# Patient Record
Sex: MALE | Race: WHITE | Employment: OTHER | URBAN - METROPOLITAN AREA
[De-identification: names, ages, dates, MRNs, and addresses within clinical notes are randomized per-mention and may not be internally consistent; named-entity substitution may affect disease eponyms.]

---

## 2017-01-01 ENCOUNTER — TELEPHONE (OUTPATIENT)
Dept: RESPIRATORY THERAPY | Age: 57
End: 2017-01-01

## 2017-01-03 ENCOUNTER — HOSPITAL ENCOUNTER (EMERGENCY)
Age: 57
Discharge: HOME | End: 2017-01-03
Attending: EMERGENCY MEDICINE
Payer: MEDICARE

## 2017-01-03 ENCOUNTER — APPOINTMENT (OUTPATIENT)
Dept: GENERAL RADIOLOGY | Age: 57
End: 2017-01-03
Attending: EMERGENCY MEDICINE
Payer: MEDICARE

## 2017-01-03 VITALS
DIASTOLIC BLOOD PRESSURE: 79 MMHG | RESPIRATION RATE: 17 BRPM | SYSTOLIC BLOOD PRESSURE: 124 MMHG | TEMPERATURE: 98.1 F | HEART RATE: 91 BPM | BODY MASS INDEX: 34.39 KG/M2 | HEIGHT: 74 IN | OXYGEN SATURATION: 93 % | WEIGHT: 268 LBS

## 2017-01-03 DIAGNOSIS — R07.89 ATYPICAL CHEST PAIN: Primary | ICD-10-CM

## 2017-01-03 LAB
ALBUMIN SERPL BCP-MCNC: 3.7 G/DL (ref 3.5–5)
ALBUMIN/GLOB SERPL: 0.9 {RATIO} (ref 1.1–2.2)
ALP SERPL-CCNC: 178 U/L (ref 45–117)
ALT SERPL-CCNC: 21 U/L (ref 12–78)
ANION GAP BLD CALC-SCNC: 7 MMOL/L (ref 5–15)
AST SERPL W P-5'-P-CCNC: 14 U/L (ref 15–37)
ATRIAL RATE: 91 BPM
BASOPHILS # BLD AUTO: 0.1 K/UL (ref 0–0.1)
BASOPHILS # BLD: 1 % (ref 0–1)
BILIRUB SERPL-MCNC: 0.3 MG/DL (ref 0.2–1)
BNP SERPL-MCNC: 34 PG/ML (ref 0–100)
BUN SERPL-MCNC: 9 MG/DL (ref 6–20)
BUN/CREAT SERPL: 10 (ref 12–20)
CALCIUM SERPL-MCNC: 9.1 MG/DL (ref 8.5–10.1)
CALCULATED P AXIS, ECG09: 54 DEGREES
CALCULATED R AXIS, ECG10: 18 DEGREES
CALCULATED T AXIS, ECG11: 62 DEGREES
CHLORIDE SERPL-SCNC: 101 MMOL/L (ref 97–108)
CO2 SERPL-SCNC: 27 MMOL/L (ref 21–32)
CREAT SERPL-MCNC: 0.91 MG/DL (ref 0.7–1.3)
DIAGNOSIS, 93000: NORMAL
EOSINOPHIL # BLD: 0.2 K/UL (ref 0–0.4)
EOSINOPHIL NFR BLD: 3 % (ref 0–7)
ERYTHROCYTE [DISTWIDTH] IN BLOOD BY AUTOMATED COUNT: 16.8 % (ref 11.5–14.5)
GLOBULIN SER CALC-MCNC: 4 G/DL (ref 2–4)
GLUCOSE SERPL-MCNC: 109 MG/DL (ref 65–100)
HCT VFR BLD AUTO: 42.2 % (ref 36.6–50.3)
HGB BLD-MCNC: 13.7 G/DL (ref 12.1–17)
LYMPHOCYTES # BLD AUTO: 26 % (ref 12–49)
LYMPHOCYTES # BLD: 2 K/UL (ref 0.8–3.5)
MCH RBC QN AUTO: 29.3 PG (ref 26–34)
MCHC RBC AUTO-ENTMCNC: 32.5 G/DL (ref 30–36.5)
MCV RBC AUTO: 90.4 FL (ref 80–99)
MONOCYTES # BLD: 0.7 K/UL (ref 0–1)
MONOCYTES NFR BLD AUTO: 8 % (ref 5–13)
NEUTS SEG # BLD: 5 K/UL (ref 1.8–8)
NEUTS SEG NFR BLD AUTO: 62 % (ref 32–75)
P-R INTERVAL, ECG05: 208 MS
PLATELET # BLD AUTO: 299 K/UL (ref 150–400)
POTASSIUM SERPL-SCNC: 3.6 MMOL/L (ref 3.5–5.1)
PROT SERPL-MCNC: 7.7 G/DL (ref 6.4–8.2)
Q-T INTERVAL, ECG07: 370 MS
QRS DURATION, ECG06: 96 MS
QTC CALCULATION (BEZET), ECG08: 455 MS
RBC # BLD AUTO: 4.67 M/UL (ref 4.1–5.7)
SODIUM SERPL-SCNC: 135 MMOL/L (ref 136–145)
TROPONIN I SERPL-MCNC: <0.04 NG/ML
TROPONIN I SERPL-MCNC: <0.04 NG/ML
VENTRICULAR RATE, ECG03: 91 BPM
WBC # BLD AUTO: 8 K/UL (ref 4.1–11.1)

## 2017-01-03 PROCEDURE — 74011000250 HC RX REV CODE- 250: Performed by: NURSE PRACTITIONER

## 2017-01-03 PROCEDURE — 94640 AIRWAY INHALATION TREATMENT: CPT

## 2017-01-03 PROCEDURE — 85025 COMPLETE CBC W/AUTO DIFF WBC: CPT | Performed by: EMERGENCY MEDICINE

## 2017-01-03 PROCEDURE — 36415 COLL VENOUS BLD VENIPUNCTURE: CPT | Performed by: NURSE PRACTITIONER

## 2017-01-03 PROCEDURE — 96374 THER/PROPH/DIAG INJ IV PUSH: CPT

## 2017-01-03 PROCEDURE — 99285 EMERGENCY DEPT VISIT HI MDM: CPT

## 2017-01-03 PROCEDURE — 74011250636 HC RX REV CODE- 250/636: Performed by: NURSE PRACTITIONER

## 2017-01-03 PROCEDURE — 80053 COMPREHEN METABOLIC PANEL: CPT | Performed by: EMERGENCY MEDICINE

## 2017-01-03 PROCEDURE — 93005 ELECTROCARDIOGRAM TRACING: CPT

## 2017-01-03 PROCEDURE — 77030029684 HC NEB SM VOL KT MONA -A

## 2017-01-03 PROCEDURE — 83880 ASSAY OF NATRIURETIC PEPTIDE: CPT | Performed by: NURSE PRACTITIONER

## 2017-01-03 PROCEDURE — 71020 XR CHEST PA LAT: CPT

## 2017-01-03 PROCEDURE — 84484 ASSAY OF TROPONIN QUANT: CPT | Performed by: EMERGENCY MEDICINE

## 2017-01-03 RX ORDER — HYDROMORPHONE HYDROCHLORIDE 1 MG/ML
1 INJECTION, SOLUTION INTRAMUSCULAR; INTRAVENOUS; SUBCUTANEOUS ONCE
Status: COMPLETED | OUTPATIENT
Start: 2017-01-03 | End: 2017-01-03

## 2017-01-03 RX ORDER — IPRATROPIUM BROMIDE AND ALBUTEROL SULFATE 2.5; .5 MG/3ML; MG/3ML
3 SOLUTION RESPIRATORY (INHALATION)
Status: COMPLETED | OUTPATIENT
Start: 2017-01-03 | End: 2017-01-03

## 2017-01-03 RX ADMIN — IPRATROPIUM BROMIDE AND ALBUTEROL SULFATE 3 ML: .5; 3 SOLUTION RESPIRATORY (INHALATION) at 17:22

## 2017-01-03 RX ADMIN — HYDROMORPHONE HYDROCHLORIDE 1 MG: 1 INJECTION, SOLUTION INTRAMUSCULAR; INTRAVENOUS; SUBCUTANEOUS at 16:28

## 2017-01-03 RX ADMIN — ALBUTEROL SULFATE 1 DOSE: 2.5 SOLUTION RESPIRATORY (INHALATION) at 16:21

## 2017-01-03 NOTE — ED TRIAGE NOTES
Pt arrives from home c/o generalized LEFT sided chest pain that radiates down his LEFT arm with SOB that started yesterday. Pt states that he was seen at [facility] for the same s/s and discharged home. Pt reports that he called his PCP and was told to come back to the ED.

## 2017-01-03 NOTE — ED PROVIDER NOTES
HPI Comments: 60 y.o. male with past medical history significant for coronary atherosclerosis, chronic systolic heart failure, CAD, HTN, high cholesterol, DM, CHF, COPD, old MI, and neurological disorder who presents from home with chief complaint of chest pain. Pt reports he has had chest pain which radiates down his L arm for 1 day accompanied by increased SOB, diaphoresis, productive cough, and intermittent L sided abdominal pain. He states he spoke with his PCP who initially recommended a breathing treatment, then recommended ED evaluation when the breathing treatment offered no relief. Pt notes he has recently bought 2 new pillows for SOB relief while lying in bed. He states he received a flu shot and he took his regular medications this morning. Pt notes he is on 3L oxygen continuously. Pt denies fever, nausea, vomiting, and diarrhea. There are no other acute medical concerns at this time. Social hx: smokes 2-3 cigarettes daily, formerly smoked 2-3 packs daily. Denies EtOH and drug use. PCP: Yesi Rea MD  Cardiologist: Wade Chandler MD    Note written by Rich Soto, as dictated by Kortney Ochoa NP 3:53 PM    The history is provided by the patient. Past Medical History:   Diagnosis Date    CAD (coronary artery disease)     CHF (congestive heart failure) (HCC)     Chronic systolic heart failure ([facility] [address] 4/12/2011    Coronary atherosclerosis of native coronary artery 4/12/2011    Diabetes ([facility] [address]     High cholesterol     Hypertension     MI, old     Neurological disorder     Other primary cardiomyopathies ([address] 4/12/2011       Past Surgical History:   Procedure Laterality Date    Hx carpal tunnel release       right wrist    Pr cardiac surg procedure unlist       stents x7         History reviewed. No pertinent family history.     Social History     Social History    Marital status:      Spouse name: N/A    Number of children: N/A    Years of education: N/A Occupational History    Not on file. Social History Main Topics    Smoking status: Current Every Day Smoker     Packs/day: 0.25    Smokeless tobacco: Not on file    Alcohol use No    Drug use: No    Sexual activity: Not on file     Other Topics Concern    Not on file     Social History Narrative         ALLERGIES: Review of patient's allergies indicates no known allergies. Review of Systems   Constitutional: Positive for diaphoresis. Negative for fever. Respiratory: Positive for cough and shortness of breath. Cardiovascular: Positive for chest pain. Gastrointestinal: Positive for abdominal pain. Negative for diarrhea, nausea and vomiting. All other systems reviewed and are negative. Vitals:    01/03/17 1403   BP: 108/73   Pulse: 95   Resp: 20   Temp: 98.8 °F (37.1 °C)   SpO2: 96%   Weight: 121.6 kg (268 lb)   Height: 6' 2\" (1.88 m)            Physical Exam   Constitutional: He is oriented to person, place, and time. He appears well-developed and well-nourished. No distress. HENT:   Head: Atraumatic. Nose: Nose normal.   Mouth/Throat: No oropharyngeal exudate. Eyes: Conjunctivae and EOM are normal. Right eye exhibits no discharge. Left eye exhibits no discharge. No scleral icterus. Neck: Normal range of motion. Neck supple. No JVD present. No tracheal deviation present. No thyromegaly present. Cardiovascular: Normal rate, regular rhythm, normal heart sounds, intact distal pulses and normal pulses. PMI is not displaced. Exam reveals no gallop and no friction rub. No murmur heard. Pulmonary/Chest: No stridor. No respiratory distress. He has decreased breath sounds in the right lower field and the left lower field. He has no wheezes. He has no rhonchi. He has no rales. He exhibits no tenderness. Abdominal: Soft. Bowel sounds are normal. He exhibits no distension and no mass. There is no hepatosplenomegaly, splenomegaly or hepatomegaly. There is no tenderness.  There is no rigidity, no rebound, no guarding, no CVA tenderness, no tenderness at McBurney's point and negative Fong's sign. Musculoskeletal: Normal range of motion. He exhibits no edema or tenderness. Lymphadenopathy:     He has no cervical adenopathy. Neurological: He is alert and oriented to person, place, and time. He has normal strength. No cranial nerve deficit or sensory deficit. He displays a negative Romberg sign. Coordination normal. GCS eye subscore is 4. GCS verbal subscore is 5. GCS motor subscore is 6. Skin: Skin is warm and dry. He is not diaphoretic. Psychiatric: He has a normal mood and affect. His behavior is normal.   Nursing note and vitals reviewed. MDM  Number of Diagnoses or Management Options  Atypical chest pain:   Diagnosis management comments:    * routine laboratory data   * CXR   * EKG    * Consult to PCP   * Analgesia       Amount and/or Complexity of Data Reviewed  Clinical lab tests: ordered and reviewed  Tests in the radiology section of CPT®: ordered and reviewed  Discussion of test results with the performing providers: yes  Review and summarize past medical records: yes  Discuss the patient with other providers: yes    Risk of Complications, Morbidity, and/or Mortality  General comments:    - stable, ambulatory pt in [facility] irate over the lack of narcotic distribution     Patient Progress  Patient progress: stable    ED Course       Procedures    CONSULT NOTE:  7:13 PM MARIA LUISA Lopez spoke with Dr. Jodie Sheehan, Consult for PCP. Discussed available diagnostic tests and clinical findings. He is in agreement with care plans as outlined. Dr. Jodie Sheehan reports Pt is a frequent ED visitor. He recommends discharging Pt home as he will see Pt tomorrow. 7:42 PM  Pt has been reevaluated. There are no new complaints, changes, or physical findings at this time. Medications have been reviewed w/ pt and/or family. Pt and/or family's questions have been answered.  Pt and/or family expressed good understanding of the dx/tx/rx and is in agreement with plan of care. Pt instructed and agreed to f/u w/ PCP and to return to ED upon further deterioration. Pt is ready for discharge. LABORATORY TESTS:  Recent Results (from the past 12 hour(s))   EKG, 12 LEAD, INITIAL    Collection Time: 01/03/17  2:12 PM   Result Value Ref Range    Ventricular Rate 91 BPM    Atrial Rate 91 BPM    P-R Interval 208 ms    QRS Duration 96 ms    Q-T Interval 370 ms    QTC Calculation (Bezet) 455 ms    Calculated P Axis 54 degrees    Calculated R Axis 18 degrees    Calculated T Axis 62 degrees    Diagnosis       Normal sinus rhythm  When compared with ECG of 26-AUG-2015 14:10,  Nonspecific T wave abnormality now evident in Lateral leads  Confirmed by Laura Snyder MD, Robbie Yoder ([id number]) on 1/3/2017 4:02:11 PM     TROPONIN I    Collection Time: 01/03/17  2:19 PM   Result Value Ref Range    Troponin-I, Qt. <0.04 <0.05 ng/mL   CBC WITH AUTOMATED DIFF    Collection Time: 01/03/17  2:19 PM   Result Value Ref Range    WBC 8.0 4.1 - 11.1 K/uL    RBC 4.67 4.10 - 5.70 M/uL    HGB 13.7 12.1 - 17.0 g/dL    HCT 42.2 36.6 - 50.3 %    MCV 90.4 80.0 - 99.0 FL    MCH 29.3 26.0 - 34.0 PG    MCHC 32.5 30.0 - 36.5 g/dL    RDW 16.8 (H) 11.5 - 14.5 %    PLATELET 993 760 - 945 K/uL    NEUTROPHILS 62 32 - 75 %    LYMPHOCYTES 26 12 - 49 %    MONOCYTES 8 5 - 13 %    EOSINOPHILS 3 0 - 7 %    BASOPHILS 1 0 - 1 %    ABS. NEUTROPHILS 5.0 1.8 - 8.0 K/UL    ABS. LYMPHOCYTES 2.0 0.8 - 3.5 K/UL    ABS. MONOCYTES 0.7 0.0 - 1.0 K/UL    ABS. EOSINOPHILS 0.2 0.0 - 0.4 K/UL    ABS.  BASOPHILS 0.1 0.0 - 0.1 K/UL   METABOLIC PANEL, COMPREHENSIVE    Collection Time: 01/03/17  2:19 PM   Result Value Ref Range    Sodium 135 (L) 136 - 145 mmol/L    Potassium 3.6 3.5 - 5.1 mmol/L    Chloride 101 97 - 108 mmol/L    CO2 27 21 - 32 mmol/L    Anion gap 7 5 - 15 mmol/L    Glucose 109 (H) 65 - 100 mg/dL    BUN 9 6 - 20 MG/DL    Creatinine 0.91 0.70 - 1.30 MG/DL    BUN/Creatinine ratio 10 (L) 12 - 20      GFR est AA >60 >60 ml/min/1.73m2    GFR est non-AA >60 >60 ml/min/1.73m2    Calcium 9.1 8.5 - 10.1 MG/DL    Bilirubin, total 0.3 0.2 - 1.0 MG/DL    ALT 21 12 - 78 U/L    AST 14 (L) 15 - 37 U/L    Alk. phosphatase 178 (H) 45 - 117 U/L    Protein, total 7.7 6.4 - 8.2 g/dL    Albumin 3.7 3.5 - 5.0 g/dL    Globulin 4.0 2.0 - 4.0 g/dL    A-G Ratio 0.9 (L) 1.1 - 2.2     BNP    Collection Time: 01/03/17  2:19 PM   Result Value Ref Range    BNP 34 0 - 100 pg/mL   EKG, 12 LEAD, INITIAL    Collection Time: 01/03/17  5:31 PM   Result Value Ref Range    Ventricular Rate 96 BPM    Atrial Rate 96 BPM    P-R Interval 200 ms    QRS Duration 96 ms    Q-T Interval 388 ms    QTC Calculation (Bezet) 490 ms    Calculated P Axis 61 degrees    Calculated R Axis 32 degrees    Calculated T Axis 62 degrees    Diagnosis       Sinus rhythm with premature atrial complexes  Prolonged QT  When compared with ECG of 03-JAN-2017 14:12,  premature atrial complexes are now present     TROPONIN I    Collection Time: 01/03/17  5:34 PM   Result Value Ref Range    Troponin-I, Qt. <0.04 <0.05 ng/mL       IMAGING RESULTS:  XR CHEST PA LAT   Final Result        Xr Chest Pa Lat    Result Date: 1/3/2017  INDICATION: . CP, SOB Additional history: Patient complains of generalized left-sided chest pain that radiates down the left arm with associated dyspnea that began yesterday. COMPARISON: Previous chest xray, 3/18/2015. Rodo Romero FINDINGS: PA and lateral view of the chest. . Lines/tubes/surgical: None. Heart/mediastinum: Unremarkable. Lungs/pleura: Chronic appearing lung changes without focal consolidation. No visualized pleural effusion or pneumothorax. Additional Comments: Degenerative changes in the spine. Remote, left rib fracture. Rodo Romero IMPRESSION: 1. Chronic appearing lung changes without evidence of acute process.          MEDICATIONS GIVEN:  Medications   albuterol 5mg / ipratropium 0.5mg neb solution (1 Dose Nebulization Given 1/3/17 1621)   HYDROmorphone (PF) (DILAUDID) injection 1 mg (1 mg IntraVENous Given 1/3/17 1628)   albuterol-ipratropium (DUO-NEB) 2.5 MG-0.5 MG/3 ML (3 mL Nebulization Given 1/3/17 1722)       IMPRESSION:  1. Atypical chest pain        PLAN:  1. Discharge Medication List as of 1/3/2017  7:23 PM      CONTINUE these medications which have NOT CHANGED    Details   omeprazole (PRILOSEC) 40 mg capsule Take 40 mg by mouth daily. , Historical Med      docusate sodium (COLACE) 100 mg capsule Take 100 mg by mouth two (2) times a day., Historical Med      LUBIPROSTONE (AMITIZA PO) Take 24 mg by mouth two (2) times a day., Historical Med      NAPROXEN PO Take 375 mg by mouth two (2) times a day., Historical Med      TIZANIDINE HCL (TIZANIDINE PO) Take 4 mg by mouth three (3) times daily. , Historical Med      rOPINIRole (REQUIP) 4 mg tab TAB Take 4 mg by mouth nightly., Historical Med      albuterol (PROVENTIL VENTOLIN) 2.5 mg /3 mL (0.083 %) nebulizer solution 2.5 mg by Nebulization route every four (4) hours. , Historical Med      morphine CR (MS CONTIN) 30 mg CR tablet Take 60 mg by mouth every twelve (12) hours. , Historical Med      HYDROmorphone (DILAUDID) 4 mg tablet Take 4 mg by mouth two (2) times a day., Historical Med      metoprolol (LOPRESSOR) 25 mg tablet Take 12.5 mg by mouth two (2) times a day., Historical Med      zolpidem (AMBIEN) 10 mg tablet Take 1 Tab by mouth nightly. , Print, Disp-30 Tab, R-2      atorvastatin (LIPITOR) 40 mg tablet Take 1 Tab by mouth daily. , Print, Disp-30 Tab, R-11      metoclopramide HCl (REGLAN) 10 mg tablet Take 10 mg by mouth nightly., Historical Med      aspirin 81 mg chewable tablet Take 1 Tab by mouth daily. , Print, Disp-100 Tab, R-3      nitroglycerin (NITROSTAT) 0.4 mg SL tablet 0.4 mg by SubLINGual route every five (5) minutes as needed for Chest Pain., Historical Med      ranolazine ER (RANEXA) 500 mg SR tablet Take 500 mg by mouth two (2) times a day., Historical Med tiotropium (SPIRIVA WITH HANDIHALER) 18 mcg inhalation capsule Take 1 Cap by inhalation daily. , Historical Med      amLODIPine (NORVASC) 10 mg tablet Take 10 mg by mouth daily. , Historical Med      fluticasone-salmeterol (ADVAIR DISKUS) 250-50 mcg/dose diskus inhaler Take 1 Puff by inhalation two (2) times a day., Historical Med      furosemide (LASIX) 40 mg tablet Take 40 mg by mouth two (2) times a day., Historical Med      isosorbide mononitrate ER (IMDUR) 60 mg CR tablet Take 60 mg by mouth every morning., Historical Med      lisinopril (PRINIVIL) 10 mg tablet Take 10 mg by mouth nightly. 1/2 tablet, Historical Med           2. Follow-up Information     Follow up With Details Comments Contact Info    Anette Swann MD In 1 day  [facility]  [phone]          3.  Supportive care     Return to ED if worse     Kalpana Pyle NP  7:42 PM

## 2017-01-03 NOTE — PROGRESS NOTES
01/03/17 1802   Post-Treatment   Breathing Pattern Regular   Left Breath Sounds Diminished   Right Breath Sounds Diminished   Pulse 108   SpO2 92 %   Respirations 22   Treatment Tolerance Well

## 2017-01-04 LAB
ATRIAL RATE: 96 BPM
CALCULATED P AXIS, ECG09: 61 DEGREES
CALCULATED R AXIS, ECG10: 32 DEGREES
CALCULATED T AXIS, ECG11: 62 DEGREES
DIAGNOSIS, 93000: NORMAL
P-R INTERVAL, ECG05: 200 MS
Q-T INTERVAL, ECG07: 388 MS
QRS DURATION, ECG06: 96 MS
QTC CALCULATION (BEZET), ECG08: 490 MS
VENTRICULAR RATE, ECG03: 96 BPM

## 2017-01-04 NOTE — DISCHARGE INSTRUCTIONS
Chest Pain: Care Instructions  Your Care Instructions  There are many things that can cause chest pain. Some are not serious and will get better on their own in a few days. But some kinds of chest pain need more testing and treatment. Your doctor may have recommended a follow-up visit in the next 8 to 12 hours. If you are not getting better, you may need more tests or treatment. Even though your doctor has released you, you still need to watch for any problems. The doctor carefully checked you, but sometimes problems can develop later. If you have new symptoms or if your symptoms do not get better, get medical care right away. If you have worse or different chest pain or pressure that lasts more than 5 minutes or you passed out (lost consciousness), call 911 or seek other emergency help right away. A medical visit is only one step in your treatment. Even if you feel better, you still need to do what your doctor recommends, such as going to all suggested follow-up appointments and taking medicines exactly as directed. This will help you recover and help prevent future problems. How can you care for yourself at home? · Rest until you feel better. · Take your medicine exactly as prescribed. Call your doctor if you think you are having a problem with your medicine. · Do not drive after taking a prescription pain medicine. When should you call for help? Call 911 if:  · You passed out (lost consciousness). · You have severe difficulty breathing. · You have symptoms of a heart attack. These may include:  ¨ Chest pain or pressure, or a strange feeling in your chest.  ¨ Sweating. ¨ Shortness of breath. ¨ Nausea or vomiting. ¨ Pain, pressure, or a strange feeling in your back, neck, jaw, or upper belly or in one or both shoulders or arms. ¨ Lightheadedness or sudden weakness. ¨ A fast or irregular heartbeat.   After you call 911, the  may tell you to chew 1 adult-strength or 2 to 4 low-dose aspirin. Wait for an ambulance. Do not try to drive yourself. Call your doctor today if:  · You have any trouble breathing. · Your chest pain gets worse. · You are dizzy or lightheaded, or you feel like you may faint. · You are not getting better as expected. · You are having new or different chest pain. Where can you learn more? Go to http://clover-livia.info/. Enter A120 in the search box to learn more about \"Chest Pain: Care Instructions. \"  Current as of: May 27, 2016  Content Version: 11.1  © 8481-4007 [employer]. Care instructions adapted under license by [employer] (which disclaims liability or warranty for this information). If you have questions about a medical condition or this instruction, always ask your healthcare professional. [address] any warranty or liability for your use of this information. We hope that we have addressed all of your medical concerns. The examination and treatment you received in the Emergency Department were for an emergent problem and were not intended as complete care. It is important that you follow up with your healthcare provider(s) for ongoing care. If your symptoms worsen or do not improve as expected, and you are unable to reach your usual health care provider(s), you should return to the Emergency Department. Today's healthcare is undergoing tremendous change, and patient satisfaction surveys are one of the many tools to assess the quality of medical care. You may receive a survey from the [employer] organization regarding your experience in the Emergency Department. I hope that your experience has been completely positive, particularly the medical care that I provided. As such, please participate in the survey; anything less than excellent does not meet my expectations or intentions.         [facility] and [facility] participate in nationally recognized quality of care measures. If your blood pressure is greater than 120/80, as reported below, we urge that you seek medical care to address the potential of high blood pressure, commonly known as hypertension. Hypertension can be hereditary or can be caused by certain medical conditions, pain, stress, or \"white coat syndrome. \"       Please make an appointment with your health care provider(s) for follow up of your Emergency Department visit. VITALS:   Patient Vitals for the past 8 hrs:   Temp Pulse Resp BP SpO2   01/03/17 1900 - 92 23 134/65 92 %   01/03/17 1845 - 98 18 141/86 92 %   01/03/17 1830 - 94 20 126/79 -   01/03/17 1800 - 98 18 135/80 92 %   01/03/17 1730 - (!) 101 22 120/77 95 %   01/03/17 1722 - - - - 92 %   01/03/17 1700 - 93 22 125/62 93 %   01/03/17 1630 - 89 19 121/72 92 %   01/03/17 1622 - 89 19 116/74 93 %   01/03/17 1621 - - - - 93 %   01/03/17 1614 - 92 21 - 94 %   01/03/17 1613 - - - - 96 %   01/03/17 1600 - 86 22 108/58 93 %   01/03/17 1549 - - - 114/73 -   01/03/17 1403 98.8 °F (37.1 °C) 95 20 108/73 96 %          Thank you for allowing us to provide you with medical care today. We realize that you have many choices for your emergency care needs. Please choose us in the future for any continued health care needs. Wiliam Quijano [facility], [address] [place], [place] [address] 20.   Office: [phone]            Recent Results (from the past 24 hour(s))   EKG, 12 LEAD, INITIAL    Collection Time: 01/03/17  2:12 PM   Result Value Ref Range    Ventricular Rate 91 BPM    Atrial Rate 91 BPM    P-R Interval 208 ms    QRS Duration 96 ms    Q-T Interval 370 ms    QTC Calculation (Bezet) 455 ms    Calculated P Axis 54 degrees    Calculated R Axis 18 degrees    Calculated T Axis 62 degrees    Diagnosis       Normal sinus rhythm  When compared with ECG of 26-AUG-2015 14:10,  Nonspecific T wave abnormality now evident in Lateral leads  Confirmed by Azucena Cancino MD, Onel Hunter ([id number]) on 1/3/2017 4:02:11 PM     TROPONIN I    Collection Time: 01/03/17  2:19 PM   Result Value Ref Range    Troponin-I, Qt. <0.04 <0.05 ng/mL   CBC WITH AUTOMATED DIFF    Collection Time: 01/03/17  2:19 PM   Result Value Ref Range    WBC 8.0 4.1 - 11.1 K/uL    RBC 4.67 4.10 - 5.70 M/uL    HGB 13.7 12.1 - 17.0 g/dL    HCT 42.2 36.6 - 50.3 %    MCV 90.4 80.0 - 99.0 FL    MCH 29.3 26.0 - 34.0 PG    MCHC 32.5 30.0 - 36.5 g/dL    RDW 16.8 (H) 11.5 - 14.5 %    PLATELET 499 698 - 993 K/uL    NEUTROPHILS 62 32 - 75 %    LYMPHOCYTES 26 12 - 49 %    MONOCYTES 8 5 - 13 %    EOSINOPHILS 3 0 - 7 %    BASOPHILS 1 0 - 1 %    ABS. NEUTROPHILS 5.0 1.8 - 8.0 K/UL    ABS. LYMPHOCYTES 2.0 0.8 - 3.5 K/UL    ABS. MONOCYTES 0.7 0.0 - 1.0 K/UL    ABS. EOSINOPHILS 0.2 0.0 - 0.4 K/UL    ABS. BASOPHILS 0.1 0.0 - 0.1 K/UL   METABOLIC PANEL, COMPREHENSIVE    Collection Time: 01/03/17  2:19 PM   Result Value Ref Range    Sodium 135 (L) 136 - 145 mmol/L    Potassium 3.6 3.5 - 5.1 mmol/L    Chloride 101 97 - 108 mmol/L    CO2 27 21 - 32 mmol/L    Anion gap 7 5 - 15 mmol/L    Glucose 109 (H) 65 - 100 mg/dL    BUN 9 6 - 20 MG/DL    Creatinine 0.91 0.70 - 1.30 MG/DL    BUN/Creatinine ratio 10 (L) 12 - 20      GFR est AA >60 >60 ml/min/1.73m2    GFR est non-AA >60 >60 ml/min/1.73m2    Calcium 9.1 8.5 - 10.1 MG/DL    Bilirubin, total 0.3 0.2 - 1.0 MG/DL    ALT 21 12 - 78 U/L    AST 14 (L) 15 - 37 U/L    Alk.  phosphatase 178 (H) 45 - 117 U/L    Protein, total 7.7 6.4 - 8.2 g/dL    Albumin 3.7 3.5 - 5.0 g/dL    Globulin 4.0 2.0 - 4.0 g/dL    A-G Ratio 0.9 (L) 1.1 - 2.2     BNP    Collection Time: 01/03/17  2:19 PM   Result Value Ref Range    BNP 34 0 - 100 pg/mL   EKG, 12 LEAD, INITIAL    Collection Time: 01/03/17  5:31 PM   Result Value Ref Range    Ventricular Rate 96 BPM    Atrial Rate 96 BPM    P-R Interval 200 ms    QRS Duration 96 ms    Q-T Interval 388 ms    QTC Calculation (Bezet) 490 ms    Calculated P Axis 61 degrees Calculated R Axis 32 degrees    Calculated T Axis 62 degrees    Diagnosis       Sinus rhythm with premature atrial complexes  Prolonged QT  When compared with ECG of 03-JAN-2017 14:12,  premature atrial complexes are now present     TROPONIN I    Collection Time: 01/03/17  5:34 PM   Result Value Ref Range    Troponin-I, Qt. <0.04 <0.05 ng/mL       Xr Chest Pa Lat    Result Date: 1/3/2017  INDICATION: . CP, SOB Additional history: Patient complains of generalized left-sided chest pain that radiates down the left arm with associated dyspnea that began yesterday. COMPARISON: Previous chest xray, 3/18/2015. Nivia Yanni FINDINGS: PA and lateral view of the chest. . Lines/tubes/surgical: None. Heart/mediastinum: Unremarkable. Lungs/pleura: Chronic appearing lung changes without focal consolidation. No visualized pleural effusion or pneumothorax. Additional Comments: Degenerative changes in the spine. Remote, left rib fracture. Nivia Yanni IMPRESSION: 1. Chronic appearing lung changes without evidence of acute process.

## 2017-05-01 ENCOUNTER — APPOINTMENT (OUTPATIENT)
Dept: CT IMAGING | Age: 57
End: 2017-05-01
Attending: INTERNAL MEDICINE
Payer: MEDICARE

## 2017-05-01 ENCOUNTER — HOSPITAL ENCOUNTER (OUTPATIENT)
Age: 57
Setting detail: OBSERVATION
Discharge: HOME | End: 2017-05-02
Attending: EMERGENCY MEDICINE | Admitting: INTERNAL MEDICINE
Payer: MEDICARE

## 2017-05-01 ENCOUNTER — APPOINTMENT (OUTPATIENT)
Dept: GENERAL RADIOLOGY | Age: 57
End: 2017-05-01
Attending: EMERGENCY MEDICINE
Payer: MEDICARE

## 2017-05-01 DIAGNOSIS — R07.9 CHEST PAIN, UNSPECIFIED TYPE: Primary | ICD-10-CM

## 2017-05-01 DIAGNOSIS — R11.0 NAUSEA WITHOUT VOMITING: ICD-10-CM

## 2017-05-01 DIAGNOSIS — I25.110 CORONARY ARTERY DISEASE INVOLVING NATIVE HEART WITH UNSTABLE ANGINA PECTORIS, UNSPECIFIED VESSEL OR LESION TYPE (HCC): ICD-10-CM

## 2017-05-01 LAB
ALBUMIN SERPL BCP-MCNC: 3.9 G/DL (ref 3.5–5)
ALBUMIN/GLOB SERPL: 1 {RATIO} (ref 1.1–2.2)
ALP SERPL-CCNC: 151 U/L (ref 45–117)
ALT SERPL-CCNC: 27 U/L (ref 12–78)
ANION GAP BLD CALC-SCNC: 7 MMOL/L (ref 5–15)
AST SERPL W P-5'-P-CCNC: 8 U/L (ref 15–37)
ATRIAL RATE: 89 BPM
BASOPHILS # BLD AUTO: 0.3 K/UL (ref 0–0.1)
BASOPHILS # BLD: 2 % (ref 0–1)
BILIRUB SERPL-MCNC: 0.4 MG/DL (ref 0.2–1)
BUN SERPL-MCNC: 22 MG/DL (ref 6–20)
BUN/CREAT SERPL: 16 (ref 12–20)
CALCIUM SERPL-MCNC: 10.2 MG/DL (ref 8.5–10.1)
CALCULATED P AXIS, ECG09: 46 DEGREES
CALCULATED R AXIS, ECG10: 19 DEGREES
CALCULATED T AXIS, ECG11: 58 DEGREES
CHLORIDE SERPL-SCNC: 98 MMOL/L (ref 97–108)
CO2 SERPL-SCNC: 30 MMOL/L (ref 21–32)
CREAT SERPL-MCNC: 1.37 MG/DL (ref 0.7–1.3)
D DIMER PPP FEU-MCNC: 0.8 MG/L FEU (ref 0–0.65)
DIAGNOSIS, 93000: NORMAL
DIFFERENTIAL METHOD BLD: ABNORMAL
EOSINOPHIL # BLD: 0.4 K/UL (ref 0–0.4)
EOSINOPHIL NFR BLD: 3 % (ref 0–7)
ERYTHROCYTE [DISTWIDTH] IN BLOOD BY AUTOMATED COUNT: 18.1 % (ref 11.5–14.5)
EST. AVERAGE GLUCOSE BLD GHB EST-MCNC: 157 MG/DL
GLOBULIN SER CALC-MCNC: 4.1 G/DL (ref 2–4)
GLUCOSE SERPL-MCNC: 137 MG/DL (ref 65–100)
HBA1C MFR BLD: 7.1 % (ref 4.2–6.3)
HCT VFR BLD AUTO: 40.3 % (ref 36.6–50.3)
HGB BLD-MCNC: 13.4 G/DL (ref 12.1–17)
LYMPHOCYTES # BLD AUTO: 37 % (ref 12–49)
LYMPHOCYTES # BLD: 5.1 K/UL (ref 0.8–3.5)
MCH RBC QN AUTO: 28.7 PG (ref 26–34)
MCHC RBC AUTO-ENTMCNC: 33.3 G/DL (ref 30–36.5)
MCV RBC AUTO: 86.3 FL (ref 80–99)
MONOCYTES # BLD: 1.1 K/UL (ref 0–1)
MONOCYTES NFR BLD AUTO: 8 % (ref 5–13)
MYELOCYTES NFR BLD MANUAL: 2 %
NEUTS SEG # BLD: 6.7 K/UL (ref 1.8–8)
NEUTS SEG NFR BLD AUTO: 48 % (ref 32–75)
P-R INTERVAL, ECG05: 196 MS
PLATELET # BLD AUTO: 374 K/UL (ref 150–400)
POTASSIUM SERPL-SCNC: 4 MMOL/L (ref 3.5–5.1)
PROT SERPL-MCNC: 8 G/DL (ref 6.4–8.2)
Q-T INTERVAL, ECG07: 362 MS
QRS DURATION, ECG06: 98 MS
QTC CALCULATION (BEZET), ECG08: 440 MS
RBC # BLD AUTO: 4.67 M/UL (ref 4.1–5.7)
RBC MORPH BLD: ABNORMAL
SODIUM SERPL-SCNC: 135 MMOL/L (ref 136–145)
TROPONIN I BLD-MCNC: <0.04 NG/ML (ref 0–0.08)
TROPONIN I SERPL-MCNC: <0.04 NG/ML
VENTRICULAR RATE, ECG03: 89 BPM
WBC # BLD AUTO: 13.9 K/UL (ref 4.1–11.1)

## 2017-05-01 PROCEDURE — 36415 COLL VENOUS BLD VENIPUNCTURE: CPT | Performed by: INTERNAL MEDICINE

## 2017-05-01 PROCEDURE — 99218 HC RM OBSERVATION: CPT

## 2017-05-01 PROCEDURE — 93306 TTE W/DOPPLER COMPLETE: CPT

## 2017-05-01 PROCEDURE — 74011250636 HC RX REV CODE- 250/636: Performed by: EMERGENCY MEDICINE

## 2017-05-01 PROCEDURE — 74011250637 HC RX REV CODE- 250/637: Performed by: INTERNAL MEDICINE

## 2017-05-01 PROCEDURE — 84484 ASSAY OF TROPONIN QUANT: CPT | Performed by: EMERGENCY MEDICINE

## 2017-05-01 PROCEDURE — 85379 FIBRIN DEGRADATION QUANT: CPT | Performed by: INTERNAL MEDICINE

## 2017-05-01 PROCEDURE — 74011000258 HC RX REV CODE- 258: Performed by: INTERNAL MEDICINE

## 2017-05-01 PROCEDURE — 94640 AIRWAY INHALATION TREATMENT: CPT

## 2017-05-01 PROCEDURE — 74011250637 HC RX REV CODE- 250/637: Performed by: EMERGENCY MEDICINE

## 2017-05-01 PROCEDURE — 96374 THER/PROPH/DIAG INJ IV PUSH: CPT

## 2017-05-01 PROCEDURE — 74011636320 HC RX REV CODE- 636/320: Performed by: INTERNAL MEDICINE

## 2017-05-01 PROCEDURE — 93005 ELECTROCARDIOGRAM TRACING: CPT

## 2017-05-01 PROCEDURE — 96361 HYDRATE IV INFUSION ADD-ON: CPT

## 2017-05-01 PROCEDURE — 93970 EXTREMITY STUDY: CPT

## 2017-05-01 PROCEDURE — 74011000250 HC RX REV CODE- 250: Performed by: INTERNAL MEDICINE

## 2017-05-01 PROCEDURE — 99285 EMERGENCY DEPT VISIT HI MDM: CPT

## 2017-05-01 PROCEDURE — 77010033678 HC OXYGEN DAILY

## 2017-05-01 PROCEDURE — 71260 CT THORAX DX C+: CPT

## 2017-05-01 PROCEDURE — 80053 COMPREHEN METABOLIC PANEL: CPT | Performed by: EMERGENCY MEDICINE

## 2017-05-01 PROCEDURE — 85025 COMPLETE CBC W/AUTO DIFF WBC: CPT | Performed by: EMERGENCY MEDICINE

## 2017-05-01 PROCEDURE — 96376 TX/PRO/DX INJ SAME DRUG ADON: CPT

## 2017-05-01 PROCEDURE — 71010 XR CHEST PORT: CPT

## 2017-05-01 PROCEDURE — 83036 HEMOGLOBIN GLYCOSYLATED A1C: CPT | Performed by: INTERNAL MEDICINE

## 2017-05-01 PROCEDURE — 74011250636 HC RX REV CODE- 250/636: Performed by: INTERNAL MEDICINE

## 2017-05-01 RX ORDER — METFORMIN HYDROCHLORIDE 1000 MG/1
1000 TABLET ORAL 2 TIMES DAILY WITH MEALS
COMMUNITY
End: 2017-05-01 | Stop reason: SINTOL

## 2017-05-01 RX ORDER — ZOLPIDEM TARTRATE 5 MG/1
10 TABLET ORAL
Status: DISCONTINUED | OUTPATIENT
Start: 2017-05-01 | End: 2017-05-02 | Stop reason: HOSPADM

## 2017-05-01 RX ORDER — MAGNESIUM SULFATE 100 %
4 CRYSTALS MISCELLANEOUS AS NEEDED
Status: DISCONTINUED | OUTPATIENT
Start: 2017-05-01 | End: 2017-05-02 | Stop reason: HOSPADM

## 2017-05-01 RX ORDER — OXYBUTYNIN CHLORIDE 5 MG/1
5 TABLET ORAL
Status: DISCONTINUED | OUTPATIENT
Start: 2017-05-01 | End: 2017-05-02 | Stop reason: HOSPADM

## 2017-05-01 RX ORDER — AMLODIPINE BESYLATE 5 MG/1
5 TABLET ORAL 2 TIMES DAILY
Status: DISCONTINUED | OUTPATIENT
Start: 2017-05-01 | End: 2017-05-02 | Stop reason: HOSPADM

## 2017-05-01 RX ORDER — OXYBUTYNIN CHLORIDE 5 MG/1
5 TABLET ORAL
COMMUNITY
End: 2018-01-01

## 2017-05-01 RX ORDER — BISMUTH SUBSALICYLATE 262 MG
1 TABLET,CHEWABLE ORAL DAILY
COMMUNITY
End: 2018-01-01

## 2017-05-01 RX ORDER — MECLIZINE HCL 12.5 MG 12.5 MG/1
12.5 TABLET ORAL
Status: DISCONTINUED | OUTPATIENT
Start: 2017-05-01 | End: 2017-05-02 | Stop reason: HOSPADM

## 2017-05-01 RX ORDER — NORTRIPTYLINE HYDROCHLORIDE 25 MG/1
25 CAPSULE ORAL
COMMUNITY
End: 2018-01-01

## 2017-05-01 RX ORDER — RANOLAZINE 500 MG/1
1000 TABLET, EXTENDED RELEASE ORAL 2 TIMES DAILY
Status: DISCONTINUED | OUTPATIENT
Start: 2017-05-01 | End: 2017-05-02 | Stop reason: HOSPADM

## 2017-05-01 RX ORDER — IPRATROPIUM BROMIDE AND ALBUTEROL SULFATE 2.5; .5 MG/3ML; MG/3ML
3 SOLUTION RESPIRATORY (INHALATION)
Status: DISCONTINUED | OUTPATIENT
Start: 2017-05-01 | End: 2017-05-02 | Stop reason: HOSPADM

## 2017-05-01 RX ORDER — FUROSEMIDE 40 MG/1
80 TABLET ORAL 2 TIMES DAILY
Status: DISCONTINUED | OUTPATIENT
Start: 2017-05-01 | End: 2017-05-02 | Stop reason: HOSPADM

## 2017-05-01 RX ORDER — BUDESONIDE 0.5 MG/2ML
500 INHALANT ORAL
Status: DISCONTINUED | OUTPATIENT
Start: 2017-05-01 | End: 2017-05-02 | Stop reason: HOSPADM

## 2017-05-01 RX ORDER — ALLOPURINOL 300 MG/1
300 TABLET ORAL DAILY
Status: DISCONTINUED | OUTPATIENT
Start: 2017-05-01 | End: 2017-05-02 | Stop reason: HOSPADM

## 2017-05-01 RX ORDER — ISOSORBIDE MONONITRATE 60 MG/1
60 TABLET, EXTENDED RELEASE ORAL 2 TIMES DAILY
Status: DISCONTINUED | OUTPATIENT
Start: 2017-05-01 | End: 2017-05-02 | Stop reason: HOSPADM

## 2017-05-01 RX ORDER — LISINOPRIL 5 MG/1
2.5 TABLET ORAL
Status: DISCONTINUED | OUTPATIENT
Start: 2017-05-01 | End: 2017-05-01

## 2017-05-01 RX ORDER — ROPINIROLE 1 MG/1
8 TABLET, FILM COATED ORAL
Status: DISCONTINUED | OUTPATIENT
Start: 2017-05-01 | End: 2017-05-02 | Stop reason: HOSPADM

## 2017-05-01 RX ORDER — ASPIRIN 325 MG
325 TABLET ORAL DAILY
Status: DISCONTINUED | OUTPATIENT
Start: 2017-05-02 | End: 2017-05-02 | Stop reason: HOSPADM

## 2017-05-01 RX ORDER — SODIUM CHLORIDE 0.9 % (FLUSH) 0.9 %
10 SYRINGE (ML) INJECTION
Status: COMPLETED | OUTPATIENT
Start: 2017-05-01 | End: 2017-05-01

## 2017-05-01 RX ORDER — FLUOXETINE HYDROCHLORIDE 20 MG/1
20 CAPSULE ORAL DAILY
COMMUNITY
End: 2018-01-01

## 2017-05-01 RX ORDER — MECLIZINE HCL 12.5 MG 12.5 MG/1
12.5 TABLET ORAL
COMMUNITY
End: 2018-01-01

## 2017-05-01 RX ORDER — ATORVASTATIN CALCIUM 40 MG/1
80 TABLET, FILM COATED ORAL
Status: DISCONTINUED | OUTPATIENT
Start: 2017-05-01 | End: 2017-05-02 | Stop reason: HOSPADM

## 2017-05-01 RX ORDER — IPRATROPIUM BROMIDE AND ALBUTEROL SULFATE 2.5; .5 MG/3ML; MG/3ML
3 SOLUTION RESPIRATORY (INHALATION) EVERY 4 HOURS
Status: ON HOLD | COMMUNITY
End: 2018-01-01

## 2017-05-01 RX ORDER — METOPROLOL SUCCINATE 50 MG/1
50 TABLET, EXTENDED RELEASE ORAL DAILY
Status: DISCONTINUED | OUTPATIENT
Start: 2017-05-01 | End: 2017-05-02 | Stop reason: HOSPADM

## 2017-05-01 RX ORDER — METOPROLOL SUCCINATE 25 MG/1
25 TABLET, EXTENDED RELEASE ORAL DAILY
COMMUNITY
End: 2018-01-01

## 2017-05-01 RX ORDER — MORPHINE SULFATE 2 MG/ML
4 INJECTION, SOLUTION INTRAMUSCULAR; INTRAVENOUS
Status: COMPLETED | OUTPATIENT
Start: 2017-05-01 | End: 2017-05-01

## 2017-05-01 RX ORDER — ASPIRIN 325 MG
325 TABLET ORAL DAILY
COMMUNITY
End: 2018-01-01

## 2017-05-01 RX ORDER — ATORVASTATIN CALCIUM 80 MG/1
80 TABLET, FILM COATED ORAL DAILY
COMMUNITY

## 2017-05-01 RX ORDER — IPRATROPIUM BROMIDE 0.5 MG/2.5ML
0.5 SOLUTION RESPIRATORY (INHALATION)
Status: DISCONTINUED | OUTPATIENT
Start: 2017-05-01 | End: 2017-05-02 | Stop reason: HOSPADM

## 2017-05-01 RX ORDER — THERA TABS 400 MCG
1 TAB ORAL DAILY
Status: DISCONTINUED | OUTPATIENT
Start: 2017-05-01 | End: 2017-05-02 | Stop reason: HOSPADM

## 2017-05-01 RX ORDER — FLUOXETINE HYDROCHLORIDE 20 MG/1
20 CAPSULE ORAL DAILY
Status: DISCONTINUED | OUTPATIENT
Start: 2017-05-01 | End: 2017-05-02 | Stop reason: HOSPADM

## 2017-05-01 RX ORDER — FLUTICASONE PROPIONATE AND SALMETEROL 250; 50 UG/1; UG/1
1 POWDER RESPIRATORY (INHALATION) 2 TIMES DAILY
Status: DISCONTINUED | OUTPATIENT
Start: 2017-05-01 | End: 2017-05-01 | Stop reason: CLARIF

## 2017-05-01 RX ORDER — CLOPIDOGREL BISULFATE 75 MG/1
75 TABLET ORAL DAILY
Status: DISCONTINUED | OUTPATIENT
Start: 2017-05-01 | End: 2017-05-02 | Stop reason: HOSPADM

## 2017-05-01 RX ORDER — METOPROLOL TARTRATE 50 MG/1
50 TABLET ORAL DAILY
COMMUNITY
End: 2017-05-01

## 2017-05-01 RX ORDER — IBUPROFEN 200 MG
1 TABLET ORAL DAILY
Status: DISCONTINUED | OUTPATIENT
Start: 2017-05-01 | End: 2017-05-02 | Stop reason: HOSPADM

## 2017-05-01 RX ORDER — HYDROMORPHONE HYDROCHLORIDE 4 MG/1
4 TABLET ORAL 3 TIMES DAILY
Status: DISCONTINUED | OUTPATIENT
Start: 2017-05-01 | End: 2017-05-02 | Stop reason: HOSPADM

## 2017-05-01 RX ORDER — MORPHINE SULFATE 30 MG/1
60 TABLET, FILM COATED, EXTENDED RELEASE ORAL EVERY 12 HOURS
Status: DISCONTINUED | OUTPATIENT
Start: 2017-05-01 | End: 2017-05-02 | Stop reason: HOSPADM

## 2017-05-01 RX ORDER — PREDNISONE 5 MG/1
5 TABLET ORAL
COMMUNITY
End: 2018-01-01

## 2017-05-01 RX ORDER — ARFORMOTEROL TARTRATE 15 UG/2ML
15 SOLUTION RESPIRATORY (INHALATION)
Status: DISCONTINUED | OUTPATIENT
Start: 2017-05-01 | End: 2017-05-02 | Stop reason: HOSPADM

## 2017-05-01 RX ORDER — CLOPIDOGREL BISULFATE 75 MG/1
75 TABLET ORAL DAILY
COMMUNITY
End: 2018-01-01

## 2017-05-01 RX ORDER — TIZANIDINE 4 MG/1
4 TABLET ORAL 3 TIMES DAILY
Status: DISCONTINUED | OUTPATIENT
Start: 2017-05-01 | End: 2017-05-02 | Stop reason: HOSPADM

## 2017-05-01 RX ORDER — IBUPROFEN 200 MG
1 TABLET ORAL DAILY
Status: DISCONTINUED | OUTPATIENT
Start: 2017-05-02 | End: 2017-05-01

## 2017-05-01 RX ORDER — DOCUSATE SODIUM 100 MG/1
100 CAPSULE, LIQUID FILLED ORAL 2 TIMES DAILY
Status: DISCONTINUED | OUTPATIENT
Start: 2017-05-01 | End: 2017-05-02 | Stop reason: HOSPADM

## 2017-05-01 RX ORDER — PREDNISONE 10 MG/1
5 TABLET ORAL
Status: DISCONTINUED | OUTPATIENT
Start: 2017-05-01 | End: 2017-05-01

## 2017-05-01 RX ORDER — ALLOPURINOL 300 MG/1
300 TABLET ORAL DAILY
COMMUNITY
End: 2018-01-01

## 2017-05-01 RX ORDER — SODIUM CHLORIDE 9 MG/ML
100 INJECTION, SOLUTION INTRAVENOUS CONTINUOUS
Status: DISCONTINUED | OUTPATIENT
Start: 2017-05-01 | End: 2017-05-02

## 2017-05-01 RX ORDER — DEXTROSE 50 % IN WATER (D50W) INTRAVENOUS SYRINGE
12.5-25 AS NEEDED
Status: DISCONTINUED | OUTPATIENT
Start: 2017-05-01 | End: 2017-05-02 | Stop reason: HOSPADM

## 2017-05-01 RX ORDER — BUPROPION HYDROCHLORIDE 150 MG/1
150 TABLET, EXTENDED RELEASE ORAL
Status: DISCONTINUED | OUTPATIENT
Start: 2017-05-01 | End: 2017-05-02 | Stop reason: HOSPADM

## 2017-05-01 RX ORDER — NORTRIPTYLINE HYDROCHLORIDE 25 MG/1
25 CAPSULE ORAL
Status: DISCONTINUED | OUTPATIENT
Start: 2017-05-01 | End: 2017-05-02 | Stop reason: HOSPADM

## 2017-05-01 RX ORDER — MORPHINE SULFATE 4 MG/ML
4 INJECTION, SOLUTION INTRAMUSCULAR; INTRAVENOUS
Status: COMPLETED | OUTPATIENT
Start: 2017-05-01 | End: 2017-05-01

## 2017-05-01 RX ORDER — NITROGLYCERIN 0.4 MG/1
0.4 TABLET SUBLINGUAL
Status: DISCONTINUED | OUTPATIENT
Start: 2017-05-01 | End: 2017-05-02 | Stop reason: HOSPADM

## 2017-05-01 RX ORDER — LISINOPRIL 2.5 MG/1
2.5 TABLET ORAL
COMMUNITY
End: 2018-01-01

## 2017-05-01 RX ORDER — BUPROPION HYDROCHLORIDE 150 MG/1
150 TABLET, EXTENDED RELEASE ORAL
COMMUNITY
End: 2017-06-05

## 2017-05-01 RX ORDER — NITROGLYCERIN 0.4 MG/1
0.4 TABLET SUBLINGUAL AS NEEDED
Status: DISCONTINUED | OUTPATIENT
Start: 2017-05-01 | End: 2017-05-01 | Stop reason: SDUPTHER

## 2017-05-01 RX ADMIN — FUROSEMIDE 80 MG: 40 TABLET ORAL at 17:54

## 2017-05-01 RX ADMIN — NORTRIPTYLINE HYDROCHLORIDE 25 MG: 25 CAPSULE ORAL at 21:14

## 2017-05-01 RX ADMIN — ZOLPIDEM TARTRATE 10 MG: 5 TABLET ORAL at 21:14

## 2017-05-01 RX ADMIN — FLUOXETINE 20 MG: 20 CAPSULE ORAL at 11:25

## 2017-05-01 RX ADMIN — RANOLAZINE 1000 MG: 500 TABLET, FILM COATED, EXTENDED RELEASE ORAL at 13:02

## 2017-05-01 RX ADMIN — ATORVASTATIN CALCIUM 80 MG: 20 TABLET, FILM COATED ORAL at 20:07

## 2017-05-01 RX ADMIN — BUPROPION HYDROCHLORIDE 150 MG: 150 TABLET, EXTENDED RELEASE ORAL at 20:07

## 2017-05-01 RX ADMIN — Medication 4 MG: at 08:18

## 2017-05-01 RX ADMIN — RANOLAZINE 1000 MG: 500 TABLET, FILM COATED, EXTENDED RELEASE ORAL at 21:15

## 2017-05-01 RX ADMIN — CLOPIDOGREL BISULFATE 75 MG: 75 TABLET ORAL at 11:25

## 2017-05-01 RX ADMIN — TIZANIDINE 4 MG: 4 TABLET ORAL at 15:09

## 2017-05-01 RX ADMIN — AMLODIPINE BESYLATE 5 MG: 5 TABLET ORAL at 20:08

## 2017-05-01 RX ADMIN — ISOSORBIDE MONONITRATE 60 MG: 60 TABLET, EXTENDED RELEASE ORAL at 11:26

## 2017-05-01 RX ADMIN — MORPHINE SULFATE 60 MG: 30 TABLET, EXTENDED RELEASE ORAL at 20:07

## 2017-05-01 RX ADMIN — NITROGLYCERIN 0.4 MG: 0.4 TABLET SUBLINGUAL at 07:37

## 2017-05-01 RX ADMIN — IOPAMIDOL 100 ML: 612 INJECTION, SOLUTION INTRAVENOUS at 19:46

## 2017-05-01 RX ADMIN — IPRATROPIUM BROMIDE 0.5 MG: 0.5 SOLUTION RESPIRATORY (INHALATION) at 13:30

## 2017-05-01 RX ADMIN — HYDROMORPHONE HYDROCHLORIDE 4 MG: 2 TABLET ORAL at 20:08

## 2017-05-01 RX ADMIN — SODIUM CHLORIDE 100 ML: 900 INJECTION, SOLUTION INTRAVENOUS at 19:45

## 2017-05-01 RX ADMIN — BUDESONIDE 500 MCG: 0.5 INHALANT RESPIRATORY (INHALATION) at 20:47

## 2017-05-01 RX ADMIN — DOCUSATE SODIUM 100 MG: 100 CAPSULE, LIQUID FILLED ORAL at 17:54

## 2017-05-01 RX ADMIN — THERA TABS 1 TABLET: TAB at 11:26

## 2017-05-01 RX ADMIN — FUROSEMIDE 80 MG: 40 TABLET ORAL at 11:26

## 2017-05-01 RX ADMIN — ISOSORBIDE MONONITRATE 60 MG: 60 TABLET, EXTENDED RELEASE ORAL at 20:07

## 2017-05-01 RX ADMIN — TIZANIDINE 4 MG: 4 TABLET ORAL at 21:14

## 2017-05-01 RX ADMIN — HYDROMORPHONE HYDROCHLORIDE 4 MG: 2 TABLET ORAL at 11:34

## 2017-05-01 RX ADMIN — NITROGLYCERIN 0.4 MG: 0.4 TABLET SUBLINGUAL at 07:12

## 2017-05-01 RX ADMIN — MORPHINE SULFATE 60 MG: 30 TABLET, EXTENDED RELEASE ORAL at 11:24

## 2017-05-01 RX ADMIN — ARFORMOTEROL TARTRATE 15 MCG: 15 SOLUTION RESPIRATORY (INHALATION) at 20:47

## 2017-05-01 RX ADMIN — METOPROLOL SUCCINATE 50 MG: 50 TABLET, EXTENDED RELEASE ORAL at 11:48

## 2017-05-01 RX ADMIN — ALLOPURINOL 300 MG: 300 TABLET ORAL at 11:25

## 2017-05-01 RX ADMIN — DOCUSATE SODIUM 100 MG: 100 CAPSULE, LIQUID FILLED ORAL at 11:26

## 2017-05-01 RX ADMIN — AMLODIPINE BESYLATE 5 MG: 5 TABLET ORAL at 11:25

## 2017-05-01 RX ADMIN — Medication 10 ML: at 19:45

## 2017-05-01 RX ADMIN — HYDROMORPHONE HYDROCHLORIDE 4 MG: 2 TABLET ORAL at 14:29

## 2017-05-01 RX ADMIN — OXYBUTYNIN CHLORIDE 5 MG: 5 TABLET ORAL at 21:15

## 2017-05-01 RX ADMIN — Medication 4 MG: at 07:10

## 2017-05-01 RX ADMIN — ROPINIROLE HYDROCHLORIDE 8 MG: 1 TABLET, FILM COATED ORAL at 21:14

## 2017-05-01 RX ADMIN — IPRATROPIUM BROMIDE 0.5 MG: 0.5 SOLUTION RESPIRATORY (INHALATION) at 20:47

## 2017-05-01 RX ADMIN — SODIUM CHLORIDE 100 ML/HR: 900 INJECTION, SOLUTION INTRAVENOUS at 14:36

## 2017-05-01 NOTE — H&P
[address]   [address] [place] [address]   HISTORY AND PHYSICAL       Name:  Mamie Aguilera   MR#:  [id number]   :  1960   Account #:  [de-identified]        Date of Adm:  2017       PRIMARY ADMITTING DIAGNOSES   1. Atypical chest pain, worse with deep breathing, coughing or moving. Previous history of a negative stress test but positive catheterization. Agree with Cardiology as planned to admit to observation and proceed   to catheterization in the morning. The patient had been set up for   outpatient catheterization, but given his recurrent emergency room   visits, was felt to need inpatient observation admission. 2. History of sliding hiatal hernia, as well as history of esophageal   strictures. If catheterization is negative, will send for workup on the   outpatient basis with Gastroenterology. I will discuss this with Dr. Christo Garland upon discharge. 3. Leukocytosis, likely secondary to recent steroid use. SECONDARY DIAGNOSES   1. History of recurrent hospitalization admissions secondary to chest   pain, with negative cardiac workup in the past. He did have one   catheterization that was positive and had stent placed, but   interestingly, had a negative stress test prior to that. 2. History of dysphasia. 3. Cough, congestion. 4. Vitamin D deficiency. 5. Chronic angina. 6. Diabetes type 2.   7. Chronic pain. 8. Neuropathy. 9. History of chronic constipation. 10. History of hypoxemia, for which he is on oxygen at home. 11. Morbid obesity. 12. Obstructive sleep apnea. 13. Peripheral vascular disease. 14. Peripheral arterial disease. 15. Osteoarthritis. 16. Gastroesophageal reflux disease. 17. Coronary artery disease, with history of multiple previous stents. 18. Chronic obstructive pulmonary disease. 19. Congestive heart failure, with an ejection fraction of 45%. 20. History of cerebrovascular accident.    21. History of deep venous thrombosis. 22. History of abdominal aortic aneurysm. 23. History of psychiatric disorder. 24. Carpal tunnel syndrome. 25. Gastroparesis. PAST SURGICAL HISTORY INCLUDES   1. Carpal tunnel surgery. 2. Esophageal dilation secondary to strictures. 3. Abdominal aortic aneurysm repair intravenously. 4. History of right leg thrombectomy following AAA repair. 5. EGD in 12/2014, with dilation and improvement of symptoms. 6. Barium swallow in 03/2013 showing C5, C6 and C7 narrowing,   corresponding to osteophyte impingement upon the esophagus. 7. Gastric emptying study in 2013, with marked delay, consistent with   severe gastroparesis. 8. The patient had previous EGD in [facility] records in 2010,   showing sliding hiatal hernia. 9. The patient had a relatively recent negative colonoscopy in 01/2014. FAMILY HISTORY: Father with a history of COPD. Mother's history   unknown. SOCIAL HISTORY: The patient lives alone. He is currently   independent of activities of daily living. He has daily tobacco abuse,   about 2-3 packs per day. He reports that he is trying to cut back. He   smokes about 5-6 cigarettes a day, but is requesting a nicotine patch   at this time. No history of alcohol or drug use. He normally ambulates   with a cane, although he has been told he needs a walker, but he does   not have one as of yet. HOME MEDICATIONS   1. Advair Diskus 1 puff twice daily. 2. Ambien 10 mg at night. 3. Colace 100 mg p.o. b.i.d.   4. GoLYTELY as directed. 5. MS Contin 60 mg 3 times daily, extended release. 6. Multivitamin 1 tab p.o. daily. 7. Ranexa 1000 mg twice daily. 8. Requip 4 mg 2 tabs daily at bedtime. 9. Spiriva inhaler daily. 10. Ventolin inhaler 2 puffs as needed. 11. Wellbutrin 150 mg daily. 12. Albuterol 3 times daily as needed. 13. Norvasc 5 mg twice daily. 14. Aspirin 81 mg daily. 15. Atorvastatin 80 mg daily. 16. Plavix 75 mg daily.    17. Fluoxetine 20 mg daily. 18. Fluconazole 100 mg twice daily, recently prescribed. 19. Flonase 2 sprays each nostril daily. 20. Lasix 40 mg twice daily. 21. Hydromorphone 4 mg twice daily as needed. 22. Atrovent inhaler every 6 hours as needed. 23. Imdur 60 mg twice daily. 24. Lisinopril 2.5 mg daily. 25. Meclizine 25 mg twice daily as needed. 26. Metoprolol 25 mg 2 tabs daily. 27. Naproxen 500 mg twice daily as needed. 28. Nitrostat 0.4 as needed. 29. Nortriptyline 25 mg daily. 30. Omeprazole 20 mg daily. 31. Oxybutynin 5 mg daily. 33. Prednisone, which he was on from 03/29/2017 to 04/28/2017 daily. 34. Tizanidine 4 mg 3 times daily as needed. 35. Triamcinolone cream as directed. HISTORY OF PRESENT ILLNESS: The patient is a 49-year-old male   known to me from multiple ER visits, as well as hospitalizations, who   has a history of recurrent chest pain, and in fact was just in the   [facility] Emergency Room about a week ago. He had stenting of   the right coronary in 10/2017, after 3-4 admissions for chest pain, and   he did have a negative stress test prior to this. After his stent   procedure, he had another cath on 12/05/2016, after recurrent chest   pain, which indicated that the recent stent coronary was patent, and   that his stent from his circumflex to the anterior descending arteries   were also patent. However, he continued to have issues with chest   pain, and comes in almost weekly to the emergency room with   complaints of chest pain. Given his presentation, his plan of care was   discussed between Dr. Sally Hernandez and Dr. Elisha Dixon.  It was felt that he   would at least need another cardiac cath to rule out any cardiac origin   of his recurrent chest pain, as he has presented atypically in the past.   The patient was, therefore, being set up for outpatient cath test, but   again returned to the emergency room today, as he was already   scheduled for cath, it was felt that we could admit him to observation   and proceed to cath in the morning. If this was negative, we could then   discharge to home. PHYSICAL EXAMINATION   VITAL SIGNS: Temperature 97.9, pulse is 93, respirations of 14, blood   pressure 152/98, saturating 92% on 3 L nasal cannula. GENERAL: The patient is awake, alert. He is oriented to person, place   and time, not in acute distress. HEENT: Normocephalic, atraumatic. Pupils are round, equal and   reactive to light. Extraocular muscles are intact. NECK: Supple, no JVD, no carotid bruit. CARDIOVASCULAR: Regular S1, S2, with tenderness under his left   breast, as well as bilateral gynecomastia. Rate is tachycardic, but   otherwise regular S1, S2, no murmurs, gallops or rubs appreciated. LUNGS: Clear to auscultation bilaterally, with prolonged expiratory   phase. ABDOMEN: Obese, soft, nontender, nondistended, positive bowel   sounds. EXTREMITIES: He has trace edema of his lower extremities, 2+   palpable pulses. NEUROLOGIC: The patient is awake, alert and oriented. Speech,   language and memory is intact. Cranial nerves 2 through 12 are   grossly intact. No focal neurologic deficit is noted. PSYCHIATRIC: Mood and affect are appropriate. DIAGNOSTIC DATA: EKG unremarkable, shows normal sinus rhythm,   no ST or T-wave abnormality. Chest x-ray showed no evidence of   acute cardiopulmonary process. LABORATORY DATA: WBC 13.9, hemoglobin 13.4, hematocrit 40.3,   platelets 179. D-dimer 0.8. Sodium 135, potassium 4.0, chloride 98,   bicarbonate 30, BUN 22, creatinine 1.3, glucose 137. LFTs are grossly   within normal limits. Cardiac enzymes negative x1. ASSESSMENT AND PLAN:   1. Chest pain with a history of atypical presentation of coronary artery   disease in the past. Would proceed to catheterization. I will continue   him on hydration at this time, and recheck a BMP in the morning. I do   not see a need for Mucomyst at this time.  Will monitor BMP post catheterization. Continue him on his home medications including   aspirin, Plavix, Lipitor and metoprolol. Will hold lisinopril for now, given   contrast needed. 2. Elevated D-dimer. Rule out pulmonary embolism. Will check a CT   angio of the chest at this time. Continue hydration and monitor kidney   function. 3. Chronic history of tobacco abuse. The patient has been counseled. Will start the patient on a nicotine patch at this time. 4. History of esophageal strictures and sliding hiatal hernia. Likely, this   can be worked up on the outpatient basis. I will discuss with Dr. Gonzalez Fairly possible referral to Gastroenterology. 5. Leukocytosis, likely secondary to recent history of steroids. Will   monitor CBC. 6. Gastroparesis. Will check a hemoglobin A1c, start the patient on a   sliding scale insulin and ask Dietary to evaluate him.         DO JUSTYN Haynes / [facility]   D:  05/01/2017   14:31   T:  05/01/2017   15:26   Job #:  [id number]

## 2017-05-01 NOTE — CONSULTS
Full note dictated, discussed POC with patient if cath negative can likely discharge to home, discussed previous colonoscopy in 2014 negative reports hx of esophogeal stricture but EGD in 2010 negative did however show sliding hiatal hernia. Discussed follow-up outpatient with Dr. Reynold Mast and possible repeat EDG outpatient if pain not cardiac.

## 2017-05-01 NOTE — ED TRIAGE NOTES
Triage note: Pt arrives with c/o left sided chest pain that radiates down left arm with SOB starting around 6am. Hx 5 MI and 6 stents. Due for heart cath tomorrow morning.

## 2017-05-01 NOTE — H&P
[address] [facility], [facility]   HISTORY AND PHYSICAL       Name:  Kelsey Workman   MR#:  [id number]   :  1960   Account #:  [de-identified]        Date of Adm:  2017       REFERRING PHYSICIAN: Dr. Reinaldo Jackson in the emergency room. PRIMARY CARE PHYSICIAN: Dr. Nathanael Mclaughlin at [facility]. The patient came to the emergency room with chest pain, intermittent in   nature, under his left breast radiating down his left axilla and left flank   with a definite tactile component. He is not having midsternal chest   pain or pain in his shoulder, jaws or abdomen. He does have chronic   low back pain. The patient is well-known to me for many years. I have   not seen him in the last couple of years, he tends to go to [facility] for his acute care. In 2016, the patient had stenting of   the right coronary artery after 3-4 admissions for chest pain. After his   stent procedure, he had another catheterization on 2016,   which indicated that the recently stented right coronary was patent and   the stents in his circumflex and anterior descending arteries   were patent as well. His ejection fraction was 55%, LV filling pressure was 10. The   chest pains have now been going on for an unspecified period of time. PAST MEDICAL HISTORY: The patient has many other medical   problems including peripheral arterial disease and he has an   abdominal aortic aneurysm stent graft that was placed by Dr. Alina Mooney about   2 to 2-1/2 years ago at [facility]. He also has chronic   obstructive pulmonary disease and continues to smoke. Although he is   down to 2 cigarettes per day. He has hypertension, hyperlipidemia, diabetes,  chronic pain, and restless leg syndrome. PAST SURGICAL HISTORY: Includes carpal tunnel release of right   wrist and at least 7 stent procedures, and abdominal aortic stent graft. REVIEW OF SYSTEMS: Negative for fever or chills.  He does have a   cough with scant production, no hemoptysis. No blood per urine. No   blood per stool. No abdominal pain. He has chronic edema of his legs,   right greater than left. He was seen and treated for cellulitis by Dr. Dionisio Killian. MEDICATIONS AT HOME, IN [facility] CARE (still needs to be   reviewed and probably not accurate)   1. Aspirin 325 daily. 2. Clopidogrel 75 mg daily. 3. Nortriptyline 25 mg every night. 4. Prednisone 5 mg Mondays, Wednesdays, Fridays. 5. Allopurinol 300 mg daily. 6. DuoNeb with albuterol and ipratropium q.4h.   7. Ditropan 5 mg nightly. 8. Multivitamins 1 a day. 9. Prozac 20 mg daily. 10. Antivert 12.5 three times a day. 11. Atorvastatin 80 mg daily. 12. Bupropion  nightly. 13. Lisinopril 2.5 mg nightly. 14. Metoprolol succinate 25 mg twice a day. 15. Metformin 1000 mg 2 times a day. 16. Omeprazole 20 mg daily. 17. Docusate sodium 100 mg 2 times a day. 18. Naproxen p.o. 500 mg 2 times a day. 19. Tizanidine 4 mg 3 times a day. 20. Requip 8 mg nightly. 21. MS Contin 30 mg, takes 60 mg every 12 hours. 22. Hydromorphone 4 mg by mouth 3 times a day. 23. Ambien 10 mg nightly. 24. Nitroglycerin sublingual p.r.n.   25. Ranolazine 1000 mg 2 times a day. 26. Spiriva hand-held inhaler 18 mcg 1 cap daily. 27. Amlodipine 5 mg 2 times a day. 28. Advair Diskus 250/50 daily. 29. Furosemide 40 mg tablets, takes 80 mg 2 times a day. 30. Isosorbide mononitrate 60 mg 2 times a day. ALLERGIES: NO KNOWN ALLERGIES. PHYSICAL EXAMINATION   GENERAL: On exam, this is well-developed, pleasant gentleman who   looks his stated age. VITAL SIGNS: Pulse 93, respirations 16, blood pressure 127/81,   saturation 92% on 3 liters. HEENT: Unremarkable. NECK: Supple. No adenopathy. No neck vein distension. Carotid   pulses palpable, no bruits. Thyroid not enlarged. Trachea midline.    CHEST WALL: Nontender in the mid sternum, very tender under the   left breast extending towards the left axilla. He has mild bilateral   gynecomastia. ABDOMEN: Obese and nontender. No bruits, no ascites, no palpable   masses. EXTREMITIES: He has edema and erythema of the skin, right greater   than left, both with the edema and redness. The skin appears to be   intact. He has 2+ left pedal pulse, trace to 1+ right pedal pulse. NEUROLOGIC: The patient is awake, alert, and appropriate. No focal   motor signs. Normal speech, normal gait. EKG shows sinus rhythm, essentially normal.     LABORATORY: His white count is 13.9. Hemoglobin, hematocrit, and   platelets normal. Chemistries: BUN 22, creatinine 1.37, potassium 4.0,   troponin is normal at less than 0.04. Chest x-ray indicates no evidence of acute cardiopulmonary   process. This has been personally viewed given this assessment. Of interest, the patient is scheduled for an elective catheterization   here, actually by me, for ongoing recurrent chest pain. I have reviewed the catheterization reports from October 2016 and   December 2016. IMPRESSION   1. Atypical chest pain. The pain is under the left breast, worse with   deep breathing, coughing, or moving, worse with palpation. His   electrocardiogram is normal. His initial troponin is normal.   2. Elevated creatinine compared to his January baseline. 3. Chronic obstructive pulmonary disease. 4. Chronic pain. At this point, significant dependence on narcotics. 5. Peripheral arterial disease and aortic disease with stent graft in his   aorta about 2 to 2-1/2 years ago. 6. Restless leg syndrome. PLAN: Admit to telemetry for observation. Will screen for PE. Will plan   to proceed with catheterization tomorrow as originally planned. See   orders for details.         MD SHRAVAN Mullins / [facility]   D:  05/01/2017   09:27   T:  05/01/2017   10:30   Job #:  [id number]

## 2017-05-01 NOTE — ED PROVIDER NOTES
Patient is a 60 y.o. male presenting with chest pain and shortness of breath. Chest Pain (Angina)    Associated symptoms include shortness of breath. Shortness of Breath   Associated symptoms include chest pain. 61y M here with chest pain. Started around 6am today while in bed. Pain radiates into the L arm. Feels like prior cardiac chest pain. Took 325 ASA and 1 SL NTG but pain stayed the same. No shortness of breath. Some nausea. No vomiting. (+) diaphoresis. Scheduled to have cardiac cath tomorrow. No fever. No leg pain/swelling. No speech problems. No focal numbness/weakness. Past Medical History:   Diagnosis Date    CAD (coronary artery disease)     CHF (congestive heart failure) (formerly [facility])     Chronic systolic heart failure ([facility] [address] 4/12/2011    COPD (chronic obstructive pulmonary disease) (formerly [facility])     Coronary atherosclerosis of native coronary artery 4/12/2011    Diabetes ([facility] [address]     High cholesterol     Hypertension     MI, old     Neurological disorder     Other primary cardiomyopathies 4/12/2011    Restless leg syndrome        Past Surgical History:   Procedure Laterality Date    CARDIAC SURG PROCEDURE UNLIST      stents x7    HX CARPAL TUNNEL RELEASE      right wrist         No family history on file. Social History     Social History    Marital status:      Spouse name: N/A    Number of children: N/A    Years of education: N/A     Occupational History    Not on file. Social History Main Topics    Smoking status: Current Every Day Smoker     Packs/day: 0.25    Smokeless tobacco: Not on file    Alcohol use No    Drug use: No    Sexual activity: Not on file     Other Topics Concern    Not on file     Social History Narrative         ALLERGIES: Review of patient's allergies indicates no known allergies. Review of Systems   Respiratory: Positive for shortness of breath. Cardiovascular: Positive for chest pain. Review of Systems   Constitutional: (-) weight loss. HEENT: (-) stiff neck   Eyes: (-) discharge. Respiratory: (-) for cough. Cardiovascular: (-) syncope. Gastrointestinal: (-) blood in stool. Genitourinary: (-) hematuria. Musculoskeletal: (-) myalgias. Neurological: (-) seizure. Skin: (-) petechiae  Lymph/Immunologic: (-) enlarged lymph nodes  All other systems reviewed and are negative. Vitals:    05/01/17 0700   BP: 132/75   Pulse: 90   Resp: (!) 32   Temp: 98.4 °F (36.9 °C)   SpO2: (!) 89%   Weight: 142.9 kg (315 lb)   Height: 6' 2\" (1.88 m)            Physical Exam Nursing note and vitals reviewed. Constitutional: oriented to person, place, and time. Appears obese. No distress. Head: Normocephalic and atraumatic. Sclera anicteric  Nose: No rhinorrhea  Mouth/Throat: Oropharynx is clear and moist. Pharynx normal  Eyes: Conjunctivae are normal. Pupils are equal, round, and reactive to light. Right eye exhibits no discharge. Left eye exhibits no discharge. Neck: Painless normal range of motion. Neck supple. No LAD. Cardiovascular: Normal rate, regular rhythm, normal heart sounds and intact distal pulses. Exam reveals no gallop and no friction rub. No murmur heard. Pulmonary/Chest:  No respiratory distress. No wheezes. No rales. No rhonchi. No increased work of breathing. No accessory muscle use. Good air exchange throughout. Abdominal: soft, non-tender, no rebound or guarding. No hepatosplenomegaly. Normal bowel sounds throughout. Back: no tenderness to palpation, no deformities, no CVA tenderness  Extremities/Musculoskeletal: Normal range of motion. no tenderness. No edema. Distal extremities are neurovasc intact. Lymphadenopathy:   No adenopathy. Neurological:  Alert and oriented to person, place, and time. Coordination normal. CN 2-12 intact. Motor and sensory function intact. Skin: Skin is warm and dry. No rash noted. No pallor. MDM 61y M here with chest pain.  Extensive cardiac hx and this feels like cardiac chest pain to him. Needs labs, morphine, ntg. Will d/w cards. Scheduled for cath tomorrow. ED Course     ED EKG interpretation:  Rhythm: normal sinus rhythm; and regular . Rate (approx.): 89; Axis: normal; P wave: normal; QRS interval: normal ; ST/T wave: normal;  This EKG was interpreted by Noé Botello MD,ED Provider.     Procedures

## 2017-05-01 NOTE — ED NOTES
Bedside report given to Ml Oleary RN. All questions answered. Pt resting comfortably. V/S stable, and no distress noted.

## 2017-05-01 NOTE — IP AVS SNAPSHOT
Current Discharge Medication List  
  
CONTINUE these medications which have NOT CHANGED Dose & Instructions Dispensing Information Comments Morning Noon Evening Bedtime ADVAIR DISKUS 250-50 mcg/dose diskus inhaler Generic drug:  fluticasone-salmeterol Your last dose was: Your next dose is:    
   
   
 Dose:  1 Puff Take 1 Puff by inhalation two (2) times a day. Refills:  0  
     
   
   
   
  
 albuterol-ipratropium 2.5 mg-0.5 mg/3 ml Nebu Commonly known as:  Lizzie Negrete Your last dose was: Your next dose is:    
   
   
 Dose:  3 mL  
3 mL by Nebulization route every four (4) hours. Refills:  0  
     
   
   
   
  
 allopurinol 300 mg tablet Commonly known as:  Onnitatiana Sue Your last dose was: Your next dose is:    
   
   
 Dose:  300 mg Take 300 mg by mouth daily. Refills:  0  
     
   
   
   
  
 aspirin 325 mg tablet Commonly known as:  ASPIRIN Your last dose was: Your next dose is:    
   
   
 Dose:  325 mg Take 325 mg by mouth daily. Refills:  0  
     
   
   
   
  
 atorvastatin 80 mg tablet Commonly known as:  LIPITOR Your last dose was: Your next dose is:    
   
   
 Dose:  80 mg Take 80 mg by mouth daily. Refills:  0  
     
   
   
   
  
 buPROPion  mg SR tablet Commonly known as:  Nikko Apo Your last dose was: Your next dose is:    
   
   
 Dose:  150 mg Take 150 mg by mouth nightly. Refills:  0  
     
   
   
   
  
 clopidogrel 75 mg Tab Commonly known as:  PLAVIX Your last dose was: Your next dose is:    
   
   
 Dose:  75 mg Take 75 mg by mouth. Refills:  0  
     
   
   
   
  
 COLACE 100 mg capsule Generic drug:  docusate sodium Your last dose was: Your next dose is:    
   
   
 Dose:  100 mg Take 100 mg by mouth two (2) times a day. Refills:  0  
     
   
   
   
  
 DILAUDID 4 mg tablet Generic drug:  HYDROmorphone Your last dose was: Your next dose is:    
   
   
 Dose:  4 mg Take 4 mg by mouth three (3) times daily. Refills:  0 FLUoxetine 20 mg capsule Commonly known as:  PROzac Your last dose was: Your next dose is:    
   
   
 Dose:  20 mg Take 20 mg by mouth daily. Refills:  0 IMDUR 60 mg CR tablet Generic drug:  isosorbide mononitrate ER Your last dose was: Your next dose is:    
   
   
 Dose:  60 mg Take 60 mg by mouth two (2) times a day. Refills:  0  
     
   
   
   
  
 LASIX 40 mg tablet Generic drug:  furosemide Your last dose was: Your next dose is:    
   
   
 Dose:  80 mg Take 80 mg by mouth two (2) times a day. Refills:  0  
     
   
   
   
  
 lisinopril 2.5 mg tablet Commonly known as:  Tuan Dance Your last dose was: Your next dose is:    
   
   
 Dose:  2.5 mg Take 2.5 mg by mouth nightly. Refills:  0  
     
   
   
   
  
 meclizine 12.5 mg tablet Commonly known as:  ANTIVERT Your last dose was: Your next dose is:    
   
   
 Dose:  12.5 mg Take 12.5 mg by mouth three (3) times daily as needed. Refills:  0  
     
   
   
   
  
 metoprolol succinate 25 mg XL tablet Commonly known as:  TOPROL-XL Your last dose was: Your next dose is:    
   
   
 Dose:  25 mg Take 25 mg by mouth two (2) times a day. Refills:  0  
     
   
   
   
  
 MS CONTIN 30 mg CR tablet Generic drug:  morphine CR Your last dose was: Your next dose is:    
   
   
 Dose:  60 mg Take 60 mg by mouth every twelve (12) hours. Refills:  0  
     
   
   
   
  
 multivitamin tablet Commonly known as:  ONE A DAY Your last dose was: Your next dose is:    
   
   
 Dose:  1 Tab Take 1 Tab by mouth daily. Refills:  0 nitroglycerin 0.4 mg SL tablet Commonly known as:  NITROSTAT Your last dose was: Your next dose is:    
   
   
 Dose:  0.4 mg  
0.4 mg by SubLINGual route every five (5) minutes as needed for Chest Pain. Refills:  0  
     
   
   
   
  
 nortriptyline 25 mg capsule Commonly known as:  PAMELOR Your last dose was: Your next dose is:    
   
   
 Dose:  25 mg Take 25 mg by mouth nightly. Refills:  0 NORVASC 10 mg tablet Generic drug:  amLODIPine Your last dose was: Your next dose is:    
   
   
 Dose:  5 mg Take 5 mg by mouth two (2) times a day. Refills:  0  
     
   
   
   
  
 oxybutynin 5 mg tablet Commonly known as:  RRQSAPZD Your last dose was: Your next dose is:    
   
   
 Dose:  5 mg Take 5 mg by mouth nightly. Refills:  0  
     
   
   
   
  
 predniSONE 5 mg tablet Commonly known as:  Jewels Katie Your last dose was: Your next dose is:    
   
   
 Dose:  5 mg Take 5 mg by mouth every Monday, Wednesday, Friday. Refills:  0  
     
   
   
   
  
 RANEXA 500 mg SR tablet Generic drug:  ranolazine ER Your last dose was: Your next dose is:    
   
   
 Dose:  1000 mg Take 1,000 mg by mouth two (2) times a day. Refills:  0  
     
   
   
   
  
 rOPINIRole 4 mg Tab TAB Commonly known as:  Suyapa Jayme Your last dose was: Your next dose is:    
   
   
 Dose:  8 mg Take 8 mg by mouth nightly. Refills:  0 SPIRIVA WITH HANDIHALER 18 mcg inhalation capsule Generic drug:  tiotropium Your last dose was: Your next dose is:    
   
   
 Dose:  1 Cap Take 1 Cap by inhalation daily. Refills:  0 TIZANIDINE PO Your last dose was: Your next dose is:    
   
   
 Dose:  4 mg Take 4 mg by mouth three (3) times daily. Refills:  0 zolpidem 10 mg tablet Commonly known as:  AMBIEN Your last dose was: Your next dose is:    
   
   
 Dose:  10 mg Take 1 Tab by mouth nightly. Quantity:  30 Tab Refills:  2 ASK your doctor about these medications Dose & Instructions Dispensing Information Comments Morning Noon Evening Bedtime  
 albuterol 2.5 mg /3 mL (0.083 %) nebulizer solution Commonly known as:  PROVENTIL VENTOLIN Your last dose was: Your next dose is:    
   
   
 Dose:  2.5 mg  
2.5 mg by Nebulization route every four (4) hours as needed. Refills:  0  
     
   
   
   
  
 metFORMIN 1,000 mg tablet Commonly known as:  GLUCOPHAGE Your last dose was: Your next dose is:    
   
   
 Dose:  1000 mg Take 1,000 mg by mouth two (2) times daily (with meals). Refills:  0 NAPROXEN PO Your last dose was: Your next dose is:    
   
   
 Dose:  500 mg Take 500 mg by mouth two (2) times a day. Refills:  0  
     
   
   
   
  
 omeprazole 40 mg capsule Commonly known as:  PRILOSEC Your last dose was: Your next dose is:    
   
   
 Dose:  20 mg Take 20 mg by mouth daily. Refills:  0

## 2017-05-01 NOTE — PROCEDURES
[facility]  *** FINAL REPORT ***    Name: Cooper Jones  MRN: [id number]    Outpatient  : 11 Aug 1960  HIS Order #: [id number]  [address] [facility] Visit #: [id number]  Date: 01 May 2017    TYPE OF TEST: Peripheral Venous Testing    REASON FOR TEST  Pain in limb, Limb swelling, DVT suspected    Right Leg:-  Deep venous thrombosis:           No  Superficial venous thrombosis:    No  Deep venous insufficiency:        Not examined  Superficial venous insufficiency: Not examined    Left Leg:-  Deep venous thrombosis:           No  Superficial venous thrombosis:    No  Deep venous insufficiency:        Not examined  Superficial venous insufficiency: Not examined      INTERPRETATION/FINDINGS  PROCEDURE:  Color duplex ultrasound imaging of lower extremity veins. FINDINGS:       Right: The common femoral, deep femoral, femoral, popliteal,  posterior tibial, peroneal, and great saphenous are patent and without   evidence of thrombus;  each is fully compressible and there is no  narrowing of the flow channel on color Doppler imaging. Phasic flow  is observed in the common femoral vein. Left:   The common femoral, deep femoral, femoral, popliteal,  posterior tibial, peroneal, and great saphenous are patent and without   evidence of thrombus;  each is fully compressible and there is no  narrowing of the flow channel on color Doppler imaging. Phasic flow  is observed in the common femoral vein. IMPRESSION:  No evidence of right or left lower extremity vein  thrombosis. ADDITIONAL COMMENTS    I have personally reviewed the data relevant to the interpretation of  this  study.     TECHNOLOGIST: Markie Benedict RVT  Signed: 2017 03:49 PM    PHYSICIAN: Jessy Torres MD  Signed: 2017 09:05 AM

## 2017-05-01 NOTE — CARDIO/PULMONARY
Cardiac Wellness: CAD education folder to bedside of Emma Villalta. Mr. Fredrick Degroot is scheduled for a cardiac catheterization tomorrow, 5/2/17. Educated using teach back method. Reviewed CAD diagnosis definition and purpose of intervention. Mr. Fredrick Degroot stated he has had multiple stents in the past and is very familiar with cardiac caths, but has never participated in cardiac rehab. Identified pertinent CAD risk factors including previous CAD history, DM, HTN, hyperlipidemia, sedentary lifestyle, elevated BMI, and cigarette smoking. Patient is a 5-6 cigarette/day smoker (had smoked up to 3 packs/day in past). Counseled on smoking cessation. Pt stated he has discussed nicotine patch with cardiologist. Plans to quit. Explained [facility] staff will follow for results of cath, and provide additional education as needed. Emma Villalta verbalized understanding with questions answered.       Polly Humphrey RN

## 2017-05-01 NOTE — IP AVS SNAPSHOT
[address] 
[phone] Patient: Brian Stovall MRN: [id number] :1960 You are allergic to the following No active allergies Recent Documentation Height Weight BMI Smoking Status 1.88 m 137.4 kg 38.89 kg/m2 Current Every Day Smoker Emergency Contacts Name Discharge Info Relation Home Work Mobile [facility] DISCHARGE CAREGIVER [3] Friend [5] [phone] About your hospitalization You were admitted on:  May 1, 2017 You last received care in the:  [facility] 4 CV SERVICES UNIT You were discharged on:  May 2, 2017 Unit phone number:  [phone] Why you were hospitalized Your primary diagnosis was:  Not on File Providers Seen During Your Hospitalizations Provider Role Specialty Primary office phone Leona Koroma MD Attending Provider Emergency Medicine [phone] Amanda Julian MD Attending Provider Cardiology [phone] Your Primary Care Physician (PCP) Primary Care Physician Office Phone Office Fax Nancy Cunha [phone] Follow-up Information Follow up With Details Comments Contact Info Adina Peres MD   [address] [place] [address] 
[phone] Current Discharge Medication List  
  
CONTINUE these medications which have NOT CHANGED Dose & Instructions Dispensing Information Comments Morning Noon Evening Bedtime ADVAIR DISKUS 250-50 mcg/dose diskus inhaler Generic drug:  fluticasone-salmeterol Your last dose was: Your next dose is:    
   
   
 Dose:  1 Puff Take 1 Puff by inhalation two (2) times a day. Refills:  0  
     
   
   
   
  
 albuterol-ipratropium 2.5 mg-0.5 mg/3 ml Nebu Commonly known as:  Mervat Cole Your last dose was: Your next dose is:    
   
   
 Dose:  3 mL 3 mL by Nebulization route every four (4) hours. Refills:  0  
     
   
   
   
  
 allopurinol 300 mg tablet Commonly known as:  Onnitatiana Sue Your last dose was: Your next dose is:    
   
   
 Dose:  300 mg Take 300 mg by mouth daily. Refills:  0  
     
   
   
   
  
 aspirin 325 mg tablet Commonly known as:  ASPIRIN Your last dose was: Your next dose is:    
   
   
 Dose:  325 mg Take 325 mg by mouth daily. Refills:  0  
     
   
   
   
  
 atorvastatin 80 mg tablet Commonly known as:  LIPITOR Your last dose was: Your next dose is:    
   
   
 Dose:  80 mg Take 80 mg by mouth daily. Refills:  0  
     
   
   
   
  
 buPROPion  mg SR tablet Commonly known as:  Nikko Apo Your last dose was: Your next dose is:    
   
   
 Dose:  150 mg Take 150 mg by mouth nightly. Refills:  0  
     
   
   
   
  
 clopidogrel 75 mg Tab Commonly known as:  PLAVIX Your last dose was: Your next dose is:    
   
   
 Dose:  75 mg Take 75 mg by mouth. Refills:  0  
     
   
   
   
  
 COLACE 100 mg capsule Generic drug:  docusate sodium Your last dose was: Your next dose is:    
   
   
 Dose:  100 mg Take 100 mg by mouth two (2) times a day. Refills:  0  
     
   
   
   
  
 DILAUDID 4 mg tablet Generic drug:  HYDROmorphone Your last dose was: Your next dose is:    
   
   
 Dose:  4 mg Take 4 mg by mouth three (3) times daily. Refills:  0 FLUoxetine 20 mg capsule Commonly known as:  PROzac Your last dose was: Your next dose is:    
   
   
 Dose:  20 mg Take 20 mg by mouth daily. Refills:  0 IMDUR 60 mg CR tablet Generic drug:  isosorbide mononitrate ER Your last dose was: Your next dose is:    
   
   
 Dose:  60 mg Take 60 mg by mouth two (2) times a day. Refills:  0 LASIX 40 mg tablet Generic drug:  furosemide Your last dose was: Your next dose is:    
   
   
 Dose:  80 mg Take 80 mg by mouth two (2) times a day. Refills:  0  
     
   
   
   
  
 lisinopril 2.5 mg tablet Commonly known as:  Radha Gaster Your last dose was: Your next dose is:    
   
   
 Dose:  2.5 mg Take 2.5 mg by mouth nightly. Refills:  0  
     
   
   
   
  
 meclizine 12.5 mg tablet Commonly known as:  ANTIVERT Your last dose was: Your next dose is:    
   
   
 Dose:  12.5 mg Take 12.5 mg by mouth three (3) times daily as needed. Refills:  0  
     
   
   
   
  
 metoprolol succinate 25 mg XL tablet Commonly known as:  TOPROL-XL Your last dose was: Your next dose is:    
   
   
 Dose:  25 mg Take 25 mg by mouth two (2) times a day. Refills:  0  
     
   
   
   
  
 MS CONTIN 30 mg CR tablet Generic drug:  morphine CR Your last dose was: Your next dose is:    
   
   
 Dose:  60 mg Take 60 mg by mouth every twelve (12) hours. Refills:  0  
     
   
   
   
  
 multivitamin tablet Commonly known as:  ONE A DAY Your last dose was: Your next dose is:    
   
   
 Dose:  1 Tab Take 1 Tab by mouth daily. Refills:  0  
     
   
   
   
  
 nitroglycerin 0.4 mg SL tablet Commonly known as:  NITROSTAT Your last dose was: Your next dose is:    
   
   
 Dose:  0.4 mg  
0.4 mg by SubLINGual route every five (5) minutes as needed for Chest Pain. Refills:  0  
     
   
   
   
  
 nortriptyline 25 mg capsule Commonly known as:  PAMELOR Your last dose was: Your next dose is:    
   
   
 Dose:  25 mg Take 25 mg by mouth nightly. Refills:  0 NORVASC 10 mg tablet Generic drug:  amLODIPine Your last dose was: Your next dose is:    
   
   
 Dose:  5 mg Take 5 mg by mouth two (2) times a day. Refills:  0  
     
   
   
   
  
 oxybutynin 5 mg tablet Commonly known as:  SXHEHEIR Your last dose was: Your next dose is:    
   
   
 Dose:  5 mg Take 5 mg by mouth nightly. Refills:  0  
     
   
   
   
  
 predniSONE 5 mg tablet Commonly known as:  Bob Ames Your last dose was: Your next dose is:    
   
   
 Dose:  5 mg Take 5 mg by mouth every Monday, Wednesday, Friday. Refills:  0  
     
   
   
   
  
 RANEXA 500 mg SR tablet Generic drug:  ranolazine ER Your last dose was: Your next dose is:    
   
   
 Dose:  1000 mg Take 1,000 mg by mouth two (2) times a day. Refills:  0  
     
   
   
   
  
 rOPINIRole 4 mg Tab TAB Commonly known as:  Fartun Riojas Your last dose was: Your next dose is:    
   
   
 Dose:  8 mg Take 8 mg by mouth nightly. Refills:  0 SPIRIVA WITH HANDIHALER 18 mcg inhalation capsule Generic drug:  tiotropium Your last dose was: Your next dose is:    
   
   
 Dose:  1 Cap Take 1 Cap by inhalation daily. Refills:  0 TIZANIDINE PO Your last dose was: Your next dose is:    
   
   
 Dose:  4 mg Take 4 mg by mouth three (3) times daily. Refills:  0  
     
   
   
   
  
 zolpidem 10 mg tablet Commonly known as:  AMBIEN Your last dose was: Your next dose is:    
   
   
 Dose:  10 mg Take 1 Tab by mouth nightly. Quantity:  30 Tab Refills:  2 ASK your doctor about these medications Dose & Instructions Dispensing Information Comments Morning Noon Evening Bedtime  
 albuterol 2.5 mg /3 mL (0.083 %) nebulizer solution Commonly known as:  PROVENTIL VENTOLIN Your last dose was: Your next dose is:    
   
   
 Dose:  2.5 mg  
2.5 mg by Nebulization route every four (4) hours as needed. Refills:  0 metFORMIN 1,000 mg tablet Commonly known as:  GLUCOPHAGE Your last dose was: Your next dose is:    
   
   
 Dose:  1000 mg Take 1,000 mg by mouth two (2) times daily (with meals). Refills:  0 NAPROXEN PO Your last dose was: Your next dose is:    
   
   
 Dose:  500 mg Take 500 mg by mouth two (2) times a day. Refills:  0  
     
   
   
   
  
 omeprazole 40 mg capsule Commonly known as:  PRILOSEC Your last dose was: Your next dose is:    
   
   
 Dose:  20 mg Take 20 mg by mouth daily. Refills:  0 Discharge Instructions Smoking Cessation Program: Below you will find a link to a text/email based smoking cessation program. The program is individualized to meet the patient's needs. To enroll use this link [email or web address]. Discharge Orders None Introducing [facility] SERVICES! Tanis Epley introduces [facility] patient portal. Now you can access parts of your medical record, email your doctor's office, and request medication refills online. 1. In your internet browser, go to [email or web address] 2. Click on the First Time User? Click Here link in the Sign In box. You will see the New Member Sign Up page. 3. Enter your [facility] Access Code exactly as it appears below. You will not need to use this code after youve completed the sign-up process. If you do not sign up before the expiration date, you must request a new code. · [employer] Access Code: [email or web address] Expires: 7/26/2017 10:49 AM 
 
4. Enter the last four digits of your Social Security Number (xxxx) and Date of Birth (mm/dd/yyyy) as indicated and click Submit. You will be taken to the next sign-up page. 5. Create a [facility] ID. This will be your [facility] login ID and cannot be changed, so think of one that is secure and easy to remember. 6. Create a [employer] password. You can change your password at any time. 7. Enter your Password Reset Question and Answer. This can be used at a later time if you forget your password. 8. Enter your e-mail address. You will receive e-mail notification when new information is available in [address] 9. Click Sign Up. You can now view and download portions of your medical record. 10. Click the Download Summary menu link to download a portable copy of your medical information. If you have questions, please visit the Frequently Asked Questions section of the [employer] website. Remember, [employer] is NOT to be used for urgent needs. For medical emergencies, dial 911. Now available from your iPhone and Android! General Information Please provide this summary of care documentation to your next provider. Patient Signature:  ____________________________________________________________ Date:  ____________________________________________________________  
  
Marlo Gutierrez Provider Signature:  ____________________________________________________________ Date:  ____________________________________________________________

## 2017-05-01 NOTE — PROGRESS NOTES
Admission Medication Reconciliation:    Information obtained from: patient, med-list from home, Rx Query    Comments/Recommendations: Patient had a very complete list and was knowledgeable about medications. Changes made to PTA list include:  1) Removed: lubiprosone, metoclopramide (no longer taking)  2) Added: oxybutynin, prednisone, meclizine, Duo-nebs, metformin, nortriptyline, allopurinol, clopidogrel, fluoxetine, bupropion (recently started about a week and a half ago), metoprolol succinate (patient reports dose increased from 25 mg daily to 25 mg BID 4-5 days ago)  3) Modified doses of the following:  -amlodipine from 10 mg daily to 5 mg BID  -atorvastatin from 40 mg to 80 mg  -furosemide from 40 mg BID to 80 mg BID  -isosorbide mononitrate from 60 mg daily to 60 mg BID  -lisinopril from 10 mg to 2.5 mg  -naproxen from 375 mg BID to 500 mg BID  -ranolazine from 500 mg BID to 1000 mg BID  -ropinirole from 4 mg qhs to 8 mg qhs  4) Patient reported taking aspirin and nitroglycerin this AM but no other morning meds. Chief Complaint for this Admission:    Chief Complaint   Patient presents with    Chest Pain (Angina)    Shortness of Breath       Allergies:  Review of patient's allergies indicates no known allergies. Prior to Admission Medications:   Prior to Admission Medications   Prescriptions Last Dose Informant Patient Reported? Taking? FLUoxetine (PROZAC) 20 mg capsule 2017 at am  Yes Yes   Sig: Take 20 mg by mouth daily. HYDROmorphone (DILAUDID) 4 mg tablet 2017 at pm  Yes Yes   Sig: Take 4 mg by mouth three (3) times daily. NAPROXEN PO 2017 at pm  Yes Yes   Sig: Take 500 mg by mouth two (2) times a day. TIZANIDINE HCL (TIZANIDINE PO) 2017 at pm  Yes Yes   Sig: Take 4 mg by mouth three (3) times daily. albuterol (PROVENTIL VENTOLIN) 2.5 mg /3 mL (0.083 %) nebulizer solution 2017 at pm  Yes Yes   Si.5 mg by Nebulization route every four (4) hours as needed. albuterol-ipratropium (DUO-NEB) 2.5 mg-0.5 mg/3 ml nebu 4/30/2017 at Unknown time  Yes Yes   Sig: 3 mL by Nebulization route every four (4) hours. allopurinol (ZYLOPRIM) 300 mg tablet 4/30/2017 at am  Yes Yes   Sig: Take 300 mg by mouth daily. amLODIPine (NORVASC) 10 mg tablet 4/30/2017 at pm  Yes Yes   Sig: Take 5 mg by mouth two (2) times a day. aspirin (ASPIRIN) 325 mg tablet 5/1/2017 at 0600  Yes Yes   Sig: Take 325 mg by mouth daily. atorvastatin (LIPITOR) 80 mg tablet 4/30/2017  Yes Yes   Sig: Take 80 mg by mouth daily. buPROPion SR (WELLBUTRIN SR) 150 mg SR tablet 4/30/2017 at hs  Yes Yes   Sig: Take 150 mg by mouth nightly. clopidogrel (PLAVIX) 75 mg tab 4/30/2017   Yes Yes   Sig: Take 75 mg by mouth. docusate sodium (COLACE) 100 mg capsule 4/30/2017 at pm  Yes Yes   Sig: Take 100 mg by mouth two (2) times a day. fluticasone-salmeterol (ADVAIR DISKUS) 250-50 mcg/dose diskus inhaler 4/30/2017 at pm  Yes Yes   Sig: Take 1 Puff by inhalation two (2) times a day. furosemide (LASIX) 40 mg tablet 4/30/2017 at pm  Yes Yes   Sig: Take 80 mg by mouth two (2) times a day. isosorbide mononitrate ER (IMDUR) 60 mg CR tablet 4/30/2017 at pm  Yes Yes   Sig: Take 60 mg by mouth two (2) times a day. lisinopril (PRINIVIL, ZESTRIL) 2.5 mg tablet 4/30/2017  Yes Yes   Sig: Take 2.5 mg by mouth nightly. meclizine (ANTIVERT) 12.5 mg tablet   Yes Yes   Sig: Take 12.5 mg by mouth three (3) times daily as needed. metFORMIN (GLUCOPHAGE) 1,000 mg tablet 4/30/2017  Yes Yes   Sig: Take 1,000 mg by mouth two (2) times daily (with meals). metoprolol succinate (TOPROL-XL) 25 mg XL tablet 4/30/2017  Yes Yes   Sig: Take 25 mg by mouth two (2) times a day. morphine CR (MS CONTIN) 30 mg CR tablet 4/30/2017 at pm  Yes Yes   Sig: Take 60 mg by mouth every twelve (12) hours. multivitamin (ONE A DAY) tablet 4/30/2017 at am  Yes Yes   Sig: Take 1 Tab by mouth daily.    nitroglycerin (NITROSTAT) 0.4 mg SL tablet 2017 at 0600  Yes Yes   Si.4 mg by SubLINGual route every five (5) minutes as needed for Chest Pain. nortriptyline (PAMELOR) 25 mg capsule 2017 at pm  Yes Yes   Sig: Take 25 mg by mouth nightly. omeprazole (PRILOSEC) 40 mg capsule 2017 at am  Yes Yes   Sig: Take 20 mg by mouth daily. oxybutynin (DITROPAN) 5 mg tablet 2017 at pm  Yes Yes   Sig: Take 5 mg by mouth nightly. predniSONE (DELTASONE) 5 mg tablet 2017  Yes Yes   Sig: Take 5 mg by mouth every Monday, Wednesday, Friday. rOPINIRole (REQUIP) 4 mg tab TAB 2017 at pm  Yes Yes   Sig: Take 8 mg by mouth nightly. ranolazine ER (RANEXA) 500 mg SR tablet 2017 at pm  Yes Yes   Sig: Take 1,000 mg by mouth two (2) times a day. tiotropium (SPIRIVA WITH HANDIHALER) 18 mcg inhalation capsule 17  Yes Yes   Sig: Take 1 Cap by inhalation daily. zolpidem (AMBIEN) 10 mg tablet 2017 at pm  No Yes   Sig: Take 1 Tab by mouth nightly.       Facility-Administered Medications: None

## 2017-05-01 NOTE — ROUTINE PROCESS
Bedside and Verbal shift change report given to Cameron Christiansen RN (oncoming nurse) by Demetri Sandra RN (offgoing nurse). Report included the following information SBAR, Kardex, Intake/Output, MAR and Recent Results.

## 2017-05-01 NOTE — PROGRESS NOTES
1530:  Bedside shift change report given to Melony Chatman RN (oncoming nurse) by Lashawn Bonilla RN (offgoing nurse). Report included the following information SBAR, Kardex, Procedure Summary, Intake/Output, MAR and Recent Results. 1935:  Patient off tele for CT scan. 2006:  Patient back from CT scan, tele applied. 2330:  Bedside shift change report given to Kim Bui RN (oncoming nurse) by Melony Chatman RN (offgoing nurse). Report included the following information SBAR, Kardex, Procedure Summary, Intake/Output, MAR and Recent Results. Problem: Falls - Risk of  Goal: *Absence of falls  Outcome: Progressing Towards Goal  Remains free of falls. Goal: *Knowledge of fall prevention  Outcome: Progressing Towards Goal  Reinforced call bell use. Problem: Pressure Injury - Risk of  Goal: *Prevention of pressure ulcer  Outcome: Progressing Towards Goal  No pressure ulcer noted. Problem: Cath Lab Procedures: Pre-Procedure  Goal: *Verbalize description of procedure  Outcome: Progressing Towards Goal  Patient verbalizes description of procedure and prior experiences.

## 2017-05-01 NOTE — ROUTINE PROCESS
1045: Patient brought to the unit via stretcher. Patient oriented to the unit and plan of care/ ordered procedures discussed. 1100: Primary Nurse Sona Lyle and Karel Pete RN performed a dual skin assessment on this patient No impairment noted  Charlie score is 23      1300: Consent signed for cardiac catheterization and blood transfusion.

## 2017-05-02 VITALS
TEMPERATURE: 97.9 F | BODY MASS INDEX: 38.88 KG/M2 | OXYGEN SATURATION: 86 % | RESPIRATION RATE: 12 BRPM | HEIGHT: 74 IN | HEART RATE: 100 BPM | SYSTOLIC BLOOD PRESSURE: 122 MMHG | DIASTOLIC BLOOD PRESSURE: 80 MMHG | WEIGHT: 302.91 LBS

## 2017-05-02 LAB
ANION GAP BLD CALC-SCNC: 6 MMOL/L (ref 5–15)
ATRIAL RATE: 89 BPM
BUN SERPL-MCNC: 23 MG/DL (ref 6–20)
BUN/CREAT SERPL: 18 (ref 12–20)
CALCIUM SERPL-MCNC: 8.9 MG/DL (ref 8.5–10.1)
CALCULATED P AXIS, ECG09: 7 DEGREES
CALCULATED R AXIS, ECG10: 16 DEGREES
CALCULATED T AXIS, ECG11: 70 DEGREES
CHLORIDE SERPL-SCNC: 100 MMOL/L (ref 97–108)
CO2 SERPL-SCNC: 31 MMOL/L (ref 21–32)
CREAT SERPL-MCNC: 1.27 MG/DL (ref 0.7–1.3)
DIAGNOSIS, 93000: NORMAL
ERYTHROCYTE [DISTWIDTH] IN BLOOD BY AUTOMATED COUNT: 18 % (ref 11.5–14.5)
GLUCOSE BLD STRIP.AUTO-MCNC: 98 MG/DL (ref 65–100)
GLUCOSE SERPL-MCNC: 127 MG/DL (ref 65–100)
HCT VFR BLD AUTO: 38.5 % (ref 36.6–50.3)
HGB BLD-MCNC: 12.5 G/DL (ref 12.1–17)
MCH RBC QN AUTO: 28.2 PG (ref 26–34)
MCHC RBC AUTO-ENTMCNC: 32.5 G/DL (ref 30–36.5)
MCV RBC AUTO: 86.9 FL (ref 80–99)
P-R INTERVAL, ECG05: 188 MS
PLATELET # BLD AUTO: 321 K/UL (ref 150–400)
POTASSIUM SERPL-SCNC: 3.9 MMOL/L (ref 3.5–5.1)
Q-T INTERVAL, ECG07: 376 MS
QRS DURATION, ECG06: 98 MS
QTC CALCULATION (BEZET), ECG08: 457 MS
RBC # BLD AUTO: 4.43 M/UL (ref 4.1–5.7)
SERVICE CMNT-IMP: NORMAL
SODIUM SERPL-SCNC: 137 MMOL/L (ref 136–145)
TROPONIN I SERPL-MCNC: <0.04 NG/ML
VENTRICULAR RATE, ECG03: 89 BPM
WBC # BLD AUTO: 11.4 K/UL (ref 4.1–11.1)

## 2017-05-02 PROCEDURE — 93458 L HRT ARTERY/VENTRICLE ANGIO: CPT

## 2017-05-02 PROCEDURE — 74011000250 HC RX REV CODE- 250: Performed by: INTERNAL MEDICINE

## 2017-05-02 PROCEDURE — 74011250637 HC RX REV CODE- 250/637: Performed by: INTERNAL MEDICINE

## 2017-05-02 PROCEDURE — 77030029065 HC DRSG HEMO QCLOT ZMED -B

## 2017-05-02 PROCEDURE — 80048 BASIC METABOLIC PNL TOTAL CA: CPT | Performed by: INTERNAL MEDICINE

## 2017-05-02 PROCEDURE — 96361 HYDRATE IV INFUSION ADD-ON: CPT

## 2017-05-02 PROCEDURE — 74011250636 HC RX REV CODE- 250/636: Performed by: INTERNAL MEDICINE

## 2017-05-02 PROCEDURE — 77010033678 HC OXYGEN DAILY

## 2017-05-02 PROCEDURE — C1894 INTRO/SHEATH, NON-LASER: HCPCS

## 2017-05-02 PROCEDURE — 99218 HC RM OBSERVATION: CPT

## 2017-05-02 PROCEDURE — 94640 AIRWAY INHALATION TREATMENT: CPT

## 2017-05-02 PROCEDURE — 93005 ELECTROCARDIOGRAM TRACING: CPT

## 2017-05-02 PROCEDURE — 82962 GLUCOSE BLOOD TEST: CPT

## 2017-05-02 PROCEDURE — 85027 COMPLETE CBC AUTOMATED: CPT | Performed by: INTERNAL MEDICINE

## 2017-05-02 PROCEDURE — 77030004522 HC CATH ANGI DX EXPO BSC -A

## 2017-05-02 PROCEDURE — 77030013744

## 2017-05-02 PROCEDURE — 74011636320 HC RX REV CODE- 636/320: Performed by: INTERNAL MEDICINE

## 2017-05-02 PROCEDURE — 84484 ASSAY OF TROPONIN QUANT: CPT | Performed by: INTERNAL MEDICINE

## 2017-05-02 PROCEDURE — 77030004533 HC CATH ANGI DX IMP BSC -B

## 2017-05-02 PROCEDURE — 36415 COLL VENOUS BLD VENIPUNCTURE: CPT | Performed by: INTERNAL MEDICINE

## 2017-05-02 RX ORDER — ATROPINE SULFATE 0.1 MG/ML
1 INJECTION INTRAVENOUS AS NEEDED
Status: DISCONTINUED | OUTPATIENT
Start: 2017-05-02 | End: 2017-05-02

## 2017-05-02 RX ORDER — VERAPAMIL HYDROCHLORIDE 2.5 MG/ML
2.5 INJECTION, SOLUTION INTRAVENOUS
Status: DISCONTINUED | OUTPATIENT
Start: 2017-05-02 | End: 2017-05-02

## 2017-05-02 RX ORDER — SODIUM CHLORIDE 9 MG/ML
3 INJECTION, SOLUTION INTRAVENOUS CONTINUOUS
Status: DISCONTINUED | OUTPATIENT
Start: 2017-05-02 | End: 2017-05-02

## 2017-05-02 RX ORDER — PRASUGREL 10 MG/1
60 TABLET, FILM COATED ORAL AS NEEDED
Status: DISCONTINUED | OUTPATIENT
Start: 2017-05-02 | End: 2017-05-02

## 2017-05-02 RX ORDER — SODIUM CHLORIDE 9 MG/ML
1.5 INJECTION, SOLUTION INTRAVENOUS CONTINUOUS
Status: DISPENSED | OUTPATIENT
Start: 2017-05-02 | End: 2017-05-02

## 2017-05-02 RX ORDER — FENTANYL CITRATE 50 UG/ML
25-200 INJECTION, SOLUTION INTRAMUSCULAR; INTRAVENOUS
Status: DISCONTINUED | OUTPATIENT
Start: 2017-05-02 | End: 2017-05-02

## 2017-05-02 RX ORDER — MIDAZOLAM HYDROCHLORIDE 1 MG/ML
.5-1 INJECTION, SOLUTION INTRAMUSCULAR; INTRAVENOUS
Status: DISCONTINUED | OUTPATIENT
Start: 2017-05-02 | End: 2017-05-02

## 2017-05-02 RX ORDER — HEPARIN SODIUM 200 [USP'U]/100ML
1000 INJECTION, SOLUTION INTRAVENOUS AS NEEDED
Status: DISCONTINUED | OUTPATIENT
Start: 2017-05-02 | End: 2017-05-02

## 2017-05-02 RX ORDER — LIDOCAINE HYDROCHLORIDE 10 MG/ML
10 INJECTION INFILTRATION; PERINEURAL
Status: DISCONTINUED | OUTPATIENT
Start: 2017-05-02 | End: 2017-05-02

## 2017-05-02 RX ORDER — HEPARIN SODIUM 1000 [USP'U]/ML
1000-5000 INJECTION, SOLUTION INTRAVENOUS; SUBCUTANEOUS AS NEEDED
Status: DISCONTINUED | OUTPATIENT
Start: 2017-05-02 | End: 2017-05-02

## 2017-05-02 RX ADMIN — FENTANYL CITRATE 25 MCG: 50 INJECTION, SOLUTION INTRAMUSCULAR; INTRAVENOUS at 14:03

## 2017-05-02 RX ADMIN — CLOPIDOGREL BISULFATE 75 MG: 75 TABLET ORAL at 08:52

## 2017-05-02 RX ADMIN — MIDAZOLAM HYDROCHLORIDE 2 MG: 1 INJECTION, SOLUTION INTRAMUSCULAR; INTRAVENOUS at 14:03

## 2017-05-02 RX ADMIN — SODIUM CHLORIDE 1.5 ML/KG/HR: 900 INJECTION, SOLUTION INTRAVENOUS at 13:20

## 2017-05-02 RX ADMIN — HEPARIN SODIUM 2000 UNITS: 200 INJECTION, SOLUTION INTRAVENOUS at 13:44

## 2017-05-02 RX ADMIN — DOCUSATE SODIUM 100 MG: 100 CAPSULE, LIQUID FILLED ORAL at 08:51

## 2017-05-02 RX ADMIN — MORPHINE SULFATE 60 MG: 30 TABLET, EXTENDED RELEASE ORAL at 18:10

## 2017-05-02 RX ADMIN — TIZANIDINE 4 MG: 4 TABLET ORAL at 08:52

## 2017-05-02 RX ADMIN — IPRATROPIUM BROMIDE 0.5 MG: 0.5 SOLUTION RESPIRATORY (INHALATION) at 04:00

## 2017-05-02 RX ADMIN — FUROSEMIDE 80 MG: 40 TABLET ORAL at 18:10

## 2017-05-02 RX ADMIN — NITROGLYCERIN 0.4 MG: 0.4 TABLET SUBLINGUAL at 01:31

## 2017-05-02 RX ADMIN — ARFORMOTEROL TARTRATE 15 MCG: 15 SOLUTION RESPIRATORY (INHALATION) at 08:00

## 2017-05-02 RX ADMIN — DOCUSATE SODIUM 100 MG: 100 CAPSULE, LIQUID FILLED ORAL at 18:10

## 2017-05-02 RX ADMIN — MIDAZOLAM HYDROCHLORIDE 1 MG: 1 INJECTION, SOLUTION INTRAMUSCULAR; INTRAVENOUS at 13:51

## 2017-05-02 RX ADMIN — RANOLAZINE 1000 MG: 500 TABLET, FILM COATED, EXTENDED RELEASE ORAL at 10:21

## 2017-05-02 RX ADMIN — BUDESONIDE 500 MCG: 0.5 INHALANT RESPIRATORY (INHALATION) at 07:28

## 2017-05-02 RX ADMIN — SODIUM CHLORIDE 100 ML/HR: 900 INJECTION, SOLUTION INTRAVENOUS at 01:03

## 2017-05-02 RX ADMIN — LIDOCAINE HYDROCHLORIDE 10 ML: 10 INJECTION, SOLUTION INFILTRATION; PERINEURAL at 14:02

## 2017-05-02 RX ADMIN — ASPIRIN 325 MG: 325 TABLET ORAL at 08:52

## 2017-05-02 RX ADMIN — FENTANYL CITRATE 50 MCG: 50 INJECTION, SOLUTION INTRAMUSCULAR; INTRAVENOUS at 13:52

## 2017-05-02 RX ADMIN — SODIUM CHLORIDE 1.5 ML/KG/HR: 900 INJECTION, SOLUTION INTRAVENOUS at 14:13

## 2017-05-02 RX ADMIN — SODIUM CHLORIDE 1.5 ML/KG/HR: 900 INJECTION, SOLUTION INTRAVENOUS at 15:49

## 2017-05-02 RX ADMIN — IPRATROPIUM BROMIDE 0.5 MG: 0.5 SOLUTION RESPIRATORY (INHALATION) at 01:29

## 2017-05-02 RX ADMIN — THERA TABS 1 TABLET: TAB at 08:52

## 2017-05-02 RX ADMIN — HYDROMORPHONE HYDROCHLORIDE 4 MG: 2 TABLET ORAL at 08:53

## 2017-05-02 RX ADMIN — METOPROLOL SUCCINATE 50 MG: 50 TABLET, EXTENDED RELEASE ORAL at 08:51

## 2017-05-02 RX ADMIN — ALLOPURINOL 300 MG: 300 TABLET ORAL at 08:51

## 2017-05-02 RX ADMIN — NITROGLYCERIN 0.4 MG: 0.4 TABLET SUBLINGUAL at 01:39

## 2017-05-02 RX ADMIN — AMLODIPINE BESYLATE 5 MG: 5 TABLET ORAL at 08:51

## 2017-05-02 RX ADMIN — ISOSORBIDE MONONITRATE 60 MG: 60 TABLET, EXTENDED RELEASE ORAL at 08:52

## 2017-05-02 RX ADMIN — FUROSEMIDE 80 MG: 40 TABLET ORAL at 08:51

## 2017-05-02 RX ADMIN — FLUOXETINE 20 MG: 20 CAPSULE ORAL at 08:52

## 2017-05-02 RX ADMIN — IOPAMIDOL 100 ML: 755 INJECTION, SOLUTION INTRAVENOUS at 14:23

## 2017-05-02 RX ADMIN — SODIUM CHLORIDE 3 ML/KG/HR: 900 INJECTION, SOLUTION INTRAVENOUS at 12:20

## 2017-05-02 NOTE — PROGRESS NOTES
TRANSFER - OUT REPORT:    Verbal report given to WALTER Mei(name) on Malini Gibbs S Nine  being transferred to CVSU(unit) for routine progression of care       Report consisted of patients Situation, Background, Assessment and   Recommendations(SBAR). Information from the following report(s) SBAR, Procedure Summary, MAR and Recent Results was reviewed with the receiving nurse. Lines:   Peripheral IV 05/01/17 Left Antecubital (Active)   Site Assessment Other (Comment) 5/2/2017 11:55 AM   Phlebitis Assessment 0 5/2/2017  8:02 AM   Infiltration Assessment 0 5/2/2017  8:02 AM   Dressing Status Clean, dry, & intact 5/2/2017  8:02 AM   Dressing Type Transparent;Tape 5/2/2017  8:02 AM   Hub Color/Line Status Green; Infusing 5/2/2017  8:02 AM   Action Taken Open ports on tubing capped 5/2/2017  8:02 AM   Alcohol Cap Used Yes 5/2/2017  8:02 AM        Opportunity for questions and clarification was provided.       Patient transported with:   Monitor  Registered Nurse

## 2017-05-02 NOTE — PROGRESS NOTES
Care management met with patient to explain role and discuss transitions of care. Patient's PCP is Dr Lisbet Slaughter through [facility] and he sees him at least monthly and gets his prescriptions through [facility] or [facility]. Per patient he has a walker, cane, hospital bed and oxygen at home, he isn't sure the name of the agency  That services the O2. Offered patient H2H for HF and he is agreeable. Referral made through [facility]. Care management will continue to follow . Sadi Hill RN,CRM  Care Management Interventions  PCP Verified by CM:  Yes ( Dr Lisbet Slaughter)  Transition of Care Consult (CM Consult):  [facility] for [facility]F)  [facility] - INPATIENT: Yes  MyChart Signup: No  Discharge Durable Medical Equipment: No  Physical Therapy Consult: No  Occupational Therapy Consult: No  Speech Therapy Consult: No  Current Support Network: Own Home Jong Tirado (geovany) [phone])  Confirm Follow Up Transport: Family (abbinelida will transport)  Plan discussed with Pt/Family/Caregiver: Yes  Discharge Location  Discharge Placement: Home with home health

## 2017-05-02 NOTE — PROGRESS NOTES
Cardiac Cath Lab Procedure Area Arrival Note:    Castro Villalta arrived to Cardiac Cath Lab, Procedure Area. Patient identifiers verified with NAME and DATE OF BIRTH. Procedure verified with patient. Consent forms verified. Allergies verified. Patient informed of procedure and plan of care. Questions answered with review. Patient voiced understanding of procedure and plan of care. Patient on cardiac monitor, non-invasive blood pressure, SPO2 monitor. On O2 @ 2 lpm via NC.  IV of normal saline on pump at 206 ml/hr. Patient status doing well with some problems : short of breath. Patient is A&Ox 4. Patient reports shortness of breath but no chest pain at this time. Patient medicated during procedure with orders obtained and verified by Dr. Eulalia Vega. Refer to patients Cardiac Cath Lab PROCEDURE REPORT for vital signs, assessment, status, and response during procedure, printed at end of case. Printed report on chart or scanned into chart.

## 2017-05-02 NOTE — PROGRESS NOTES
TRANSFER - IN REPORT:    Verbal report received from Sarbjit Fritz, RN(name) on Reymundo Buddle Nine  being received from [facility](unit) for ordered procedure      Report consisted of patients Situation, Background, Assessment and   Recommendations(SBAR). Information from the following report(s) SBAR, Procedure Summary, MAR and Recent Results was reviewed with the receiving nurse. Opportunity for questions and clarification was provided. Assessment completed upon patients arrival to unit and care assumed.

## 2017-05-02 NOTE — PROGRESS NOTES
Received call from [address] [place] [address] liaison, as patient is a [facility] patient he would not be eligible for the [facility] as [facility] follows patient.  Panfilo Walker RN,CRM

## 2017-05-02 NOTE — PROCEDURES
Cardiac Catheterization Procedure Note   Patient: Makayla Coronado  MRN: [id number]  SSN: [id number]   YOB: 1960 Age: 60 y.o.   Sex: male    Date of Procedure: 5/2/2017   Pre-procedure Diagnosis: Chest pain CCS Class IV  Post-procedure Diagnosis: Coronary Artery Disease  Procedure: Left Heart Cath  :  Dr. Brandon Valle MD    Assistant(s):  None  Anesthesia: Moderate Sedation   Estimated Blood Loss: Less than 10 mL   Specimens Removed: None  Findings: moderate nonocclusive disease with widely patent stents in all 3 vessels; elevated LV filling pressure @ 28 mmHg; EF 49%  Complications: None   Implants:  None  Signed by:  Brandon Valle MD  5/2/2017  2:28 PM

## 2017-05-02 NOTE — PROGRESS NOTES
Problem: Patient Education: Go to Patient Education Activity  Goal: Patient/Family Education  Outcome: Resolved/Met Date Met:  05/02/17  Pt education on gastroparesis diet; provided handout and instructed pt on gastroparesis nutrition. Pt expressed understanding.

## 2017-05-02 NOTE — PROGRESS NOTES
TRANSFER - OUT REPORT:    Verbal report given to Andrea Dick and Elie Ma RN (name) on Km Shen Nine  being transferred to cath lab recovery room (unit) for routine progression of care       Report consisted of patients Situation, Background, Assessment and   Recommendations(SBAR). Information from the following report(s) SBAR, Procedure Summary and MAR was reviewed with the receiving nurse. Lines:   Peripheral IV 05/01/17 Left Antecubital (Active)   Site Assessment Other (Comment) 5/2/2017 11:55 AM   Phlebitis Assessment 0 5/2/2017  8:02 AM   Infiltration Assessment 0 5/2/2017  8:02 AM   Dressing Status Clean, dry, & intact 5/2/2017  8:02 AM   Dressing Type Transparent;Tape 5/2/2017  8:02 AM   Hub Color/Line Status Green; Infusing 5/2/2017  8:02 AM   Action Taken Open ports on tubing capped 5/2/2017  8:02 AM   Alcohol Cap Used Yes 5/2/2017  8:02 AM        Opportunity for questions and clarification was provided.       Patient transported with:   Registered Nurse  Tech

## 2017-05-02 NOTE — PHYSICIAN ADVISORY
Chart reviewed and Observation is appropriate at this point. Pending result of cardiac cath and possible need for intervention and additional days in hospital, updated review and possible inpatient admission should be considered. Sherita Prader, [facility].  Care Management

## 2017-05-02 NOTE — PROGRESS NOTES
0730:  Bedside shift change report given to Catalina Costa (oncoming nurse) by Katarina Ricketts (offgoing nurse). Report included the following information SBAR, Kardex, MAR and Recent Results. 1301:  Pt off unit to Cath lab    1622:  Pt back from Cath lab    1908: I have reviewed discharge instructions with the patient. The patient verbalized understanding. Discharge medications reviewed with patient and appropriate educational materials and side effects teaching were provided.

## 2017-05-02 NOTE — DIABETES MGMT
DTC Progress Note    Recommendations/ Comments: Consult received for diabetic teaching regarding diet. Pt in cath lab. BG by chemistry 137, 127; by POC today 98. Pt has been NPO for cardiac cath. DTC will continue to follow        Chart reviewed on Juan Alberto Villalta. Patient is a 60 y.o. male with hx DM; on no DM meds at home. A1c:   Lab Results   Component Value Date/Time    Hemoglobin A1c 7.1 05/01/2017 03:48 PM    Hemoglobin A1c 9.6 05/21/2014 09:59 AM       Recent Glucose Results: Lab Results   Component Value Date/Time     (H) 05/02/2017 05:06 AM    GLUCPOC 98 05/02/2017 01:30 PM        Lab Results   Component Value Date/Time    Creatinine 1.27 05/02/2017 05:06 AM       Active Orders   Diet    DIET NPO Past Midnight        PO intake: Patient Vitals for the past 72 hrs:   % Diet Eaten   05/02/17 1155 0 %   05/02/17 0802 0 %   05/01/17 1911 50 %   05/01/17 1540 100 %       Current hospital DM medication: None    Will continue to follow as needed.     Thank you  Jon Choi RN, CDE

## 2017-05-02 NOTE — PROGRESS NOTES
2330: Bedside shift change report given to [address] (oncoming nurse) by Cynthia Pearson RN (offgoing nurse). Report included the following information SBAR and Cardiac Rhythm NSR.     0100: Pt still sitting on side of bed, declines laying back in bed. Pt complains of generalized pain on left side of chest and abdomen. Refuses the offer of SL nitro, appears anxious. Will continue to monitor. 0120: RN convinced pt to let staff completed EKG and given SL nitro for CP 9/10.    0126: EKG completed. RT in to given breathing treatment. Placed pt back on 3L NC that he had taken off.     0131: 1st SL nitro given. 0139: Pt finished breathing treatment. Gave 2nd SL nitro. Pt stated pain has come down to 7/10. Will page MD on call. 0145: Pt states pain has come down to 6/10, refusing additional dose of nitro. Awaiting call back from MD.    0157: Pt sleeping in bed, appears in no acute distress. 0300: Pt still resting in bed quietly. Has not complained of any pain. Will continue to monitor. 0500: VSS. Pt ambulated at side of bed to urinate. Weight obtained, pt tucked back into bed. Declined bath at this time. Will continue to monitor. 0730: Bedside shift change report given to Adri Ramos (oncoming nurse) by [address] (offgoing nurse). Report included the following information SBAR and Cardiac Rhythm NSR with 1st degree AVB, PACs.

## 2017-05-02 NOTE — PROGRESS NOTES
SHEATH PULL NOTE:    Patient informed of procedure with questions answered with review. Sheath site prepped with Chloraprep swab. 6 fr sheath in right femoral artery pulled by Tonya Steel RN. Hand hold and quick clot, with manual compression to site. No bleeding, no hematoma, no pain at site. Hemostasis obtained with hand hold/manual compression at site. Patient tolerated well. No change in status. Handhold for 15 minutes. No change at site. Quick clot, 4x4, & tegaderm dressing applied to site. No bleeding, no hematoma, no pain/discomfort at site. Groin instructions provided with review. Continue to monitor procedure site and patient status. *Advised patient to keep head flat and extremity flat to decrease risk of bleeding. *Recommended that patient not drink for ONE HOUR post sheath pull completion. *Recommended that patient not eat for TWO HOURS post sheath pull completion. *Instructed patient on rationale for delay of PO products to decrease risk for aspiration and if additional treatment to procedure site is required. Patient verbalized understanding of instructions with review.

## 2017-05-02 NOTE — DISCHARGE INSTRUCTIONS
Smoking Cessation Program: Below you will find a link to a text/email based smoking cessation program. The program is individualized to meet the patient's needs. To enroll use this link [email or web address].

## 2017-05-02 NOTE — PROGRESS NOTES
TRANSFER - IN REPORT:    Verbal report received from [address] on Spring Villalta  being received from procedure for routine progression of care. Report consisted of patients Situation, Background, Assessment and Recommendations(SBAR). Information from the following report(s) SBAR, Procedure Summary, MAR and Recent Results was reviewed with the receiving clinician. Opportunity for questions and clarification was provided. Assessment completed upon patients arrival to [address] [place] [address] and care assumed. Cardiac Cath Lab Recovery Arrival Note:    Spring Villalta arrived to [facility] recovery area. Patient procedure= cardiac catheterization. Patient on cardiac monitor, non-invasive blood pressure, SPO2 monitor. On O2 @ 3 lpm via nasal cannula. IV  of 0.9% normal saline on pump at 206 ml/hr. Patient status doing well without problems. Patient is A&Ox 4. Patient reports no complaints. PROCEDURE SITE CHECK:    Procedure site:without any bleeding or hematoma, no pain/discomfort reported at procedure site. 6fr sheath in right femoral artery. No change in patient status. Continue to monitor patient and status.

## 2017-06-01 ENCOUNTER — TELEPHONE (OUTPATIENT)
Dept: RESPIRATORY THERAPY | Age: 57
End: 2017-06-01

## 2017-06-05 ENCOUNTER — APPOINTMENT (OUTPATIENT)
Dept: GENERAL RADIOLOGY | Age: 57
End: 2017-06-05
Attending: STUDENT IN AN ORGANIZED HEALTH CARE EDUCATION/TRAINING PROGRAM
Payer: MEDICARE

## 2017-06-05 ENCOUNTER — HOSPITAL ENCOUNTER (EMERGENCY)
Age: 57
Discharge: HOME | End: 2017-06-06
Attending: STUDENT IN AN ORGANIZED HEALTH CARE EDUCATION/TRAINING PROGRAM
Payer: MEDICARE

## 2017-06-05 DIAGNOSIS — E87.6 HYPOKALEMIA: ICD-10-CM

## 2017-06-05 DIAGNOSIS — R60.9 PERIPHERAL EDEMA: ICD-10-CM

## 2017-06-05 DIAGNOSIS — R07.89 ATYPICAL CHEST PAIN: Primary | ICD-10-CM

## 2017-06-05 LAB
ALBUMIN SERPL BCP-MCNC: 3.8 G/DL (ref 3.5–5)
ALBUMIN/GLOB SERPL: 1 {RATIO} (ref 1.1–2.2)
ALP SERPL-CCNC: 125 U/L (ref 45–117)
ALT SERPL-CCNC: 39 U/L (ref 12–78)
ANION GAP BLD CALC-SCNC: 9 MMOL/L (ref 5–15)
APPEARANCE UR: CLEAR
APTT PPP: 27 SEC (ref 22.1–32.5)
AST SERPL W P-5'-P-CCNC: 20 U/L (ref 15–37)
BASOPHILS # BLD AUTO: 0 K/UL (ref 0–0.1)
BASOPHILS # BLD: 0 % (ref 0–1)
BILIRUB SERPL-MCNC: 0.6 MG/DL (ref 0.2–1)
BILIRUB UR QL CFM: NEGATIVE
BNP SERPL-MCNC: 27 PG/ML (ref 0–100)
BUN SERPL-MCNC: 24 MG/DL (ref 6–20)
BUN/CREAT SERPL: 20 (ref 12–20)
CALCIUM SERPL-MCNC: 9.8 MG/DL (ref 8.5–10.1)
CHLORIDE SERPL-SCNC: 91 MMOL/L (ref 97–108)
CO2 SERPL-SCNC: 32 MMOL/L (ref 21–32)
COLOR UR: NORMAL
CREAT SERPL-MCNC: 1.23 MG/DL (ref 0.7–1.3)
DIFFERENTIAL METHOD BLD: ABNORMAL
EOSINOPHIL # BLD: 0.1 K/UL (ref 0–0.4)
EOSINOPHIL NFR BLD: 1 % (ref 0–7)
ERYTHROCYTE [DISTWIDTH] IN BLOOD BY AUTOMATED COUNT: 17.5 % (ref 11.5–14.5)
GLOBULIN SER CALC-MCNC: 4 G/DL (ref 2–4)
GLUCOSE SERPL-MCNC: 134 MG/DL (ref 65–100)
GLUCOSE UR STRIP.AUTO-MCNC: NEGATIVE MG/DL
HCT VFR BLD AUTO: 36.6 % (ref 36.6–50.3)
HGB BLD-MCNC: 11.9 G/DL (ref 12.1–17)
HGB UR QL STRIP: NEGATIVE
INR PPP: 1.1 (ref 0.9–1.1)
KETONES UR QL STRIP.AUTO: NEGATIVE MG/DL
LEUKOCYTE ESTERASE UR QL STRIP.AUTO: NEGATIVE
LYMPHOCYTES # BLD AUTO: 28 % (ref 12–49)
LYMPHOCYTES # BLD: 3.5 K/UL (ref 0.8–3.5)
MCH RBC QN AUTO: 28.1 PG (ref 26–34)
MCHC RBC AUTO-ENTMCNC: 32.5 G/DL (ref 30–36.5)
MCV RBC AUTO: 86.3 FL (ref 80–99)
METAMYELOCYTES NFR BLD MANUAL: 1 %
MONOCYTES # BLD: 0.6 K/UL (ref 0–1)
MONOCYTES NFR BLD AUTO: 5 % (ref 5–13)
MYELOCYTES NFR BLD MANUAL: 1 %
NEUTS BAND NFR BLD MANUAL: 1 % (ref 0–6)
NEUTS SEG # BLD: 8 K/UL (ref 1.8–8)
NEUTS SEG NFR BLD AUTO: 63 % (ref 32–75)
NITRITE UR QL STRIP.AUTO: NEGATIVE
NRBC # BLD: 0.02 K/UL (ref 0–0.01)
NRBC BLD-RTO: 0.2 PER 100 WBC
PH UR STRIP: 6 [PH] (ref 5–8)
PLATELET # BLD AUTO: 358 K/UL (ref 150–400)
POTASSIUM SERPL-SCNC: 3.1 MMOL/L (ref 3.5–5.1)
PROT SERPL-MCNC: 7.8 G/DL (ref 6.4–8.2)
PROT UR STRIP-MCNC: NEGATIVE MG/DL
PROTHROMBIN TIME: 10.8 SEC (ref 9–11.1)
RBC # BLD AUTO: 4.24 M/UL (ref 4.1–5.7)
RBC MORPH BLD: ABNORMAL
SODIUM SERPL-SCNC: 132 MMOL/L (ref 136–145)
SP GR UR REFRACTOMETRY: 1.03 (ref 1–1.03)
THERAPEUTIC RANGE,PTTT: NORMAL SECS (ref 58–77)
TROPONIN I SERPL-MCNC: <0.04 NG/ML
UROBILINOGEN UR QL STRIP.AUTO: 1 EU/DL (ref 0.2–1)
WBC # BLD AUTO: 12.5 K/UL (ref 4.1–11.1)
WBC NRBC COR # BLD: ABNORMAL 10*3/UL

## 2017-06-05 PROCEDURE — 85025 COMPLETE CBC W/AUTO DIFF WBC: CPT | Performed by: STUDENT IN AN ORGANIZED HEALTH CARE EDUCATION/TRAINING PROGRAM

## 2017-06-05 PROCEDURE — 96374 THER/PROPH/DIAG INJ IV PUSH: CPT

## 2017-06-05 PROCEDURE — 84484 ASSAY OF TROPONIN QUANT: CPT | Performed by: STUDENT IN AN ORGANIZED HEALTH CARE EDUCATION/TRAINING PROGRAM

## 2017-06-05 PROCEDURE — 81003 URINALYSIS AUTO W/O SCOPE: CPT | Performed by: STUDENT IN AN ORGANIZED HEALTH CARE EDUCATION/TRAINING PROGRAM

## 2017-06-05 PROCEDURE — 99285 EMERGENCY DEPT VISIT HI MDM: CPT

## 2017-06-05 PROCEDURE — 71010 XR CHEST PORT: CPT

## 2017-06-05 PROCEDURE — 74011250637 HC RX REV CODE- 250/637

## 2017-06-05 PROCEDURE — 93005 ELECTROCARDIOGRAM TRACING: CPT

## 2017-06-05 PROCEDURE — 85730 THROMBOPLASTIN TIME PARTIAL: CPT | Performed by: STUDENT IN AN ORGANIZED HEALTH CARE EDUCATION/TRAINING PROGRAM

## 2017-06-05 PROCEDURE — 83880 ASSAY OF NATRIURETIC PEPTIDE: CPT | Performed by: STUDENT IN AN ORGANIZED HEALTH CARE EDUCATION/TRAINING PROGRAM

## 2017-06-05 PROCEDURE — 85610 PROTHROMBIN TIME: CPT | Performed by: STUDENT IN AN ORGANIZED HEALTH CARE EDUCATION/TRAINING PROGRAM

## 2017-06-05 PROCEDURE — 80053 COMPREHEN METABOLIC PANEL: CPT | Performed by: STUDENT IN AN ORGANIZED HEALTH CARE EDUCATION/TRAINING PROGRAM

## 2017-06-05 RX ORDER — NAPROXEN 500 MG/1
500 TABLET ORAL 2 TIMES DAILY WITH MEALS
COMMUNITY
End: 2018-01-01

## 2017-06-05 RX ORDER — POTASSIUM CHLORIDE 20MEQ/15ML
40 LIQUID (ML) ORAL
Status: COMPLETED | OUTPATIENT
Start: 2017-06-05 | End: 2017-06-06

## 2017-06-05 RX ORDER — OMEPRAZOLE 20 MG/1
20 CAPSULE, DELAYED RELEASE ORAL DAILY
COMMUNITY

## 2017-06-05 RX ORDER — METFORMIN HYDROCHLORIDE 1000 MG/1
1000 TABLET ORAL 2 TIMES DAILY WITH MEALS
COMMUNITY
End: 2018-01-01

## 2017-06-05 RX ORDER — BUMETANIDE 0.25 MG/ML
2 INJECTION INTRAMUSCULAR; INTRAVENOUS
Status: COMPLETED | OUTPATIENT
Start: 2017-06-06 | End: 2017-06-06

## 2017-06-05 RX ORDER — ASPIRIN 325 MG
TABLET ORAL
Status: COMPLETED
Start: 2017-06-05 | End: 2017-06-05

## 2017-06-05 RX ORDER — ASPIRIN 325 MG
325 TABLET ORAL ONCE
Status: COMPLETED | OUTPATIENT
Start: 2017-06-05 | End: 2017-06-05

## 2017-06-05 RX ADMIN — ASPIRIN 325 MG: 325 TABLET ORAL at 22:20

## 2017-06-05 RX ADMIN — Medication 325 MG: at 22:20

## 2017-06-05 NOTE — ED TRIAGE NOTES
Pt states that he has been SOB for a couple of days with leg swelling. Pt wears 3L oxygen at night to sleep. Pt went to [facility] for the past 10 days and left AMA last night.

## 2017-06-06 VITALS
OXYGEN SATURATION: 96 % | HEART RATE: 87 BPM | TEMPERATURE: 97.9 F | HEIGHT: 74 IN | DIASTOLIC BLOOD PRESSURE: 63 MMHG | BODY MASS INDEX: 39.27 KG/M2 | SYSTOLIC BLOOD PRESSURE: 121 MMHG | WEIGHT: 306 LBS | RESPIRATION RATE: 14 BRPM

## 2017-06-06 LAB
ATRIAL RATE: 86 BPM
CALCULATED P AXIS, ECG09: 17 DEGREES
CALCULATED R AXIS, ECG10: -5 DEGREES
CALCULATED T AXIS, ECG11: 37 DEGREES
DIAGNOSIS, 93000: NORMAL
P-R INTERVAL, ECG05: 204 MS
Q-T INTERVAL, ECG07: 414 MS
QRS DURATION, ECG06: 106 MS
QTC CALCULATION (BEZET), ECG08: 495 MS
VENTRICULAR RATE, ECG03: 86 BPM

## 2017-06-06 PROCEDURE — 74011250637 HC RX REV CODE- 250/637: Performed by: STUDENT IN AN ORGANIZED HEALTH CARE EDUCATION/TRAINING PROGRAM

## 2017-06-06 PROCEDURE — 74011000250 HC RX REV CODE- 250: Performed by: STUDENT IN AN ORGANIZED HEALTH CARE EDUCATION/TRAINING PROGRAM

## 2017-06-06 RX ADMIN — POTASSIUM CHLORIDE 40 MEQ: 20 SOLUTION ORAL at 00:34

## 2017-06-06 RX ADMIN — BUMETANIDE 2 MG: 0.25 INJECTION, SOLUTION INTRAMUSCULAR; INTRAVENOUS at 00:34

## 2017-06-06 NOTE — ED NOTES
Pt discharged from ED with follow up care instructions reviewed by MD. Pt questions asked and answered at this time. VSS. NAD. Pt ambulatory with use of cane to waiting room to contact family member for transport home.

## 2017-06-06 NOTE — ED PROVIDER NOTES
HPI Comments: 60 y.o. male with past medical history significant for CAD, chronic systolic heart failure, DM, CHF, MI, and COPD who presents ambulatory from home with chief complaint of chest pain. Pt reports ongoing CP, leg swelling, SOB and difficulty swallowing. Pt states he was admitted to [facility] for several days for the same where he received Lovenox and Lasix. Pt states he left AMA last night. Pt is oxygen dependent. Pt endorses a history of the same with multiple admissions all with negative cardiac work-ups. There are no other acute medical concerns at this time. Social hx: current every day tobacco smoker; denies EtOH use; denies illicit drug use  PCP: Nathanael Mclaughlin MD    Note written by Rich Ball, as dictated by Socrates Mcelroy MD 9:20 PM         The history is provided by the patient. Past Medical History:   Diagnosis Date    CAD (coronary artery disease)     [facility])     Chronic systolic heart failure ([facility] 4/12/2011    COPD (chronic obstructive pulmonary disease) ([facility])     Coronary atherosclerosis of native coronary artery 4/12/2011    Diabetes ([facility]     High cholesterol     Hypertension     MI, old     Neurological disorder     Other primary cardiomyopathies 4/12/2011    Restless leg syndrome        Past Surgical History:   Procedure Laterality Date    CARDIAC SURG PROCEDURE UNLIST      stents x7    HX CARPAL TUNNEL RELEASE      right wrist         History reviewed. No pertinent family history. Social History     Social History    Marital status:      Spouse name: N/A    Number of children: N/A    Years of education: N/A     Occupational History    Not on file.      Social History Main Topics    Smoking status: Current Every Day Smoker     Packs/day: 0.25    Smokeless tobacco: Not on file    Alcohol use No    Drug use: No    Sexual activity: Not on file     Other Topics Concern    Not on file     Social History Narrative ALLERGIES: Review of patient's allergies indicates no known allergies. Review of Systems   HENT: Positive for trouble swallowing. Respiratory: Positive for shortness of breath. Cardiovascular: Positive for chest pain and leg swelling. All other systems reviewed and are negative. Vitals:    06/05/17 1915   BP: 154/83   Pulse: 95   Resp: 16   Temp: 98.4 °F (36.9 °C)   SpO2: 95%   Weight: 138.8 kg (306 lb)   Height: 6' 2\" (1.88 m)            Physical Exam   Constitutional: He is oriented to person, place, and time. He appears well-developed and well-nourished. He appears ill (chronically). No distress. Obese   HENT:   Head: Normocephalic and atraumatic. Eyes: Conjunctivae and EOM are normal.   Neck: Normal range of motion. Cardiovascular: Normal rate and normal heart sounds. No murmur heard. Pulmonary/Chest: Effort normal and breath sounds normal. No respiratory distress. He has no wheezes. Abdominal: Soft. Bowel sounds are normal. He exhibits no distension. There is no tenderness. There is no rebound. Musculoskeletal: Normal range of motion. He exhibits no edema or tenderness. 3+ edema bilaterally in legs   Neurological: He is alert and oriented to person, place, and time. No cranial nerve deficit. He exhibits normal muscle tone. Coordination normal.   Skin: Skin is warm and dry. He is not diaphoretic. Nursing note and vitals reviewed. Note written by Rich Rizo, as dictated by Ilir Joshi MD 9:20 PM     [facility]  ED Course   The patient is resting comfortably and feels better, is alert and in no distress. The repeat examination is unremarkable and benign.  The electrocardiogram shows no signs of acute ischemia and the history, exam, diagnostic testing and current condition do not suggest that this patient is having an acute myocardial infarction, significant arrhythmia, unstable angina, esophageal perforation, pulmonary embolism, aortic dissection, pneumothorax, severe pneumonia, sepsis or other significant pathology that would warrant further testing, continued ED treatment, admission, or cardiology or other specialist consultation at this point. The vital signs have been stable. The patient's condition is stable and appropriate for discharge. The patient will pursue further outpatient evaluation with the primary care physician, other designated physician or cardiologist. The patient and/or caregivers have expressed a clear and thorough understanding and agree to follow up as instructed. Procedures    ED EKG interpretation:  Rhythm: sinus rhythm with 1st degree AV block. Rate (approx.): 86; Axis: normal; ST/T wave: normal. Occasional PACs. No STEMI. No ischemia.    Note written by Rich Goetz, as dictated by Lacho Wetzel MD 9:36 PM

## 2017-06-06 NOTE — PROGRESS NOTES
Admission Medication Reconciliation:    Information obtained from: patient and his medication list    Significant PMH/Disease States:   Past Medical History:   Diagnosis Date    CAD (coronary artery disease)     [facility]     Chronic systolic heart failure ([facility] [address] 4/12/2011    COPD (chronic obstructive pulmonary disease) ([facility])     Coronary atherosclerosis of native coronary artery 4/12/2011    Diabetes ([facility] [address] [facility]     High cholesterol     Hypertension     MI, old     Neurological disorder     Other primary cardiomyopathies 4/12/2011    Restless leg syndrome        Chief Complaint for this Admission:  leg edema, back pain    Allergies:  Review of patient's allergies indicates no known allergies. Prior to Admission Medications:   Prior to Admission Medications   Prescriptions Last Dose Informant Patient Reported? Taking? FLUoxetine (PROZAC) 20 mg capsule 6/5/2017 at am Self Yes Yes   Sig: Take 20 mg by mouth daily. HYDROmorphone (DILAUDID) 4 mg tablet 6/5/2017 at pta Self Yes Yes   Sig: Take 4 mg by mouth three (3) times daily. TIZANIDINE HCL (TIZANIDINE PO) 6/5/2017 at Unknown time Self Yes Yes   Sig: Take 4 mg by mouth three (3) times daily. albuterol-ipratropium (DUO-NEB) 2.5 mg-0.5 mg/3 ml nebu 6/5/2017 at Unknown time Self Yes Yes   Sig: 3 mL by Nebulization route every four (4) hours. allopurinol (ZYLOPRIM) 300 mg tablet 6/5/2017 at am Self Yes Yes   Sig: Take 300 mg by mouth daily. amLODIPine (NORVASC) 10 mg tablet 6/5/2017 at am Self Yes Yes   Sig: Take 5 mg by mouth two (2) times a day. aspirin (ASPIRIN) 325 mg tablet 6/5/2017 at am Self Yes Yes   Sig: Take 325 mg by mouth daily. atorvastatin (LIPITOR) 80 mg tablet 6/5/2017 at am Self Yes Yes   Sig: Take 80 mg by mouth daily. clopidogrel (PLAVIX) 75 mg tab 6/5/2017 at am Self Yes Yes   Sig: Take 75 mg by mouth daily.    docusate sodium (COLACE) 100 mg capsule 6/5/2017 at am Self Yes Yes   Sig: Take 100 mg by mouth two (2) times a day. fluticasone-salmeterol (ADVAIR DISKUS) 250-50 mcg/dose diskus inhaler 2017 at Unknown time Self Yes Yes   Sig: Take 1 Puff by inhalation two (2) times a day. isosorbide mononitrate ER (IMDUR) 60 mg CR tablet 2017 at am Self Yes Yes   Sig: Take 60 mg by mouth two (2) times a day. lisinopril (PRINIVIL, ZESTRIL) 2.5 mg tablet 2017 at pm Self Yes Yes   Sig: Take 2.5 mg by mouth nightly. meclizine (ANTIVERT) 12.5 mg tablet  Self Yes Yes   Sig: Take 12.5 mg by mouth three (3) times daily as needed. metFORMIN (GLUCOPHAGE) 1,000 mg tablet 2017 at am Self Yes Yes   Sig: Take 1,000 mg by mouth two (2) times daily (with meals). Indications: type 2 diabetes mellitus   metoprolol succinate (TOPROL-XL) 25 mg XL tablet 2017 at Unknown time Self Yes Yes   Sig: Take 50 mg by mouth daily. morphine CR (MS CONTIN) 30 mg CR tablet 2017 at am Self Yes Yes   Sig: Take 60 mg by mouth every twelve (12) hours. multivitamin (ONE A DAY) tablet 2017 at am Self Yes Yes   Sig: Take 1 Tab by mouth daily. naproxen (NAPROSYN) 500 mg tablet 2017 at am Self Yes Yes   Sig: Take 500 mg by mouth two (2) times daily (with meals). Indications: Pain   nitroglycerin (NITROSTAT) 0.4 mg SL tablet  Self Yes Yes   Si.4 mg by SubLINGual route every five (5) minutes as needed for Chest Pain. nortriptyline (PAMELOR) 25 mg capsule 2017 at pm Self Yes Yes   Sig: Take 25 mg by mouth nightly. omeprazole (PRILOSEC) 20 mg capsule 2017 at am Self Yes Yes   Sig: Take 20 mg by mouth daily. Indications: gastroesophageal reflux disease   oxybutynin (DITROPAN) 5 mg tablet 2017 at pm Self Yes Yes   Sig: Take 5 mg by mouth nightly. predniSONE (DELTASONE) 5 mg tablet 2017 at am Self Yes Yes   Sig: Take 5 mg by mouth every Monday, Wednesday, Friday. rOPINIRole (REQUIP) 4 mg tab TAB 2017 at pm Self Yes Yes   Sig: Take 8 mg by mouth nightly.  Indications: Restless Legs Syndrome ranolazine ER (RANEXA) 500 mg SR tablet 6/5/2017 at am Self Yes Yes   Sig: Take 1,000 mg by mouth two (2) times a day. tiotropium (SPIRIVA WITH HANDIHALER) 18 mcg inhalation capsule 6/5/2017 at am Self Yes Yes   Sig: Take 1 Cap by inhalation daily. zolpidem (AMBIEN) 10 mg tablet 6/4/2017 at pm Self No Yes   Sig: Take 1 Tab by mouth nightly. Facility-Administered Medications: None     Comments/Recommendations: Medication review done with patient and his medication list.  Of note removed furosemide and bupropion. Added naproxen, metformin, and omeprazole  Metoprolol changed form 25mg xl bid  Allergies reviewed.

## 2017-06-06 NOTE — DISCHARGE INSTRUCTIONS
We hope that we have addressed all of your medical concerns. The examination and treatment you received in the Emergency Department were for an emergent problem and were not intended as complete care. It is important that you follow up with your healthcare provider(s) for ongoing care. If your symptoms worsen or do not improve as expected, and you are unable to reach your usual health care provider(s), you should return to the Emergency Department. Today's healthcare is undergoing tremendous change, and patient satisfaction surveys are one of the many tools to assess the quality of medical care. You may receive a survey from the [employer]iu regarding your experience in the Emergency Department. I hope that your experience has been completely positive, particularly the medical care that I provided. As such, please participate in the survey; anything less than excellent does not meet my expectations or intentions. [facility] and [facility] participate in nationally recognized quality of care measures. If your blood pressure is greater than 120/80, as reported below, we urge that you seek medical care to address the potential of high blood pressure, commonly known as hypertension. Hypertension can be hereditary or can be caused by certain medical conditions, pain, stress, or \"white coat syndrome. \"       Please make an appointment with your health care provider(s) for follow up of your Emergency Department visit.        VITALS:   Patient Vitals for the past 8 hrs:   Temp Pulse Resp BP SpO2   06/05/17 2330 - 85 17 137/76 93 %   06/05/17 2300 - 83 16 116/60 95 %   06/05/17 2230 - 82 19 119/63 94 %   06/05/17 2200 - 87 17 129/65 94 %   06/05/17 2130 - 94 12 116/69 95 %   06/05/17 2100 - 90 19 129/74 94 %   06/05/17 2030 - 88 17 128/71 97 %   06/05/17 1915 98.4 °F (36.9 °C) 95 16 154/83 95 %          Thank you for allowing us to provide you with medical care today. We realize that you have many choices for your emergency care needs. Please choose us in the future for any continued health care needs. Tyson Avery, [address] [place], [place] [address].   Office: [phone]            Recent Results (from the past 24 hour(s))   BNP    Collection Time: 06/05/17  7:37 PM   Result Value Ref Range    BNP 27 0 - 100 pg/mL   CBC WITH AUTOMATED DIFF    Collection Time: 06/05/17  7:37 PM   Result Value Ref Range    WBC 12.5 (H) 4.1 - 11.1 K/uL    RBC 4.24 4.10 - 5.70 M/uL    HGB 11.9 (L) 12.1 - 17.0 g/dL    HCT 36.6 36.6 - 50.3 %    MCV 86.3 80.0 - 99.0 FL    MCH 28.1 26.0 - 34.0 PG    MCHC 32.5 30.0 - 36.5 g/dL    RDW 17.5 (H) 11.5 - 14.5 %    PLATELET 826 754 - 848 K/uL    NEUTROPHILS 63 32 - 75 %    BAND NEUTROPHILS 1 0 - 6 %    LYMPHOCYTES 28 12 - 49 %    MONOCYTES 5 5 - 13 %    EOSINOPHILS 1 0 - 7 %    BASOPHILS 0 0 - 1 %    METAMYELOCYTES 1 (H) 0 %    MYELOCYTES 1 (H) 0 %    ABS. NEUTROPHILS 8.0 1.8 - 8.0 K/UL    ABS. LYMPHOCYTES 3.5 0.8 - 3.5 K/UL    ABS. MONOCYTES 0.6 0.0 - 1.0 K/UL    ABS. EOSINOPHILS 0.1 0.0 - 0.4 K/UL    ABS. BASOPHILS 0.0 0.0 - 0.1 K/UL    DF MANUAL      RBC COMMENTS MICROCYTOSIS  1+        RBC COMMENTS ANISOCYTOSIS  1+        RBC COMMENTS POLYCHROMASIA  1+       METABOLIC PANEL, COMPREHENSIVE    Collection Time: 06/05/17  7:37 PM   Result Value Ref Range    Sodium 132 (L) 136 - 145 mmol/L    Potassium 3.1 (L) 3.5 - 5.1 mmol/L    Chloride 91 (L) 97 - 108 mmol/L    CO2 32 21 - 32 mmol/L    Anion gap 9 5 - 15 mmol/L    Glucose 134 (H) 65 - 100 mg/dL    BUN 24 (H) 6 - 20 MG/DL    Creatinine 1.23 0.70 - 1.30 MG/DL    BUN/Creatinine ratio 20 12 - 20      GFR est AA >60 >60 ml/min/1.73m2    GFR est non-AA >60 >60 ml/min/1.73m2    Calcium 9.8 8.5 - 10.1 MG/DL    Bilirubin, total 0.6 0.2 - 1.0 MG/DL    ALT (SGPT) 39 12 - 78 U/L    AST (SGOT) 20 15 - 37 U/L    Alk.  phosphatase 125 (H) 45 - 117 U/L    Protein, total 7.8 6.4 - 8.2 g/dL    Albumin 3.8 3.5 - 5.0 g/dL    Globulin 4.0 2.0 - 4.0 g/dL    A-G Ratio 1.0 (L) 1.1 - 2.2     PROTHROMBIN TIME + INR    Collection Time: 06/05/17  7:37 PM   Result Value Ref Range    INR 1.1 0.9 - 1.1      Prothrombin time 10.8 9.0 - 11.1 sec   PTT    Collection Time: 06/05/17  7:37 PM   Result Value Ref Range    aPTT 27.0 22.1 - 32.5 sec    aPTT, therapeutic range     58.0 - 77.0 SECS   TROPONIN I    Collection Time: 06/05/17  7:37 PM   Result Value Ref Range    Troponin-I, Qt. <0.04 <0.05 ng/mL   NUCLEATED RBC    Collection Time: 06/05/17  7:37 PM   Result Value Ref Range    NRBC 0.2 (H) 0  WBC    ABSOLUTE NRBC 0.02 (H) 0.00 - 0.01 K/uL    WBC CORRECTED FOR NR ADJUSTED FOR NUCLEATED RBC'S     EKG, 12 LEAD, INITIAL    Collection Time: 06/05/17  9:36 PM   Result Value Ref Range    Ventricular Rate 86 BPM    Atrial Rate 86 BPM    P-R Interval 204 ms    QRS Duration 106 ms    Q-T Interval 414 ms    QTC Calculation (Bezet) 495 ms    Calculated P Axis 17 degrees    Calculated R Axis -5 degrees    Calculated T Axis 37 degrees    Diagnosis       Sinus rhythm with premature atrial complexes  When compared with ECG of 02-MAY-2017 01:26,  No significant change was found     URINALYSIS W/ RFLX MICROSCOPIC    Collection Time: 06/05/17 10:19 PM   Result Value Ref Range    Color DARK YELLOW      Appearance CLEAR CLEAR      Specific gravity 1.027 1.003 - 1.030      pH (UA) 6.0 5.0 - 8.0      Protein NEGATIVE  NEG mg/dL    Glucose NEGATIVE  NEG mg/dL    Ketone NEGATIVE  NEG mg/dL    Blood NEGATIVE  NEG      Urobilinogen 1.0 0.2 - 1.0 EU/dL    Nitrites NEGATIVE  NEG      Leukocyte Esterase NEGATIVE  NEG     BILIRUBIN, CONFIRM    Collection Time: 06/05/17 10:19 PM   Result Value Ref Range    Bilirubin UA, confirm NEGATIVE  NEG         Xr Chest Port    Result Date: 6/5/2017  INDICATION: . chest pain Additional history: Patient left [facility] last night. Dyspnea for a couple days with leg swelling. Oxygen dependent wall sleeping. COMPARISON: Previous chest xray, 5/1/2017. LIMITATIONS: Portable technique. Pancho Vasquez FINDINGS: Single frontal view of the chest. . Lines/tubes/surgical: None. Heart/mediastinum: Unremarkable. Lungs/pleura: Right basilar opacity. Left basilar opacity. No visualized pleural effusion or pneumothorax. Additional Comments: None. Pancho Vasquez IMPRESSION: 1. Bibasilar opacities which could represent atelectasis and/or consolidation. .               Chest Pain: Care Instructions  Your Care Instructions  There are many things that can cause chest pain. Some are not serious and will get better on their own in a few days. But some kinds of chest pain need more testing and treatment. Your doctor may have recommended a follow-up visit in the next 8 to 12 hours. If you are not getting better, you may need more tests or treatment. Even though your doctor has released you, you still need to watch for any problems. The doctor carefully checked you, but sometimes problems can develop later. If you have new symptoms or if your symptoms do not get better, get medical care right away. If you have worse or different chest pain or pressure that lasts more than 5 minutes or you passed out (lost consciousness), call 911 or seek other emergency help right away. A medical visit is only one step in your treatment. Even if you feel better, you still need to do what your doctor recommends, such as going to all suggested follow-up appointments and taking medicines exactly as directed. This will help you recover and help prevent future problems. How can you care for yourself at home? · Rest until you feel better. · Take your medicine exactly as prescribed. Call your doctor if you think you are having a problem with your medicine. · Do not drive after taking a prescription pain medicine. When should you call for help? Call 911 if:  · You passed out (lost consciousness). · You have severe difficulty breathing.   · You have symptoms of a heart attack. These may include:  ¨ Chest pain or pressure, or a strange feeling in your chest.  ¨ Sweating. ¨ Shortness of breath. ¨ Nausea or vomiting. ¨ Pain, pressure, or a strange feeling in your back, neck, jaw, or upper belly or in one or both shoulders or arms. ¨ Lightheadedness or sudden weakness. ¨ A fast or irregular heartbeat. After you call 911, the  may tell you to chew 1 adult-strength or 2 to 4 low-dose aspirin. Wait for an ambulance. Do not try to drive yourself. Call your doctor today if:  · You have any trouble breathing. · Your chest pain gets worse. · You are dizzy or lightheaded, or you feel like you may faint. · You are not getting better as expected. · You are having new or different chest pain. Where can you learn more? Go to http://cloverEndecalivia.info/. Enter A120 in the search box to learn more about \"Chest Pain: Care Instructions. \"  Current as of: May 27, 2016  Content Version: 11.2  © 4636-2739 [employer]. Care instructions adapted under license by [employer] (which disclaims liability or warranty for this information). If you have questions about a medical condition or this instruction, always ask your healthcare professional. [place] [address] any warranty or liability for your use of this information. Leg and Ankle Edema: Care Instructions  Your Care Instructions  Swelling in the legs, ankles, and feet is called edema. It is common after you sit or stand for a while. Long plane flights or car rides often cause swelling in the legs and feet. You may also have swelling if you have to stand for long periods of time at your job. Problems with the veins in the legs (varicose veins) and changes in hormones can also cause swelling.  Sometimes the swelling in the ankles and feet is caused by a more serious problem, such as heart failure, infection, blood clots, or liver or kidney disease. Follow-up care is a key part of your treatment and safety. Be sure to make and go to all appointments, and call your doctor if you are having problems. Its also a good idea to know your test results and keep a list of the medicines you take. How can you care for yourself at home? · If your doctor gave you medicine, take it as prescribed. Call your doctor if you think you are having a problem with your medicine. · Whenever you are resting, raise your legs up. Try to keep the swollen area higher than the level of your heart. · Take breaks from standing or sitting in one position. ¨ Walk around to increase the blood flow in your lower legs. ¨ Move your feet and ankles often while you stand, or tighten and relax your leg muscles. · Wear support stockings. Put them on in the morning, before swelling gets worse. · Eat a balanced diet. Lose weight if you need to. · Limit the amount of salt (sodium) in your diet. Salt holds fluid in the body and may increase swelling. When should you call for help? Call 911 anytime you think you may need emergency care. For example, call if:  · You have symptoms of a blood clot in your lung (called a pulmonary embolism). These may include:  ¨ Sudden chest pain. ¨ Trouble breathing. ¨ Coughing up blood. Call your doctor now or seek immediate medical care if:  · You have signs of a blood clot, such as:  ¨ Pain in your calf, back of the knee, thigh, or groin. ¨ Redness and swelling in your leg or groin. · You have symptoms of infection, such as:  ¨ Increased pain, swelling, warmth, or redness. ¨ Red streaks or pus. ¨ A fever. Watch closely for changes in your health, and be sure to contact your doctor if:  · Your swelling is getting worse. · You have new or worsening pain in your legs. · You do not get better as expected. Where can you learn more? Go to http://clover-livia.info/.   Enter O218 in the search box to learn more about \"Leg and Ankle Edema: Care Instructions. \"  Current as of: May 27, 2016  Content Version: 11.2  © 2693-5349 [employer], [employer]. Care instructions adapted under license by [employer] (which disclaims liability or warranty for this information). If you have questions about a medical condition or this instruction, always ask your healthcare professional. [place] [address] any warranty or liability for your use of this information.

## 2017-08-04 ENCOUNTER — TELEPHONE (OUTPATIENT)
Dept: RESPIRATORY THERAPY | Age: 57
End: 2017-08-04

## 2017-09-07 ENCOUNTER — TELEPHONE (OUTPATIENT)
Dept: RESPIRATORY THERAPY | Age: 57
End: 2017-09-07

## 2017-10-04 ENCOUNTER — TELEPHONE (OUTPATIENT)
Dept: RESPIRATORY THERAPY | Age: 57
End: 2017-10-04

## 2018-01-01 ENCOUNTER — HOSPITAL ENCOUNTER (INPATIENT)
Age: 58
LOS: 2 days | Discharge: HOME HEALTH | DRG: 871 | End: 2018-07-12
Attending: EMERGENCY MEDICINE | Admitting: FAMILY MEDICINE
Payer: MEDICARE

## 2018-01-01 ENCOUNTER — TELEPHONE (OUTPATIENT)
Dept: RESPIRATORY THERAPY | Age: 58
End: 2018-01-01

## 2018-01-01 ENCOUNTER — TELEPHONE (OUTPATIENT)
Dept: CARDIOLOGY CLINIC | Age: 58
End: 2018-01-01

## 2018-01-01 ENCOUNTER — APPOINTMENT (OUTPATIENT)
Dept: GENERAL RADIOLOGY | Age: 58
DRG: 871 | End: 2018-01-01
Attending: STUDENT IN AN ORGANIZED HEALTH CARE EDUCATION/TRAINING PROGRAM
Payer: MEDICARE

## 2018-01-01 ENCOUNTER — ANESTHESIA (OUTPATIENT)
Dept: CARDIAC CATH/INVASIVE PROCEDURES | Age: 58
DRG: 871 | End: 2018-01-01
Payer: MEDICARE

## 2018-01-01 ENCOUNTER — APPOINTMENT (OUTPATIENT)
Dept: GENERAL RADIOLOGY | Age: 58
DRG: 190 | End: 2018-01-01
Attending: EMERGENCY MEDICINE
Payer: MEDICARE

## 2018-01-01 ENCOUNTER — HOSPITAL ENCOUNTER (INPATIENT)
Age: 58
LOS: 10 days | Discharge: HOME HEALTH | DRG: 871 | End: 2018-08-23
Attending: STUDENT IN AN ORGANIZED HEALTH CARE EDUCATION/TRAINING PROGRAM | Admitting: FAMILY MEDICINE
Payer: MEDICARE

## 2018-01-01 ENCOUNTER — APPOINTMENT (OUTPATIENT)
Dept: GENERAL RADIOLOGY | Age: 58
DRG: 291 | End: 2018-01-01
Attending: EMERGENCY MEDICINE
Payer: MEDICARE

## 2018-01-01 ENCOUNTER — PATIENT OUTREACH (OUTPATIENT)
Dept: FAMILY MEDICINE CLINIC | Age: 58
End: 2018-01-01

## 2018-01-01 ENCOUNTER — APPOINTMENT (OUTPATIENT)
Dept: GENERAL RADIOLOGY | Age: 58
DRG: 291 | End: 2018-01-01
Attending: INTERNAL MEDICINE
Payer: MEDICARE

## 2018-01-01 ENCOUNTER — TELEPHONE (OUTPATIENT)
Dept: CASE MANAGEMENT | Age: 58
End: 2018-01-01

## 2018-01-01 ENCOUNTER — HOSPITAL ENCOUNTER (INPATIENT)
Age: 58
LOS: 3 days | Discharge: NURSING, REHAB OR LONG-TERM CARE | DRG: 313 | End: 2018-06-06
Attending: STUDENT IN AN ORGANIZED HEALTH CARE EDUCATION/TRAINING PROGRAM | Admitting: HOSPITALIST
Payer: MEDICARE

## 2018-01-01 ENCOUNTER — APPOINTMENT (OUTPATIENT)
Dept: CT IMAGING | Age: 58
DRG: 287 | End: 2018-01-01
Attending: EMERGENCY MEDICINE
Payer: MEDICARE

## 2018-01-01 ENCOUNTER — HOSPITAL ENCOUNTER (INPATIENT)
Age: 58
LOS: 7 days | DRG: 291 | End: 2018-10-22
Attending: EMERGENCY MEDICINE | Admitting: INTERNAL MEDICINE
Payer: MEDICARE

## 2018-01-01 ENCOUNTER — APPOINTMENT (OUTPATIENT)
Dept: NUCLEAR MEDICINE | Age: 58
DRG: 313 | End: 2018-01-01
Attending: INTERNAL MEDICINE
Payer: MEDICARE

## 2018-01-01 ENCOUNTER — ANESTHESIA EVENT (OUTPATIENT)
Dept: CARDIAC CATH/INVASIVE PROCEDURES | Age: 58
DRG: 871 | End: 2018-01-01
Payer: MEDICARE

## 2018-01-01 ENCOUNTER — APPOINTMENT (OUTPATIENT)
Dept: NUCLEAR MEDICINE | Age: 58
DRG: 871 | End: 2018-01-01
Attending: INTERNAL MEDICINE
Payer: MEDICARE

## 2018-01-01 ENCOUNTER — HOSPITAL ENCOUNTER (INPATIENT)
Age: 58
LOS: 5 days | Discharge: HOME HEALTH | DRG: 287 | End: 2018-03-03
Attending: EMERGENCY MEDICINE | Admitting: INTERNAL MEDICINE
Payer: MEDICARE

## 2018-01-01 ENCOUNTER — APPOINTMENT (OUTPATIENT)
Dept: GENERAL RADIOLOGY | Age: 58
DRG: 291 | End: 2018-01-01
Attending: HOSPITALIST
Payer: MEDICARE

## 2018-01-01 ENCOUNTER — APPOINTMENT (OUTPATIENT)
Dept: GENERAL RADIOLOGY | Age: 58
DRG: 871 | End: 2018-01-01
Attending: EMERGENCY MEDICINE
Payer: MEDICARE

## 2018-01-01 ENCOUNTER — APPOINTMENT (OUTPATIENT)
Dept: GENERAL RADIOLOGY | Age: 58
DRG: 871 | End: 2018-01-01
Attending: INTERNAL MEDICINE
Payer: MEDICARE

## 2018-01-01 ENCOUNTER — APPOINTMENT (OUTPATIENT)
Dept: GENERAL RADIOLOGY | Age: 58
DRG: 287 | End: 2018-01-01
Attending: EMERGENCY MEDICINE
Payer: MEDICARE

## 2018-01-01 ENCOUNTER — APPOINTMENT (OUTPATIENT)
Dept: CT IMAGING | Age: 58
DRG: 871 | End: 2018-01-01
Attending: STUDENT IN AN ORGANIZED HEALTH CARE EDUCATION/TRAINING PROGRAM
Payer: MEDICARE

## 2018-01-01 ENCOUNTER — APPOINTMENT (OUTPATIENT)
Dept: GENERAL RADIOLOGY | Age: 58
DRG: 313 | End: 2018-01-01
Attending: STUDENT IN AN ORGANIZED HEALTH CARE EDUCATION/TRAINING PROGRAM
Payer: MEDICARE

## 2018-01-01 ENCOUNTER — HOSPITAL ENCOUNTER (INPATIENT)
Age: 58
LOS: 1 days | Discharge: HOME | DRG: 190 | End: 2018-05-07
Attending: EMERGENCY MEDICINE | Admitting: INTERNAL MEDICINE
Payer: MEDICARE

## 2018-01-01 VITALS
SYSTOLIC BLOOD PRESSURE: 102 MMHG | DIASTOLIC BLOOD PRESSURE: 67 MMHG | OXYGEN SATURATION: 99 % | HEART RATE: 98 BPM | RESPIRATION RATE: 20 BRPM | TEMPERATURE: 98 F | WEIGHT: 256.2 LBS | BODY MASS INDEX: 32.88 KG/M2 | HEIGHT: 74 IN

## 2018-01-01 VITALS
RESPIRATION RATE: 16 BRPM | WEIGHT: 261.47 LBS | SYSTOLIC BLOOD PRESSURE: 108 MMHG | DIASTOLIC BLOOD PRESSURE: 69 MMHG | OXYGEN SATURATION: 95 % | BODY MASS INDEX: 32.51 KG/M2 | HEART RATE: 90 BPM | TEMPERATURE: 97.9 F | HEIGHT: 75 IN

## 2018-01-01 VITALS
RESPIRATION RATE: 18 BRPM | DIASTOLIC BLOOD PRESSURE: 59 MMHG | OXYGEN SATURATION: 96 % | HEIGHT: 74 IN | TEMPERATURE: 97.9 F | HEART RATE: 91 BPM | WEIGHT: 239.86 LBS | SYSTOLIC BLOOD PRESSURE: 120 MMHG | BODY MASS INDEX: 30.78 KG/M2

## 2018-01-01 VITALS
HEART RATE: 83 BPM | SYSTOLIC BLOOD PRESSURE: 107 MMHG | BODY MASS INDEX: 33.3 KG/M2 | WEIGHT: 259.48 LBS | HEIGHT: 74 IN | DIASTOLIC BLOOD PRESSURE: 69 MMHG | RESPIRATION RATE: 18 BRPM | OXYGEN SATURATION: 96 % | TEMPERATURE: 97.9 F

## 2018-01-01 VITALS
WEIGHT: 265.2 LBS | OXYGEN SATURATION: 97 % | TEMPERATURE: 97.6 F | HEART RATE: 89 BPM | HEIGHT: 74 IN | BODY MASS INDEX: 34.03 KG/M2 | DIASTOLIC BLOOD PRESSURE: 65 MMHG | RESPIRATION RATE: 18 BRPM | SYSTOLIC BLOOD PRESSURE: 129 MMHG

## 2018-01-01 VITALS
RESPIRATION RATE: 38 BRPM | BODY MASS INDEX: 32.26 KG/M2 | TEMPERATURE: 100.5 F | HEIGHT: 74 IN | OXYGEN SATURATION: 53 % | WEIGHT: 251.4 LBS | HEART RATE: 102 BPM | SYSTOLIC BLOOD PRESSURE: 105 MMHG | DIASTOLIC BLOOD PRESSURE: 63 MMHG

## 2018-01-01 DIAGNOSIS — I10 ESSENTIAL HYPERTENSION, BENIGN: ICD-10-CM

## 2018-01-01 DIAGNOSIS — I50.9 ACUTE ON CHRONIC CONGESTIVE HEART FAILURE, UNSPECIFIED CONGESTIVE HEART FAILURE TYPE: Primary | ICD-10-CM

## 2018-01-01 DIAGNOSIS — I21.4 NSTEMI (NON-ST ELEVATED MYOCARDIAL INFARCTION) (HCC): ICD-10-CM

## 2018-01-01 DIAGNOSIS — J44.1 COPD WITH EXACERBATION (HCC): ICD-10-CM

## 2018-01-01 DIAGNOSIS — L03.116 BILATERAL LOWER LEG CELLULITIS: ICD-10-CM

## 2018-01-01 DIAGNOSIS — E66.01 MORBID OBESITY (HCC): ICD-10-CM

## 2018-01-01 DIAGNOSIS — R07.9 CHEST PAIN, UNSPECIFIED TYPE: ICD-10-CM

## 2018-01-01 DIAGNOSIS — I50.21 ACUTE SYSTOLIC CHF (CONGESTIVE HEART FAILURE) (HCC): Primary | ICD-10-CM

## 2018-01-01 DIAGNOSIS — I89.0 LYMPHEDEMA OF BOTH LOWER EXTREMITIES: ICD-10-CM

## 2018-01-01 DIAGNOSIS — R07.9 CHEST PAIN, UNSPECIFIED TYPE: Primary | ICD-10-CM

## 2018-01-01 DIAGNOSIS — I25.10 ATHEROSCLEROSIS OF NATIVE CORONARY ARTERY OF NATIVE HEART WITHOUT ANGINA PECTORIS: ICD-10-CM

## 2018-01-01 DIAGNOSIS — I50.21 SYSTOLIC CHF, ACUTE (HCC): ICD-10-CM

## 2018-01-01 DIAGNOSIS — L30.9 DERMATITIS: ICD-10-CM

## 2018-01-01 DIAGNOSIS — J18.9 PNEUMONIA DUE TO INFECTIOUS ORGANISM, UNSPECIFIED LATERALITY, UNSPECIFIED PART OF LUNG: Primary | ICD-10-CM

## 2018-01-01 DIAGNOSIS — Z71.89 ACP (ADVANCE CARE PLANNING): ICD-10-CM

## 2018-01-01 DIAGNOSIS — R06.02 SOB (SHORTNESS OF BREATH): ICD-10-CM

## 2018-01-01 DIAGNOSIS — L03.119 CELLULITIS OF LOWER EXTREMITY, UNSPECIFIED LATERALITY: Primary | ICD-10-CM

## 2018-01-01 DIAGNOSIS — I50.22 CHRONIC SYSTOLIC HEART FAILURE (HCC): ICD-10-CM

## 2018-01-01 DIAGNOSIS — I50.21 ACUTE SYSTOLIC CHF (CONGESTIVE HEART FAILURE) (HCC): ICD-10-CM

## 2018-01-01 DIAGNOSIS — R05.9 COUGH: ICD-10-CM

## 2018-01-01 DIAGNOSIS — L03.115 BILATERAL LOWER LEG CELLULITIS: ICD-10-CM

## 2018-01-01 DIAGNOSIS — R06.02 SOB (SHORTNESS OF BREATH): Primary | ICD-10-CM

## 2018-01-01 DIAGNOSIS — E88.09 HYPOALBUMINEMIA: ICD-10-CM

## 2018-01-01 DIAGNOSIS — A41.9 SEPSIS, DUE TO UNSPECIFIED ORGANISM: ICD-10-CM

## 2018-01-01 DIAGNOSIS — R60.1 ANASARCA: ICD-10-CM

## 2018-01-01 DIAGNOSIS — R53.81 PHYSICAL DEBILITY: ICD-10-CM

## 2018-01-01 DIAGNOSIS — L03.119 CELLULITIS OF LOWER EXTREMITY, UNSPECIFIED LATERALITY: ICD-10-CM

## 2018-01-01 LAB
ALBUMIN SERPL-MCNC: 1.9 G/DL (ref 3.5–5)
ALBUMIN SERPL-MCNC: 1.9 G/DL (ref 3.5–5)
ALBUMIN SERPL-MCNC: 2 G/DL (ref 3.5–5)
ALBUMIN SERPL-MCNC: 2.1 G/DL (ref 3.5–5)
ALBUMIN SERPL-MCNC: 2.1 G/DL (ref 3.5–5)
ALBUMIN SERPL-MCNC: 2.2 G/DL (ref 3.5–5)
ALBUMIN SERPL-MCNC: 2.3 G/DL (ref 3.5–5)
ALBUMIN SERPL-MCNC: 2.4 G/DL (ref 3.5–5)
ALBUMIN SERPL-MCNC: 2.7 G/DL (ref 3.5–5)
ALBUMIN SERPL-MCNC: 2.9 G/DL (ref 3.5–5)
ALBUMIN SERPL-MCNC: 3 G/DL (ref 3.5–5)
ALBUMIN SERPL-MCNC: 3.7 G/DL (ref 3.5–5)
ALBUMIN/GLOB SERPL: 0.3 {RATIO} (ref 1.1–2.2)
ALBUMIN/GLOB SERPL: 0.4 {RATIO} (ref 1.1–2.2)
ALBUMIN/GLOB SERPL: 0.5 {RATIO} (ref 1.1–2.2)
ALBUMIN/GLOB SERPL: 0.5 {RATIO} (ref 1.1–2.2)
ALBUMIN/GLOB SERPL: 0.6 {RATIO} (ref 1.1–2.2)
ALBUMIN/GLOB SERPL: 0.6 {RATIO} (ref 1.1–2.2)
ALBUMIN/GLOB SERPL: 0.7 {RATIO} (ref 1.1–2.2)
ALP SERPL-CCNC: 112 U/L (ref 45–117)
ALP SERPL-CCNC: 116 U/L (ref 45–117)
ALP SERPL-CCNC: 132 U/L (ref 45–117)
ALP SERPL-CCNC: 147 U/L (ref 45–117)
ALP SERPL-CCNC: 151 U/L (ref 45–117)
ALP SERPL-CCNC: 151 U/L (ref 45–117)
ALP SERPL-CCNC: 152 U/L (ref 45–117)
ALP SERPL-CCNC: 155 U/L (ref 45–117)
ALP SERPL-CCNC: 202 U/L (ref 45–117)
ALP SERPL-CCNC: 203 U/L (ref 45–117)
ALP SERPL-CCNC: 206 U/L (ref 45–117)
ALP SERPL-CCNC: 210 U/L (ref 45–117)
ALT SERPL-CCNC: 10 U/L (ref 12–78)
ALT SERPL-CCNC: 11 U/L (ref 12–78)
ALT SERPL-CCNC: 11 U/L (ref 12–78)
ALT SERPL-CCNC: 12 U/L (ref 12–78)
ALT SERPL-CCNC: 13 U/L (ref 12–78)
ALT SERPL-CCNC: 14 U/L (ref 12–78)
ALT SERPL-CCNC: 19 U/L (ref 12–78)
ALT SERPL-CCNC: 75 U/L (ref 12–78)
ANION GAP SERPL CALC-SCNC: 10 MMOL/L (ref 5–15)
ANION GAP SERPL CALC-SCNC: 11 MMOL/L (ref 5–15)
ANION GAP SERPL CALC-SCNC: 12 MMOL/L (ref 5–15)
ANION GAP SERPL CALC-SCNC: 12 MMOL/L (ref 5–15)
ANION GAP SERPL CALC-SCNC: 5 MMOL/L (ref 5–15)
ANION GAP SERPL CALC-SCNC: 5 MMOL/L (ref 5–15)
ANION GAP SERPL CALC-SCNC: 6 MMOL/L (ref 5–15)
ANION GAP SERPL CALC-SCNC: 6 MMOL/L (ref 5–15)
ANION GAP SERPL CALC-SCNC: 7 MMOL/L (ref 5–15)
ANION GAP SERPL CALC-SCNC: 8 MMOL/L (ref 5–15)
ANION GAP SERPL CALC-SCNC: 9 MMOL/L (ref 5–15)
APPEARANCE UR: CLEAR
APTT PPP: 28.1 SEC (ref 22.1–32)
ARTERIAL PATENCY WRIST A: ABNORMAL
ARTERIAL PATENCY WRIST A: YES
AST SERPL-CCNC: 11 U/L (ref 15–37)
AST SERPL-CCNC: 12 U/L (ref 15–37)
AST SERPL-CCNC: 13 U/L (ref 15–37)
AST SERPL-CCNC: 16 U/L (ref 15–37)
AST SERPL-CCNC: 16 U/L (ref 15–37)
AST SERPL-CCNC: 23 U/L (ref 15–37)
AST SERPL-CCNC: 25 U/L (ref 15–37)
AST SERPL-CCNC: 27 U/L (ref 15–37)
AST SERPL-CCNC: 27 U/L (ref 15–37)
AST SERPL-CCNC: 31 U/L (ref 15–37)
AST SERPL-CCNC: 50 U/L (ref 15–37)
AST SERPL-CCNC: 80 U/L (ref 15–37)
ATRIAL RATE: 111 BPM
ATRIAL RATE: 114 BPM
ATRIAL RATE: 119 BPM
ATRIAL RATE: 133 BPM
ATRIAL RATE: 258 BPM
ATRIAL RATE: 79 BPM
ATRIAL RATE: 88 BPM
ATRIAL RATE: 88 BPM
ATRIAL RATE: 95 BPM
ATRIAL RATE: 97 BPM
ATTENDING PHYSICIAN, CST07: NORMAL
BACTERIA SPEC CULT: NORMAL
BACTERIA URNS QL MICRO: NEGATIVE /HPF
BACTERIA URNS QL MICRO: NEGATIVE /HPF
BASE EXCESS BLD CALC-SCNC: 2 MMOL/L
BASE EXCESS BLD CALC-SCNC: 7 MMOL/L
BASOPHILS # BLD: 0 K/UL (ref 0–0.1)
BASOPHILS # BLD: 0.1 K/UL (ref 0–0.1)
BASOPHILS NFR BLD: 0 % (ref 0–1)
BASOPHILS NFR BLD: 1 % (ref 0–1)
BASOPHILS NFR BLD: 2 % (ref 0–1)
BDY SITE: ABNORMAL
BDY SITE: ABNORMAL
BILIRUB SERPL-MCNC: 0.3 MG/DL (ref 0.2–1)
BILIRUB SERPL-MCNC: 0.5 MG/DL (ref 0.2–1)
BILIRUB SERPL-MCNC: 0.5 MG/DL (ref 0.2–1)
BILIRUB SERPL-MCNC: 0.7 MG/DL (ref 0.2–1)
BILIRUB SERPL-MCNC: 0.8 MG/DL (ref 0.2–1)
BILIRUB SERPL-MCNC: 0.9 MG/DL (ref 0.2–1)
BILIRUB SERPL-MCNC: 1 MG/DL (ref 0.2–1)
BILIRUB SERPL-MCNC: 1 MG/DL (ref 0.2–1)
BILIRUB SERPL-MCNC: 1.2 MG/DL (ref 0.2–1)
BILIRUB SERPL-MCNC: 1.3 MG/DL (ref 0.2–1)
BILIRUB SERPL-MCNC: 1.4 MG/DL (ref 0.2–1)
BILIRUB SERPL-MCNC: 2.1 MG/DL (ref 0.2–1)
BILIRUB UR QL: NEGATIVE
BNP SERPL-MCNC: 1048 PG/ML (ref 0–125)
BNP SERPL-MCNC: 1135 PG/ML (ref 0–125)
BNP SERPL-MCNC: 1212 PG/ML (ref 0–125)
BNP SERPL-MCNC: 1311 PG/ML (ref 0–125)
BNP SERPL-MCNC: 1711 PG/ML (ref 0–125)
BNP SERPL-MCNC: 2626 PG/ML (ref 0–125)
BNP SERPL-MCNC: 6371 PG/ML (ref 0–125)
BNP SERPL-MCNC: 6802 PG/ML (ref 0–125)
BNP SERPL-MCNC: 8157 PG/ML (ref 0–125)
BNP SERPL-MCNC: 846 PG/ML (ref 0–125)
BNP SERPL-MCNC: 946 PG/ML (ref 0–125)
BNP SERPL-MCNC: 995 PG/ML (ref 0–125)
BUN SERPL-MCNC: 10 MG/DL (ref 6–20)
BUN SERPL-MCNC: 10 MG/DL (ref 6–20)
BUN SERPL-MCNC: 11 MG/DL (ref 6–20)
BUN SERPL-MCNC: 12 MG/DL (ref 6–20)
BUN SERPL-MCNC: 13 MG/DL (ref 6–20)
BUN SERPL-MCNC: 13 MG/DL (ref 6–20)
BUN SERPL-MCNC: 15 MG/DL (ref 6–20)
BUN SERPL-MCNC: 16 MG/DL (ref 6–20)
BUN SERPL-MCNC: 16 MG/DL (ref 6–20)
BUN SERPL-MCNC: 17 MG/DL (ref 6–20)
BUN SERPL-MCNC: 18 MG/DL (ref 6–20)
BUN SERPL-MCNC: 19 MG/DL (ref 6–20)
BUN SERPL-MCNC: 20 MG/DL (ref 6–20)
BUN SERPL-MCNC: 21 MG/DL (ref 6–20)
BUN SERPL-MCNC: 23 MG/DL (ref 6–20)
BUN SERPL-MCNC: 7 MG/DL (ref 6–20)
BUN SERPL-MCNC: 8 MG/DL (ref 6–20)
BUN SERPL-MCNC: 8 MG/DL (ref 6–20)
BUN SERPL-MCNC: 9 MG/DL (ref 6–20)
BUN/CREAT SERPL: 10 (ref 12–20)
BUN/CREAT SERPL: 11 (ref 12–20)
BUN/CREAT SERPL: 12 (ref 12–20)
BUN/CREAT SERPL: 13 (ref 12–20)
BUN/CREAT SERPL: 14 (ref 12–20)
BUN/CREAT SERPL: 14 (ref 12–20)
BUN/CREAT SERPL: 15 (ref 12–20)
BUN/CREAT SERPL: 17 (ref 12–20)
BUN/CREAT SERPL: 18 (ref 12–20)
BUN/CREAT SERPL: 21 (ref 12–20)
BUN/CREAT SERPL: 21 (ref 12–20)
BUN/CREAT SERPL: 22 (ref 12–20)
BUN/CREAT SERPL: 23 (ref 12–20)
BUN/CREAT SERPL: 23 (ref 12–20)
BUN/CREAT SERPL: 7 (ref 12–20)
BUN/CREAT SERPL: 9 (ref 12–20)
CALCIUM SERPL-MCNC: 10.6 MG/DL (ref 8.5–10.1)
CALCIUM SERPL-MCNC: 7.5 MG/DL (ref 8.5–10.1)
CALCIUM SERPL-MCNC: 7.6 MG/DL (ref 8.5–10.1)
CALCIUM SERPL-MCNC: 7.7 MG/DL (ref 8.5–10.1)
CALCIUM SERPL-MCNC: 7.9 MG/DL (ref 8.5–10.1)
CALCIUM SERPL-MCNC: 8 MG/DL (ref 8.5–10.1)
CALCIUM SERPL-MCNC: 8.1 MG/DL (ref 8.5–10.1)
CALCIUM SERPL-MCNC: 8.2 MG/DL (ref 8.5–10.1)
CALCIUM SERPL-MCNC: 8.2 MG/DL (ref 8.5–10.1)
CALCIUM SERPL-MCNC: 8.3 MG/DL (ref 8.5–10.1)
CALCIUM SERPL-MCNC: 8.4 MG/DL (ref 8.5–10.1)
CALCIUM SERPL-MCNC: 8.5 MG/DL (ref 8.5–10.1)
CALCIUM SERPL-MCNC: 8.7 MG/DL (ref 8.5–10.1)
CALCIUM SERPL-MCNC: 8.8 MG/DL (ref 8.5–10.1)
CALCIUM SERPL-MCNC: 8.9 MG/DL (ref 8.5–10.1)
CALCIUM SERPL-MCNC: 9 MG/DL (ref 8.5–10.1)
CALCIUM SERPL-MCNC: 9 MG/DL (ref 8.5–10.1)
CALCIUM SERPL-MCNC: 9.1 MG/DL (ref 8.5–10.1)
CALCIUM SERPL-MCNC: 9.3 MG/DL (ref 8.5–10.1)
CALCIUM SERPL-MCNC: 9.5 MG/DL (ref 8.5–10.1)
CALCIUM SERPL-MCNC: 9.6 MG/DL (ref 8.5–10.1)
CALCULATED P AXIS, ECG09: 46 DEGREES
CALCULATED P AXIS, ECG09: 48 DEGREES
CALCULATED P AXIS, ECG09: 98 DEGREES
CALCULATED R AXIS, ECG10: 135 DEGREES
CALCULATED R AXIS, ECG10: 154 DEGREES
CALCULATED R AXIS, ECG10: 79 DEGREES
CALCULATED R AXIS, ECG10: 85 DEGREES
CALCULATED R AXIS, ECG10: 87 DEGREES
CALCULATED R AXIS, ECG10: 90 DEGREES
CALCULATED R AXIS, ECG10: 91 DEGREES
CALCULATED R AXIS, ECG10: 93 DEGREES
CALCULATED R AXIS, ECG10: 94 DEGREES
CALCULATED R AXIS, ECG10: 95 DEGREES
CALCULATED T AXIS, ECG11: -14 DEGREES
CALCULATED T AXIS, ECG11: -18 DEGREES
CALCULATED T AXIS, ECG11: -19 DEGREES
CALCULATED T AXIS, ECG11: 32 DEGREES
CALCULATED T AXIS, ECG11: 32 DEGREES
CALCULATED T AXIS, ECG11: 37 DEGREES
CALCULATED T AXIS, ECG11: 47 DEGREES
CALCULATED T AXIS, ECG11: 58 DEGREES
CALCULATED T AXIS, ECG11: 63 DEGREES
CALCULATED T AXIS, ECG11: 84 DEGREES
CHLORIDE SERPL-SCNC: 100 MMOL/L (ref 97–108)
CHLORIDE SERPL-SCNC: 101 MMOL/L (ref 97–108)
CHLORIDE SERPL-SCNC: 102 MMOL/L (ref 97–108)
CHLORIDE SERPL-SCNC: 103 MMOL/L (ref 97–108)
CHLORIDE SERPL-SCNC: 104 MMOL/L (ref 97–108)
CHLORIDE SERPL-SCNC: 105 MMOL/L (ref 97–108)
CHLORIDE SERPL-SCNC: 90 MMOL/L (ref 97–108)
CHLORIDE SERPL-SCNC: 94 MMOL/L (ref 97–108)
CHLORIDE SERPL-SCNC: 95 MMOL/L (ref 97–108)
CHLORIDE SERPL-SCNC: 97 MMOL/L (ref 97–108)
CHLORIDE SERPL-SCNC: 98 MMOL/L (ref 97–108)
CHLORIDE SERPL-SCNC: 98 MMOL/L (ref 97–108)
CHLORIDE SERPL-SCNC: 99 MMOL/L (ref 97–108)
CHLORIDE SERPL-SCNC: 99 MMOL/L (ref 97–108)
CHOLEST SERPL-MCNC: 104 MG/DL
CHOLEST SERPL-MCNC: 109 MG/DL
CK MB CFR SERPL CALC: 2.6 % (ref 0–2.5)
CK MB SERPL-MCNC: 2.7 NG/ML (ref 5–25)
CK SERPL-CCNC: 102 U/L (ref 39–308)
CO2 SERPL-SCNC: 20 MMOL/L (ref 21–32)
CO2 SERPL-SCNC: 22 MMOL/L (ref 21–32)
CO2 SERPL-SCNC: 23 MMOL/L (ref 21–32)
CO2 SERPL-SCNC: 24 MMOL/L (ref 21–32)
CO2 SERPL-SCNC: 25 MMOL/L (ref 21–32)
CO2 SERPL-SCNC: 26 MMOL/L (ref 21–32)
CO2 SERPL-SCNC: 27 MMOL/L (ref 21–32)
CO2 SERPL-SCNC: 28 MMOL/L (ref 21–32)
CO2 SERPL-SCNC: 29 MMOL/L (ref 21–32)
CO2 SERPL-SCNC: 31 MMOL/L (ref 21–32)
CO2 SERPL-SCNC: 32 MMOL/L (ref 21–32)
CO2 SERPL-SCNC: 33 MMOL/L (ref 21–32)
COLOR UR: ABNORMAL
COLOR UR: ABNORMAL
COLOR UR: NORMAL
COMMENT, HOLDF: NORMAL
CORTIS SERPL-MCNC: 16.1 UG/DL
CORTIS SERPL-MCNC: 19.2 UG/DL
CREAT SERPL-MCNC: 0.58 MG/DL (ref 0.7–1.3)
CREAT SERPL-MCNC: 0.65 MG/DL (ref 0.7–1.3)
CREAT SERPL-MCNC: 0.66 MG/DL (ref 0.7–1.3)
CREAT SERPL-MCNC: 0.69 MG/DL (ref 0.7–1.3)
CREAT SERPL-MCNC: 0.72 MG/DL (ref 0.7–1.3)
CREAT SERPL-MCNC: 0.73 MG/DL (ref 0.7–1.3)
CREAT SERPL-MCNC: 0.73 MG/DL (ref 0.7–1.3)
CREAT SERPL-MCNC: 0.75 MG/DL (ref 0.7–1.3)
CREAT SERPL-MCNC: 0.78 MG/DL (ref 0.7–1.3)
CREAT SERPL-MCNC: 0.8 MG/DL (ref 0.7–1.3)
CREAT SERPL-MCNC: 0.81 MG/DL (ref 0.7–1.3)
CREAT SERPL-MCNC: 0.82 MG/DL (ref 0.7–1.3)
CREAT SERPL-MCNC: 0.82 MG/DL (ref 0.7–1.3)
CREAT SERPL-MCNC: 0.83 MG/DL (ref 0.7–1.3)
CREAT SERPL-MCNC: 0.83 MG/DL (ref 0.7–1.3)
CREAT SERPL-MCNC: 0.87 MG/DL (ref 0.7–1.3)
CREAT SERPL-MCNC: 0.88 MG/DL (ref 0.7–1.3)
CREAT SERPL-MCNC: 0.89 MG/DL (ref 0.7–1.3)
CREAT SERPL-MCNC: 0.92 MG/DL (ref 0.7–1.3)
CREAT SERPL-MCNC: 0.92 MG/DL (ref 0.7–1.3)
CREAT SERPL-MCNC: 0.99 MG/DL (ref 0.7–1.3)
CREAT SERPL-MCNC: 1 MG/DL (ref 0.7–1.3)
CREAT SERPL-MCNC: 1 MG/DL (ref 0.7–1.3)
CREAT SERPL-MCNC: 1.02 MG/DL (ref 0.7–1.3)
CREAT SERPL-MCNC: 1.03 MG/DL (ref 0.7–1.3)
CREAT SERPL-MCNC: 1.04 MG/DL (ref 0.7–1.3)
CREAT SERPL-MCNC: 1.09 MG/DL (ref 0.7–1.3)
CREAT SERPL-MCNC: 1.18 MG/DL (ref 0.7–1.3)
CREAT SERPL-MCNC: 1.34 MG/DL (ref 0.7–1.3)
CREAT SERPL-MCNC: 1.37 MG/DL (ref 0.7–1.3)
CREAT SERPL-MCNC: 1.39 MG/DL (ref 0.7–1.3)
CREAT SERPL-MCNC: 1.48 MG/DL (ref 0.7–1.3)
CREAT SERPL-MCNC: 1.58 MG/DL (ref 0.7–1.3)
CREAT SERPL-MCNC: 1.8 MG/DL (ref 0.7–1.3)
CREAT SERPL-MCNC: 1.88 MG/DL (ref 0.7–1.3)
CRP SERPL-MCNC: 5.75 MG/DL (ref 0–0.6)
CRP SERPL-MCNC: 9.2 MG/DL (ref 0–0.6)
DATE LAST DOSE: ABNORMAL
DIAGNOSIS, 93000: NORMAL
DIFFERENTIAL METHOD BLD: ABNORMAL
DUKE TM SCORE RESULT, CST14: NORMAL
DUKE TREADMILL SCORE, CST13: NORMAL
ECG INTERP BEFORE EX, CST11: NORMAL
ECG INTERP DURING EX, CST12: NORMAL
EOSINOPHIL # BLD: 0 K/UL (ref 0–0.4)
EOSINOPHIL # BLD: 0.1 K/UL (ref 0–0.4)
EOSINOPHIL # BLD: 0.2 K/UL (ref 0–0.4)
EOSINOPHIL # BLD: 0.3 K/UL (ref 0–0.4)
EOSINOPHIL # BLD: 0.4 K/UL (ref 0–0.4)
EOSINOPHIL # BLD: 0.5 K/UL (ref 0–0.4)
EOSINOPHIL # BLD: 0.6 K/UL (ref 0–0.4)
EOSINOPHIL NFR BLD: 0 % (ref 0–7)
EOSINOPHIL NFR BLD: 1 % (ref 0–7)
EOSINOPHIL NFR BLD: 2 % (ref 0–7)
EOSINOPHIL NFR BLD: 2 % (ref 0–7)
EOSINOPHIL NFR BLD: 3 % (ref 0–7)
EOSINOPHIL NFR BLD: 4 % (ref 0–7)
EOSINOPHIL NFR BLD: 5 % (ref 0–7)
EOSINOPHIL NFR BLD: 5 % (ref 0–7)
EOSINOPHIL NFR BLD: 7 % (ref 0–7)
EOSINOPHIL NFR BLD: 7 % (ref 0–7)
EPITH CASTS URNS QL MICRO: ABNORMAL /LPF
EPITH CASTS URNS QL MICRO: ABNORMAL /LPF
ERYTHROCYTE [DISTWIDTH] IN BLOOD BY AUTOMATED COUNT: 18.9 % (ref 11.5–14.5)
ERYTHROCYTE [DISTWIDTH] IN BLOOD BY AUTOMATED COUNT: 19 % (ref 11.5–14.5)
ERYTHROCYTE [DISTWIDTH] IN BLOOD BY AUTOMATED COUNT: 19.1 % (ref 11.5–14.5)
ERYTHROCYTE [DISTWIDTH] IN BLOOD BY AUTOMATED COUNT: 19.6 % (ref 11.5–14.5)
ERYTHROCYTE [DISTWIDTH] IN BLOOD BY AUTOMATED COUNT: 19.7 % (ref 11.5–14.5)
ERYTHROCYTE [DISTWIDTH] IN BLOOD BY AUTOMATED COUNT: 19.9 % (ref 11.5–14.5)
ERYTHROCYTE [DISTWIDTH] IN BLOOD BY AUTOMATED COUNT: 20.1 % (ref 11.5–14.5)
ERYTHROCYTE [DISTWIDTH] IN BLOOD BY AUTOMATED COUNT: 20.2 % (ref 11.5–14.5)
ERYTHROCYTE [DISTWIDTH] IN BLOOD BY AUTOMATED COUNT: 20.2 % (ref 11.5–14.5)
ERYTHROCYTE [DISTWIDTH] IN BLOOD BY AUTOMATED COUNT: 20.8 % (ref 11.5–14.5)
ERYTHROCYTE [DISTWIDTH] IN BLOOD BY AUTOMATED COUNT: 21.8 % (ref 11.5–14.5)
ERYTHROCYTE [DISTWIDTH] IN BLOOD BY AUTOMATED COUNT: 21.9 % (ref 11.5–14.5)
ERYTHROCYTE [DISTWIDTH] IN BLOOD BY AUTOMATED COUNT: 22 % (ref 11.5–14.5)
ERYTHROCYTE [DISTWIDTH] IN BLOOD BY AUTOMATED COUNT: 22.2 % (ref 11.5–14.5)
ERYTHROCYTE [DISTWIDTH] IN BLOOD BY AUTOMATED COUNT: 22.4 % (ref 11.5–14.5)
ERYTHROCYTE [DISTWIDTH] IN BLOOD BY AUTOMATED COUNT: 22.5 % (ref 11.5–14.5)
ERYTHROCYTE [DISTWIDTH] IN BLOOD BY AUTOMATED COUNT: 22.9 % (ref 11.5–14.5)
ERYTHROCYTE [DISTWIDTH] IN BLOOD BY AUTOMATED COUNT: 23.1 % (ref 11.5–14.5)
ERYTHROCYTE [DISTWIDTH] IN BLOOD BY AUTOMATED COUNT: 23.3 % (ref 11.5–14.5)
ERYTHROCYTE [DISTWIDTH] IN BLOOD BY AUTOMATED COUNT: 23.5 % (ref 11.5–14.5)
ERYTHROCYTE [SEDIMENTATION RATE] IN BLOOD: 46 MM/HR (ref 0–20)
ERYTHROCYTE [SEDIMENTATION RATE] IN BLOOD: 64 MM/HR (ref 0–20)
EST. AVERAGE GLUCOSE BLD GHB EST-MCNC: 126 MG/DL
EST. AVERAGE GLUCOSE BLD GHB EST-MCNC: 126 MG/DL
FERRITIN SERPL-MCNC: 115 NG/ML (ref 26–388)
FLUAV AG NPH QL IA: NEGATIVE
FLUBV AG NOSE QL IA: NEGATIVE
FOLATE SERPL-MCNC: 7.5 NG/ML (ref 5–21)
FOLATE SERPL-MCNC: 7.9 NG/ML (ref 5–21)
FUNCTIONAL CAPACITY, CST17: NORMAL
GAS FLOW.O2 O2 DELIVERY SYS: ABNORMAL L/MIN
GAS FLOW.O2 O2 DELIVERY SYS: ABNORMAL L/MIN
GAS FLOW.O2 SETTING OXYMISER: 3 L/M
GLOBULIN SER CALC-MCNC: 4.4 G/DL (ref 2–4)
GLOBULIN SER CALC-MCNC: 4.7 G/DL (ref 2–4)
GLOBULIN SER CALC-MCNC: 4.7 G/DL (ref 2–4)
GLOBULIN SER CALC-MCNC: 5.1 G/DL (ref 2–4)
GLOBULIN SER CALC-MCNC: 5.2 G/DL (ref 2–4)
GLOBULIN SER CALC-MCNC: 5.3 G/DL (ref 2–4)
GLOBULIN SER CALC-MCNC: 5.3 G/DL (ref 2–4)
GLOBULIN SER CALC-MCNC: 5.6 G/DL (ref 2–4)
GLOBULIN SER CALC-MCNC: 5.8 G/DL (ref 2–4)
GLOBULIN SER CALC-MCNC: 5.9 G/DL (ref 2–4)
GLOBULIN SER CALC-MCNC: 6 G/DL (ref 2–4)
GLOBULIN SER CALC-MCNC: 6.4 G/DL (ref 2–4)
GLUCOSE BLD STRIP.AUTO-MCNC: 100 MG/DL (ref 65–100)
GLUCOSE BLD STRIP.AUTO-MCNC: 100 MG/DL (ref 65–100)
GLUCOSE BLD STRIP.AUTO-MCNC: 101 MG/DL (ref 65–100)
GLUCOSE BLD STRIP.AUTO-MCNC: 102 MG/DL (ref 65–100)
GLUCOSE BLD STRIP.AUTO-MCNC: 102 MG/DL (ref 65–100)
GLUCOSE BLD STRIP.AUTO-MCNC: 103 MG/DL (ref 65–100)
GLUCOSE BLD STRIP.AUTO-MCNC: 103 MG/DL (ref 65–100)
GLUCOSE BLD STRIP.AUTO-MCNC: 104 MG/DL (ref 65–100)
GLUCOSE BLD STRIP.AUTO-MCNC: 105 MG/DL (ref 65–100)
GLUCOSE BLD STRIP.AUTO-MCNC: 106 MG/DL (ref 65–100)
GLUCOSE BLD STRIP.AUTO-MCNC: 107 MG/DL (ref 65–100)
GLUCOSE BLD STRIP.AUTO-MCNC: 108 MG/DL (ref 65–100)
GLUCOSE BLD STRIP.AUTO-MCNC: 109 MG/DL (ref 65–100)
GLUCOSE BLD STRIP.AUTO-MCNC: 109 MG/DL (ref 65–100)
GLUCOSE BLD STRIP.AUTO-MCNC: 110 MG/DL (ref 65–100)
GLUCOSE BLD STRIP.AUTO-MCNC: 111 MG/DL (ref 65–100)
GLUCOSE BLD STRIP.AUTO-MCNC: 112 MG/DL (ref 65–100)
GLUCOSE BLD STRIP.AUTO-MCNC: 113 MG/DL (ref 65–100)
GLUCOSE BLD STRIP.AUTO-MCNC: 114 MG/DL (ref 65–100)
GLUCOSE BLD STRIP.AUTO-MCNC: 115 MG/DL (ref 65–100)
GLUCOSE BLD STRIP.AUTO-MCNC: 116 MG/DL (ref 65–100)
GLUCOSE BLD STRIP.AUTO-MCNC: 116 MG/DL (ref 65–100)
GLUCOSE BLD STRIP.AUTO-MCNC: 117 MG/DL (ref 65–100)
GLUCOSE BLD STRIP.AUTO-MCNC: 117 MG/DL (ref 65–100)
GLUCOSE BLD STRIP.AUTO-MCNC: 118 MG/DL (ref 65–100)
GLUCOSE BLD STRIP.AUTO-MCNC: 119 MG/DL (ref 65–100)
GLUCOSE BLD STRIP.AUTO-MCNC: 120 MG/DL (ref 65–100)
GLUCOSE BLD STRIP.AUTO-MCNC: 121 MG/DL (ref 65–100)
GLUCOSE BLD STRIP.AUTO-MCNC: 122 MG/DL (ref 65–100)
GLUCOSE BLD STRIP.AUTO-MCNC: 122 MG/DL (ref 65–100)
GLUCOSE BLD STRIP.AUTO-MCNC: 123 MG/DL (ref 65–100)
GLUCOSE BLD STRIP.AUTO-MCNC: 125 MG/DL (ref 65–100)
GLUCOSE BLD STRIP.AUTO-MCNC: 125 MG/DL (ref 65–100)
GLUCOSE BLD STRIP.AUTO-MCNC: 126 MG/DL (ref 65–100)
GLUCOSE BLD STRIP.AUTO-MCNC: 126 MG/DL (ref 65–100)
GLUCOSE BLD STRIP.AUTO-MCNC: 129 MG/DL (ref 65–100)
GLUCOSE BLD STRIP.AUTO-MCNC: 130 MG/DL (ref 65–100)
GLUCOSE BLD STRIP.AUTO-MCNC: 131 MG/DL (ref 65–100)
GLUCOSE BLD STRIP.AUTO-MCNC: 133 MG/DL (ref 65–100)
GLUCOSE BLD STRIP.AUTO-MCNC: 136 MG/DL (ref 65–100)
GLUCOSE BLD STRIP.AUTO-MCNC: 137 MG/DL (ref 65–100)
GLUCOSE BLD STRIP.AUTO-MCNC: 138 MG/DL (ref 65–100)
GLUCOSE BLD STRIP.AUTO-MCNC: 139 MG/DL (ref 65–100)
GLUCOSE BLD STRIP.AUTO-MCNC: 140 MG/DL (ref 65–100)
GLUCOSE BLD STRIP.AUTO-MCNC: 141 MG/DL (ref 65–100)
GLUCOSE BLD STRIP.AUTO-MCNC: 142 MG/DL (ref 65–100)
GLUCOSE BLD STRIP.AUTO-MCNC: 142 MG/DL (ref 65–100)
GLUCOSE BLD STRIP.AUTO-MCNC: 143 MG/DL (ref 65–100)
GLUCOSE BLD STRIP.AUTO-MCNC: 147 MG/DL (ref 65–100)
GLUCOSE BLD STRIP.AUTO-MCNC: 149 MG/DL (ref 65–100)
GLUCOSE BLD STRIP.AUTO-MCNC: 149 MG/DL (ref 65–100)
GLUCOSE BLD STRIP.AUTO-MCNC: 152 MG/DL (ref 65–100)
GLUCOSE BLD STRIP.AUTO-MCNC: 153 MG/DL (ref 65–100)
GLUCOSE BLD STRIP.AUTO-MCNC: 168 MG/DL (ref 65–100)
GLUCOSE BLD STRIP.AUTO-MCNC: 172 MG/DL (ref 65–100)
GLUCOSE BLD STRIP.AUTO-MCNC: 198 MG/DL (ref 65–100)
GLUCOSE BLD STRIP.AUTO-MCNC: 198 MG/DL (ref 65–100)
GLUCOSE BLD STRIP.AUTO-MCNC: 217 MG/DL (ref 65–100)
GLUCOSE BLD STRIP.AUTO-MCNC: 256 MG/DL (ref 65–100)
GLUCOSE BLD STRIP.AUTO-MCNC: 69 MG/DL (ref 65–100)
GLUCOSE BLD STRIP.AUTO-MCNC: 71 MG/DL (ref 65–100)
GLUCOSE BLD STRIP.AUTO-MCNC: 76 MG/DL (ref 65–100)
GLUCOSE BLD STRIP.AUTO-MCNC: 79 MG/DL (ref 65–100)
GLUCOSE BLD STRIP.AUTO-MCNC: 81 MG/DL (ref 65–100)
GLUCOSE BLD STRIP.AUTO-MCNC: 81 MG/DL (ref 65–100)
GLUCOSE BLD STRIP.AUTO-MCNC: 84 MG/DL (ref 65–100)
GLUCOSE BLD STRIP.AUTO-MCNC: 86 MG/DL (ref 65–100)
GLUCOSE BLD STRIP.AUTO-MCNC: 88 MG/DL (ref 65–100)
GLUCOSE BLD STRIP.AUTO-MCNC: 89 MG/DL (ref 65–100)
GLUCOSE BLD STRIP.AUTO-MCNC: 91 MG/DL (ref 65–100)
GLUCOSE BLD STRIP.AUTO-MCNC: 93 MG/DL (ref 65–100)
GLUCOSE BLD STRIP.AUTO-MCNC: 93 MG/DL (ref 65–100)
GLUCOSE BLD STRIP.AUTO-MCNC: 94 MG/DL (ref 65–100)
GLUCOSE BLD STRIP.AUTO-MCNC: 95 MG/DL (ref 65–100)
GLUCOSE BLD STRIP.AUTO-MCNC: 96 MG/DL (ref 65–100)
GLUCOSE BLD STRIP.AUTO-MCNC: 96 MG/DL (ref 65–100)
GLUCOSE BLD STRIP.AUTO-MCNC: 98 MG/DL (ref 65–100)
GLUCOSE BLD STRIP.AUTO-MCNC: 99 MG/DL (ref 65–100)
GLUCOSE BLD STRIP.AUTO-MCNC: 99 MG/DL (ref 65–100)
GLUCOSE SERPL-MCNC: 102 MG/DL (ref 65–100)
GLUCOSE SERPL-MCNC: 103 MG/DL (ref 65–100)
GLUCOSE SERPL-MCNC: 105 MG/DL (ref 65–100)
GLUCOSE SERPL-MCNC: 105 MG/DL (ref 65–100)
GLUCOSE SERPL-MCNC: 107 MG/DL (ref 65–100)
GLUCOSE SERPL-MCNC: 108 MG/DL (ref 65–100)
GLUCOSE SERPL-MCNC: 110 MG/DL (ref 65–100)
GLUCOSE SERPL-MCNC: 110 MG/DL (ref 65–100)
GLUCOSE SERPL-MCNC: 111 MG/DL (ref 65–100)
GLUCOSE SERPL-MCNC: 113 MG/DL (ref 65–100)
GLUCOSE SERPL-MCNC: 113 MG/DL (ref 65–100)
GLUCOSE SERPL-MCNC: 115 MG/DL (ref 65–100)
GLUCOSE SERPL-MCNC: 116 MG/DL (ref 65–100)
GLUCOSE SERPL-MCNC: 116 MG/DL (ref 65–100)
GLUCOSE SERPL-MCNC: 117 MG/DL (ref 65–100)
GLUCOSE SERPL-MCNC: 121 MG/DL (ref 65–100)
GLUCOSE SERPL-MCNC: 122 MG/DL (ref 65–100)
GLUCOSE SERPL-MCNC: 128 MG/DL (ref 65–100)
GLUCOSE SERPL-MCNC: 130 MG/DL (ref 65–100)
GLUCOSE SERPL-MCNC: 136 MG/DL (ref 65–100)
GLUCOSE SERPL-MCNC: 155 MG/DL (ref 65–100)
GLUCOSE SERPL-MCNC: 167 MG/DL (ref 65–100)
GLUCOSE SERPL-MCNC: 75 MG/DL (ref 65–100)
GLUCOSE SERPL-MCNC: 76 MG/DL (ref 65–100)
GLUCOSE SERPL-MCNC: 83 MG/DL (ref 65–100)
GLUCOSE SERPL-MCNC: 84 MG/DL (ref 65–100)
GLUCOSE SERPL-MCNC: 87 MG/DL (ref 65–100)
GLUCOSE SERPL-MCNC: 88 MG/DL (ref 65–100)
GLUCOSE SERPL-MCNC: 90 MG/DL (ref 65–100)
GLUCOSE SERPL-MCNC: 92 MG/DL (ref 65–100)
GLUCOSE SERPL-MCNC: 93 MG/DL (ref 65–100)
GLUCOSE SERPL-MCNC: 95 MG/DL (ref 65–100)
GLUCOSE SERPL-MCNC: 96 MG/DL (ref 65–100)
GLUCOSE SERPL-MCNC: 97 MG/DL (ref 65–100)
GLUCOSE SERPL-MCNC: 98 MG/DL (ref 65–100)
GLUCOSE UR STRIP.AUTO-MCNC: NEGATIVE MG/DL
HBA1C MFR BLD: 6 % (ref 4.2–6.3)
HBA1C MFR BLD: 6 % (ref 4.2–6.3)
HCO3 BLD-SCNC: 26 MMOL/L (ref 22–26)
HCO3 BLD-SCNC: 30.7 MMOL/L (ref 22–26)
HCT VFR BLD AUTO: 24.3 % (ref 36.6–50.3)
HCT VFR BLD AUTO: 25.2 % (ref 36.6–50.3)
HCT VFR BLD AUTO: 26 % (ref 36.6–50.3)
HCT VFR BLD AUTO: 26.4 % (ref 36.6–50.3)
HCT VFR BLD AUTO: 26.5 % (ref 36.6–50.3)
HCT VFR BLD AUTO: 27.1 % (ref 36.6–50.3)
HCT VFR BLD AUTO: 27.3 % (ref 36.6–50.3)
HCT VFR BLD AUTO: 27.5 % (ref 36.6–50.3)
HCT VFR BLD AUTO: 30 % (ref 36.6–50.3)
HCT VFR BLD AUTO: 30.8 % (ref 36.6–50.3)
HCT VFR BLD AUTO: 31.2 % (ref 36.6–50.3)
HCT VFR BLD AUTO: 31.2 % (ref 36.6–50.3)
HCT VFR BLD AUTO: 31.5 % (ref 36.6–50.3)
HCT VFR BLD AUTO: 32.8 % (ref 36.6–50.3)
HCT VFR BLD AUTO: 33.9 % (ref 36.6–50.3)
HCT VFR BLD AUTO: 33.9 % (ref 36.6–50.3)
HCT VFR BLD AUTO: 34.1 % (ref 36.6–50.3)
HCT VFR BLD AUTO: 34.2 % (ref 36.6–50.3)
HCT VFR BLD AUTO: 37.7 % (ref 36.6–50.3)
HCT VFR BLD AUTO: 38.1 % (ref 36.6–50.3)
HCT VFR BLD AUTO: 38.3 % (ref 36.6–50.3)
HCT VFR BLD AUTO: 38.5 % (ref 36.6–50.3)
HCT VFR BLD AUTO: 38.5 % (ref 36.6–50.3)
HCT VFR BLD AUTO: 40 % (ref 36.6–50.3)
HCT VFR BLD AUTO: 40.9 % (ref 36.6–50.3)
HDLC SERPL-MCNC: 24 MG/DL
HDLC SERPL-MCNC: 34 MG/DL
HDLC SERPL: 3.1 {RATIO} (ref 0–5)
HDLC SERPL: 4.5 {RATIO} (ref 0–5)
HGB BLD-MCNC: 10 G/DL (ref 12.1–17)
HGB BLD-MCNC: 10.1 G/DL (ref 12.1–17)
HGB BLD-MCNC: 10.8 G/DL (ref 12.1–17)
HGB BLD-MCNC: 11.5 G/DL (ref 12.1–17)
HGB BLD-MCNC: 11.5 G/DL (ref 12.1–17)
HGB BLD-MCNC: 11.6 G/DL (ref 12.1–17)
HGB BLD-MCNC: 11.6 G/DL (ref 12.1–17)
HGB BLD-MCNC: 11.9 G/DL (ref 12.1–17)
HGB BLD-MCNC: 12.3 G/DL (ref 12.1–17)
HGB BLD-MCNC: 12.4 G/DL (ref 12.1–17)
HGB BLD-MCNC: 7.2 G/DL (ref 12.1–17)
HGB BLD-MCNC: 7.7 G/DL (ref 12.1–17)
HGB BLD-MCNC: 7.8 G/DL (ref 12.1–17)
HGB BLD-MCNC: 7.8 G/DL (ref 12.1–17)
HGB BLD-MCNC: 7.9 G/DL (ref 12.1–17)
HGB BLD-MCNC: 7.9 G/DL (ref 12.1–17)
HGB BLD-MCNC: 8.1 G/DL (ref 12.1–17)
HGB BLD-MCNC: 8.3 G/DL (ref 12.1–17)
HGB BLD-MCNC: 8.9 G/DL (ref 12.1–17)
HGB BLD-MCNC: 9 G/DL (ref 12.1–17)
HGB BLD-MCNC: 9.2 G/DL (ref 12.1–17)
HGB BLD-MCNC: 9.5 G/DL (ref 12.1–17)
HGB BLD-MCNC: 9.6 G/DL (ref 12.1–17)
HGB BLD-MCNC: 9.8 G/DL (ref 12.1–17)
HGB BLD-MCNC: 9.9 G/DL (ref 12.1–17)
HGB UR QL STRIP: NEGATIVE
HYALINE CASTS URNS QL MICRO: ABNORMAL /LPF (ref 0–5)
HYALINE CASTS URNS QL MICRO: ABNORMAL /LPF (ref 0–5)
IMM GRANULOCYTES # BLD: 0 K/UL
IMM GRANULOCYTES # BLD: 0 K/UL (ref 0–0.04)
IMM GRANULOCYTES # BLD: 0.1 K/UL (ref 0–0.04)
IMM GRANULOCYTES # BLD: 0.2 K/UL (ref 0–0.04)
IMM GRANULOCYTES NFR BLD AUTO: 0 %
IMM GRANULOCYTES NFR BLD AUTO: 0 % (ref 0–0.5)
IMM GRANULOCYTES NFR BLD AUTO: 1 % (ref 0–0.5)
INR PPP: 1.1 (ref 0.9–1.1)
IRON SATN MFR SERPL: 14 % (ref 20–50)
IRON SATN MFR SERPL: 5 % (ref 20–50)
IRON SERPL-MCNC: 14 UG/DL (ref 35–150)
IRON SERPL-MCNC: 40 UG/DL (ref 35–150)
KETONES UR QL STRIP.AUTO: NEGATIVE MG/DL
KNOWN CARDIAC CONDITION, CST08: NORMAL
LACTATE BLD-SCNC: 1.3 MMOL/L (ref 0.4–2)
LACTATE SERPL-SCNC: 1.4 MMOL/L (ref 0.4–2)
LACTATE SERPL-SCNC: 1.5 MMOL/L (ref 0.4–2)
LACTATE SERPL-SCNC: 2 MMOL/L (ref 0.4–2)
LDLC SERPL CALC-MCNC: 56.8 MG/DL (ref 0–100)
LDLC SERPL CALC-MCNC: 72.2 MG/DL (ref 0–100)
LEUKOCYTE ESTERASE UR QL STRIP.AUTO: ABNORMAL
LEUKOCYTE ESTERASE UR QL STRIP.AUTO: NEGATIVE
LEUKOCYTE ESTERASE UR QL STRIP.AUTO: NEGATIVE
LIPID PROFILE,FLP: NORMAL
LIPID PROFILE,FLP: NORMAL
LYMPHOCYTES # BLD: 1 K/UL (ref 0.8–3.5)
LYMPHOCYTES # BLD: 1.1 K/UL (ref 0.8–3.5)
LYMPHOCYTES # BLD: 1.1 K/UL (ref 0.8–3.5)
LYMPHOCYTES # BLD: 1.3 K/UL (ref 0.8–3.5)
LYMPHOCYTES # BLD: 1.3 K/UL (ref 0.8–3.5)
LYMPHOCYTES # BLD: 1.4 K/UL (ref 0.8–3.5)
LYMPHOCYTES # BLD: 1.5 K/UL (ref 0.8–3.5)
LYMPHOCYTES # BLD: 1.6 K/UL (ref 0.8–3.5)
LYMPHOCYTES # BLD: 1.6 K/UL (ref 0.8–3.5)
LYMPHOCYTES # BLD: 1.7 K/UL (ref 0.8–3.5)
LYMPHOCYTES # BLD: 1.7 K/UL (ref 0.8–3.5)
LYMPHOCYTES # BLD: 1.8 K/UL (ref 0.8–3.5)
LYMPHOCYTES # BLD: 1.9 K/UL (ref 0.8–3.5)
LYMPHOCYTES # BLD: 1.9 K/UL (ref 0.8–3.5)
LYMPHOCYTES # BLD: 2.1 K/UL (ref 0.8–3.5)
LYMPHOCYTES # BLD: 2.2 K/UL (ref 0.8–3.5)
LYMPHOCYTES # BLD: 2.3 K/UL (ref 0.8–3.5)
LYMPHOCYTES # BLD: 2.4 K/UL (ref 0.8–3.5)
LYMPHOCYTES # BLD: 2.6 K/UL (ref 0.8–3.5)
LYMPHOCYTES # BLD: 2.9 K/UL (ref 0.8–3.5)
LYMPHOCYTES # BLD: 3.3 K/UL (ref 0.8–3.5)
LYMPHOCYTES # BLD: 3.5 K/UL (ref 0.8–3.5)
LYMPHOCYTES NFR BLD: 11 % (ref 12–49)
LYMPHOCYTES NFR BLD: 13 % (ref 12–49)
LYMPHOCYTES NFR BLD: 15 % (ref 12–49)
LYMPHOCYTES NFR BLD: 16 % (ref 12–49)
LYMPHOCYTES NFR BLD: 19 % (ref 12–49)
LYMPHOCYTES NFR BLD: 20 % (ref 12–49)
LYMPHOCYTES NFR BLD: 22 % (ref 12–49)
LYMPHOCYTES NFR BLD: 25 % (ref 12–49)
LYMPHOCYTES NFR BLD: 25 % (ref 12–49)
LYMPHOCYTES NFR BLD: 26 % (ref 12–49)
LYMPHOCYTES NFR BLD: 28 % (ref 12–49)
LYMPHOCYTES NFR BLD: 31 % (ref 12–49)
LYMPHOCYTES NFR BLD: 31 % (ref 12–49)
LYMPHOCYTES NFR BLD: 32 % (ref 12–49)
LYMPHOCYTES NFR BLD: 33 % (ref 12–49)
LYMPHOCYTES NFR BLD: 35 % (ref 12–49)
LYMPHOCYTES NFR BLD: 6 % (ref 12–49)
LYMPHOCYTES NFR BLD: 8 % (ref 12–49)
MAGNESIUM SERPL-MCNC: 1.5 MG/DL (ref 1.6–2.4)
MAGNESIUM SERPL-MCNC: 1.7 MG/DL (ref 1.6–2.4)
MAGNESIUM SERPL-MCNC: 1.8 MG/DL (ref 1.6–2.4)
MAGNESIUM SERPL-MCNC: 1.9 MG/DL (ref 1.6–2.4)
MAGNESIUM SERPL-MCNC: 2 MG/DL (ref 1.6–2.4)
MAGNESIUM SERPL-MCNC: 2.3 MG/DL (ref 1.6–2.4)
MAX. DIASTOLIC BP, CST04: 76 MMHG
MAX. HEART RATE, CST05: 96 BPM
MAX. SYSTOLIC BP, CST03: 133 MMHG
MCH RBC QN AUTO: 20.9 PG (ref 26–34)
MCH RBC QN AUTO: 21.3 PG (ref 26–34)
MCH RBC QN AUTO: 23.6 PG (ref 26–34)
MCH RBC QN AUTO: 23.7 PG (ref 26–34)
MCH RBC QN AUTO: 23.8 PG (ref 26–34)
MCH RBC QN AUTO: 23.9 PG (ref 26–34)
MCH RBC QN AUTO: 24 PG (ref 26–34)
MCH RBC QN AUTO: 24.1 PG (ref 26–34)
MCH RBC QN AUTO: 24.2 PG (ref 26–34)
MCH RBC QN AUTO: 24.2 PG (ref 26–34)
MCH RBC QN AUTO: 24.3 PG (ref 26–34)
MCH RBC QN AUTO: 24.3 PG (ref 26–34)
MCH RBC QN AUTO: 24.4 PG (ref 26–34)
MCH RBC QN AUTO: 24.5 PG (ref 26–34)
MCH RBC QN AUTO: 24.6 PG (ref 26–34)
MCH RBC QN AUTO: 24.9 PG (ref 26–34)
MCHC RBC AUTO-ENTMCNC: 28.8 G/DL (ref 30–36.5)
MCHC RBC AUTO-ENTMCNC: 28.9 G/DL (ref 30–36.5)
MCHC RBC AUTO-ENTMCNC: 29.2 G/DL (ref 30–36.5)
MCHC RBC AUTO-ENTMCNC: 29.2 G/DL (ref 30–36.5)
MCHC RBC AUTO-ENTMCNC: 29.4 G/DL (ref 30–36.5)
MCHC RBC AUTO-ENTMCNC: 29.5 G/DL (ref 30–36.5)
MCHC RBC AUTO-ENTMCNC: 29.5 G/DL (ref 30–36.5)
MCHC RBC AUTO-ENTMCNC: 29.6 G/DL (ref 30–36.5)
MCHC RBC AUTO-ENTMCNC: 29.7 G/DL (ref 30–36.5)
MCHC RBC AUTO-ENTMCNC: 29.7 G/DL (ref 30–36.5)
MCHC RBC AUTO-ENTMCNC: 29.9 G/DL (ref 30–36.5)
MCHC RBC AUTO-ENTMCNC: 29.9 G/DL (ref 30–36.5)
MCHC RBC AUTO-ENTMCNC: 30 G/DL (ref 30–36.5)
MCHC RBC AUTO-ENTMCNC: 30.1 G/DL (ref 30–36.5)
MCHC RBC AUTO-ENTMCNC: 30.2 G/DL (ref 30–36.5)
MCHC RBC AUTO-ENTMCNC: 30.2 G/DL (ref 30–36.5)
MCHC RBC AUTO-ENTMCNC: 30.4 G/DL (ref 30–36.5)
MCHC RBC AUTO-ENTMCNC: 30.4 G/DL (ref 30–36.5)
MCHC RBC AUTO-ENTMCNC: 30.5 G/DL (ref 30–36.5)
MCHC RBC AUTO-ENTMCNC: 30.6 G/DL (ref 30–36.5)
MCHC RBC AUTO-ENTMCNC: 30.8 G/DL (ref 30–36.5)
MCHC RBC AUTO-ENTMCNC: 30.8 G/DL (ref 30–36.5)
MCHC RBC AUTO-ENTMCNC: 30.9 G/DL (ref 30–36.5)
MCHC RBC AUTO-ENTMCNC: 31 G/DL (ref 30–36.5)
MCHC RBC AUTO-ENTMCNC: 31.7 G/DL (ref 30–36.5)
MCV RBC AUTO: 70.5 FL (ref 80–99)
MCV RBC AUTO: 70.6 FL (ref 80–99)
MCV RBC AUTO: 78.6 FL (ref 80–99)
MCV RBC AUTO: 78.8 FL (ref 80–99)
MCV RBC AUTO: 79 FL (ref 80–99)
MCV RBC AUTO: 79.4 FL (ref 80–99)
MCV RBC AUTO: 79.4 FL (ref 80–99)
MCV RBC AUTO: 79.5 FL (ref 80–99)
MCV RBC AUTO: 79.6 FL (ref 80–99)
MCV RBC AUTO: 79.7 FL (ref 80–99)
MCV RBC AUTO: 79.8 FL (ref 80–99)
MCV RBC AUTO: 80.1 FL (ref 80–99)
MCV RBC AUTO: 80.2 FL (ref 80–99)
MCV RBC AUTO: 80.2 FL (ref 80–99)
MCV RBC AUTO: 80.3 FL (ref 80–99)
MCV RBC AUTO: 81.3 FL (ref 80–99)
MCV RBC AUTO: 81.4 FL (ref 80–99)
MCV RBC AUTO: 81.5 FL (ref 80–99)
MCV RBC AUTO: 81.5 FL (ref 80–99)
MCV RBC AUTO: 81.7 FL (ref 80–99)
MCV RBC AUTO: 81.7 FL (ref 80–99)
MCV RBC AUTO: 82 FL (ref 80–99)
MCV RBC AUTO: 83.2 FL (ref 80–99)
MCV RBC AUTO: 83.2 FL (ref 80–99)
MCV RBC AUTO: 83.5 FL (ref 80–99)
MONOCYTES # BLD: 0.1 K/UL (ref 0–1)
MONOCYTES # BLD: 0.4 K/UL (ref 0–1)
MONOCYTES # BLD: 0.5 K/UL (ref 0–1)
MONOCYTES # BLD: 0.6 K/UL (ref 0–1)
MONOCYTES # BLD: 0.7 K/UL (ref 0–1)
MONOCYTES # BLD: 0.8 K/UL (ref 0–1)
MONOCYTES # BLD: 0.9 K/UL (ref 0–1)
MONOCYTES NFR BLD: 1 % (ref 5–13)
MONOCYTES NFR BLD: 11 % (ref 5–13)
MONOCYTES NFR BLD: 5 % (ref 5–13)
MONOCYTES NFR BLD: 6 % (ref 5–13)
MONOCYTES NFR BLD: 7 % (ref 5–13)
MONOCYTES NFR BLD: 8 % (ref 5–13)
MONOCYTES NFR BLD: 9 % (ref 5–13)
MONOCYTES NFR BLD: 9 % (ref 5–13)
MYELOCYTES NFR BLD MANUAL: 1 %
NEUTS BAND NFR BLD MANUAL: 2 % (ref 0–6)
NEUTS SEG # BLD: 10.7 K/UL (ref 1.8–8)
NEUTS SEG # BLD: 14.5 K/UL (ref 1.8–8)
NEUTS SEG # BLD: 15.6 K/UL (ref 1.8–8)
NEUTS SEG # BLD: 2.4 K/UL (ref 1.8–8)
NEUTS SEG # BLD: 3.4 K/UL (ref 1.8–8)
NEUTS SEG # BLD: 3.8 K/UL (ref 1.8–8)
NEUTS SEG # BLD: 4.8 K/UL (ref 1.8–8)
NEUTS SEG # BLD: 4.9 K/UL (ref 1.8–8)
NEUTS SEG # BLD: 5 K/UL (ref 1.8–8)
NEUTS SEG # BLD: 5 K/UL (ref 1.8–8)
NEUTS SEG # BLD: 5.1 K/UL (ref 1.8–8)
NEUTS SEG # BLD: 5.6 K/UL (ref 1.8–8)
NEUTS SEG # BLD: 5.8 K/UL (ref 1.8–8)
NEUTS SEG # BLD: 5.8 K/UL (ref 1.8–8)
NEUTS SEG # BLD: 5.9 K/UL (ref 1.8–8)
NEUTS SEG # BLD: 5.9 K/UL (ref 1.8–8)
NEUTS SEG # BLD: 6.2 K/UL (ref 1.8–8)
NEUTS SEG # BLD: 6.3 K/UL (ref 1.8–8)
NEUTS SEG # BLD: 7.9 K/UL (ref 1.8–8)
NEUTS SEG # BLD: 8.3 K/UL (ref 1.8–8)
NEUTS SEG # BLD: 8.5 K/UL (ref 1.8–8)
NEUTS SEG # BLD: 9 K/UL (ref 1.8–8)
NEUTS SEG NFR BLD: 48 % (ref 32–75)
NEUTS SEG NFR BLD: 49 % (ref 32–75)
NEUTS SEG NFR BLD: 50 % (ref 32–75)
NEUTS SEG NFR BLD: 54 % (ref 32–75)
NEUTS SEG NFR BLD: 55 % (ref 32–75)
NEUTS SEG NFR BLD: 61 % (ref 32–75)
NEUTS SEG NFR BLD: 63 % (ref 32–75)
NEUTS SEG NFR BLD: 64 % (ref 32–75)
NEUTS SEG NFR BLD: 64 % (ref 32–75)
NEUTS SEG NFR BLD: 66 % (ref 32–75)
NEUTS SEG NFR BLD: 68 % (ref 32–75)
NEUTS SEG NFR BLD: 70 % (ref 32–75)
NEUTS SEG NFR BLD: 71 % (ref 32–75)
NEUTS SEG NFR BLD: 73 % (ref 32–75)
NEUTS SEG NFR BLD: 74 % (ref 32–75)
NEUTS SEG NFR BLD: 76 % (ref 32–75)
NEUTS SEG NFR BLD: 79 % (ref 32–75)
NEUTS SEG NFR BLD: 86 % (ref 32–75)
NEUTS SEG NFR BLD: 86 % (ref 32–75)
NEUTS SEG NFR BLD: 87 % (ref 32–75)
NITRITE UR QL STRIP.AUTO: NEGATIVE
NRBC # BLD: 0 K/UL (ref 0–0.01)
NRBC # BLD: 0.02 K/UL (ref 0–0.01)
NRBC BLD-RTO: 0 PER 100 WBC
NRBC BLD-RTO: 0.2 PER 100 WBC
NRBC BLD-RTO: 0.2 PER 100 WBC
NRBC BLD-RTO: 0.3 PER 100 WBC
O2/TOTAL GAS SETTING VFR VENT: 0.8 %
OVERALL BP RESPONSE TO EXERCISE, CST16: NORMAL
OVERALL HR RESPONSE TO EXERCISE, CST15: NORMAL
P-R INTERVAL, ECG05: 128 MS
P-R INTERVAL, ECG05: 144 MS
P-R INTERVAL, ECG05: 164 MS
P-R INTERVAL, ECG05: 168 MS
P-R INTERVAL, ECG05: 210 MS
P-R INTERVAL, ECG05: 218 MS
PCO2 BLD: 35.8 MMHG (ref 35–45)
PCO2 BLD: 42.9 MMHG (ref 35–45)
PEAK EX METS, CST10: 1 METS
PEEP RESPIRATORY: 5 CMH2O
PH BLD: 7.46 [PH] (ref 7.35–7.45)
PH BLD: 7.47 [PH] (ref 7.35–7.45)
PH UR STRIP: 5.5 [PH] (ref 5–8)
PH UR STRIP: 5.5 [PH] (ref 5–8)
PH UR STRIP: 8 [PH] (ref 5–8)
PHOSPHATE SERPL-MCNC: 2.5 MG/DL (ref 2.6–4.7)
PHOSPHATE SERPL-MCNC: 3 MG/DL (ref 2.6–4.7)
PHOSPHATE SERPL-MCNC: 3.1 MG/DL (ref 2.6–4.7)
PIP ISTAT,IPIP: 12
PLATELET # BLD AUTO: 328 K/UL (ref 150–400)
PLATELET # BLD AUTO: 340 K/UL (ref 150–400)
PLATELET # BLD AUTO: 357 K/UL (ref 150–400)
PLATELET # BLD AUTO: 359 K/UL (ref 150–400)
PLATELET # BLD AUTO: 365 K/UL (ref 150–400)
PLATELET # BLD AUTO: 370 K/UL (ref 150–400)
PLATELET # BLD AUTO: 371 K/UL (ref 150–400)
PLATELET # BLD AUTO: 382 K/UL (ref 150–400)
PLATELET # BLD AUTO: 392 K/UL (ref 150–400)
PLATELET # BLD AUTO: 396 K/UL (ref 150–400)
PLATELET # BLD AUTO: 401 K/UL (ref 150–400)
PLATELET # BLD AUTO: 401 K/UL (ref 150–400)
PLATELET # BLD AUTO: 405 K/UL (ref 150–400)
PLATELET # BLD AUTO: 406 K/UL (ref 150–400)
PLATELET # BLD AUTO: 407 K/UL (ref 150–400)
PLATELET # BLD AUTO: 420 K/UL (ref 150–400)
PLATELET # BLD AUTO: 424 K/UL (ref 150–400)
PLATELET # BLD AUTO: 446 K/UL (ref 150–400)
PLATELET # BLD AUTO: 464 K/UL (ref 150–400)
PLATELET # BLD AUTO: 488 K/UL (ref 150–400)
PLATELET # BLD AUTO: 489 K/UL (ref 150–400)
PLATELET # BLD AUTO: 507 K/UL (ref 150–400)
PLATELET # BLD AUTO: 510 K/UL (ref 150–400)
PLATELET # BLD AUTO: 532 K/UL (ref 150–400)
PLATELET # BLD AUTO: 568 K/UL (ref 150–400)
PLATELET COMMENTS,PCOM: ABNORMAL
PMV BLD AUTO: 8.8 FL (ref 8.9–12.9)
PMV BLD AUTO: 9 FL (ref 8.9–12.9)
PMV BLD AUTO: 9 FL (ref 8.9–12.9)
PMV BLD AUTO: 9.1 FL (ref 8.9–12.9)
PMV BLD AUTO: 9.1 FL (ref 8.9–12.9)
PMV BLD AUTO: 9.2 FL (ref 8.9–12.9)
PMV BLD AUTO: 9.3 FL (ref 8.9–12.9)
PMV BLD AUTO: 9.4 FL (ref 8.9–12.9)
PMV BLD AUTO: 9.5 FL (ref 8.9–12.9)
PMV BLD AUTO: 9.6 FL (ref 8.9–12.9)
PMV BLD AUTO: 9.7 FL (ref 8.9–12.9)
PO2 BLD: 49 MMHG (ref 80–100)
PO2 BLD: 81 MMHG (ref 80–100)
POTASSIUM SERPL-SCNC: 2.9 MMOL/L (ref 3.5–5.1)
POTASSIUM SERPL-SCNC: 3 MMOL/L (ref 3.5–5.1)
POTASSIUM SERPL-SCNC: 3.2 MMOL/L (ref 3.5–5.1)
POTASSIUM SERPL-SCNC: 3.4 MMOL/L (ref 3.5–5.1)
POTASSIUM SERPL-SCNC: 3.5 MMOL/L (ref 3.5–5.1)
POTASSIUM SERPL-SCNC: 3.5 MMOL/L (ref 3.5–5.1)
POTASSIUM SERPL-SCNC: 3.6 MMOL/L (ref 3.5–5.1)
POTASSIUM SERPL-SCNC: 3.6 MMOL/L (ref 3.5–5.1)
POTASSIUM SERPL-SCNC: 3.7 MMOL/L (ref 3.5–5.1)
POTASSIUM SERPL-SCNC: 3.8 MMOL/L (ref 3.5–5.1)
POTASSIUM SERPL-SCNC: 4 MMOL/L (ref 3.5–5.1)
POTASSIUM SERPL-SCNC: 4 MMOL/L (ref 3.5–5.1)
POTASSIUM SERPL-SCNC: 4.1 MMOL/L (ref 3.5–5.1)
POTASSIUM SERPL-SCNC: 4.2 MMOL/L (ref 3.5–5.1)
POTASSIUM SERPL-SCNC: 4.3 MMOL/L (ref 3.5–5.1)
POTASSIUM SERPL-SCNC: 4.3 MMOL/L (ref 3.5–5.1)
POTASSIUM SERPL-SCNC: 4.4 MMOL/L (ref 3.5–5.1)
POTASSIUM SERPL-SCNC: 4.5 MMOL/L (ref 3.5–5.1)
POTASSIUM SERPL-SCNC: 4.6 MMOL/L (ref 3.5–5.1)
PROT SERPL-MCNC: 6.4 G/DL (ref 6.4–8.2)
PROT SERPL-MCNC: 6.6 G/DL (ref 6.4–8.2)
PROT SERPL-MCNC: 7.2 G/DL (ref 6.4–8.2)
PROT SERPL-MCNC: 7.5 G/DL (ref 6.4–8.2)
PROT SERPL-MCNC: 7.6 G/DL (ref 6.4–8.2)
PROT SERPL-MCNC: 8 G/DL (ref 6.4–8.2)
PROT SERPL-MCNC: 8.2 G/DL (ref 6.4–8.2)
PROT SERPL-MCNC: 8.4 G/DL (ref 6.4–8.2)
PROT SERPL-MCNC: 8.7 G/DL (ref 6.4–8.2)
PROT SERPL-MCNC: 9 G/DL (ref 6.4–8.2)
PROT UR STRIP-MCNC: ABNORMAL MG/DL
PROT UR STRIP-MCNC: NEGATIVE MG/DL
PROT UR STRIP-MCNC: NEGATIVE MG/DL
PROTHROMBIN TIME: 10.9 SEC (ref 9–11.1)
PROTOCOL NAME, CST01: NORMAL
Q-T INTERVAL, ECG07: 316 MS
Q-T INTERVAL, ECG07: 368 MS
Q-T INTERVAL, ECG07: 388 MS
Q-T INTERVAL, ECG07: 388 MS
Q-T INTERVAL, ECG07: 390 MS
Q-T INTERVAL, ECG07: 402 MS
Q-T INTERVAL, ECG07: 412 MS
Q-T INTERVAL, ECG07: 416 MS
Q-T INTERVAL, ECG07: 434 MS
Q-T INTERVAL, ECG07: 436 MS
QRS DURATION, ECG06: 110 MS
QRS DURATION, ECG06: 112 MS
QRS DURATION, ECG06: 128 MS
QRS DURATION, ECG06: 130 MS
QRS DURATION, ECG06: 136 MS
QRS DURATION, ECG06: 142 MS
QRS DURATION, ECG06: 144 MS
QRS DURATION, ECG06: 150 MS
QRS DURATION, ECG06: 158 MS
QRS DURATION, ECG06: 92 MS
QTC CALCULATION (BEZET), ECG08: 468 MS
QTC CALCULATION (BEZET), ECG08: 494 MS
QTC CALCULATION (BEZET), ECG08: 495 MS
QTC CALCULATION (BEZET), ECG08: 498 MS
QTC CALCULATION (BEZET), ECG08: 500 MS
QTC CALCULATION (BEZET), ECG08: 503 MS
QTC CALCULATION (BEZET), ECG08: 537 MS
QTC CALCULATION (BEZET), ECG08: 539 MS
QTC CALCULATION (BEZET), ECG08: 545 MS
QTC CALCULATION (BEZET), ECG08: 547 MS
RBC # BLD AUTO: 3.03 M/UL (ref 4.1–5.7)
RBC # BLD AUTO: 3.19 M/UL (ref 4.1–5.7)
RBC # BLD AUTO: 3.22 M/UL (ref 4.1–5.7)
RBC # BLD AUTO: 3.24 M/UL (ref 4.1–5.7)
RBC # BLD AUTO: 3.31 M/UL (ref 4.1–5.7)
RBC # BLD AUTO: 3.33 M/UL (ref 4.1–5.7)
RBC # BLD AUTO: 3.36 M/UL (ref 4.1–5.7)
RBC # BLD AUTO: 3.45 M/UL (ref 4.1–5.7)
RBC # BLD AUTO: 3.75 M/UL (ref 4.1–5.7)
RBC # BLD AUTO: 3.78 M/UL (ref 4.1–5.7)
RBC # BLD AUTO: 3.92 M/UL (ref 4.1–5.7)
RBC # BLD AUTO: 4.06 M/UL (ref 4.1–5.7)
RBC # BLD AUTO: 4.11 M/UL (ref 4.1–5.7)
RBC # BLD AUTO: 4.11 M/UL (ref 4.1–5.7)
RBC # BLD AUTO: 4.15 M/UL (ref 4.1–5.7)
RBC # BLD AUTO: 4.25 M/UL (ref 4.1–5.7)
RBC # BLD AUTO: 4.33 M/UL (ref 4.1–5.7)
RBC # BLD AUTO: 4.47 M/UL (ref 4.1–5.7)
RBC # BLD AUTO: 4.71 M/UL (ref 4.1–5.7)
RBC # BLD AUTO: 4.75 M/UL (ref 4.1–5.7)
RBC # BLD AUTO: 4.77 M/UL (ref 4.1–5.7)
RBC # BLD AUTO: 4.79 M/UL (ref 4.1–5.7)
RBC # BLD AUTO: 4.85 M/UL (ref 4.1–5.7)
RBC # BLD AUTO: 5.02 M/UL (ref 4.1–5.7)
RBC # BLD AUTO: 5.09 M/UL (ref 4.1–5.7)
RBC #/AREA URNS HPF: ABNORMAL /HPF (ref 0–5)
RBC #/AREA URNS HPF: ABNORMAL /HPF (ref 0–5)
RBC MORPH BLD: ABNORMAL
REPORTED DOSE,DOSE: ABNORMAL UNITS
REPORTED DOSE/TIME,TMG: ABNORMAL
SAMPLES BEING HELD,HOLD: NORMAL
SAO2 % BLD: 87 % (ref 92–97)
SAO2 % BLD: 96 % (ref 92–97)
SERVICE CMNT-IMP: ABNORMAL
SERVICE CMNT-IMP: NORMAL
SODIUM SERPL-SCNC: 129 MMOL/L (ref 136–145)
SODIUM SERPL-SCNC: 131 MMOL/L (ref 136–145)
SODIUM SERPL-SCNC: 131 MMOL/L (ref 136–145)
SODIUM SERPL-SCNC: 132 MMOL/L (ref 136–145)
SODIUM SERPL-SCNC: 132 MMOL/L (ref 136–145)
SODIUM SERPL-SCNC: 134 MMOL/L (ref 136–145)
SODIUM SERPL-SCNC: 135 MMOL/L (ref 136–145)
SODIUM SERPL-SCNC: 136 MMOL/L (ref 136–145)
SODIUM SERPL-SCNC: 137 MMOL/L (ref 136–145)
SODIUM SERPL-SCNC: 138 MMOL/L (ref 136–145)
SODIUM SERPL-SCNC: 139 MMOL/L (ref 136–145)
SODIUM SERPL-SCNC: 141 MMOL/L (ref 136–145)
SP GR UR REFRACTOMETRY: 1.01 (ref 1–1.03)
SP GR UR REFRACTOMETRY: 1.02 (ref 1–1.03)
SP GR UR REFRACTOMETRY: 1.02 (ref 1–1.03)
SPECIMEN TYPE: ABNORMAL
SPECIMEN TYPE: ABNORMAL
TEST INDICATION, CST09: NORMAL
THERAPEUTIC RANGE,PTTT: NORMAL SECS (ref 58–77)
TIBC SERPL-MCNC: 283 UG/DL (ref 250–450)
TIBC SERPL-MCNC: 305 UG/DL (ref 250–450)
TOTAL RESP. RATE, ITRR: 18
TRIGL SERPL-MCNC: 64 MG/DL (ref ?–150)
TRIGL SERPL-MCNC: 66 MG/DL (ref ?–150)
TROPONIN I SERPL-MCNC: 0.05 NG/ML
TROPONIN I SERPL-MCNC: 0.05 NG/ML
TROPONIN I SERPL-MCNC: 0.5 NG/ML
TROPONIN I SERPL-MCNC: 0.61 NG/ML
TROPONIN I SERPL-MCNC: 0.93 NG/ML
TROPONIN I SERPL-MCNC: <0.04 NG/ML
TROPONIN I SERPL-MCNC: <0.05 NG/ML
TROPONIN I SERPL-MCNC: <0.05 NG/ML
TSH SERPL DL<=0.05 MIU/L-ACNC: 2.84 UIU/ML (ref 0.36–3.74)
UR CULT HOLD, URHOLD: NORMAL
UR CULT HOLD, URHOLD: NORMAL
UROBILINOGEN UR QL STRIP.AUTO: 0.2 EU/DL (ref 0.2–1)
UROBILINOGEN UR QL STRIP.AUTO: 0.2 EU/DL (ref 0.2–1)
UROBILINOGEN UR QL STRIP.AUTO: 1 EU/DL (ref 0.2–1)
VANCOMYCIN TROUGH SERPL-MCNC: 13 UG/ML (ref 5–10)
VANCOMYCIN TROUGH SERPL-MCNC: 16.6 UG/ML (ref 5–10)
VANCOMYCIN TROUGH SERPL-MCNC: 17.8 UG/ML (ref 5–10)
VANCOMYCIN TROUGH SERPL-MCNC: 28 UG/ML (ref 5–10)
VANCOMYCIN TROUGH SERPL-MCNC: 28.5 UG/ML (ref 5–10)
VENTRICULAR RATE, ECG03: 111 BPM
VENTRICULAR RATE, ECG03: 114 BPM
VENTRICULAR RATE, ECG03: 119 BPM
VENTRICULAR RATE, ECG03: 132 BPM
VENTRICULAR RATE, ECG03: 88 BPM
VENTRICULAR RATE, ECG03: 88 BPM
VENTRICULAR RATE, ECG03: 91 BPM
VENTRICULAR RATE, ECG03: 93 BPM
VENTRICULAR RATE, ECG03: 95 BPM
VENTRICULAR RATE, ECG03: 98 BPM
VIT B12 SERPL-MCNC: 235 PG/ML (ref 211–911)
VLDLC SERPL CALC-MCNC: 12.8 MG/DL
VLDLC SERPL CALC-MCNC: 13.2 MG/DL
WBC # BLD AUTO: 10.5 K/UL (ref 4.1–11.1)
WBC # BLD AUTO: 10.6 K/UL (ref 4.1–11.1)
WBC # BLD AUTO: 11.7 K/UL (ref 4.1–11.1)
WBC # BLD AUTO: 12.5 K/UL (ref 4.1–11.1)
WBC # BLD AUTO: 14 K/UL (ref 4.1–11.1)
WBC # BLD AUTO: 16.8 K/UL (ref 4.1–11.1)
WBC # BLD AUTO: 17.8 K/UL (ref 4.1–11.1)
WBC # BLD AUTO: 5.1 K/UL (ref 4.1–11.1)
WBC # BLD AUTO: 7 K/UL (ref 4.1–11.1)
WBC # BLD AUTO: 7.4 K/UL (ref 4.1–11.1)
WBC # BLD AUTO: 7.5 K/UL (ref 4.1–11.1)
WBC # BLD AUTO: 7.6 K/UL (ref 4.1–11.1)
WBC # BLD AUTO: 7.7 K/UL (ref 4.1–11.1)
WBC # BLD AUTO: 7.7 K/UL (ref 4.1–11.1)
WBC # BLD AUTO: 8.1 K/UL (ref 4.1–11.1)
WBC # BLD AUTO: 8.1 K/UL (ref 4.1–11.1)
WBC # BLD AUTO: 8.2 K/UL (ref 4.1–11.1)
WBC # BLD AUTO: 8.3 K/UL (ref 4.1–11.1)
WBC # BLD AUTO: 8.3 K/UL (ref 4.1–11.1)
WBC # BLD AUTO: 8.4 K/UL (ref 4.1–11.1)
WBC # BLD AUTO: 8.9 K/UL (ref 4.1–11.1)
WBC # BLD AUTO: 9.2 K/UL (ref 4.1–11.1)
WBC # BLD AUTO: 9.2 K/UL (ref 4.1–11.1)
WBC # BLD AUTO: 9.4 K/UL (ref 4.1–11.1)
WBC # BLD AUTO: 9.9 K/UL (ref 4.1–11.1)
WBC MORPH BLD: ABNORMAL
WBC URNS QL MICRO: ABNORMAL /HPF (ref 0–4)
WBC URNS QL MICRO: ABNORMAL /HPF (ref 0–4)

## 2018-01-01 PROCEDURE — 74011250637 HC RX REV CODE- 250/637: Performed by: HOSPITALIST

## 2018-01-01 PROCEDURE — 74011250636 HC RX REV CODE- 250/636: Performed by: INTERNAL MEDICINE

## 2018-01-01 PROCEDURE — 84484 ASSAY OF TROPONIN QUANT: CPT | Performed by: EMERGENCY MEDICINE

## 2018-01-01 PROCEDURE — 74011250637 HC RX REV CODE- 250/637: Performed by: INTERNAL MEDICINE

## 2018-01-01 PROCEDURE — 74011000250 HC RX REV CODE- 250: Performed by: FAMILY MEDICINE

## 2018-01-01 PROCEDURE — 94640 AIRWAY INHALATION TREATMENT: CPT

## 2018-01-01 PROCEDURE — 74011636637 HC RX REV CODE- 636/637: Performed by: FAMILY MEDICINE

## 2018-01-01 PROCEDURE — 36415 COLL VENOUS BLD VENIPUNCTURE: CPT | Performed by: HOSPITALIST

## 2018-01-01 PROCEDURE — 77010033678 HC OXYGEN DAILY

## 2018-01-01 PROCEDURE — 74011250636 HC RX REV CODE- 250/636: Performed by: EMERGENCY MEDICINE

## 2018-01-01 PROCEDURE — 80053 COMPREHEN METABOLIC PANEL: CPT | Performed by: INTERNAL MEDICINE

## 2018-01-01 PROCEDURE — 65660000001 HC RM ICU INTERMED STEPDOWN

## 2018-01-01 PROCEDURE — 83735 ASSAY OF MAGNESIUM: CPT | Performed by: INTERNAL MEDICINE

## 2018-01-01 PROCEDURE — 36415 COLL VENOUS BLD VENIPUNCTURE: CPT | Performed by: NURSE PRACTITIONER

## 2018-01-01 PROCEDURE — 99218 HC RM OBSERVATION: CPT

## 2018-01-01 PROCEDURE — 74011000258 HC RX REV CODE- 258: Performed by: INTERNAL MEDICINE

## 2018-01-01 PROCEDURE — 84100 ASSAY OF PHOSPHORUS: CPT | Performed by: INTERNAL MEDICINE

## 2018-01-01 PROCEDURE — 85025 COMPLETE CBC W/AUTO DIFF WBC: CPT | Performed by: EMERGENCY MEDICINE

## 2018-01-01 PROCEDURE — 97530 THERAPEUTIC ACTIVITIES: CPT

## 2018-01-01 PROCEDURE — 80048 BASIC METABOLIC PNL TOTAL CA: CPT | Performed by: INTERNAL MEDICINE

## 2018-01-01 PROCEDURE — 82962 GLUCOSE BLOOD TEST: CPT

## 2018-01-01 PROCEDURE — 74011250636 HC RX REV CODE- 250/636: Performed by: HOSPITALIST

## 2018-01-01 PROCEDURE — 93970 EXTREMITY STUDY: CPT

## 2018-01-01 PROCEDURE — 74011250636 HC RX REV CODE- 250/636: Performed by: FAMILY MEDICINE

## 2018-01-01 PROCEDURE — 96365 THER/PROPH/DIAG IV INF INIT: CPT

## 2018-01-01 PROCEDURE — 80202 ASSAY OF VANCOMYCIN: CPT | Performed by: HOSPITALIST

## 2018-01-01 PROCEDURE — 85025 COMPLETE CBC W/AUTO DIFF WBC: CPT | Performed by: HOSPITALIST

## 2018-01-01 PROCEDURE — 65620000000 HC RM CCU GENERAL

## 2018-01-01 PROCEDURE — 74011000250 HC RX REV CODE- 250: Performed by: INTERNAL MEDICINE

## 2018-01-01 PROCEDURE — 83880 ASSAY OF NATRIURETIC PEPTIDE: CPT | Performed by: FAMILY MEDICINE

## 2018-01-01 PROCEDURE — 36600 WITHDRAWAL OF ARTERIAL BLOOD: CPT

## 2018-01-01 PROCEDURE — 85025 COMPLETE CBC W/AUTO DIFF WBC: CPT | Performed by: INTERNAL MEDICINE

## 2018-01-01 PROCEDURE — 99285 EMERGENCY DEPT VISIT HI MDM: CPT

## 2018-01-01 PROCEDURE — 97165 OT EVAL LOW COMPLEX 30 MIN: CPT

## 2018-01-01 PROCEDURE — P9047 ALBUMIN (HUMAN), 25%, 50ML: HCPCS | Performed by: INTERNAL MEDICINE

## 2018-01-01 PROCEDURE — 97164 PT RE-EVAL EST PLAN CARE: CPT

## 2018-01-01 PROCEDURE — 71045 X-RAY EXAM CHEST 1 VIEW: CPT

## 2018-01-01 PROCEDURE — A9500 TC99M SESTAMIBI: HCPCS

## 2018-01-01 PROCEDURE — 74011250637 HC RX REV CODE- 250/637: Performed by: FAMILY MEDICINE

## 2018-01-01 PROCEDURE — 65660000000 HC RM CCU STEPDOWN

## 2018-01-01 PROCEDURE — 4A023N6 MEASUREMENT OF CARDIAC SAMPLING AND PRESSURE, RIGHT HEART, PERCUTANEOUS APPROACH: ICD-10-PCS | Performed by: INTERNAL MEDICINE

## 2018-01-01 PROCEDURE — 93005 ELECTROCARDIOGRAM TRACING: CPT

## 2018-01-01 PROCEDURE — 85730 THROMBOPLASTIN TIME PARTIAL: CPT | Performed by: EMERGENCY MEDICINE

## 2018-01-01 PROCEDURE — 36415 COLL VENOUS BLD VENIPUNCTURE: CPT | Performed by: INTERNAL MEDICINE

## 2018-01-01 PROCEDURE — 97116 GAIT TRAINING THERAPY: CPT

## 2018-01-01 PROCEDURE — 97110 THERAPEUTIC EXERCISES: CPT

## 2018-01-01 PROCEDURE — C1751 CATH, INF, PER/CENT/MIDLINE: HCPCS

## 2018-01-01 PROCEDURE — 85025 COMPLETE CBC W/AUTO DIFF WBC: CPT | Performed by: STUDENT IN AN ORGANIZED HEALTH CARE EDUCATION/TRAINING PROGRAM

## 2018-01-01 PROCEDURE — 74011250637 HC RX REV CODE- 250/637: Performed by: NURSE PRACTITIONER

## 2018-01-01 PROCEDURE — 94660 CPAP INITIATION&MGMT: CPT

## 2018-01-01 PROCEDURE — 84484 ASSAY OF TROPONIN QUANT: CPT | Performed by: STUDENT IN AN ORGANIZED HEALTH CARE EDUCATION/TRAINING PROGRAM

## 2018-01-01 PROCEDURE — 77030013797 HC KT TRNSDUC PRSSR EDWD -A

## 2018-01-01 PROCEDURE — 77030029684 HC NEB SM VOL KT MONA -A

## 2018-01-01 PROCEDURE — 77010033711 HC HIGH FLOW OXYGEN

## 2018-01-01 PROCEDURE — 80053 COMPREHEN METABOLIC PANEL: CPT | Performed by: STUDENT IN AN ORGANIZED HEALTH CARE EDUCATION/TRAINING PROGRAM

## 2018-01-01 PROCEDURE — 74011636637 HC RX REV CODE- 636/637: Performed by: INTERNAL MEDICINE

## 2018-01-01 PROCEDURE — 87040 BLOOD CULTURE FOR BACTERIA: CPT | Performed by: EMERGENCY MEDICINE

## 2018-01-01 PROCEDURE — 83540 ASSAY OF IRON: CPT | Performed by: HOSPITALIST

## 2018-01-01 PROCEDURE — 94760 N-INVAS EAR/PLS OXIMETRY 1: CPT

## 2018-01-01 PROCEDURE — 82746 ASSAY OF FOLIC ACID SERUM: CPT | Performed by: INTERNAL MEDICINE

## 2018-01-01 PROCEDURE — 83540 ASSAY OF IRON: CPT | Performed by: INTERNAL MEDICINE

## 2018-01-01 PROCEDURE — 96374 THER/PROPH/DIAG INJ IV PUSH: CPT

## 2018-01-01 PROCEDURE — 85025 COMPLETE CBC W/AUTO DIFF WBC: CPT | Performed by: FAMILY MEDICINE

## 2018-01-01 PROCEDURE — 96367 TX/PROPH/DG ADDL SEQ IV INF: CPT

## 2018-01-01 PROCEDURE — 74011250637 HC RX REV CODE- 250/637: Performed by: EMERGENCY MEDICINE

## 2018-01-01 PROCEDURE — 84484 ASSAY OF TROPONIN QUANT: CPT | Performed by: FAMILY MEDICINE

## 2018-01-01 PROCEDURE — 77030010547 HC BG URIN W/UMETER -A

## 2018-01-01 PROCEDURE — 3E033XZ INTRODUCTION OF VASOPRESSOR INTO PERIPHERAL VEIN, PERCUTANEOUS APPROACH: ICD-10-PCS | Performed by: INTERNAL MEDICINE

## 2018-01-01 PROCEDURE — 74011250636 HC RX REV CODE- 250/636

## 2018-01-01 PROCEDURE — 84443 ASSAY THYROID STIM HORMONE: CPT | Performed by: INTERNAL MEDICINE

## 2018-01-01 PROCEDURE — 36415 COLL VENOUS BLD VENIPUNCTURE: CPT | Performed by: STUDENT IN AN ORGANIZED HEALTH CARE EDUCATION/TRAINING PROGRAM

## 2018-01-01 PROCEDURE — 81001 URINALYSIS AUTO W/SCOPE: CPT | Performed by: EMERGENCY MEDICINE

## 2018-01-01 PROCEDURE — G8988 SELF CARE GOAL STATUS: HCPCS

## 2018-01-01 PROCEDURE — 80048 BASIC METABOLIC PNL TOTAL CA: CPT | Performed by: HOSPITALIST

## 2018-01-01 PROCEDURE — 83735 ASSAY OF MAGNESIUM: CPT | Performed by: HOSPITALIST

## 2018-01-01 PROCEDURE — 87040 BLOOD CULTURE FOR BACTERIA: CPT | Performed by: INTERNAL MEDICINE

## 2018-01-01 PROCEDURE — 96361 HYDRATE IV INFUSION ADD-ON: CPT

## 2018-01-01 PROCEDURE — 84484 ASSAY OF TROPONIN QUANT: CPT | Performed by: INTERNAL MEDICINE

## 2018-01-01 PROCEDURE — 94761 N-INVAS EAR/PLS OXIMETRY MLT: CPT

## 2018-01-01 PROCEDURE — 94664 DEMO&/EVAL PT USE INHALER: CPT

## 2018-01-01 PROCEDURE — 5A2204Z RESTORATION OF CARDIAC RHYTHM, SINGLE: ICD-10-PCS | Performed by: INTERNAL MEDICINE

## 2018-01-01 PROCEDURE — 97161 PT EVAL LOW COMPLEX 20 MIN: CPT

## 2018-01-01 PROCEDURE — 78452 HT MUSCLE IMAGE SPECT MULT: CPT

## 2018-01-01 PROCEDURE — C1894 INTRO/SHEATH, NON-LASER: HCPCS

## 2018-01-01 PROCEDURE — 71046 X-RAY EXAM CHEST 2 VIEWS: CPT

## 2018-01-01 PROCEDURE — 74011000250 HC RX REV CODE- 250: Performed by: NURSE PRACTITIONER

## 2018-01-01 PROCEDURE — 77030011255 HC DSG AQUACEL AG BMS -A

## 2018-01-01 PROCEDURE — 71275 CT ANGIOGRAPHY CHEST: CPT

## 2018-01-01 PROCEDURE — 83880 ASSAY OF NATRIURETIC PEPTIDE: CPT | Performed by: INTERNAL MEDICINE

## 2018-01-01 PROCEDURE — 80053 COMPREHEN METABOLIC PANEL: CPT | Performed by: HOSPITALIST

## 2018-01-01 PROCEDURE — 81003 URINALYSIS AUTO W/O SCOPE: CPT | Performed by: EMERGENCY MEDICINE

## 2018-01-01 PROCEDURE — 97535 SELF CARE MNGMENT TRAINING: CPT

## 2018-01-01 PROCEDURE — 65270000029 HC RM PRIVATE

## 2018-01-01 PROCEDURE — 65610000006 HC RM INTENSIVE CARE

## 2018-01-01 PROCEDURE — 85027 COMPLETE CBC AUTOMATED: CPT | Performed by: INTERNAL MEDICINE

## 2018-01-01 PROCEDURE — 85610 PROTHROMBIN TIME: CPT | Performed by: EMERGENCY MEDICINE

## 2018-01-01 PROCEDURE — 83880 ASSAY OF NATRIURETIC PEPTIDE: CPT | Performed by: EMERGENCY MEDICINE

## 2018-01-01 PROCEDURE — 80202 ASSAY OF VANCOMYCIN: CPT | Performed by: INTERNAL MEDICINE

## 2018-01-01 PROCEDURE — 74011250636 HC RX REV CODE- 250/636: Performed by: STUDENT IN AN ORGANIZED HEALTH CARE EDUCATION/TRAINING PROGRAM

## 2018-01-01 PROCEDURE — 82803 BLOOD GASES ANY COMBINATION: CPT

## 2018-01-01 PROCEDURE — 82607 VITAMIN B-12: CPT | Performed by: INTERNAL MEDICINE

## 2018-01-01 PROCEDURE — 93312 ECHO TRANSESOPHAGEAL: CPT

## 2018-01-01 PROCEDURE — 94762 N-INVAS EAR/PLS OXIMTRY CONT: CPT

## 2018-01-01 PROCEDURE — 74011000258 HC RX REV CODE- 258: Performed by: FAMILY MEDICINE

## 2018-01-01 PROCEDURE — 74011000250 HC RX REV CODE- 250: Performed by: HOSPITALIST

## 2018-01-01 PROCEDURE — 80048 BASIC METABOLIC PNL TOTAL CA: CPT | Performed by: FAMILY MEDICINE

## 2018-01-01 PROCEDURE — 96366 THER/PROPH/DIAG IV INF ADDON: CPT

## 2018-01-01 PROCEDURE — 51798 US URINE CAPACITY MEASURE: CPT

## 2018-01-01 PROCEDURE — G8979 MOBILITY GOAL STATUS: HCPCS

## 2018-01-01 PROCEDURE — 82533 TOTAL CORTISOL: CPT | Performed by: INTERNAL MEDICINE

## 2018-01-01 PROCEDURE — 74011000258 HC RX REV CODE- 258: Performed by: EMERGENCY MEDICINE

## 2018-01-01 PROCEDURE — 87804 INFLUENZA ASSAY W/OPTIC: CPT | Performed by: INTERNAL MEDICINE

## 2018-01-01 PROCEDURE — 74011636320 HC RX REV CODE- 636/320: Performed by: EMERGENCY MEDICINE

## 2018-01-01 PROCEDURE — 93041 RHYTHM ECG TRACING: CPT

## 2018-01-01 PROCEDURE — 85652 RBC SED RATE AUTOMATED: CPT | Performed by: EMERGENCY MEDICINE

## 2018-01-01 PROCEDURE — 80048 BASIC METABOLIC PNL TOTAL CA: CPT | Performed by: NURSE PRACTITIONER

## 2018-01-01 PROCEDURE — 83880 ASSAY OF NATRIURETIC PEPTIDE: CPT | Performed by: NURSE PRACTITIONER

## 2018-01-01 PROCEDURE — 97161 PT EVAL LOW COMPLEX 20 MIN: CPT | Performed by: PHYSICAL THERAPIST

## 2018-01-01 PROCEDURE — G8978 MOBILITY CURRENT STATUS: HCPCS

## 2018-01-01 PROCEDURE — 86140 C-REACTIVE PROTEIN: CPT | Performed by: EMERGENCY MEDICINE

## 2018-01-01 PROCEDURE — 83605 ASSAY OF LACTIC ACID: CPT | Performed by: STUDENT IN AN ORGANIZED HEALTH CARE EDUCATION/TRAINING PROGRAM

## 2018-01-01 PROCEDURE — 93306 TTE W/DOPPLER COMPLETE: CPT

## 2018-01-01 PROCEDURE — 99233 SBSQ HOSP IP/OBS HIGH 50: CPT | Performed by: INTERNAL MEDICINE

## 2018-01-01 PROCEDURE — C1769 GUIDE WIRE: HCPCS

## 2018-01-01 PROCEDURE — 80053 COMPREHEN METABOLIC PANEL: CPT | Performed by: EMERGENCY MEDICINE

## 2018-01-01 PROCEDURE — 96376 TX/PRO/DX INJ SAME DRUG ADON: CPT

## 2018-01-01 PROCEDURE — 96375 TX/PRO/DX INJ NEW DRUG ADDON: CPT

## 2018-01-01 PROCEDURE — B24BZZ4 ULTRASONOGRAPHY OF HEART WITH AORTA, TRANSESOPHAGEAL: ICD-10-PCS | Performed by: INTERNAL MEDICINE

## 2018-01-01 PROCEDURE — 77030034850

## 2018-01-01 PROCEDURE — 77030018836 HC SOL IRR NACL ICUM -A

## 2018-01-01 PROCEDURE — 83735 ASSAY OF MAGNESIUM: CPT | Performed by: EMERGENCY MEDICINE

## 2018-01-01 PROCEDURE — 77030013744

## 2018-01-01 PROCEDURE — 5A09357 ASSISTANCE WITH RESPIRATORY VENTILATION, LESS THAN 24 CONSECUTIVE HOURS, CONTINUOUS POSITIVE AIRWAY PRESSURE: ICD-10-PCS | Performed by: INTERNAL MEDICINE

## 2018-01-01 PROCEDURE — 85027 COMPLETE CBC AUTOMATED: CPT | Performed by: HOSPITALIST

## 2018-01-01 PROCEDURE — 92960 CARDIOVERSION ELECTRIC EXT: CPT

## 2018-01-01 PROCEDURE — 83036 HEMOGLOBIN GLYCOSYLATED A1C: CPT | Performed by: FAMILY MEDICINE

## 2018-01-01 PROCEDURE — 36415 COLL VENOUS BLD VENIPUNCTURE: CPT | Performed by: EMERGENCY MEDICINE

## 2018-01-01 PROCEDURE — 77030039046 HC PAD DEFIB RADIOTRNSPNT CNMD -B

## 2018-01-01 PROCEDURE — 80202 ASSAY OF VANCOMYCIN: CPT | Performed by: FAMILY MEDICINE

## 2018-01-01 PROCEDURE — 83605 ASSAY OF LACTIC ACID: CPT | Performed by: EMERGENCY MEDICINE

## 2018-01-01 PROCEDURE — 77030035694 HC MSK BIPAP FLL FAC PERFMAX PHIL -B

## 2018-01-01 PROCEDURE — 99222 1ST HOSP IP/OBS MODERATE 55: CPT | Performed by: INTERNAL MEDICINE

## 2018-01-01 PROCEDURE — 93451 RIGHT HEART CATH: CPT

## 2018-01-01 PROCEDURE — 74011000250 HC RX REV CODE- 250: Performed by: EMERGENCY MEDICINE

## 2018-01-01 PROCEDURE — 80061 LIPID PANEL: CPT | Performed by: FAMILY MEDICINE

## 2018-01-01 PROCEDURE — 77030012879 HC MSK CPAP FLL FAC PHIL -B

## 2018-01-01 PROCEDURE — G8987 SELF CARE CURRENT STATUS: HCPCS

## 2018-01-01 PROCEDURE — 76450000000

## 2018-01-01 PROCEDURE — 82728 ASSAY OF FERRITIN: CPT | Performed by: HOSPITALIST

## 2018-01-01 PROCEDURE — 93017 CV STRESS TEST TRACING ONLY: CPT

## 2018-01-01 PROCEDURE — 83880 ASSAY OF NATRIURETIC PEPTIDE: CPT | Performed by: STUDENT IN AN ORGANIZED HEALTH CARE EDUCATION/TRAINING PROGRAM

## 2018-01-01 PROCEDURE — 83605 ASSAY OF LACTIC ACID: CPT

## 2018-01-01 PROCEDURE — 36415 COLL VENOUS BLD VENIPUNCTURE: CPT | Performed by: FAMILY MEDICINE

## 2018-01-01 PROCEDURE — 82550 ASSAY OF CK (CPK): CPT | Performed by: INTERNAL MEDICINE

## 2018-01-01 PROCEDURE — 74011000250 HC RX REV CODE- 250

## 2018-01-01 PROCEDURE — 74011000258 HC RX REV CODE- 258: Performed by: NURSE PRACTITIONER

## 2018-01-01 PROCEDURE — 74011250636 HC RX REV CODE- 250/636: Performed by: NURSE PRACTITIONER

## 2018-01-01 PROCEDURE — 77030013140 HC MSK NEB VYRM -A

## 2018-01-01 RX ORDER — PHENYLEPHRINE HCL IN 0.9% NACL 30MG/250ML
10-100 PLASTIC BAG, INJECTION (ML) INTRAVENOUS
Status: DISCONTINUED | OUTPATIENT
Start: 2018-01-01 | End: 2018-01-01

## 2018-01-01 RX ORDER — FLUOXETINE HYDROCHLORIDE 20 MG/1
20 CAPSULE ORAL DAILY
Qty: 30 CAP | Refills: 0 | Status: SHIPPED
Start: 2018-01-01 | End: 2018-01-01

## 2018-01-01 RX ORDER — INSULIN LISPRO 100 [IU]/ML
INJECTION, SOLUTION INTRAVENOUS; SUBCUTANEOUS
Status: DISCONTINUED | OUTPATIENT
Start: 2018-01-01 | End: 2018-01-01 | Stop reason: HOSPADM

## 2018-01-01 RX ORDER — MORPHINE SULFATE 30 MG/1
30 TABLET, FILM COATED, EXTENDED RELEASE ORAL EVERY 8 HOURS
Status: DISCONTINUED | OUTPATIENT
Start: 2018-01-01 | End: 2018-01-01 | Stop reason: HOSPADM

## 2018-01-01 RX ORDER — LEVOFLOXACIN 5 MG/ML
750 INJECTION, SOLUTION INTRAVENOUS EVERY 24 HOURS
Status: DISCONTINUED | OUTPATIENT
Start: 2018-01-01 | End: 2018-01-01 | Stop reason: CLARIF

## 2018-01-01 RX ORDER — LANOLIN ALCOHOL/MO/W.PET/CERES
325 CREAM (GRAM) TOPICAL
Status: DISCONTINUED | OUTPATIENT
Start: 2018-01-01 | End: 2018-01-01

## 2018-01-01 RX ORDER — LANOLIN ALCOHOL/MO/W.PET/CERES
1 CREAM (GRAM) TOPICAL
Status: DISCONTINUED | OUTPATIENT
Start: 2018-01-01 | End: 2018-01-01 | Stop reason: HOSPADM

## 2018-01-01 RX ORDER — BUDESONIDE 0.5 MG/2ML
500 INHALANT ORAL
Status: DISCONTINUED | OUTPATIENT
Start: 2018-01-01 | End: 2018-01-01 | Stop reason: HOSPADM

## 2018-01-01 RX ORDER — ISOSORBIDE MONONITRATE 60 MG/1
60 TABLET, EXTENDED RELEASE ORAL DAILY
Status: DISCONTINUED | OUTPATIENT
Start: 2018-01-01 | End: 2018-01-01 | Stop reason: HOSPADM

## 2018-01-01 RX ORDER — AMOXICILLIN 250 MG
1 CAPSULE ORAL DAILY
Status: DISCONTINUED | OUTPATIENT
Start: 2018-01-01 | End: 2018-01-01 | Stop reason: HOSPADM

## 2018-01-01 RX ORDER — HEPARIN SODIUM 200 [USP'U]/100ML
2000 INJECTION, SOLUTION INTRAVENOUS AS NEEDED
Status: DISCONTINUED | OUTPATIENT
Start: 2018-01-01 | End: 2018-01-01

## 2018-01-01 RX ORDER — HYDROMORPHONE HYDROCHLORIDE 4 MG/1
4 TABLET ORAL EVERY 12 HOURS
Status: DISCONTINUED | OUTPATIENT
Start: 2018-01-01 | End: 2018-01-01 | Stop reason: HOSPADM

## 2018-01-01 RX ORDER — LISINOPRIL 5 MG/1
2.5 TABLET ORAL
Status: DISCONTINUED | OUTPATIENT
Start: 2018-01-01 | End: 2018-01-01

## 2018-01-01 RX ORDER — METOPROLOL SUCCINATE 25 MG/1
25 TABLET, EXTENDED RELEASE ORAL DAILY
Status: DISCONTINUED | OUTPATIENT
Start: 2018-01-01 | End: 2018-01-01 | Stop reason: HOSPADM

## 2018-01-01 RX ORDER — ACETAMINOPHEN 325 MG/1
650 TABLET ORAL
Status: DISCONTINUED | OUTPATIENT
Start: 2018-01-01 | End: 2018-01-01 | Stop reason: HOSPADM

## 2018-01-01 RX ORDER — FUROSEMIDE 10 MG/ML
80 INJECTION INTRAMUSCULAR; INTRAVENOUS EVERY 12 HOURS
Status: DISCONTINUED | OUTPATIENT
Start: 2018-01-01 | End: 2018-01-01

## 2018-01-01 RX ORDER — FUROSEMIDE 20 MG/1
20 TABLET ORAL DAILY
Status: DISCONTINUED | OUTPATIENT
Start: 2018-01-01 | End: 2018-01-01 | Stop reason: HOSPADM

## 2018-01-01 RX ORDER — NITROGLYCERIN 0.4 MG/1
0.4 TABLET SUBLINGUAL
Status: DISCONTINUED | OUTPATIENT
Start: 2018-01-01 | End: 2018-01-01 | Stop reason: SDUPTHER

## 2018-01-01 RX ORDER — ARFORMOTEROL TARTRATE 15 UG/2ML
15 SOLUTION RESPIRATORY (INHALATION)
Status: DISCONTINUED | OUTPATIENT
Start: 2018-01-01 | End: 2018-01-01 | Stop reason: HOSPADM

## 2018-01-01 RX ORDER — ZOLPIDEM TARTRATE 10 MG/1
10 TABLET ORAL
Status: DISCONTINUED | OUTPATIENT
Start: 2018-01-01 | End: 2018-01-01 | Stop reason: HOSPADM

## 2018-01-01 RX ORDER — METOPROLOL TARTRATE 50 MG/1
50 TABLET ORAL EVERY 12 HOURS
Status: DISCONTINUED | OUTPATIENT
Start: 2018-01-01 | End: 2018-01-01

## 2018-01-01 RX ORDER — FUROSEMIDE 10 MG/ML
40 INJECTION INTRAMUSCULAR; INTRAVENOUS DAILY
Status: DISCONTINUED | OUTPATIENT
Start: 2018-01-01 | End: 2018-01-01

## 2018-01-01 RX ORDER — ALBUTEROL SULFATE 90 UG/1
1 AEROSOL, METERED RESPIRATORY (INHALATION)
COMMUNITY

## 2018-01-01 RX ORDER — IPRATROPIUM BROMIDE AND ALBUTEROL SULFATE 2.5; .5 MG/3ML; MG/3ML
3 SOLUTION RESPIRATORY (INHALATION)
Status: DISCONTINUED | OUTPATIENT
Start: 2018-01-01 | End: 2018-01-01

## 2018-01-01 RX ORDER — VANCOMYCIN HYDROCHLORIDE
1250 EVERY 8 HOURS
Status: DISCONTINUED | OUTPATIENT
Start: 2018-01-01 | End: 2018-01-01 | Stop reason: HOSPADM

## 2018-01-01 RX ORDER — HYDROMORPHONE HYDROCHLORIDE 2 MG/1
4 TABLET ORAL 3 TIMES DAILY
Status: DISCONTINUED | OUTPATIENT
Start: 2018-01-01 | End: 2018-01-01

## 2018-01-01 RX ORDER — SODIUM CHLORIDE 0.9 % (FLUSH) 0.9 %
10 SYRINGE (ML) INJECTION
Status: COMPLETED | OUTPATIENT
Start: 2018-01-01 | End: 2018-01-01

## 2018-01-01 RX ORDER — CITALOPRAM 40 MG/1
40 TABLET, FILM COATED ORAL DAILY
COMMUNITY
End: 2018-01-01

## 2018-01-01 RX ORDER — FLUTICASONE PROPIONATE AND SALMETEROL 250; 50 UG/1; UG/1
1 POWDER RESPIRATORY (INHALATION) 2 TIMES DAILY
Status: DISCONTINUED | OUTPATIENT
Start: 2018-01-01 | End: 2018-01-01 | Stop reason: CLARIF

## 2018-01-01 RX ORDER — HYDROMORPHONE HYDROCHLORIDE 2 MG/ML
1 INJECTION, SOLUTION INTRAMUSCULAR; INTRAVENOUS; SUBCUTANEOUS
Status: DISCONTINUED | OUTPATIENT
Start: 2018-01-01 | End: 2018-01-01 | Stop reason: HOSPADM

## 2018-01-01 RX ORDER — CLOPIDOGREL BISULFATE 75 MG/1
75 TABLET ORAL DAILY
Status: DISCONTINUED | OUTPATIENT
Start: 2018-01-01 | End: 2018-01-01

## 2018-01-01 RX ORDER — BUPROPION HYDROCHLORIDE 150 MG/1
150 TABLET, EXTENDED RELEASE ORAL 2 TIMES DAILY
COMMUNITY

## 2018-01-01 RX ORDER — PHENYLEPHRINE HCL IN 0.9% NACL 30MG/250ML
10-100 PLASTIC BAG, INJECTION (ML) INTRAVENOUS
Status: DISCONTINUED | OUTPATIENT
Start: 2018-01-01 | End: 2018-01-01 | Stop reason: HOSPADM

## 2018-01-01 RX ORDER — ATROPINE SULFATE 0.1 MG/ML
1 INJECTION INTRAVENOUS AS NEEDED
Status: DISCONTINUED | OUTPATIENT
Start: 2018-01-01 | End: 2018-01-01

## 2018-01-01 RX ORDER — NORTRIPTYLINE HYDROCHLORIDE 25 MG/1
25 CAPSULE ORAL
Status: DISCONTINUED | OUTPATIENT
Start: 2018-01-01 | End: 2018-01-01

## 2018-01-01 RX ORDER — ENOXAPARIN SODIUM 100 MG/ML
40 INJECTION SUBCUTANEOUS EVERY 24 HOURS
Status: DISCONTINUED | OUTPATIENT
Start: 2018-01-01 | End: 2018-01-01

## 2018-01-01 RX ORDER — BUPROPION HYDROCHLORIDE 150 MG/1
150 TABLET, EXTENDED RELEASE ORAL 2 TIMES DAILY
Status: DISCONTINUED | OUTPATIENT
Start: 2018-01-01 | End: 2018-01-01 | Stop reason: HOSPADM

## 2018-01-01 RX ORDER — ASPIRIN 81 MG/1
81 TABLET ORAL DAILY
Status: DISCONTINUED | OUTPATIENT
Start: 2018-01-01 | End: 2018-01-01

## 2018-01-01 RX ORDER — IPRATROPIUM BROMIDE AND ALBUTEROL SULFATE 2.5; .5 MG/3ML; MG/3ML
3 SOLUTION RESPIRATORY (INHALATION)
Status: DISCONTINUED | OUTPATIENT
Start: 2018-01-01 | End: 2018-01-01 | Stop reason: HOSPADM

## 2018-01-01 RX ORDER — METOPROLOL TARTRATE 25 MG/1
12.5 TABLET, FILM COATED ORAL EVERY 12 HOURS
Status: DISCONTINUED | OUTPATIENT
Start: 2018-01-01 | End: 2018-01-01 | Stop reason: HOSPADM

## 2018-01-01 RX ORDER — CITALOPRAM 20 MG/1
40 TABLET, FILM COATED ORAL DAILY
Status: DISCONTINUED | OUTPATIENT
Start: 2018-01-01 | End: 2018-01-01 | Stop reason: HOSPADM

## 2018-01-01 RX ORDER — FACIAL-BODY WIPES
10 EACH TOPICAL DAILY PRN
Status: DISCONTINUED | OUTPATIENT
Start: 2018-01-01 | End: 2018-01-01 | Stop reason: HOSPADM

## 2018-01-01 RX ORDER — MAGNESIUM CITRATE
296 SOLUTION, ORAL ORAL
Status: COMPLETED | OUTPATIENT
Start: 2018-01-01 | End: 2018-01-01

## 2018-01-01 RX ORDER — HYDROMORPHONE HYDROCHLORIDE 2 MG/ML
1 INJECTION, SOLUTION INTRAMUSCULAR; INTRAVENOUS; SUBCUTANEOUS
Status: DISCONTINUED | OUTPATIENT
Start: 2018-01-01 | End: 2018-01-01

## 2018-01-01 RX ORDER — SODIUM CHLORIDE 9 MG/ML
INJECTION, SOLUTION INTRAVENOUS
Status: DISCONTINUED | OUTPATIENT
Start: 2018-01-01 | End: 2018-01-01 | Stop reason: HOSPADM

## 2018-01-01 RX ORDER — MORPHINE SULFATE 60 MG/1
60 TABLET, FILM COATED, EXTENDED RELEASE ORAL 3 TIMES DAILY
COMMUNITY

## 2018-01-01 RX ORDER — OMEPRAZOLE 20 MG/1
20 CAPSULE, DELAYED RELEASE ORAL DAILY
Status: DISCONTINUED | OUTPATIENT
Start: 2018-01-01 | End: 2018-01-01 | Stop reason: CLARIF

## 2018-01-01 RX ORDER — PREDNISONE 20 MG/1
40 TABLET ORAL
Status: DISCONTINUED | OUTPATIENT
Start: 2018-01-01 | End: 2018-01-01 | Stop reason: HOSPADM

## 2018-01-01 RX ORDER — SPIRONOLACTONE 25 MG/1
25 TABLET ORAL 2 TIMES DAILY
Status: DISCONTINUED | OUTPATIENT
Start: 2018-01-01 | End: 2018-01-01

## 2018-01-01 RX ORDER — CLOPIDOGREL BISULFATE 75 MG/1
75 TABLET ORAL DAILY
Status: DISCONTINUED | OUTPATIENT
Start: 2018-01-01 | End: 2018-01-01 | Stop reason: HOSPADM

## 2018-01-01 RX ORDER — ALBUTEROL SULFATE 0.83 MG/ML
2.5 SOLUTION RESPIRATORY (INHALATION)
Status: DISCONTINUED | OUTPATIENT
Start: 2018-01-01 | End: 2018-01-01 | Stop reason: HOSPADM

## 2018-01-01 RX ORDER — SODIUM CHLORIDE 0.9 % (FLUSH) 0.9 %
5-10 SYRINGE (ML) INJECTION EVERY 8 HOURS
Status: DISCONTINUED | OUTPATIENT
Start: 2018-01-01 | End: 2018-01-01

## 2018-01-01 RX ORDER — KETOROLAC TROMETHAMINE 30 MG/ML
30 INJECTION, SOLUTION INTRAMUSCULAR; INTRAVENOUS
Status: DISCONTINUED | OUTPATIENT
Start: 2018-01-01 | End: 2018-01-01

## 2018-01-01 RX ORDER — FUROSEMIDE 20 MG/1
20 TABLET ORAL DAILY
Status: DISCONTINUED | OUTPATIENT
Start: 2018-01-01 | End: 2018-01-01

## 2018-01-01 RX ORDER — NORTRIPTYLINE HYDROCHLORIDE 25 MG/1
50 CAPSULE ORAL
Status: DISCONTINUED | OUTPATIENT
Start: 2018-01-01 | End: 2018-01-01 | Stop reason: HOSPADM

## 2018-01-01 RX ORDER — ZOLPIDEM TARTRATE 5 MG/1
10 TABLET ORAL ONCE
Status: COMPLETED | OUTPATIENT
Start: 2018-01-01 | End: 2018-01-01

## 2018-01-01 RX ORDER — THERA TABS 400 MCG
1 TAB ORAL DAILY
Status: DISCONTINUED | OUTPATIENT
Start: 2018-01-01 | End: 2018-01-01 | Stop reason: HOSPADM

## 2018-01-01 RX ORDER — MAGNESIUM SULFATE 100 %
4 CRYSTALS MISCELLANEOUS AS NEEDED
Status: DISCONTINUED | OUTPATIENT
Start: 2018-01-01 | End: 2018-01-01 | Stop reason: HOSPADM

## 2018-01-01 RX ORDER — SODIUM CHLORIDE 9 MG/ML
100 INJECTION, SOLUTION INTRAVENOUS CONTINUOUS
Status: DISCONTINUED | OUTPATIENT
Start: 2018-01-01 | End: 2018-01-01 | Stop reason: HOSPADM

## 2018-01-01 RX ORDER — LISINOPRIL 5 MG/1
2.5 TABLET ORAL DAILY
Status: DISCONTINUED | OUTPATIENT
Start: 2018-01-01 | End: 2018-01-01 | Stop reason: HOSPADM

## 2018-01-01 RX ORDER — CARVEDILOL 6.25 MG/1
6.25 TABLET ORAL 2 TIMES DAILY WITH MEALS
Status: DISCONTINUED | OUTPATIENT
Start: 2018-01-01 | End: 2018-01-01

## 2018-01-01 RX ORDER — NORTRIPTYLINE HYDROCHLORIDE 25 MG/1
50 CAPSULE ORAL
Status: DISCONTINUED | OUTPATIENT
Start: 2018-01-01 | End: 2018-01-01

## 2018-01-01 RX ORDER — DEXTROSE 50 % IN WATER (D50W) INTRAVENOUS SYRINGE
12.5-25 AS NEEDED
Status: DISCONTINUED | OUTPATIENT
Start: 2018-01-01 | End: 2018-01-01 | Stop reason: HOSPADM

## 2018-01-01 RX ORDER — ALBUMIN HUMAN 250 G/1000ML
12.5 SOLUTION INTRAVENOUS EVERY 12 HOURS
Status: DISCONTINUED | OUTPATIENT
Start: 2018-01-01 | End: 2018-01-01

## 2018-01-01 RX ORDER — FUROSEMIDE 20 MG/1
60 TABLET ORAL DAILY
Qty: 90 TAB | Refills: 0 | Status: SHIPPED
Start: 2018-01-01 | End: 2018-01-01

## 2018-01-01 RX ORDER — FUROSEMIDE 20 MG/1
20 TABLET ORAL DAILY
COMMUNITY

## 2018-01-01 RX ORDER — MIDAZOLAM HYDROCHLORIDE 1 MG/ML
1-10 INJECTION, SOLUTION INTRAMUSCULAR; INTRAVENOUS
Status: DISCONTINUED | OUTPATIENT
Start: 2018-01-01 | End: 2018-01-01

## 2018-01-01 RX ORDER — MIDODRINE HYDROCHLORIDE 5 MG/1
5 TABLET ORAL
Status: DISCONTINUED | OUTPATIENT
Start: 2018-01-01 | End: 2018-01-01 | Stop reason: HOSPADM

## 2018-01-01 RX ORDER — IBUPROFEN 200 MG
1 TABLET ORAL DAILY
Status: DISCONTINUED | OUTPATIENT
Start: 2018-01-01 | End: 2018-01-01 | Stop reason: HOSPADM

## 2018-01-01 RX ORDER — SODIUM CHLORIDE 0.9 % (FLUSH) 0.9 %
20 SYRINGE (ML) INJECTION
Status: COMPLETED | OUTPATIENT
Start: 2018-01-01 | End: 2018-01-01

## 2018-01-01 RX ORDER — LANOLIN ALCOHOL/MO/W.PET/CERES
325 CREAM (GRAM) TOPICAL
COMMUNITY
End: 2018-01-01

## 2018-01-01 RX ORDER — ALLOPURINOL 300 MG/1
300 TABLET ORAL DAILY
Status: DISCONTINUED | OUTPATIENT
Start: 2018-01-01 | End: 2018-01-01

## 2018-01-01 RX ORDER — LEVOFLOXACIN 5 MG/ML
750 INJECTION, SOLUTION INTRAVENOUS
Status: COMPLETED | OUTPATIENT
Start: 2018-01-01 | End: 2018-01-01

## 2018-01-01 RX ORDER — BUPROPION HYDROCHLORIDE 150 MG/1
150 TABLET, EXTENDED RELEASE ORAL 2 TIMES DAILY
Status: DISCONTINUED | OUTPATIENT
Start: 2018-01-01 | End: 2018-01-01

## 2018-01-01 RX ORDER — METOPROLOL SUCCINATE 50 MG/1
25 TABLET, EXTENDED RELEASE ORAL DAILY
Status: DISCONTINUED | OUTPATIENT
Start: 2018-01-01 | End: 2018-01-01 | Stop reason: SDUPTHER

## 2018-01-01 RX ORDER — ISOSORBIDE MONONITRATE 30 MG/1
60 TABLET, EXTENDED RELEASE ORAL DAILY
Status: DISCONTINUED | OUTPATIENT
Start: 2018-01-01 | End: 2018-01-01 | Stop reason: HOSPADM

## 2018-01-01 RX ORDER — ACETAMINOPHEN 325 MG/1
650 TABLET ORAL
Status: COMPLETED | OUTPATIENT
Start: 2018-01-01 | End: 2018-01-01

## 2018-01-01 RX ORDER — ATORVASTATIN CALCIUM 40 MG/1
80 TABLET, FILM COATED ORAL DAILY
Status: DISCONTINUED | OUTPATIENT
Start: 2018-01-01 | End: 2018-01-01 | Stop reason: HOSPADM

## 2018-01-01 RX ORDER — MORPHINE SULFATE 60 MG/1
60 TABLET, FILM COATED, EXTENDED RELEASE ORAL
Status: COMPLETED | OUTPATIENT
Start: 2018-01-01 | End: 2018-01-01

## 2018-01-01 RX ORDER — ASPIRIN 81 MG/1
81 TABLET ORAL DAILY
COMMUNITY

## 2018-01-01 RX ORDER — AMIODARONE HYDROCHLORIDE 200 MG/1
200 TABLET ORAL
Qty: 60 TAB | Refills: 0 | Status: SHIPPED | OUTPATIENT
Start: 2018-01-01

## 2018-01-01 RX ORDER — VANCOMYCIN 2 GRAM/500 ML IN 0.9 % SODIUM CHLORIDE INTRAVENOUS
2000 EVERY 12 HOURS
Status: DISCONTINUED | OUTPATIENT
Start: 2018-01-01 | End: 2018-01-01 | Stop reason: HOSPADM

## 2018-01-01 RX ORDER — SODIUM CHLORIDE 0.9 % (FLUSH) 0.9 %
5-10 SYRINGE (ML) INJECTION AS NEEDED
Status: DISCONTINUED | OUTPATIENT
Start: 2018-01-01 | End: 2018-01-01 | Stop reason: HOSPADM

## 2018-01-01 RX ORDER — CITALOPRAM 20 MG/1
40 TABLET, FILM COATED ORAL DAILY
Status: DISCONTINUED | OUTPATIENT
Start: 2018-01-01 | End: 2018-01-01

## 2018-01-01 RX ORDER — PROPOFOL 10 MG/ML
INJECTION, EMULSION INTRAVENOUS AS NEEDED
Status: DISCONTINUED | OUTPATIENT
Start: 2018-01-01 | End: 2018-01-01 | Stop reason: HOSPADM

## 2018-01-01 RX ORDER — MORPHINE SULFATE 2 MG/ML
1 INJECTION, SOLUTION INTRAMUSCULAR; INTRAVENOUS
Status: COMPLETED | OUTPATIENT
Start: 2018-01-01 | End: 2018-01-01

## 2018-01-01 RX ORDER — POLYETHYLENE GLYCOL 3350 17 G/17G
17 POWDER, FOR SOLUTION ORAL DAILY PRN
Status: DISCONTINUED | OUTPATIENT
Start: 2018-01-01 | End: 2018-01-01 | Stop reason: HOSPADM

## 2018-01-01 RX ORDER — LANOLIN ALCOHOL/MO/W.PET/CERES
325 CREAM (GRAM) TOPICAL
Qty: 30 TAB | Refills: 1 | Status: SHIPPED | OUTPATIENT
Start: 2018-01-01

## 2018-01-01 RX ORDER — FUROSEMIDE 10 MG/ML
40 INJECTION INTRAMUSCULAR; INTRAVENOUS EVERY 12 HOURS
Status: DISCONTINUED | OUTPATIENT
Start: 2018-01-01 | End: 2018-01-01

## 2018-01-01 RX ORDER — MORPHINE SULFATE 60 MG/1
60 TABLET, FILM COATED, EXTENDED RELEASE ORAL 3 TIMES DAILY
Status: DISCONTINUED | OUTPATIENT
Start: 2018-01-01 | End: 2018-01-01 | Stop reason: HOSPADM

## 2018-01-01 RX ORDER — FLUMAZENIL 0.1 MG/ML
INJECTION INTRAVENOUS AS NEEDED
Status: DISCONTINUED | OUTPATIENT
Start: 2018-01-01 | End: 2018-01-01 | Stop reason: HOSPADM

## 2018-01-01 RX ORDER — HYDROMORPHONE HYDROCHLORIDE 4 MG/1
4 TABLET ORAL 2 TIMES DAILY
Status: DISCONTINUED | OUTPATIENT
Start: 2018-01-01 | End: 2018-01-01 | Stop reason: HOSPADM

## 2018-01-01 RX ORDER — LORAZEPAM 0.5 MG/1
0.5 TABLET ORAL
Status: DISCONTINUED | OUTPATIENT
Start: 2018-01-01 | End: 2018-01-01 | Stop reason: HOSPADM

## 2018-01-01 RX ORDER — LANOLIN ALCOHOL/MO/W.PET/CERES
325 CREAM (GRAM) TOPICAL
Qty: 30 TAB | Refills: 1 | Status: SHIPPED | OUTPATIENT
Start: 2018-01-01 | End: 2018-01-01

## 2018-01-01 RX ORDER — NITROGLYCERIN 0.4 MG/1
0.4 TABLET SUBLINGUAL AS NEEDED
Status: DISCONTINUED | OUTPATIENT
Start: 2018-01-01 | End: 2018-01-01 | Stop reason: HOSPADM

## 2018-01-01 RX ORDER — PANTOPRAZOLE SODIUM 20 MG/1
20 TABLET, DELAYED RELEASE ORAL
Status: DISCONTINUED | OUTPATIENT
Start: 2018-01-01 | End: 2018-01-01 | Stop reason: HOSPADM

## 2018-01-01 RX ORDER — VANCOMYCIN/0.9 % SOD CHLORIDE 1.5G/250ML
1500 PLASTIC BAG, INJECTION (ML) INTRAVENOUS
Status: DISCONTINUED | OUTPATIENT
Start: 2018-01-01 | End: 2018-01-01

## 2018-01-01 RX ORDER — AMIODARONE HYDROCHLORIDE 200 MG/1
200 TABLET ORAL
Status: DISCONTINUED | OUTPATIENT
Start: 2018-01-01 | End: 2018-01-01

## 2018-01-01 RX ORDER — SPIRONOLACTONE 25 MG/1
50 TABLET ORAL 2 TIMES DAILY
Status: DISCONTINUED | OUTPATIENT
Start: 2018-01-01 | End: 2018-01-01 | Stop reason: HOSPADM

## 2018-01-01 RX ORDER — SPIRONOLACTONE 25 MG/1
25 TABLET ORAL DAILY
Status: DISCONTINUED | OUTPATIENT
Start: 2018-01-01 | End: 2018-01-01 | Stop reason: HOSPADM

## 2018-01-01 RX ORDER — ZOLPIDEM TARTRATE 5 MG/1
10 TABLET ORAL
Status: DISCONTINUED | OUTPATIENT
Start: 2018-01-01 | End: 2018-01-01 | Stop reason: HOSPADM

## 2018-01-01 RX ORDER — LEVOFLOXACIN 750 MG/1
750 TABLET ORAL EVERY 24 HOURS
Status: DISCONTINUED | OUTPATIENT
Start: 2018-01-01 | End: 2018-01-01 | Stop reason: HOSPADM

## 2018-01-01 RX ORDER — DOCUSATE SODIUM 100 MG/1
100 CAPSULE, LIQUID FILLED ORAL 2 TIMES DAILY
COMMUNITY

## 2018-01-01 RX ORDER — ZOLPIDEM TARTRATE 5 MG/1
10 TABLET ORAL
Status: DISCONTINUED | OUTPATIENT
Start: 2018-01-01 | End: 2018-01-01

## 2018-01-01 RX ORDER — FLUTICASONE PROPIONATE AND SALMETEROL 250; 50 UG/1; UG/1
1 POWDER RESPIRATORY (INHALATION)
Status: DISCONTINUED | OUTPATIENT
Start: 2018-01-01 | End: 2018-01-01 | Stop reason: CLARIF

## 2018-01-01 RX ORDER — ROPINIROLE 1 MG/1
8 TABLET, FILM COATED ORAL
Status: DISCONTINUED | OUTPATIENT
Start: 2018-01-01 | End: 2018-01-01 | Stop reason: HOSPADM

## 2018-01-01 RX ORDER — IBUPROFEN 200 MG
1 TABLET ORAL EVERY 24 HOURS
Status: DISCONTINUED | OUTPATIENT
Start: 2018-01-01 | End: 2018-01-01

## 2018-01-01 RX ORDER — FENTANYL CITRATE 50 UG/ML
25-200 INJECTION, SOLUTION INTRAMUSCULAR; INTRAVENOUS
Status: DISCONTINUED | OUTPATIENT
Start: 2018-01-01 | End: 2018-01-01

## 2018-01-01 RX ORDER — METFORMIN HYDROCHLORIDE 500 MG/1
1000 TABLET ORAL 2 TIMES DAILY WITH MEALS
Status: DISCONTINUED | OUTPATIENT
Start: 2018-01-01 | End: 2018-01-01 | Stop reason: HOSPADM

## 2018-01-01 RX ORDER — MORPHINE SULFATE 2 MG/ML
2 INJECTION, SOLUTION INTRAMUSCULAR; INTRAVENOUS
Status: DISCONTINUED | OUTPATIENT
Start: 2018-01-01 | End: 2018-01-01 | Stop reason: HOSPADM

## 2018-01-01 RX ORDER — NITROGLYCERIN 0.4 MG/1
0.4 TABLET SUBLINGUAL
COMMUNITY

## 2018-01-01 RX ORDER — LISINOPRIL 5 MG/1
2.5 TABLET ORAL DAILY
Status: DISCONTINUED | OUTPATIENT
Start: 2018-01-01 | End: 2018-01-01

## 2018-01-01 RX ORDER — DOBUTAMINE HYDROCHLORIDE 200 MG/100ML
2.5 INJECTION INTRAVENOUS CONTINUOUS
Status: DISPENSED | OUTPATIENT
Start: 2018-01-01 | End: 2018-01-01

## 2018-01-01 RX ORDER — SODIUM CHLORIDE 0.9 % (FLUSH) 0.9 %
5-10 SYRINGE (ML) INJECTION EVERY 8 HOURS
Status: DISCONTINUED | OUTPATIENT
Start: 2018-01-01 | End: 2018-01-01 | Stop reason: HOSPADM

## 2018-01-01 RX ORDER — SPIRONOLACTONE 25 MG/1
25 TABLET ORAL DAILY
Status: DISCONTINUED | OUTPATIENT
Start: 2018-01-01 | End: 2018-01-01

## 2018-01-01 RX ORDER — BUMETANIDE 0.25 MG/ML
1 INJECTION INTRAMUSCULAR; INTRAVENOUS ONCE
Status: COMPLETED | OUTPATIENT
Start: 2018-01-01 | End: 2018-01-01

## 2018-01-01 RX ORDER — MILRINONE LACTATE 0.2 MG/ML
0.38 INJECTION, SOLUTION INTRAVENOUS CONTINUOUS
Status: DISCONTINUED | OUTPATIENT
Start: 2018-01-01 | End: 2018-01-01

## 2018-01-01 RX ORDER — POTASSIUM CHLORIDE 7.45 MG/ML
10 INJECTION INTRAVENOUS
Status: DISCONTINUED | OUTPATIENT
Start: 2018-01-01 | End: 2018-01-01

## 2018-01-01 RX ORDER — ASPIRIN 81 MG/1
81 TABLET ORAL DAILY
Status: DISCONTINUED | OUTPATIENT
Start: 2018-01-01 | End: 2018-01-01 | Stop reason: HOSPADM

## 2018-01-01 RX ORDER — VANCOMYCIN/0.9 % SOD CHLORIDE 1.5G/250ML
1500 PLASTIC BAG, INJECTION (ML) INTRAVENOUS EVERY 12 HOURS
Status: DISCONTINUED | OUTPATIENT
Start: 2018-01-01 | End: 2018-01-01

## 2018-01-01 RX ORDER — FUROSEMIDE 10 MG/ML
60 INJECTION INTRAMUSCULAR; INTRAVENOUS 3 TIMES DAILY
Status: DISCONTINUED | OUTPATIENT
Start: 2018-01-01 | End: 2018-01-01

## 2018-01-01 RX ORDER — FUROSEMIDE 40 MG/1
80 TABLET ORAL DAILY
Status: DISCONTINUED | OUTPATIENT
Start: 2018-01-01 | End: 2018-01-01

## 2018-01-01 RX ORDER — MORPHINE SULFATE 30 MG/1
60 TABLET, FILM COATED, EXTENDED RELEASE ORAL EVERY 8 HOURS
Qty: 20 TAB | Refills: 0 | Status: SHIPPED | OUTPATIENT
Start: 2018-01-01 | End: 2018-01-01

## 2018-01-01 RX ORDER — PREDNISONE 20 MG/1
20 TABLET ORAL
Qty: 5 TAB | Refills: 0 | Status: SHIPPED | OUTPATIENT
Start: 2018-01-01 | End: 2018-01-01

## 2018-01-01 RX ORDER — METOPROLOL SUCCINATE 25 MG/1
25 TABLET, EXTENDED RELEASE ORAL DAILY
Status: DISCONTINUED | OUTPATIENT
Start: 2018-01-01 | End: 2018-01-01

## 2018-01-01 RX ORDER — HYDROMORPHONE HYDROCHLORIDE 4 MG/1
4 TABLET ORAL
COMMUNITY

## 2018-01-01 RX ORDER — SODIUM CHLORIDE 0.9 % (FLUSH) 0.9 %
20 SYRINGE (ML) INJECTION
Status: DISPENSED | OUTPATIENT
Start: 2018-01-01 | End: 2018-01-01

## 2018-01-01 RX ORDER — PHENYLEPHRINE HCL IN 0.9% NACL 30MG/250ML
10-300 PLASTIC BAG, INJECTION (ML) INTRAVENOUS
Status: DISCONTINUED | OUTPATIENT
Start: 2018-01-01 | End: 2018-01-01

## 2018-01-01 RX ORDER — MIDAZOLAM HYDROCHLORIDE 1 MG/ML
INJECTION, SOLUTION INTRAMUSCULAR; INTRAVENOUS AS NEEDED
Status: DISCONTINUED | OUTPATIENT
Start: 2018-01-01 | End: 2018-01-01 | Stop reason: HOSPADM

## 2018-01-01 RX ORDER — MIDODRINE HYDROCHLORIDE 5 MG/1
5 TABLET ORAL
Qty: 90 TAB | Refills: 0 | Status: SHIPPED | OUTPATIENT
Start: 2018-01-01 | End: 2018-01-01

## 2018-01-01 RX ORDER — HYDROMORPHONE HYDROCHLORIDE 4 MG/1
4 TABLET ORAL 2 TIMES DAILY
Qty: 20 TAB | Refills: 0 | Status: SHIPPED | OUTPATIENT
Start: 2018-01-01 | End: 2018-01-01

## 2018-01-01 RX ORDER — FUROSEMIDE 40 MG/1
40 TABLET ORAL 2 TIMES DAILY
Status: ON HOLD | COMMUNITY
End: 2018-01-01

## 2018-01-01 RX ORDER — ISOSORBIDE MONONITRATE 60 MG/1
60 TABLET, EXTENDED RELEASE ORAL DAILY
Status: DISCONTINUED | OUTPATIENT
Start: 2018-01-01 | End: 2018-01-01

## 2018-01-01 RX ORDER — SPIRONOLACTONE 25 MG/1
25 TABLET ORAL 2 TIMES DAILY
COMMUNITY
End: 2018-01-01

## 2018-01-01 RX ORDER — KETOROLAC TROMETHAMINE 30 MG/ML
30 INJECTION, SOLUTION INTRAMUSCULAR; INTRAVENOUS
Status: DISCONTINUED | OUTPATIENT
Start: 2018-01-01 | End: 2018-01-01 | Stop reason: HOSPADM

## 2018-01-01 RX ORDER — POTASSIUM CHLORIDE 1500 MG/1
40 TABLET, FILM COATED, EXTENDED RELEASE ORAL DAILY
Qty: 60 TAB | Refills: 0 | Status: SHIPPED | OUTPATIENT
Start: 2018-01-01 | End: 2018-01-01

## 2018-01-01 RX ORDER — POTASSIUM CHLORIDE 20MEQ/15ML
40 LIQUID (ML) ORAL
Status: COMPLETED | OUTPATIENT
Start: 2018-01-01 | End: 2018-01-01

## 2018-01-01 RX ORDER — HYDROMORPHONE HYDROCHLORIDE 2 MG/1
4 TABLET ORAL
Status: COMPLETED | OUTPATIENT
Start: 2018-01-01 | End: 2018-01-01

## 2018-01-01 RX ORDER — LORAZEPAM 2 MG/ML
1 INJECTION INTRAMUSCULAR ONCE
Status: COMPLETED | OUTPATIENT
Start: 2018-01-01 | End: 2018-01-01

## 2018-01-01 RX ORDER — VANCOMYCIN 2 GRAM/500 ML IN 0.9 % SODIUM CHLORIDE INTRAVENOUS
2000
Status: COMPLETED | OUTPATIENT
Start: 2018-01-01 | End: 2018-01-01

## 2018-01-01 RX ORDER — DOCUSATE SODIUM 100 MG/1
100 CAPSULE, LIQUID FILLED ORAL 2 TIMES DAILY
Status: DISCONTINUED | OUTPATIENT
Start: 2018-01-01 | End: 2018-01-01

## 2018-01-01 RX ORDER — LEVOFLOXACIN 750 MG/1
750 TABLET ORAL EVERY 24 HOURS
Qty: 10 TAB | Refills: 0 | Status: SHIPPED | OUTPATIENT
Start: 2018-01-01 | End: 2018-01-01

## 2018-01-01 RX ORDER — LISINOPRIL 5 MG/1
5 TABLET ORAL
Status: DISCONTINUED | OUTPATIENT
Start: 2018-01-01 | End: 2018-01-01 | Stop reason: SDUPTHER

## 2018-01-01 RX ORDER — AMOXICILLIN 250 MG
1 CAPSULE ORAL
Status: COMPLETED | OUTPATIENT
Start: 2018-01-01 | End: 2018-01-01

## 2018-01-01 RX ORDER — FUROSEMIDE 10 MG/ML
80 INJECTION INTRAMUSCULAR; INTRAVENOUS
Status: COMPLETED | OUTPATIENT
Start: 2018-01-01 | End: 2018-01-01

## 2018-01-01 RX ORDER — ALLOPURINOL 100 MG/1
100 TABLET ORAL DAILY
Status: DISCONTINUED | OUTPATIENT
Start: 2018-01-01 | End: 2018-01-01

## 2018-01-01 RX ORDER — ENOXAPARIN SODIUM 100 MG/ML
40 INJECTION SUBCUTANEOUS EVERY 24 HOURS
Status: DISCONTINUED | OUTPATIENT
Start: 2018-01-01 | End: 2018-01-01 | Stop reason: HOSPADM

## 2018-01-01 RX ORDER — LANOLIN ALCOHOL/MO/W.PET/CERES
325 CREAM (GRAM) TOPICAL
Status: DISCONTINUED | OUTPATIENT
Start: 2018-01-01 | End: 2018-01-01 | Stop reason: HOSPADM

## 2018-01-01 RX ORDER — NOREPINEPHRINE BITARTRATE/D5W 8 MG/250ML
2-16 PLASTIC BAG, INJECTION (ML) INTRAVENOUS
Status: DISCONTINUED | OUTPATIENT
Start: 2018-01-01 | End: 2018-01-01

## 2018-01-01 RX ORDER — MORPHINE SULFATE 60 MG/1
60 TABLET, FILM COATED, EXTENDED RELEASE ORAL EVERY 8 HOURS
Status: DISCONTINUED | OUTPATIENT
Start: 2018-01-01 | End: 2018-01-01 | Stop reason: HOSPADM

## 2018-01-01 RX ORDER — POTASSIUM CHLORIDE 750 MG/1
40 TABLET, FILM COATED, EXTENDED RELEASE ORAL 2 TIMES DAILY
Status: COMPLETED | OUTPATIENT
Start: 2018-01-01 | End: 2018-01-01

## 2018-01-01 RX ORDER — ALBUTEROL SULFATE 90 UG/1
1 AEROSOL, METERED RESPIRATORY (INHALATION)
Status: DISCONTINUED | OUTPATIENT
Start: 2018-01-01 | End: 2018-01-01 | Stop reason: CLARIF

## 2018-01-01 RX ORDER — METOPROLOL SUCCINATE 25 MG/1
25 TABLET, EXTENDED RELEASE ORAL DAILY
COMMUNITY

## 2018-01-01 RX ORDER — MORPHINE SULFATE 2 MG/ML
INJECTION, SOLUTION INTRAMUSCULAR; INTRAVENOUS
Status: COMPLETED
Start: 2018-01-01 | End: 2018-01-01

## 2018-01-01 RX ORDER — SPIRONOLACTONE 25 MG/1
25 TABLET ORAL DAILY
Qty: 30 TAB | Refills: 1 | Status: SHIPPED | OUTPATIENT
Start: 2018-01-01 | End: 2018-01-01

## 2018-01-01 RX ORDER — MORPHINE SULFATE 30 MG/1
60 TABLET, FILM COATED, EXTENDED RELEASE ORAL EVERY 8 HOURS
Status: DISCONTINUED | OUTPATIENT
Start: 2018-01-01 | End: 2018-01-01 | Stop reason: HOSPADM

## 2018-01-01 RX ORDER — HYDROMORPHONE HYDROCHLORIDE 1 MG/ML
1 INJECTION, SOLUTION INTRAMUSCULAR; INTRAVENOUS; SUBCUTANEOUS
Status: COMPLETED | OUTPATIENT
Start: 2018-01-01 | End: 2018-01-01

## 2018-01-01 RX ORDER — DOCUSATE SODIUM 100 MG/1
100 CAPSULE, LIQUID FILLED ORAL 2 TIMES DAILY
Status: DISCONTINUED | OUTPATIENT
Start: 2018-01-01 | End: 2018-01-01 | Stop reason: HOSPADM

## 2018-01-01 RX ORDER — POLYETHYLENE GLYCOL 3350 17 G/17G
17 POWDER, FOR SOLUTION ORAL DAILY
Status: DISCONTINUED | OUTPATIENT
Start: 2018-01-01 | End: 2018-01-01 | Stop reason: HOSPADM

## 2018-01-01 RX ORDER — ALLOPURINOL 100 MG/1
100 TABLET ORAL DAILY
Status: DISCONTINUED | OUTPATIENT
Start: 2018-01-01 | End: 2018-01-01 | Stop reason: HOSPADM

## 2018-01-01 RX ORDER — CLINDAMYCIN PHOSPHATE 600 MG/50ML
600 INJECTION INTRAVENOUS EVERY 8 HOURS
Status: DISCONTINUED | OUTPATIENT
Start: 2018-01-01 | End: 2018-01-01

## 2018-01-01 RX ORDER — ATROPINE SULFATE 0.1 MG/ML
.5-1 INJECTION INTRAVENOUS AS NEEDED
Status: DISCONTINUED | OUTPATIENT
Start: 2018-01-01 | End: 2018-01-01

## 2018-01-01 RX ORDER — PANTOPRAZOLE SODIUM 40 MG/1
40 TABLET, DELAYED RELEASE ORAL
Status: DISCONTINUED | OUTPATIENT
Start: 2018-01-01 | End: 2018-01-01 | Stop reason: HOSPADM

## 2018-01-01 RX ORDER — ALLOPURINOL 100 MG/1
100 TABLET ORAL DAILY
COMMUNITY

## 2018-01-01 RX ORDER — VANCOMYCIN 2 GRAM/500 ML IN 0.9 % SODIUM CHLORIDE INTRAVENOUS
2000 ONCE
Status: COMPLETED | OUTPATIENT
Start: 2018-01-01 | End: 2018-01-01

## 2018-01-01 RX ORDER — NITROGLYCERIN 0.4 MG/1
0.4 TABLET SUBLINGUAL
Status: DISCONTINUED | OUTPATIENT
Start: 2018-01-01 | End: 2018-01-01 | Stop reason: HOSPADM

## 2018-01-01 RX ORDER — METOPROLOL TARTRATE 25 MG/1
25 TABLET, FILM COATED ORAL EVERY 12 HOURS
Status: DISCONTINUED | OUTPATIENT
Start: 2018-01-01 | End: 2018-01-01

## 2018-01-01 RX ORDER — ENOXAPARIN SODIUM 150 MG/ML
1 INJECTION SUBCUTANEOUS EVERY 12 HOURS
Status: DISCONTINUED | OUTPATIENT
Start: 2018-01-01 | End: 2018-01-01

## 2018-01-01 RX ORDER — ALBUMIN HUMAN 250 G/1000ML
12.5 SOLUTION INTRAVENOUS EVERY 6 HOURS
Status: DISCONTINUED | OUTPATIENT
Start: 2018-01-01 | End: 2018-01-01

## 2018-01-01 RX ORDER — FUROSEMIDE 40 MG/1
60 TABLET ORAL 2 TIMES DAILY
Qty: 45 TAB | Refills: 3 | Status: SHIPPED | OUTPATIENT
Start: 2018-01-01 | End: 2018-01-01

## 2018-01-01 RX ORDER — HYDROMORPHONE HYDROCHLORIDE 2 MG/1
TABLET ORAL
COMMUNITY
End: 2018-01-01

## 2018-01-01 RX ORDER — VANCOMYCIN 1.75 GRAM/500 ML IN 0.9 % SODIUM CHLORIDE INTRAVENOUS
1750 EVERY 12 HOURS
Status: DISCONTINUED | OUTPATIENT
Start: 2018-01-01 | End: 2018-01-01 | Stop reason: DRUGHIGH

## 2018-01-01 RX ORDER — CLOPIDOGREL BISULFATE 75 MG/1
75 TABLET ORAL DAILY
COMMUNITY

## 2018-01-01 RX ORDER — ASPIRIN 325 MG
325 TABLET ORAL DAILY
Status: DISCONTINUED | OUTPATIENT
Start: 2018-01-01 | End: 2018-01-01

## 2018-01-01 RX ORDER — ONDANSETRON 2 MG/ML
4 INJECTION INTRAMUSCULAR; INTRAVENOUS
Status: ACTIVE | OUTPATIENT
Start: 2018-01-01 | End: 2018-01-01

## 2018-01-01 RX ORDER — HYDROMORPHONE HYDROCHLORIDE 2 MG/1
4 TABLET ORAL
Status: DISCONTINUED | OUTPATIENT
Start: 2018-01-01 | End: 2018-01-01

## 2018-01-01 RX ORDER — IBUPROFEN 200 MG
1 TABLET ORAL DAILY
Qty: 30 PATCH | Refills: 0 | Status: SHIPPED
Start: 2018-01-01 | End: 2018-01-01

## 2018-01-01 RX ORDER — PANTOPRAZOLE SODIUM 40 MG/1
40 TABLET, DELAYED RELEASE ORAL DAILY
Status: DISCONTINUED | OUTPATIENT
Start: 2018-01-01 | End: 2018-01-01 | Stop reason: HOSPADM

## 2018-01-01 RX ORDER — ATORVASTATIN CALCIUM 40 MG/1
80 TABLET, FILM COATED ORAL DAILY
Status: DISCONTINUED | OUTPATIENT
Start: 2018-01-01 | End: 2018-01-01

## 2018-01-01 RX ORDER — MAGNESIUM SULFATE HEPTAHYDRATE 40 MG/ML
2 INJECTION, SOLUTION INTRAVENOUS ONCE
Status: COMPLETED | OUTPATIENT
Start: 2018-01-01 | End: 2018-01-01

## 2018-01-01 RX ORDER — VANCOMYCIN/0.9 % SOD CHLORIDE 1.5G/250ML
1500 PLASTIC BAG, INJECTION (ML) INTRAVENOUS EVERY 12 HOURS
Status: DISCONTINUED | OUTPATIENT
Start: 2018-01-01 | End: 2018-01-01 | Stop reason: DRUGHIGH

## 2018-01-01 RX ORDER — ISOSORBIDE MONONITRATE 60 MG/1
60 TABLET, EXTENDED RELEASE ORAL 2 TIMES DAILY
Status: DISCONTINUED | OUTPATIENT
Start: 2018-01-01 | End: 2018-01-01

## 2018-01-01 RX ORDER — GUAIFENESIN 100 MG/5ML
324 LIQUID (ML) ORAL
Status: COMPLETED | OUTPATIENT
Start: 2018-01-01 | End: 2018-01-01

## 2018-01-01 RX ORDER — METOPROLOL TARTRATE 5 MG/5ML
5 INJECTION INTRAVENOUS ONCE
Status: COMPLETED | OUTPATIENT
Start: 2018-01-01 | End: 2018-01-01

## 2018-01-01 RX ORDER — HYDROMORPHONE HYDROCHLORIDE 4 MG/1
4 TABLET ORAL EVERY 12 HOURS
Status: DISCONTINUED | OUTPATIENT
Start: 2018-01-01 | End: 2018-01-01

## 2018-01-01 RX ORDER — MORPHINE SULFATE 60 MG/1
60 TABLET, FILM COATED, EXTENDED RELEASE ORAL EVERY 12 HOURS
Status: DISCONTINUED | OUTPATIENT
Start: 2018-01-01 | End: 2018-01-01

## 2018-01-01 RX ORDER — ENOXAPARIN SODIUM 100 MG/ML
1 INJECTION SUBCUTANEOUS EVERY 12 HOURS
Status: DISCONTINUED | OUTPATIENT
Start: 2018-01-01 | End: 2018-01-01 | Stop reason: CLARIF

## 2018-01-01 RX ORDER — FUROSEMIDE 10 MG/ML
40 INJECTION INTRAMUSCULAR; INTRAVENOUS
Status: COMPLETED | OUTPATIENT
Start: 2018-01-01 | End: 2018-01-01

## 2018-01-01 RX ORDER — FAMOTIDINE 20 MG/1
20 TABLET, FILM COATED ORAL 2 TIMES DAILY
Status: DISCONTINUED | OUTPATIENT
Start: 2018-01-01 | End: 2018-01-01 | Stop reason: HOSPADM

## 2018-01-01 RX ORDER — HYDROMORPHONE HYDROCHLORIDE 1 MG/ML
1 INJECTION, SOLUTION INTRAMUSCULAR; INTRAVENOUS; SUBCUTANEOUS ONCE
Status: COMPLETED | OUTPATIENT
Start: 2018-01-01 | End: 2018-01-01

## 2018-01-01 RX ORDER — PANTOPRAZOLE SODIUM 40 MG/1
40 TABLET, DELAYED RELEASE ORAL
Status: DISCONTINUED | OUTPATIENT
Start: 2018-01-01 | End: 2018-01-01

## 2018-01-01 RX ORDER — HYDROMORPHONE HYDROCHLORIDE 2 MG/1
4 TABLET ORAL EVERY 12 HOURS
Status: DISCONTINUED | OUTPATIENT
Start: 2018-01-01 | End: 2018-01-01

## 2018-01-01 RX ORDER — FENTANYL CITRATE 50 UG/ML
100 INJECTION, SOLUTION INTRAMUSCULAR; INTRAVENOUS
Status: COMPLETED | OUTPATIENT
Start: 2018-01-01 | End: 2018-01-01

## 2018-01-01 RX ORDER — ONDANSETRON 2 MG/ML
4 INJECTION INTRAMUSCULAR; INTRAVENOUS
Status: DISCONTINUED | OUTPATIENT
Start: 2018-01-01 | End: 2018-01-01 | Stop reason: HOSPADM

## 2018-01-01 RX ORDER — SIMETHICONE 80 MG
80 TABLET,CHEWABLE ORAL
Status: DISCONTINUED | OUTPATIENT
Start: 2018-01-01 | End: 2018-01-01 | Stop reason: HOSPADM

## 2018-01-01 RX ORDER — LISINOPRIL 2.5 MG/1
2.5 TABLET ORAL DAILY
COMMUNITY
End: 2018-01-01

## 2018-01-01 RX ORDER — IPRATROPIUM BROMIDE AND ALBUTEROL SULFATE 2.5; .5 MG/3ML; MG/3ML
3 SOLUTION RESPIRATORY (INHALATION) EVERY 4 HOURS
Qty: 30 NEBULE | Refills: 2 | Status: SHIPPED | OUTPATIENT
Start: 2018-01-01

## 2018-01-01 RX ORDER — MECLIZINE HCL 12.5 MG 12.5 MG/1
12.5 TABLET ORAL
Status: DISCONTINUED | OUTPATIENT
Start: 2018-01-01 | End: 2018-01-01 | Stop reason: HOSPADM

## 2018-01-01 RX ORDER — IPRATROPIUM BROMIDE AND ALBUTEROL SULFATE 2.5; .5 MG/3ML; MG/3ML
3 SOLUTION RESPIRATORY (INHALATION)
Status: COMPLETED | OUTPATIENT
Start: 2018-01-01 | End: 2018-01-01

## 2018-01-01 RX ORDER — EPHEDRINE SULFATE 50 MG/ML
INJECTION, SOLUTION INTRAVENOUS AS NEEDED
Status: DISCONTINUED | OUTPATIENT
Start: 2018-01-01 | End: 2018-01-01 | Stop reason: HOSPADM

## 2018-01-01 RX ORDER — MORPHINE SULFATE 15 MG/1
60 TABLET, FILM COATED, EXTENDED RELEASE ORAL 3 TIMES DAILY
Status: DISCONTINUED | OUTPATIENT
Start: 2018-01-01 | End: 2018-01-01

## 2018-01-01 RX ORDER — SODIUM CHLORIDE 0.9 % (FLUSH) 0.9 %
5-10 SYRINGE (ML) INJECTION AS NEEDED
Status: DISCONTINUED | OUTPATIENT
Start: 2018-01-01 | End: 2018-01-01

## 2018-01-01 RX ORDER — NALOXONE HYDROCHLORIDE 0.4 MG/ML
0.4 INJECTION, SOLUTION INTRAMUSCULAR; INTRAVENOUS; SUBCUTANEOUS
Status: DISCONTINUED | OUTPATIENT
Start: 2018-01-01 | End: 2018-01-01 | Stop reason: HOSPADM

## 2018-01-01 RX ORDER — AMIODARONE HYDROCHLORIDE 200 MG/1
400 TABLET ORAL
Status: DISCONTINUED | OUTPATIENT
Start: 2018-01-01 | End: 2018-01-01 | Stop reason: HOSPADM

## 2018-01-01 RX ORDER — PEPPERMINT OIL
SPIRIT ORAL ONCE
Status: COMPLETED | OUTPATIENT
Start: 2018-01-01 | End: 2018-01-01

## 2018-01-01 RX ORDER — MORPHINE SULFATE 30 MG/1
30 TABLET, FILM COATED, EXTENDED RELEASE ORAL 3 TIMES DAILY
Status: DISCONTINUED | OUTPATIENT
Start: 2018-01-01 | End: 2018-01-01 | Stop reason: HOSPADM

## 2018-01-01 RX ORDER — MORPHINE SULFATE 30 MG/1
30 TABLET, FILM COATED, EXTENDED RELEASE ORAL EVERY 12 HOURS
Status: DISCONTINUED | OUTPATIENT
Start: 2018-01-01 | End: 2018-01-01

## 2018-01-01 RX ORDER — LORAZEPAM 2 MG/ML
1 INJECTION INTRAMUSCULAR
Status: DISCONTINUED | OUTPATIENT
Start: 2018-01-01 | End: 2018-01-01 | Stop reason: HOSPADM

## 2018-01-01 RX ORDER — LORAZEPAM 2 MG/ML
1 CONCENTRATE ORAL
Status: DISCONTINUED | OUTPATIENT
Start: 2018-01-01 | End: 2018-01-01

## 2018-01-01 RX ORDER — ROPINIROLE 1 MG/1
8 TABLET, FILM COATED ORAL
Status: DISCONTINUED | OUTPATIENT
Start: 2018-01-01 | End: 2018-01-01

## 2018-01-01 RX ORDER — PANTOPRAZOLE SODIUM 40 MG/1
40 TABLET, DELAYED RELEASE ORAL DAILY
Status: DISCONTINUED | OUTPATIENT
Start: 2018-01-01 | End: 2018-01-01

## 2018-01-01 RX ORDER — FLUMAZENIL 0.1 MG/ML
INJECTION INTRAVENOUS
Status: COMPLETED
Start: 2018-01-01 | End: 2018-01-01

## 2018-01-01 RX ORDER — HEPARIN SODIUM 5000 [USP'U]/ML
5000 INJECTION, SOLUTION INTRAVENOUS; SUBCUTANEOUS EVERY 8 HOURS
Status: DISCONTINUED | OUTPATIENT
Start: 2018-01-01 | End: 2018-01-01 | Stop reason: HOSPADM

## 2018-01-01 RX ORDER — RANOLAZINE 500 MG/1
1000 TABLET, EXTENDED RELEASE ORAL 2 TIMES DAILY
Status: DISCONTINUED | OUTPATIENT
Start: 2018-01-01 | End: 2018-01-01

## 2018-01-01 RX ORDER — ACETAMINOPHEN 160 MG/5ML
650 SUSPENSION ORAL
Status: DISCONTINUED | OUTPATIENT
Start: 2018-01-01 | End: 2018-01-01

## 2018-01-01 RX ORDER — PREDNISONE 5 MG/1
5 TABLET ORAL
Status: DISCONTINUED | OUTPATIENT
Start: 2018-01-01 | End: 2018-01-01 | Stop reason: HOSPADM

## 2018-01-01 RX ORDER — FUROSEMIDE 10 MG/ML
40 INJECTION INTRAMUSCULAR; INTRAVENOUS ONCE
Status: COMPLETED | OUTPATIENT
Start: 2018-01-01 | End: 2018-01-01

## 2018-01-01 RX ORDER — LORAZEPAM 2 MG/ML
1 INJECTION INTRAMUSCULAR
Status: COMPLETED | OUTPATIENT
Start: 2018-01-01 | End: 2018-01-01

## 2018-01-01 RX ORDER — LORAZEPAM 2 MG/ML
1 INJECTION INTRAMUSCULAR ONCE
Status: DISCONTINUED | OUTPATIENT
Start: 2018-01-01 | End: 2018-01-01 | Stop reason: SDUPTHER

## 2018-01-01 RX ORDER — GUAIFENESIN 100 MG/5ML
81 LIQUID (ML) ORAL DAILY
Status: DISCONTINUED | OUTPATIENT
Start: 2018-01-01 | End: 2018-01-01 | Stop reason: HOSPADM

## 2018-01-01 RX ORDER — VANCOMYCIN 1.75 GRAM/500 ML IN 0.9 % SODIUM CHLORIDE INTRAVENOUS
1750
Status: DISCONTINUED | OUTPATIENT
Start: 2018-01-01 | End: 2018-01-01

## 2018-01-01 RX ORDER — LISINOPRIL 2.5 MG/1
2.5 TABLET ORAL DAILY
COMMUNITY

## 2018-01-01 RX ORDER — IPRATROPIUM BROMIDE 0.5 MG/2.5ML
0.5 SOLUTION RESPIRATORY (INHALATION)
Status: DISCONTINUED | OUTPATIENT
Start: 2018-01-01 | End: 2018-01-01 | Stop reason: SDUPTHER

## 2018-01-01 RX ORDER — NORTRIPTYLINE HYDROCHLORIDE 50 MG/1
50 CAPSULE ORAL
COMMUNITY

## 2018-01-01 RX ORDER — VANCOMYCIN 1.75 GRAM/500 ML IN 0.9 % SODIUM CHLORIDE INTRAVENOUS
1750 EVERY 12 HOURS
Status: DISCONTINUED | OUTPATIENT
Start: 2018-01-01 | End: 2018-01-01

## 2018-01-01 RX ORDER — SPIRONOLACTONE 50 MG/1
50 TABLET, FILM COATED ORAL 2 TIMES DAILY
Qty: 60 TAB | Refills: 2 | Status: SHIPPED | OUTPATIENT
Start: 2018-01-01 | End: 2018-01-01

## 2018-01-01 RX ORDER — METOPROLOL SUCCINATE 50 MG/1
50 TABLET, EXTENDED RELEASE ORAL DAILY
Status: DISCONTINUED | OUTPATIENT
Start: 2018-01-01 | End: 2018-01-01

## 2018-01-01 RX ORDER — OXYBUTYNIN CHLORIDE 5 MG/1
5 TABLET ORAL
Status: DISCONTINUED | OUTPATIENT
Start: 2018-01-01 | End: 2018-01-01

## 2018-01-01 RX ORDER — AMOXICILLIN AND CLAVULANATE POTASSIUM 875; 125 MG/1; MG/1
1 TABLET, FILM COATED ORAL 2 TIMES DAILY
Qty: 14 TAB | Refills: 0 | Status: SHIPPED | OUTPATIENT
Start: 2018-01-01 | End: 2018-01-01

## 2018-01-01 RX ORDER — POTASSIUM CHLORIDE 750 MG/1
40 TABLET, FILM COATED, EXTENDED RELEASE ORAL 2 TIMES DAILY
Status: DISCONTINUED | OUTPATIENT
Start: 2018-01-01 | End: 2018-01-01 | Stop reason: HOSPADM

## 2018-01-01 RX ORDER — LIDOCAINE HYDROCHLORIDE 10 MG/ML
10-30 INJECTION INFILTRATION; PERINEURAL
Status: DISCONTINUED | OUTPATIENT
Start: 2018-01-01 | End: 2018-01-01

## 2018-01-01 RX ORDER — AMLODIPINE BESYLATE 5 MG/1
5 TABLET ORAL 2 TIMES DAILY
Status: DISCONTINUED | OUTPATIENT
Start: 2018-01-01 | End: 2018-01-01

## 2018-01-01 RX ORDER — ACETAMINOPHEN 650 MG/1
650 SUPPOSITORY RECTAL
Status: DISCONTINUED | OUTPATIENT
Start: 2018-01-01 | End: 2018-01-01 | Stop reason: HOSPADM

## 2018-01-01 RX ORDER — DOBUTAMINE HYDROCHLORIDE 200 MG/100ML
2.5 INJECTION INTRAVENOUS CONTINUOUS
Status: DISCONTINUED | OUTPATIENT
Start: 2018-01-01 | End: 2018-01-01

## 2018-01-01 RX ADMIN — VANCOMYCIN HYDROCHLORIDE 1750 MG: 10 INJECTION, POWDER, LYOPHILIZED, FOR SOLUTION INTRAVENOUS at 17:53

## 2018-01-01 RX ADMIN — ACETAMINOPHEN 650 MG: 325 TABLET ORAL at 23:20

## 2018-01-01 RX ADMIN — SPIRONOLACTONE 25 MG: 25 TABLET ORAL at 09:52

## 2018-01-01 RX ADMIN — BUDESONIDE 500 MCG: 0.5 INHALANT RESPIRATORY (INHALATION) at 21:59

## 2018-01-01 RX ADMIN — IPRATROPIUM BROMIDE AND ALBUTEROL SULFATE 3 ML: .5; 3 SOLUTION RESPIRATORY (INHALATION) at 08:45

## 2018-01-01 RX ADMIN — ATORVASTATIN CALCIUM 80 MG: 40 TABLET, FILM COATED ORAL at 08:31

## 2018-01-01 RX ADMIN — CITALOPRAM HYDROBROMIDE 40 MG: 20 TABLET ORAL at 12:25

## 2018-01-01 RX ADMIN — Medication 10 ML: at 22:51

## 2018-01-01 RX ADMIN — SACUBITRIL AND VALSARTAN 1 TABLET: 24; 26 TABLET, FILM COATED ORAL at 11:46

## 2018-01-01 RX ADMIN — MORPHINE SULFATE 60 MG: 15 TABLET, EXTENDED RELEASE ORAL at 00:46

## 2018-01-01 RX ADMIN — SODIUM CHLORIDE 1000 ML: 900 INJECTION, SOLUTION INTRAVENOUS at 22:37

## 2018-01-01 RX ADMIN — NORTRIPTYLINE HYDROCHLORIDE 50 MG: 25 CAPSULE ORAL at 21:17

## 2018-01-01 RX ADMIN — LORAZEPAM 1 MG: 2 INJECTION INTRAMUSCULAR; INTRAVENOUS at 06:36

## 2018-01-01 RX ADMIN — MORPHINE SULFATE 60 MG: 60 TABLET, FILM COATED, EXTENDED RELEASE ORAL at 09:45

## 2018-01-01 RX ADMIN — ARFORMOTEROL TARTRATE 15 MCG: 15 SOLUTION RESPIRATORY (INHALATION) at 08:45

## 2018-01-01 RX ADMIN — BUDESONIDE 500 MCG: 0.5 INHALANT RESPIRATORY (INHALATION) at 20:29

## 2018-01-01 RX ADMIN — METOPROLOL TARTRATE 25 MG: 25 TABLET ORAL at 09:42

## 2018-01-01 RX ADMIN — MIDODRINE HYDROCHLORIDE 5 MG: 5 TABLET ORAL at 08:17

## 2018-01-01 RX ADMIN — LISINOPRIL 2.5 MG: 5 TABLET ORAL at 10:34

## 2018-01-01 RX ADMIN — Medication 10 ML: at 04:51

## 2018-01-01 RX ADMIN — MORPHINE SULFATE 60 MG: 15 TABLET, EXTENDED RELEASE ORAL at 17:09

## 2018-01-01 RX ADMIN — MORPHINE SULFATE 60 MG: 60 TABLET, FILM COATED, EXTENDED RELEASE ORAL at 21:02

## 2018-01-01 RX ADMIN — ISOSORBIDE MONONITRATE 60 MG: 60 TABLET, EXTENDED RELEASE ORAL at 08:30

## 2018-01-01 RX ADMIN — ASPIRIN 81 MG: 81 TABLET, COATED ORAL at 09:04

## 2018-01-01 RX ADMIN — MORPHINE SULFATE 30 MG: 30 TABLET, FILM COATED, EXTENDED RELEASE ORAL at 19:10

## 2018-01-01 RX ADMIN — BUDESONIDE 500 MCG: 0.5 INHALANT RESPIRATORY (INHALATION) at 08:44

## 2018-01-01 RX ADMIN — BUPROPION HYDROCHLORIDE 150 MG: 150 TABLET, EXTENDED RELEASE ORAL at 08:20

## 2018-01-01 RX ADMIN — PIPERACILLIN SODIUM AND TAZOBACTAM SODIUM 3.38 G: 3; .375 INJECTION, POWDER, LYOPHILIZED, FOR SOLUTION INTRAVENOUS at 18:09

## 2018-01-01 RX ADMIN — BUDESONIDE 500 MCG: 0.5 INHALANT RESPIRATORY (INHALATION) at 21:26

## 2018-01-01 RX ADMIN — Medication 10 ML: at 21:39

## 2018-01-01 RX ADMIN — CLOPIDOGREL BISULFATE 75 MG: 75 TABLET ORAL at 09:45

## 2018-01-01 RX ADMIN — ARFORMOTEROL TARTRATE 15 MCG: 15 SOLUTION RESPIRATORY (INHALATION) at 07:49

## 2018-01-01 RX ADMIN — HYDROMORPHONE HYDROCHLORIDE 4 MG: 2 TABLET ORAL at 07:00

## 2018-01-01 RX ADMIN — BUDESONIDE 500 MCG: 0.5 INHALANT RESPIRATORY (INHALATION) at 08:53

## 2018-01-01 RX ADMIN — Medication 81 MG: at 09:45

## 2018-01-01 RX ADMIN — Medication 81 MG: at 08:41

## 2018-01-01 RX ADMIN — CLOPIDOGREL BISULFATE 75 MG: 75 TABLET ORAL at 08:35

## 2018-01-01 RX ADMIN — Medication 10 ML: at 14:00

## 2018-01-01 RX ADMIN — BUDESONIDE 500 MCG: 0.5 INHALANT RESPIRATORY (INHALATION) at 08:20

## 2018-01-01 RX ADMIN — HYDROMORPHONE HYDROCHLORIDE 4 MG: 4 TABLET ORAL at 17:08

## 2018-01-01 RX ADMIN — METOPROLOL TARTRATE 12.5 MG: 25 TABLET ORAL at 21:32

## 2018-01-01 RX ADMIN — MORPHINE SULFATE 60 MG: 60 TABLET, FILM COATED, EXTENDED RELEASE ORAL at 08:19

## 2018-01-01 RX ADMIN — MORPHINE SULFATE 60 MG: 60 TABLET, FILM COATED, EXTENDED RELEASE ORAL at 09:24

## 2018-01-01 RX ADMIN — ARFORMOTEROL TARTRATE 15 MCG: 15 SOLUTION RESPIRATORY (INHALATION) at 20:32

## 2018-01-01 RX ADMIN — MIDAZOLAM HYDROCHLORIDE 1 MG: 1 INJECTION, SOLUTION INTRAMUSCULAR; INTRAVENOUS at 12:20

## 2018-01-01 RX ADMIN — NAFCILLIN SODIUM 2 G: 2 INJECTION, POWDER, LYOPHILIZED, FOR SOLUTION INTRAMUSCULAR; INTRAVENOUS at 11:43

## 2018-01-01 RX ADMIN — NORTRIPTYLINE HYDROCHLORIDE 50 MG: 25 CAPSULE ORAL at 20:56

## 2018-01-01 RX ADMIN — MORPHINE SULFATE 60 MG: 60 TABLET, FILM COATED, EXTENDED RELEASE ORAL at 22:25

## 2018-01-01 RX ADMIN — PIPERACILLIN SODIUM,TAZOBACTAM SODIUM 3.38 G: 3; .375 INJECTION, POWDER, FOR SOLUTION INTRAVENOUS at 09:53

## 2018-01-01 RX ADMIN — Medication 10 ML: at 21:18

## 2018-01-01 RX ADMIN — Medication 81 MG: at 10:51

## 2018-01-01 RX ADMIN — ZOLPIDEM TARTRATE 10 MG: 10 TABLET ORAL at 21:17

## 2018-01-01 RX ADMIN — PIPERACILLIN SODIUM AND TAZOBACTAM SODIUM 3.38 G: 3; .375 INJECTION, POWDER, LYOPHILIZED, FOR SOLUTION INTRAVENOUS at 02:31

## 2018-01-01 RX ADMIN — Medication 10 ML: at 21:03

## 2018-01-01 RX ADMIN — NAFCILLIN SODIUM 2 G: 2 INJECTION, POWDER, LYOPHILIZED, FOR SOLUTION INTRAMUSCULAR; INTRAVENOUS at 16:28

## 2018-01-01 RX ADMIN — PANTOPRAZOLE SODIUM 40 MG: 40 TABLET, DELAYED RELEASE ORAL at 08:44

## 2018-01-01 RX ADMIN — ROPINIROLE HYDROCHLORIDE 8 MG: 1 TABLET, FILM COATED ORAL at 22:02

## 2018-01-01 RX ADMIN — ARFORMOTEROL TARTRATE 15 MCG: 15 SOLUTION RESPIRATORY (INHALATION) at 07:20

## 2018-01-01 RX ADMIN — HYDROMORPHONE HYDROCHLORIDE 4 MG: 4 TABLET ORAL at 09:16

## 2018-01-01 RX ADMIN — MORPHINE SULFATE 60 MG: 60 TABLET, FILM COATED, EXTENDED RELEASE ORAL at 09:27

## 2018-01-01 RX ADMIN — VANCOMYCIN HYDROCHLORIDE 1750 MG: 10 INJECTION, POWDER, LYOPHILIZED, FOR SOLUTION INTRAVENOUS at 17:17

## 2018-01-01 RX ADMIN — FUROSEMIDE 60 MG: 40 TABLET ORAL at 08:40

## 2018-01-01 RX ADMIN — LORAZEPAM 1 MG: 2 INJECTION INTRAMUSCULAR; INTRAVENOUS at 15:40

## 2018-01-01 RX ADMIN — THERA TABS 1 TABLET: TAB at 08:44

## 2018-01-01 RX ADMIN — BUDESONIDE 500 MCG: 0.5 INHALANT RESPIRATORY (INHALATION) at 07:21

## 2018-01-01 RX ADMIN — MORPHINE SULFATE 30 MG: 30 TABLET, FILM COATED, EXTENDED RELEASE ORAL at 08:35

## 2018-01-01 RX ADMIN — Medication 10 ML: at 07:40

## 2018-01-01 RX ADMIN — SPIRONOLACTONE 50 MG: 25 TABLET, FILM COATED ORAL at 18:07

## 2018-01-01 RX ADMIN — POTASSIUM CHLORIDE 40 MEQ: 750 TABLET, EXTENDED RELEASE ORAL at 09:05

## 2018-01-01 RX ADMIN — VANCOMYCIN HYDROCHLORIDE 1750 MG: 10 INJECTION, POWDER, LYOPHILIZED, FOR SOLUTION INTRAVENOUS at 21:41

## 2018-01-01 RX ADMIN — PIPERACILLIN SODIUM,TAZOBACTAM SODIUM 3.38 G: 3; .375 INJECTION, POWDER, FOR SOLUTION INTRAVENOUS at 20:34

## 2018-01-01 RX ADMIN — ARFORMOTEROL TARTRATE 15 MCG: 15 SOLUTION RESPIRATORY (INHALATION) at 09:19

## 2018-01-01 RX ADMIN — ACETAMINOPHEN 650 MG: 650 SUPPOSITORY RECTAL at 11:18

## 2018-01-01 RX ADMIN — ASPIRIN 81 MG: 81 TABLET, COATED ORAL at 08:18

## 2018-01-01 RX ADMIN — APIXABAN 5 MG: 5 TABLET, FILM COATED ORAL at 08:42

## 2018-01-01 RX ADMIN — NORTRIPTYLINE HYDROCHLORIDE 25 MG: 25 CAPSULE ORAL at 21:27

## 2018-01-01 RX ADMIN — ATORVASTATIN CALCIUM 80 MG: 40 TABLET, FILM COATED ORAL at 09:51

## 2018-01-01 RX ADMIN — METFORMIN HYDROCHLORIDE 1000 MG: 500 TABLET, FILM COATED ORAL at 07:22

## 2018-01-01 RX ADMIN — SACUBITRIL AND VALSARTAN 1 TABLET: 24; 26 TABLET, FILM COATED ORAL at 12:16

## 2018-01-01 RX ADMIN — CLINDAMYCIN PHOSPHATE 600 MG: 600 INJECTION, SOLUTION INTRAVENOUS at 02:31

## 2018-01-01 RX ADMIN — PANTOPRAZOLE SODIUM 40 MG: 40 TABLET, DELAYED RELEASE ORAL at 07:40

## 2018-01-01 RX ADMIN — APIXABAN 5 MG: 5 TABLET, FILM COATED ORAL at 08:34

## 2018-01-01 RX ADMIN — ATORVASTATIN CALCIUM 80 MG: 40 TABLET, FILM COATED ORAL at 09:03

## 2018-01-01 RX ADMIN — PIPERACILLIN SODIUM AND TAZOBACTAM SODIUM 3.38 G: 3; .375 INJECTION, POWDER, LYOPHILIZED, FOR SOLUTION INTRAVENOUS at 03:00

## 2018-01-01 RX ADMIN — Medication 10 ML: at 14:02

## 2018-01-01 RX ADMIN — FAMOTIDINE 20 MG: 20 TABLET ORAL at 09:14

## 2018-01-01 RX ADMIN — ASPIRIN 81 MG CHEWABLE TABLET 324 MG: 81 TABLET CHEWABLE at 20:10

## 2018-01-01 RX ADMIN — ARFORMOTEROL TARTRATE 15 MCG: 15 SOLUTION RESPIRATORY (INHALATION) at 19:46

## 2018-01-01 RX ADMIN — CITALOPRAM HYDROBROMIDE 40 MG: 20 TABLET ORAL at 08:24

## 2018-01-01 RX ADMIN — BUPROPION HYDROCHLORIDE 150 MG: 150 TABLET, EXTENDED RELEASE ORAL at 08:34

## 2018-01-01 RX ADMIN — HYDROMORPHONE HYDROCHLORIDE 1 MG: 2 INJECTION INTRAMUSCULAR; INTRAVENOUS; SUBCUTANEOUS at 06:35

## 2018-01-01 RX ADMIN — ZOLPIDEM TARTRATE 10 MG: 5 TABLET ORAL at 22:50

## 2018-01-01 RX ADMIN — CITALOPRAM HYDROBROMIDE 40 MG: 20 TABLET ORAL at 09:45

## 2018-01-01 RX ADMIN — MORPHINE SULFATE 30 MG: 30 TABLET, FILM COATED, EXTENDED RELEASE ORAL at 03:26

## 2018-01-01 RX ADMIN — CLINDAMYCIN PHOSPHATE 600 MG: 600 INJECTION, SOLUTION INTRAVENOUS at 00:12

## 2018-01-01 RX ADMIN — FERROUS SULFATE TAB 325 MG (65 MG ELEMENTAL FE) 325 MG: 325 (65 FE) TAB at 07:22

## 2018-01-01 RX ADMIN — ENOXAPARIN SODIUM 111 MG: 120 INJECTION SUBCUTANEOUS at 22:45

## 2018-01-01 RX ADMIN — MORPHINE SULFATE 60 MG: 60 TABLET, FILM COATED, EXTENDED RELEASE ORAL at 08:33

## 2018-01-01 RX ADMIN — SACUBITRIL AND VALSARTAN 1 TABLET: 24; 26 TABLET, FILM COATED ORAL at 22:58

## 2018-01-01 RX ADMIN — FUROSEMIDE 60 MG: 40 TABLET ORAL at 08:27

## 2018-01-01 RX ADMIN — ALLOPURINOL 100 MG: 100 TABLET ORAL at 08:03

## 2018-01-01 RX ADMIN — ARFORMOTEROL TARTRATE 15 MCG: 15 SOLUTION RESPIRATORY (INHALATION) at 07:46

## 2018-01-01 RX ADMIN — PANTOPRAZOLE SODIUM 40 MG: 40 TABLET, DELAYED RELEASE ORAL at 09:05

## 2018-01-01 RX ADMIN — Medication 10 ML: at 21:00

## 2018-01-01 RX ADMIN — ASPIRIN 81 MG CHEWABLE TABLET 81 MG: 81 TABLET CHEWABLE at 08:31

## 2018-01-01 RX ADMIN — MORPHINE SULFATE 30 MG: 30 TABLET, FILM COATED, EXTENDED RELEASE ORAL at 20:20

## 2018-01-01 RX ADMIN — AMIODARONE HYDROCHLORIDE 200 MG: 200 TABLET ORAL at 08:46

## 2018-01-01 RX ADMIN — ARFORMOTEROL TARTRATE 15 MCG: 15 SOLUTION RESPIRATORY (INHALATION) at 07:31

## 2018-01-01 RX ADMIN — VANCOMYCIN HYDROCHLORIDE 2000 MG: 10 INJECTION, POWDER, LYOPHILIZED, FOR SOLUTION INTRAVENOUS at 02:16

## 2018-01-01 RX ADMIN — PIPERACILLIN SODIUM,TAZOBACTAM SODIUM 3.38 G: 3; .375 INJECTION, POWDER, FOR SOLUTION INTRAVENOUS at 17:42

## 2018-01-01 RX ADMIN — POTASSIUM CHLORIDE 10 MEQ: 10 INJECTION, SOLUTION INTRAVENOUS at 06:32

## 2018-01-01 RX ADMIN — ATORVASTATIN CALCIUM 80 MG: 40 TABLET, FILM COATED ORAL at 08:19

## 2018-01-01 RX ADMIN — CLINDAMYCIN PHOSPHATE 600 MG: 600 INJECTION, SOLUTION INTRAVENOUS at 08:13

## 2018-01-01 RX ADMIN — METOPROLOL SUCCINATE 50 MG: 50 TABLET, EXTENDED RELEASE ORAL at 09:04

## 2018-01-01 RX ADMIN — METOPROLOL TARTRATE 12.5 MG: 25 TABLET ORAL at 08:18

## 2018-01-01 RX ADMIN — Medication 10 ML: at 16:22

## 2018-01-01 RX ADMIN — HEPARIN SODIUM 5000 UNITS: 5000 INJECTION, SOLUTION INTRAVENOUS; SUBCUTANEOUS at 20:14

## 2018-01-01 RX ADMIN — IPRATROPIUM BROMIDE AND ALBUTEROL SULFATE 3 ML: .5; 3 SOLUTION RESPIRATORY (INHALATION) at 12:15

## 2018-01-01 RX ADMIN — ATORVASTATIN CALCIUM 80 MG: 40 TABLET, FILM COATED ORAL at 08:45

## 2018-01-01 RX ADMIN — LORAZEPAM 1 MG: 2 INJECTION INTRAMUSCULAR; INTRAVENOUS at 16:00

## 2018-01-01 RX ADMIN — PIPERACILLIN SODIUM AND TAZOBACTAM SODIUM 3.38 G: 3; .375 INJECTION, POWDER, LYOPHILIZED, FOR SOLUTION INTRAVENOUS at 18:07

## 2018-01-01 RX ADMIN — FAMOTIDINE 20 MG: 20 TABLET ORAL at 09:45

## 2018-01-01 RX ADMIN — CITALOPRAM HYDROBROMIDE 40 MG: 20 TABLET ORAL at 09:05

## 2018-01-01 RX ADMIN — ARFORMOTEROL TARTRATE 15 MCG: 15 SOLUTION RESPIRATORY (INHALATION) at 21:26

## 2018-01-01 RX ADMIN — BUDESONIDE 500 MCG: 0.5 INHALANT RESPIRATORY (INHALATION) at 07:41

## 2018-01-01 RX ADMIN — BUDESONIDE 500 MCG: 0.5 INHALANT RESPIRATORY (INHALATION) at 08:01

## 2018-01-01 RX ADMIN — DIBASIC SODIUM PHOSPHATE, MONOBASIC POTASSIUM PHOSPHATE AND MONOBASIC SODIUM PHOSPHATE 1 TABLET: 852; 155; 130 TABLET ORAL at 09:53

## 2018-01-01 RX ADMIN — IPRATROPIUM BROMIDE AND ALBUTEROL SULFATE 3 ML: .5; 3 SOLUTION RESPIRATORY (INHALATION) at 22:20

## 2018-01-01 RX ADMIN — ZOLPIDEM TARTRATE 10 MG: 10 TABLET ORAL at 21:41

## 2018-01-01 RX ADMIN — ROPINIROLE HYDROCHLORIDE 8 MG: 1 TABLET, FILM COATED ORAL at 21:33

## 2018-01-01 RX ADMIN — ASPIRIN 81 MG: 81 TABLET, COATED ORAL at 08:36

## 2018-01-01 RX ADMIN — ARFORMOTEROL TARTRATE 15 MCG: 15 SOLUTION RESPIRATORY (INHALATION) at 07:21

## 2018-01-01 RX ADMIN — IPRATROPIUM BROMIDE AND ALBUTEROL SULFATE 3 ML: .5; 3 SOLUTION RESPIRATORY (INHALATION) at 09:32

## 2018-01-01 RX ADMIN — DIBASIC SODIUM PHOSPHATE, MONOBASIC POTASSIUM PHOSPHATE AND MONOBASIC SODIUM PHOSPHATE 1 TABLET: 852; 155; 130 TABLET ORAL at 08:49

## 2018-01-01 RX ADMIN — FERROUS SULFATE TAB 325 MG (65 MG ELEMENTAL FE) 325 MG: 325 (65 FE) TAB at 07:40

## 2018-01-01 RX ADMIN — METOPROLOL TARTRATE 25 MG: 25 TABLET ORAL at 09:14

## 2018-01-01 RX ADMIN — HYDROMORPHONE HYDROCHLORIDE 4 MG: 4 TABLET ORAL at 17:26

## 2018-01-01 RX ADMIN — BUDESONIDE 500 MCG: 0.5 INHALANT RESPIRATORY (INHALATION) at 08:31

## 2018-01-01 RX ADMIN — THERA TABS 1 TABLET: TAB at 09:06

## 2018-01-01 RX ADMIN — APIXABAN 5 MG: 5 TABLET, FILM COATED ORAL at 09:24

## 2018-01-01 RX ADMIN — Medication 10 ML: at 00:21

## 2018-01-01 RX ADMIN — Medication 10 ML: at 15:08

## 2018-01-01 RX ADMIN — FERROUS SULFATE TAB 325 MG (65 MG ELEMENTAL FE) 325 MG: 325 (65 FE) TAB at 06:52

## 2018-01-01 RX ADMIN — DOBUTAMINE IN DEXTROSE 4 MCG/KG/MIN: 200 INJECTION, SOLUTION INTRAVENOUS at 05:19

## 2018-01-01 RX ADMIN — ROPINIROLE HYDROCHLORIDE 8 MG: 1 TABLET, FILM COATED ORAL at 21:05

## 2018-01-01 RX ADMIN — ROPINIROLE HYDROCHLORIDE 8 MG: 1 TABLET, FILM COATED ORAL at 21:40

## 2018-01-01 RX ADMIN — METFORMIN HYDROCHLORIDE 1000 MG: 500 TABLET, FILM COATED ORAL at 17:26

## 2018-01-01 RX ADMIN — MORPHINE SULFATE 60 MG: 60 TABLET, FILM COATED, EXTENDED RELEASE ORAL at 21:05

## 2018-01-01 RX ADMIN — ROPINIROLE HYDROCHLORIDE 8 MG: 1 TABLET, FILM COATED ORAL at 22:36

## 2018-01-01 RX ADMIN — FUROSEMIDE 80 MG: 10 INJECTION, SOLUTION INTRAMUSCULAR; INTRAVENOUS at 00:14

## 2018-01-01 RX ADMIN — DOCUSATE SODIUM 100 MG: 100 CAPSULE, LIQUID FILLED ORAL at 08:42

## 2018-01-01 RX ADMIN — MORPHINE SULFATE 60 MG: 15 TABLET, EXTENDED RELEASE ORAL at 08:40

## 2018-01-01 RX ADMIN — BUPROPION HYDROCHLORIDE 150 MG: 150 TABLET, EXTENDED RELEASE ORAL at 09:55

## 2018-01-01 RX ADMIN — BUPROPION HYDROCHLORIDE 150 MG: 150 TABLET, EXTENDED RELEASE ORAL at 17:10

## 2018-01-01 RX ADMIN — Medication 10 ML: at 22:26

## 2018-01-01 RX ADMIN — APIXABAN 5 MG: 5 TABLET, FILM COATED ORAL at 17:13

## 2018-01-01 RX ADMIN — VANCOMYCIN HYDROCHLORIDE 1750 MG: 10 INJECTION, POWDER, LYOPHILIZED, FOR SOLUTION INTRAVENOUS at 11:17

## 2018-01-01 RX ADMIN — APIXABAN 5 MG: 5 TABLET, FILM COATED ORAL at 21:17

## 2018-01-01 RX ADMIN — ISOSORBIDE MONONITRATE 60 MG: 60 TABLET, EXTENDED RELEASE ORAL at 12:25

## 2018-01-01 RX ADMIN — APIXABAN 5 MG: 5 TABLET, FILM COATED ORAL at 08:19

## 2018-01-01 RX ADMIN — ROPINIROLE HYDROCHLORIDE 8 MG: 1 TABLET, FILM COATED ORAL at 21:11

## 2018-01-01 RX ADMIN — ATORVASTATIN CALCIUM 80 MG: 40 TABLET, FILM COATED ORAL at 09:56

## 2018-01-01 RX ADMIN — PIPERACILLIN SODIUM AND TAZOBACTAM SODIUM 3.38 G: 3; .375 INJECTION, POWDER, LYOPHILIZED, FOR SOLUTION INTRAVENOUS at 11:39

## 2018-01-01 RX ADMIN — BUPROPION HYDROCHLORIDE 150 MG: 150 TABLET, EXTENDED RELEASE ORAL at 17:48

## 2018-01-01 RX ADMIN — PANTOPRAZOLE SODIUM 40 MG: 40 TABLET, DELAYED RELEASE ORAL at 07:02

## 2018-01-01 RX ADMIN — VANCOMYCIN HYDROCHLORIDE 2000 MG: 10 INJECTION, POWDER, LYOPHILIZED, FOR SOLUTION INTRAVENOUS at 16:39

## 2018-01-01 RX ADMIN — FUROSEMIDE 40 MG: 10 INJECTION, SOLUTION INTRAMUSCULAR; INTRAVENOUS at 08:40

## 2018-01-01 RX ADMIN — SODIUM CHLORIDE 100 ML: 900 INJECTION, SOLUTION INTRAVENOUS at 00:21

## 2018-01-01 RX ADMIN — BUDESONIDE 500 MCG: 0.5 INHALANT RESPIRATORY (INHALATION) at 09:18

## 2018-01-01 RX ADMIN — Medication 81 MG: at 08:03

## 2018-01-01 RX ADMIN — MILRINONE LACTATE IN DEXTROSE 0.38 MCG/KG/MIN: 200 INJECTION, SOLUTION INTRAVENOUS at 14:01

## 2018-01-01 RX ADMIN — METFORMIN HYDROCHLORIDE 1000 MG: 500 TABLET, FILM COATED ORAL at 16:10

## 2018-01-01 RX ADMIN — MORPHINE SULFATE 2 MG: 2 INJECTION, SOLUTION INTRAMUSCULAR; INTRAVENOUS at 21:47

## 2018-01-01 RX ADMIN — ZOLPIDEM TARTRATE 10 MG: 10 TABLET ORAL at 22:37

## 2018-01-01 RX ADMIN — WHITE PETROLATUM: 1.75 OINTMENT TOPICAL at 21:15

## 2018-01-01 RX ADMIN — DOCUSATE SODIUM 100 MG: 100 CAPSULE, LIQUID FILLED ORAL at 17:26

## 2018-01-01 RX ADMIN — NORTRIPTYLINE HYDROCHLORIDE 50 MG: 25 CAPSULE ORAL at 21:05

## 2018-01-01 RX ADMIN — HYDROMORPHONE HYDROCHLORIDE 4 MG: 2 TABLET ORAL at 21:34

## 2018-01-01 RX ADMIN — Medication 10 ML: at 21:11

## 2018-01-01 RX ADMIN — BUPROPION HYDROCHLORIDE 150 MG: 150 TABLET, EXTENDED RELEASE ORAL at 08:41

## 2018-01-01 RX ADMIN — FERROUS SULFATE TAB 325 MG (65 MG ELEMENTAL FE) 325 MG: 325 (65 FE) TAB at 08:14

## 2018-01-01 RX ADMIN — HYDROMORPHONE HYDROCHLORIDE 4 MG: 2 TABLET ORAL at 06:39

## 2018-01-01 RX ADMIN — MAGNESIUM SULFATE HEPTAHYDRATE 2 G: 40 INJECTION, SOLUTION INTRAVENOUS at 08:36

## 2018-01-01 RX ADMIN — HYDROMORPHONE HYDROCHLORIDE 4 MG: 4 TABLET ORAL at 08:19

## 2018-01-01 RX ADMIN — ARFORMOTEROL TARTRATE 15 MCG: 15 SOLUTION RESPIRATORY (INHALATION) at 19:39

## 2018-01-01 RX ADMIN — ENOXAPARIN SODIUM 40 MG: 40 INJECTION, SOLUTION INTRAVENOUS; SUBCUTANEOUS at 21:53

## 2018-01-01 RX ADMIN — ALLOPURINOL 100 MG: 100 TABLET ORAL at 08:45

## 2018-01-01 RX ADMIN — HEPARIN SODIUM 5000 UNITS: 5000 INJECTION, SOLUTION INTRAVENOUS; SUBCUTANEOUS at 12:00

## 2018-01-01 RX ADMIN — METFORMIN HYDROCHLORIDE 1000 MG: 500 TABLET, FILM COATED ORAL at 17:47

## 2018-01-01 RX ADMIN — ARFORMOTEROL TARTRATE 15 MCG: 15 SOLUTION RESPIRATORY (INHALATION) at 20:42

## 2018-01-01 RX ADMIN — ARFORMOTEROL TARTRATE 15 MCG: 15 SOLUTION RESPIRATORY (INHALATION) at 08:20

## 2018-01-01 RX ADMIN — Medication 81 MG: at 08:20

## 2018-01-01 RX ADMIN — FERROUS SULFATE TAB 325 MG (65 MG ELEMENTAL FE) 325 MG: 325 (65 FE) TAB at 08:30

## 2018-01-01 RX ADMIN — MORPHINE SULFATE 2 MG: 2 INJECTION, SOLUTION INTRAMUSCULAR; INTRAVENOUS at 12:01

## 2018-01-01 RX ADMIN — CITALOPRAM HYDROBROMIDE 40 MG: 20 TABLET ORAL at 08:35

## 2018-01-01 RX ADMIN — ARFORMOTEROL TARTRATE 15 MCG: 15 SOLUTION RESPIRATORY (INHALATION) at 20:02

## 2018-01-01 RX ADMIN — NORTRIPTYLINE HYDROCHLORIDE 50 MG: 25 CAPSULE ORAL at 21:23

## 2018-01-01 RX ADMIN — SODIUM CHLORIDE 1000 ML: 900 INJECTION, SOLUTION INTRAVENOUS at 02:04

## 2018-01-01 RX ADMIN — CITALOPRAM HYDROBROMIDE 40 MG: 20 TABLET ORAL at 08:32

## 2018-01-01 RX ADMIN — POLYETHYLENE GLYCOL 3350 17 G: 17 POWDER, FOR SOLUTION ORAL at 11:56

## 2018-01-01 RX ADMIN — NORTRIPTYLINE HYDROCHLORIDE 50 MG: 25 CAPSULE ORAL at 21:33

## 2018-01-01 RX ADMIN — APIXABAN 5 MG: 5 TABLET, FILM COATED ORAL at 18:03

## 2018-01-01 RX ADMIN — SACUBITRIL AND VALSARTAN 1 TABLET: 24; 26 TABLET, FILM COATED ORAL at 13:20

## 2018-01-01 RX ADMIN — Medication 10 ML: at 21:43

## 2018-01-01 RX ADMIN — FAMOTIDINE 20 MG: 20 TABLET ORAL at 09:53

## 2018-01-01 RX ADMIN — IOPAMIDOL 85 ML: 755 INJECTION, SOLUTION INTRAVENOUS at 00:21

## 2018-01-01 RX ADMIN — ZOLPIDEM TARTRATE 10 MG: 10 TABLET ORAL at 21:07

## 2018-01-01 RX ADMIN — Medication 10 ML: at 06:48

## 2018-01-01 RX ADMIN — NORTRIPTYLINE HYDROCHLORIDE 50 MG: 25 CAPSULE ORAL at 21:21

## 2018-01-01 RX ADMIN — MORPHINE SULFATE 60 MG: 60 TABLET, FILM COATED, EXTENDED RELEASE ORAL at 08:31

## 2018-01-01 RX ADMIN — HYDROMORPHONE HYDROCHLORIDE 4 MG: 2 TABLET ORAL at 20:13

## 2018-01-01 RX ADMIN — DOCUSATE SODIUM AND SENNOSIDES 1 TABLET: 8.6; 5 TABLET, FILM COATED ORAL at 12:03

## 2018-01-01 RX ADMIN — ROPINIROLE HYDROCHLORIDE 8 MG: 1 TABLET, FILM COATED ORAL at 01:43

## 2018-01-01 RX ADMIN — MORPHINE SULFATE 2 MG: 2 INJECTION, SOLUTION INTRAMUSCULAR; INTRAVENOUS at 08:20

## 2018-01-01 RX ADMIN — BUDESONIDE 500 MCG: 0.5 INHALANT RESPIRATORY (INHALATION) at 21:02

## 2018-01-01 RX ADMIN — Medication 10 ML: at 06:50

## 2018-01-01 RX ADMIN — MORPHINE SULFATE 60 MG: 60 TABLET, FILM COATED, EXTENDED RELEASE ORAL at 02:07

## 2018-01-01 RX ADMIN — HYDROMORPHONE HYDROCHLORIDE 1 MG: 1 INJECTION, SOLUTION INTRAMUSCULAR; INTRAVENOUS; SUBCUTANEOUS at 21:27

## 2018-01-01 RX ADMIN — VANCOMYCIN HYDROCHLORIDE 1250 MG: 10 INJECTION, POWDER, LYOPHILIZED, FOR SOLUTION INTRAVENOUS at 15:29

## 2018-01-01 RX ADMIN — ENOXAPARIN SODIUM 40 MG: 40 INJECTION SUBCUTANEOUS at 01:44

## 2018-01-01 RX ADMIN — BUDESONIDE 500 MCG: 0.5 INHALANT RESPIRATORY (INHALATION) at 08:28

## 2018-01-01 RX ADMIN — BUDESONIDE 500 MCG: 0.5 INHALANT RESPIRATORY (INHALATION) at 20:48

## 2018-01-01 RX ADMIN — VANCOMYCIN HYDROCHLORIDE 2000 MG: 10 INJECTION, POWDER, LYOPHILIZED, FOR SOLUTION INTRAVENOUS at 02:00

## 2018-01-01 RX ADMIN — MORPHINE SULFATE 60 MG: 60 TABLET, FILM COATED, EXTENDED RELEASE ORAL at 05:20

## 2018-01-01 RX ADMIN — AMIODARONE HYDROCHLORIDE 400 MG: 200 TABLET ORAL at 09:53

## 2018-01-01 RX ADMIN — EPHEDRINE SULFATE 10 MG: 50 INJECTION, SOLUTION INTRAVENOUS at 12:50

## 2018-01-01 RX ADMIN — FUROSEMIDE 60 MG: 40 TABLET ORAL at 17:26

## 2018-01-01 RX ADMIN — MORPHINE SULFATE 60 MG: 15 TABLET, EXTENDED RELEASE ORAL at 21:23

## 2018-01-01 RX ADMIN — Medication 10 ML: at 21:20

## 2018-01-01 RX ADMIN — MORPHINE SULFATE 60 MG: 60 TABLET, FILM COATED, EXTENDED RELEASE ORAL at 14:02

## 2018-01-01 RX ADMIN — CITALOPRAM HYDROBROMIDE 40 MG: 20 TABLET ORAL at 08:40

## 2018-01-01 RX ADMIN — IPRATROPIUM BROMIDE AND ALBUTEROL SULFATE 3 ML: .5; 3 SOLUTION RESPIRATORY (INHALATION) at 16:49

## 2018-01-01 RX ADMIN — HEPARIN SODIUM 5000 UNITS: 5000 INJECTION, SOLUTION INTRAVENOUS; SUBCUTANEOUS at 12:29

## 2018-01-01 RX ADMIN — POLYETHYLENE GLYCOL 3350 17 G: 17 POWDER, FOR SOLUTION ORAL at 09:06

## 2018-01-01 RX ADMIN — APIXABAN 5 MG: 5 TABLET, FILM COATED ORAL at 17:48

## 2018-01-01 RX ADMIN — VANCOMYCIN HYDROCHLORIDE 2000 MG: 10 INJECTION, POWDER, LYOPHILIZED, FOR SOLUTION INTRAVENOUS at 14:26

## 2018-01-01 RX ADMIN — HYDROMORPHONE HYDROCHLORIDE 4 MG: 2 TABLET ORAL at 19:35

## 2018-01-01 RX ADMIN — ACETAMINOPHEN 650 MG: 325 TABLET ORAL at 00:13

## 2018-01-01 RX ADMIN — DOBUTAMINE IN DEXTROSE 5 MCG/KG/MIN: 200 INJECTION, SOLUTION INTRAVENOUS at 17:16

## 2018-01-01 RX ADMIN — NAFCILLIN SODIUM 2 G: 2 INJECTION, POWDER, LYOPHILIZED, FOR SOLUTION INTRAMUSCULAR; INTRAVENOUS at 06:00

## 2018-01-01 RX ADMIN — METFORMIN HYDROCHLORIDE 1000 MG: 500 TABLET, FILM COATED ORAL at 07:02

## 2018-01-01 RX ADMIN — DIBASIC SODIUM PHOSPHATE, MONOBASIC POTASSIUM PHOSPHATE AND MONOBASIC SODIUM PHOSPHATE 1 TABLET: 852; 155; 130 TABLET ORAL at 17:51

## 2018-01-01 RX ADMIN — HYDROMORPHONE HYDROCHLORIDE 4 MG: 4 TABLET ORAL at 20:49

## 2018-01-01 RX ADMIN — METOPROLOL TARTRATE 25 MG: 25 TABLET ORAL at 10:19

## 2018-01-01 RX ADMIN — BUDESONIDE 500 MCG: 0.5 INHALANT RESPIRATORY (INHALATION) at 20:51

## 2018-01-01 RX ADMIN — PIPERACILLIN SODIUM,TAZOBACTAM SODIUM 3.38 G: 3; .375 INJECTION, POWDER, FOR SOLUTION INTRAVENOUS at 17:03

## 2018-01-01 RX ADMIN — MORPHINE SULFATE 60 MG: 60 TABLET, FILM COATED, EXTENDED RELEASE ORAL at 10:22

## 2018-01-01 RX ADMIN — DILTIAZEM HYDROCHLORIDE 2.5 MG/HR: 5 INJECTION INTRAVENOUS at 22:38

## 2018-01-01 RX ADMIN — PIPERACILLIN SODIUM AND TAZOBACTAM SODIUM 3.38 G: 3; .375 INJECTION, POWDER, LYOPHILIZED, FOR SOLUTION INTRAVENOUS at 21:50

## 2018-01-01 RX ADMIN — Medication 10 ML: at 05:20

## 2018-01-01 RX ADMIN — BUPROPION HYDROCHLORIDE 150 MG: 150 TABLET, EXTENDED RELEASE ORAL at 20:37

## 2018-01-01 RX ADMIN — PIPERACILLIN SODIUM,TAZOBACTAM SODIUM 3.38 G: 3; .375 INJECTION, POWDER, FOR SOLUTION INTRAVENOUS at 04:00

## 2018-01-01 RX ADMIN — LISINOPRIL 2.5 MG: 5 TABLET ORAL at 08:40

## 2018-01-01 RX ADMIN — MORPHINE SULFATE 60 MG: 15 TABLET, EXTENDED RELEASE ORAL at 22:54

## 2018-01-01 RX ADMIN — ROPINIROLE HYDROCHLORIDE 8 MG: 1 TABLET, FILM COATED ORAL at 21:46

## 2018-01-01 RX ADMIN — MORPHINE SULFATE 60 MG: 60 TABLET, FILM COATED, EXTENDED RELEASE ORAL at 13:46

## 2018-01-01 RX ADMIN — ALBUMIN (HUMAN) 12.5 G: 0.25 INJECTION, SOLUTION INTRAVENOUS at 20:13

## 2018-01-01 RX ADMIN — PIPERACILLIN SODIUM,TAZOBACTAM SODIUM 3.38 G: 3; .375 INJECTION, POWDER, FOR SOLUTION INTRAVENOUS at 09:23

## 2018-01-01 RX ADMIN — METOPROLOL SUCCINATE 25 MG: 25 TABLET, EXTENDED RELEASE ORAL at 08:36

## 2018-01-01 RX ADMIN — NAFCILLIN SODIUM 2 G: 2 INJECTION, POWDER, LYOPHILIZED, FOR SOLUTION INTRAMUSCULAR; INTRAVENOUS at 00:04

## 2018-01-01 RX ADMIN — MORPHINE SULFATE 2 MG: 2 INJECTION, SOLUTION INTRAMUSCULAR; INTRAVENOUS at 19:17

## 2018-01-01 RX ADMIN — BUDESONIDE 500 MCG: 0.5 INHALANT RESPIRATORY (INHALATION) at 09:26

## 2018-01-01 RX ADMIN — HYDROMORPHONE HYDROCHLORIDE 4 MG: 4 TABLET ORAL at 21:18

## 2018-01-01 RX ADMIN — Medication 81 MG: at 09:25

## 2018-01-01 RX ADMIN — ASPIRIN 81 MG CHEWABLE TABLET 81 MG: 81 TABLET CHEWABLE at 10:33

## 2018-01-01 RX ADMIN — FUROSEMIDE 60 MG: 10 INJECTION, SOLUTION INTRAMUSCULAR; INTRAVENOUS at 10:37

## 2018-01-01 RX ADMIN — ENOXAPARIN SODIUM 40 MG: 40 INJECTION SUBCUTANEOUS at 01:49

## 2018-01-01 RX ADMIN — HYDROMORPHONE HYDROCHLORIDE 4 MG: 4 TABLET ORAL at 20:30

## 2018-01-01 RX ADMIN — IPRATROPIUM BROMIDE AND ALBUTEROL SULFATE 3 ML: .5; 3 SOLUTION RESPIRATORY (INHALATION) at 13:54

## 2018-01-01 RX ADMIN — ROPINIROLE HYDROCHLORIDE 8 MG: 1 TABLET, FILM COATED ORAL at 21:06

## 2018-01-01 RX ADMIN — HYDROMORPHONE HYDROCHLORIDE 4 MG: 4 TABLET ORAL at 08:44

## 2018-01-01 RX ADMIN — FAMOTIDINE 20 MG: 20 TABLET ORAL at 18:09

## 2018-01-01 RX ADMIN — IPRATROPIUM BROMIDE AND ALBUTEROL SULFATE 3 ML: .5; 3 SOLUTION RESPIRATORY (INHALATION) at 16:17

## 2018-01-01 RX ADMIN — Medication 10 ML: at 01:27

## 2018-01-01 RX ADMIN — ROPINIROLE HYDROCHLORIDE 8 MG: 1 TABLET, FILM COATED ORAL at 21:19

## 2018-01-01 RX ADMIN — MORPHINE SULFATE 60 MG: 60 TABLET, FILM COATED, EXTENDED RELEASE ORAL at 09:52

## 2018-01-01 RX ADMIN — SPIRONOLACTONE 25 MG: 25 TABLET ORAL at 08:53

## 2018-01-01 RX ADMIN — CLOPIDOGREL BISULFATE 75 MG: 75 TABLET ORAL at 12:25

## 2018-01-01 RX ADMIN — CITALOPRAM HYDROBROMIDE 40 MG: 20 TABLET ORAL at 08:10

## 2018-01-01 RX ADMIN — HYDROMORPHONE HYDROCHLORIDE 1 MG: 2 INJECTION INTRAMUSCULAR; INTRAVENOUS; SUBCUTANEOUS at 10:45

## 2018-01-01 RX ADMIN — BUDESONIDE 500 MCG: 0.5 INHALANT RESPIRATORY (INHALATION) at 20:21

## 2018-01-01 RX ADMIN — BUDESONIDE 500 MCG: 0.5 INHALANT RESPIRATORY (INHALATION) at 20:42

## 2018-01-01 RX ADMIN — VANCOMYCIN HYDROCHLORIDE 2000 MG: 10 INJECTION, POWDER, LYOPHILIZED, FOR SOLUTION INTRAVENOUS at 13:24

## 2018-01-01 RX ADMIN — MORPHINE SULFATE 2 MG: 2 INJECTION, SOLUTION INTRAMUSCULAR; INTRAVENOUS at 00:58

## 2018-01-01 RX ADMIN — ALBUTEROL SULFATE 2.5 MG: 2.5 SOLUTION RESPIRATORY (INHALATION) at 13:20

## 2018-01-01 RX ADMIN — PANTOPRAZOLE SODIUM 40 MG: 40 TABLET, DELAYED RELEASE ORAL at 07:30

## 2018-01-01 RX ADMIN — FUROSEMIDE 80 MG: 10 INJECTION, SOLUTION INTRAMUSCULAR; INTRAVENOUS at 16:42

## 2018-01-01 RX ADMIN — NAFCILLIN SODIUM 2 G: 2 INJECTION, POWDER, LYOPHILIZED, FOR SOLUTION INTRAMUSCULAR; INTRAVENOUS at 23:03

## 2018-01-01 RX ADMIN — Medication 10 ML: at 06:47

## 2018-01-01 RX ADMIN — Medication 81 MG: at 08:29

## 2018-01-01 RX ADMIN — VANCOMYCIN HYDROCHLORIDE 1500 MG: 10 INJECTION, POWDER, LYOPHILIZED, FOR SOLUTION INTRAVENOUS at 06:32

## 2018-01-01 RX ADMIN — PROPOFOL 25 MG: 10 INJECTION, EMULSION INTRAVENOUS at 12:26

## 2018-01-01 RX ADMIN — FUROSEMIDE 40 MG: 10 INJECTION, SOLUTION INTRAMUSCULAR; INTRAVENOUS at 18:42

## 2018-01-01 RX ADMIN — BUDESONIDE 500 MCG: 0.5 INHALANT RESPIRATORY (INHALATION) at 20:32

## 2018-01-01 RX ADMIN — HYDROMORPHONE HYDROCHLORIDE 4 MG: 2 TABLET ORAL at 07:07

## 2018-01-01 RX ADMIN — MORPHINE SULFATE 60 MG: 60 TABLET, FILM COATED, EXTENDED RELEASE ORAL at 17:26

## 2018-01-01 RX ADMIN — VANCOMYCIN HYDROCHLORIDE 1750 MG: 10 INJECTION, POWDER, LYOPHILIZED, FOR SOLUTION INTRAVENOUS at 22:52

## 2018-01-01 RX ADMIN — ARFORMOTEROL TARTRATE 15 MCG: 15 SOLUTION RESPIRATORY (INHALATION) at 20:21

## 2018-01-01 RX ADMIN — SPIRONOLACTONE 50 MG: 25 TABLET, FILM COATED ORAL at 16:11

## 2018-01-01 RX ADMIN — DOCUSATE SODIUM 100 MG: 100 CAPSULE, LIQUID FILLED ORAL at 08:26

## 2018-01-01 RX ADMIN — ALLOPURINOL 100 MG: 100 TABLET ORAL at 09:52

## 2018-01-01 RX ADMIN — MILRINONE LACTATE IN DEXTROSE 0.38 MCG/KG/MIN: 200 INJECTION, SOLUTION INTRAVENOUS at 04:23

## 2018-01-01 RX ADMIN — FAMOTIDINE 20 MG: 20 TABLET ORAL at 08:14

## 2018-01-01 RX ADMIN — ARFORMOTEROL TARTRATE 15 MCG: 15 SOLUTION RESPIRATORY (INHALATION) at 08:10

## 2018-01-01 RX ADMIN — BUDESONIDE 500 MCG: 0.5 INHALANT RESPIRATORY (INHALATION) at 20:40

## 2018-01-01 RX ADMIN — MILRINONE LACTATE IN DEXTROSE 0.38 MCG/KG/MIN: 200 INJECTION, SOLUTION INTRAVENOUS at 06:37

## 2018-01-01 RX ADMIN — FENTANYL CITRATE 50 MCG: 50 INJECTION, SOLUTION INTRAMUSCULAR; INTRAVENOUS at 09:56

## 2018-01-01 RX ADMIN — LIDOCAINE HYDROCHLORIDE 10 ML: 10 INJECTION, SOLUTION INFILTRATION; PERINEURAL at 09:53

## 2018-01-01 RX ADMIN — MORPHINE SULFATE 60 MG: 15 TABLET, EXTENDED RELEASE ORAL at 17:13

## 2018-01-01 RX ADMIN — ALLOPURINOL 100 MG: 100 TABLET ORAL at 10:39

## 2018-01-01 RX ADMIN — POTASSIUM CHLORIDE 40 MEQ: 750 TABLET, EXTENDED RELEASE ORAL at 17:38

## 2018-01-01 RX ADMIN — PANTOPRAZOLE SODIUM 40 MG: 40 TABLET, DELAYED RELEASE ORAL at 07:07

## 2018-01-01 RX ADMIN — ROPINIROLE HYDROCHLORIDE 8 MG: 1 TABLET, FILM COATED ORAL at 21:13

## 2018-01-01 RX ADMIN — Medication 10 ML: at 22:00

## 2018-01-01 RX ADMIN — MORPHINE SULFATE 60 MG: 15 TABLET, EXTENDED RELEASE ORAL at 21:38

## 2018-01-01 RX ADMIN — BUMETANIDE 1 MG: 0.25 INJECTION INTRAMUSCULAR; INTRAVENOUS at 02:05

## 2018-01-01 RX ADMIN — AMIODARONE HYDROCHLORIDE 400 MG: 200 TABLET ORAL at 17:13

## 2018-01-01 RX ADMIN — METOPROLOL TARTRATE 25 MG: 25 TABLET ORAL at 08:14

## 2018-01-01 RX ADMIN — HYDROMORPHONE HYDROCHLORIDE 4 MG: 2 TABLET ORAL at 18:02

## 2018-01-01 RX ADMIN — MORPHINE SULFATE 30 MG: 30 TABLET, FILM COATED, EXTENDED RELEASE ORAL at 22:02

## 2018-01-01 RX ADMIN — ALBUMIN (HUMAN) 12.5 G: 0.25 INJECTION, SOLUTION INTRAVENOUS at 08:43

## 2018-01-01 RX ADMIN — ARFORMOTEROL TARTRATE 15 MCG: 15 SOLUTION RESPIRATORY (INHALATION) at 10:17

## 2018-01-01 RX ADMIN — APIXABAN 5 MG: 5 TABLET, FILM COATED ORAL at 09:14

## 2018-01-01 RX ADMIN — EPHEDRINE SULFATE 10 MG: 50 INJECTION, SOLUTION INTRAVENOUS at 13:03

## 2018-01-01 RX ADMIN — FUROSEMIDE 80 MG: 40 TABLET ORAL at 09:26

## 2018-01-01 RX ADMIN — MORPHINE SULFATE 60 MG: 60 TABLET, FILM COATED, EXTENDED RELEASE ORAL at 17:13

## 2018-01-01 RX ADMIN — VANCOMYCIN HYDROCHLORIDE 1500 MG: 10 INJECTION, POWDER, LYOPHILIZED, FOR SOLUTION INTRAVENOUS at 13:10

## 2018-01-01 RX ADMIN — BUPROPION HYDROCHLORIDE 150 MG: 150 TABLET, EXTENDED RELEASE ORAL at 18:08

## 2018-01-01 RX ADMIN — ZOLPIDEM TARTRATE 10 MG: 10 TABLET ORAL at 21:18

## 2018-01-01 RX ADMIN — VANCOMYCIN HYDROCHLORIDE 1750 MG: 10 INJECTION, POWDER, LYOPHILIZED, FOR SOLUTION INTRAVENOUS at 10:06

## 2018-01-01 RX ADMIN — MORPHINE SULFATE 60 MG: 15 TABLET, EXTENDED RELEASE ORAL at 15:56

## 2018-01-01 RX ADMIN — CALCIUM GLUCONATE 2 G: 98 INJECTION, SOLUTION INTRAVENOUS at 08:41

## 2018-01-01 RX ADMIN — DOCUSATE SODIUM 100 MG: 100 CAPSULE, LIQUID FILLED ORAL at 08:31

## 2018-01-01 RX ADMIN — ROPINIROLE HYDROCHLORIDE 8 MG: 1 TABLET, FILM COATED ORAL at 00:24

## 2018-01-01 RX ADMIN — BUDESONIDE 500 MCG: 0.5 INHALANT RESPIRATORY (INHALATION) at 12:42

## 2018-01-01 RX ADMIN — NAFCILLIN SODIUM 2 G: 2 INJECTION, POWDER, LYOPHILIZED, FOR SOLUTION INTRAMUSCULAR; INTRAVENOUS at 22:08

## 2018-01-01 RX ADMIN — BUDESONIDE 500 MCG: 0.5 INHALANT RESPIRATORY (INHALATION) at 07:49

## 2018-01-01 RX ADMIN — LEVOFLOXACIN 750 MG: 5 INJECTION, SOLUTION INTRAVENOUS at 00:46

## 2018-01-01 RX ADMIN — ATORVASTATIN CALCIUM 80 MG: 40 TABLET, FILM COATED ORAL at 08:17

## 2018-01-01 RX ADMIN — LISINOPRIL 2.5 MG: 5 TABLET ORAL at 08:31

## 2018-01-01 RX ADMIN — ARFORMOTEROL TARTRATE 15 MCG: 15 SOLUTION RESPIRATORY (INHALATION) at 09:26

## 2018-01-01 RX ADMIN — ALBUMIN (HUMAN) 12.5 G: 0.25 INJECTION, SOLUTION INTRAVENOUS at 08:40

## 2018-01-01 RX ADMIN — CITALOPRAM HYDROBROMIDE 40 MG: 20 TABLET ORAL at 18:15

## 2018-01-01 RX ADMIN — BUDESONIDE 500 MCG: 0.5 INHALANT RESPIRATORY (INHALATION) at 20:36

## 2018-01-01 RX ADMIN — Medication 10 ML: at 16:01

## 2018-01-01 RX ADMIN — PROPOFOL 25 MG: 10 INJECTION, EMULSION INTRAVENOUS at 12:21

## 2018-01-01 RX ADMIN — MORPHINE SULFATE 60 MG: 60 TABLET, FILM COATED, EXTENDED RELEASE ORAL at 07:21

## 2018-01-01 RX ADMIN — ROPINIROLE HYDROCHLORIDE 8 MG: 1 TABLET, FILM COATED ORAL at 21:44

## 2018-01-01 RX ADMIN — METOPROLOL TARTRATE 12.5 MG: 25 TABLET ORAL at 09:56

## 2018-01-01 RX ADMIN — APIXABAN 5 MG: 5 TABLET, FILM COATED ORAL at 08:15

## 2018-01-01 RX ADMIN — MORPHINE SULFATE 60 MG: 60 TABLET, FILM COATED, EXTENDED RELEASE ORAL at 21:11

## 2018-01-01 RX ADMIN — BUDESONIDE 500 MCG: 0.5 INHALANT RESPIRATORY (INHALATION) at 09:15

## 2018-01-01 RX ADMIN — HYDROMORPHONE HYDROCHLORIDE 1 MG: 1 INJECTION, SOLUTION INTRAMUSCULAR; INTRAVENOUS; SUBCUTANEOUS at 16:13

## 2018-01-01 RX ADMIN — ZOLPIDEM TARTRATE 10 MG: 5 TABLET ORAL at 21:33

## 2018-01-01 RX ADMIN — POLYETHYLENE GLYCOL 3350 17 G: 17 POWDER, FOR SOLUTION ORAL at 12:25

## 2018-01-01 RX ADMIN — MORPHINE SULFATE 60 MG: 60 TABLET, FILM COATED, EXTENDED RELEASE ORAL at 21:07

## 2018-01-01 RX ADMIN — NORTRIPTYLINE HYDROCHLORIDE 50 MG: 25 CAPSULE ORAL at 21:40

## 2018-01-01 RX ADMIN — Medication 10 ML: at 16:26

## 2018-01-01 RX ADMIN — POTASSIUM CHLORIDE 40 MEQ: 750 TABLET, EXTENDED RELEASE ORAL at 09:02

## 2018-01-01 RX ADMIN — FUROSEMIDE 60 MG: 40 TABLET ORAL at 20:13

## 2018-01-01 RX ADMIN — MORPHINE SULFATE 60 MG: 60 TABLET, FILM COATED, EXTENDED RELEASE ORAL at 07:09

## 2018-01-01 RX ADMIN — BUPROPION HYDROCHLORIDE 150 MG: 150 TABLET, EXTENDED RELEASE ORAL at 17:03

## 2018-01-01 RX ADMIN — Medication 10 ML: at 07:09

## 2018-01-01 RX ADMIN — NORTRIPTYLINE HYDROCHLORIDE 50 MG: 25 CAPSULE ORAL at 21:19

## 2018-01-01 RX ADMIN — HYDROMORPHONE HYDROCHLORIDE 4 MG: 4 TABLET ORAL at 21:20

## 2018-01-01 RX ADMIN — SPIRONOLACTONE 50 MG: 25 TABLET, FILM COATED ORAL at 17:12

## 2018-01-01 RX ADMIN — HYDROMORPHONE HYDROCHLORIDE 4 MG: 4 TABLET ORAL at 09:04

## 2018-01-01 RX ADMIN — ZOLPIDEM TARTRATE 10 MG: 10 TABLET ORAL at 22:38

## 2018-01-01 RX ADMIN — AMIODARONE HYDROCHLORIDE 200 MG: 200 TABLET ORAL at 08:29

## 2018-01-01 RX ADMIN — CARVEDILOL 6.25 MG: 6.25 TABLET, FILM COATED ORAL at 17:23

## 2018-01-01 RX ADMIN — Medication 10 ML: at 00:06

## 2018-01-01 RX ADMIN — FUROSEMIDE 20 MG: 40 TABLET ORAL at 10:33

## 2018-01-01 RX ADMIN — DOCUSATE SODIUM 100 MG: 100 CAPSULE, LIQUID FILLED ORAL at 09:04

## 2018-01-01 RX ADMIN — PIPERACILLIN SODIUM AND TAZOBACTAM SODIUM 3.38 G: 3; .375 INJECTION, POWDER, LYOPHILIZED, FOR SOLUTION INTRAVENOUS at 02:30

## 2018-01-01 RX ADMIN — IPRATROPIUM BROMIDE AND ALBUTEROL SULFATE 3 ML: .5; 3 SOLUTION RESPIRATORY (INHALATION) at 01:11

## 2018-01-01 RX ADMIN — BUDESONIDE 500 MCG: 0.5 INHALANT RESPIRATORY (INHALATION) at 19:39

## 2018-01-01 RX ADMIN — Medication 81 MG: at 09:51

## 2018-01-01 RX ADMIN — Medication 81 MG: at 09:14

## 2018-01-01 RX ADMIN — NORTRIPTYLINE HYDROCHLORIDE 50 MG: 25 CAPSULE ORAL at 21:07

## 2018-01-01 RX ADMIN — VANCOMYCIN HYDROCHLORIDE 2000 MG: 10 INJECTION, POWDER, LYOPHILIZED, FOR SOLUTION INTRAVENOUS at 14:47

## 2018-01-01 RX ADMIN — NORTRIPTYLINE HYDROCHLORIDE 50 MG: 25 CAPSULE ORAL at 22:02

## 2018-01-01 RX ADMIN — VANCOMYCIN HYDROCHLORIDE 1250 MG: 10 INJECTION, POWDER, LYOPHILIZED, FOR SOLUTION INTRAVENOUS at 00:57

## 2018-01-01 RX ADMIN — LORAZEPAM 0.5 MG: 0.5 TABLET ORAL at 21:22

## 2018-01-01 RX ADMIN — POTASSIUM CHLORIDE 40 MEQ: 750 TABLET, EXTENDED RELEASE ORAL at 09:24

## 2018-01-01 RX ADMIN — VANCOMYCIN HYDROCHLORIDE 1500 MG: 10 INJECTION, POWDER, LYOPHILIZED, FOR SOLUTION INTRAVENOUS at 01:20

## 2018-01-01 RX ADMIN — MORPHINE SULFATE 60 MG: 60 TABLET, FILM COATED, EXTENDED RELEASE ORAL at 07:15

## 2018-01-01 RX ADMIN — IPRATROPIUM BROMIDE AND ALBUTEROL SULFATE 3 ML: .5; 3 SOLUTION RESPIRATORY (INHALATION) at 11:42

## 2018-01-01 RX ADMIN — ATORVASTATIN CALCIUM 80 MG: 40 TABLET, FILM COATED ORAL at 08:42

## 2018-01-01 RX ADMIN — PIPERACILLIN SODIUM AND TAZOBACTAM SODIUM 3.38 G: 3; .375 INJECTION, POWDER, LYOPHILIZED, FOR SOLUTION INTRAVENOUS at 10:21

## 2018-01-01 RX ADMIN — Medication 10 ML: at 21:07

## 2018-01-01 RX ADMIN — MORPHINE SULFATE 60 MG: 60 TABLET, FILM COATED, EXTENDED RELEASE ORAL at 21:00

## 2018-01-01 RX ADMIN — AMIODARONE HYDROCHLORIDE 200 MG: 200 TABLET ORAL at 10:39

## 2018-01-01 RX ADMIN — CITALOPRAM HYDROBROMIDE 40 MG: 20 TABLET ORAL at 08:30

## 2018-01-01 RX ADMIN — HYDROMORPHONE HYDROCHLORIDE 1 MG: 2 INJECTION INTRAMUSCULAR; INTRAVENOUS; SUBCUTANEOUS at 15:40

## 2018-01-01 RX ADMIN — VANCOMYCIN HYDROCHLORIDE 2000 MG: 10 INJECTION, POWDER, LYOPHILIZED, FOR SOLUTION INTRAVENOUS at 01:27

## 2018-01-01 RX ADMIN — MORPHINE SULFATE 60 MG: 60 TABLET, FILM COATED, EXTENDED RELEASE ORAL at 08:14

## 2018-01-01 RX ADMIN — ASPIRIN 81 MG: 81 TABLET, COATED ORAL at 08:27

## 2018-01-01 RX ADMIN — VANCOMYCIN HYDROCHLORIDE 2000 MG: 10 INJECTION, POWDER, LYOPHILIZED, FOR SOLUTION INTRAVENOUS at 13:21

## 2018-01-01 RX ADMIN — PREDNISONE 40 MG: 20 TABLET ORAL at 17:31

## 2018-01-01 RX ADMIN — Medication 10 ML: at 21:02

## 2018-01-01 RX ADMIN — MORPHINE SULFATE 60 MG: 60 TABLET, FILM COATED, EXTENDED RELEASE ORAL at 01:04

## 2018-01-01 RX ADMIN — DEXTROSE MONOHYDRATE 12.5 G: 25 INJECTION, SOLUTION INTRAVENOUS at 13:39

## 2018-01-01 RX ADMIN — METOPROLOL TARTRATE 50 MG: 50 TABLET ORAL at 09:53

## 2018-01-01 RX ADMIN — SODIUM CHLORIDE 1000 ML: 900 INJECTION, SOLUTION INTRAVENOUS at 23:49

## 2018-01-01 RX ADMIN — Medication 81 MG: at 08:18

## 2018-01-01 RX ADMIN — BUDESONIDE 500 MCG: 0.5 INHALANT RESPIRATORY (INHALATION) at 21:06

## 2018-01-01 RX ADMIN — METOPROLOL SUCCINATE 25 MG: 25 TABLET, EXTENDED RELEASE ORAL at 08:26

## 2018-01-01 RX ADMIN — VANCOMYCIN HYDROCHLORIDE 1250 MG: 10 INJECTION, POWDER, LYOPHILIZED, FOR SOLUTION INTRAVENOUS at 15:33

## 2018-01-01 RX ADMIN — ALLOPURINOL 100 MG: 100 TABLET ORAL at 08:35

## 2018-01-01 RX ADMIN — BUPROPION HYDROCHLORIDE 150 MG: 150 TABLET, EXTENDED RELEASE ORAL at 17:13

## 2018-01-01 RX ADMIN — CLINDAMYCIN PHOSPHATE 600 MG: 600 INJECTION, SOLUTION INTRAVENOUS at 00:54

## 2018-01-01 RX ADMIN — CLOPIDOGREL BISULFATE 75 MG: 75 TABLET ORAL at 08:36

## 2018-01-01 RX ADMIN — FUROSEMIDE 40 MG: 10 INJECTION, SOLUTION INTRAMUSCULAR; INTRAVENOUS at 16:13

## 2018-01-01 RX ADMIN — DOCUSATE SODIUM 100 MG: 100 CAPSULE, LIQUID FILLED ORAL at 17:10

## 2018-01-01 RX ADMIN — MORPHINE SULFATE 60 MG: 60 TABLET, FILM COATED, EXTENDED RELEASE ORAL at 15:12

## 2018-01-01 RX ADMIN — LORAZEPAM 1 MG: 2 INJECTION INTRAMUSCULAR; INTRAVENOUS at 15:44

## 2018-01-01 RX ADMIN — METFORMIN HYDROCHLORIDE 1000 MG: 500 TABLET, FILM COATED ORAL at 06:51

## 2018-01-01 RX ADMIN — IPRATROPIUM BROMIDE AND ALBUTEROL SULFATE 3 ML: .5; 3 SOLUTION RESPIRATORY (INHALATION) at 21:59

## 2018-01-01 RX ADMIN — Medication 10 ML: at 19:01

## 2018-01-01 RX ADMIN — BUDESONIDE 500 MCG: 0.5 INHALANT RESPIRATORY (INHALATION) at 08:57

## 2018-01-01 RX ADMIN — METFORMIN HYDROCHLORIDE 1000 MG: 500 TABLET, FILM COATED ORAL at 07:01

## 2018-01-01 RX ADMIN — FUROSEMIDE 40 MG: 10 INJECTION, SOLUTION INTRAMUSCULAR; INTRAVENOUS at 09:24

## 2018-01-01 RX ADMIN — BUDESONIDE 500 MCG: 0.5 INHALANT RESPIRATORY (INHALATION) at 08:43

## 2018-01-01 RX ADMIN — ARFORMOTEROL TARTRATE 15 MCG: 15 SOLUTION RESPIRATORY (INHALATION) at 21:50

## 2018-01-01 RX ADMIN — FAMOTIDINE 20 MG: 20 TABLET ORAL at 18:08

## 2018-01-01 RX ADMIN — ROPINIROLE HYDROCHLORIDE 8 MG: 1 TABLET, FILM COATED ORAL at 22:37

## 2018-01-01 RX ADMIN — PANTOPRAZOLE SODIUM 40 MG: 40 TABLET, DELAYED RELEASE ORAL at 06:51

## 2018-01-01 RX ADMIN — BUDESONIDE 500 MCG: 0.5 INHALANT RESPIRATORY (INHALATION) at 09:32

## 2018-01-01 RX ADMIN — Medication 10 ML: at 03:50

## 2018-01-01 RX ADMIN — MORPHINE SULFATE 2 MG: 2 INJECTION, SOLUTION INTRAMUSCULAR; INTRAVENOUS at 23:31

## 2018-01-01 RX ADMIN — SPIRONOLACTONE 25 MG: 25 TABLET ORAL at 12:29

## 2018-01-01 RX ADMIN — AMIODARONE HYDROCHLORIDE 400 MG: 200 TABLET ORAL at 18:03

## 2018-01-01 RX ADMIN — ALLOPURINOL 100 MG: 100 TABLET ORAL at 08:19

## 2018-01-01 RX ADMIN — NORTRIPTYLINE HYDROCHLORIDE 50 MG: 25 CAPSULE ORAL at 00:22

## 2018-01-01 RX ADMIN — HUMAN INSULIN 5 UNITS: 100 INJECTION, SOLUTION SUBCUTANEOUS at 12:20

## 2018-01-01 RX ADMIN — IPRATROPIUM BROMIDE AND ALBUTEROL SULFATE 3 ML: .5; 3 SOLUTION RESPIRATORY (INHALATION) at 20:48

## 2018-01-01 RX ADMIN — Medication 10 ML: at 21:34

## 2018-01-01 RX ADMIN — IPRATROPIUM BROMIDE AND ALBUTEROL SULFATE 3 ML: .5; 3 SOLUTION RESPIRATORY (INHALATION) at 23:07

## 2018-01-01 RX ADMIN — DOCUSATE SODIUM 100 MG: 100 CAPSULE, LIQUID FILLED ORAL at 17:43

## 2018-01-01 RX ADMIN — CEFAZOLIN 3 G: 1 INJECTION, POWDER, FOR SOLUTION INTRAMUSCULAR; INTRAVENOUS; PARENTERAL at 00:36

## 2018-01-01 RX ADMIN — ROPINIROLE HYDROCHLORIDE 8 MG: 1 TABLET, FILM COATED ORAL at 21:02

## 2018-01-01 RX ADMIN — NORTRIPTYLINE HYDROCHLORIDE 50 MG: 25 CAPSULE ORAL at 21:18

## 2018-01-01 RX ADMIN — ARFORMOTEROL TARTRATE 15 MCG: 15 SOLUTION RESPIRATORY (INHALATION) at 08:37

## 2018-01-01 RX ADMIN — ROPINIROLE HYDROCHLORIDE 8 MG: 1 TABLET, FILM COATED ORAL at 21:07

## 2018-01-01 RX ADMIN — HYDROMORPHONE HYDROCHLORIDE 4 MG: 4 TABLET ORAL at 21:07

## 2018-01-01 RX ADMIN — BUPROPION HYDROCHLORIDE 150 MG: 150 TABLET, EXTENDED RELEASE ORAL at 17:25

## 2018-01-01 RX ADMIN — DOCUSATE SODIUM 100 MG: 100 CAPSULE, LIQUID FILLED ORAL at 10:37

## 2018-01-01 RX ADMIN — POTASSIUM CHLORIDE 40 MEQ: 750 TABLET, EXTENDED RELEASE ORAL at 09:14

## 2018-01-01 RX ADMIN — NORTRIPTYLINE HYDROCHLORIDE 25 MG: 25 CAPSULE ORAL at 22:51

## 2018-01-01 RX ADMIN — SPIRONOLACTONE 50 MG: 25 TABLET, FILM COATED ORAL at 08:30

## 2018-01-01 RX ADMIN — APIXABAN 5 MG: 5 TABLET, FILM COATED ORAL at 00:21

## 2018-01-01 RX ADMIN — ARFORMOTEROL TARTRATE 15 MCG: 15 SOLUTION RESPIRATORY (INHALATION) at 09:08

## 2018-01-01 RX ADMIN — ATORVASTATIN CALCIUM 80 MG: 40 TABLET, FILM COATED ORAL at 08:12

## 2018-01-01 RX ADMIN — ROPINIROLE HYDROCHLORIDE 8 MG: 1 TABLET, FILM COATED ORAL at 21:23

## 2018-01-01 RX ADMIN — CITALOPRAM HYDROBROMIDE 40 MG: 20 TABLET ORAL at 08:44

## 2018-01-01 RX ADMIN — DOBUTAMINE IN DEXTROSE 4 MCG/KG/MIN: 200 INJECTION, SOLUTION INTRAVENOUS at 11:02

## 2018-01-01 RX ADMIN — METOPROLOL SUCCINATE 25 MG: 25 TABLET, EXTENDED RELEASE ORAL at 10:33

## 2018-01-01 RX ADMIN — IPRATROPIUM BROMIDE AND ALBUTEROL SULFATE 3 ML: .5; 3 SOLUTION RESPIRATORY (INHALATION) at 11:32

## 2018-01-01 RX ADMIN — MORPHINE SULFATE 60 MG: 60 TABLET, FILM COATED, EXTENDED RELEASE ORAL at 13:25

## 2018-01-01 RX ADMIN — ROPINIROLE HYDROCHLORIDE 8 MG: 1 TABLET, FILM COATED ORAL at 20:54

## 2018-01-01 RX ADMIN — BUDESONIDE 500 MCG: 0.5 INHALANT RESPIRATORY (INHALATION) at 08:45

## 2018-01-01 RX ADMIN — MORPHINE SULFATE 30 MG: 30 TABLET, FILM COATED, EXTENDED RELEASE ORAL at 12:18

## 2018-01-01 RX ADMIN — CLINDAMYCIN PHOSPHATE 600 MG: 600 INJECTION, SOLUTION INTRAVENOUS at 09:46

## 2018-01-01 RX ADMIN — HYDROMORPHONE HYDROCHLORIDE 4 MG: 2 TABLET ORAL at 09:03

## 2018-01-01 RX ADMIN — ATORVASTATIN CALCIUM 80 MG: 40 TABLET, FILM COATED ORAL at 08:24

## 2018-01-01 RX ADMIN — MORPHINE SULFATE 60 MG: 60 TABLET, FILM COATED, EXTENDED RELEASE ORAL at 18:51

## 2018-01-01 RX ADMIN — BUPROPION HYDROCHLORIDE 150 MG: 150 TABLET, EXTENDED RELEASE ORAL at 17:46

## 2018-01-01 RX ADMIN — POTASSIUM CHLORIDE 40 MEQ: 750 TABLET, EXTENDED RELEASE ORAL at 08:44

## 2018-01-01 RX ADMIN — ZOLPIDEM TARTRATE 10 MG: 10 TABLET ORAL at 21:20

## 2018-01-01 RX ADMIN — IRON SUCROSE 400 MG: 20 INJECTION, SOLUTION INTRAVENOUS at 09:33

## 2018-01-01 RX ADMIN — ZOLPIDEM TARTRATE 10 MG: 5 TABLET ORAL at 21:02

## 2018-01-01 RX ADMIN — MORPHINE SULFATE 30 MG: 30 TABLET, FILM COATED, EXTENDED RELEASE ORAL at 08:10

## 2018-01-01 RX ADMIN — MORPHINE SULFATE 60 MG: 15 TABLET, EXTENDED RELEASE ORAL at 08:46

## 2018-01-01 RX ADMIN — BUPROPION HYDROCHLORIDE 150 MG: 150 TABLET, EXTENDED RELEASE ORAL at 18:03

## 2018-01-01 RX ADMIN — HYDROMORPHONE HYDROCHLORIDE 4 MG: 4 TABLET ORAL at 17:43

## 2018-01-01 RX ADMIN — IPRATROPIUM BROMIDE AND ALBUTEROL SULFATE 3 ML: .5; 3 SOLUTION RESPIRATORY (INHALATION) at 22:00

## 2018-01-01 RX ADMIN — LORAZEPAM 1 MG: 2 INJECTION INTRAMUSCULAR; INTRAVENOUS at 02:27

## 2018-01-01 RX ADMIN — BUPROPION HYDROCHLORIDE 150 MG: 150 TABLET, EXTENDED RELEASE ORAL at 08:43

## 2018-01-01 RX ADMIN — CLOPIDOGREL BISULFATE 75 MG: 75 TABLET ORAL at 08:15

## 2018-01-01 RX ADMIN — ISOSORBIDE MONONITRATE 60 MG: 60 TABLET, EXTENDED RELEASE ORAL at 08:31

## 2018-01-01 RX ADMIN — FUROSEMIDE 60 MG: 40 TABLET ORAL at 17:42

## 2018-01-01 RX ADMIN — ATORVASTATIN CALCIUM 80 MG: 40 TABLET, FILM COATED ORAL at 08:26

## 2018-01-01 RX ADMIN — NORTRIPTYLINE HYDROCHLORIDE 50 MG: 25 CAPSULE ORAL at 21:14

## 2018-01-01 RX ADMIN — Medication 10 ML: at 21:44

## 2018-01-01 RX ADMIN — Medication 10 ML: at 05:25

## 2018-01-01 RX ADMIN — CITALOPRAM HYDROBROMIDE 40 MG: 20 TABLET ORAL at 10:33

## 2018-01-01 RX ADMIN — Medication 10 ML: at 15:11

## 2018-01-01 RX ADMIN — ZOLPIDEM TARTRATE 10 MG: 5 TABLET ORAL at 00:22

## 2018-01-01 RX ADMIN — MIDODRINE HYDROCHLORIDE 5 MG: 5 TABLET ORAL at 11:43

## 2018-01-01 RX ADMIN — MORPHINE SULFATE 60 MG: 60 TABLET, FILM COATED, EXTENDED RELEASE ORAL at 21:41

## 2018-01-01 RX ADMIN — ASPIRIN 81 MG: 81 TABLET, COATED ORAL at 08:25

## 2018-01-01 RX ADMIN — MORPHINE SULFATE 60 MG: 60 TABLET, FILM COATED, EXTENDED RELEASE ORAL at 22:04

## 2018-01-01 RX ADMIN — DIBASIC SODIUM PHOSPHATE, MONOBASIC POTASSIUM PHOSPHATE AND MONOBASIC SODIUM PHOSPHATE 1 TABLET: 852; 155; 130 TABLET ORAL at 20:45

## 2018-01-01 RX ADMIN — ASPIRIN 81 MG: 81 TABLET, COATED ORAL at 08:31

## 2018-01-01 RX ADMIN — FUROSEMIDE 40 MG: 10 INJECTION, SOLUTION INTRAMUSCULAR; INTRAVENOUS at 08:15

## 2018-01-01 RX ADMIN — MILRINONE LACTATE IN DEXTROSE 0.38 MCG/KG/MIN: 200 INJECTION, SOLUTION INTRAVENOUS at 12:53

## 2018-01-01 RX ADMIN — ALLOPURINOL 100 MG: 100 TABLET ORAL at 09:55

## 2018-01-01 RX ADMIN — METOPROLOL TARTRATE 50 MG: 50 TABLET ORAL at 20:54

## 2018-01-01 RX ADMIN — HYDROMORPHONE HYDROCHLORIDE 4 MG: 2 TABLET ORAL at 19:05

## 2018-01-01 RX ADMIN — APIXABAN 5 MG: 5 TABLET, FILM COATED ORAL at 17:42

## 2018-01-01 RX ADMIN — BUDESONIDE 500 MCG: 0.5 INHALANT RESPIRATORY (INHALATION) at 21:14

## 2018-01-01 RX ADMIN — ZOLPIDEM TARTRATE 10 MG: 10 TABLET ORAL at 21:06

## 2018-01-01 RX ADMIN — ZOLPIDEM TARTRATE 10 MG: 5 TABLET ORAL at 21:07

## 2018-01-01 RX ADMIN — APIXABAN 5 MG: 5 TABLET, FILM COATED ORAL at 08:17

## 2018-01-01 RX ADMIN — METOPROLOL SUCCINATE 25 MG: 50 TABLET, EXTENDED RELEASE ORAL at 10:43

## 2018-01-01 RX ADMIN — CLOPIDOGREL BISULFATE 75 MG: 75 TABLET ORAL at 08:18

## 2018-01-01 RX ADMIN — PIPERACILLIN SODIUM,TAZOBACTAM SODIUM 3.38 G: 3; .375 INJECTION, POWDER, FOR SOLUTION INTRAVENOUS at 22:42

## 2018-01-01 RX ADMIN — ZOLPIDEM TARTRATE 10 MG: 5 TABLET ORAL at 22:02

## 2018-01-01 RX ADMIN — INSULIN LISPRO 2 UNITS: 100 INJECTION, SOLUTION INTRAVENOUS; SUBCUTANEOUS at 19:09

## 2018-01-01 RX ADMIN — Medication 10 ML: at 12:28

## 2018-01-01 RX ADMIN — THERA TABS 1 TABLET: TAB at 12:25

## 2018-01-01 RX ADMIN — VANCOMYCIN HYDROCHLORIDE 1250 MG: 10 INJECTION, POWDER, LYOPHILIZED, FOR SOLUTION INTRAVENOUS at 09:19

## 2018-01-01 RX ADMIN — ZOLPIDEM TARTRATE 10 MG: 10 TABLET ORAL at 21:11

## 2018-01-01 RX ADMIN — DOCUSATE SODIUM 100 MG: 100 CAPSULE, LIQUID FILLED ORAL at 17:09

## 2018-01-01 RX ADMIN — Medication 10 ML: at 06:00

## 2018-01-01 RX ADMIN — APIXABAN 5 MG: 5 TABLET, FILM COATED ORAL at 17:46

## 2018-01-01 RX ADMIN — IPRATROPIUM BROMIDE AND ALBUTEROL SULFATE 3 ML: .5; 3 SOLUTION RESPIRATORY (INHALATION) at 03:46

## 2018-01-01 RX ADMIN — ATORVASTATIN CALCIUM 80 MG: 40 TABLET, FILM COATED ORAL at 09:18

## 2018-01-01 RX ADMIN — MORPHINE SULFATE 1 MG: 2 INJECTION, SOLUTION INTRAMUSCULAR; INTRAVENOUS at 17:45

## 2018-01-01 RX ADMIN — FAMOTIDINE 20 MG: 20 TABLET ORAL at 17:25

## 2018-01-01 RX ADMIN — PROPOFOL 25 MG: 10 INJECTION, EMULSION INTRAVENOUS at 12:23

## 2018-01-01 RX ADMIN — IPRATROPIUM BROMIDE AND ALBUTEROL SULFATE 3 ML: .5; 3 SOLUTION RESPIRATORY (INHALATION) at 17:31

## 2018-01-01 RX ADMIN — WHITE PETROLATUM: 1.75 OINTMENT TOPICAL at 22:36

## 2018-01-01 RX ADMIN — HYDROMORPHONE HYDROCHLORIDE 4 MG: 4 TABLET ORAL at 17:52

## 2018-01-01 RX ADMIN — SPIRONOLACTONE 50 MG: 25 TABLET, FILM COATED ORAL at 20:13

## 2018-01-01 RX ADMIN — HEPARIN SODIUM 2000 UNITS: 200 INJECTION, SOLUTION INTRAVENOUS at 09:42

## 2018-01-01 RX ADMIN — FUROSEMIDE 60 MG: 10 INJECTION, SOLUTION INTRAMUSCULAR; INTRAVENOUS at 22:59

## 2018-01-01 RX ADMIN — MILRINONE LACTATE IN DEXTROSE 0.38 MCG/KG/MIN: 200 INJECTION, SOLUTION INTRAVENOUS at 20:44

## 2018-01-01 RX ADMIN — ALLOPURINOL 100 MG: 100 TABLET ORAL at 08:10

## 2018-01-01 RX ADMIN — BUPROPION HYDROCHLORIDE 150 MG: 150 TABLET, EXTENDED RELEASE ORAL at 18:00

## 2018-01-01 RX ADMIN — HYDROMORPHONE HYDROCHLORIDE 4 MG: 4 TABLET ORAL at 08:25

## 2018-01-01 RX ADMIN — VANCOMYCIN HYDROCHLORIDE 1750 MG: 10 INJECTION, POWDER, LYOPHILIZED, FOR SOLUTION INTRAVENOUS at 09:30

## 2018-01-01 RX ADMIN — CITALOPRAM HYDROBROMIDE 40 MG: 20 TABLET ORAL at 09:17

## 2018-01-01 RX ADMIN — MORPHINE SULFATE 60 MG: 15 TABLET, EXTENDED RELEASE ORAL at 16:48

## 2018-01-01 RX ADMIN — PANTOPRAZOLE SODIUM 40 MG: 40 TABLET, DELAYED RELEASE ORAL at 07:22

## 2018-01-01 RX ADMIN — VANCOMYCIN HYDROCHLORIDE 2000 MG: 10 INJECTION, POWDER, LYOPHILIZED, FOR SOLUTION INTRAVENOUS at 13:14

## 2018-01-01 RX ADMIN — AMIODARONE HYDROCHLORIDE 400 MG: 200 TABLET ORAL at 08:18

## 2018-01-01 RX ADMIN — Medication 10 ML: at 21:16

## 2018-01-01 RX ADMIN — CITALOPRAM HYDROBROMIDE 40 MG: 20 TABLET ORAL at 08:03

## 2018-01-01 RX ADMIN — ATORVASTATIN CALCIUM 80 MG: 40 TABLET, FILM COATED ORAL at 09:14

## 2018-01-01 RX ADMIN — DOCUSATE SODIUM 100 MG: 100 CAPSULE, LIQUID FILLED ORAL at 17:18

## 2018-01-01 RX ADMIN — FUROSEMIDE 60 MG: 40 TABLET ORAL at 09:05

## 2018-01-01 RX ADMIN — SODIUM CHLORIDE 1000 ML: 900 INJECTION, SOLUTION INTRAVENOUS at 16:13

## 2018-01-01 RX ADMIN — SPIRONOLACTONE 50 MG: 25 TABLET, FILM COATED ORAL at 17:26

## 2018-01-01 RX ADMIN — NAFCILLIN SODIUM 2 G: 2 INJECTION, POWDER, LYOPHILIZED, FOR SOLUTION INTRAMUSCULAR; INTRAVENOUS at 11:59

## 2018-01-01 RX ADMIN — MORPHINE SULFATE 60 MG: 15 TABLET, EXTENDED RELEASE ORAL at 16:59

## 2018-01-01 RX ADMIN — THERA TABS 1 TABLET: TAB at 09:16

## 2018-01-01 RX ADMIN — ATORVASTATIN CALCIUM 80 MG: 40 TABLET, FILM COATED ORAL at 08:34

## 2018-01-01 RX ADMIN — Medication 10 ML: at 08:20

## 2018-01-01 RX ADMIN — BUDESONIDE 500 MCG: 0.5 INHALANT RESPIRATORY (INHALATION) at 09:01

## 2018-01-01 RX ADMIN — HEPARIN SODIUM 5000 UNITS: 5000 INJECTION, SOLUTION INTRAVENOUS; SUBCUTANEOUS at 08:20

## 2018-01-01 RX ADMIN — ACETAMINOPHEN 650 MG: 325 TABLET ORAL at 21:53

## 2018-01-01 RX ADMIN — Medication 80 MCG/MIN: at 13:42

## 2018-01-01 RX ADMIN — ATORVASTATIN CALCIUM 80 MG: 40 TABLET, FILM COATED ORAL at 08:30

## 2018-01-01 RX ADMIN — ASPIRIN 81 MG: 81 TABLET, COATED ORAL at 08:40

## 2018-01-01 RX ADMIN — ASPIRIN 325 MG: 325 TABLET ORAL at 09:03

## 2018-01-01 RX ADMIN — DOCUSATE SODIUM 100 MG: 100 CAPSULE, LIQUID FILLED ORAL at 08:41

## 2018-01-01 RX ADMIN — ENOXAPARIN SODIUM 111 MG: 120 INJECTION SUBCUTANEOUS at 09:51

## 2018-01-01 RX ADMIN — ATORVASTATIN CALCIUM 80 MG: 40 TABLET, FILM COATED ORAL at 09:25

## 2018-01-01 RX ADMIN — ZOLPIDEM TARTRATE 10 MG: 5 TABLET ORAL at 21:39

## 2018-01-01 RX ADMIN — HYDROMORPHONE HYDROCHLORIDE 4 MG: 4 TABLET ORAL at 20:13

## 2018-01-01 RX ADMIN — ISOSORBIDE MONONITRATE 60 MG: 60 TABLET, EXTENDED RELEASE ORAL at 10:29

## 2018-01-01 RX ADMIN — NAFCILLIN SODIUM 2 G: 2 INJECTION, POWDER, LYOPHILIZED, FOR SOLUTION INTRAMUSCULAR; INTRAVENOUS at 23:40

## 2018-01-01 RX ADMIN — ZOLPIDEM TARTRATE 10 MG: 10 TABLET ORAL at 21:31

## 2018-01-01 RX ADMIN — NORTRIPTYLINE HYDROCHLORIDE 50 MG: 25 CAPSULE ORAL at 22:54

## 2018-01-01 RX ADMIN — SPIRONOLACTONE 50 MG: 25 TABLET, FILM COATED ORAL at 08:24

## 2018-01-01 RX ADMIN — SPIRONOLACTONE 50 MG: 25 TABLET, FILM COATED ORAL at 08:26

## 2018-01-01 RX ADMIN — ATORVASTATIN CALCIUM 80 MG: 40 TABLET, FILM COATED ORAL at 09:42

## 2018-01-01 RX ADMIN — PIPERACILLIN SODIUM,TAZOBACTAM SODIUM 3.38 G: 3; .375 INJECTION, POWDER, FOR SOLUTION INTRAVENOUS at 11:17

## 2018-01-01 RX ADMIN — NORTRIPTYLINE HYDROCHLORIDE 50 MG: 25 CAPSULE ORAL at 21:44

## 2018-01-01 RX ADMIN — Medication 10 ML: at 14:25

## 2018-01-01 RX ADMIN — BUPROPION HYDROCHLORIDE 150 MG: 150 TABLET, EXTENDED RELEASE ORAL at 08:35

## 2018-01-01 RX ADMIN — Medication 10 ML: at 06:39

## 2018-01-01 RX ADMIN — APIXABAN 5 MG: 5 TABLET, FILM COATED ORAL at 08:03

## 2018-01-01 RX ADMIN — REGADENOSON 0.4 MG: 0.08 INJECTION, SOLUTION INTRAVENOUS at 09:50

## 2018-01-01 RX ADMIN — DOCUSATE SODIUM 100 MG: 100 CAPSULE, LIQUID FILLED ORAL at 08:45

## 2018-01-01 RX ADMIN — ARFORMOTEROL TARTRATE 15 MCG: 15 SOLUTION RESPIRATORY (INHALATION) at 09:01

## 2018-01-01 RX ADMIN — ALLOPURINOL 100 MG: 100 TABLET ORAL at 09:24

## 2018-01-01 RX ADMIN — PANTOPRAZOLE SODIUM 20 MG: 20 TABLET, DELAYED RELEASE ORAL at 07:13

## 2018-01-01 RX ADMIN — HYDROMORPHONE HYDROCHLORIDE 4 MG: 4 TABLET ORAL at 08:13

## 2018-01-01 RX ADMIN — AMIODARONE HYDROCHLORIDE 200 MG: 200 TABLET ORAL at 20:37

## 2018-01-01 RX ADMIN — Medication 10 ML: at 21:38

## 2018-01-01 RX ADMIN — BUDESONIDE 500 MCG: 0.5 INHALANT RESPIRATORY (INHALATION) at 08:38

## 2018-01-01 RX ADMIN — ARFORMOTEROL TARTRATE 15 MCG: 15 SOLUTION RESPIRATORY (INHALATION) at 20:20

## 2018-01-01 RX ADMIN — DOCUSATE SODIUM 100 MG: 100 CAPSULE, LIQUID FILLED ORAL at 17:31

## 2018-01-01 RX ADMIN — ALBUMIN (HUMAN) 12.5 G: 0.25 INJECTION, SOLUTION INTRAVENOUS at 21:37

## 2018-01-01 RX ADMIN — LISINOPRIL 2.5 MG: 5 TABLET ORAL at 08:27

## 2018-01-01 RX ADMIN — Medication 10 ML: at 22:55

## 2018-01-01 RX ADMIN — VANCOMYCIN HYDROCHLORIDE 1250 MG: 10 INJECTION, POWDER, LYOPHILIZED, FOR SOLUTION INTRAVENOUS at 08:15

## 2018-01-01 RX ADMIN — BUPROPION HYDROCHLORIDE 150 MG: 150 TABLET, EXTENDED RELEASE ORAL at 17:42

## 2018-01-01 RX ADMIN — ACETAMINOPHEN 650 MG: 325 TABLET ORAL at 21:34

## 2018-01-01 RX ADMIN — METOPROLOL SUCCINATE 25 MG: 25 TABLET, EXTENDED RELEASE ORAL at 08:40

## 2018-01-01 RX ADMIN — POTASSIUM CHLORIDE 40 MEQ: 750 TABLET, EXTENDED RELEASE ORAL at 17:18

## 2018-01-01 RX ADMIN — AMIODARONE HYDROCHLORIDE 200 MG: 200 TABLET ORAL at 17:42

## 2018-01-01 RX ADMIN — HYDROMORPHONE HYDROCHLORIDE 4 MG: 4 TABLET ORAL at 08:35

## 2018-01-01 RX ADMIN — CLINDAMYCIN PHOSPHATE 600 MG: 600 INJECTION, SOLUTION INTRAVENOUS at 09:58

## 2018-01-01 RX ADMIN — Medication 10 ML: at 07:14

## 2018-01-01 RX ADMIN — ZOLPIDEM TARTRATE 10 MG: 5 TABLET ORAL at 21:20

## 2018-01-01 RX ADMIN — SACUBITRIL AND VALSARTAN 1 TABLET: 24; 26 TABLET, FILM COATED ORAL at 22:25

## 2018-01-01 RX ADMIN — VANCOMYCIN HYDROCHLORIDE 1250 MG: 10 INJECTION, POWDER, LYOPHILIZED, FOR SOLUTION INTRAVENOUS at 23:21

## 2018-01-01 RX ADMIN — Medication 10 MCG/MIN: at 00:32

## 2018-01-01 RX ADMIN — FUROSEMIDE 40 MG: 10 INJECTION, SOLUTION INTRAMUSCULAR; INTRAVENOUS at 08:19

## 2018-01-01 RX ADMIN — ENOXAPARIN SODIUM 40 MG: 40 INJECTION SUBCUTANEOUS at 01:27

## 2018-01-01 RX ADMIN — MIDAZOLAM HYDROCHLORIDE 2 MG: 1 INJECTION, SOLUTION INTRAMUSCULAR; INTRAVENOUS at 09:56

## 2018-01-01 RX ADMIN — MORPHINE SULFATE 30 MG: 30 TABLET, FILM COATED, EXTENDED RELEASE ORAL at 03:46

## 2018-01-01 RX ADMIN — HEPARIN SODIUM 5000 UNITS: 5000 INJECTION, SOLUTION INTRAVENOUS; SUBCUTANEOUS at 08:32

## 2018-01-01 RX ADMIN — ARFORMOTEROL TARTRATE 15 MCG: 15 SOLUTION RESPIRATORY (INHALATION) at 21:14

## 2018-01-01 RX ADMIN — THERA TABS 1 TABLET: TAB at 09:04

## 2018-01-01 RX ADMIN — FUROSEMIDE 60 MG: 40 TABLET ORAL at 12:26

## 2018-01-01 RX ADMIN — FUROSEMIDE 40 MG: 10 INJECTION, SOLUTION INTRAMUSCULAR; INTRAVENOUS at 14:26

## 2018-01-01 RX ADMIN — BUPROPION HYDROCHLORIDE 150 MG: 150 TABLET, EXTENDED RELEASE ORAL at 10:39

## 2018-01-01 RX ADMIN — PIPERACILLIN SODIUM AND TAZOBACTAM SODIUM 3.38 G: 3; .375 INJECTION, POWDER, LYOPHILIZED, FOR SOLUTION INTRAVENOUS at 12:50

## 2018-01-01 RX ADMIN — ROPINIROLE HYDROCHLORIDE 8 MG: 1 TABLET, FILM COATED ORAL at 21:31

## 2018-01-01 RX ADMIN — HEPARIN SODIUM 5000 UNITS: 5000 INJECTION, SOLUTION INTRAVENOUS; SUBCUTANEOUS at 11:43

## 2018-01-01 RX ADMIN — FUROSEMIDE 20 MG: 40 TABLET ORAL at 08:03

## 2018-01-01 RX ADMIN — FERROUS SULFATE TAB 325 MG (65 MG ELEMENTAL FE) 325 MG: 325 (65 FE) TAB at 07:07

## 2018-01-01 RX ADMIN — BUDESONIDE 500 MCG: 0.5 INHALANT RESPIRATORY (INHALATION) at 22:20

## 2018-01-01 RX ADMIN — FUROSEMIDE 40 MG: 10 INJECTION, SOLUTION INTRAMUSCULAR; INTRAVENOUS at 21:55

## 2018-01-01 RX ADMIN — VANCOMYCIN HYDROCHLORIDE 1750 MG: 10 INJECTION, POWDER, LYOPHILIZED, FOR SOLUTION INTRAVENOUS at 05:39

## 2018-01-01 RX ADMIN — FUROSEMIDE 40 MG: 10 INJECTION, SOLUTION INTRAMUSCULAR; INTRAVENOUS at 09:00

## 2018-01-01 RX ADMIN — FERROUS SULFATE TAB 325 MG (65 MG ELEMENTAL FE) 325 MG: 325 (65 FE) TAB at 08:45

## 2018-01-01 RX ADMIN — PANTOPRAZOLE SODIUM 40 MG: 40 TABLET, DELAYED RELEASE ORAL at 06:49

## 2018-01-01 RX ADMIN — BUDESONIDE 500 MCG: 0.5 INHALANT RESPIRATORY (INHALATION) at 10:17

## 2018-01-01 RX ADMIN — PIPERACILLIN SODIUM,TAZOBACTAM SODIUM 3.38 G: 3; .375 INJECTION, POWDER, FOR SOLUTION INTRAVENOUS at 14:30

## 2018-01-01 RX ADMIN — PANTOPRAZOLE SODIUM 40 MG: 40 TABLET, DELAYED RELEASE ORAL at 07:15

## 2018-01-01 RX ADMIN — FLUMAZENIL 0.1 MG: 0.1 INJECTION INTRAVENOUS at 13:07

## 2018-01-01 RX ADMIN — EPHEDRINE SULFATE 5 MG: 50 INJECTION, SOLUTION INTRAVENOUS at 12:45

## 2018-01-01 RX ADMIN — METFORMIN HYDROCHLORIDE 1000 MG: 500 TABLET, FILM COATED ORAL at 07:07

## 2018-01-01 RX ADMIN — ROPINIROLE HYDROCHLORIDE 8 MG: 1 TABLET, FILM COATED ORAL at 22:06

## 2018-01-01 RX ADMIN — FERROUS SULFATE TAB 325 MG (65 MG ELEMENTAL FE) 325 MG: 325 (65 FE) TAB at 07:18

## 2018-01-01 RX ADMIN — Medication 81 MG: at 08:45

## 2018-01-01 RX ADMIN — Medication 70 MCG/MIN: at 08:32

## 2018-01-01 RX ADMIN — Medication 10 ML: at 07:22

## 2018-01-01 RX ADMIN — EPHEDRINE SULFATE 5 MG: 50 INJECTION, SOLUTION INTRAVENOUS at 12:47

## 2018-01-01 RX ADMIN — FAMOTIDINE 20 MG: 20 TABLET ORAL at 17:46

## 2018-01-01 RX ADMIN — ISOSORBIDE MONONITRATE 60 MG: 60 TABLET, EXTENDED RELEASE ORAL at 10:33

## 2018-01-01 RX ADMIN — ZOLPIDEM TARTRATE 10 MG: 10 TABLET ORAL at 21:44

## 2018-01-01 RX ADMIN — FUROSEMIDE 60 MG: 40 TABLET ORAL at 08:53

## 2018-01-01 RX ADMIN — FUROSEMIDE 60 MG: 40 TABLET ORAL at 17:31

## 2018-01-01 RX ADMIN — MORPHINE SULFATE 60 MG: 60 TABLET, FILM COATED, EXTENDED RELEASE ORAL at 09:14

## 2018-01-01 RX ADMIN — METOPROLOL SUCCINATE 25 MG: 25 TABLET, EXTENDED RELEASE ORAL at 12:25

## 2018-01-01 RX ADMIN — ARFORMOTEROL TARTRATE 15 MCG: 15 SOLUTION RESPIRATORY (INHALATION) at 07:41

## 2018-01-01 RX ADMIN — DOBUTAMINE IN DEXTROSE 5 MCG/KG/MIN: 200 INJECTION, SOLUTION INTRAVENOUS at 08:32

## 2018-01-01 RX ADMIN — ARFORMOTEROL TARTRATE 15 MCG: 15 SOLUTION RESPIRATORY (INHALATION) at 08:57

## 2018-01-01 RX ADMIN — MORPHINE SULFATE 60 MG: 60 TABLET, FILM COATED, EXTENDED RELEASE ORAL at 16:28

## 2018-01-01 RX ADMIN — IPRATROPIUM BROMIDE AND ALBUTEROL SULFATE 3 ML: .5; 3 SOLUTION RESPIRATORY (INHALATION) at 20:40

## 2018-01-01 RX ADMIN — HEPARIN SODIUM 5000 UNITS: 5000 INJECTION, SOLUTION INTRAVENOUS; SUBCUTANEOUS at 17:43

## 2018-01-01 RX ADMIN — Medication 60 MCG/MIN: at 17:08

## 2018-01-01 RX ADMIN — ROPINIROLE HYDROCHLORIDE 8 MG: 1 TABLET, FILM COATED ORAL at 21:17

## 2018-01-01 RX ADMIN — HYDROMORPHONE HYDROCHLORIDE 4 MG: 4 TABLET ORAL at 21:33

## 2018-01-01 RX ADMIN — ATORVASTATIN CALCIUM 80 MG: 40 TABLET, FILM COATED ORAL at 12:28

## 2018-01-01 RX ADMIN — IPRATROPIUM BROMIDE AND ALBUTEROL SULFATE 3 ML: .5; 3 SOLUTION RESPIRATORY (INHALATION) at 12:40

## 2018-01-01 RX ADMIN — APIXABAN 5 MG: 5 TABLET, FILM COATED ORAL at 18:00

## 2018-01-01 RX ADMIN — APIXABAN 5 MG: 5 TABLET, FILM COATED ORAL at 17:14

## 2018-01-01 RX ADMIN — FUROSEMIDE 60 MG: 40 TABLET ORAL at 12:25

## 2018-01-01 RX ADMIN — PIPERACILLIN SODIUM,TAZOBACTAM SODIUM 3.38 G: 3; .375 INJECTION, POWDER, FOR SOLUTION INTRAVENOUS at 01:11

## 2018-01-01 RX ADMIN — ALLOPURINOL 100 MG: 100 TABLET ORAL at 08:42

## 2018-01-01 RX ADMIN — FUROSEMIDE 60 MG: 40 TABLET ORAL at 08:34

## 2018-01-01 RX ADMIN — NAFCILLIN SODIUM 2 G: 2 INJECTION, POWDER, LYOPHILIZED, FOR SOLUTION INTRAMUSCULAR; INTRAVENOUS at 05:44

## 2018-01-01 RX ADMIN — ACETAMINOPHEN 650 MG: 325 TABLET, FILM COATED ORAL at 17:02

## 2018-01-01 RX ADMIN — ALBUMIN (HUMAN) 12.5 G: 0.25 INJECTION, SOLUTION INTRAVENOUS at 08:41

## 2018-01-01 RX ADMIN — ARFORMOTEROL TARTRATE 15 MCG: 15 SOLUTION RESPIRATORY (INHALATION) at 08:01

## 2018-01-01 RX ADMIN — Medication 10 ML: at 23:37

## 2018-01-01 RX ADMIN — AMIODARONE HYDROCHLORIDE 400 MG: 200 TABLET ORAL at 09:13

## 2018-01-01 RX ADMIN — HEPARIN SODIUM 5000 UNITS: 5000 INJECTION, SOLUTION INTRAVENOUS; SUBCUTANEOUS at 17:30

## 2018-01-01 RX ADMIN — Medication 10 ML: at 13:26

## 2018-01-01 RX ADMIN — FUROSEMIDE 40 MG: 10 INJECTION, SOLUTION INTRAMUSCULAR; INTRAVENOUS at 08:34

## 2018-01-01 RX ADMIN — ALLOPURINOL 100 MG: 100 TABLET ORAL at 08:17

## 2018-01-01 RX ADMIN — MILRINONE LACTATE IN DEXTROSE 0.38 MCG/KG/MIN: 200 INJECTION, SOLUTION INTRAVENOUS at 23:57

## 2018-01-01 RX ADMIN — VANCOMYCIN HYDROCHLORIDE 1500 MG: 10 INJECTION, POWDER, LYOPHILIZED, FOR SOLUTION INTRAVENOUS at 02:11

## 2018-01-01 RX ADMIN — MORPHINE SULFATE 30 MG: 30 TABLET, FILM COATED, EXTENDED RELEASE ORAL at 11:44

## 2018-01-01 RX ADMIN — Medication 10 ML: at 03:46

## 2018-01-01 RX ADMIN — MORPHINE SULFATE 30 MG: 30 TABLET, FILM COATED, EXTENDED RELEASE ORAL at 21:17

## 2018-01-01 RX ADMIN — SACUBITRIL AND VALSARTAN 1 TABLET: 24; 26 TABLET, FILM COATED ORAL at 12:38

## 2018-01-01 RX ADMIN — VANCOMYCIN HYDROCHLORIDE 2000 MG: 10 INJECTION, POWDER, LYOPHILIZED, FOR SOLUTION INTRAVENOUS at 15:13

## 2018-01-01 RX ADMIN — VANCOMYCIN HYDROCHLORIDE 2000 MG: 10 INJECTION, POWDER, LYOPHILIZED, FOR SOLUTION INTRAVENOUS at 03:00

## 2018-01-01 RX ADMIN — HYDROMORPHONE HYDROCHLORIDE 4 MG: 4 TABLET ORAL at 17:31

## 2018-01-01 RX ADMIN — Medication 10 ML: at 22:10

## 2018-01-01 RX ADMIN — HEPARIN SODIUM 5000 UNITS: 5000 INJECTION, SOLUTION INTRAVENOUS; SUBCUTANEOUS at 21:18

## 2018-01-01 RX ADMIN — SODIUM CHLORIDE 3 G: 900 INJECTION, SOLUTION INTRAVENOUS at 20:28

## 2018-01-01 RX ADMIN — IPRATROPIUM BROMIDE AND ALBUTEROL SULFATE 3 ML: .5; 3 SOLUTION RESPIRATORY (INHALATION) at 09:15

## 2018-01-01 RX ADMIN — SACUBITRIL AND VALSARTAN 1 TABLET: 24; 26 TABLET, FILM COATED ORAL at 22:19

## 2018-01-01 RX ADMIN — MORPHINE SULFATE 60 MG: 60 TABLET, FILM COATED, EXTENDED RELEASE ORAL at 17:42

## 2018-01-01 RX ADMIN — HYDROMORPHONE HYDROCHLORIDE 4 MG: 4 TABLET ORAL at 20:33

## 2018-01-01 RX ADMIN — Medication 10 ML: at 21:47

## 2018-01-01 RX ADMIN — NORTRIPTYLINE HYDROCHLORIDE 50 MG: 25 CAPSULE ORAL at 21:20

## 2018-01-01 RX ADMIN — BUDESONIDE 500 MCG: 0.5 INHALANT RESPIRATORY (INHALATION) at 07:20

## 2018-01-01 RX ADMIN — Medication 81 MG: at 09:53

## 2018-01-01 RX ADMIN — LEVOFLOXACIN 750 MG: 5 INJECTION, SOLUTION INTRAVENOUS at 00:48

## 2018-01-01 RX ADMIN — SODIUM CHLORIDE 75 ML/HR: 900 INJECTION, SOLUTION INTRAVENOUS at 06:31

## 2018-01-01 RX ADMIN — ALLOPURINOL 100 MG: 100 TABLET ORAL at 09:14

## 2018-01-01 RX ADMIN — ALLOPURINOL 100 MG: 100 TABLET ORAL at 08:41

## 2018-01-01 RX ADMIN — ENOXAPARIN SODIUM 111 MG: 120 INJECTION SUBCUTANEOUS at 09:43

## 2018-01-01 RX ADMIN — FUROSEMIDE 60 MG: 40 TABLET ORAL at 10:33

## 2018-01-01 RX ADMIN — ENOXAPARIN SODIUM 40 MG: 40 INJECTION SUBCUTANEOUS at 02:07

## 2018-01-01 RX ADMIN — AMIODARONE HYDROCHLORIDE 200 MG: 200 TABLET ORAL at 08:43

## 2018-01-01 RX ADMIN — DILTIAZEM HYDROCHLORIDE 10 MG/HR: 5 INJECTION INTRAVENOUS at 16:16

## 2018-01-01 RX ADMIN — CLOPIDOGREL BISULFATE 75 MG: 75 TABLET ORAL at 08:31

## 2018-01-01 RX ADMIN — ARFORMOTEROL TARTRATE 15 MCG: 15 SOLUTION RESPIRATORY (INHALATION) at 21:27

## 2018-01-01 RX ADMIN — NAFCILLIN SODIUM 2 G: 2 INJECTION, POWDER, LYOPHILIZED, FOR SOLUTION INTRAMUSCULAR; INTRAVENOUS at 04:44

## 2018-01-01 RX ADMIN — METOPROLOL SUCCINATE 25 MG: 25 TABLET, EXTENDED RELEASE ORAL at 08:24

## 2018-01-01 RX ADMIN — MORPHINE SULFATE 60 MG: 60 TABLET, FILM COATED, EXTENDED RELEASE ORAL at 17:14

## 2018-01-01 RX ADMIN — Medication 81 MG: at 09:42

## 2018-01-01 RX ADMIN — ARFORMOTEROL TARTRATE 15 MCG: 15 SOLUTION RESPIRATORY (INHALATION) at 20:37

## 2018-01-01 RX ADMIN — Medication 10 ML: at 05:46

## 2018-01-01 RX ADMIN — HYDROMORPHONE HYDROCHLORIDE 4 MG: 2 TABLET ORAL at 20:36

## 2018-01-01 RX ADMIN — FLUMAZENIL 0.2 MG: 0.1 INJECTION INTRAVENOUS at 13:11

## 2018-01-01 RX ADMIN — NORTRIPTYLINE HYDROCHLORIDE 50 MG: 25 CAPSULE ORAL at 21:38

## 2018-01-01 RX ADMIN — HYDROMORPHONE HYDROCHLORIDE 4 MG: 4 TABLET ORAL at 08:32

## 2018-01-01 RX ADMIN — ISOSORBIDE MONONITRATE 60 MG: 60 TABLET, EXTENDED RELEASE ORAL at 09:43

## 2018-01-01 RX ADMIN — AMIODARONE HYDROCHLORIDE 200 MG: 200 TABLET ORAL at 17:14

## 2018-01-01 RX ADMIN — MORPHINE SULFATE 30 MG: 30 TABLET, FILM COATED, EXTENDED RELEASE ORAL at 15:29

## 2018-01-01 RX ADMIN — Medication 10 ML: at 22:36

## 2018-01-01 RX ADMIN — SPIRONOLACTONE 25 MG: 25 TABLET ORAL at 09:05

## 2018-01-01 RX ADMIN — FUROSEMIDE 40 MG: 10 INJECTION, SOLUTION INTRAMUSCULAR; INTRAVENOUS at 20:14

## 2018-01-01 RX ADMIN — FUROSEMIDE 40 MG: 10 INJECTION, SOLUTION INTRAMUSCULAR; INTRAVENOUS at 21:24

## 2018-01-01 RX ADMIN — HEPARIN SODIUM 5000 UNITS: 5000 INJECTION, SOLUTION INTRAVENOUS; SUBCUTANEOUS at 01:04

## 2018-01-01 RX ADMIN — ATORVASTATIN CALCIUM 80 MG: 40 TABLET, FILM COATED ORAL at 08:14

## 2018-01-01 RX ADMIN — NORTRIPTYLINE HYDROCHLORIDE 50 MG: 25 CAPSULE ORAL at 21:01

## 2018-01-01 RX ADMIN — DOCUSATE SODIUM 100 MG: 100 CAPSULE, LIQUID FILLED ORAL at 09:17

## 2018-01-01 RX ADMIN — PREDNISONE 40 MG: 20 TABLET ORAL at 07:08

## 2018-01-01 RX ADMIN — Medication 20 ML: at 09:50

## 2018-01-01 RX ADMIN — ROPINIROLE HYDROCHLORIDE 8 MG: 1 TABLET, FILM COATED ORAL at 22:04

## 2018-01-01 RX ADMIN — ALLOPURINOL 100 MG: 100 TABLET ORAL at 08:14

## 2018-01-01 RX ADMIN — LISINOPRIL 2.5 MG: 5 TABLET ORAL at 10:38

## 2018-01-01 RX ADMIN — FERROUS SULFATE TAB 325 MG (65 MG ELEMENTAL FE) 325 MG: 325 (65 FE) TAB at 08:41

## 2018-01-01 RX ADMIN — AMIODARONE HYDROCHLORIDE 400 MG: 200 TABLET ORAL at 17:14

## 2018-01-01 RX ADMIN — ASPIRIN 81 MG: 81 TABLET, COATED ORAL at 12:25

## 2018-01-01 RX ADMIN — HYDROMORPHONE HYDROCHLORIDE 1 MG: 1 INJECTION, SOLUTION INTRAMUSCULAR; INTRAVENOUS; SUBCUTANEOUS at 00:36

## 2018-01-01 RX ADMIN — ATORVASTATIN CALCIUM 80 MG: 40 TABLET, FILM COATED ORAL at 10:34

## 2018-01-01 RX ADMIN — CITALOPRAM HYDROBROMIDE 40 MG: 20 TABLET ORAL at 09:02

## 2018-01-01 RX ADMIN — PREDNISONE 5 MG: 5 TABLET ORAL at 16:23

## 2018-01-01 RX ADMIN — Medication: at 14:21

## 2018-01-01 RX ADMIN — Medication 10 ML: at 13:47

## 2018-01-01 RX ADMIN — PANTOPRAZOLE SODIUM 40 MG: 40 TABLET, DELAYED RELEASE ORAL at 09:04

## 2018-01-01 RX ADMIN — ASPIRIN 81 MG: 81 TABLET, COATED ORAL at 09:17

## 2018-01-01 RX ADMIN — Medication 10 ML: at 06:51

## 2018-01-01 RX ADMIN — FAMOTIDINE 20 MG: 20 TABLET ORAL at 17:14

## 2018-01-01 RX ADMIN — LEVOFLOXACIN 750 MG: 5 INJECTION, SOLUTION INTRAVENOUS at 00:43

## 2018-01-01 RX ADMIN — SPIRONOLACTONE 50 MG: 25 TABLET, FILM COATED ORAL at 10:33

## 2018-01-01 RX ADMIN — MIDODRINE HYDROCHLORIDE 5 MG: 5 TABLET ORAL at 10:03

## 2018-01-01 RX ADMIN — FAMOTIDINE 20 MG: 20 TABLET ORAL at 09:42

## 2018-01-01 RX ADMIN — Medication 10 ML: at 15:06

## 2018-01-01 RX ADMIN — DOCUSATE SODIUM AND SENNOSIDES 1 TABLET: 8.6; 5 TABLET, FILM COATED ORAL at 15:15

## 2018-01-01 RX ADMIN — IPRATROPIUM BROMIDE AND ALBUTEROL SULFATE 3 ML: .5; 3 SOLUTION RESPIRATORY (INHALATION) at 13:04

## 2018-01-01 RX ADMIN — NORTRIPTYLINE HYDROCHLORIDE 50 MG: 25 CAPSULE ORAL at 21:31

## 2018-01-01 RX ADMIN — METOPROLOL SUCCINATE 25 MG: 25 TABLET, EXTENDED RELEASE ORAL at 08:31

## 2018-01-01 RX ADMIN — ARFORMOTEROL TARTRATE 15 MCG: 15 SOLUTION RESPIRATORY (INHALATION) at 21:02

## 2018-01-01 RX ADMIN — AMIODARONE HYDROCHLORIDE 400 MG: 200 TABLET ORAL at 17:46

## 2018-01-01 RX ADMIN — HEPARIN SODIUM 5000 UNITS: 5000 INJECTION, SOLUTION INTRAVENOUS; SUBCUTANEOUS at 04:36

## 2018-01-01 RX ADMIN — APIXABAN 5 MG: 5 TABLET, FILM COATED ORAL at 08:41

## 2018-01-01 RX ADMIN — LISINOPRIL 2.5 MG: 5 TABLET ORAL at 08:35

## 2018-01-01 RX ADMIN — ATORVASTATIN CALCIUM 80 MG: 40 TABLET, FILM COATED ORAL at 08:03

## 2018-01-01 RX ADMIN — Medication 95 MCG/MIN: at 03:00

## 2018-01-01 RX ADMIN — BUDESONIDE 500 MCG: 0.5 INHALANT RESPIRATORY (INHALATION) at 21:27

## 2018-01-01 RX ADMIN — ATORVASTATIN CALCIUM 80 MG: 40 TABLET, FILM COATED ORAL at 08:44

## 2018-01-01 RX ADMIN — HYDROMORPHONE HYDROCHLORIDE 4 MG: 4 TABLET ORAL at 08:10

## 2018-01-01 RX ADMIN — PANTOPRAZOLE SODIUM 40 MG: 40 TABLET, DELAYED RELEASE ORAL at 12:27

## 2018-01-01 RX ADMIN — APIXABAN 5 MG: 5 TABLET, FILM COATED ORAL at 18:09

## 2018-01-01 RX ADMIN — SACUBITRIL AND VALSARTAN 1 TABLET: 24; 26 TABLET, FILM COATED ORAL at 11:44

## 2018-01-01 RX ADMIN — ARFORMOTEROL TARTRATE 15 MCG: 15 SOLUTION RESPIRATORY (INHALATION) at 12:44

## 2018-01-01 RX ADMIN — AMIODARONE HYDROCHLORIDE 200 MG: 200 TABLET ORAL at 17:03

## 2018-01-01 RX ADMIN — PREDNISONE 5 MG: 5 TABLET ORAL at 18:02

## 2018-01-01 RX ADMIN — ARFORMOTEROL TARTRATE 15 MCG: 15 SOLUTION RESPIRATORY (INHALATION) at 20:41

## 2018-01-01 RX ADMIN — METOPROLOL TARTRATE 25 MG: 25 TABLET ORAL at 08:20

## 2018-01-01 RX ADMIN — FUROSEMIDE 80 MG: 40 TABLET ORAL at 09:03

## 2018-01-01 RX ADMIN — ATORVASTATIN CALCIUM 80 MG: 40 TABLET, FILM COATED ORAL at 09:45

## 2018-01-01 RX ADMIN — CLOPIDOGREL BISULFATE 75 MG: 75 TABLET ORAL at 09:04

## 2018-01-01 RX ADMIN — ISOSORBIDE MONONITRATE 60 MG: 60 TABLET, EXTENDED RELEASE ORAL at 08:35

## 2018-01-01 RX ADMIN — IPRATROPIUM BROMIDE AND ALBUTEROL SULFATE 3 ML: .5; 3 SOLUTION RESPIRATORY (INHALATION) at 03:52

## 2018-01-01 RX ADMIN — NAFCILLIN SODIUM 2 G: 2 INJECTION, POWDER, LYOPHILIZED, FOR SOLUTION INTRAMUSCULAR; INTRAVENOUS at 05:42

## 2018-01-01 RX ADMIN — BUPROPION HYDROCHLORIDE 150 MG: 150 TABLET, EXTENDED RELEASE ORAL at 17:14

## 2018-01-01 RX ADMIN — BUPROPION HYDROCHLORIDE 150 MG: 150 TABLET, EXTENDED RELEASE ORAL at 09:45

## 2018-01-01 RX ADMIN — METFORMIN HYDROCHLORIDE 1000 MG: 500 TABLET, FILM COATED ORAL at 07:40

## 2018-01-01 RX ADMIN — FUROSEMIDE 60 MG: 40 TABLET ORAL at 16:10

## 2018-01-01 RX ADMIN — ACETAMINOPHEN 650 MG: 325 TABLET ORAL at 06:31

## 2018-01-01 RX ADMIN — MORPHINE SULFATE 30 MG: 30 TABLET, FILM COATED, EXTENDED RELEASE ORAL at 12:25

## 2018-01-01 RX ADMIN — HYDROMORPHONE HYDROCHLORIDE 4 MG: 2 TABLET ORAL at 06:37

## 2018-01-01 RX ADMIN — MORPHINE SULFATE 30 MG: 30 TABLET, FILM COATED, EXTENDED RELEASE ORAL at 00:59

## 2018-01-01 RX ADMIN — METFORMIN HYDROCHLORIDE 1000 MG: 500 TABLET, FILM COATED ORAL at 17:31

## 2018-01-01 RX ADMIN — Medication 10 ML: at 20:58

## 2018-01-01 RX ADMIN — PIPERACILLIN SODIUM,TAZOBACTAM SODIUM 3.38 G: 3; .375 INJECTION, POWDER, FOR SOLUTION INTRAVENOUS at 17:18

## 2018-01-01 RX ADMIN — HEPARIN SODIUM 5000 UNITS: 5000 INJECTION, SOLUTION INTRAVENOUS; SUBCUTANEOUS at 21:17

## 2018-01-01 RX ADMIN — ACETAMINOPHEN 650 MG: 325 TABLET ORAL at 12:38

## 2018-01-01 RX ADMIN — HYDROMORPHONE HYDROCHLORIDE 4 MG: 4 TABLET ORAL at 10:34

## 2018-01-01 RX ADMIN — PROPOFOL 0.5 MG: 10 INJECTION, EMULSION INTRAVENOUS at 12:29

## 2018-01-01 RX ADMIN — HYDROMORPHONE HYDROCHLORIDE 4 MG: 4 TABLET ORAL at 19:09

## 2018-01-01 RX ADMIN — CLOPIDOGREL BISULFATE 75 MG: 75 TABLET ORAL at 08:26

## 2018-01-01 RX ADMIN — FAMOTIDINE 20 MG: 20 TABLET ORAL at 08:03

## 2018-01-01 RX ADMIN — ZOLPIDEM TARTRATE 10 MG: 10 TABLET ORAL at 21:14

## 2018-01-01 RX ADMIN — ARFORMOTEROL TARTRATE 15 MCG: 15 SOLUTION RESPIRATORY (INHALATION) at 19:42

## 2018-01-01 RX ADMIN — MORPHINE SULFATE 60 MG: 60 TABLET, EXTENDED RELEASE ORAL at 03:36

## 2018-01-01 RX ADMIN — SPIRONOLACTONE 50 MG: 25 TABLET, FILM COATED ORAL at 12:24

## 2018-01-01 RX ADMIN — PIPERACILLIN SODIUM AND TAZOBACTAM SODIUM 3.38 G: 3; .375 INJECTION, POWDER, LYOPHILIZED, FOR SOLUTION INTRAVENOUS at 18:50

## 2018-01-01 RX ADMIN — SODIUM CHLORIDE 100 ML/HR: 900 INJECTION, SOLUTION INTRAVENOUS at 06:50

## 2018-01-01 RX ADMIN — AMIODARONE HYDROCHLORIDE 200 MG: 200 TABLET ORAL at 08:41

## 2018-01-01 RX ADMIN — POTASSIUM CHLORIDE 40 MEQ: 750 TABLET, EXTENDED RELEASE ORAL at 17:47

## 2018-01-01 RX ADMIN — POTASSIUM CHLORIDE 40 MEQ: 750 TABLET, EXTENDED RELEASE ORAL at 12:25

## 2018-01-01 RX ADMIN — DOCUSATE SODIUM 100 MG: 100 CAPSULE, LIQUID FILLED ORAL at 08:24

## 2018-01-01 RX ADMIN — VANCOMYCIN HYDROCHLORIDE 1750 MG: 10 INJECTION, POWDER, LYOPHILIZED, FOR SOLUTION INTRAVENOUS at 01:08

## 2018-01-01 RX ADMIN — HYDROMORPHONE HYDROCHLORIDE 4 MG: 2 TABLET ORAL at 18:33

## 2018-01-01 RX ADMIN — PIPERACILLIN SODIUM AND TAZOBACTAM SODIUM 3.38 G: 3; .375 INJECTION, POWDER, LYOPHILIZED, FOR SOLUTION INTRAVENOUS at 11:13

## 2018-01-01 RX ADMIN — ROPINIROLE HYDROCHLORIDE 8 MG: 1 TABLET, FILM COATED ORAL at 22:54

## 2018-01-01 RX ADMIN — NAFCILLIN SODIUM 2 G: 2 INJECTION, POWDER, LYOPHILIZED, FOR SOLUTION INTRAMUSCULAR; INTRAVENOUS at 17:55

## 2018-01-01 RX ADMIN — Medication 60 MCG/MIN: at 16:02

## 2018-01-01 RX ADMIN — POTASSIUM CHLORIDE 40 MEQ: 750 TABLET, EXTENDED RELEASE ORAL at 19:09

## 2018-01-01 RX ADMIN — IPRATROPIUM BROMIDE AND ALBUTEROL SULFATE 3 ML: .5; 3 SOLUTION RESPIRATORY (INHALATION) at 19:43

## 2018-01-01 RX ADMIN — POTASSIUM CHLORIDE 40 MEQ: 20 SOLUTION ORAL at 08:45

## 2018-01-01 RX ADMIN — ATORVASTATIN CALCIUM 80 MG: 40 TABLET, FILM COATED ORAL at 08:41

## 2018-01-01 RX ADMIN — MILRINONE LACTATE IN DEXTROSE 0.38 MCG/KG/MIN: 200 INJECTION, SOLUTION INTRAVENOUS at 21:20

## 2018-01-01 RX ADMIN — PIPERACILLIN SODIUM,TAZOBACTAM SODIUM 3.38 G: 3; .375 INJECTION, POWDER, FOR SOLUTION INTRAVENOUS at 02:32

## 2018-01-01 RX ADMIN — FAMOTIDINE 20 MG: 20 TABLET ORAL at 08:19

## 2018-01-01 RX ADMIN — ALLOPURINOL 100 MG: 100 TABLET ORAL at 09:44

## 2018-01-01 RX ADMIN — PIPERACILLIN SODIUM,TAZOBACTAM SODIUM 3.38 G: 3; .375 INJECTION, POWDER, FOR SOLUTION INTRAVENOUS at 10:47

## 2018-01-01 RX ADMIN — Medication 10 ML: at 15:13

## 2018-01-01 RX ADMIN — ISOSORBIDE MONONITRATE 60 MG: 60 TABLET, EXTENDED RELEASE ORAL at 08:40

## 2018-01-01 RX ADMIN — BUPROPION HYDROCHLORIDE 150 MG: 150 TABLET, EXTENDED RELEASE ORAL at 08:46

## 2018-01-01 RX ADMIN — FUROSEMIDE 20 MG: 40 TABLET ORAL at 09:52

## 2018-01-01 RX ADMIN — FAMOTIDINE 20 MG: 20 TABLET ORAL at 17:21

## 2018-01-01 RX ADMIN — ATORVASTATIN CALCIUM 80 MG: 40 TABLET, FILM COATED ORAL at 10:39

## 2018-01-01 RX ADMIN — METOPROLOL TARTRATE 25 MG: 25 TABLET ORAL at 09:24

## 2018-01-01 RX ADMIN — BUDESONIDE 500 MCG: 0.5 INHALANT RESPIRATORY (INHALATION) at 08:10

## 2018-01-01 RX ADMIN — HYDROMORPHONE HYDROCHLORIDE 4 MG: 4 TABLET ORAL at 07:02

## 2018-01-01 RX ADMIN — Medication 10 ML: at 05:15

## 2018-01-01 RX ADMIN — MIDODRINE HYDROCHLORIDE 5 MG: 5 TABLET ORAL at 13:39

## 2018-01-01 RX ADMIN — FUROSEMIDE 40 MG: 10 INJECTION, SOLUTION INTRAMUSCULAR; INTRAVENOUS at 21:22

## 2018-01-01 RX ADMIN — MORPHINE SULFATE 60 MG: 60 TABLET, FILM COATED, EXTENDED RELEASE ORAL at 17:25

## 2018-01-01 RX ADMIN — FENTANYL CITRATE 100 MCG: 50 INJECTION, SOLUTION INTRAMUSCULAR; INTRAVENOUS at 06:05

## 2018-01-01 RX ADMIN — Medication 10 ML: at 13:10

## 2018-01-01 RX ADMIN — METOPROLOL SUCCINATE 25 MG: 25 TABLET, EXTENDED RELEASE ORAL at 08:30

## 2018-01-01 RX ADMIN — NAFCILLIN SODIUM 2 G: 2 INJECTION, POWDER, LYOPHILIZED, FOR SOLUTION INTRAMUSCULAR; INTRAVENOUS at 16:05

## 2018-01-01 RX ADMIN — SPIRONOLACTONE 25 MG: 25 TABLET ORAL at 18:08

## 2018-01-01 RX ADMIN — PIPERACILLIN SODIUM AND TAZOBACTAM SODIUM 3.38 G: 3; .375 INJECTION, POWDER, LYOPHILIZED, FOR SOLUTION INTRAVENOUS at 12:29

## 2018-01-01 RX ADMIN — FUROSEMIDE 40 MG: 10 INJECTION, SOLUTION INTRAMUSCULAR; INTRAVENOUS at 09:14

## 2018-01-01 RX ADMIN — HEPARIN SODIUM 5000 UNITS: 5000 INJECTION, SOLUTION INTRAVENOUS; SUBCUTANEOUS at 11:42

## 2018-01-01 RX ADMIN — AMIODARONE HYDROCHLORIDE 200 MG: 200 TABLET ORAL at 17:48

## 2018-01-01 RX ADMIN — BUDESONIDE 500 MCG: 0.5 INHALANT RESPIRATORY (INHALATION) at 20:02

## 2018-01-01 RX ADMIN — Medication 81 MG: at 08:43

## 2018-01-01 RX ADMIN — IOPAMIDOL 75 ML: 755 INJECTION, SOLUTION INTRAVENOUS at 19:01

## 2018-01-01 RX ADMIN — KETOROLAC TROMETHAMINE 30 MG: 30 INJECTION, SOLUTION INTRAMUSCULAR at 12:57

## 2018-01-01 RX ADMIN — IPRATROPIUM BROMIDE AND ALBUTEROL SULFATE 3 ML: .5; 3 SOLUTION RESPIRATORY (INHALATION) at 17:57

## 2018-01-01 RX ADMIN — APIXABAN 5 MG: 5 TABLET, FILM COATED ORAL at 10:51

## 2018-01-01 RX ADMIN — IPRATROPIUM BROMIDE AND ALBUTEROL SULFATE 3 ML: .5; 3 SOLUTION RESPIRATORY (INHALATION) at 04:16

## 2018-01-01 RX ADMIN — FAMOTIDINE 20 MG: 20 TABLET ORAL at 09:55

## 2018-01-01 RX ADMIN — MORPHINE SULFATE 60 MG: 60 TABLET, FILM COATED, EXTENDED RELEASE ORAL at 01:56

## 2018-01-01 RX ADMIN — CLOPIDOGREL BISULFATE 75 MG: 75 TABLET ORAL at 08:30

## 2018-01-01 RX ADMIN — FAMOTIDINE 20 MG: 20 TABLET ORAL at 08:17

## 2018-01-01 RX ADMIN — MORPHINE SULFATE 60 MG: 60 TABLET, FILM COATED, EXTENDED RELEASE ORAL at 15:15

## 2018-01-01 RX ADMIN — ASPIRIN 81 MG: 81 TABLET, COATED ORAL at 08:44

## 2018-01-01 RX ADMIN — ARFORMOTEROL TARTRATE 15 MCG: 15 SOLUTION RESPIRATORY (INHALATION) at 20:51

## 2018-01-01 RX ADMIN — CLOPIDOGREL BISULFATE 75 MG: 75 TABLET ORAL at 08:24

## 2018-01-01 RX ADMIN — DOCUSATE SODIUM 100 MG: 100 CAPSULE, LIQUID FILLED ORAL at 08:39

## 2018-01-01 RX ADMIN — PANTOPRAZOLE SODIUM 20 MG: 20 TABLET, DELAYED RELEASE ORAL at 06:44

## 2018-01-01 RX ADMIN — FUROSEMIDE 20 MG: 20 TABLET ORAL at 08:17

## 2018-01-01 RX ADMIN — BUPROPION HYDROCHLORIDE 150 MG: 150 TABLET, EXTENDED RELEASE ORAL at 09:51

## 2018-01-01 RX ADMIN — DOCUSATE SODIUM 100 MG: 100 CAPSULE, LIQUID FILLED ORAL at 17:13

## 2018-01-01 RX ADMIN — NAFCILLIN SODIUM 2 G: 2 INJECTION, POWDER, LYOPHILIZED, FOR SOLUTION INTRAMUSCULAR; INTRAVENOUS at 10:26

## 2018-01-01 RX ADMIN — FERROUS SULFATE TAB 325 MG (65 MG ELEMENTAL FE) 325 MG: 325 (65 FE) TAB at 08:29

## 2018-01-01 RX ADMIN — ROPINIROLE HYDROCHLORIDE 8 MG: 1 TABLET, FILM COATED ORAL at 21:00

## 2018-01-01 RX ADMIN — Medication 10 ML: at 21:26

## 2018-01-01 RX ADMIN — CLOPIDOGREL BISULFATE 75 MG: 75 TABLET ORAL at 09:42

## 2018-01-01 RX ADMIN — MORPHINE SULFATE 60 MG: 60 TABLET, FILM COATED, EXTENDED RELEASE ORAL at 09:57

## 2018-01-01 RX ADMIN — BUDESONIDE 500 MCG: 0.5 INHALANT RESPIRATORY (INHALATION) at 09:08

## 2018-01-01 RX ADMIN — DOCUSATE SODIUM AND SENNOSIDES 1 TABLET: 8.6; 5 TABLET, FILM COATED ORAL at 09:53

## 2018-01-01 RX ADMIN — SPIRONOLACTONE 25 MG: 25 TABLET ORAL at 09:43

## 2018-01-01 RX ADMIN — BUPROPION HYDROCHLORIDE 150 MG: 150 TABLET, EXTENDED RELEASE ORAL at 08:14

## 2018-01-01 RX ADMIN — HYDROMORPHONE HYDROCHLORIDE 4 MG: 4 TABLET ORAL at 12:38

## 2018-01-01 RX ADMIN — MORPHINE SULFATE 60 MG: 60 TABLET, FILM COATED, EXTENDED RELEASE ORAL at 13:18

## 2018-01-01 RX ADMIN — DOCUSATE SODIUM 100 MG: 100 CAPSULE, LIQUID FILLED ORAL at 09:02

## 2018-01-01 RX ADMIN — MORPHINE SULFATE 60 MG: 60 TABLET, FILM COATED, EXTENDED RELEASE ORAL at 17:15

## 2018-01-01 RX ADMIN — BUDESONIDE 500 MCG: 0.5 INHALANT RESPIRATORY (INHALATION) at 20:41

## 2018-01-01 RX ADMIN — INSULIN LISPRO 2 UNITS: 100 INJECTION, SOLUTION INTRAVENOUS; SUBCUTANEOUS at 08:36

## 2018-01-01 RX ADMIN — PANTOPRAZOLE SODIUM 40 MG: 40 TABLET, DELAYED RELEASE ORAL at 06:50

## 2018-01-01 RX ADMIN — HYDROMORPHONE HYDROCHLORIDE 4 MG: 4 TABLET ORAL at 21:44

## 2018-01-01 RX ADMIN — MORPHINE SULFATE 60 MG: 60 TABLET, FILM COATED, EXTENDED RELEASE ORAL at 16:14

## 2018-01-01 RX ADMIN — ZOLPIDEM TARTRATE 10 MG: 10 TABLET ORAL at 21:32

## 2018-01-01 RX ADMIN — NAFCILLIN SODIUM 2 G: 2 INJECTION, POWDER, LYOPHILIZED, FOR SOLUTION INTRAMUSCULAR; INTRAVENOUS at 11:11

## 2018-01-01 RX ADMIN — MIDODRINE HYDROCHLORIDE 5 MG: 5 TABLET ORAL at 16:35

## 2018-01-01 RX ADMIN — SODIUM CHLORIDE 100 ML/HR: 900 INJECTION, SOLUTION INTRAVENOUS at 18:31

## 2018-01-01 RX ADMIN — APIXABAN 5 MG: 5 TABLET, FILM COATED ORAL at 09:53

## 2018-01-01 RX ADMIN — MORPHINE SULFATE 60 MG: 15 TABLET, EXTENDED RELEASE ORAL at 10:38

## 2018-01-01 RX ADMIN — CARVEDILOL 6.25 MG: 6.25 TABLET, FILM COATED ORAL at 07:06

## 2018-01-01 RX ADMIN — Medication 10 ML: at 06:19

## 2018-01-01 RX ADMIN — MORPHINE SULFATE 2 MG: 2 INJECTION, SOLUTION INTRAMUSCULAR; INTRAVENOUS at 15:06

## 2018-01-01 RX ADMIN — CITALOPRAM HYDROBROMIDE 40 MG: 20 TABLET ORAL at 08:18

## 2018-01-01 RX ADMIN — MORPHINE SULFATE 60 MG: 15 TABLET, EXTENDED RELEASE ORAL at 08:26

## 2018-01-01 RX ADMIN — FAMOTIDINE 20 MG: 20 TABLET ORAL at 17:13

## 2018-01-01 RX ADMIN — BUPROPION HYDROCHLORIDE 150 MG: 150 TABLET, EXTENDED RELEASE ORAL at 09:14

## 2018-01-01 RX ADMIN — SPIRONOLACTONE 50 MG: 25 TABLET, FILM COATED ORAL at 08:36

## 2018-01-01 RX ADMIN — Medication 10 ML: at 06:42

## 2018-01-01 RX ADMIN — VANCOMYCIN HYDROCHLORIDE 2000 MG: 10 INJECTION, POWDER, LYOPHILIZED, FOR SOLUTION INTRAVENOUS at 06:32

## 2018-01-01 RX ADMIN — Medication 10 ML: at 16:28

## 2018-01-01 RX ADMIN — POTASSIUM CHLORIDE 10 MEQ: 10 INJECTION, SOLUTION INTRAVENOUS at 05:00

## 2018-01-01 RX ADMIN — MORPHINE SULFATE 60 MG: 60 TABLET, FILM COATED, EXTENDED RELEASE ORAL at 22:58

## 2018-01-01 RX ADMIN — MORPHINE SULFATE 60 MG: 60 TABLET, FILM COATED, EXTENDED RELEASE ORAL at 08:40

## 2018-01-01 RX ADMIN — HEPARIN SODIUM 5000 UNITS: 5000 INJECTION, SOLUTION INTRAVENOUS; SUBCUTANEOUS at 04:14

## 2018-01-01 RX ADMIN — BUDESONIDE 500 MCG: 0.5 INHALANT RESPIRATORY (INHALATION) at 07:55

## 2018-01-01 RX ADMIN — HYDROMORPHONE HYDROCHLORIDE 4 MG: 2 TABLET ORAL at 14:18

## 2018-01-01 RX ADMIN — FUROSEMIDE 60 MG: 40 TABLET ORAL at 08:31

## 2018-01-01 RX ADMIN — MILRINONE LACTATE IN DEXTROSE 0.38 MCG/KG/MIN: 200 INJECTION, SOLUTION INTRAVENOUS at 17:07

## 2018-01-01 RX ADMIN — METOPROLOL TARTRATE 12.5 MG: 25 TABLET ORAL at 21:07

## 2018-01-01 RX ADMIN — PANTOPRAZOLE SODIUM 40 MG: 40 TABLET, DELAYED RELEASE ORAL at 09:18

## 2018-01-01 RX ADMIN — ASPIRIN 81 MG CHEWABLE TABLET 81 MG: 81 TABLET CHEWABLE at 08:15

## 2018-01-01 RX ADMIN — IPRATROPIUM BROMIDE AND ALBUTEROL SULFATE 3 ML: .5; 3 SOLUTION RESPIRATORY (INHALATION) at 09:18

## 2018-01-01 RX ADMIN — PANTOPRAZOLE SODIUM 40 MG: 40 TABLET, DELAYED RELEASE ORAL at 06:38

## 2018-01-01 RX ADMIN — PREDNISONE 5 MG: 5 TABLET ORAL at 18:15

## 2018-01-01 RX ADMIN — FERROUS SULFATE TAB 325 MG (65 MG ELEMENTAL FE) 325 MG: 325 (65 FE) TAB at 08:42

## 2018-01-01 RX ADMIN — AMIODARONE HYDROCHLORIDE 150 MG: 150 INJECTION, SOLUTION INTRAVENOUS at 15:24

## 2018-01-01 RX ADMIN — MORPHINE SULFATE 30 MG: 30 TABLET, FILM COATED, EXTENDED RELEASE ORAL at 21:01

## 2018-01-01 RX ADMIN — IPRATROPIUM BROMIDE AND ALBUTEROL SULFATE 3 ML: .5; 3 SOLUTION RESPIRATORY (INHALATION) at 16:44

## 2018-01-01 RX ADMIN — IPRATROPIUM BROMIDE AND ALBUTEROL SULFATE 3 ML: .5; 3 SOLUTION RESPIRATORY (INHALATION) at 07:55

## 2018-01-01 RX ADMIN — FERROUS SULFATE TAB 325 MG (65 MG ELEMENTAL FE) 325 MG: 325 (65 FE) TAB at 07:02

## 2018-01-01 RX ADMIN — PANTOPRAZOLE SODIUM 20 MG: 20 TABLET, DELAYED RELEASE ORAL at 06:35

## 2018-01-01 RX ADMIN — DOCUSATE SODIUM 100 MG: 100 CAPSULE, LIQUID FILLED ORAL at 17:42

## 2018-01-01 RX ADMIN — BUPROPION HYDROCHLORIDE 150 MG: 150 TABLET, EXTENDED RELEASE ORAL at 09:24

## 2018-01-01 RX ADMIN — CLOPIDOGREL BISULFATE 75 MG: 75 TABLET ORAL at 09:52

## 2018-01-01 RX ADMIN — NITROGLYCERIN 1 INCH: 20 OINTMENT TOPICAL at 07:30

## 2018-01-01 RX ADMIN — ZOLPIDEM TARTRATE 10 MG: 5 TABLET ORAL at 21:23

## 2018-01-01 RX ADMIN — MORPHINE SULFATE 60 MG: 60 TABLET, FILM COATED, EXTENDED RELEASE ORAL at 16:34

## 2018-01-01 RX ADMIN — HYDROMORPHONE HYDROCHLORIDE 4 MG: 4 TABLET ORAL at 18:17

## 2018-01-01 RX ADMIN — DOCUSATE SODIUM 100 MG: 100 CAPSULE, LIQUID FILLED ORAL at 08:18

## 2018-01-01 RX ADMIN — BENZOCAINE 2 SPRAY: 200 SPRAY DENTAL; ORAL; PERIODONTAL at 12:19

## 2018-01-01 RX ADMIN — ASPIRIN 81 MG: 81 TABLET, COATED ORAL at 09:05

## 2018-01-01 RX ADMIN — ALLOPURINOL 100 MG: 100 TABLET ORAL at 09:43

## 2018-01-01 RX ADMIN — MORPHINE SULFATE 60 MG: 60 TABLET, FILM COATED, EXTENDED RELEASE ORAL at 22:38

## 2018-01-01 RX ADMIN — ARFORMOTEROL TARTRATE 15 MCG: 15 SOLUTION RESPIRATORY (INHALATION) at 08:28

## 2018-01-01 RX ADMIN — FAMOTIDINE 20 MG: 20 TABLET ORAL at 09:25

## 2018-01-01 RX ADMIN — SODIUM CHLORIDE 100 ML: 900 INJECTION, SOLUTION INTRAVENOUS at 19:01

## 2018-01-01 RX ADMIN — MORPHINE SULFATE 30 MG: 30 TABLET, FILM COATED, EXTENDED RELEASE ORAL at 18:17

## 2018-01-01 RX ADMIN — CLINDAMYCIN PHOSPHATE 600 MG: 600 INJECTION, SOLUTION INTRAVENOUS at 17:24

## 2018-01-01 RX ADMIN — Medication 10 ML: at 21:37

## 2018-01-01 RX ADMIN — BUPROPION HYDROCHLORIDE 150 MG: 150 TABLET, EXTENDED RELEASE ORAL at 17:52

## 2018-01-01 RX ADMIN — AMIODARONE HYDROCHLORIDE 400 MG: 200 TABLET ORAL at 09:24

## 2018-01-01 RX ADMIN — ROPINIROLE HYDROCHLORIDE 8 MG: 1 TABLET, FILM COATED ORAL at 22:45

## 2018-01-01 RX ADMIN — PIPERACILLIN SODIUM AND TAZOBACTAM SODIUM 3.38 G: 3; .375 INJECTION, POWDER, LYOPHILIZED, FOR SOLUTION INTRAVENOUS at 19:18

## 2018-01-01 RX ADMIN — SODIUM CHLORIDE: 9 INJECTION, SOLUTION INTRAVENOUS at 12:16

## 2018-01-01 RX ADMIN — PIPERACILLIN SODIUM,TAZOBACTAM SODIUM 3.38 G: 3; .375 INJECTION, POWDER, FOR SOLUTION INTRAVENOUS at 01:51

## 2018-01-01 RX ADMIN — POTASSIUM CHLORIDE 40 MEQ: 750 TABLET, EXTENDED RELEASE ORAL at 17:14

## 2018-01-01 RX ADMIN — MORPHINE SULFATE 60 MG: 60 TABLET, FILM COATED, EXTENDED RELEASE ORAL at 08:03

## 2018-01-01 RX ADMIN — CARVEDILOL 6.25 MG: 6.25 TABLET, FILM COATED ORAL at 16:23

## 2018-01-01 RX ADMIN — CLOPIDOGREL BISULFATE 75 MG: 75 TABLET ORAL at 08:10

## 2018-01-01 RX ADMIN — FUROSEMIDE 60 MG: 40 TABLET ORAL at 08:24

## 2018-01-01 RX ADMIN — AMIODARONE HYDROCHLORIDE 200 MG: 200 TABLET ORAL at 17:09

## 2018-01-01 RX ADMIN — Medication 10 ML: at 06:33

## 2018-01-01 RX ADMIN — AMIODARONE HYDROCHLORIDE 400 MG: 200 TABLET ORAL at 18:00

## 2018-01-01 RX ADMIN — MAGESIUM CITRATE 296 ML: 1.75 LIQUID ORAL at 12:03

## 2018-01-01 RX ADMIN — MAGNESIUM CITRATE 296 ML: 1.75 LIQUID ORAL at 15:12

## 2018-01-01 RX ADMIN — MORPHINE SULFATE 30 MG: 30 TABLET, FILM COATED, EXTENDED RELEASE ORAL at 03:57

## 2018-01-01 RX ADMIN — BUDESONIDE 500 MCG: 0.5 INHALANT RESPIRATORY (INHALATION) at 07:31

## 2018-01-01 RX ADMIN — METFORMIN HYDROCHLORIDE 1000 MG: 500 TABLET, FILM COATED ORAL at 16:28

## 2018-01-01 RX ADMIN — ZOLPIDEM TARTRATE 10 MG: 5 TABLET ORAL at 21:05

## 2018-01-01 RX ADMIN — SPIRONOLACTONE 50 MG: 25 TABLET, FILM COATED ORAL at 17:09

## 2018-01-01 RX ADMIN — FERROUS SULFATE TAB 325 MG (65 MG ELEMENTAL FE) 325 MG: 325 (65 FE) TAB at 09:05

## 2018-01-01 RX ADMIN — Medication 95 MCG/MIN: at 20:34

## 2018-01-01 RX ADMIN — APIXABAN 5 MG: 5 TABLET, FILM COATED ORAL at 17:09

## 2018-01-01 RX ADMIN — MORPHINE SULFATE 60 MG: 60 TABLET, FILM COATED, EXTENDED RELEASE ORAL at 21:06

## 2018-01-01 RX ADMIN — FERROUS SULFATE TAB 325 MG (65 MG ELEMENTAL FE) 325 MG: 325 (65 FE) TAB at 07:01

## 2018-01-01 RX ADMIN — CITALOPRAM HYDROBROMIDE 40 MG: 20 TABLET ORAL at 08:26

## 2018-01-01 RX ADMIN — Medication 10 ML: at 04:57

## 2018-01-01 RX ADMIN — PREDNISONE 40 MG: 20 TABLET ORAL at 07:40

## 2018-01-01 RX ADMIN — IPRATROPIUM BROMIDE AND ALBUTEROL SULFATE 3 ML: .5; 3 SOLUTION RESPIRATORY (INHALATION) at 12:21

## 2018-01-01 RX ADMIN — ATORVASTATIN CALCIUM 80 MG: 40 TABLET, FILM COATED ORAL at 21:25

## 2018-01-01 RX ADMIN — Medication 10 ML: at 06:35

## 2018-01-01 RX ADMIN — Medication 10 ML: at 13:21

## 2018-01-01 RX ADMIN — PANTOPRAZOLE SODIUM 40 MG: 40 TABLET, DELAYED RELEASE ORAL at 06:37

## 2018-01-01 RX ADMIN — PIPERACILLIN SODIUM,TAZOBACTAM SODIUM 3.38 G: 3; .375 INJECTION, POWDER, FOR SOLUTION INTRAVENOUS at 17:47

## 2018-01-01 RX ADMIN — BUDESONIDE 500 MCG: 0.5 INHALANT RESPIRATORY (INHALATION) at 19:46

## 2018-01-01 RX ADMIN — AMIODARONE HYDROCHLORIDE 400 MG: 200 TABLET ORAL at 18:09

## 2018-01-01 RX ADMIN — Medication 81 MG: at 08:14

## 2018-01-01 RX ADMIN — HEPARIN SODIUM 5000 UNITS: 5000 INJECTION, SOLUTION INTRAVENOUS; SUBCUTANEOUS at 03:35

## 2018-01-01 RX ADMIN — ALLOPURINOL 100 MG: 100 TABLET ORAL at 08:33

## 2018-01-01 RX ADMIN — VANCOMYCIN HYDROCHLORIDE 2000 MG: 10 INJECTION, POWDER, LYOPHILIZED, FOR SOLUTION INTRAVENOUS at 22:39

## 2018-01-01 RX ADMIN — ZOLPIDEM TARTRATE 10 MG: 10 TABLET ORAL at 20:56

## 2018-01-01 RX ADMIN — HUMAN INSULIN 2 UNITS: 100 INJECTION, SOLUTION SUBCUTANEOUS at 06:46

## 2018-01-01 RX ADMIN — AMIODARONE HYDROCHLORIDE 400 MG: 200 TABLET ORAL at 08:17

## 2018-01-01 RX ADMIN — KETOROLAC TROMETHAMINE 30 MG: 60 INJECTION, SOLUTION INTRAMUSCULAR at 04:18

## 2018-01-01 RX ADMIN — VANCOMYCIN HYDROCHLORIDE 2000 MG: 10 INJECTION, POWDER, LYOPHILIZED, FOR SOLUTION INTRAVENOUS at 02:07

## 2018-01-01 RX ADMIN — MORPHINE SULFATE 30 MG: 30 TABLET, FILM COATED, EXTENDED RELEASE ORAL at 12:05

## 2018-01-01 RX ADMIN — HEPARIN SODIUM 5000 UNITS: 5000 INJECTION, SOLUTION INTRAVENOUS; SUBCUTANEOUS at 02:07

## 2018-01-01 RX ADMIN — METOPROLOL TARTRATE 5 MG: 5 INJECTION, SOLUTION INTRAVENOUS at 05:18

## 2018-01-01 RX ADMIN — SPIRONOLACTONE 50 MG: 25 TABLET, FILM COATED ORAL at 17:42

## 2018-01-01 RX ADMIN — CLOPIDOGREL BISULFATE 75 MG: 75 TABLET ORAL at 10:33

## 2018-01-01 RX ADMIN — NAFCILLIN SODIUM 2 G: 2 INJECTION, POWDER, LYOPHILIZED, FOR SOLUTION INTRAMUSCULAR; INTRAVENOUS at 05:14

## 2018-01-01 RX ADMIN — NAFCILLIN SODIUM 2 G: 2 INJECTION, POWDER, LYOPHILIZED, FOR SOLUTION INTRAMUSCULAR; INTRAVENOUS at 22:37

## 2018-01-01 RX ADMIN — ATORVASTATIN CALCIUM 80 MG: 40 TABLET, FILM COATED ORAL at 09:04

## 2018-01-01 RX ADMIN — DILTIAZEM HYDROCHLORIDE 10 MG/HR: 5 INJECTION INTRAVENOUS at 08:37

## 2018-01-01 RX ADMIN — VANCOMYCIN HYDROCHLORIDE 1750 MG: 10 INJECTION, POWDER, LYOPHILIZED, FOR SOLUTION INTRAVENOUS at 09:41

## 2018-01-01 RX ADMIN — Medication 25 MCG/MIN: at 12:00

## 2018-01-01 RX ADMIN — CITALOPRAM HYDROBROMIDE 40 MG: 20 TABLET ORAL at 08:12

## 2018-01-01 RX ADMIN — IRON SUCROSE 400 MG: 20 INJECTION, SOLUTION INTRAVENOUS at 09:09

## 2018-01-01 RX ADMIN — INSULIN LISPRO 2 UNITS: 100 INJECTION, SOLUTION INTRAVENOUS; SUBCUTANEOUS at 07:22

## 2018-01-01 RX ADMIN — Medication 10 ML: at 15:12

## 2018-01-01 RX ADMIN — POTASSIUM CHLORIDE 40 MEQ: 750 TABLET, EXTENDED RELEASE ORAL at 09:18

## 2018-01-01 RX ADMIN — HYDROMORPHONE HYDROCHLORIDE 1 MG: 1 INJECTION, SOLUTION INTRAMUSCULAR; INTRAVENOUS; SUBCUTANEOUS at 22:38

## 2018-01-01 RX ADMIN — PIPERACILLIN SODIUM AND TAZOBACTAM SODIUM 3.38 G: 3; .375 INJECTION, POWDER, LYOPHILIZED, FOR SOLUTION INTRAVENOUS at 03:14

## 2018-01-01 RX ADMIN — MORPHINE SULFATE 60 MG: 60 TABLET, FILM COATED, EXTENDED RELEASE ORAL at 14:26

## 2018-01-01 RX ADMIN — SACUBITRIL AND VALSARTAN 1 TABLET: 24; 26 TABLET, FILM COATED ORAL at 01:43

## 2018-01-01 RX ADMIN — LORAZEPAM 1 MG: 2 INJECTION INTRAMUSCULAR; INTRAVENOUS at 15:18

## 2018-01-01 RX ADMIN — ROPINIROLE HYDROCHLORIDE 8 MG: 1 TABLET, FILM COATED ORAL at 21:38

## 2018-01-01 RX ADMIN — HYDROMORPHONE HYDROCHLORIDE 4 MG: 4 TABLET ORAL at 09:06

## 2018-01-01 RX ADMIN — POTASSIUM CHLORIDE 10 MEQ: 10 INJECTION, SOLUTION INTRAVENOUS at 04:23

## 2018-01-01 RX ADMIN — MORPHINE SULFATE 60 MG: 60 TABLET, FILM COATED, EXTENDED RELEASE ORAL at 22:37

## 2018-01-01 RX ADMIN — ARFORMOTEROL TARTRATE 15 MCG: 15 SOLUTION RESPIRATORY (INHALATION) at 08:53

## 2018-01-01 RX ADMIN — ROPINIROLE HYDROCHLORIDE 8 MG: 1 TABLET, FILM COATED ORAL at 21:01

## 2018-01-01 RX ADMIN — POTASSIUM CHLORIDE 40 MEQ: 750 TABLET, EXTENDED RELEASE ORAL at 18:02

## 2018-01-01 RX ADMIN — MORPHINE SULFATE 60 MG: 15 TABLET, EXTENDED RELEASE ORAL at 08:42

## 2018-01-01 RX ADMIN — Medication 10 ML: at 06:44

## 2018-01-01 RX ADMIN — Medication 10 ML: at 22:02

## 2018-01-01 RX ADMIN — POTASSIUM CHLORIDE 40 MEQ: 750 TABLET, EXTENDED RELEASE ORAL at 18:00

## 2018-01-01 RX ADMIN — POTASSIUM CHLORIDE 10 MEQ: 10 INJECTION, SOLUTION INTRAVENOUS at 02:07

## 2018-01-01 RX ADMIN — MORPHINE SULFATE 60 MG: 60 TABLET, FILM COATED, EXTENDED RELEASE ORAL at 17:21

## 2018-01-01 RX ADMIN — DOCUSATE SODIUM 100 MG: 100 CAPSULE, LIQUID FILLED ORAL at 18:15

## 2018-01-01 RX ADMIN — PIPERACILLIN SODIUM,TAZOBACTAM SODIUM 3.38 G: 3; .375 INJECTION, POWDER, FOR SOLUTION INTRAVENOUS at 02:11

## 2018-01-01 RX ADMIN — FAMOTIDINE 20 MG: 20 TABLET ORAL at 18:03

## 2018-01-01 RX ADMIN — LISINOPRIL 2.5 MG: 5 TABLET ORAL at 12:26

## 2018-01-01 RX ADMIN — LISINOPRIL 2.5 MG: 5 TABLET ORAL at 08:25

## 2018-01-01 RX ADMIN — BUPROPION HYDROCHLORIDE 150 MG: 150 TABLET, EXTENDED RELEASE ORAL at 08:03

## 2018-01-01 RX ADMIN — ATORVASTATIN CALCIUM 80 MG: 40 TABLET, FILM COATED ORAL at 08:40

## 2018-01-01 RX ADMIN — CARVEDILOL 6.25 MG: 6.25 TABLET, FILM COATED ORAL at 07:22

## 2018-01-01 RX ADMIN — BUPROPION HYDROCHLORIDE 150 MG: 150 TABLET, EXTENDED RELEASE ORAL at 17:21

## 2018-01-01 RX ADMIN — MORPHINE SULFATE 60 MG: 60 TABLET, FILM COATED, EXTENDED RELEASE ORAL at 07:06

## 2018-01-01 RX ADMIN — MORPHINE SULFATE 2 MG: 2 INJECTION, SOLUTION INTRAMUSCULAR; INTRAVENOUS at 03:35

## 2018-01-01 RX ADMIN — DOCUSATE SODIUM 100 MG: 100 CAPSULE, LIQUID FILLED ORAL at 20:13

## 2018-01-01 RX ADMIN — FERROUS SULFATE TAB 325 MG (65 MG ELEMENTAL FE) 325 MG: 325 (65 FE) TAB at 12:38

## 2018-01-01 RX ADMIN — FUROSEMIDE 40 MG: 10 INJECTION, SOLUTION INTRAMUSCULAR; INTRAVENOUS at 21:37

## 2018-01-01 RX ADMIN — METOPROLOL SUCCINATE 25 MG: 25 TABLET, EXTENDED RELEASE ORAL at 08:35

## 2018-01-01 RX ADMIN — MORPHINE SULFATE 60 MG: 60 TABLET, FILM COATED, EXTENDED RELEASE ORAL at 21:32

## 2018-01-01 RX ADMIN — FUROSEMIDE 60 MG: 40 TABLET ORAL at 17:09

## 2018-01-01 RX ADMIN — FAMOTIDINE 20 MG: 20 TABLET ORAL at 18:00

## 2018-01-01 RX ADMIN — METOPROLOL TARTRATE 25 MG: 25 TABLET ORAL at 21:53

## 2018-01-01 RX ADMIN — HEPARIN SODIUM 5000 UNITS: 5000 INJECTION, SOLUTION INTRAVENOUS; SUBCUTANEOUS at 17:25

## 2018-01-01 RX ADMIN — BUPROPION HYDROCHLORIDE 150 MG: 150 TABLET, EXTENDED RELEASE ORAL at 18:17

## 2018-01-01 RX ADMIN — NAFCILLIN SODIUM 2 G: 2 INJECTION, POWDER, LYOPHILIZED, FOR SOLUTION INTRAMUSCULAR; INTRAVENOUS at 12:46

## 2018-01-01 RX ADMIN — DOCUSATE SODIUM 100 MG: 100 CAPSULE, LIQUID FILLED ORAL at 17:03

## 2018-01-01 RX ADMIN — BUPROPION HYDROCHLORIDE 150 MG: 150 TABLET, EXTENDED RELEASE ORAL at 08:10

## 2018-01-01 RX ADMIN — APIXABAN 5 MG: 5 TABLET, FILM COATED ORAL at 08:45

## 2018-01-01 RX ADMIN — MIDODRINE HYDROCHLORIDE 5 MG: 5 TABLET ORAL at 14:41

## 2018-01-01 RX ADMIN — IPRATROPIUM BROMIDE AND ALBUTEROL SULFATE 3 ML: .5; 3 SOLUTION RESPIRATORY (INHALATION) at 08:43

## 2018-01-01 RX ADMIN — CITALOPRAM HYDROBROMIDE 40 MG: 20 TABLET ORAL at 09:52

## 2018-01-01 RX ADMIN — ISOSORBIDE MONONITRATE 60 MG: 60 TABLET, EXTENDED RELEASE ORAL at 08:24

## 2018-01-01 RX ADMIN — CLOPIDOGREL BISULFATE 75 MG: 75 TABLET ORAL at 08:40

## 2018-01-01 RX ADMIN — MILRINONE LACTATE IN DEXTROSE 0.38 MCG/KG/MIN: 200 INJECTION, SOLUTION INTRAVENOUS at 10:18

## 2018-01-01 RX ADMIN — FERROUS SULFATE TAB 325 MG (65 MG ELEMENTAL FE) 325 MG: 325 (65 FE) TAB at 09:17

## 2018-01-01 RX ADMIN — ARFORMOTEROL TARTRATE 15 MCG: 15 SOLUTION RESPIRATORY (INHALATION) at 08:44

## 2018-01-01 RX ADMIN — ATORVASTATIN CALCIUM 80 MG: 40 TABLET, FILM COATED ORAL at 09:06

## 2018-01-01 RX ADMIN — Medication 10 ML: at 21:05

## 2018-01-01 RX ADMIN — METOPROLOL SUCCINATE 25 MG: 25 TABLET, EXTENDED RELEASE ORAL at 08:10

## 2018-01-01 RX ADMIN — DOCUSATE SODIUM 100 MG: 100 CAPSULE, LIQUID FILLED ORAL at 08:33

## 2018-01-01 RX ADMIN — POLYETHYLENE GLYCOL 3350 17 G: 17 POWDER, FOR SOLUTION ORAL at 15:15

## 2018-01-01 RX ADMIN — CLINDAMYCIN PHOSPHATE 600 MG: 600 INJECTION, SOLUTION INTRAVENOUS at 17:21

## 2018-01-01 RX ADMIN — ZOLPIDEM TARTRATE 10 MG: 5 TABLET ORAL at 23:15

## 2018-01-01 RX ADMIN — ISOSORBIDE MONONITRATE 60 MG: 60 TABLET, EXTENDED RELEASE ORAL at 08:27

## 2018-01-01 RX ADMIN — FERROUS SULFATE TAB 325 MG (65 MG ELEMENTAL FE) 325 MG: 325 (65 FE) TAB at 09:55

## 2018-01-01 RX ADMIN — BUDESONIDE 500 MCG: 0.5 INHALANT RESPIRATORY (INHALATION) at 21:50

## 2018-01-01 RX ADMIN — Medication 10 ML: at 05:44

## 2018-01-01 RX ADMIN — MORPHINE SULFATE 60 MG: 60 TABLET, FILM COATED, EXTENDED RELEASE ORAL at 17:06

## 2018-01-01 RX ADMIN — NAFCILLIN SODIUM 2 G: 2 INJECTION, POWDER, LYOPHILIZED, FOR SOLUTION INTRAMUSCULAR; INTRAVENOUS at 16:35

## 2018-01-01 RX ADMIN — AMIODARONE HYDROCHLORIDE 400 MG: 200 TABLET ORAL at 08:14

## 2018-01-01 RX ADMIN — ARFORMOTEROL TARTRATE 15 MCG: 15 SOLUTION RESPIRATORY (INHALATION) at 21:06

## 2018-01-01 RX ADMIN — NAFCILLIN SODIUM 2 G: 2 INJECTION, POWDER, LYOPHILIZED, FOR SOLUTION INTRAMUSCULAR; INTRAVENOUS at 16:39

## 2018-01-01 RX ADMIN — ARFORMOTEROL TARTRATE 15 MCG: 15 SOLUTION RESPIRATORY (INHALATION) at 20:29

## 2018-01-01 RX ADMIN — BUDESONIDE 500 MCG: 0.5 INHALANT RESPIRATORY (INHALATION) at 19:42

## 2018-01-01 RX ADMIN — HEPARIN SODIUM 5000 UNITS: 5000 INJECTION, SOLUTION INTRAVENOUS; SUBCUTANEOUS at 05:20

## 2018-01-01 RX ADMIN — MORPHINE SULFATE 60 MG: 60 TABLET, FILM COATED, EXTENDED RELEASE ORAL at 22:50

## 2018-01-01 RX ADMIN — ALBUMIN (HUMAN) 12.5 G: 0.25 INJECTION, SOLUTION INTRAVENOUS at 19:32

## 2018-01-01 RX ADMIN — HYDROMORPHONE HYDROCHLORIDE 4 MG: 4 TABLET ORAL at 12:29

## 2018-01-01 RX ADMIN — ENOXAPARIN SODIUM 111 MG: 120 INJECTION SUBCUTANEOUS at 20:52

## 2018-01-01 RX ADMIN — BUDESONIDE 500 MCG: 0.5 INHALANT RESPIRATORY (INHALATION) at 07:46

## 2018-01-01 RX ADMIN — ZOLPIDEM TARTRATE 10 MG: 5 TABLET ORAL at 22:54

## 2018-01-01 RX ADMIN — MORPHINE SULFATE 60 MG: 60 TABLET, FILM COATED, EXTENDED RELEASE ORAL at 01:11

## 2018-01-01 RX ADMIN — CLINDAMYCIN PHOSPHATE 600 MG: 600 INJECTION, SOLUTION INTRAVENOUS at 16:33

## 2018-01-01 RX ADMIN — DOCUSATE SODIUM 100 MG: 100 CAPSULE, LIQUID FILLED ORAL at 08:36

## 2018-01-01 RX ADMIN — ARFORMOTEROL TARTRATE 15 MCG: 15 SOLUTION RESPIRATORY (INHALATION) at 20:26

## 2018-01-01 RX ADMIN — PIPERACILLIN SODIUM AND TAZOBACTAM SODIUM 3.38 G: 3; .375 INJECTION, POWDER, LYOPHILIZED, FOR SOLUTION INTRAVENOUS at 03:44

## 2018-01-01 RX ADMIN — APIXABAN 5 MG: 5 TABLET, FILM COATED ORAL at 17:03

## 2018-01-01 RX ADMIN — HYDROMORPHONE HYDROCHLORIDE 4 MG: 4 TABLET ORAL at 08:30

## 2018-01-01 RX ADMIN — BUPROPION HYDROCHLORIDE 150 MG: 150 TABLET, EXTENDED RELEASE ORAL at 08:17

## 2018-01-01 RX ADMIN — MORPHINE SULFATE 60 MG: 60 TABLET, FILM COATED, EXTENDED RELEASE ORAL at 08:17

## 2018-01-01 RX ADMIN — HYDROMORPHONE HYDROCHLORIDE 4 MG: 4 TABLET ORAL at 08:26

## 2018-01-01 RX ADMIN — FUROSEMIDE 60 MG: 40 TABLET ORAL at 08:30

## 2018-01-01 RX ADMIN — BUDESONIDE 500 MCG: 0.5 INHALANT RESPIRATORY (INHALATION) at 20:26

## 2018-01-01 RX ADMIN — MORPHINE SULFATE 60 MG: 60 TABLET, FILM COATED, EXTENDED RELEASE ORAL at 00:32

## 2018-01-01 RX ADMIN — DOCUSATE SODIUM 100 MG: 100 CAPSULE, LIQUID FILLED ORAL at 16:12

## 2018-01-01 RX ADMIN — DOCUSATE SODIUM 100 MG: 100 CAPSULE, LIQUID FILLED ORAL at 17:49

## 2018-01-01 RX ADMIN — MIDODRINE HYDROCHLORIDE 5 MG: 5 TABLET ORAL at 17:46

## 2018-01-01 RX ADMIN — LISINOPRIL 2.5 MG: 5 TABLET ORAL at 09:53

## 2018-02-25 NOTE — IP AVS SNAPSHOT
[address] 
[phone] Patient: Donato Hunter MRN: [id number] :1960 About your hospitalization You were admitted on:  2018 You last received care in the:  [place] [id number] You were discharged on:  March 3, 2018 Why you were hospitalized Your primary diagnosis was:  Systolic Chf, Acute (Hcc) Your diagnoses also included:  Acute Systolic Chf (Congestive Heart Failure) (Hcc) Follow-up Information Follow up With Details Comments Contact Info [place] Sent by [email or web address]  [facility] for home health services. Service to begin the day after discharge. Please call the agency if no contact by 12 noon the day after discharge. [phone] [address]  Appointment on 3/28/18 at 10:25AM with Jorden Cerna MD. Please call the Center before then if there are questions or concerns. [phone] Tammy Velázquez MD  follow up with your pcp next week for hospital follow up [address] [place] [address] 
[phone] Ursula Sanchez MD   [address] 
[phone] Micheal Clark NP On 3/8/2018 at 11:30 am [address] [place] [address] 
[phone] Discharge Orders None A check dani indicates which time of day the medication should be taken. My Medications START taking these medications Instructions Each Dose to Equal  
 Morning Noon Evening Bedtime  
 amoxicillin-clavulanate 875-125 mg per tablet Commonly known as:  AUGMENTIN Your last dose was: Your next dose is: Take 1 Tab by mouth two (2) times a day. 1 Tab  
    
   
   
   
  
 potassium chloride SR 20 mEq tablet Commonly known as:  K-TAB Your last dose was: Your next dose is: Take 2 Tabs by mouth daily. 40 mEq sacubitril-valsartan 24-26 mg tablet Commonly known as:  ENTRESTO Your last dose was: Your next dose is: Take 1 Tab by mouth two (2) times a day. 1 Tab  
    
   
   
   
  
 spironolactone 25 mg tablet Commonly known as:  ALDACTONE Your last dose was: Your next dose is: Take 1 Tab by mouth daily. 25 mg CHANGE how you take these medications Instructions Each Dose to Equal  
 Morning Noon Evening Bedtime  
 furosemide 40 mg tablet Commonly known as:  LASIX What changed:  how much to take Your last dose was: Your next dose is: Take 1.5 Tabs by mouth two (2) times a day. 60 mg CONTINUE taking these medications Instructions Each Dose to Equal  
 Morning Noon Evening Bedtime ADVAIR DISKUS 250-50 mcg/dose diskus inhaler Generic drug:  fluticasone-salmeterol Your last dose was: Your next dose is: Take 1 Puff by inhalation two (2) times a day. 1 Puff  
    
   
   
   
  
 albuterol-ipratropium 2.5 mg-0.5 mg/3 ml Nebu Commonly known as:  Juventino Cinnamon Your last dose was: Your next dose is:    
   
   
 3 mL by Nebulization route every four (4) hours. 3 mL  
    
   
   
   
  
 aspirin delayed-release 81 mg tablet Your last dose was: Your next dose is: Take 81 mg by mouth daily. 81 mg  
    
   
   
   
  
 atorvastatin 80 mg tablet Commonly known as:  LIPITOR Your last dose was: Your next dose is: Take 80 mg by mouth daily. 80 mg  
    
   
   
   
  
 citalopram 40 mg tablet Commonly known as:  Adarsh Pinedory Your last dose was: Your next dose is: Take 40 mg by mouth daily. 40 mg  
    
   
   
   
  
 clopidogrel 75 mg Tab Commonly known as:  PLAVIX Your last dose was: Your next dose is: Take 75 mg by mouth daily. 75 mg  
    
   
   
   
  
 DILAUDID 4 mg tablet Generic drug:  HYDROmorphone Your last dose was: Your next dose is: Take 4 mg by mouth two (2) times a day. 4 mg  
    
   
   
   
  
 docusate sodium 100 mg capsule Commonly known as:  Joselyn Palin Your last dose was: Your next dose is: Take 100 mg by mouth two (2) times a day. 100 mg FLUoxetine 20 mg capsule Commonly known as:  PROzac Your last dose was: Your next dose is: Take 1 Cap by mouth daily. 20 mg  
    
   
   
   
  
 IMDUR 60 mg CR tablet Generic drug:  isosorbide mononitrate ER Your last dose was: Your next dose is: Take 60 mg by mouth daily. 60 mg  
    
   
   
   
  
 metFORMIN 1,000 mg tablet Commonly known as:  GLUCOPHAGE Your last dose was: Your next dose is: Take 1,000 mg by mouth two (2) times daily (with meals). Indications: type 2 diabetes mellitus 1000 mg  
    
   
   
   
  
 MS CONTIN 30 mg CR tablet Generic drug:  morphine CR Your last dose was: Your next dose is: Take 60 mg by mouth every eight (8) hours. 60 mg NITROSTAT 0.4 mg SL tablet Generic drug:  nitroglycerin Your last dose was: Your next dose is: 0.4 mg by SubLINGual route every five (5) minutes as needed for Chest Pain. 0.4 mg  
    
   
   
   
  
 nortriptyline 50 mg capsule Commonly known as:  PAMELOR Your last dose was: Your next dose is: Take 50 mg by mouth nightly. 50 mg  
    
   
   
   
  
 omeprazole 20 mg capsule Commonly known as:  PRILOSEC Your last dose was: Your next dose is: Take 20 mg by mouth daily. Indications: gastroesophageal reflux disease 20 mg  
    
   
   
   
  
 rOPINIRole 4 mg Tab TAB Commonly known as:  Nabila Ny Your last dose was: Your next dose is: Take 8 mg by mouth nightly. Indications: Restless Legs Syndrome 8 mg SPIRIVA WITH HANDIHALER 18 mcg inhalation capsule Generic drug:  tiotropium Your last dose was: Your next dose is: Take 1 Cap by inhalation daily. 1 Cap  
    
   
   
   
  
 zolpidem 10 mg tablet Commonly known as:  AMBIEN Your last dose was: Your next dose is: Take 1 Tab by mouth nightly. 10 mg  
    
   
   
   
  
  
STOP taking these medications   
 lisinopril 2.5 mg tablet Commonly known as:  PRINIVIL, ZESTRIL  
   
  
 metoprolol succinate 25 mg XL tablet Commonly known as:  TOPROL-XL Where to Get Your Medications These medications were sent to [facility], [facility] 99 [address] [place] [address], [facility] 79 Phone:  [phone]  
  sacubitril-valsartan 24-26 mg tablet Information on where to get these meds will be given to you by the nurse or doctor. ! Ask your nurse or doctor about these medications  
  albuterol-ipratropium 2.5 mg-0.5 mg/3 ml Nebu  
 amoxicillin-clavulanate 875-125 mg per tablet FLUoxetine 20 mg capsule  
 furosemide 40 mg tablet  
 potassium chloride SR 20 mEq tablet  
 spironolactone 25 mg tablet Discharge Instructions Please bring this form with you to show your primary care provider at your follow-up appointment. Primary care provider:  Dr. Jose Baker MD 
 
Discharging provider:  Schuyler Wilson MD 
 
You have been admitted to the hospital with the following diagnoses: · Acute systolic CHF (congestive heart failure) ([facility] [address] · Cellulitis of Legs FOLLOW-UP CARE RECOMMENDATIONS: 
 
APPOINTMENTS: 
Follow-up Information Follow up With Details Comments Contact Info [place] Sent by [email or web address]  Referred for home health services. Service to begin the day after discharge. Please call the agency if no contact by 12 noon the day after discharge. [phone] [facility] Yung  Appointment on 3/28/18 at 10:25AM with Kathleen Hoover MD. Please call the Center before then if there are questions or concerns. [phone] Fuentes Hernández MD  follow up with your pcp next week for hospital follow up [address] [facility] [address] [place] [address] 
[phone] Luis Alves MD   [address] 
[phone] Eugenio Dennis NP On 3/8/2018 at 11:30 am [address] [place] [address] 
[phone] FOLLOW-UP TESTS recommended:  
- follow up with Cardiology team as scheduled 
- take medications as prescribed - Lasix increased to 60mg daily 
- STOP taking Toprol XL until further instructed - Take Entresto- samples given by Cardiology PENDING TEST RESULTS: 
At the time of your discharge the following test results are still pending: NONE Please make sure you review these results with your outpatient follow-up provider(s). SYMPTOMS to watch for: chest pain, shortness of breath, fever, chills, nausea, vomiting, diarrhea, change in mentation, falling, weakness, bleeding. DIET/what to eat:  Diabetic Diet ACTIVITY:  Activity as tolerated WOUND CARE: NONE 
 
EQUIPMENT needed:  NONE What to do if new or unexpected symptoms occur? If you experience any of the above symptoms (or should other concerns or questions arise after discharge) please call your primary care physician. Return to the emergency room if you cannot get hold of your doctor. · It is very important that you keep your follow-up appointment(s). · Please bring discharge papers, medication list (and/or medication bottles) to your follow-up appointments for review by your outpatient provider(s). · Please check the list of medications and be sure it includes every medication (even non-prescription medications) that your provider wants you to take. · It is important that you take the medication exactly as they are prescribed. · Keep your medication in the bottles provided by the pharmacist and keep a list of the medication names, dosages, and times to be taken in your wallet. · Do not take other medications without consulting your doctor. · If you have any questions about your medications or other instructions, please talk to your nurse or care provider before you leave the hospital. 
 
I understand that if any problems occur once I am at home I am to contact my physician. These instructions were explained to me and I had the opportunity to ask questions. [facility] Announcement We are excited to announce that we are making your provider's discharge notes available to you in [facility]. You will see these notes when they are completed and signed by the physician that discharged you from your recent hospital stay. If you have any questions or concerns about any information you see in [facility], please call the Health Information Department where you were seen or reach out to your Primary Care Provider for more information about your plan of care. Introducing [facility]! Lilian Tanner introduces [employer] patient portal. Now you can access parts of your medical record, email your doctor's office, and request medication refills online. 1. In your internet browser, go to [email or web address] 2. Click on the First Time User? Click Here link in the Sign In box. You will see the New Member Sign Up page. 3. Enter your [facility] Access Code exactly as it appears below. You will not need to use this code after youve completed the sign-up process. If you do not sign up before the expiration date, you must request a new code. · [employer] Access Code: [email or web address] Expires: 5/29/2018  7:02 AM 
 
4. Enter the last four digits of your Social Security Number (xxxx) and Date of Birth (mm/dd/yyyy) as indicated and click Submit. You will be taken to the next sign-up page. 5. Create a [employer] ID. This will be your [employer] login ID and cannot be changed, so think of one that is secure and easy to remember. 6. Create a [employer] password. You can change your password at any time. 7. Enter your Password Reset Question and Answer. This can be used at a later time if you forget your password. 8. Enter your e-mail address. You will receive e-mail notification when new information is available in [address] 9. Click Sign Up. You can now view and download portions of your medical record. 10. Click the Download Summary menu link to download a portable copy of your medical information. If you have questions, please visit the Frequently Asked Questions section of the [employer] website. Remember, [employer] is NOT to be used for urgent needs. For medical emergencies, dial 911. Now available from your iPhone and Android! Providers Seen During Your Hospitalization Provider Specialty Primary office phone Caprice Clarke MD Emergency Medicine [phone] Chandrika Jones MD Hospitalist [phone] Jasvir Escobar MD Internal Medicine [phone] Martinez Nielsen MD Internal Medicine [phone] Your Primary Care Physician (PCP) Primary Care Physician Office Phone Office Fax Zheng Oglesby [phone] You are allergic to the following No active allergies Recent Documentation Height Weight BMI Smoking Status 1.88 m 120.3 kg 34.05 kg/m2 Current Every Day Smoker Emergency Contacts Name Discharge Info Relation Home Work Mobile [facility] DISCHARGE CAREGIVER [3] Friend [5] [phone] Patient Belongings The following personal items are in your possession at time of discharge: 
     Visual Aid: Glasses Discharge Instructions Attachments/References HEART FAILURE: AVOIDING TRIGGERS (ENGLISH) Patient Handouts Avoiding Triggers With Heart Failure: Care Instructions Your Care Instructions Triggers are anything that make your heart failure flare up. A flare-up is also called \"sudden heart failure\" or \"acute heart failure. \" When you have a flare-up, fluid builds up in your lungs, and you have problems breathing. You might need to go to the hospital. By watching for changes in your condition and avoiding triggers, you can prevent heart failure flare-ups. Follow-up care is a key part of your treatment and safety. Be sure to make and go to all appointments, and call your doctor if you are having problems. It's also a good idea to know your test results and keep a list of the medicines you take. How can you care for yourself at home? Watch for changes in your weight and condition · Weigh yourself without clothing at the same time each day. Record your weight. Call your doctor if you have sudden weight gain, such as more than 2 to 3 pounds in a day or 5 pounds in a week. (Your doctor may suggest a different range of weight gain.) A sudden weight gain may mean that your heart failure is getting worse. · Keep a daily record of your symptoms. Write down any changes in how you feel, such as new shortness of breath, cough, or problems eating. Also record if your ankles are more swollen than usual and if you feel more tired than usual. Note anything that you ate or did that could have triggered these changes. Limit sodium Sodium causes your body to hold on to extra water. This may cause your heart failure symptoms to get worse. People get most of their sodium from processed foods. Fast food and restaurant meals also tend to be very high in sodium. · Your doctor may suggest that you limit sodium to 2,000 milligrams (mg) a day or less. That is less than 1 teaspoon of salt a day, including all the salt you eat in cooking or in packaged foods. · Read food labels on cans and food packages. They tell you how much sodium you get in one serving. Check the serving size. If you eat more than one serving, you are getting more sodium. · Be aware that sodium can come in forms other than salt, including monosodium glutamate (MSG), sodium citrate, and sodium bicarbonate (baking soda). MSG is often added to Asian food. You can sometimes ask for food without MSG or salt. · Slowly reducing salt will help you adjust to the taste. Take the salt shaker off the table. · Flavor your food with garlic, lemon juice, onion, vinegar, herbs, and spices instead of salt. Do not use soy sauce, steak sauce, onion salt, garlic salt, mustard, or ketchup on your food, unless it is labeled \"low-sodium\" or \"low-salt. \" 
· Make your own salad dressings, sauces, and ketchup without adding salt. · Use fresh or frozen ingredients, instead of canned ones, whenever you can. Choose low-sodium canned goods. · Eat less processed food and food from restaurants, including fast food. Exercise as directed Moderate, regular exercise is very good for your heart. It improves your blood flow and helps control your weight. But too much exercise can stress your heart and cause a heart failure flare-up. · Check with your doctor before you start an exercise program. 
· Walking is an easy way to get exercise. Start out slowly. Gradually increase the length and pace of your walk. Swimming, riding a bike, and using a treadmill are also good forms of exercise. · When you exercise, watch for signs that your heart is working too hard. You are pushing yourself too hard if you cannot talk while you are exercising.  If you become short of breath or dizzy or have chest pain, stop, sit down, and rest. 
 · Do not exercise when you do not feel well. Take medicines correctly · Take your medicines exactly as prescribed. Call your doctor if you think you are having a problem with your medicine. · Make a list of all the medicines you take. Include those prescribed to you by other doctors and any over-the-counter medicines, vitamins, or supplements you take. Take this list with you when you go to any doctor. · Take your medicines at the same time every day. It may help you to post a list of all the medicines you take every day and what time of day you take them. · Make taking your medicine as simple as you can. Plan times to take your medicines when you are doing other things, such as eating a meal or getting ready for bed. This will make it easier to remember to take your medicines. · Get organized. Use helpful tools, such as daily or weekly pill containers. When should you call for help? Call 911 if you have symptoms of sudden heart failure such as: 
? · You have severe trouble breathing. ? · You cough up pink, foamy mucus. ? · You have a new irregular or rapid heartbeat. ?Call your doctor now or seek immediate medical care if: 
? · You have new or increased shortness of breath. ? · You are dizzy or lightheaded, or you feel like you may faint. ? · You have sudden weight gain, such as more than 2 to 3 pounds in a day or 5 pounds in a week. (Your doctor may suggest a different range of weight gain.) ? · You have increased swelling in your legs, ankles, or feet. ? · You are suddenly so tired or weak that you cannot do your usual activities. ? Watch closely for changes in your health, and be sure to contact your doctor if you develop new symptoms. Where can you learn more? Go to http://clover-livia.info/. Enter Q198 in the search box to learn more about \"Avoiding Triggers With Heart Failure: Care Instructions. \" Current as of: September 21, 2016 Content Version: 11.4 © 7425-3359 [employer]. Care instructions adapted under license by [employer] (which disclaims liability or warranty for this information). If you have questions about a medical condition or this instruction, always ask your healthcare professional. [address] any warranty or liability for your use of this information. Please provide this summary of care documentation to your next provider. Signatures-by signing, you are acknowledging that this After Visit Summary has been reviewed with you and you have received a copy. Patient Signature:  ____________________________________________________________ Date:  ____________________________________________________________  
  
[facility] Provider Signature:  ____________________________________________________________ Date:  ____________________________________________________________

## 2018-02-25 NOTE — IP AVS SNAPSHOT
[address] [facility] 13 
[phone] Patient: Ina Arias MRN: [id number] :1960 A check dani indicates which time of day the medication should be taken. My Medications START taking these medications Instructions Each Dose to Equal  
 Morning Noon Evening Bedtime  
 amoxicillin-clavulanate 875-125 mg per tablet Commonly known as:  AUGMENTIN Your last dose was: Your next dose is: Take 1 Tab by mouth two (2) times a day. 1 Tab  
    
   
   
   
  
 potassium chloride SR 20 mEq tablet Commonly known as:  K-TAB Your last dose was: Your next dose is: Take 2 Tabs by mouth daily. 40 mEq  
    
   
   
   
  
 sacubitril-valsartan 24-26 mg tablet Commonly known as:  ENTRESTO Your last dose was: Your next dose is: Take 1 Tab by mouth two (2) times a day. 1 Tab  
    
   
   
   
  
 spironolactone 25 mg tablet Commonly known as:  ALDACTONE Your last dose was: Your next dose is: Take 1 Tab by mouth daily. 25 mg CHANGE how you take these medications Instructions Each Dose to Equal  
 Morning Noon Evening Bedtime  
 furosemide 40 mg tablet Commonly known as:  LASIX What changed:  how much to take Your last dose was: Your next dose is: Take 1.5 Tabs by mouth two (2) times a day. 60 mg CONTINUE taking these medications Instructions Each Dose to Equal  
 Morning Noon Evening Bedtime ADVAIR DISKUS 250-50 mcg/dose diskus inhaler Generic drug:  fluticasone-salmeterol Your last dose was: Your next dose is: Take 1 Puff by inhalation two (2) times a day. 1 Puff  
    
   
   
   
  
 albuterol-ipratropium 2.5 mg-0.5 mg/3 ml Nebu Commonly known as:  Cathie Barbosa  
   
 Your last dose was: Your next dose is:    
   
   
 3 mL by Nebulization route every four (4) hours. 3 mL  
    
   
   
   
  
 aspirin delayed-release 81 mg tablet Your last dose was: Your next dose is: Take 81 mg by mouth daily. 81 mg  
    
   
   
   
  
 atorvastatin 80 mg tablet Commonly known as:  LIPITOR Your last dose was: Your next dose is: Take 80 mg by mouth daily. 80 mg  
    
   
   
   
  
 citalopram 40 mg tablet Commonly known as:  Robbie Alston Your last dose was: Your next dose is: Take 40 mg by mouth daily. 40 mg  
    
   
   
   
  
 clopidogrel 75 mg Tab Commonly known as:  PLAVIX Your last dose was: Your next dose is: Take 75 mg by mouth daily. 75 mg  
    
   
   
   
  
 DILAUDID 4 mg tablet Generic drug:  HYDROmorphone Your last dose was: Your next dose is: Take 4 mg by mouth two (2) times a day. 4 mg  
    
   
   
   
  
 docusate sodium 100 mg capsule Commonly known as:  Addison Jhaveri Your last dose was: Your next dose is: Take 100 mg by mouth two (2) times a day. 100 mg FLUoxetine 20 mg capsule Commonly known as:  PROzac Your last dose was: Your next dose is: Take 1 Cap by mouth daily. 20 mg  
    
   
   
   
  
 IMDUR 60 mg CR tablet Generic drug:  isosorbide mononitrate ER Your last dose was: Your next dose is: Take 60 mg by mouth daily. 60 mg  
    
   
   
   
  
 metFORMIN 1,000 mg tablet Commonly known as:  GLUCOPHAGE Your last dose was: Your next dose is: Take 1,000 mg by mouth two (2) times daily (with meals). Indications: type 2 diabetes mellitus 1000 mg  
    
   
   
   
  
 MS CONTIN 30 mg CR tablet Generic drug:  morphine CR Your last dose was: Your next dose is: Take 60 mg by mouth every eight (8) hours. 60 mg NITROSTAT 0.4 mg SL tablet Generic drug:  nitroglycerin Your last dose was: Your next dose is: 0.4 mg by SubLINGual route every five (5) minutes as needed for Chest Pain. 0.4 mg  
    
   
   
   
  
 nortriptyline 50 mg capsule Commonly known as:  PAMELOR Your last dose was: Your next dose is: Take 50 mg by mouth nightly. 50 mg  
    
   
   
   
  
 omeprazole 20 mg capsule Commonly known as:  PRILOSEC Your last dose was: Your next dose is: Take 20 mg by mouth daily. Indications: gastroesophageal reflux disease 20 mg  
    
   
   
   
  
 rOPINIRole 4 mg Tab TAB Commonly known as:  Twylla Ramus Your last dose was: Your next dose is: Take 8 mg by mouth nightly. Indications: Restless Legs Syndrome 8 mg SPIRIVA WITH HANDIHALER 18 mcg inhalation capsule Generic drug:  tiotropium Your last dose was: Your next dose is: Take 1 Cap by inhalation daily. 1 Cap  
    
   
   
   
  
 zolpidem 10 mg tablet Commonly known as:  AMBIEN Your last dose was: Your next dose is: Take 1 Tab by mouth nightly. 10 mg  
    
   
   
   
  
  
STOP taking these medications   
 lisinopril 2.5 mg tablet Commonly known as:  PRINIVIL, ZESTRIL  
   
  
 metoprolol succinate 25 mg XL tablet Commonly known as:  TOPROL-XL Where to Get Your Medications These medications were sent to [facility], [address] [place] [address] 99 [address], [place] [facility] 79 Phone:  [phone]  
  sacubitril-valsartan 24-26 mg tablet Information on where to get these meds will be given to you by the nurse or doctor. ! Ask your nurse or doctor about these medications  
  albuterol-ipratropium 2.5 mg-0.5 mg/3 ml Nebu  
 amoxicillin-clavulanate 875-125 mg per tablet FLUoxetine 20 mg capsule  
 furosemide 40 mg tablet  
 potassium chloride SR 20 mEq tablet  
 spironolactone 25 mg tablet

## 2018-02-26 PROBLEM — I50.21 SYSTOLIC CHF, ACUTE (HCC): Status: ACTIVE | Noted: 2018-01-01

## 2018-02-26 PROBLEM — I50.21 ACUTE SYSTOLIC CHF (CONGESTIVE HEART FAILURE) (HCC): Status: ACTIVE | Noted: 2018-01-01

## 2018-02-26 NOTE — PROGRESS NOTES
Admission Medication Reconciliation:    Information obtained from:  patient, RxQuery, Home medication list (date of 6/2017)    Comments/Recommendations: All medications/allergies have been reviewed and updated. The patient appeared to be a good historian and the medication doses and frequencies matched the information from insurance claim history. The patient had a hand written medication list with the date of 6/2017; however, it appears he has made several updates since that date. Changes made to Prior to Admission (PTA) Medication List:   ?   Medications Added:   - Nitroglycerin, nortriptyline, docusate  ? Medications Changed:   - Hydromorphone 4 mg tablet changed from 1 tablet TID to BID  - morphine CR 60 mg changed from Q12H to Q8H  - metoprolol changed from 50 mg daily to 25 mg daily  ? Medications Removed:   - None       Significant PMH/Disease States:   Past Medical History:   Diagnosis Date    CAD (coronary artery disease)     [facility])     Chronic systolic heart failure ([facility] [address] 4/12/2011    COPD (chronic obstructive pulmonary disease) ([facility])     Coronary atherosclerosis of native coronary artery 4/12/2011    Diabetes ([facility]     High cholesterol     Hypertension     MI, old     Neurological disorder     Other primary cardiomyopathies 4/12/2011    Restless leg syndrome        Chief Complaint for this Admission:    Chief Complaint   Patient presents with    Shortness of Breath    Chest Pain       Allergies:  Review of patient's allergies indicates no known allergies. Prior to Admission Medications:   Prior to Admission Medications   Prescriptions Last Dose Informant Patient Reported? Taking? HYDROmorphone (DILAUDID) 4 mg tablet  Self Yes Yes   Sig: Take 4 mg by mouth two (2) times a day. albuterol-ipratropium (DUO-NEB) 2.5 mg-0.5 mg/3 ml nebu  Self Yes Yes   Sig: 3 mL by Nebulization route every four (4) hours.    aspirin delayed-release 81 mg tablet   Yes Yes   Sig: Take 81 mg by mouth daily. atorvastatin (LIPITOR) 80 mg tablet  Self Yes Yes   Sig: Take 80 mg by mouth daily. citalopram (CELEXA) 40 mg tablet   Yes Yes   Sig: Take 40 mg by mouth daily. clopidogrel (PLAVIX) 75 mg tab  Self Yes Yes   Sig: Take 75 mg by mouth daily. docusate sodium (COLACE) 100 mg capsule   Yes Yes   Sig: Take 100 mg by mouth two (2) times a day. fluticasone-salmeterol (ADVAIR DISKUS) 250-50 mcg/dose diskus inhaler  Self Yes Yes   Sig: Take 1 Puff by inhalation two (2) times a day. furosemide (LASIX) 40 mg tablet   Yes Yes   Sig: Take 40 mg by mouth two (2) times a day. isosorbide mononitrate ER (IMDUR) 60 mg CR tablet  Self Yes Yes   Sig: Take 60 mg by mouth daily. lisinopril (PRINIVIL, ZESTRIL) 2.5 mg tablet  Self Yes Yes   Sig: Take 2.5 mg by mouth nightly. metFORMIN (GLUCOPHAGE) 1,000 mg tablet  Self Yes Yes   Sig: Take 1,000 mg by mouth two (2) times daily (with meals). Indications: type 2 diabetes mellitus   metoprolol succinate (TOPROL-XL) 25 mg XL tablet  Self Yes Yes   Sig: Take 25 mg by mouth daily. morphine CR (MS CONTIN) 30 mg CR tablet  Self Yes Yes   Sig: Take 60 mg by mouth every eight (8) hours. nitroglycerin (NITROSTAT) 0.4 mg SL tablet   Yes Yes   Si.4 mg by SubLINGual route every five (5) minutes as needed for Chest Pain. nortriptyline (PAMELOR) 50 mg capsule   Yes Yes   Sig: Take 50 mg by mouth nightly. omeprazole (PRILOSEC) 20 mg capsule  Self Yes Yes   Sig: Take 20 mg by mouth daily. Indications: gastroesophageal reflux disease   rOPINIRole (REQUIP) 4 mg tab TAB  Self Yes Yes   Sig: Take 8 mg by mouth nightly. Indications: Restless Legs Syndrome   tiotropium (SPIRIVA WITH HANDIHALER) 18 mcg inhalation capsule  Self Yes Yes   Sig: Take 1 Cap by inhalation daily. zolpidem (AMBIEN) 10 mg tablet  Self No Yes   Sig: Take 1 Tab by mouth nightly.       Facility-Administered Medications: None     Thank you for allowing pharmacy to participate in the coordination of this patient's care. If you have any other questions, please contact the medication reconciliation pharmacist at [phone]. Lynn Siu PharmBianca D.

## 2018-02-26 NOTE — PROGRESS NOTES
Primary Nurse Megan Allen RN and Dre Noonan RN performed a dual skin assessment on this patient Impairment noted- see wound doc flow sheet  Charlie score is 17; wound care consulted.

## 2018-02-26 NOTE — ED NOTES
Assumed care of patient at this time. He states he has significant shortness of breath. Additionally he has severe pain in his bilateral legs. Given Duoneb in route which patient states helped him significantly. Lungs are coarse and sound wet throughout. Patient has a strong cardiac history. Will place on CM x 3. Call bell within reach of patient at this time.

## 2018-02-26 NOTE — ED NOTES
Reassessed patient at this time. He is still sitting on the edge of the bed. Continuing to void well after the lasix dose. Lungs sound less coarse, but he states that his breathing is not improved.

## 2018-02-26 NOTE — PROGRESS NOTES
Hospitalist Progress Note            Daily Progress Note: 2/26/2018    I have seen and examined the patient. Case discussed with NP, please see her more detailed note   I agree with the overall treatment plan as documented.     Acute on chronic systolic CHF  -On IV diuresis  -Daily weights  -Strict I/Os  -Awaiting Cardiology    Bilateral LE cellulitis  -On Vanc  -Follow cultures  -Awaiting ID    Recent MI/CAD  -continue home meds    Prolonged QTc  -On Prozac  -Will repeat EKG  -Will follow Cardiology    Acute on chronic hypoxic respiratory failure  -recent baseline of 3l of oxygen daytime and CPAP at night    Chronic COPD  -Continue home meds    HTN  -stable    SIRISHA  -on CPAP    Chronic pain syndrome  -On pain meds    Smoking  -Counseled  -Nicotine patch    Anxiety/Depression  -continue home meds        Carlos Perdomo MD      Hospitalist, Internal Medicine            Blackberry number:  [phone]

## 2018-02-26 NOTE — ED NOTES
Reassessed patient at this time. He continues to state that he has severe pain in his legs. Additionally patient states that his breathing is not improved. \"I feel like my airway is closing. It doesn't feel right. \" Patient assured that he was getting plenty of air and it appeared that he has some extra fluid on his lungs. Will give a dose of Lasix to evaluate. Will continue to monitor. CM x 3. Call bell within reach of patient.

## 2018-02-26 NOTE — PROGRESS NOTES
Pharmacist Note - Vancomycin Dosing    Consult provided for this 59 y.o. male for indication of lower extremity cellulitis. Antibiotic regimen(s):      Recent Labs      18   2205   WBC  9.2   CREA  0.83   BUN  11     Frequency of BMP: daily x 3 then every other day  Height: 188 cm  Weight: 124 kg  Est CrCl: >100 ml/min  Temp (24hrs), Av °F (36.7 °C), Min:98 °F (36.7 °C), Max:98 °F (36.7 °C)    Cultures:pending    Goal trough = 10 - 15 mcg/mL    Therapy will be initiated with a loading dose of 2000 mg IV x 1 to be followed by a maintenance dose of 2000 mg IV every 12 hours. Pharmacy to follow patient daily and order levels / make dose adjustments as appropriate.

## 2018-02-26 NOTE — PROGRESS NOTES
Hospitalist Progress Note            Daily Progress Note: 2/26/2018    I have seen and examined the patient. Case discussed with NP, please see her more detailed note   I agree with the overall treatment plan as documented.     Acute on chronic systolic CHF  -On IV diuresis  -Daily weights  -Strict I/Os  -Awaiting Cardiology    Bilateral LE cellulitis  -On Vanc  -Follow cultures  -Awaiting ID    Recent MI/CAD  -continue home meds    Prolonged QTc  -On Prozac  -Will repeat EKG  -Will follow Cardiology    Acute on chronic hypoxic respiratory failure  -recent baseline of 3l of oxygen daytime and CPAP at night    Chronic COPD  -Continue home meds    HTN  -stable    SIRISHA  -on CPAP    Chronic pain syndrome  -On pain meds    Smoking  -Counseled  -Nicotine patch    Anxiety/Depression  -continue home meds        Juan Luis Mackey MD      Hospitalist, Internal Medicine            Blackberry number:  [phone]

## 2018-02-26 NOTE — PROGRESS NOTES
Admission Medication Reconciliation:    Information obtained from:  patient, RxQuery, Home medication list (date of 6/2017)    Comments/Recommendations: All medications/allergies have been reviewed and updated. The patient appeared to be a good historian and the medication doses and frequencies matched the information from insurance claim history. The patient had a hand written medication list with the date of 6/2017; however, it appears he has made several updates since that date. Changes made to Prior to Admission (PTA) Medication List:   ?   Medications Added:   - Nitroglycerin, nortriptyline, docusate  ? Medications Changed:   - Hydromorphone 4 mg tablet changed from 1 tablet TID to BID  - morphine CR 60 mg changed from Q12H to Q8H  ? Medications Removed:   - None       Significant PMH/Disease States:   Past Medical History:   Diagnosis Date    CAD (coronary artery disease)     [facility]     Chronic systolic heart failure ([facility] [address] 4/12/2011    [facility]) [facility])     Coronary atherosclerosis of native coronary artery 4/12/2011    Diabetes ([facility] [address]     High cholesterol     Hypertension     MI, old     Neurological disorder     Other primary cardiomyopathies 4/12/2011    Restless leg syndrome        Chief Complaint for this Admission:    Chief Complaint   Patient presents with    Shortness of Breath    Chest Pain       Allergies:  Review of patient's allergies indicates no known allergies. Prior to Admission Medications:   Prior to Admission Medications   Prescriptions Last Dose Informant Patient Reported? Taking? HYDROmorphone (DILAUDID) 4 mg tablet  Self Yes Yes   Sig: Take 4 mg by mouth two (2) times a day. albuterol-ipratropium (DUO-NEB) 2.5 mg-0.5 mg/3 ml nebu  Self Yes Yes   Sig: 3 mL by Nebulization route every four (4) hours. aspirin delayed-release 81 mg tablet   Yes Yes   Sig: Take 81 mg by mouth daily.    atorvastatin (LIPITOR) 80 mg tablet  Self Yes Yes   Sig: Take 80 mg by mouth daily. citalopram (CELEXA) 40 mg tablet   Yes Yes   Sig: Take 40 mg by mouth daily. clopidogrel (PLAVIX) 75 mg tab  Self Yes Yes   Sig: Take 75 mg by mouth daily. docusate sodium (COLACE) 100 mg capsule   Yes Yes   Sig: Take 100 mg by mouth two (2) times a day. fluticasone-salmeterol (ADVAIR DISKUS) 250-50 mcg/dose diskus inhaler  Self Yes Yes   Sig: Take 1 Puff by inhalation two (2) times a day. furosemide (LASIX) 40 mg tablet   Yes Yes   Sig: Take 40 mg by mouth two (2) times a day. isosorbide mononitrate ER (IMDUR) 60 mg CR tablet  Self Yes Yes   Sig: Take 60 mg by mouth daily. lisinopril (PRINIVIL, ZESTRIL) 2.5 mg tablet  Self Yes Yes   Sig: Take 2.5 mg by mouth nightly. metFORMIN (GLUCOPHAGE) 1,000 mg tablet  Self Yes Yes   Sig: Take 1,000 mg by mouth two (2) times daily (with meals). Indications: type 2 diabetes mellitus   metoprolol succinate (TOPROL-XL) 25 mg XL tablet  Self Yes Yes   Sig: Take 25 mg by mouth daily. morphine CR (MS CONTIN) 30 mg CR tablet  Self Yes Yes   Sig: Take 60 mg by mouth every eight (8) hours. nitroglycerin (NITROSTAT) 0.4 mg SL tablet   Yes Yes   Si.4 mg by SubLINGual route every five (5) minutes as needed for Chest Pain. nortriptyline (PAMELOR) 50 mg capsule   Yes Yes   Sig: Take 50 mg by mouth nightly. omeprazole (PRILOSEC) 20 mg capsule  Self Yes Yes   Sig: Take 20 mg by mouth daily. Indications: gastroesophageal reflux disease   rOPINIRole (REQUIP) 4 mg tab TAB  Self Yes Yes   Sig: Take 8 mg by mouth nightly. Indications: Restless Legs Syndrome   tiotropium (SPIRIVA WITH HANDIHALER) 18 mcg inhalation capsule  Self Yes Yes   Sig: Take 1 Cap by inhalation daily. zolpidem (AMBIEN) 10 mg tablet  Self No Yes   Sig: Take 1 Tab by mouth nightly. Facility-Administered Medications: None     Thank you for allowing pharmacy to participate in the coordination of this patient's care.   If you have any other questions, please contact the medication reconciliation pharmacist at [phone]. Ivette Palacios.

## 2018-02-26 NOTE — CONSULTS
Infectious Disease Consult    Today's Date: 2/26/2018   Admit Date: 2/25/2018    Impression:   · CHF  · Lymphedema  · Dermatitis of both legs--same as last I saw him a couple of weeks ago--suspect most of this is secondary to vascular insufficiency    Plan:   · IV antibiotic therapy  · Wound care/edema control is paramount    Anti-infectives:     Inpat Anti-Infectives     Start     Ordered Stop    02/27/18 1400  Vancomycin trough level 2/27 @ 14:00 prior to dose administration  Other,   ONCE      02/26/18 1555 02/28/18 0159    02/26/18 0200  vancomycin (VANCOCIN) 2000 mg in  ml infusion  2,000 mg,   IntraVENous,   EVERY 12 HOURS      02/26/18 0122 --    02/26/18 0120  vancomycin dosing per pharmacy  Other,   RX DOSING/MONITORING      02/26/18 0122 --          Subjective:   Date of Consultation:  February 26, 2018  Referring Physician: Dr Sal Lafleur    Patient is a 59 y.o. male admitted with painful and swollen legs. I know him from his being in the hospital a couple of weeks ago with AMI and painful, swollen legs. He states that he has had no improvement in the leg symptoms, though he has not been ill with fevers, chills, etc. He is on IV antibiotic therapy and we are asked to see him in consultation.       Patient Active Problem List   Diagnosis Code    Coronary atherosclerosis of native coronary artery I25.10    Other primary cardiomyopathies I42.8    Chronic systolic heart failure (HCC) I50.22    Chest pain R07.9    Hematemesis K92.0    Rectal bleeding K62.5    Obstructive sleep apnea (adult) (pediatric) G47.33    Chronic airway obstruction, not elsewhere classified J44.9    Essential hypertension, benign I10    Type II or unspecified type diabetes mellitus without mention of complication, not stated as uncontrolled E11.9    Coronary artery disease I25.10    Morbid obesity ([address] E66.01    COPD with exacerbation ([address] T94.8    Systolic CHF, acute ([address] H89.90    Acute systolic CHF (congestive heart failure) ([facility]) I50.21     Past Medical History:   Diagnosis Date    CAD (coronary artery disease)     CHF (congestive heart failure) ([facility])     Chronic systolic heart failure ([facility] 4/12/2011    COPD [facility]ch[facility] obstructive [facility]     Coronary atherosclerosis of native coronary artery 4/12/2011    Diabetes ([facility]     High cholesterol     Hypertension     MI, old     Neurological disorder     Other primary cardiomyopathies 4/12/2011    Restless leg syndrome       History reviewed. No pertinent family history. Social History   Substance Use Topics    Smoking status: Current Every Day Smoker     Packs/day: 0.25    Smokeless tobacco: Not on file    Alcohol use No     Past Surgical History:   Procedure Laterality Date    CARDIAC SURG PROCEDURE UNLIST      stents x7    HX CARPAL TUNNEL RELEASE      right wrist      Prior to Admission medications    Medication Sig Start Date End Date Taking? Authorizing Provider   aspirin delayed-release 81 mg tablet Take 81 mg by mouth daily. Yes Gerald Caldwell MD   furosemide (LASIX) 40 mg tablet Take 40 mg by mouth two (2) times a day. Yes Gerald Caldwell MD   citalopram (CELEXA) 40 mg tablet Take 40 mg by mouth daily. Yes Gerald Caldwell MD   nitroglycerin (NITROSTAT) 0.4 mg SL tablet 0.4 mg by SubLINGual route every five (5) minutes as needed for Chest Pain. Yes Historical Provider   nortriptyline (PAMELOR) 50 mg capsule Take 50 mg by mouth nightly. Yes Historical Provider   docusate sodium (COLACE) 100 mg capsule Take 100 mg by mouth two (2) times a day. Yes Historical Provider   metFORMIN (GLUCOPHAGE) 1,000 mg tablet Take 1,000 mg by mouth two (2) times daily (with meals). Indications: type 2 diabetes mellitus   Yes Historical Provider   omeprazole (PRILOSEC) 20 mg capsule Take 20 mg by mouth daily. Indications: gastroesophageal reflux disease   Yes Historical Provider   clopidogrel (PLAVIX) 75 mg tab Take 75 mg by mouth daily.    Yes Phys Other, MD   albuterol-ipratropium (DUO-NEB) 2.5 mg-0.5 mg/3 ml nebu 3 mL by Nebulization route every four (4) hours. Yes Gerald Caldwell MD   atorvastatin (LIPITOR) 80 mg tablet Take 80 mg by mouth daily. Yes Historical Provider   lisinopril (PRINIVIL, ZESTRIL) 2.5 mg tablet Take 2.5 mg by mouth nightly. Yes Historical Provider   metoprolol succinate (TOPROL-XL) 25 mg XL tablet Take 25 mg by mouth daily. Yes Historical Provider   rOPINIRole (REQUIP) 4 mg tab TAB Take 8 mg by mouth nightly. Indications: Restless Legs Syndrome   Yes Historical Provider   morphine CR (MS CONTIN) 30 mg CR tablet Take 60 mg by mouth every eight (8) hours. Yes Historical Provider   HYDROmorphone (DILAUDID) 4 mg tablet Take 4 mg by mouth two (2) times a day. Yes Historical Provider   zolpidem (AMBIEN) 10 mg tablet Take 1 Tab by mouth nightly. 14  Yes Melissa Bean MD   tiotropium (SPIRIVA WITH HANDIHALER) 18 mcg inhalation capsule Take 1 Cap by inhalation daily. Yes Gerald Caldwell MD   fluticasone-salmeterol (ADVAIR DISKUS) 250-50 mcg/dose diskus inhaler Take 1 Puff by inhalation two (2) times a day. Yes Gerald Caldwell MD   isosorbide mononitrate ER (IMDUR) 60 mg CR tablet Take 60 mg by mouth daily. Yes Gerald Caldwell MD       No Known Allergies     Review of Systems:  Pertinent items are noted in the History of Present Illness. Objective:     Visit Vitals    /64    Pulse 95    Temp 97.7 °F (36.5 °C)    Resp 18    Ht 6' 2\" (1.88 m)    Wt 124.3 kg (274 lb 2 oz)    SpO2 99%    BMI 35.2 kg/m2     Temp (24hrs), Av.1 °F (36.7 °C), Min:97.7 °F (36.5 °C), Max:98.7 °F (37.1 °C)       Lines:  Peripheral IV:            Physical Exam:  Lungs:  clear to auscultation bilaterally  Heart:  regular rate and rhythm  Abdomen:  soft, non-tender. Bowel sounds normal. No masses,  no organomegaly  Skin:  no rash  Bilateral lower extremity edema with erythema and superficial skin loss. No lymphangiitis, or adenopathy.  The skin is not all that warm.     Data Review:     CBC:  Recent Labs      02/25/18 2205   WBC  9.2   GRANS  64   MONOS  6   EOS  3   ANEU  5.8   ABL  2.4   HGB  9.5*   HCT  31.5*   PLT  401*       BMP:  Recent Labs      02/25/18 2205   CREA  0.83   BUN  11   NA  137   K  3.6   CL  104   CO2  25   AGAP  8   GLU  136*       LFTS:  Recent Labs      02/25/18 2205   TBILI  0.3   ALT  14   SGOT  16   AP  132*   TP  7.6   ALB  2.9*       Microbiology:     All Micro Results     Procedure Component Value Units Date/Time    CULTURE, BLOOD, PAIRED [[id number]] Collected:  02/25/18 2246    Order Status:  Completed Specimen:  Blood Updated:  02/26/18 1035     Special Requests: NO SPECIAL REQUESTS        Culture result: NO GROWTH AFTER 11 HOURS       INFLUENZA A & B AG (RAPID TEST) [[id number]] Collected:  02/26/18 0209    Order Status:  Completed Specimen:  Nasopharyngeal from Nasal washing Updated:  02/26/18 0238     Influenza A Antigen NEGATIVE         Influenza B Antigen NEGATIVE              Imaging:   Results noted    Signed By: Wolfgang Ny MD     February 26, 2018

## 2018-02-26 NOTE — NURSE NAVIGATOR
Chart reviewed by Heart Failure Nurse Navigator. Heart Failure database completed. EF pending prior echo 40%. ACEi/ARB: Lisinopril 2.5 mg.  BB:Toprol. CRT not indicated. NYHA Functional Class IV. Heart Failure Teach Back in Patient Education. Heart Failure Avoiding Triggers on Discharge Instructions. Spoke with patient's nurse for daily weight for today.

## 2018-02-26 NOTE — PROGRESS NOTES
Hospitalist Progress Note  Prabhakar Zapata NP  Answering service: [phone] OR [phone] from in house phone  Cell: [phone]      Date of Service:  2018  NAME:  Shyla Villalta  :  1960  MRN:  [id number]      Admission Summary:   68-year-old male with history of coronary artery disease, status post MI and COPD, who presented to the emergency room via EMS with complaints of chest pain and shortness of breath. Patient reports that chest pain is of acute onset, located in the left anterior chest wall. Shortness of breath is associated with the chest pain. Symptoms started about one and a half to two hours prior to presentation to the emergency room. The patient described chest pain as 10/10, radiating down the left arm. According to the patient, he had just finished taking his nightly medications and laid down on the bed when he started feeling short of breath and chest pain. The patient takes daily aspirin. Patient reported that he put on his oxygen via nasal cannula at 3 liters. Patient also called EMS who gave a nebulizer treatment. The patient reported some relief from the nebulizer treatments. Patient stated \"I had an MI about 1 month ago and was treated at [facility].  There is no association with respiration and chest pain. The patient also reported diaphoresis but denied wheezing. Interval history / Subjective:     Breathing improved. Feels breathing is at is baseline now. Reports chronic generalized pain is about at it's baseline. Discussed with patient he is already on a large amount of pain medications and if his pain is no different that we will continue his home medications.       Assessment & Plan:     Acute on Chronic Systolic Heart Failure NYHA class 4 on admission  -increased SOB, orthopnea, crackles on exam   -IV diuretics BID, hopefully can switch to home dose in am   -17 echo ef 40%  -cardiology consulted   -resume home ASA, Lisinopril, Toprol  -CM consulted   -follows cardiology at [facility]   -HF educator  -strict I&Os    Chest Pain/Shortness of Breath   -improved, likely 2/2 to the above   -scheduled nebs, resume home scheduled meds   -wear O2 3L at home prn, monitor O2 sats   -troponin negative x3   -reports recent MI at [address] [facility] mo ago however unable to tell me if he had stents placed  -cardiology consult   -waiting records from [facility]    BLE Cellulitis vs Chronic Lymphedema   -has been following OP wound care at [facility] AND [facility] for this   -no fevers or chills, legs weeping however unsure if this acute infection, will await ID recs  -continue IV abx for now   -wound care consulted   -doppler negative    Chronic Respiratory Failure in the setting of COPD and chronic home O2 use   -continue home O2 at 3L    Chronic COPD with tobacco use   -does not appear to be in acute exacerbation, no wheezing on exam   -resume nebs   -smoking cessation encouraged     HTN   -BP stable   -resume home meds     Sleep Apnea   -resume home cpap    CAD  -resume home meds     Chronic Pain Syndrome   -on scheduled Oxycodone and Dilaudid at home prescribed by pcp   -pt reports it is at its baseline   -resume home meds, encouraged pt to follow up with pcp for further adjustments     Type 2 DM   -hold home Metformin   -SSI, accuchecks    Diffuse Rash   -has been on going >1 mo  -no recent travel   -will await ID input     Depression   -resume home meds    Obesity   -BMI 35.2    Code status: Full  DVT prophylaxis: Lovenox    Care Plan discussed with: Patient/Family and Nurse  Disposition: Home w/Family and TBD     Hospital Problems  Date Reviewed: 2/26/2018          Codes Class Noted POA    * (Principal)Systolic CHF, acute ([facility] ICD-10-CM: I50.21  ICD-9-CM: 428.21, 428.0  2/26/2018 Unknown        Acute systolic CHF (congestive heart failure) ([facility] ICD-10-CM: I50.21  ICD-9-CM: 428.21, 428.0  2/26/2018 Unknown                Review of Systems: A comprehensive review of systems was negative except for: Respiratory: positive for dyspnea on exertion  Cardiovascular: positive for chest pain, lower extremity edema  Integument/breast: positive for rash and skin color change  Musculoskeletal: positive for BLE pain       Vital Signs:    Last 24hrs VS reviewed since prior progress note. Most recent are:  Visit Vitals    /61    Pulse 97    Temp 98 °F (36.7 °C)    Resp 17    Ht 6' 2\" (1.88 m)    Wt 124.3 kg (274 lb 2 oz)    SpO2 99%    BMI 35.2 kg/m2         Intake/Output Summary (Last 24 hours) at 02/26/18 1224  Last data filed at 02/26/18 0400   Gross per 24 hour   Intake                0 ml   Output             3625 ml   Net            -3625 ml        Physical Examination:             Constitutional:  No acute distress, cooperative, pleasant    ENT:  Oral mucous moist, oropharynx benign. Resp:  CTA bilaterally. Diminished in bases. No wheezing/rhonchi/rales. No accessory muscle use. O2 3L via nasal cannula   CV:  Regular rhythm, normal rate, no murmurs, gallops, rubs    GI:  Soft, non distended, non tender. normoactive bowel sounds,     Musculoskeletal:  Non pitting BLE edema, warm, 2+ pulses throughout    Neurologic:  Moves all extremities. AAOx3, CN II-XII reviewed. Follows commands     Psych:  Good insight, Not anxious nor agitated. Skin:  generalized maculopapular rash throughout excluding face.  see below for BLE              Data Review:    Review and/or order of clinical lab test  Review and/or order of tests in the radiology section of CPT  Review and/or order of tests in the medicine section of CPT      Labs:     Recent Labs      02/25/18 2205   WBC  9.2   HGB  9.5*   HCT  31.5*   PLT  401*     Recent Labs      02/25/18 2205   NA  137   K  3.6   CL  104   CO2  25   BUN  11   CREA  0.83   GLU  136*   CA  8.9   MG  1.8     Recent Labs      02/25/18 2205   SGOT  16   ALT  14   AP  132*   TBILI  0.3   TP  7.6   ALB  2.9*   GLOB 4.7*     Recent Labs      02/25/18 2205   INR  1.1   PTP  10.9   APTT  28.1      No results for input(s): FE, TIBC, PSAT, FERR in the last 72 hours. No results found for: FOL, RBCF   No results for input(s): PH, PCO2, PO2 in the last 72 hours.   Recent Labs      02/26/18   0209  02/25/18 2205   TROIQ  <0.04  <0.04     Lab Results   Component Value Date/Time    Cholesterol, total 284 (H) 05/21/2014 09:59 AM    HDL Cholesterol 23 05/21/2014 09:59 AM    LDL,Direct 193 (H) 05/21/2014 09:59 AM    LDL, calculated Not calculated due to elevated triglyceride level 05/21/2014 09:59 AM    Triglyceride 482 (H) 05/21/2014 09:59 AM    CHOL/HDL Ratio 12.3 (H) 05/21/2014 09:59 AM     Lab Results   Component Value Date/Time    Glucose (POC) 98 05/02/2017 01:30 PM    Glucose (POC) 137 (H) 08/27/2015 06:43 AM    Glucose (POC) 87 08/26/2015 04:30 PM    Glucose (POC) 84 08/26/2015 04:12 PM    Glucose (POC) 93 05/06/2015 11:49 AM     Lab Results   Component Value Date/Time    Color YELLOW/STRAW 02/25/2018 10:46 PM    Appearance CLEAR 02/25/2018 10:46 PM    Specific gravity 1.019 02/25/2018 10:46 PM    pH (UA) 5.5 02/25/2018 10:46 PM    Protein NEGATIVE  02/25/2018 10:46 PM    Glucose NEGATIVE  02/25/2018 10:46 PM    Ketone NEGATIVE  02/25/2018 10:46 PM    Bilirubin NEGATIVE  02/25/2018 10:46 PM    Urobilinogen 0.2 02/25/2018 10:46 PM    Nitrites NEGATIVE  02/25/2018 10:46 PM    Leukocyte Esterase NEGATIVE  02/25/2018 10:46 PM    Epithelial cells FEW 01/05/2014 04:34 PM    Bacteria NEGATIVE  01/05/2014 04:34 PM    WBC 0-4 01/05/2014 04:34 PM    RBC 0-5 01/05/2014 04:34 PM         Medications Reviewed:     Current Facility-Administered Medications   Medication Dose Route Frequency    albuterol-ipratropium (DUO-NEB) 2.5 MG-0.5 MG/3 ML  3 mL Nebulization Q4H RT    aspirin (ASPIRIN) tablet 325 mg  325 mg Oral DAILY    atorvastatin (LIPITOR) tablet 80 mg  80 mg Oral DAILY    clopidogrel (PLAVIX) tablet 75 mg  75 mg Oral DAILY    docusate sodium (COLACE) capsule 100 mg  100 mg Oral BID    FLUoxetine (PROzac) capsule 20 mg  20 mg Oral DAILY    isosorbide mononitrate ER (IMDUR) tablet 60 mg  60 mg Oral BID    lisinopril (PRINIVIL, ZESTRIL) tablet 2.5 mg  2.5 mg Oral QHS    meclizine (ANTIVERT) tablet 12.5 mg  12.5 mg Oral TID PRN    metoprolol succinate (TOPROL-XL) XL tablet 50 mg  50 mg Oral DAILY    therapeutic multivitamin (THERAGRAN) tablet 1 Tab  1 Tab Oral DAILY    nitroglycerin (NITROSTAT) tablet 0.4 mg  0.4 mg SubLINGual Q5MIN PRN    predniSONE (DELTASONE) tablet 5 mg  5 mg Oral Q MON, WED & FRI    rOPINIRole (REQUIP) tablet 8 mg  8 mg Oral QHS    zolpidem (AMBIEN) tablet 10 mg  10 mg Oral QHS    sodium chloride (NS) flush 5-10 mL  5-10 mL IntraVENous Q8H    sodium chloride (NS) flush 5-10 mL  5-10 mL IntraVENous PRN    ondansetron (ZOFRAN) injection 4 mg  4 mg IntraVENous Q4H PRN    enoxaparin (LOVENOX) injection 40 mg  40 mg SubCUTAneous Q24H    furosemide (LASIX) injection 40 mg  40 mg IntraVENous Q12H    vancomycin dosing per pharmacy   Other Rx Dosing/Monitoring    vancomycin (VANCOCIN) 2000 mg in  ml infusion  2,000 mg IntraVENous Q12H    pantoprazole (PROTONIX) tablet 40 mg  40 mg Oral DAILY    arformoterol (BROVANA) neb solution 15 mcg  15 mcg Nebulization BID RT    And    budesonide (PULMICORT) 500 mcg/2 ml nebulizer suspension  500 mcg Nebulization BID RT    HYDROmorphone (DILAUDID) tablet 4 mg  4 mg Oral Q12H    morphine CR (MS CONTIN) tablet 60 mg  60 mg Oral Q8H     Current Outpatient Prescriptions   Medication Sig    aspirin delayed-release 81 mg tablet Take 81 mg by mouth daily.  furosemide (LASIX) 40 mg tablet Take 40 mg by mouth two (2) times a day.  citalopram (CELEXA) 40 mg tablet Take 40 mg by mouth daily.  nitroglycerin (NITROSTAT) 0.4 mg SL tablet 0.4 mg by SubLINGual route every five (5) minutes as needed for Chest Pain.     nortriptyline (PAMELOR) 50 mg capsule Take 50 mg by mouth nightly.  docusate sodium (COLACE) 100 mg capsule Take 100 mg by mouth two (2) times a day.  metFORMIN (GLUCOPHAGE) 1,000 mg tablet Take 1,000 mg by mouth two (2) times daily (with meals). Indications: type 2 diabetes mellitus    omeprazole (PRILOSEC) 20 mg capsule Take 20 mg by mouth daily. Indications: gastroesophageal reflux disease    clopidogrel (PLAVIX) 75 mg tab Take 75 mg by mouth daily.  albuterol-ipratropium (DUO-NEB) 2.5 mg-0.5 mg/3 ml nebu 3 mL by Nebulization route every four (4) hours.  atorvastatin (LIPITOR) 80 mg tablet Take 80 mg by mouth daily.  lisinopril (PRINIVIL, ZESTRIL) 2.5 mg tablet Take 2.5 mg by mouth nightly.  metoprolol succinate (TOPROL-XL) 25 mg XL tablet Take 25 mg by mouth daily.  rOPINIRole (REQUIP) 4 mg tab TAB Take 8 mg by mouth nightly. Indications: Restless Legs Syndrome    morphine CR (MS CONTIN) 30 mg CR tablet Take 60 mg by mouth every eight (8) hours.  HYDROmorphone (DILAUDID) 4 mg tablet Take 4 mg by mouth two (2) times a day.  zolpidem (AMBIEN) 10 mg tablet Take 1 Tab by mouth nightly.  tiotropium (SPIRIVA WITH HANDIHALER) 18 mcg inhalation capsule Take 1 Cap by inhalation daily.  fluticasone-salmeterol (ADVAIR DISKUS) 250-50 mcg/dose diskus inhaler Take 1 Puff by inhalation two (2) times a day.  isosorbide mononitrate ER (IMDUR) 60 mg CR tablet Take 60 mg by mouth daily.      ______________________________________________________________________  EXPECTED LENGTH OF STAY: 2d 12h  ACTUAL LENGTH OF STAY:          Biju Penaloza NP

## 2018-02-26 NOTE — CDMP QUERY
#1    Patient is noted to have a BMI of 35.2 kg/m 2  Please clarify if this patient is:     =>Obesity (BMI of 30-39.9)  => Morbid Obesity  (BMI 40 or greater)  =>Overweight (BMI 25-29.9)  => Other weight status (specify  status)  => Unable to determine    The [address] [place] [address] has issued a statement indicating that, \"Individuals who are overweight, obese, or morbidly obese are at an increased risk for certain medical conditions when compared to persons of normal weight.  Therefore, these conditions are always clinically significant and reportable when documented by the provider. \"      Presentation:  Ht: 6' 2\" (1.88 m)  Wt: 124.3 kg (274 lb 2 oz      Please clarify and document your clinical opinion in the progress notes and discharge summary, including the definitive and or presumptive diagnosis, (suspected or probable), related to the above clinical findings. Please include clinical findings supporting your diagnosis.        Thank you,         Edilia Stroud [address] [place] [address]

## 2018-02-26 NOTE — ED PROVIDER NOTES
HPI Comments: 59 y.o. male with past medical history significant for CAD, MI, CHF, COPD, HTN, High cholesterol who presents from home via EMS with chief complaint of chest pain and shortness of breath. Pt reports an acute onset of left sided chest pain and shortness of breath approximately 1.5 hours ago. He describes chest pain as 10/10 radiating down left arm. At time of onset, pt had recently completed taking his nightly medications and laid down. He takes aspirin daily. Pt is chronically 3L. EMS was contacted. He received a nebulizer treatment by EMS. Pt also notes cellulitis to legs b/l x 1 year which has worsened recently. He also has a diffuse, pruritic rash over arms, back, chest, abdomen and legs. Pt also c/o productive cough with clear phlegm, nausea, vomiting and mild low abdominal pain. He denies a known fever but \"I'm really hot\". He states he was seen at [facility] 3-4 weeks ago. EKG showed MI. He was transferred to [facility] and had cardiac cath. He is unsure if he had a stent at that time. NKDA. There are no other acute medical concerns at this time. Social hx: Smoker (down to 4 cigarettes per day from 3 ppd), He does consume EtOH but has improving with this as well. Cardiology: Sharla Wallace MD   PCP: Aysha Garcia MD    Note written by Rich Ojeda, as dictated by Naga Price MD 10:34 PM    The history is provided by the patient. No  was used.         Past Medical History:   Diagnosis Date    CAD (coronary artery disease)     CHF (congestive heart failure) (HCC)     Chronic systolic heart failure ([address] 4/12/2011    COPD (chronic obstructive pulmonary disease) ([address]     Coronary atherosclerosis of native coronary artery 4/12/2011    Diabetes ([facility]     High cholesterol     Hypertension     MI, old     Neurological disorder     Other primary cardiomyopathies 4/12/2011    Restless leg syndrome        Past Surgical History:   Procedure Laterality Date  CARDIAC SURG PROCEDURE UNLIST      stents x7    HX CARPAL TUNNEL RELEASE      right wrist         History reviewed. No pertinent family history. Social History     Social History    Marital status:      Spouse name: N/A    Number of children: N/A    Years of education: N/A     Occupational History    Not on file. Social History Main Topics    Smoking status: Current Every Day Smoker     Packs/day: 0.25    Smokeless tobacco: Not on file    Alcohol use No    Drug use: No    Sexual activity: Not on file     Other Topics Concern    Not on file     Social History Narrative         ALLERGIES: Review of patient's allergies indicates no known allergies. Review of Systems   Constitutional: Negative for appetite change and fever. HENT: Negative for congestion, nosebleeds and sore throat. Respiratory: Positive for cough and shortness of breath. Cardiovascular: Positive for chest pain. Gastrointestinal: Positive for abdominal pain (mild, low), nausea and vomiting. Negative for diarrhea. Genitourinary: Negative for dysuria. Musculoskeletal: Negative. Negative for back pain. Skin: Positive for color change (legs increasingly erythemic) and rash (arms, back, chest, abdomen, legs). Neurological: Negative for weakness and headaches. Hematological: Negative for adenopathy. Psychiatric/Behavioral: Negative. All other systems reviewed and are negative. Vitals:    02/25/18 2148 02/25/18 2300 02/25/18 2307   BP:  125/61    Pulse: (!) 117 (!) 109    Resp: 24 21    Temp: 98 °F (36.7 °C)     SpO2: 97% 100% 100%   Weight: 124.3 kg (274 lb 2 oz)     Height: 6' 2\" (1.88 m)              Physical Exam   Constitutional: He is oriented to person, place, and time. He appears well-developed and well-nourished. HENT:   Head: Normocephalic and atraumatic. Mouth/Throat: Oropharynx is clear and moist.   Eyes: Conjunctivae are normal.   Neck: Normal range of motion. Neck supple. Cardiovascular: Regular rhythm and normal heart sounds. Tachycardia present. Pulses present in lower extremities b/l   Pulmonary/Chest: Effort normal.   Crackles at bases b/l   Abdominal: Soft. Bowel sounds are normal. There is no tenderness. Musculoskeletal: Normal range of motion. He exhibits no edema or tenderness. Neurological: He is alert and oriented to person, place, and time. Skin: Skin is warm. Rash (red raised pruritic rash: arms, chest, back and trunk) noted. There is erythema (knees down b/l). Weeping from legs b/l   Psychiatric: He has a normal mood and affect. His behavior is normal.   Nursing note and vitals reviewed. Note written by Rich Urrutia, as dictated by Nilsa Smith MD 10:38 PM    [facility]      ED Course       Procedures     PROGRESS NOTE:  Pt reports that he had an MI 2-3 weeks ago and was initially evaluated at [facility], transferred to [address] [place] [address] and had a cardiac cath. Pt unsure if he received any stents. ED EKG interpretation:  Rhythm: sinus tachycardia; and regular . Rate (approx.): 119; Axis: normal; P wave: normal; QRS interval: L posterior fasicular block; ST/T wave: non-specific changes;  Prolonged QT. This EKG was interpreted by Nilsa Smith MD,ED Provider. CONSULT NOTE:  11:00 PM Nilsa Smith MD spoke with Dr. Allison Stoll, Consult for Hospitalist.  Discussed available diagnostic tests and clinical findings. Hospitalist team will admit. 11:52 PM  Dr. Lola Madrid at bedside evaluating patient. CONSULT NOTE:  12:04 AM Nilsa Smith MD spoke with Dr. Julianna Caldwell, Consult for Cardiology. Tiger Texted current and old EKG. 12:09 AM  Spoke with Dr. Julianna Caldwell. He has reviewed EKGs and notes new RBBB and     Patient with 2 SIRS - tachycardia and tachypnea. I believe these vital signs are from his CHF, rather than infection. A/P:  1. CHF - Lasix given  2. SOB - hx COPD, but no wheezing on exam. Feel CHF more likely.   3. Chest pain - trend troponins. Hx STEMI, per patient 4 weeks ago treated at [facility]. 4. Lower extremity cellulitis - Ancef given. Cultures pending.

## 2018-02-26 NOTE — ED NOTES
Patient is sitting on the bed comfortably. Remains on monitor, all vitals are stable. No apparent distress. Call bell within reach of patient at this time.

## 2018-02-26 NOTE — PROCEDURES
[facility]  *** FINAL REPORT ***    Name: Kevin Nielsen  MRN: [id number]    Inpatient  : 11 Aug 1960  HIS Order #: [id number]  [id number] [facility] Visit #: [id number]  Date: 2018    TYPE OF TEST: Peripheral Venous Testing    REASON FOR TEST  Pain in limb, Limb swelling    Right Leg:-  Deep venous thrombosis:           No  Superficial venous thrombosis:    No  Deep venous insufficiency:        Not examined  Superficial venous insufficiency: Not examined    Left Leg:-  Deep venous thrombosis:           No  Superficial venous thrombosis:    No  Deep venous insufficiency:        Not examined  Superficial venous insufficiency: Not examined      INTERPRETATION/FINDINGS  PROCEDURE:  Color duplex ultrasound imaging of lower extremity veins. FINDINGS:       Right: The common femoral, deep femoral, femoral, popliteal,  posterior tibial, peroneal, and great saphenous are patent and without   evidence of thrombus;  each is fully compressible and there is no  narrowing of the flow channel on color Doppler imaging. Phasic flow  is observed in the common femoral vein. Left:   The common femoral, deep femoral, femoral, popliteal,  posterior tibial, peroneal, and great saphenous are patent and without   evidence of thrombus;  each is fully compressible and there is no  narrowing of the flow channel on color Doppler imaging. Phasic flow  is observed in the common femoral vein. IMPRESSION:  No evidence of right or left lower extremity vein  thrombosis. ADDITIONAL COMMENTS    I have personally reviewed the data relevant to the interpretation of  this  study.     TECHNOLOGIST: YUMIKO Cameron, FRANKY  Signed: 2018 01:32 PM    PHYSICIAN: Elena Miles MD  Signed: 2018 11:10 AM

## 2018-02-26 NOTE — CDMP QUERY
#2  There is noted documentation of \"Chronic oxygen dependence\". on this record. Could this be further clarified as    =>Chronic Respiratory Failure in the setting of COPD requiring supplemental oxygen  =>Other Explanation of clinical findings  =>Unable to Determine (no explanation of clinical findings)    The medical record reflects the following clinical findings, treatment, and risk factors:    Risk Factors: O2 dependent COPD  Clinical Indicators: Chronic oxygen dependence  Treatment: supplemental oxygen      Please clarify and document your clinical opinion in the progress notes and discharge summary including the definitive and/or presumptive diagnosis, (suspected or probable), related to the above clinical findings.  Please include clinical findings supporting your diagnosis    Thank you,         Geovanny Byrd [facility]

## 2018-02-26 NOTE — ED NOTES
Patient reassessed at this time. He continues to sit on the edge of the bed. \"It takes too much work to lay down and sit back up all the time to pee after that Lasix. \" Suggested to patient that it is possible to use the urinal in the bed and that it might be more comfortable for him but he refused. Patient refuses to wear his BP cuff consistently and takes it off. Additionally he is fidgety and the pulse ox cannot get a good consistent reading. Patient made aware, and there is no change in his behavior at this time. Will continue to monitor.

## 2018-02-26 NOTE — CONSULTS
[address]    Chip Claudio  MR#: [id number]  : 1960  ACCOUNT #: [de-identified]   DATE OF SERVICE: 2018    HISTORY OF PRESENT ILLNESS:  This 57-year-old man has a longstanding ischemic cardiomyopathy with a history of a recent non-ST elevation infarct, initially seen at [facility], then transferred to [facility].  He underwent cardiac catheterization on 2018 by Dr. Fer Thurston. This study showed a dilated left ventricle with global hypokinesis and a left ventricular ejection fraction of 20% without mitral regurgitation. He has a history of stents, but there was no significant stenoses within the coronary vasculature. He was sent home on 2018 and he presented to [facility] ER yesterday evening awakening very short of breath after trying to get home and go to sleep. He had some vague chest pain. Rescue squad was called. He was given a jet nebulizer by EMS. There are no acute EKG changes. The EKG here showed a sinus rhythm, right bundle branch block, with left posterior fascicular block, and troponins have all been negative x4. He received some IV Lasix and has diuresed over 3.5 liters so far. He is not in any severe distress, but generally does not feel well. He has chronic lymphedema and cellulitis. It is not clear how compliant he is with a medical or dietary regimen. He is not having ongoing chest pain. No clear cut orthopnea, PND. At this point, he has a very prominent diffuse peripheral edema and chronic cellulitis. PAST MEDICAL HISTORY:  Restless leg syndrome, hypertension, hypercholesterolemia, type 2 diabetes mellitus, longstanding coronary artery disease with chronic systolic congestive heart failure with acute exacerbations and multiple catheterizations and stenting procedures. A total of 7 stents reported. PAST SURGICAL HISTORY:  He had carpal tunnel release.     CURRENT MEDICATIONS:  Dual jet nebulizers, Brovana nebulizer, aspirin 325 daily, atorvastatin 80 mg a day,  Celexa 40 mg a day, Plavix 75 mg daily, Colace 100 twice a day,  Lovenox 40 subq q.24h.,  furosemide 40 mg IV q.12h.,  Dilaudid 4 mg every 12 hours, Imdur 60 mg daily x2, lisinopril 2.5 mg daily. metoprolol 50 mg a day, MS Contin 60 p.o. q. 8h. Nicoderm patch, Protonix 40 mg a day, prednisone 5 mg 3 times a week, Requip 8 mg at bedtime, vancomycin per pharmacy dosing, Ambien 10 at bedtime. ALLERGIES:  NONE KNOWN. FAMILY HISTORY:  Noncontributory. SOCIAL HISTORY:  , continues to smoke 1/4 pack a day. No alcohol. REVIEW OF SYSTEMS:  Some generalized malaise, but no focal weakness. Level of edema seems about the same. PHYSICAL EXAMINATION:  GENERAL:  No acute distress. VITAL SIGNS:  Blood pressure 102/64, pulse 96 and regular, afebrile. HEENT:  Sitting up there is no jugular venous distention. Carotids are full without bruits. Thyroid not palpable. Sclerae are clear. LUNGS:  Show distant breath sounds, but otherwise clear. HEART:  Regular rate and rhythm with an S3 gallop and no murmur. ABDOMEN:  Distended with probable ascites present. EXTREMITIES:  Massive edema with chronic cellulitis and lymphedema. I cannot feel distal pulses. SKIN:  Otherwise is intact. MUSCULOSKELETAL:  No skeletal deformities. NEUROLOGIC:  Nonfocal.    Chest x-ray shows cephalization. LABORATORY DATA:  hemoglobin 9.5, hematocrit 31.5, white count 9200, platelets 769,710. Sodium 137. Potassium 3.6. Chloride 104, CO2 25, BUN 11, creatinine 0.83. Troponins are negative x4. ProBNP is elevated at 1311. Blood gas pH 7.35, pCO2 of 44, pO2 61. Blood cultures pending. TSH 0.50 negative drug screen. EKG:  Sinus rhythm, right bundle branch block. More horizontal axis.   Tracing from admission shows sinus rhythm, right bundle branch block with left posterior fascicular block and more prominent nonspecific ST-T wave changes. PROBLEMS:    1. Acute on chronic systolic congestive heart failure. 2.  Chronic cellulitis and lymphedema. 3.  Restless leg syndrome. 4.  Chronic obstructive pulmonary disease. 5.  Type 2 diabetes mellitus. 6.  Hypercholesterolemia. 7.  Hypertension. 8.  Chronic pain syndrome. PLANS/RECOMMENDATIONS:  Recheck echo. Intensify his heart failure regimen. Wound care consult. I will follow with you.       MD Lucian Orellana 112 / DN  D: 02/26/2018 17:47     T: 02/26/2018 18:15  JOB #: [id number]

## 2018-02-26 NOTE — H&P
[address] [facility]  ACUTE CARE HISTORY AND PHYSICAL    Jt GEORGE  MR#: [id number]  : 1960  ACCOUNT #: [de-identified]   DATE OF SERVICE: 2018    PRIMARY CARE PHYSICIAN:  Khanh Rodríguez MD    PRESENTING COMPLAINT:  Chest pain and shortness of breath x1 day. HISTORY OF PRESENT ILLNESS:  Patient is a 78-year-old male with history of coronary artery disease, status post MI and COPD, who presented to the emergency room via EMS with complaints of chest pain and shortness of breath. Patient reports that chest pain is of acute onset, located in the left anterior chest wall. Shortness of breath is associated with the chest pain. Symptoms started about one and a half to two hours prior to presentation to the emergency room. The patient described chest pain as 10/10, radiating down the left arm. According to the patient, he had just finished taking his nightly medications and laid down on the bed when he started feeling short of breath and chest pain. The patient takes daily aspirin. Patient reported that he put on his oxygen via nasal cannula at 3 liters. Patient also called EMS who gave a nebulizer treatment. The patient reported some relief from the nebulizer treatments. Patient stated \"I had an MI about 1 month ago and was treated at [facility].  There is no association with respiration and chest pain. The patient also reported diaphoresis but denied wheezing. When he presented to the emergency room, he was noted to be tachycardic. EKG showed tachycardia. EKG was discussed with Cardiology on call, Dr. Katie Chau and ischemic episode was ruled out as per ED provider who discussed the case with cardiologist.  The patient reports that he is on oxygen via nasal cannula at 3 liters chronically. He also reports using CPAP at night. Patient is reporting bilateral lower extremity cellulitis that was treated about a year ago.   According to the patient, the lower extremity has become painful, swollen and erythematous. The patient states that he has had antibiotics orally prescribed by primary care physician, but these do not seem to be helping. The patient reports cough that is productive of clear phlegm, nausea, and vomiting. Vomiting was once and mild as per patient. He denies fever, chills or rigors. Patient is also reporting increasing erythematous skin rash that has been ongoing for more than 4 months. Patient still smokes 4-5 cigarettes a day, down from 3 packs a day. He denies recent alcohol use. The patient reported that he had MI about 4 weeks ago at [place]. EKG showed MI and he had a cardiac catheterization. He is not sure if he received any stents or not. PAST MEDICAL HISTORY:    1. Coronary artery disease, status post myocardial infarction. 2.  Chronic systolic heart failure. 3.  COPD. 4.  Diabetes mellitus. 5.  Hyperlipidemia. 6.  Hypertension. 7.  Restless legs syndrome. 8.  History of bilateral lower extremity cellulitis. 9.  Tobacco abuse. 10.  Chronic oxygen dependent. 11.  Obstructive sleep apnea on CPAP. PAST SURGICAL HISTORY:    1. Cardiac catheterization, status post stent x7.  2.  Right wrist carpal tunnel release.     MEDICATIONS:  Albuterol nebulizer every 4 hours as needed, allopurinol 300 mg daily, amlodipine 10 mg, take 5 mg 2 times daily, aspirin 325 mg daily, atorvastatin 80 mg daily, clopidogrel 75 mg daily, docusate sodium 100 mg 2 times daily, fluoxetine 20 mg daily, Advair Diskus 250/50 one puff inhalation 2 times daily, Dilaudid 4 mg 3 times daily, isosorbide mononitrate CR 60 mg 2 times daily, lisinopril 2.5 mg every night, meclizine 12.5 mg 3 times daily as needed, metformin 1000 mg 2 times daily with meals, metoprolol XL 50 mg daily, morphine CR 60 mg every 12 hours, multivitamin 1 tablet daily, naproxen 500 mg 2 times daily with meals, nitroglycerin 0.4 mg sublingual every 5 minutes as needed for chest pain, nortriptyline 25 mg every night, omeprazole 20 mg daily, oxybutynin 5 mg every night, prednisone 5 mg every Monday, Wednesday, and Friday, ranolazine ER (Ranexa) 1000 mg 2 times a day, ropinirole (Requip) 4 mg, take 8 mg every night, tiotropium (Spiriva) 18 mcg inhalation daily, tizanidine 4 mg 3 times daily, zolpidem 10 mg every night. ALLERGIES:  NO KNOWN DRUG ALLERGIES. SOCIAL HISTORY:  Patient is . He smokes about 4 cigarettes a day, down from 3 packs of cigarettes daily. He denies recreational drug use. The patient also denies current alcohol use. FAMILY HISTORY:  Reviewed but noncontributory. REVIEW OF SYSTEMS:  More than 12 systems reviewed and the pertinent positives are in history of present illness, all others are negative. PHYSICAL EXAMINATION:  GENERAL:  Patient is seen lying in bed, on oxygen via nasal cannula. VITAL SIGNS:  Blood pressure 125/61, pulse 109, respirations 21, temperature 98, O2 sat 100%. HEAD, EAR, NOSE AND THROAT:  Atraumatic, normocephalic. Anicteric, not pale, not jaundiced. Extraocular muscles intact. Pupils bilaterally reactive, equal, round. NECK:  Supple, no JVD, no carotid bruit. CARDIOVASCULAR:  Heart sounds 1 and 2, tachycardic. No gallop, no friction murmur. RESPIRATIONS:  No wheezing, no rhonchi. Rales noted in bilateral basal lung zones. ABDOMEN:  Soft, nontender. Bowel sounds normoactive. NEUROLOGIC:  Alert, oriented x3. Cranial nerves II-XII intact. SKIN:  Warm, dry, and erythematous rash noted diffusely, upper extremities, in the arms, chest, back, and trunk. PSYCHIATRIC:  Normal mood, normal affect. BACK:  No CVA tenderness. EXTREMITIES:  Bilateral lower extremities erythematous, swollen, tender to palpation and warm to touch. The swelling extends to the feet bilaterally. Pulses present. DIAGNOSTIC TESTS:  EKG showed sinus tachycardia at 119 beats per minute, nonspecific ST-T wave changes.     LABORATORY DATA: WBC 9.2, hemoglobin 9.5, hematocrit 31.5, platelets 462. Urine is yellow and clear, specific gravity 1.019, negative nitrite, negative leukocyte esterase. INR 1.1. Sodium 137, potassium 3.6, chloride 104, carbon dioxide 25, BUN 11, creatinine 0.83, glucose 136, calcium 8.9, ALT 14, AST 16, alkaline phosphatase 132. Troponin negative. BNP 1311. C-reactive protein 5.75. Lactic acid 1.4. Chest x-ray showed no acute cardiopulmonary process. ASSESSMENT AND PLAN:  1. Chest pain/shortness of breath. 2.  Acute on chronic systolic heart failure. 3.  COPD. 4.  Chronic oxygen dependence. 5.  Coronary artery disease, status post recent myocardial infarction. Patient has had stents in the past.  6.  Hypertension. 7.  Hypercholesterolemia. 8.  Sleep apnea on CPAP. 9.  Chronic pain syndrome. 10.  Tobacco abuse. 11.  Depression. 12.  Restless leg syndrome. 13.  Diabetes mellitus. 14.  Diffuse erythematous rash. 15.  Bilateral lower extremity cellulitis. PLAN:    1. To admit patient to [facility] under hospitalist service. 2.  Blood cultures. 3.  IV antibiotics including vancomycin. 4.  CTA of the chest. Venous doppler LE. 5.  Serial cardiac enzymes. Parenteral diuresis. 6.  Echocardiogram.  7.  Obtain records from [facility]. 8.  EKG was reviewed by Cardiology on call and no intervention is needed at this time. 9.  Pain control. Antiemetics. 10.  Infectious disease consult. 11.  Extensive patient education including smoking cessation and counseling was done by bedside. 12.  Oxygen supplement via nasal cannula. 13.  Continue patient on CPAP during sleep. 14.  Monitor vital signs including blood pressure as per unit protocol. 15.  Restart appropriate home medications. Hold metformin. 12.  Fall precautions, skin care precautions, out of bed to chair with assistance. 17.  I discussed Advance Directives with the patient.   He is FULL CODE.  18.  Further management will depend on patient's clinical progress and further evaluation by attending physician, results of tests and recommendation by specialist.      Alfonso Hummel MD       SGU / LYNNETTE  D: 02/26/2018 00:41     T: 02/26/2018 06:28  JOB #: [id number]

## 2018-02-27 NOTE — PROGRESS NOTES
PTs BP 97/60. PT has both Lasix 80mg and Lisinopril 5mg due at 2100. Paged Dr. Don Mccloud regarding BP meds. MD instructed to hold both medications for now.

## 2018-02-27 NOTE — PROGRESS NOTES
ID Progress Note  2018    Subjective:     Up and around a bit    Objective:     Antibiotics:  1. Vancomycin       Vitals:   Visit Vitals    /72    Pulse 98    Temp 97.8 °F (36.6 °C)    Resp 20    Ht 6' 2\" (1.88 m)    Wt 119.9 kg (264 lb 5.3 oz)    SpO2 95%    BMI 33.94 kg/m2        Tmax:  Temp (24hrs), Av.7 °F (36.5 °C), Min:97.5 °F (36.4 °C), Max:97.9 °F (36.6 °C)      Exam:  Edema persists, erythema improved    Labs:      Recent Labs      18   0352  18   2205   WBC  7.4  9.2   HGB  8.9*  9.5*   PLT  365  401*   BUN  9  11   CREA  0.65*  0.83   SGOT   --   16   AP   --   132*   TBILI   --   0.3       Cultures:     Lab Results   Component Value Date/Time    Specimen Description: ESOPHAGEAL BRUSHING 2010 09:25 AM     Lab Results   Component Value Date/Time    Culture result: NO GROWTH 2 DAYS 2018 10:46 PM       Radiology:     Line/Insert Date:           Assessment:     1. Lymphedema   2. Debility   3. Erythema of legs more consistent with dermatitis/vascular insufficiency    Objective:     1.  Oral antibiotics at time of discharge    Chelsey Corral MD

## 2018-02-27 NOTE — PROGRESS NOTES
Problem: Mobility Impaired (Adult and Pediatric)  Goal: *Acute Goals and Plan of Care (Insert Text)  Physical Therapy Goals  Initiated 2/27/2018  1. Patient will transfer from bed to chair and chair to bed with modified independence using the least restrictive device within 7 day(s). 2.  Patient will perform sit to stand with modified independence within 7 day(s). 3.  Patient will ambulate with modified independence for 250 feet with the least restrictive device within 7 day(s). 4.  Patient will ascend/descend 1 stairs with 1 handrail(s) with minimal assistance/contact guard assist within 7 day(s). physical Therapy EVALUATION  Patient: Shiraz Cain (62 y.o. male)  Date: 2/27/2018  Primary Diagnosis: Acute systolic CHF (congestive heart failure) ([facility])        Precautions:        ASSESSMENT :  Based on the objective data described below, the patient presents with decline from baseline in independence with functional mobility due to poor cardiopulmonary function, LE edema, decreased LE sensation and generalized weakness s/p admission for acute CHF. Patient receievd sitting EOB, transefrred to standing with supervision with RW.  Gait training with RW with supervision initially x 75' but reported rapid onset of LE weakness and fear of giving way. Chair brought and patient sat x 2 min before returning to standing (CGA and use of coutner to asssit). Amb back to room with RW and CGA due to decreased step length and mild buckling noted in L knee intermittently. Patient's VS stable throughout without c/o dizziness or lightheadedness, just LE fatigue and weakness. Left sitting EOB with all needs in reach. Patient lives with fiance in one level home with 1 step up to enter. MOD Indep with RW prior to admission but needed assist for dressing and bathing. Anticipate patient will improve endurance as medically more stable and fluid offloaded and recommend HHPT upon d/c.     Patient will benefit from skilled intervention to address the above impairments. Patients rehabilitation potential is considered to be Good  Factors which may influence rehabilitation potential include:   []         None noted  []         Mental ability/status  []         Medical condition  []         Home/family situation and support systems  []         Safety awareness  []         Pain tolerance/management  []         Other:      PLAN :  Recommendations and Planned Interventions:  []           Bed Mobility Training             []    Neuromuscular Re-Education  []           Transfer Training                   []    Orthotic/Prosthetic Training  []           Gait Training                         []    Modalities  []           Therapeutic Exercises           []    Edema Management/Control  []           Therapeutic Activities            []    Patient and Family Training/Education  []           Other (comment):    Frequency/Duration: Patient will be followed by physical therapy  4 times a week to address goals. Discharge Recommendations: Home Health  Further Equipment Recommendations for Discharge: Likely none from PT     SUBJECTIVE:   Patient stated I get around okay, just hard with my legs so big.     OBJECTIVE DATA SUMMARY:   HISTORY:    Past Medical History:   Diagnosis Date    CAD (coronary artery disease)     [facility]estive [facility]) ([facility]     Chronic systolic heart failure ([facility] 4/12/2011    COPD [facility]ch[facility] obstructive pulmonary [facility]     Coronary atherosclerosis of native coronary artery 4/12/2011    Diabetes ([facility]     High cholesterol     Hypertension     MI, old     Neurological disorder     Other primary cardiomyopathies 4/12/2011    Restless leg syndrome      Past Surgical History:   Procedure Laterality Date    CARDIAC SURG PROCEDURE UNLIST      stents x7    HX CARPAL TUNNEL RELEASE      right wrist     Prior Level of Function/Home Situation:   Personal factors and/or comorbidities impacting plan of care: Home Situation  Home Environment: Private residence  # Steps to Enter: 1  One/Two Story Residence: One story  Living Alone: No  Support Systems: Spouse/Significant Other/Partner, [employer] / [employer], Friends \ neighbors  Patient Expects to be Discharged to[de-identified] Private residence  Current DME Used/Available at Home: Oxygen, portable, Glucometer, Nebulizer, CPAP, Cane, straight  Tub or Shower Type: Tub/Shower combination    EXAMINATION/PRESENTATION/DECISION MAKING:   Critical Behavior:  Neurologic State: Alert  Orientation Level: Oriented X4  Cognition: Appropriate decision making, Appropriate for age attention/concentration, Appropriate safety awareness  Safety/Judgement: Awareness of environment, Good awareness of safety precautions  Hearing: Auditory  Auditory Impairment: None  Skin:  See nursing notes   Edema: B LE  Range Of Motion:  AROM: Generally decreased, functional (L shoulder flexion ~60*; R 120*; elbows-digits WDL)                       Strength:    Strength: Generally decreased, functional (-3/5 shoulder flexion; elbows-digits WDL)                    Tone & Sensation:   Tone: Normal              Sensation: Intact               Coordination:  Coordination: Within functional limits  Vision:   Acuity: Impaired near vision (baseline)  Corrective Lenses: Reading glasses  Functional Mobility:  Bed Mobility:  Rolling:  (recieved sitting EOB but reports indep with bed mob)           Transfers:  Sit to Stand: Supervision; Adaptive equipment  Stand to Sit: Supervision        Bed to Chair: Supervision              Balance:   Sitting: Intact; Without support  Standing: Intact; With support  Ambulation/Gait Training:  Distance (ft): 75 Feet (ft) (needed chair brought after 75' 2/2 LE fa) 75' back to room after rest  Assistive Device: Gait belt;Walker, rolling  Ambulation - Level of Assistance: Contact guard assistance        Gait Abnormalities: Decreased step clearance; Antalgic        Base of Support: Widened Speed/Renetta: Fluctuations  Step Length: Right shortened;Left shortened                     Stairs:          Functional Measure:  Barthel Index:    Bathin  Bladder: 10  Bowels: 10  Groomin  Dressin  Feeding: 10  Mobility: 5  Stairs: 0  Toilet Use: 5  Transfer (Bed to Chair and Back): 10  Total: 60       Barthel and G-code impairment scale:  Percentage of impairment CH  0% CI  1-19% CJ  20-39% CK  40-59% CL  60-79% CM  80-99% CN  100%   Barthel Score 0-100 100 99-80 79-60 59-40 20-39 1-19   0   Barthel Score 0-20 20 17-19 13-16 9-12 5-8 1-4 0      The Barthel ADL Index: Guidelines  1. The index should be used as a record of what a patient does, not as a record of what a patient could do. 2. The main aim is to establish degree of independence from any help, physical or verbal, however minor and for whatever reason. 3. The need for supervision renders the patient not independent. 4. A patient's performance should be established using the best available evidence. Asking the patient, friends/relatives and nurses are the usual sources, but direct observation and common sense are also important. However direct testing is not needed. 5. Usually the patient's performance over the preceding 24-48 hours is important, but occasionally longer periods will be relevant. 6. Middle categories imply that the patient supplies over 50 per cent of the effort. 7. Use of aids to be independent is allowed. Juanita Lundy., Barthel, D.W. ([id number]). Functional evaluation: the Barthel Index. [facility] (14)2. Norval Low corey [place], J.J.M.F, Chon Garza., Bernardino Dick., [place], [address] [place] [address] (). Measuring the change indisability after inpatient rehabilitation; comparison of the responsiveness of the Barthel Index and Functional Jackson Measure. Journal of Neurology, Neurosurgery, and Psychiatry, 66(4), 925-544.   KIAH Garcia, ROLANDO Carlos, Annalee Schilling MBiancaA. (2004.) Assessment of post-stroke quality of life in cost-effectiveness studies: The usefulness of the Barthel Index and the EuroQoL-5D. Quality of Life Research, 13, 938-56         G codes: In compliance with CMSs Claims Based Outcome Reporting, the following G-code set was chosen for this patient based on their primary functional limitation being treated: The outcome measure chosen to determine the severity of the functional limitation was the Barthel with a score of 60/100 which was correlated with the impairment scale. ? Mobility - Walking and Moving Around:     - CURRENT STATUS: CJ - 20%-39% impaired, limited or restricted    - GOAL STATUS: CI - 1%-19% impaired, limited or restricted    - D/C STATUS:  ---------------To be determined---------------      Physical Therapy Evaluation Charge Determination   History Examination Presentation Decision-Making   HIGH Complexity :3+ comorbidities / personal factors will impact the outcome/ POC  LOW Complexity : 1-2 Standardized tests and measures addressing body structure, function, activity limitation and / or participation in recreation  LOW Complexity : Stable, uncomplicated  Other outcome measures Barthel 60/100  MEDIUM      Based on the above components, the patient evaluation is determined to be of the following complexity level: LOW     Pain:  Pain Scale 1: Numeric (0 - 10)  Pain Intensity 1: 7  Pain Location 1: Generalized     Pain Description 1: Aching  Pain Intervention(s) 1: Declines  Activity Tolerance:   Below baseline, 75' ambulation with seated rest needed    Please refer to the flowsheet for vital signs taken during this treatment.   After treatment:   []         Patient left in no apparent distress sitting up in chair  [x]         Patient left in no apparent distress in bed  [x]         Call bell left within reach  [x]         Nursing notified  []         Caregiver present  []         Bed alarm activated    COMMUNICATION/EDUCATION:   The patients plan of care was discussed with: Registered Nurse. [x]         Fall prevention education was provided and the patient/caregiver indicated understanding. []         Patient/family have participated as able in goal setting and plan of care. [x]         Patient/family agree to work toward stated goals and plan of care. []         Patient understands intent and goals of therapy, but is neutral about his/her participation. []         Patient is unable to participate in goal setting and plan of care.     Thank you for this referral.  Nathan Alonso, PT   Time Calculation: 24 mins

## 2018-02-27 NOTE — WOUND CARE
WOCN Note:     New consult placed by MD for assessmend of low legs    Chart reviewed. Admitted DX:  Acute systolic CHF (congestive heart failure) (HCC)  Past Medical History:  CAD, MI , COPD, DM,   Admitted from home    Assessment:   Patient is A&O x 3, communicative, continent and mobile independently. Bed: Advance care      Bilateral heel, buttocks, and sacral skin intact and without erythema. Generalized edema, redness, warmth and tenderness to lower legs and feet. 1. Left low leg, venous leg wounds Present on Admission = multiple areas circumferentially over a field measuring ~30 x 40 x cm  0.2 cm; 75 % yellow 25% red. Large serous exudate. Periwound with erythema. 2.  Right low leg, venous leg wounds Present on Admission:  Two fields of wounds (8x5x0.1 and 13x2x1); 75% red 25% yellow; moderate serous exudate. Periwound with erythema. Recommendations:    Elevate legs  1. Right and left legs:  Daily cleanse with soap and water; apply Xeroform; cover with dry roll gauze. Skin Care & Pressure Prevention:  Minimize layers of linen/pads under patient to optimize support surface. Turn/reposition approximately every 2 hours and offload heels.     Discussed above plan with patient and Keira Baldwin RN    Transition of Care: Plan to follow weekly and as needed while admitted to hospital.    ASHUTOSH Salas RN  Wound Care  Office [phone]  Pager [phone]

## 2018-02-27 NOTE — PROGRESS NOTES
2/27/18 1450: CM received call from [address] [facility] will not accept patient for [address] [place] [address], as Supriya Nava reportedly provides services for CHF patients and also will not sign [facility] plan of care if additional [facility] services are recommended. CM awaiting PT/OT evaluations, sent initial referral to [facility] via [employer]. [facility] orders pending determination of final needs. ELINA Mehta    ------------------------  2/27/18 1009  Patient is a 60 y/o  male insured by [facility], outpatient medical care managed by Supriya Nava. PMH CHF, MI, CAD, COPD, HTN, BLE leg cellulitis. CM visited patient bedside. Patient sitting at EOB, A&Ox4, confirmed demographics on facesheet. Patient lives with friend Tjbhargavi Adame in [address] [place] [address]. DME includes cane, walker, O2 with portable tanks and concentrator. Patient states that portable tanks are \"useless\" 2/2 difficulty wheeling tanks with walker, CM suggested that patient s/w PCP and O2 supplier for possible alternatives. Patient agrees to [facility] if ordered, denies agency preference. CM sent referral to [address] via [address] [facility]. Discharge pending therapy evaluations and medical clearance. Friend Mai to provide discharge transport home. ELINA Mehta     Care Management Interventions  PCP Verified by CM: Yes (seen last week, speaks to provider \"daily\")  Mode of Transport at Discharge: Other (see comment) (friend Mai)  MyChart Signup: No  Discharge Durable Medical Equipment: No  Physical Therapy Consult: Yes  Occupational Therapy Consult: Yes  Speech Therapy Consult: No  Current Support Network: Own Home  Plan discussed with Pt/Family/Caregiver: Yes  Freedom of Choice Offered:  Yes

## 2018-02-27 NOTE — NURSE NAVIGATOR
Heart Failure Nurse Navigator note:  Provided heart failure folder. Reviewed daily weights and calendar. Patient states he has a scale at home but does not do his weights daily. Encouraged him to weigh daily and record on calendar. Reviewed when to call physician. Reviewed low sodium diet. Patient likes to eat pizza once a week. Provided \"Nutrition in the Fast Daniele\" and reviewed salt content of the pizza he eats. Encourage patient to avoid high salt food. Reviewed signs and symptoms of heart failure. Patient states he has no difficulty getting his prescriptions and having transportation to his appointments through [facility]. Spoke with patient's nurse concerning standing scale weights. Patient is able to stand and would benefit in hospital from standing weights.

## 2018-02-27 NOTE — PROGRESS NOTES
Problem: Self Care Deficits Care Plan (Adult)  Goal: *Acute Goals and Plan of Care (Insert Text)  Occupational Therapy Goals  Initiated 2/27/2018  1. Patient will perform lower body dressing with AE PRN supervision/set-up within 7 day(s). 2.  Patient will perform showering with supervision/set-up within 7 day(s). 3.  Patient will perform gathering ADL items high and low 4/4 with supervision/set-up within 7 day(s). 4.  Patient will perform toilet transfers with supervision/set-up within 7 day(s). 5.  Patient will perform all aspects of toileting with supervision/set-up within 7 day(s). 6.  Patient will participate in upper extremity therapeutic exercise/activities with supervision/set-up within 7 day(s). 7.  Patient will utilize energy conservation techniques during functional activities with verbal cues within 7 day(s). Occupational Therapy EVALUATION  Patient: Michelle Villalta (62 y.o. male)  Date: 2/27/2018  Primary Diagnosis: Acute systolic CHF (congestive heart failure) ([facility]ly [facility])        Precautions:        ASSESSMENT :  Based on the objective data described below, the patient presents with independence to max A ADLs. ADLs limited by active ROM, strength, edema B LEs, cognition (attention to task, processing) and cardiopulmonary tolerance. Recommend HHOT to increase independence and safety in the home. Patient is highly motivated to be independent and decrease dependency on wife for A. [address] paperwork filled out for tub transfer bench and long handled AE. Recommend with nursing patient to complete as able in order to maintain strength, endurance and independence: ADLs with supervision/setup, OOB to chair 3x/day and mobilizing to the bathroom for toileting with  assist. Thank you for your assistance. Patient will benefit from skilled intervention to address the above impairments.   Patients rehabilitation potential is considered to be Good  Factors which may influence rehabilitation potential include:   [x]             None noted  []             Mental ability/status  []             Medical condition  []             Home/family situation and support systems  []             Safety awareness  []             Pain tolerance/management  []             Other:      PLAN :  Recommendations and Planned Interventions:  [x]               Self Care Training                  [x]        Therapeutic Activities  [x]               Functional Mobility Training    []        Cognitive Retraining  [x]               Therapeutic Exercises           [x]        Endurance Activities  [x]               Balance Training                   []        Neuromuscular Re-Education  []               Visual/Perceptual Training     [x]   Home Safety Training  [x]               Patient Education                 [x]        Family Training/Education  []               Other (comment):    Frequency/Duration: Patient will be followed by occupational therapy 3 times a week to address goals. Discharge Recommendations: Home Health  Further Equipment Recommendations for Discharge: tub transfer bench, long handled dressing AE     SUBJECTIVE:   Patient stated My wife does it for me but if I can do it myself I do.     OBJECTIVE DATA SUMMARY:   HISTORY:   Past Medical History:   Diagnosis Date    CAD (coronary artery disease)     [facility]     Chronic systolic heart failure ([facility] 4/12/2011    [facility]     Coronary atherosclerosis of native coronary artery 4/12/2011    Diabetes ([facility]     High cholesterol     Hypertension     MI, old     Neurological disorder     Other primary cardiomyopathies 4/12/2011    Restless leg syndrome      Past Surgical History:   Procedure Laterality Date    CARDIAC SURG PROCEDURE UNLIST      stents x7    HX CARPAL TUNNEL RELEASE      right wrist       Prior Level of Function/Environment/Context: independent to moderate A ADLs, stands to shower with difficulty due to SOB, energy, endurance, and ability to step in and out; uses back scratcher to don underwear and pants, donning L LE first and undress last. Wife A with socks, shoes and bathing. Patient does not work. Occupations in which the patient is/was successful, what are the barriers preventing that success:   Performance Patterns (routines, roles, habits, and rituals):   Personal Interests and/or values:   Expanded or extensive additional review of patient history:     Home Situation  Home Environment: Private residence  # Steps to Enter: 1  One/Two Story Residence: One story  Living Alone: No  Support Systems: Spouse/Significant Other/Partner, [employer] community, Friends \ neighbors  Patient Expects to be Discharged to[de-identified] Private residence  Current DME Used/Available at Home: Oxygen, portable, Glucometer, Nebulizer, CPAP, Cane, straight  Tub or Shower Type: Tub/Shower combination  [x]  Right hand dominant   []  Left hand dominant    EXAMINATION OF PERFORMANCE DEFICITS:  Cognitive/Behavioral Status:  Neurologic State: Alert  Orientation Level: Oriented X4  Cognition: Appropriate decision making; Appropriate for age attention/concentration; Appropriate safety awareness  Perception: Appears intact  Perseveration: No perseveration noted  Safety/Judgement: Awareness of environment;Good awareness of safety precautions    Skin: red rash B UEs and LEs with red wounds in which wound care addressing this am. Session deferred to pm    Edema: increased B LEs, non pitting    Hearing:   Auditory  Auditory Impairment: None    Vision/Perceptual:                           Acuity: Impaired near vision (baseline)    Corrective Lenses: Reading glasses    Range of Motion:    AROM: Generally decreased, functional (L shoulder flexion ~60*; R 120*; elbows-digits WDL) L shoulder IR to neutral; R IR to back side                         Strength:    Strength: Generally decreased, functional (-3/5 shoulder flexion; elbows-digits WDL)                Coordination:  Coordination: Within functional limits  Fine Motor Skills-Upper: Left Intact; Right Intact    Gross Motor Skills-Upper: Left Intact; Right Intact    Tone & Sensation:    Tone: Normal  Sensation: Intact                      Balance:  Sitting: Intact; Without support    Functional Mobility and Transfers for ADLs:  Bed Mobility:       Transfers:       ADL Assessment:  Feeding: Independent    Oral Facial Hygiene/Grooming: Supervision setup on beside tray, sitting EOB    Bathing: Moderate assistance patient stating completed upper body this am, A from wound care knees-feet    Upper Body Dressing: Supervision infer from ROM and setup 2* declined demonstrate ability to gather    Lower Body Dressing: Maximum assistance poor tailor sitting, functional reach to knees, declined standing to demonstrate clothing management over bottom 2* just finished walking with PT and now exhausted    Toileting: Moderate assistance R UE decreased functional reach to backside, declined standing to demonstrate clothing management over bottom 2* just finished walking with PT and now exhausted                    ADL Intervention and task modifications:     Patient instructed and indicated understanding energy conservation techniques to increase independence and safety during ADLs with visual handout provided. Recommend remain seated to don all, stand one time; patient indicated understanding. Discussed at length his tub, tub transfer, bathing, endurance for completion and overall safety.                             Cognitive Retraining  Safety/Judgement: Awareness of environment;Good awareness of safety precautions    Therapeutic Exercise:     Functional Measure:  Barthel Index:    Bathin  Bladder: 10  Bowels: 10  Groomin  Dressin  Feeding: 10  Mobility: 5  Stairs: 0  Toilet Use: 5  Transfer (Bed to Chair and Back): 10  Total: 60       Barthel and G-code impairment scale:  Percentage of impairment CH  0% CI  1-19% CJ  20-39% CK  40-59% CL  60-79% CM  80-99% CN  100%   Barthel Score 0-100 100 99-80 79-60 59-40 20-39 1-19   0   Barthel Score 0-20 20 17-19 13-16 9-12 5-8 1-4 0      The Barthel ADL Index: Guidelines  1. The index should be used as a record of what a patient does, not as a record of what a patient could do. 2. The main aim is to establish degree of independence from any help, physical or verbal, however minor and for whatever reason. 3. The need for supervision renders the patient not independent. 4. A patient's performance should be established using the best available evidence. Asking the patient, friends/relatives and nurses are the usual sources, but direct observation and common sense are also important. However direct testing is not needed. 5. Usually the patient's performance over the preceding 24-48 hours is important, but occasionally longer periods will be relevant. 6. Middle categories imply that the patient supplies over 50 per cent of the effort. 7. Use of aids to be independent is allowed. Rosalva Dailey., Barthel, D.W. (5384). Functional evaluation: the Barthel Index. [facility] [address] [facility] (14)2. ARIN Smith, Naresh Reyes., Laith Gottlieb., [place], [address] [place] [address] (1999). Measuring the change indisability after inpatient rehabilitation; comparison of the responsiveness of the Barthel Index and Functional [place] Measure. Journal of Neurology, Neurosurgery, and Psychiatry, 66(4), 022-913. Jeffrey Randhawa, N.J.A, ROLANDO Carlos, & Maricruz Gutierrez MBiancaA. (2004.) Assessment of post-stroke quality of life in cost-effectiveness studies: The usefulness of the Barthel Index and the EuroQoL-5D. Quality of Life Research, 13, 119-34         G codes: In compliance with CMSs Claims Based Outcome Reporting, the following G-code set was chosen for this patient based on their primary functional limitation being treated:     The outcome measure chosen to determine the severity of the functional limitation was the Barthel Index with a score of 60/100 which was correlated with the impairment scale. ? Self Care:     - CURRENT STATUS: CJ - 20%-39% impaired, limited or restricted    - GOAL STATUS: CH - 0% impaired, limited or restricted    - D/C STATUS:  ---------------To be determined---------------     Pain:  Pain Scale 1: Numeric (0 - 10)  Pain Intensity 1: 7  Pain Location 1: Generalized     Pain Description 1: Aching  Pain Intervention(s) 1: Declines  Activity Tolerance:   VSS  Please refer to the flowsheet for vital signs taken during this treatment. After treatment:   [] Patient left in no apparent distress sitting up in chair  [x] Patient left in no apparent distress sitting edge of bed  [x] Call bell left within reach  [x] Nursing notified  [] Caregiver present  [] Bed alarm activated    COMMUNICATION/EDUCATION:   The patients plan of care was discussed with: Registered Nurse. [x] Home safety education was provided and the patient/caregiver indicated understanding. [x] Patient/family have participated as able in goal setting and plan of care. [x] Patient/family agree to work toward stated goals and plan of care. [] Patient understands intent and goals of therapy, but is neutral about his/her participation. [] Patient is unable to participate in goal setting and plan of care. This patients plan of care is appropriate for delegation to [facility].     Thank you for this referral.  Becky Escobedo  Time Calculation: 19 mins

## 2018-02-27 NOTE — PROGRESS NOTES
Bedside and Verbal shift change [facility]  (oncoming nurse) by Obed Red (offgoing nurse). Report included the following information SBAR, Kardex, Procedure Summary and Intake/Output.

## 2018-02-27 NOTE — PROGRESS NOTES
Hospitalist Progress Note  Yesenia Denis NP  Answering service: [phone] OR [phone] from in house phone  Cell: [phone]      Date of Service:  2018  NAME:  Jimbo Villalta  :  1960  MRN:  [id number]      Admission Summary:   66-year-old male with history of coronary artery disease, status post MI and COPD, who presented to the emergency room via EMS with complaints of chest pain and shortness of breath. Patient reports that chest pain is of acute onset, located in the left anterior chest wall. Shortness of breath is associated with the chest pain. Symptoms started about one and a half to two hours prior to presentation to the emergency room. The patient described chest pain as 10/10, radiating down the left arm. According to the patient, he had just finished taking his nightly medications and laid down on the bed when he started feeling short of breath and chest pain. The patient takes daily aspirin. Patient reported that he put on his oxygen via nasal cannula at 3 liters. Patient also called EMS who gave a nebulizer treatment. The patient reported some relief from the nebulizer treatments. Patient stated \"I had an MI about 1 month ago and was treated at [facility].  There is no association with respiration and chest pain. The patient also reported diaphoresis but denied wheezing. Interval history / Subjective:    Breathing back to baseline. Chronic BLE the same. Reports increased abdominal pain over night. Has not has a BM since last Thursday. No nausea or vomiting.       Assessment & Plan:     Acute on Chronic Systolic Heart Failure NYHA class 4 on admission  -increased SOB, orthopnea, crackles on exam on admission  -IV diuretics BID, hopefully can switch to home dose in am   -17 echo ef 40%, repeat echo ef 30%  -cardiology following, recommending maximizing HF meds, no need for AICD at this time  -HF team consulted  -resume home ASA, Lisinopril, Toprol  -CM consulted for [facility]   -follows cardiology at [address]   -HF educator  -strict I&Os    Chest Pain/Shortness of Breath   -currently resolved, likely 2/2 to the above   -prn nebs, resume home scheduled meds   -wear O2 3L at home prn, monitor O2 sats   -troponin negative x3   -recent NSTEMI on 1/24 at [facility] with heart cath that showed no blockages.  -cardiology following    BLE Chronic Lymphedema with Dermatitis   -has been following OP wound care at [facility] for this   -no fevers or chills, legs weeping however unsure if this acute infection, will await ID recs  -continue IV abx for now  -discussed with Dr Almas Hopkins, can discharge home with Augmentin 875 BID x10 days  -wound care consulted   -doppler negative    Chronic Respiratory Failure in the setting of COPD and chronic home O2 use   -continue home O2 at 3L    Chronic COPD with tobacco use   -does not appear to be in acute exacerbation, no wheezing on exam   -resume nebs   -smoking cessation encouraged     HTN   -BP stable   -resume home meds     Sleep Apnea   -resume home cpap    CAD  -resume home meds     Chronic Pain Syndrome   -on scheduled Oxycodone and Dilaudid at home prescribed by pcp   -pt reports it is at its baseline   -resume home meds, encouraged pt to follow up with pcp for further adjustments     Type 2 DM   -hold home Metformin   -SSI, accuchecks    Diffuse Rash   -has been on going >1 mo  -no recent travel   -ID following    Depression   -resume home meds    Obesity   -BMI 35.2    Constipation   -likely from narcotic use, no BM since last thursday   -continue home colace  -give Mag citrate today    Iron Deficiency Anemia   -iron low  -IV iron today  -add po iron    Code status: Full  DVT prophylaxis: Lovenox    Care Plan discussed with: Patient/Family, Nurse and  Dr Katarina Sue, Dr Almas Hopkins  Disposition: Home w/Family and TBD, hopefully tomorrow     Hospital Problems  Date Reviewed: 2/26/2018          Codes Class Noted POA    * (Principal)Systolic CHF, acute ([facility] ICD-10-CM: I50.21  ICD-9-CM: 428.21, 428.0  2/26/2018 Unknown        Acute systolic CHF (congestive heart failure) ([facility] ICD-10-CM: I50.21  ICD-9-CM: 428.21, 428.0  2/26/2018 Unknown                Review of Systems:   A comprehensive review of systems was negative except for: Respiratory: positive for dyspnea on exertion  Cardiovascular: positive for chest pain, lower extremity edema  Gastrointestinal: positive for constipation and abdominal pain  Integument/breast: positive for rash and skin color change  Musculoskeletal: positive for BLE pain       Vital Signs:    Last 24hrs VS reviewed since prior progress note. Most recent are:  Visit Vitals    /72    Pulse 98    Temp 97.8 °F (36.6 °C)    Resp 20    Ht 6' 2\" (1.88 m)    Wt 119.9 kg (264 lb 5.3 oz)    SpO2 95%    BMI 33.94 kg/m2         Intake/Output Summary (Last 24 hours) at 02/27/18 1045  Last data filed at 02/27/18 0954   Gross per 24 hour   Intake                0 ml   Output             2200 ml   Net            -2200 ml        Physical Examination:             Constitutional:  No acute distress, cooperative, pleasant    ENT:  Oral mucous moist, oropharynx benign. Resp:  CTA bilaterally. Diminished in bases. No wheezing/rhonchi/rales. No accessory muscle use. O2 3L via nasal cannula   CV:  Regular rhythm, normal rate, no murmurs, gallops, rubs    GI:  abd distended, non tender. normoactive bowel sounds,     Musculoskeletal:  Non pitting BLE edema, warm, 2+ pulses throughout    Neurologic:  Moves all extremities. AAOx3, CN II-XII reviewed. Follows commands     Psych:  Good insight, Not anxious nor agitated. Skin:  generalized maculopapular rash throughout excluding face.  see below for BLE              Data Review:    Review and/or order of clinical lab test  Review and/or order of tests in the radiology section of CPT  Review and/or order of tests in the medicine section of [facility]      Labs:     Recent Labs      02/27/18 0352 02/25/18 2205   WBC  7.4  9.2   HGB  8.9*  9.5*   HCT  30.0*  31.5*   PLT  365  401*     Recent Labs      02/27/18 0352 02/25/18 2205   NA  138  137   K  4.6  3.6   CL  104  104   CO2  28  25   BUN  9  11   CREA  0.65*  0.83   GLU  105*  136*   CA  8.4*  8.9   MG  2.0  1.8     Recent Labs      02/25/18 2205   SGOT  16   ALT  14   AP  132*   TBILI  0.3   TP  7.6   ALB  2.9*   GLOB  4.7*     Recent Labs      02/25/18 2205   INR  1.1   PTP  10.9   APTT  28.1      Recent Labs      02/27/18 0352   TIBC  305   PSAT  5*      Lab Results   Component Value Date/Time    Folate 7.9 02/27/2018 03:52 AM    Folate 7.5 02/27/2018 03:52 AM      No results for input(s): PH, PCO2, PO2 in the last 72 hours.   Recent Labs      02/27/18 0352 02/26/18 0209 02/25/18 2205   CPK  102   --    --    CKNDX  2.6*   --    --    TROIQ  <0.04  <0.04  <0.04     Lab Results   Component Value Date/Time    Cholesterol, total 284 (H) 05/21/2014 09:59 AM    HDL Cholesterol 23 05/21/2014 09:59 AM    LDL,Direct 193 (H) 05/21/2014 09:59 AM    LDL, calculated Not calculated due to elevated triglyceride level 05/21/2014 09:59 AM    Triglyceride 482 (H) 05/21/2014 09:59 AM    CHOL/HDL Ratio 12.3 (H) 05/21/2014 09:59 AM     Lab Results   Component Value Date/Time    Glucose (POC) 111 (H) 02/27/2018 08:02 AM    Glucose (POC) 119 (H) 02/26/2018 09:19 PM    Glucose (POC) 116 (H) 02/26/2018 05:01 PM    Glucose (POC) 98 05/02/2017 01:30 PM    Glucose (POC) 137 (H) 08/27/2015 06:43 AM     Lab Results   Component Value Date/Time    Color YELLOW/STRAW 02/25/2018 10:46 PM    Appearance CLEAR 02/25/2018 10:46 PM    Specific gravity 1.019 02/25/2018 10:46 PM    pH (UA) 5.5 02/25/2018 10:46 PM    Protein NEGATIVE  02/25/2018 10:46 PM    Glucose NEGATIVE  02/25/2018 10:46 PM    Ketone NEGATIVE  02/25/2018 10:46 PM    Bilirubin NEGATIVE  02/25/2018 10:46 PM    Urobilinogen 0.2 02/25/2018 10:46 PM    Nitrites NEGATIVE  02/25/2018 10:46 PM    Leukocyte Esterase NEGATIVE  02/25/2018 10:46 PM    Epithelial cells FEW 01/05/2014 04:34 PM    Bacteria NEGATIVE  01/05/2014 04:34 PM    WBC 0-4 01/05/2014 04:34 PM    RBC 0-5 01/05/2014 04:34 PM         Medications Reviewed:     Current Facility-Administered Medications   Medication Dose Route Frequency    ferrous sulfate tablet 325 mg  1 Tab Oral DAILY WITH BREAKFAST    furosemide (LASIX) tablet 80 mg  80 mg Oral DAILY    iron sucrose (VENOFER) 400 mg in 0.9% sodium chloride 250 mL IVPB  400 mg IntraVENous Q24H    sacubitril-valsartan (ENTRESTO) 24-26 mg tablet 1 Tab  1 Tab Oral Q12H    magnesium citrate solution 296 mL  296 mL Oral NOW    albuterol-ipratropium (DUO-NEB) 2.5 MG-0.5 MG/3 ML  3 mL Nebulization Q4H RT    atorvastatin (LIPITOR) tablet 80 mg  80 mg Oral DAILY    docusate sodium (COLACE) capsule 100 mg  100 mg Oral BID    citalopram (CELEXA) tablet 40 mg  40 mg Oral DAILY    meclizine (ANTIVERT) tablet 12.5 mg  12.5 mg Oral TID PRN    therapeutic multivitamin (THERAGRAN) tablet 1 Tab  1 Tab Oral DAILY    nitroglycerin (NITROSTAT) tablet 0.4 mg  0.4 mg SubLINGual Q5MIN PRN    predniSONE (DELTASONE) tablet 5 mg  5 mg Oral Q MON, WED & FRI    rOPINIRole (REQUIP) tablet 8 mg  8 mg Oral QHS    zolpidem (AMBIEN) tablet 10 mg  10 mg Oral QHS    sodium chloride (NS) flush 5-10 mL  5-10 mL IntraVENous Q8H    sodium chloride (NS) flush 5-10 mL  5-10 mL IntraVENous PRN    ondansetron (ZOFRAN) injection 4 mg  4 mg IntraVENous Q4H PRN    enoxaparin (LOVENOX) injection 40 mg  40 mg SubCUTAneous Q24H    vancomycin dosing per pharmacy   Other Rx Dosing/Monitoring    vancomycin (VANCOCIN) 2000 mg in  ml infusion  2,000 mg IntraVENous Q12H    pantoprazole (PROTONIX) tablet 40 mg  40 mg Oral DAILY    arformoterol (BROVANA) neb solution 15 mcg  15 mcg Nebulization BID RT    And    budesonide (PULMICORT) 500 mcg/2 ml nebulizer suspension  500 mcg Nebulization BID RT    morphine CR (MS CONTIN) tablet 60 mg  60 mg Oral Q8H    nicotine (NICODERM CQ) 21 mg/24 hr patch 1 Patch  1 Patch TransDERmal DAILY    Vancomycin trough level 2/27 @ 14:00 prior to dose administration   Other ONCE    aspirin delayed-release tablet 81 mg  81 mg Oral DAILY    potassium chloride SR (KLOR-CON 10) tablet 40 mEq  40 mEq Oral BID    carvedilol (COREG) tablet 6.25 mg  6.25 mg Oral BID WITH MEALS    HYDROmorphone (DILAUDID) tablet 4 mg  4 mg Oral Q12H    nortriptyline (PAMELOR) capsule 25 mg  25 mg Oral QHS     ______________________________________________________________________  EXPECTED LENGTH OF STAY: 3d 12h  ACTUAL LENGTH OF STAY:          [facility], NP

## 2018-02-27 NOTE — PROGRESS NOTES
Cardiology Progress Note  2018     Admit Date: 2018  Admit Diagnosis: Acute systolic CHF (congestive heart failure) (HCC)  CC: none currently    Assessment:   Principal Problem:    Systolic CHF, acute ([facility].) (2018)    Active Problems:    Acute systolic CHF (congestive heart failure) ([facility] (2018)      Plan:   Has diuresed well. Ongoing issues with cellulitis and lymphedema legs, Fe def anemia. Adjust CHF regimen, hold off AICD with active leg infection and consult to Advanced CHF re: longterm management and support. Volume status:euvolemic  Renal function: stable    For other plans, see orders.   Subjective: Lora Dumont Nine reports   Chest Pain:  [x]   none,  consistent with  []   non-cardiac   []   atypical   []   angina             [x]   none now    []      on-going  Dyspnea: [x]   none  []   at rest  []   with exertion     []   improved   []   unchanged   []   worsening  PND:       [x]   none  []   overnight    Orthopnea: [x]   none  []   improved  []   unchanged  []   worsening  Presyncope: [x]   none   []   improved    []   unchanged    []   worsening  Ambulated in hallway without symptoms  []   Yes  Ambulated in room without symptoms  []   Yes    Objective:    Physical Exam:  Overall VSSAF;    Visit Vitals    /70    Pulse 91    Temp 97.5 °F (36.4 °C)    Resp 18    Ht 6' 2\" (1.88 m)    Wt 119.9 kg (264 lb 5.3 oz)    SpO2 90%    BMI 33.94 kg/m2     Temp (24hrs), Av.9 °F (36.6 °C), Min:97.5 °F (36.4 °C), Max:98.7 °F (37.1 °C)    Patient Vitals for the past 8 hrs:   Pulse   18 0706 91   18 0424 67   18 0021 83    Patient Vitals for the past 8 hrs:   Resp   18 0424 18   18 0021 18    Patient Vitals for the past 8 hrs:   BP   18 0706 115/70   18 0424 100/67   18 0021 111/64        Intake/Output Summary (Last 24 hours) at 18 0741  Last data filed at 18 0422   Gross per 24 hour   Intake                0 ml Output             1525 ml   Net            -1525 ml       General Appearance: Well developed, well nourished, no acute distress. Ears/Nose/Mouth/Throat:   Normal MM; anicteric. JVP: WNL   Resp:   Lungs clear to auscultation bilaterally. Nl resp effort. Cardiovascular:  RRR, S1, S2 normal, no new murmur. +S3. Abdomen:   Soft, non-tender, bowel sounds are present. Extremities: 4+ edema bilaterally. Chronic cellulitis. Skin:  Neuro: Breakdown on legs. A/O x3, grossly nonfocal    []      cath site intact w/o hematoma or bruit; distal pulse unchanged. D  No results for input(s): PH, PCO2, PO2 in the last 72 hours.   Recent Labs      02/27/18 0352 02/26/18 0209 02/25/18 2205   CPK  102   --    --    CKMB  2.7   --    --    TROIQ  <0.04  <0.04  <0.04     Recent Labs      02/27/18 0352 02/25/18 2205   NA  138  137   K  4.6  3.6   CL  104  104   CO2  28  25   BUN  9  11   CREA  0.65*  0.83   GLU  105*  136*   CA  8.4*  8.9   ALB   --   2.9*   WBC  7.4  9.2   HGB  8.9*  9.5*   HCT  30.0*  31.5*   PLT  365  401*     Recent Labs      02/25/18 2205   SGOT  16   ALT  14   AP  132*   TBILI  0.3   TP  7.6   ALB  2.9*   GLOB  4.7*     Recent Labs      02/25/18 2205   INR  1.1   PTP  10.9   APTT  28.1      Recent Labs      02/27/18 0352   TIBC  305   PSAT  5*      Lab Results   Component Value Date/Time    Glucose (POC) 119 (H) 02/26/2018 09:19 PM    Glucose (POC) 116 (H) 02/26/2018 05:01 PM    Glucose (POC) 98 05/02/2017 01:30 PM    Glucose (POC) 137 (H) 08/27/2015 06:43 AM    Glucose (POC) 87 08/26/2015 04:30 PM       Current Facility-Administered Medications   Medication Dose Route Frequency    ferrous sulfate tablet 325 mg  1 Tab Oral DAILY WITH BREAKFAST    furosemide (LASIX) tablet 80 mg  80 mg Oral DAILY    iron sucrose (VENOFER) 400 mg in 0.9% sodium chloride 250 mL IVPB  400 mg IntraVENous Q24H    sacubitril-valsartan (ENTRESTO) 24-26 mg tablet 1 Tab  1 Tab Oral Q12H    albuterol-ipratropium (DUO-NEB) 2.5 MG-0.5 MG/3 ML  3 mL Nebulization Q4H RT    atorvastatin (LIPITOR) tablet 80 mg  80 mg Oral DAILY    docusate sodium (COLACE) capsule 100 mg  100 mg Oral BID    citalopram (CELEXA) tablet 40 mg  40 mg Oral DAILY    meclizine (ANTIVERT) tablet 12.5 mg  12.5 mg Oral TID PRN    therapeutic multivitamin (THERAGRAN) tablet 1 Tab  1 Tab Oral DAILY    nitroglycerin (NITROSTAT) tablet 0.4 mg  0.4 mg SubLINGual Q5MIN PRN    predniSONE (DELTASONE) tablet 5 mg  5 mg Oral Q MON, WED & FRI    rOPINIRole (REQUIP) tablet 8 mg  8 mg Oral QHS    zolpidem (AMBIEN) tablet 10 mg  10 mg Oral QHS    sodium chloride (NS) flush 5-10 mL  5-10 mL IntraVENous Q8H    sodium chloride (NS) flush 5-10 mL  5-10 mL IntraVENous PRN    ondansetron (ZOFRAN) injection 4 mg  4 mg IntraVENous Q4H PRN    enoxaparin (LOVENOX) injection 40 mg  40 mg SubCUTAneous Q24H    vancomycin dosing per pharmacy   Other Rx Dosing/Monitoring    vancomycin (VANCOCIN) 2000 mg in  ml infusion  2,000 mg IntraVENous Q12H    pantoprazole (PROTONIX) tablet 40 mg  40 mg Oral DAILY    arformoterol (BROVANA) neb solution 15 mcg  15 mcg Nebulization BID RT    And    budesonide (PULMICORT) 500 mcg/2 ml nebulizer suspension  500 mcg Nebulization BID RT    morphine CR (MS CONTIN) tablet 60 mg  60 mg Oral Q8H    nicotine (NICODERM CQ) 21 mg/24 hr patch 1 Patch  1 Patch TransDERmal DAILY    Vancomycin trough level 2/27 @ 14:00 prior to dose administration   Other ONCE    aspirin delayed-release tablet 81 mg  81 mg Oral DAILY    potassium chloride SR (KLOR-CON 10) tablet 40 mEq  40 mEq Oral BID    carvedilol (COREG) tablet 6.25 mg  6.25 mg Oral BID WITH MEALS    lisinopril (PRINIVIL, ZESTRIL) tablet 5 mg  5 mg Oral QHS    HYDROmorphone (DILAUDID) tablet 4 mg  4 mg Oral Q12H    nortriptyline (PAMELOR) capsule 25 mg  25 mg Oral QHS        Yanet Burch MD

## 2018-02-28 NOTE — PROGRESS NOTES
Hospitalist Progress Note  Christiano Yoon NP  Answering service: [phone] OR [phone] from in house phone  Cell: [phone]      Date of Service:  2018  NAME:  Joel Villalta  :  1960  MRN:  [id number]      Admission Summary:   13-year-old male with history of coronary artery disease, status post MI and COPD, who presented to the emergency room via EMS with complaints of chest pain and shortness of breath. Patient reports that chest pain is of acute onset, located in the left anterior chest wall. Shortness of breath is associated with the chest pain. Symptoms started about one and a half to two hours prior to presentation to the emergency room. The patient described chest pain as 10/10, radiating down the left arm. According to the patient, he had just finished taking his nightly medications and laid down on the bed when he started feeling short of breath and chest pain. The patient takes daily aspirin. Patient reported that he put on his oxygen via nasal cannula at 3 liters. Patient also called EMS who gave a nebulizer treatment. The patient reported some relief from the nebulizer treatments. Patient stated \"I had an MI about 1 month ago and was treated at [facility].  There is no association with respiration and chest pain. The patient also reported diaphoresis but denied wheezing. Interval history / Subjective:    Patient reports breathing is worse today. Feels tight. Breathing treatments help but dont last long enough. Still no BM, would like to try Mag Citrate again today, if that does not work discussed trying suppository this afternoon.       Assessment & Plan:     Acute on Chronic Systolic Heart Failure NYHA class 4 on admission  -increased SOB, orthopnea, crackles on exam on admission  -IV diuretics BID-> changed to Lasix 80mg daily  -17 echo ef 40%, repeat echo ef 30%  -cardiology following, recommending maximizing HF meds, no need for AICD at this time  -HF team consulted, waiting input  -resume home ASA, Lisinopril, Toprol  -CM consulted for [facility]   -follows cardiology at [address]   -HF educator  -strict I&Os    Chest Pain/Shortness of Breath   - likely 2/2 to the above   -change nebs to q6hr, resume home scheduled meds   -wear O2 3L at home prn, monitor O2 sats   -troponin negative x3   -recent NSTEMI on 1/24 at [facility] with heart cath that showed no blockages.  -cardiology following    BLE Chronic Lymphedema with Dermatitis   -has been following OP wound care at [facility] for this   -no fevers or chills, legs weeping however unsure if this acute infection, will await ID recs  -continue IV abx for now  -discussed with Dr Rae Murray, can discharge home with Augmentin 875 BID x10 days  -wound care consulted   -doppler negative    Chronic Respiratory Failure in the setting of COPD and chronic home O2 use   -continue home O2 at 3L    Chronic COPD with tobacco use   -does not appear to be in acute exacerbation, no wheezing on exam   -reports being more tight today, change nebs to every 6 hours, resume home Spiriva/Advair  -smoking cessation encouraged, nicotine patch     HTN   -BP stable   -resume home meds     Sleep Apnea   -resume home cpap    CAD  -resume home meds     Chronic Pain Syndrome   -on scheduled Oxycodone and Dilaudid at home prescribed by pcp   -pt reports it is at its baseline   -resume home meds, encouraged pt to follow up with pcp for further adjustments     Type 2 DM   -hold home Metformin   -SSI, accuchecks    Diffuse Rash   -has been on going >1 mo  -no recent travel   -ID following    Depression   -resume home meds    Obesity   -BMI 35.2    Constipation   -likely from narcotic use, no BM since last thursday   -change colace to pericolace  -mag citrate again today  -add prn suppository    Iron Deficiency Anemia   -iron low  -IV iron today  -add po iron    Code status: Full  DVT prophylaxis: Lovenox    Care Plan discussed with: Patient/Family, Nurse and  Dr Loretta Wood  Disposition: Home w/Family and TBD, hopefully tomorrow     Hospital Problems  Date Reviewed: 2/26/2018          Codes Class Noted POA    * (Principal)Systolic CHF, acute ([facility] ICD-10-CM: I50.21  ICD-9-CM: 428.21, 428.0  2/26/2018 Unknown        Acute systolic CHF (congestive heart failure) ([facility] ICD-10-CM: I50.21  ICD-9-CM: 428.21, 428.0  2/26/2018 Unknown                Review of Systems:   A comprehensive review of systems was negative except for: Respiratory: positive for dyspnea on exertion  Cardiovascular: positive for chest pain, lower extremity edema  Gastrointestinal: positive for constipation and abdominal pain  Integument/breast: positive for rash and skin color change  Musculoskeletal: positive for BLE pain       Vital Signs:    Last 24hrs VS reviewed since prior progress note. Most recent are:  Visit Vitals    /67 (BP 1 Location: Right arm, BP Patient Position: Sitting)    Pulse 97    Temp 97.6 °F (36.4 °C)    Resp 18    Ht 6' 2\" (1.88 m)    Wt 119.9 kg (264 lb 5.3 oz)    SpO2 99%    BMI 33.94 kg/m2         Intake/Output Summary (Last 24 hours) at 02/28/18 1047  Last data filed at 02/28/18 0850   Gross per 24 hour   Intake              650 ml   Output             2800 ml   Net            -2150 ml        Physical Examination:             Constitutional:  No acute distress, cooperative, pleasant    ENT:  Oral mucous moist, oropharynx benign. Resp:  CTA bilaterally. Diminished in bases. No wheezing/rhonchi/rales. No accessory muscle use. O2 3L via nasal cannula   CV:  Regular rhythm, normal rate, no murmurs, gallops, rubs    GI:  abd distended, non tender. normoactive bowel sounds,     Musculoskeletal:  Non pitting BLE edema, warm, 2+ pulses throughout    Neurologic:  Moves all extremities. AAOx3, CN II-XII reviewed.  Follows commands     Psych:  Good insight, Not anxious nor agitated. Skin:  generalized maculopapular rash throughout excluding face. BLE lymphedema with dsg. Data Review:    Review and/or order of clinical lab test  Review and/or order of tests in the radiology section of CPT  Review and/or order of tests in the medicine section of CPT      Labs:     Recent Labs      02/27/18 0352 02/25/18 2205   WBC  7.4  9.2   HGB  8.9*  9.5*   HCT  30.0*  31.5*   PLT  365  401*     Recent Labs      02/28/18 0209 02/27/18 0352 02/25/18 2205   NA  135*  138  137   K  4.2  4.6  3.6   CL  101  104  104   CO2  29  28  25   BUN  12  9  11   CREA  0.78  0.65*  0.83   GLU  98  105*  136*   CA  9.0  8.4*  8.9   MG   --   2.0  1.8     Recent Labs      02/25/18 2205   SGOT  16   ALT  14   AP  132*   TBILI  0.3   TP  7.6   ALB  2.9*   GLOB  4.7*     Recent Labs      02/25/18 2205   INR  1.1   PTP  10.9   APTT  28.1      Recent Labs      02/27/18 0352   TIBC  305   PSAT  5*      Lab Results   Component Value Date/Time    Folate 7.9 02/27/2018 03:52 AM    Folate 7.5 02/27/2018 03:52 AM      No results for input(s): PH, PCO2, PO2 in the last 72 hours.   Recent Labs      02/27/18 0352 02/26/18 0209 02/25/18 2205   CPK  102   --    --    CKNDX  2.6*   --    --    TROIQ  <0.04  <0.04  <0.04     Lab Results   Component Value Date/Time    Cholesterol, total 284 (H) 05/21/2014 09:59 AM    HDL Cholesterol 23 05/21/2014 09:59 AM    LDL,Direct 193 (H) 05/21/2014 09:59 AM    LDL, calculated Not calculated due to elevated triglyceride level 05/21/2014 09:59 AM    Triglyceride 482 (H) 05/21/2014 09:59 AM    CHOL/HDL Ratio 12.3 (H) 05/21/2014 09:59 AM     Lab Results   Component Value Date/Time    Glucose (POC) 130 (H) 02/28/2018 07:27 AM    Glucose (POC) 133 (H) 02/27/2018 09:28 PM    Glucose (POC) 111 (H) 02/27/2018 08:02 AM    Glucose (POC) 119 (H) 02/26/2018 09:19 PM    Glucose (POC) 116 (H) 02/26/2018 05:01 PM     Lab Results   Component Value Date/Time    Color YELLOW/STRAW 02/25/2018 10:46 PM    Appearance CLEAR 02/25/2018 10:46 PM    Specific gravity 1.019 02/25/2018 10:46 PM    pH (UA) 5.5 02/25/2018 10:46 PM    Protein NEGATIVE  02/25/2018 10:46 PM    Glucose NEGATIVE  02/25/2018 10:46 PM    Ketone NEGATIVE  02/25/2018 10:46 PM    Bilirubin NEGATIVE  02/25/2018 10:46 PM    Urobilinogen 0.2 02/25/2018 10:46 PM    Nitrites NEGATIVE  02/25/2018 10:46 PM    Leukocyte Esterase NEGATIVE  02/25/2018 10:46 PM    Epithelial cells FEW 01/05/2014 04:34 PM    Bacteria NEGATIVE  01/05/2014 04:34 PM    WBC 0-4 01/05/2014 04:34 PM    RBC 0-5 01/05/2014 04:34 PM         Medications Reviewed:     Current Facility-Administered Medications   Medication Dose Route Frequency    albuterol-ipratropium (DUO-NEB) 2.5 MG-0.5 MG/3 ML  3 mL Nebulization Q6H RT    magnesium citrate solution 296 mL  296 mL Oral NOW    senna-docusate (PERICOLACE) 8.6-50 mg per tablet 1 Tab  1 Tab Oral NOW    bisacodyl (DULCOLAX) suppository 10 mg  10 mg Rectal DAILY PRN    nortriptyline (PAMELOR) capsule 50 mg  50 mg Oral QHS    ferrous sulfate tablet 325 mg  1 Tab Oral DAILY WITH BREAKFAST    furosemide (LASIX) tablet 80 mg  80 mg Oral DAILY    iron sucrose (VENOFER) 400 mg in 0.9% sodium chloride 250 mL IVPB  400 mg IntraVENous Q24H    sacubitril-valsartan (ENTRESTO) 24-26 mg tablet 1 Tab  1 Tab Oral Q12H    atorvastatin (LIPITOR) tablet 80 mg  80 mg Oral DAILY    citalopram (CELEXA) tablet 40 mg  40 mg Oral DAILY    meclizine (ANTIVERT) tablet 12.5 mg  12.5 mg Oral TID PRN    therapeutic multivitamin (THERAGRAN) tablet 1 Tab  1 Tab Oral DAILY    nitroglycerin (NITROSTAT) tablet 0.4 mg  0.4 mg SubLINGual Q5MIN PRN    predniSONE (DELTASONE) tablet 5 mg  5 mg Oral Q MON, WED & FRI    rOPINIRole (REQUIP) tablet 8 mg  8 mg Oral QHS    zolpidem (AMBIEN) tablet 10 mg  10 mg Oral QHS    sodium chloride (NS) flush 5-10 mL  5-10 mL IntraVENous Q8H    sodium chloride (NS) flush 5-10 mL  5-10 mL IntraVENous PRN    ondansetron (ZOFRAN) injection 4 mg  4 mg IntraVENous Q4H PRN    enoxaparin (LOVENOX) injection 40 mg  40 mg SubCUTAneous Q24H    vancomycin dosing per pharmacy   Other Rx Dosing/Monitoring    vancomycin (VANCOCIN) 2000 mg in  ml infusion  2,000 mg IntraVENous Q12H    pantoprazole (PROTONIX) tablet 40 mg  40 mg Oral DAILY    arformoterol (BROVANA) neb solution 15 mcg  15 mcg Nebulization BID RT    And    budesonide (PULMICORT) 500 mcg/2 ml nebulizer suspension  500 mcg Nebulization BID RT    morphine CR (MS CONTIN) tablet 60 mg  60 mg Oral Q8H    nicotine (NICODERM CQ) 21 mg/24 hr patch 1 Patch  1 Patch TransDERmal DAILY    aspirin delayed-release tablet 81 mg  81 mg Oral DAILY    potassium chloride SR (KLOR-CON 10) tablet 40 mEq  40 mEq Oral BID    carvedilol (COREG) tablet 6.25 mg  6.25 mg Oral BID WITH MEALS    HYDROmorphone (DILAUDID) tablet 4 mg  4 mg Oral Q12H    nortriptyline (PAMELOR) capsule 25 mg  25 mg Oral QHS     ______________________________________________________________________  EXPECTED LENGTH OF STAY: 3d 12h  ACTUAL LENGTH OF STAY:          2                 Lynsey Nugent NP

## 2018-02-28 NOTE — PROGRESS NOTES
Problem: Mobility Impaired (Adult and Pediatric)  Goal: *Acute Goals and Plan of Care (Insert Text)  Physical Therapy Goals  Initiated 2/27/2018  1. Patient will transfer from bed to chair and chair to bed with modified independence using the least restrictive device within 7 day(s). 2.  Patient will perform sit to stand with modified independence within 7 day(s). 3.  Patient will ambulate with modified independence for 250 feet with the least restrictive device within 7 day(s). 4.  Patient will ascend/descend 1 stairs with 1 handrail(s) with minimal assistance/contact guard assist within 7 day(s). physical Therapy TREATMENT with 6MWT results    Patient: Carmita Corey [de-identified]59 y.o. male)  Date: 2/28/2018  Diagnosis: Acute systolic CHF (congestive heart failure) (HCC) Systolic CHF, acute (HCC)       Precautions:    Chart, physical therapy assessment, plan of care and goals were reviewed. ASSESSMENT:  Pt was able to complete the 6MWT. Pt reports concerns over LE drainage during treatment session. Pt was able to ambulate at a supervision level with the rolling walker. Pt had pain in LE but able to support weight during gait. Pt may benefit from HHPT to progress. Progression toward goals:  [x]      Improving appropriately and progressing toward goals  []      Improving slowly and progressing toward goals  []      Not making progress toward goals and plan of care will be adjusted     PLAN:  Patient continues to benefit from skilled intervention to address the above impairments. Continue treatment per established plan of care. Discharge Recommendations:  Home Health  Further Equipment Recommendations for Discharge: Owns rolling walker     SUBJECTIVE:   Patient stated To tell you the truth it is not a good day.     OBJECTIVE DATA SUMMARY:   Critical Behavior:  Neurologic State: Alert  Orientation Level: Oriented X4  Cognition: Appropriate decision making, Appropriate for age attention/concentration, Appropriate safety awareness, Follows commands  Safety/Judgement: Awareness of environment, Good awareness of safety precautions  Functional Mobility Training:  Bed Mobility:                     Transfers:  Sit to Stand: Modified independent  Stand to Sit: Modified independent                             Balance:  Sitting: Intact  Standing: Impaired  Standing - Static: Good  Standing - Dynamic : Good  Ambulation/Gait Training:  Distance (ft): 160 Feet (ft) (92 feet for 6MWT)  Assistive Device: Gait belt;Walker, rolling  Ambulation - Level of Assistance: Stand-by assistance;Contact guard assistance        Gait Abnormalities: Antalgic;Decreased step clearance        Base of Support: Widened     Speed/Renetta: Pace decreased (<100 feet/min); Slow  Step Length: Left shortened;Right shortened                   Stairs:             6 MWT results:  Distance Walked in Feet (ft): 92 ft. Pre Heart Rate: 99   Pre O2 Saturation: 99 (3 L O2)      Post Heart Rate: 102   Post O2 Saturation: 95 (on 3 L)   Assistive device used: Assistive Device: Gait belt;Walker, rolling       Normative data: 30-57 years old = 0 feet; Men 39-80 years old = 1889 feet; Women 3680 years rhl=3780 feet  Modified 10 point Adolfo RPE scale utilized where 0 = no breathlessness at all; 10 = maximum exertion  Please refer to the flowsheet for any additional vital signs taken during this treatment. Neuro Re-Education:    Therapeutic Exercises:     Pain:  Pain Scale 1: Numeric (0 - 10)  Pain Intensity 1: 8  Pain Location 1: Generalized  Pain Orientation 1: Anterior;Posterior  Pain Description 1: Aching  Pain Intervention(s) 1: Rest  Activity Tolerance:   Limited   Please refer to the flowsheet for vital signs taken during this treatment.   After treatment:   [] Patient left in no apparent distress sitting up in chair  [x] Patient left in no apparent distress in bed-sitting EOB  [x] Call bell left within reach  [x] Nursing notified  [] Caregiver present  [] Bed alarm activated    COMMUNICATION/COLLABORATION:   The patients plan of care was discussed with: Registered Nurse    Mohini Sommers PTA   Time Calculation: 19 mins

## 2018-02-28 NOTE — PROGRESS NOTES
ID Progress Note  2018    Subjective:     Up and around a bit    Objective:     Antibiotics:  1. Vancomycin       Vitals:   Visit Vitals    /75    Pulse 99    Temp 97.8 °F (36.6 °C)    Resp 18    Ht 6' 2\" (1.88 m)    Wt 119.9 kg (264 lb 5.3 oz)    SpO2 99%    BMI 33.94 kg/m2        Tmax:  Temp (24hrs), Av.8 °F (36.6 °C), Min:97.6 °F (36.4 °C), Max:98.1 °F (36.7 °C)      Exam:  Edema persists, erythema improved    Labs:      Recent Labs      18   0209  18   0352  18   2205   WBC   --   7.4  9.2   HGB   --   8.9*  9.5*   PLT   --   365  401*   BUN  12  9  11   CREA  0.78  0.65*  0.83   SGOT   --    --   16   AP   --    --   132*   TBILI   --    --   0.3       Cultures:     Lab Results   Component Value Date/Time    Specimen Description: ESOPHAGEAL BRUSHING 2010 09:25 AM     Lab Results   Component Value Date/Time    Culture result: NO GROWTH 3 DAYS 2018 10:46 PM       Radiology:     Line/Insert Date:           Assessment:     1. Lymphedema   2. Debility   3. Erythema of legs more consistent with dermatitis/vascular insufficiency    Objective:     1.  Oral antibiotics at time of discharge    Amira Retana MD

## 2018-02-28 NOTE — PROGRESS NOTES
CM reviewed case with treatment team during Interdisciplinary Rounds. Plan is for possible discharge tomorrow with home health. CM noted that patient was previously accepted by [facility] with order needed. Order now available, and CM forwarded order to Colette Shipman with [address] verifying receipt. CM was advised that patient had an appointment today with Nidia Pandey MD at the [address] [place] [address] ([phone]). CM called the [address] and rescheduled the appointment for 3/2/8/18 at 10:15AM with understanding that he can call if needed before the appointment.    CM updated AVS.

## 2018-02-28 NOTE — PROGRESS NOTES
Bedside shift change report given to Stevie (oncoming nurse) by Aurelia Cochran (offgoing nurse). Report included the following information SBAR, Kardex, Intake/Output, MAR, Recent Results and Cardiac Rhythm NSR/Sinus Tach, BBB.

## 2018-02-28 NOTE — PROGRESS NOTES
Bedside and Verbal shift change report given to Glen Edwards RN (oncoming nurse) by Vern Edmondson RN (offgoing nurse). Report included the following information SBAR, Kardex, ED Summary, Procedure Summary, Intake/Output, MAR and Recent Results.

## 2018-02-28 NOTE — CONSULTS
[facility] Consult Note      DOS:   2/28/2018  NAME:  Noemi Hammer   MRN:   [id number]     REFERRING PROVIDER:  Dr. Faulkner Lucero: Amparo Fritz MD  PRIMARY CARDIOLOGIST: Dr. Amairani Francis      Chief Complaint:   Chief Complaint   Patient presents with    Shortness of Breath    Chest Pain       HPI: 59y.o. year old male with a history of  ischemic cardiomyopathy with a history of a recent non-ST elevation infarct at [facility]. He underwent cardiac catheterization on 01/24/2018 which showed an EF of 20% without mitral regurgitation. He has had previous stents but this last cath showed no significant stenosis. He was sent home on 01/27/2018 and he presented to [facility] ER yesterday evening presenting with chest pain and shortness of breath, he placed himself on his nasal cannula (3L) and called EMA. In the ED, there were no acute EKG changes and his troponins have all been negative. He received some IV Lasix and has diuresed well. He was started on his home regimen yesterday including BB and Entresto. Impression / Plan:   Heart Failure Status: NYHA Class IV on admission     Acute on chronic HFrEF, ICM, EF 30%, NYHA IIIb   Agree with Entresto 24/26mg, watch BP and renal function    Cont Coreg 6.25mg   Decrease to 60mg lasix daily    Start low dose Spironolactone, watch potassium    Will follow along, will follow as OP.         History:  Past Medical History:   Diagnosis Date    CAD (coronary artery disease)     [facility]est[facility] failure) [facility]     Chronic systolic heart failure ([facility] 4/12/2011    COPD (chronic obstructive pulmonary disease) ([facility])     Coronary atherosclerosis of native coronary artery 4/12/2011    Diabetes ([facility] [address]     High cholesterol     Hypertension     MI, old     Neurological disorder     Other primary cardiomyopathies 4/12/2011    Restless leg syndrome      Past Surgical History:   Procedure Laterality Date    CARDIAC SURG PROCEDURE UNLIST      stents x7    HX CARPAL TUNNEL RELEASE      right wrist     Social History     Social History    Marital status:      Spouse name: N/A    Number of children: N/A    Years of education: N/A     Occupational History    Not on file. Social History Main Topics    Smoking status: Current Every Day Smoker     Packs/day: 0.25    Smokeless tobacco: Not on file    Alcohol use No    Drug use: No    Sexual activity: Not on file     Other Topics Concern    Not on file     Social History Narrative     History reviewed. No pertinent family history.     Current Medications:   Current Facility-Administered Medications   Medication Dose Route Frequency Provider Last Rate Last Dose    ferrous sulfate tablet 325 mg  1 Tab Oral DAILY WITH BREAKFAST Alondra Lu NP   325 mg at 02/28/18 0849    furosemide (LASIX) tablet 80 mg  80 mg Oral DAILY Dada Warren MD   80 mg at 02/28/18 8616    iron sucrose (VENOFER) 400 mg in 0.9% sodium chloride 250 mL IVPB  400 mg IntraVENous Q24H Dada Warren MD   400 mg at 02/28/18 3494    sacubitril-valsartan (ENTRESTO) 24-26 mg tablet 1 Tab  1 Tab Oral Q12H Dada Warren MD   1 Tab at 02/28/18 0143    albuterol-ipratropium (DUO-NEB) 2.5 MG-0.5 MG/3 ML  3 mL Nebulization BID RT Dada Warren MD   3 mL at 02/28/18 0755    atorvastatin (LIPITOR) tablet 80 mg  80 mg Oral DAILY Renny Marcial MD   80 mg at 02/28/18 1246    docusate sodium (COLACE) capsule 100 mg  100 mg Oral BID Renny Marcial MD   100 mg at 02/28/18 0902    citalopram (CELEXA) tablet 40 mg  40 mg Oral DAILY Renny Marcial MD   40 mg at 02/28/18 0902    meclizine (ANTIVERT) tablet 12.5 mg  12.5 mg Oral TID PRN Renny Marcial MD        therapeutic multivitamin [facility]) tablet 1 Tab  1 Tab Oral DAILY Renny Marcial MD   1 Tab at 02/28/18 0904    nitroglycerin (NITROSTAT) tablet 0.4 mg  0.4 mg SubLINGual Q5MIN PRN Renny Marcial MD        predniSONE (DELTASONE) tablet 5 mg  5 mg Oral Q MON, WED & Meera Smith MD   5 mg at 02/26/18 1815    rOPINIRole (REQUIP) tablet 8 mg  8 mg Oral QHS Kev López MD   8 mg at 02/28/18 0143    zolpidem (AMBIEN) tablet 10 mg  10 mg Oral QHS Kev López MD   10 mg at 02/27/18 2250    sodium chloride (NS) flush 5-10 mL  5-10 mL IntraVENous Q8H Kev López MD   10 mL at 02/28/18 0709    sodium chloride (NS) flush 5-10 mL  5-10 mL IntraVENous PRN Kev López MD        ondansetron [facility]) injection 4 mg  4 mg IntraVENous Q4H PRN Kev López MD        enoxaparin (LOVENOX) injection 40 mg  40 mg SubCUTAneous Q24H Kev López MD   40 mg at 02/28/18 0144    vancomycin dosing per pharmacy   Other Rx Dosing/Monitoring Kev López MD        vancomycin (VANCOCIN) 2000 mg in  ml infusion  2,000 mg IntraVENous Q12H Kev Lpóez  mL/hr at 02/28/18 0200 2,000 mg at 02/28/18 0200    pantoprazole (PROTONIX) tablet 40 mg  40 mg Oral DAILY Kev López MD   40 mg at 02/28/18 0904    arformoterol (BROVANA) neb solution 15 mcg  15 mcg Nebulization BID RT Kev López MD   Stopped at 02/26/18 2000    And    budesonide (PULMICORT) 500 mcg/2 ml nebulizer suspension  500 mcg Nebulization BID RT Kev López MD   500 mcg at 02/28/18 0755    morphine CR (MS CONTIN) tablet 60 mg  60 mg Oral Q8H Dusty Montes De Oca NP   60 mg at 02/28/18 0709    nicotine (NICODERM CQ) 21 mg/24 hr patch 1 Patch  1 Patch TransDERmal DAILY Gabriela Dixon MD   1 Patch at 02/28/18 1580    aspirin delayed-release tablet 81 mg  81 mg Oral DAILY Lupe Fuentes MD   81 mg at 02/28/18 4917    potassium chloride SR (KLOR-CON 10) tablet 40 mEq  40 mEq Oral BID Lupe Fuentes MD   40 mEq at 02/28/18 0902    carvedilol (COREG) tablet 6.25 mg  6.25 mg Oral BID WITH MEALS Dusty Montes De Oca NP   Stopped at 02/28/18 0718    HYDROmorphone (DILAUDID) tablet 4 mg  4 mg Oral Q12H Waldo Barry Hunter MD   4 mg at 02/28/18 0709    nortriptyline (PAMELOR) capsule 25 mg  25 mg Oral QHS Belgica Lopez MD   25 mg at 02/27/18 2472       Allergies: No Known Allergies    ROS:    Review of Systems   Constitutional: Positive for malaise/fatigue. HENT: Negative. Eyes: Negative. Respiratory: Positive for shortness of breath. Cardiovascular: Positive for leg swelling. Gastrointestinal: Negative. Genitourinary: Negative. Skin: Positive for itching and rash. Neurological: Negative. Physical Exam:   Physical Exam   Constitutional: He is oriented to person, place, and time. He appears well-nourished. He is active and cooperative. Nasal cannula in place. HENT:   Head: Normocephalic. Eyes: Pupils are equal, round, and reactive to light. Neck: No hepatojugular reflux and no JVD present. Cardiovascular: Normal heart sounds. Exam reveals no S3. No murmur heard. Pulses:       Radial pulses are 2+ on the right side, and 2+ on the left side. Dorsalis pedis pulses are 1+ on the right side, and 1+ on the left side. Posterior tibial pulses are 1+ on the right side, and 1+ on the left side. Pulmonary/Chest: No accessory muscle usage. No respiratory distress. He has decreased breath sounds in the right lower field and the left lower field. Abdominal: Bowel sounds are normal. He exhibits distension. There is no hepatosplenomegaly. There is no tenderness. Feet:   Right Foot:   Skin Integrity: Positive for blister, skin breakdown and erythema. Left Foot:   Skin Integrity: Positive for blister, skin breakdown and erythema. Neurological: He is alert and oriented to person, place, and time. Skin: Skin is dry. Rash noted. There is erythema.        Vitals:   Visit Vitals    /67 (BP 1 Location: Right arm, BP Patient Position: Sitting)    Pulse 97    Temp 97.6 °F (36.4 °C)    Resp 18    Ht 6' 2\" (1.88 m)    Wt 264 lb 5.3 oz (119.9 kg)    SpO2 99%    BMI 33.94 kg/m2         Temp (24hrs), Av.8 °F (36.6 °C), Min:97.6 °F (36.4 °C), Max:98.1 °F (36.7 °C)      Admission Weight: Last Weight   Weight: 274 lb 2 oz (124.3 kg) Weight: 264 lb 5.3 oz (119.9 kg)     Intake / Output / Drain:  Last 24 hrs.:   Intake/Output Summary (Last 24 hours) at 18 1013  Last data filed at 18 0700   Gross per 24 hour   Intake              410 ml   Output             2800 ml   Net            -2390 ml       Oxygen Therapy:  Oxygen Therapy  O2 Sat (%): 99 % (18 0850)  Pulse via Oximetry: 80 beats per minute (18 0843)  O2 Device: Nasal cannula (18)  O2 Flow Rate (L/min): 3 l/min (18 2200)  O2 Temperature: 37.4 °F (3 °C) (18 0035)  FIO2 (%): 32 % (18 2307)    Recent Labs:   Labs Latest Ref Rng & Units 2018   WBC 4.1 - 11.1 K/uL - 7.4 9.2   RBC 4.10 - 5.70 M/uL - 4.25 4.47   Hemoglobin 12.1 - 17.0 g/dL - 8. 9(L) 9.5(L)   Hematocrit 36.6 - 50.3 % - 30. 0(L) 31. 5(L)   MCV 80.0 - 99.0 FL - 70. 6(L) 70. 5(L)   Platelets 531 - 857 K/uL - 365 401(H)   Lymphocytes 12 - 49 % - 20 26   Monocytes 5 - 13 % - 7 6   Eosinophils 0 - 7 % - 4 3   Basophils 0 - 1 % - 1 1   Albumin 3.5 - 5.0 g/dL - - 2. 9(L)   Calcium 8.5 - 10.1 MG/DL 9.0 8.4(L) 8.9   SGOT 15 - 37 U/L - - 16   Glucose 65 - 100 mg/dL 98 105(H) 136(H)   BUN 6 - 20 MG/DL 12 9 11   Creatinine 0.70 - 1.30 MG/DL 0.78 0.65(L) 0.83   Sodium 136 - 145 mmol/L 135(L) 138 137   Potassium 3.5 - 5.1 mmol/L 4.2 4.6 3.6   Some recent data might be hidden     EKG:   EKG Results     Procedure 720 Value Units Date/Time    EKG, 12 LEAD, INITIAL [[id number]] Collected:  18 0813    Order Status:  Completed Updated:  18 1024     Ventricular Rate 88 BPM      Atrial Rate 88 BPM      P-R Interval 218 ms      QRS Duration 144 ms      Q-T Interval 412 ms      QTC Calculation (Bezet) 498 ms      Calculated P Axis 48 degrees      Calculated R Axis 87 degrees      Calculated T Axis 58 degrees Diagnosis --     Sinus rhythm with 1st degree AV block  Right bundle branch block  Abnormal ECG  When compared with ECG of 26-FEB-2018 15:26,  No significant change was found  Confirmed by Ambrcoio Alves M.D., [facility] ([id number]) on 2/27/2018 10:24:35 AM      ECG RHYTHM ANALYSIS ADULT [[id number]] Collected:  02/26/18 1526    Order Status:  Completed Updated:  02/27/18 0939     Ventricular Rate 88 BPM      Atrial Rate 88 BPM      P-R Interval 210 ms      QRS Duration 158 ms      Q-T Interval 416 ms      QTC Calculation (Bezet) 503 ms      Calculated P Axis 46 degrees      Calculated R Axis 79 degrees      Calculated T Axis 37 degrees      Diagnosis --     Sinus rhythm with 1st degree AV block  Right bundle branch block  Abnormal ECG  When compared with ECG of 25-FEB-2018 21:54,  WA interval has increased  Right bundle branch block is now present  Confirmed by Vitor Reyes MD ([id number]) on 2/27/2018 9:39:10 AM      EKG 12 LEAD INITIAL [[id number]] Collected:  02/25/18 2154    Order Status:  Completed Updated:  02/26/18 0932     Ventricular Rate 119 BPM      Atrial Rate 119 BPM      P-R Interval 128 ms      QRS Duration 110 ms      Q-T Interval 388 ms      QTC Calculation (Bezet) 545 ms      Calculated R Axis 135 degrees      Calculated T Axis -14 degrees      Diagnosis --     Sinus tachycardia  Left posterior fascicular block  Right bundle branch block  Prolonged QT  When compared with ECG of 05-JUN-2017 21:36,  premature atrial complexes are no longer present  Left posterior fascicular block is now present  ST now depressed in Lateral leads  T wave inversion now evident in Inferior leads  T wave inversion now evident in Anterior leads  Confirmed by Vitor Reyes MD ([id number]) on 2/26/2018 9:32:48 AM          Echocardiogram:   2/26/18  Left ventricle: The ventricle was dilated. Systolic function was  moderately to markedly reduced. Ejection fraction was estimated in the  range of 30 % to 35 %.  Suboptimal endocardial visualization limits wall  motion analysis. Left atrium: The atrium was dilated. Tricuspid valve: Insufficient tricuspid regurgitation to estimate  pulmonary artery pressure. Nuclear Studies:   5/2014  Myocardial SPECT study: LexiScan stress test was performed by the   cardiologist. A stress rest sestamibi study was performed per 2 day protocol   utilizing 32.8 mCi Tc-99 M  sestamibi for the stress portion and 33.0 mCi   Tc-99 M sestamibi for the rest portion. The examination demonstrates   reversible defect in the anterior and inferolateral walls compatible with   myocardium at ischemic risk. Gated images were obtained in a CINE format. Left ventricular ejection fraction is 40%. Gated images demonstrate global   hypokinesia.        IMPRESSION: Reversible defects in the anterior and inferolateral walls   compatible with myocardium at ischemic risk. Global hypokinesia with an EF   of 40 %.    Cardiac Catheterizations: 1/24/18 at [facility], no significant blockages. Radiology (CXR, CT scans):       Results from Hospital Encounter encounter on 02/25/18   XR CHEST PORT   Narrative Chest portable AP    History: Chest pain. Shortness of breath. Comparison: 6/5/2017    Findings: The lungs are well expanded. No focal consolidation, pleural  effusion, or pneumothorax. The heart is mildly enlarged, but unchanged. The  visualized osseous structures are unremarkable. Impression Impression:  No acute cardiopulmonary process. CT Results (most recent):    Results from Hospital Encounter encounter on 02/25/18   CTA CHEST W OR W WO CONT   Narrative EXAM:  CTA CHEST W OR W WO CONT    INDICATION:   CP, SOB    COMPARISON: 5/1/2017. CONTRAST:  85 mL of Isovue-370. TECHNIQUE:   Precontrast  images were obtained to localize the volume for acquisition.   Multislice helical CT arteriography was performed from the diaphragm to the  thoracic inlet during uneventful rapid bolus intravenous contrast  administration. Lung and soft tissue windows were generated. Coronal and  sagittal images were generated and 3D post processing consisting of coronal  maximum intensity images was performed. CT dose reduction was achieved through  use of a standardized protocol tailored for this examination and automatic  exposure control for dose modulation. FINDINGS:    THYROID: No nodule. MEDIASTINUM: No mass or lymphadenopathy. TAYLOR: No mass or lymphadenopathy. THORACIC AORTA: No dissection or aneurysm. PULMONARY ARTERIES: Normal in caliber. The pulmonary arteries are well enhanced. No evidence of pulmonary embolus. TRACHEA/BRONCHI: Patent. ESOPHAGUS: No wall thickening or dilatation. HEART: Mildly enlarged. Atherosclerotic calcifications. PLEURA: No effusion or pneumothorax. LUNGS: Mild emphysema. Bands of atelectasis or scarring the lung bases. No  nodule, mass, or airspace disease. INCIDENTALLY IMAGED UPPER ABDOMEN: No focal abnormality. BONES: No destructive bone lesion. Impression IMPRESSION:   1. Negative for pulmonary embolus. 2. Mild cardiomegaly. 3. Mild emphysema. dAi Mckinley NP  [facility] [id number] [facility]  [phone]  24 hour VAD/HF Pager: [phone]         Patient seen and examined. Data and note reviewed. I have discussed and agree with the plans as noted. 59year old male with a history of ICM who was admitted with acute on chronic HFrEF. Agree with transition from ARB to ARNI. Continue to diurese, monitor hemodynamics and renal function closely. Recommend starting AA. Thank you for allowing us to participate in your patient's care. Roberta C. Ivie Castleman, MD, [address], [address] [place] [address]  Office [phone]  Fax [phone]

## 2018-03-01 NOTE — PROGRESS NOTES
ID Progress Note  3/1/2018    Subjective:     Up and around a bit    Objective:     Antibiotics:  1. Vancomycin       Vitals:   Visit Vitals    /69    Pulse 90    Temp 98.1 °F (36.7 °C)    Resp 20    Ht 6' 2\" (1.88 m)    Wt 120.3 kg (265 lb 3.4 oz)    SpO2 96%    BMI 34.05 kg/m2        Tmax:  Temp (24hrs), Av °F (36.7 °C), Min:97.4 °F (36.3 °C), Max:98.6 °F (37 °C)      Exam:  Edema persists, erythema improved    Labs:      Recent Labs      18   0323  18   0209  18   0352   WBC   --    --   7.4   HGB   --    --   8.9*   PLT   --    --   365   BUN  13  12  9   CREA  0.73  0.78  0.65*       Cultures:     Lab Results   Component Value Date/Time    Specimen Description: ESOPHAGEAL BRUSHING 2010 09:25 AM     Lab Results   Component Value Date/Time    Culture result: NO GROWTH 4 DAYS 2018 10:46 PM       Radiology:     Line/Insert Date:           Assessment:     1. Lymphedema   2. Debility   3. Erythema of legs more consistent with dermatitis/vascular insufficiency    Objective:     1. Oral antibiotics at time of discharge  2.  Edema control    Dyan Sloan MD

## 2018-03-01 NOTE — ADT AUTH CERT NOTES
Patient Demographics          Patient Name [facility] Sex  Address Phone       Layla Thurston [phone] Male 1960 [address] [address] [phone] (Home)   [phone] (Mobile) *Preferred*                   CSN:       [id number]                   [address] [place] [address] Date: Admit Time Room Bed       2018  9:43  [[id number]] 02 [[id number]]                   Attending Providers          Provider Pager From To       Chacha Fiore MD  18       Colette Veliz MD  18       Trenton Buck MD  18       Yony Walker MD  18                    Emergency Contact(s)          Name Manoj Cross [phone]  [phone]                   Utilization Review                ADVANCED HF CONSULT NOTE by Jovanny Cardozo RN          Review Entered Review Status       3/1/2018 In Primary         Details                    [facility] Consult Note              DOS:                                 2018    NAME:                    Zane Mabry     MRN:                                 [id number]          REFERRING PROVIDER:  Dr. Launie Seip: Sudeep Urban MD    PRIMARY CARDIOLOGIST: Dr. Jose Miguel [place]              Chief Complaint:                     Chief Complaint       Patient presents with            Shortness of Breath            Chest Pain                 HPI: 59y.o. year old male with a history of  ischemic cardiomyopathy with a history of a recent non-ST elevation infarct at [facility]. He underwent cardiac catheterization on 2018 which showed an EF of 20% without mitral regurgitation. He has had previous stents but this last cath showed no significant stenosis. He was sent home on 2018 and he presented to [facility] ER yesterday evening presenting with chest pain and shortness of breath, he placed himself on his nasal cannula (3L) and called EMA.  In the ED, there were no acute EKG changes and his troponins have all been negative. He received some IV Lasix and has diuresed well. He was started on his home regimen yesterday including BB and Entresto. Impression / Plan:     Heart Failure Status: NYHA Class IV on admission          Acute on chronic HFrEF, ICM, EF 30%, NYHA IIIb                          Agree with Entresto 24/26mg, watch BP and renal function                           Cont Coreg 6.25mg                          Decrease to 60mg lasix daily                           Start low dose Spironolactone, watch potassium                           Will follow along, will follow as OP. History:                          Past Medical History:       Diagnosis     Date            CAD (coronary artery disease)                  CHF (congestive heart failure) ([facility])                  Chronic systolic heart failure ([facility] [address] [facility]     4/12/2011            COPD (chronic obstructive pulmonary disease) ([facility])                  Coronary atherosclerosis of native coronary artery     4/12/2011            Diabetes ([facility]                  High cholesterol                  Hypertension                  MI, old                  Neurological disorder                  Other primary cardiomyopathies     4/12/2011            Restless leg syndrome                                              Past Surgical History:       Procedure     Laterality     Date            CARDIAC SURG PROCEDURE UNLIST                         stents x7            HX CARPAL TUNNEL RELEASE                         right wrist              Social History                                        Social History            Marital status:                        Spouse name:     N/A            Number of children:     N/A            Years of education:     N/A                            Occupational History            Not on file.                                         Social History Main Topics      Smoking status:     Current Every Day Smoker                   Packs/day:     0.25            Smokeless tobacco:     Not on file            Alcohol use     No            Drug use:     No            Sexual activity:     Not on file                                  Other Topics     Concern            Not on file              Social History Narrative            History reviewed. No pertinent family history.          Current Medications:                                                         Current Facility-Administered Medications       Medication     Dose     Route     Frequency     Provider     Last Rate     Last Dose            ferrous sulfate tablet 325 mg      1 Tab     Oral     DAILY WITH BREAKFAST     Mari Sol, NP           325 mg at 02/28/18 1782            furosemide (LASIX) tablet 80 mg      80 mg     Oral     DAILY     Alana Joy MD           80 mg at 02/28/18 8409            iron sucrose (VENOFER) 400 mg in 0.9% sodium chloride 250 mL IVPB      400 mg     IntraVENous     Q24H     Alana Joy MD           400 mg at 02/28/18 4420            sacubitril-valsartan (ENTRESTO) 24-26 mg tablet 1 Tab      1 Tab     Oral     Q12H     Alana Joy MD           1 Tab at 02/28/18 0143            albuterol-ipratropium (DUO-NEB) 2.5 MG-0.5 MG/3 ML      3 mL     Nebulization     BID RT     Alana Joy MD           3 mL at 02/28/18 0755            atorvastatin (LIPITOR) tablet 80 mg      80 mg     Oral     DAILY     Tara Tellez MD           80 mg at 02/28/18 8802            docusate sodium (COLACE) capsule 100 mg      100 mg     Oral     BID     Tara Tellez MD           100 mg at 02/28/18 0902            citalopram (CELEXA) tablet 40 mg      40 mg     Oral     DAILY     Tara Tellez MD           40 mg at 02/28/18 0902            meclizine (ANTIVERT) tablet 12.5 mg      12.5 mg     Oral     TID PRN     MD Palak Cohen therapeutic multivitamin (THERAGRAN) tablet 1 Tab      1 Tab     Oral     DAILY     Ngozi Urena MD           1 Tab at 02/28/18 0904            nitroglycerin (NITROSTAT) tablet 0.4 mg      0.4 mg     SubLINGual     Q5MIN PRN     Ngozi Urena MD                        predniSONE (DELTASONE) tablet 5 mg      5 mg     Oral     Q MON, WED & Sly Villela MD           5 mg at 02/26/18 1815            rOPINIRole (REQUIP) tablet 8 mg      8 mg     Oral     QHS     Ngozi Urena MD           8 mg at 02/28/18 0143            zolpidem (AMBIEN) tablet 10 mg      10 mg     Oral     QHS     Ngozi Urena MD           10 mg at 02/27/18 2250            sodium chloride (NS) flush 5-10 mL      5-10 mL     IntraVENous     Q8H     Ngozi Urena MD           10 mL at 02/28/18 0709            sodium chloride (NS) flush 5-10 mL      5-10 mL     IntraVENous     PRN     Ngozi Urena MD                        ondansetron [facility]) injection 4 mg      4 mg     IntraVENous     Q4H PRN     Ngozi Urena MD                        enoxaparin (LOVENOX) injection 40 mg      40 mg     SubCUTAneous     Q24H     Ngozi Urena MD           40 mg at 02/28/18 0144            vancomycin dosing per pharmacy           Other     Rx Dosing/Monitoring     Ngozi Urena MD                        vancomycin (VANCOCIN) 2000 mg in  ml infusion      2,000 mg     IntraVENous     Q12H     Ngozi Urena MD     250 mL/hr at 02/28/18 0200     2,000 mg at 02/28/18 0200            pantoprazole (PROTONIX) tablet 40 mg      40 mg     Oral     DAILY     Ngozi Urena MD           40 mg at 02/28/18 0904            arformoterol (BROVANA) neb solution 15 mcg      15 mcg     Nebulization     BID RT     Ngozi Urena MD           Stopped at 02/26/18 2000             And            budesonide (PULMICORT) 500 mcg/2 ml nebulizer suspension      500 mcg     Nebulization     BID RT Clearance MD Winsome           500 mcg at 02/28/18 0755            morphine CR (MS CONTIN) tablet 60 mg      60 mg     Oral     Q8H     Jessica Burgess, NP           60 mg at 02/28/18 0709            nicotine (NICODERM CQ) 21 mg/24 hr patch 1 Patch      1 Patch     TransDERmal     DAILY     Steve Diaz MD           1 Patch at 02/28/18 7898            aspirin delayed-release tablet 81 mg      81 mg     Oral     DAILY     Marley Villagomez MD           81 mg at 02/28/18 0573            potassium chloride SR (KLOR-CON 10) tablet 40 mEq      40 mEq     Oral     BID     Marley Villagomez MD           40 mEq at 02/28/18 0902            carvedilol (COREG) tablet 6.25 mg      6.25 mg     Oral     BID WITH MEALS     Jessica Burgess NP           Stopped at 02/28/18 2604            HYDROmorphone (DILAUDID) tablet 4 mg      4 mg     Oral     Q12H     Steve Diaz MD           4 mg at 02/28/18 5816            nortriptyline (PAMELOR) capsule 25 mg      25 mg     Oral     QHS     Dylon Retana MD           25 mg at 02/27/18 2251                 Allergies: No Known Allergies         ROS:      Review of Systems     Constitutional: Positive for malaise/fatigue. HENT: Negative. Eyes: Negative. Respiratory: Positive for shortness of breath. Cardiovascular: Positive for leg swelling. Gastrointestinal: Negative. Genitourinary: Negative. Skin: Positive for itching and rash. Neurological: Negative. Physical Exam:     Physical Exam     Constitutional: He is oriented to person, place, and time. He appears well-nourished. He is active and cooperative. Nasal cannula in place. HENT:     Head: Normocephalic. Eyes: Pupils are equal, round, and reactive to light. Neck: No hepatojugular reflux and no JVD present. Cardiovascular: Normal heart sounds. Exam reveals no S3. No murmur heard.     Pulses:         Radial pulses are 2+ on the right side, and 2+ on the left side.          Dorsalis pedis pulses are 1+ on the right side, and 1+ on the left side. Posterior tibial pulses are 1+ on the right side, and 1+ on the left side. Pulmonary/Chest: No accessory muscle usage. No respiratory distress. He has decreased breath sounds in the right lower field and the left lower field. Abdominal: Bowel sounds are normal. He exhibits distension. There is no hepatosplenomegaly. There is no tenderness. Feet:     Right Foot:     Skin Integrity: Positive for blister, skin breakdown and erythema. Left Foot:     Skin Integrity: Positive for blister, skin breakdown and erythema. Neurological: He is alert and oriented to person, place, and time. Skin: Skin is dry. Rash noted. There is erythema.                Vitals:                           Visit Vitals            BP     112/67 (BP 1 Location: Right arm, BP Patient Position: Sitting)            Pulse     97            Temp     97.6 °F (36.4 °C)            Resp     18            Ht     6' 2\" (1.88 m)            Wt     264 lb 5.3 oz (119.9 kg)            SpO2     99%            BMI     33.94 kg/m2                 Temp (24hrs), Av.8 °F (36.6 °C), Min:97.6 °F (36.4 °C), Max:98.1 °F (36.7 °C)                Admission Weight:     Last Weight       Weight: 274 lb 2 oz (124.3 kg)     Weight: 264 lb 5.3 oz (119.9 kg)            Intake / Output / Drain:    Last 24 hrs.:     Intake/Output Summary (Last 24 hours) at 18 1013  Last data filed at 18 0700            Gross per 24 hour       Intake                  410 ml       Output                 2800 ml       Net                -2390 ml                 Oxygen Therapy:    Oxygen Therapy    O2 Sat (%): 99 % (18 0850)    Pulse via Oximetry: 80 beats per minute (18 0843)    O2 Device: Nasal cannula (18)    O2 Flow Rate (L/min): 3 l/min (18)    O2 Temperature: 37.4 °F (3 °C) (18 0035)    FIO2 (%): 32 % (18 2307) Recent Labs:       Labs     Latest Ref Rng & Units     2/28/2018 2/27/2018 2/25/2018       WBC     4.1 - 11.1 K/uL     -     7.4     9.2       RBC     4.10 - 5.70 M/uL     -     4.25     4.47       Hemoglobin     12.1 - 17.0 g/dL     -     8. 9(L)     9.5(L)       Hematocrit     36.6 - 50.3 %     -     30. 0(L)     31. 5(L)       MCV     80.0 - 99.0 FL     -     70. 6(L)     70. 5(L)       Platelets     989 - 880 K/uL     -     365     401(H)       Lymphocytes     12 - 49 %     -     20     26       Monocytes     5 - 13 %     -     7     6       Eosinophils     0 - 7 %     -     4     3       Basophils     0 - 1 %     -     1     1       Albumin     3.5 - 5.0 g/dL     -     -     2. 9(L)       Calcium     8.5 - 10.1 MG/DL     9.0     8.4(L)     8.9       SGOT     15 - 37 U/L     -     -     16       Glucose     65 - 100 mg/dL     98     105(H)     136(H)       BUN     6 - 20 MG/DL     12     9     11       Creatinine     0.70 - 1.30 MG/DL     0.78     0.65(L)     0.83       Sodium     136 - 145 mmol/L     135(L)     138     137       Potassium     3.5 - 5.1 mmol/L     4.2     4.6     3.6       Some recent data might be hidden            EKG:               EKG Results              Procedure     720     Value     Units     Date/Time                                                   EKG, 12 LEAD, INITIAL [[id number]]     Collected:  02/27/18 0813             Order Status:  Completed     Updated:  02/27/18 1024                   Ventricular Rate     88     BPM                         Atrial Rate     88     BPM                         P-R Interval     218     ms                         QRS Duration     144     ms                         Q-T Interval     412     ms                         QTC Calculation (Bezet)     498     ms                         Calculated P Axis     48     degrees                         Calculated R Axis     87     degrees                         Calculated T Axis     58     degrees Diagnosis     --                   Sinus rhythm with 1st degree AV block    Right bundle branch block    Abnormal ECG    When compared with ECG of 26-FEB-2018 15:26,    No significant change was found    Confirmed by Bethany Flaherty M.D., Sabrina Deluca ([id number]) on 2/27/2018 10:24:35 AM             ECG RHYTHM ANALYSIS ADULT [[id number]]     Collected:  02/26/18 1526             Order Status:  Completed     Updated:  02/27/18 0939                   Ventricular Rate     88     BPM                         Atrial Rate     88     BPM                         P-R Interval     210     ms                         QRS Duration     158     ms                         Q-T Interval     416     ms                         QTC Calculation (Bezet)     503     ms                         Calculated P Axis     46     degrees                         Calculated R Axis     79     degrees                         Calculated T Axis     37     degrees                         Diagnosis     --                   Sinus rhythm with 1st degree AV block    Right bundle branch block    Abnormal ECG    When compared with ECG of 25-FEB-2018 21:54,    ME interval has increased    Right bundle branch block is now present    Confirmed by Riya Goldberg MD ([id number]) on 2/27/2018 9:39:10 AM             EKG 12 LEAD INITIAL [[id number]]     Collected:  02/25/18 2154             Order Status:  Completed     Updated:  02/26/18 0932                   Ventricular Rate     119     BPM                         Atrial Rate     119     BPM                         P-R Interval     128     ms                         QRS Duration     110     ms                         Q-T Interval     388     ms                         QTC Calculation (Bezet)     545     ms                         Calculated R Axis     135     degrees                         Calculated T Axis     -14     degrees                         Diagnosis     --                   Sinus tachycardia    Left posterior fascicular block    Right bundle branch block    Prolonged QT    When compared with ECG of 05-JUN-2017 21:36,    premature atrial complexes are no longer present    Left posterior fascicular block is now present    ST now depressed in Lateral leads    T wave inversion now evident in Inferior leads    T wave inversion now evident in Anterior leads    Confirmed by Linn Hurtado MD ([id number]) on 2/26/2018 9:32:48 AM                    Echocardiogram:     2/26/18    Left ventricle: The ventricle was dilated. Systolic function was  moderately to markedly reduced. Ejection fraction was estimated in the  range of 30 % to 35 %. Suboptimal endocardial visualization limits wall  motion analysis. Left atrium: The atrium was dilated. Tricuspid valve: Insufficient tricuspid regurgitation to estimate  pulmonary artery pressure. Nuclear Studies:     5/2014    Myocardial SPECT study: LexiScan stress test was performed by the     cardiologist. A stress rest sestamibi study was performed per 2 day protocol     utilizing 32.8 mCi Tc-99 M  sestamibi for the stress portion and 33.0 mCi     Tc-99 M sestamibi for the rest portion. The examination demonstrates     reversible defect in the anterior and inferolateral walls compatible with     myocardium at ischemic risk. Gated images were obtained in a CINE format. Left ventricular ejection fraction is 40%. Gated images demonstrate global     hypokinesia. IMPRESSION: Reversible defects in the anterior and inferolateral walls     compatible with myocardium at ischemic risk. Global hypokinesia with an EF     of 40 %. Cardiac Catheterizations: 1/24/18 [facility], no significant blockages. Radiology (CXR, CT scans):                 Results from Hospital Encounter encounter on 02/25/18       XR CHEST PORT            Narrative     Chest portable AP    History: Chest pain. Shortness of breath. Comparison: 6/5/2017    Findings:   The lungs are well expanded. No focal consolidation, pleural  effusion, or pneumothorax. The heart is mildly enlarged, but unchanged. The  visualized osseous structures are unremarkable. Impression     Impression:  No acute cardiopulmonary process. CT Results (most recent):           Results from Hospital Encounter encounter on 02/25/18       CTA CHEST W OR W WO CONT            Narrative     EXAM:  CTA CHEST W OR W WO CONT    INDICATION:   CP, SOB    COMPARISON: 5/1/2017. CONTRAST:  85 mL of Isovue-370. TECHNIQUE:   Precontrast  images were obtained to localize the volume for acquisition. Multislice helical CT arteriography was performed from the diaphragm to the  thoracic inlet during uneventful rapid bolus intravenous contrast  administration. Lung and soft tissue windows were generated. Coronal and  sagittal images were generated and 3D post processing consisting of coronal  maximum intensity images was performed. CT dose reduction was achieved through  use of a standardized protocol tailored for this examination and automatic  exposure control for dose modulation. FINDINGS:    THYROID: No nodule. MEDIASTINUM: No mass or lymphadenopathy. TAYLOR: No mass or lymphadenopathy. THORACIC AORTA: No dissection or aneurysm. PULMONARY ARTERIES: Normal in caliber. The pulmonary arteries are well enhanced. No evidence of pulmonary embolus. TRACHEA/BRONCHI: Patent. ESOPHAGUS: No wall thickening or dilatation. HEART: Mildly enlarged. Atherosclerotic calcifications. PLEURA: No effusion or pneumothorax. LUNGS: Mild emphysema. Bands of atelectasis or scarring the lung bases. No  nodule, mass, or airspace disease. INCIDENTALLY IMAGED UPPER ABDOMEN: No focal abnormality. BONES: No destructive bone lesion. Impression     IMPRESSION:   1. Negative for pulmonary embolus. 2. Mild cardiomegaly. 3. Mild emphysema. [address] Kim Warren NP    Lucian [address]    [address] [facility]    [phone]    24 hour VAD/HF Pager: [phone]                    Patient seen and examined. Data and note reviewed. I have discussed and agree with the plans as noted. 59year old male with a history of ICM who was admitted with acute on chronic HFrEF. Agree with transition from ARB to ARNI. Continue to diurese, monitor hemodynamics and renal function closely. Recommend starting AA. Thank you for allowing us to participate in your patient's care. Nuris Correa MD, [facility], [address] [place] [address] Director                          2/28 PROGRESS NOTES by Javad Ng RN          Review Entered Review Status       3/1/2018 In Primary         Details                        Admission Summary:       38-year-old male with history of coronary artery disease, status post MI and COPD, who presented to the emergency room via EMS with complaints of chest pain and shortness of breath. Patient reports that chest pain is of acute onset, located in the left anterior chest wall. Shortness of breath is associated with the chest pain. Symptoms started about one and a half to two hours prior to presentation to the emergency room. The patient described chest pain as 10/10, radiating down the left arm. According to the patient, he had just finished taking his nightly medications and laid down on the bed when he started feeling short of breath and chest pain. The patient takes daily aspirin. Patient reported that he put on his oxygen via nasal cannula at 3 liters. Patient also called EMS who gave a nebulizer treatment. The patient reported some relief from the nebulizer treatments. Patient stated \"I had an MI about 1 month ago and was treated at [facility].  There is no association with respiration and chest pain. The patient also reported diaphoresis but denied wheezing.              Interval history / Subjective:        Breathing back to baseline. Chronic BLE the same. Reports increased abdominal pain over night. Has not has a BM since last Thursday. No nausea or vomiting.                Assessment & Plan:              Acute on Chronic Systolic Heart Failure NYHA class 4 on admission    -increased SOB, orthopnea, crackles on exam on admission    -IV diuretics BID, hopefully can switch to home dose in am     -5/1/17 echo ef 40%, repeat echo ef 30%    -cardiology following, recommending maximizing HF meds, no need for AICD at this time    -HF team consulted    -resume home ASA, Lisinopril, Toprol    -CM consulted for [facility]     -follows cardiology at [address] [place] [address] educator    -strict I&Os         Chest Pain/Shortness of Breath     -currently resolved, likely 2/2 to the above     -prn nebs, resume home scheduled meds     -wear O2 3L at home prn, monitor O2 sats     -troponin negative x3     -recent NSTEMI on 1/24 at [facility] with heart cath that showed no blockages.    -cardiology following         BLE Chronic Lymphedema with Dermatitis     -has been following OP wound care at [facility] for this     -no fevers or chills, legs weeping however unsure if this acute infection, will await ID recs    -continue IV abx for now    -discussed with Dr Sawyer Heart, can discharge home with Augmentin 875 BID x10 days    -wound care consulted     -doppler negative         Chronic Respiratory Failure in the setting of COPD and chronic home O2 use     -continue home O2 at 3L         Chronic COPD with tobacco use     -does not appear to be in acute exacerbation, no wheezing on exam     -resume nebs     -smoking cessation encouraged          HTN     -BP stable     -resume home meds          Sleep Apnea     -resume home cpap         CAD    -resume home meds          Chronic Pain Syndrome     -on scheduled Oxycodone and Dilaudid at home prescribed by pcp     -pt reports it is at its baseline     -resume home meds, encouraged pt to follow up with pcp for further adjustments          Type 2 DM     -hold home Metformin     -SSI, accuchecks         Diffuse Rash     -has been on going >1 mo    -no recent travel     -ID following         Depression     -resume home meds         Obesity     -BMI 35.2         Constipation     -likely from narcotic use, no BM since last thursday     -continue home colace    -give Mag citrate today         Iron Deficiency Anemia     -iron low    -IV iron today    -add po iron         Code status: Full    DVT prophylaxis: Lovenox         Care Plan discussed with: Patient/Family, Nurse and  Dr Wing Agudelo, Dr Ita Reyes    Disposition: Home w/Family and TBD, hopefully tomorrow            ID Progress Note    2018           Subjective:            Up and around a bit           Objective:            Antibiotics: 1. Vancomycin                                                   Vitals:                           Visit Vitals            BP     121/75            Pulse     99            Temp     97.8 °F (36.6 °C)            Resp     18            Ht     6' 2\" (1.88 m)            Wt     119.9 kg (264 lb 5.3 oz)            SpO2     99%            BMI     33.94 kg/m2                 Tmax:  Temp (24hrs), Av.8 °F (36.6 °C), Min:97.6 °F (36.4 °C), Max:98.1 °F (36.7 °C)              Exam:  Edema persists, erythema improved         Labs:                                              Recent Labs              18     0209      18     0352      18     2205       WBC       --       7.4      9.2       HGB       --       8.9*      9.5*       PLT       --       365      401*       BUN      12      9      11       CREA      0.78      0.65*      0.83       SGOT       --        --       16       AP       --        --       132*       TBILI       --        --       0.3                 Cultures:                                      Lab Results       Component     Value     Date/Time             Specimen Description:     ESOPHAGEAL BRUSHING     06/22/2010 09:25 AM                                        Lab Results       Component     Value     Date/Time             Culture result:     NO GROWTH 3 DAYS     02/25/2018 10:46 PM                 Radiology:          Line/Insert Date:                  Assessment: 1.Lymphedema       2. Debility       3. Erythema of legs more consistent with dermatitis/vascular insufficiency             Objective: 1.Oral antibiotics at time of discharge                                 2/28 PT NOTE by Fco Latif RN          Review Entered Review Status       3/1/2018 In Primary         Details               Problem: Mobility Impaired (Adult and Pediatric)    Goal: *Acute Goals and Plan of Care (Insert Text)  Physical Therapy Goals  Initiated 2/27/2018  1. Patient will transfer from bed to chair and chair to bed with modified independence using the least restrictive device within 7 day(s). 2.  Patient will perform sit to stand with modified independence within 7 day(s). 3.  Patient will ambulate with modified independence for 250 feet with the least restrictive device within 7 day(s). 4.  Patient will ascend/descend 1 stairs with 1 handrail(s) with minimal assistance/contact guard assist within 7 day(s). PHYSICAL THERAPY TREATMENT WITH 6MWT RESULTS         Patient: Suha Vasquez (62 y.o. male)    Date: 2/28/2018    Diagnosis: Acute systolic CHF (congestive heart failure) (HCC) Systolic CHF, acute (HCC)         Precautions:      Chart, physical therapy assessment, plan of care and goals were reviewed. ASSESSMENT:    Pt was able to complete the 6MWT. Pt reports concerns over LE drainage during treatment session. Pt was able to ambulate at a supervision level with the rolling walker. Pt had pain in LE but able to support weight during gait. Pt may benefit from HHPT to progress.      Progression toward goals:          Improving appropriately and progressing toward goals          Improving slowly and progressing toward goals          Not making progress toward goals and plan of care will be adjusted              PLAN:    Patient continues to benefit from skilled intervention to address the above impairments. Continue treatment per established plan of care. Discharge Recommendations:  Home Health    Further Equipment Recommendations for Discharge: Owns rolling walker              SUBJECTIVE:       Patient stated To tell you the truth it is not a good day.            OBJECTIVE DATA SUMMARY:       Critical Behavior:    Neurologic State: Alert    Orientation Level: Oriented X4    Cognition: Appropriate decision making, Appropriate for age attention/concentration, Appropriate safety awareness, Follows commands    Safety/Judgement: Awareness of environment, Good awareness of safety precautions    Functional Mobility Training:    Bed Mobility:                              Transfers:    Sit to Stand: Modified independent    Stand to Sit: Modified independent                                       Balance:    Sitting: Intact    Standing: Impaired    Standing - Static: Good    Standing - Dynamic : Good    Ambulation/Gait Training:    Distance (ft): 160 Feet (ft) (92 feet for 6MWT)    Assistive Device: Gait belt;Walker, rolling    Ambulation - Level of Assistance: Stand-by assistance;Contact guard assistance              Gait Abnormalities: Antalgic;Decreased step clearance              Base of Support: Widened         Speed/Renetta: Pace decreased (<100 feet/min); Slow    Step Length: Left shortened;Right shortened                                  Stairs:                        6 MWT results:      Distance Walked in Feet (ft): 92 ft.        Pre Heart Rate: 99       Pre O2 Saturation: 99 (3 L O2)               Post Heart Rate: 102       Post O2 Saturation: 95 (on 3 L)       Assistive device used: Assistive Device: Gait belt;Walker, rolling Normative data: 30-57 years old = 0 feet; Men 39-80 years old = 1889 feet; Women 3680 years sdy=8286 feet    Modified 10 point Adolfo RPE scale utilized where 0 = no breathlessness at all; 10 = maximum exertion    Please refer to the flowsheet for any additional vital signs taken during this treatment. Neuro Re-Education:         Therapeutic Exercises:          Pain:    Pain Scale 1: Numeric (0 - 10)    Pain Intensity 1: 8    Pain Location 1: Generalized    Pain Orientation 1: Anterior;Posterior    Pain Description 1: Aching    Pain Intervention(s) 1: Rest    Activity Tolerance:     Limited     Please refer to the flowsheet for vital signs taken during this treatment.     After treatment:      Patient left in no apparent distress sitting up in chair     Patient left in no apparent distress in bed-sitting EOB     Call bell left within reach     Nursing notified     Caregiver present     Bed alarm activated           COMMUNICATION/COLLABORATION:       The patients plan of care was discussed with: Registered Nurse         Shadi Estrada PTA     Time Calculation: 19 mins

## 2018-03-01 NOTE — PROGRESS NOTES
Bedside shift change report given to Corinne, RN (oncoming nurse) by Bella Brown RN (offgoing nurse). Report included the following information SBAR, Kardex, Intake/Output, MAR and Recent Results.

## 2018-03-01 NOTE — PROGRESS NOTES
Bedside shift change report given to Addison Vela RN (oncoming nurse) by Bella Brown RN (offgoing nurse). Report included the following information SBAR, Kardex, Intake/Output, MAR and Recent Results.

## 2018-03-01 NOTE — PROGRESS NOTES
Bedside shift change report given to WALTER Moreland (oncoming nurse) by Gregoria Claudio (offgoing nurse). Report included the following information SBAR, Kardex, Intake/Output and MAR.

## 2018-03-01 NOTE — PROGRESS NOTES
CM following for discharge planning. Received call from [facility], agency has received orders and will accept patient. CM sent referral to CM specialist Eladia for PCP follow-up.  LAUREN Sidhu/LATRICIA

## 2018-03-01 NOTE — CONSULTS
[facility] Consult Note      DOS:   3/1/2018  NAME:  Loyd Meza   MRN:   [id number]     REFERRING PROVIDER:  Dr. Javier Luna: Kamari Tafoya MD  PRIMARY CARDIOLOGIST: Dr. Rustam Cedillo      Chief Complaint:   Chief Complaint   Patient presents with    Shortness of Breath    Chest Pain       HPI: 59y.o. year old male with a history of  ischemic cardiomyopathy with a history of a recent non-ST elevation infarct at [facility]. He underwent cardiac catheterization on 01/24/2018 which showed an EF of 20% without mitral regurgitation. He has had previous stents but this last cath showed no significant stenosis. He was sent home on 01/27/2018 and he presented to [facility] ER yesterday evening presenting with chest pain and shortness of breath, he placed himself on his nasal cannula (3L) and called EMA. In the ED, there were no acute EKG changes and his troponins have all been negative. He received some IV Lasix and has diuresed well. He was started on his home regimen yesterday including BB and Entresto. Impression / Plan:   Heart Failure Status: NYHA Class IV on admission     Acute on chronic HFrEF, ICM, EF 30%, NYHA IIIb after treatment    Cont  Entresto 24/26mg, watch BP and renal function    Stop Coreg- may be making RV function worse    Cont 60mg lasix daily    Cont Spironolactone, watch potassium    Check Pro BNP daily   Will discuss with cardiology for a RHC   Will follow along, will follow as OP.         History:  Past Medical History:   Diagnosis Date    CAD (coronary artery disease)     [facility])     Chronic systolic heart failure [facility] 4/12/2011    COPD (chronic obstructive pulmonary disease) ([facility])     Coronary atherosclerosis of native coronary artery 4/12/2011    Diabetes ([facility]     High cholesterol     Hypertension     MI, old     Neurological disorder     Other primary cardiomyopathies 4/12/2011    Restless leg syndrome Past Surgical History:   Procedure Laterality Date    CARDIAC SURG PROCEDURE UNLIST      stents x7    HX CARPAL TUNNEL RELEASE      right wrist     Social History     Social History    Marital status:      Spouse name: N/A    Number of children: N/A    Years of education: N/A     Occupational History    Not on file. Social History Main Topics    Smoking status: Current Every Day Smoker     Packs/day: 0.25    Smokeless tobacco: Not on file    Alcohol use No    Drug use: No    Sexual activity: Not on file     Other Topics Concern    Not on file     Social History Narrative     History reviewed. No pertinent family history.     Current Medications:   Current Facility-Administered Medications   Medication Dose Route Frequency Provider Last Rate Last Dose    albuterol-ipratropium (DUO-NEB) 2.5 MG-0.5 MG/3 ML  3 mL Nebulization Q4H RT Laurey Dubin, NP        polyethylene glycol (MIRALAX) packet 17 g  17 g Oral DAILY Laurey Dubin, NP   17 g at 03/01/18 1156    bisacodyl (DULCOLAX) suppository 10 mg  10 mg Rectal DAILY PRN Laurey Dubin, NP        nortriptyline (PAMELOR) capsule 50 mg  50 mg Oral QHS Laurey Dubin, NP   50 mg at 02/28/18 2133    spironolactone (ALDACTONE) tablet 25 mg  25 mg Oral DAILY Arco Ly, NP   25 mg at 03/01/18 0853    furosemide (LASIX) tablet 60 mg  60 mg Oral DAILY Arco Ly, NP   60 mg at 03/01/18 1562    ferrous sulfate tablet 325 mg  1 Tab Oral DAILY WITH BREAKFAST Laurey Dubin, NP   325 mg at 03/01/18 1130    sacubitril-valsartan (ENTRESTO) 24-26 mg tablet 1 Tab  1 Tab Oral Q12H Devang Welch MD   1 Tab at 03/01/18 1146    atorvastatin (LIPITOR) tablet 80 mg  80 mg Oral DAILY Uriel Hall MD   80 mg at 03/01/18 0844    citalopram (CELEXA) tablet 40 mg  40 mg Oral DAILY Uriel Hall MD   40 mg at 03/01/18 0844    meclizine (ANTIVERT) tablet 12.5 mg  12.5 mg Oral TID PRN Uriel Hall MD        therapeutic multivitamin (THERAGRAN) tablet 1 Tab  1 Tab Oral DAILY Dedra Lopez MD   1 Tab at 03/01/18 0844    nitroglycerin (NITROSTAT) tablet 0.4 mg  0.4 mg SubLINGual Q5MIN PRN Dedra Lopez MD        predniSONE (DELTASONE) tablet 5 mg  5 mg Oral Q MON, WED & Alexis Bernardo MD   5 mg at 02/28/18 1623    rOPINIRole (REQUIP) tablet 8 mg  8 mg Oral QHS Dedra Lopez MD   8 mg at 02/28/18 2133    zolpidem (AMBIEN) tablet 10 mg  10 mg Oral QHS Dedra Lopez MD   10 mg at 02/28/18 2133    sodium chloride (NS) flush 5-10 mL  5-10 mL IntraVENous Q8H Dedra Lopez MD   10 mL at 03/01/18 0722    sodium chloride (NS) flush 5-10 mL  5-10 mL IntraVENous PRN Dedra Lopez MD   10 mL at 03/01/18 0127    ondansetron (ZOFRAN) injection 4 mg  4 mg IntraVENous Q4H PRN Dedra Lopez MD        enoxaparin (LOVENOX) injection 40 mg  40 mg SubCUTAneous Q24H Dedra Lopez MD   40 mg at 03/01/18 0127    vancomycin dosing per pharmacy   Other Rx Dosing/Monitoring Dedra Lopez MD        vancomycin (VANCOCIN) 2000 mg in  ml infusion  2,000 mg IntraVENous Q12H Dedra Lopez  mL/hr at 03/01/18 0127 2,000 mg at 03/01/18 0127    pantoprazole (PROTONIX) tablet 40 mg  40 mg Oral DAILY Dedra Lopez MD   40 mg at 03/01/18 0844    arformoterol (BROVANA) neb solution 15 mcg  15 mcg Nebulization BID RT Dedra Lopez MD   Stopped at 02/26/18 2000    And    budesonide (PULMICORT) 500 mcg/2 ml nebulizer suspension  500 mcg Nebulization BID RT Dedra Lopez MD   Stopped at 03/01/18 0900    morphine CR (MS CONTIN) tablet 60 mg  60 mg Oral Q8H Windkayleigh Franlkin, NP   60 mg at 03/01/18 0721    nicotine (NICODERM CQ) 21 mg/24 hr patch 1 Patch  1 Patch TransDERmal DAILY Alisson Avila MD   1 Patch at 03/01/18 0847    aspirin delayed-release tablet 81 mg  81 mg Oral DAILY Nick Yun MD   81 mg at 03/01/18 0844    potassium chloride SR (KLOR-CON 10) tablet 40 mEq  40 mEq Oral BID Nick Yun MD   40 mEq at 03/01/18 0844    HYDROmorphone (DILAUDID) tablet 4 mg  4 mg Oral Q12H Alisson Avila MD   4 mg at 03/01/18 0844       Allergies: No Known Allergies    ROS:    Review of Systems   Constitutional: Positive for malaise/fatigue. He states he just doesn't feel right. HENT: Negative. Eyes: Negative. Respiratory: Positive for shortness of breath. Cardiovascular: Positive for leg swelling. Gastrointestinal: Negative. Genitourinary: Negative. Skin: Positive for itching and rash. Neurological: Negative. Physical Exam:   Physical Exam   Constitutional: He is oriented to person, place, and time. He appears well-nourished. He is active and cooperative. Nasal cannula in place. HENT:   Head: Normocephalic. Eyes: Pupils are equal, round, and reactive to light. Neck: No hepatojugular reflux and no JVD present. Cardiovascular: Normal heart sounds. Exam reveals no S3. No murmur heard. Pulses:       Radial pulses are 2+ on the right side, and 2+ on the left side. Dorsalis pedis pulses are 1+ on the right side, and 1+ on the left side. Posterior tibial pulses are 1+ on the right side, and 1+ on the left side. Pulmonary/Chest: No accessory muscle usage. No respiratory distress. He has decreased breath sounds in the right lower field and the left lower field. Abdominal: Bowel sounds are normal. He exhibits distension. There is no hepatosplenomegaly. There is no tenderness. Feet:   Right Foot:   Skin Integrity: Positive for blister, skin breakdown and erythema. Left Foot:   Skin Integrity: Positive for blister, skin breakdown and erythema. Neurological: He is alert and oriented to person, place, and time. Skin: Skin is dry. Rash noted. There is erythema.        Vitals:   Visit Vitals    /69 (BP 1 Location: Right arm, BP Patient Position: Sitting)    Pulse 93    Temp 98.6 °F (37 °C)    Resp 18    Ht 6' 2\" (1.88 m)    Wt 265 lb 3.4 oz (120.3 kg)    SpO2 94%    BMI 34.05 kg/m2         Temp (24hrs), Av.9 °F (36.6 °C), Min:97.4 °F (36.3 °C), Max:98.6 °F (37 °C)      Admission Weight: Last Weight   Weight: 274 lb 2 oz (124.3 kg) Weight: 265 lb 3.4 oz (120.3 kg)     Intake / Output / Drain:  Last 24 hrs.:     Intake/Output Summary (Last 24 hours) at 18 1159  Last data filed at 18 0834   Gross per 24 hour   Intake                0 ml   Output             2600 ml   Net            -2600 ml       Oxygen Therapy:  Oxygen Therapy  O2 Sat (%): 94 % (18 1146)  Pulse via Oximetry: 76 beats per minute (189)  O2 Device: Nasal cannula (18 003)  O2 Flow Rate (L/min): 3 l/min (18 0031)  O2 Temperature: 37.4 °F (3 °C) (18 0035)  FIO2 (%): 32 % (18 2307)    Recent Labs:   Labs Latest Ref Rng & Units 3/1/2018 2018 2018 2018   WBC 4.1 - 11.1 K/uL - - 7.4 9.2   RBC 4.10 - 5.70 M/uL - - 4.25 4.47   Hemoglobin 12.1 - 17.0 g/dL - - 8. 9(L) 9.5(L)   Hematocrit 36.6 - 50.3 % - - 30. 0(L) 31. 5(L)   MCV 80.0 - 99.0 FL - - 70. 6(L) 70. 5(L)   Platelets 036 - 725 K/uL - - 365 401(H)   Lymphocytes 12 - 49 % - - 20 26   Monocytes 5 - 13 % - - 7 6   Eosinophils 0 - 7 % - - 4 3   Basophils 0 - 1 % - - 1 1   Albumin 3.5 - 5.0 g/dL - - - 2. 9(L)   Calcium 8.5 - 10.1 MG/DL 9.1 9.0 8.4(L) 8.9   SGOT 15 - 37 U/L - - - 16   Glucose 65 - 100 mg/dL 130(H) 98 105(H) 136(H)   BUN 6 - 20 MG/DL 13 12 9 11   Creatinine 0.70 - 1.30 MG/DL 0.73 0.78 0.65(L) 0.83   Sodium 136 - 145 mmol/L 136 135(L) 138 137   Potassium 3.5 - 5.1 mmol/L 4.2 4.2 4.6 3.6   Some recent data might be hidden     EKG:   EKG Results     Procedure 720 Value Units Date/Time    SCANNED CARDIAC RHYTHM STRIP [[id number]] Collected:  18 0514    Order Status:  Completed Updated:  18 0601    EKG, 12 LEAD, INITIAL [[id number]] Collected:  18 0813    Order Status:  Completed Updated:  18 1024     Ventricular Rate 88 BPM Atrial Rate 88 BPM      P-R Interval 218 ms      QRS Duration 144 ms      Q-T Interval 412 ms      QTC Calculation (Bezet) 498 ms      Calculated P Axis 48 degrees      Calculated R Axis 87 degrees      Calculated T Axis 58 degrees      Diagnosis --     Sinus rhythm with 1st degree AV block  Right bundle branch block  Abnormal ECG  When compared with ECG of 26-FEB-2018 15:26,  No significant change was found  Confirmed by Scout Velasco M.D., Jannet Gauss ([id number]) on 2/27/2018 10:24:35 AM      ECG RHYTHM ANALYSIS ADULT [[id number]] Collected:  02/26/18 1526    Order Status:  Completed Updated:  02/27/18 0939     Ventricular Rate 88 BPM      Atrial Rate 88 BPM      P-R Interval 210 ms      QRS Duration 158 ms      Q-T Interval 416 ms      QTC Calculation (Bezet) 503 ms      Calculated P Axis 46 degrees      Calculated R Axis 79 degrees      Calculated T Axis 37 degrees      Diagnosis --     Sinus rhythm with 1st degree AV block  Right bundle branch block  Abnormal ECG  When compared with ECG of 25-FEB-2018 21:54,  MD interval has increased  Right bundle branch block is now present  Confirmed by Dewayne Bailey MD ([id number]) on 2/27/2018 9:39:10 AM      EKG 12 LEAD INITIAL [[id number]] Collected:  02/25/18 2154    Order Status:  Completed Updated:  02/26/18 0932     Ventricular Rate 119 BPM      Atrial Rate 119 BPM      P-R Interval 128 ms      QRS Duration 110 ms      Q-T Interval 388 ms      QTC Calculation (Bezet) 545 ms      Calculated R Axis 135 degrees      Calculated T Axis -14 degrees      Diagnosis --     Sinus tachycardia  Left posterior fascicular block  Right bundle branch block  Prolonged QT  When compared with ECG of 05-JUN-2017 21:36,  premature atrial complexes are no longer present  Left posterior fascicular block is now present  ST now depressed in Lateral leads  T wave inversion now evident in Inferior leads  T wave inversion now evident in Anterior leads  Confirmed by Dewayne Bailey MD ([id number]) on 2/26/2018 9:32:48 AM Echocardiogram:   2/26/18  Left ventricle: The ventricle was dilated. Systolic function was  moderately to markedly reduced. Ejection fraction was estimated in the  range of 30 % to 35 %. Suboptimal endocardial visualization limits wall  motion analysis. Left atrium: The atrium was dilated. Tricuspid valve: Insufficient tricuspid regurgitation to estimate  pulmonary artery pressure. Nuclear Studies:   5/2014  Myocardial SPECT study: LexiScan stress test was performed by the   cardiologist. A stress rest sestamibi study was performed per 2 day protocol   utilizing 32.8 mCi Tc-99 M  sestamibi for the stress portion and 33.0 mCi   Tc-99 M sestamibi for the rest portion. The examination demonstrates   reversible defect in the anterior and inferolateral walls compatible with   myocardium at ischemic risk. Gated images were obtained in a CINE format. Left ventricular ejection fraction is 40%. Gated images demonstrate global   hypokinesia.        IMPRESSION: Reversible defects in the anterior and inferolateral walls   compatible with myocardium at ischemic risk. Global hypokinesia with an EF   of 40 %.    Cardiac Catheterizations: 1/24/18 at [facility], no significant blockages. Radiology (CXR, CT scans):       Results from Hospital Encounter encounter on 02/25/18   XR CHEST PORT   Narrative Chest portable AP    History: Chest pain. Shortness of breath. Comparison: 6/5/2017    Findings: The lungs are well expanded. No focal consolidation, pleural  effusion, or pneumothorax. The heart is mildly enlarged, but unchanged. The  visualized osseous structures are unremarkable. Impression Impression:  No acute cardiopulmonary process. CT Results (most recent):    Results from Hospital Encounter encounter on 02/25/18   CTA CHEST W OR W WO CONT   Narrative EXAM:  CTA CHEST W OR W WO CONT    INDICATION:   CP, SOB    COMPARISON: 5/1/2017. CONTRAST:  85 mL of Isovue-370.     TECHNIQUE: Precontrast  images were obtained to localize the volume for acquisition. Multislice helical CT arteriography was performed from the diaphragm to the  thoracic inlet during uneventful rapid bolus intravenous contrast  administration. Lung and soft tissue windows were generated. Coronal and  sagittal images were generated and 3D post processing consisting of coronal  maximum intensity images was performed. CT dose reduction was achieved through  use of a standardized protocol tailored for this examination and automatic  exposure control for dose modulation. FINDINGS:    THYROID: No nodule. MEDIASTINUM: No mass or lymphadenopathy. TAYLOR: No mass or lymphadenopathy. THORACIC AORTA: No dissection or aneurysm. PULMONARY ARTERIES: Normal in caliber. The pulmonary arteries are well enhanced. No evidence of pulmonary embolus. TRACHEA/BRONCHI: Patent. ESOPHAGUS: No wall thickening or dilatation. HEART: Mildly enlarged. Atherosclerotic calcifications. PLEURA: No effusion or pneumothorax. LUNGS: Mild emphysema. Bands of atelectasis or scarring the lung bases. No  nodule, mass, or airspace disease. INCIDENTALLY IMAGED UPPER ABDOMEN: No focal abnormality. BONES: No destructive bone lesion. Impression IMPRESSION:   1. Negative for pulmonary embolus. 2. Mild cardiomegaly. 3. Mild emphysema. BARBARA Garcia [id number] [facility]  [phone]  24 hour VAD/HF Pager: [phone]             Patient seen and examined. Data and note reviewed. I have discussed and agree with the plans as noted. 59year old male with a history of ICM who was admitted with acute on chronic systolic heart failure. He was given IV diuretics with a good response but still does not feel well. Recommend discontinuation of BB d/t RV failure and RHC to further define his hemodynamics. Thank you for allowing us to participate in your patient's care.     Josh Arechiga Jeyson Gan MD, [facility] [place], [place] [address] Director    [facility]  [address] [facility], [address]  [facility], [address] [place] [address]  Office [phone]  Fax [phone]

## 2018-03-01 NOTE — PROGRESS NOTES
Hospitalist Progress Note  David Lagos NP  Answering service: [phone] OR [phone] from in house phone  Cell: [phone]      Date of Service:  3/1/2018  NAME:  Yanely Villalta  :  1960  MRN:  [id number]      Admission Summary:   58-year-old male with history of coronary artery disease, status post MI and COPD, who presented to the emergency room via EMS with complaints of chest pain and shortness of breath. Patient reports that chest pain is of acute onset, located in the left anterior chest wall. Shortness of breath is associated with the chest pain. Symptoms started about one and a half to two hours prior to presentation to the emergency room. The patient described chest pain as 10/10, radiating down the left arm. According to the patient, he had just finished taking his nightly medications and laid down on the bed when he started feeling short of breath and chest pain. The patient takes daily aspirin. Patient reported that he put on his oxygen via nasal cannula at 3 liters. Patient also called EMS who gave a nebulizer treatment. The patient reported some relief from the nebulizer treatments. Patient stated \"I had an MI about 1 month ago and was treated at [facility].  There is no association with respiration and chest pain. The patient also reported diaphoresis but denied wheezing. Interval history / Subjective:    Patient reports he still feels not good. Continues to report chronic pain. Also reports he needs the breathing treatments every 4 hours as this is how he takes them at home.       Assessment & Plan:     Acute on Chronic Systolic Heart Failure NYHA class 4 on admission  -increased SOB, orthopnea, crackles on exam on admission  -Lasix decreased to 60mg  -Started on Spironolactone and Entrestro (waiting prior auth)  -Coreg 6.25mg BID   -17 echo ef 40%, repeat echo ef 30%  -cardiology following, recommending maximizing HF meds, no need for AICD at this time  -HF team following, discussed with today, would like to repeat labs in am, waiting prior auth on Entresto, ?  Need for right heart cath they will discuss with Dr Nestor Scott  -resume home ASA, Lisinopril, Toprol  -CM consulted for [facility]   -follows cardiology at [facility]   -HF educator  -strict I&Os    Chest Pain/Shortness of Breath   - likely 2/2 to the above   -increase nebs to every 4 hours (will need script for duo-neb at discharge already has nebulizer), resume home scheduled meds   -wear O2 3L at home prn, monitor O2 sats   -troponin negative x3   -recent NSTEMI on 1/24 at [facility] with heart cath that showed no blockages.  -cardiology following    BLE Chronic Lymphedema with Dermatitis   -has been following OP wound care at [facility] for this   -no fevers or chills, legs weeping however unsure if this acute infection, will await ID recs  -continue IV abx for now  -discussed with Dr Petrona Araujo, can discharge home with Augmentin 875 BID x10 days  -wound care consulted, will need to follow up with lymphedema clinic OP  -doppler negative    Chronic Respiratory Failure in the setting of COPD and chronic home O2 use   -continue home O2 at 3L    Chronic COPD with tobacco use   -does not appear to be in acute exacerbation, no wheezing on exam   -change nebs to every 4 hours, resume home Spiriva/Advair  -smoking cessation encouraged, nicotine patch     HTN   -BP stable   -resume home meds     Sleep Apnea   -resume home cpap    CAD  -resume home meds     Chronic Pain Syndrome   -on scheduled Oxycodone and Dilaudid at home prescribed by pcp   -pt reports it is at its baseline   -resume home meds, encouraged pt to follow up with pcp for further adjustments     Type 2 DM   -hold home Metformin   -SSI, accuchecks    Diffuse Rash   -has been on going >1 mo  -no recent travel   -ID following    Depression   -resume home meds    Obesity   -BMI 35.2    Constipation -likely from narcotic use, no BM since last thursday   -change colace to pericolace  -add miralax  -prn suppository    Iron Deficiency Anemia   -iron low  -s/p IV iron x2 doses  -add po iron    Code status: Full  DVT prophylaxis: Lovenox    Care Plan discussed with: Patient/Family, Nurse and  Dr Khadra Zhang, HF team  Disposition: Home w/Family and TBD, hopefully tomorrow     Hospital Problems  Date Reviewed: 2/26/2018          Codes Class Noted POA    * (Principal)Systolic CHF, acute ([facility] [address] ICD-10-CM: I50.21  ICD-9-CM: 428.21, 428.0  2/26/2018 Unknown        Acute systolic CHF (congestive heart failure) ([facility] [address] ICD-10-CM: I50.21  ICD-9-CM: 428.21, 428.0  2/26/2018 Unknown                Review of Systems:   A comprehensive review of systems was negative except for: Respiratory: positive for dyspnea on exertion  Cardiovascular: positive for chest pain, lower extremity edema  Gastrointestinal: positive for constipation and abdominal pain  Integument/breast: positive for rash and skin color change  Musculoskeletal: positive for BLE pain       Vital Signs:    Last 24hrs VS reviewed since prior progress note. Most recent are:  Visit Vitals    BP 99/64 (BP 1 Location: Right arm, BP Patient Position: Sitting)    Pulse 93    Temp 97.4 °F (36.3 °C)    Resp 18    Ht 6' 2\" (1.88 m)    Wt 120.3 kg (265 lb 3.4 oz)    SpO2 92%    BMI 34.05 kg/m2         Intake/Output Summary (Last 24 hours) at 03/01/18 1036  Last data filed at 03/01/18 0834   Gross per 24 hour   Intake                0 ml   Output             3050 ml   Net            -3050 ml        Physical Examination:             Constitutional:  No acute distress, cooperative, pleasant    ENT:  Oral mucous moist, oropharynx benign. Resp:  Diminished. No wheezing/rhonchi/rales. No accessory muscle use. O2 3L via nasal cannula   CV:  Regular rhythm, normal rate, no murmurs, gallops, rubs    GI:  abd distended, non tender.  normoactive bowel sounds, Musculoskeletal:  Non pitting BLE edema, warm, 2+ pulses throughout    Neurologic:  Moves all extremities. AAOx3, CN II-XII reviewed. Follows commands     Psych:  Good insight, Not anxious nor agitated. Skin:  generalized maculopapular rash throughout excluding face. BLE lymphedema with dsg. Data Review:    Review and/or order of clinical lab test  Review and/or order of tests in the medicine section of [facility]      Labs:     Recent Labs      02/27/18   0352   WBC  7.4   HGB  8.9*   HCT  30.0*   PLT  365     Recent Labs      03/01/18   0323  02/28/18   0209  02/27/18   0352   NA  136  135*  138   K  4.2  4.2  4.6   CL  100  101  104   CO2  28  29  28   BUN  13  12  9   CREA  0.73  0.78  0.65*   GLU  130*  98  105*   CA  9.1  9.0  8.4*   MG   --    --   2.0     No results for input(s): SGOT, GPT, ALT, AP, TBIL, TBILI, TP, ALB, GLOB, GGT, AML, LPSE in the last 72 hours. No lab exists for component: AMYP, HLPSE  No results for input(s): INR, PTP, APTT in the last 72 hours. No lab exists for component: INREXT, INREXT   Recent Labs      02/27/18 0352   TIBC  305   PSAT  5*      Lab Results   Component Value Date/Time    Folate 7.9 02/27/2018 03:52 AM    Folate 7.5 02/27/2018 03:52 AM      No results for input(s): PH, PCO2, PO2 in the last 72 hours.   Recent Labs      02/27/18 0352   CPK  102   CKNDX  2.6*   TROIQ  <0.04     Lab Results   Component Value Date/Time    Cholesterol, total 284 (H) 05/21/2014 09:59 AM    HDL Cholesterol 23 05/21/2014 09:59 AM    LDL,Direct 193 (H) 05/21/2014 09:59 AM    LDL, calculated Not calculated due to elevated triglyceride level 05/21/2014 09:59 AM    Triglyceride 482 (H) 05/21/2014 09:59 AM    CHOL/HDL Ratio 12.3 (H) 05/21/2014 09:59 AM     Lab Results   Component Value Date/Time    Glucose (POC) 125 (H) 03/01/2018 06:36 AM    Glucose (POC) 112 (H) 02/28/2018 09:36 PM    Glucose (POC) 118 (H) 02/28/2018 05:11 PM    Glucose (POC) 172 (H) 02/28/2018 11:34 AM    Glucose (POC) 130 (H) 02/28/2018 07:27 AM     Lab Results   Component Value Date/Time    Color YELLOW/STRAW 02/25/2018 10:46 PM    Appearance CLEAR 02/25/2018 10:46 PM    Specific gravity 1.019 02/25/2018 10:46 PM    pH (UA) 5.5 02/25/2018 10:46 PM    Protein NEGATIVE  02/25/2018 10:46 PM    Glucose NEGATIVE  02/25/2018 10:46 PM    Ketone NEGATIVE  02/25/2018 10:46 PM    Bilirubin NEGATIVE  02/25/2018 10:46 PM    Urobilinogen 0.2 02/25/2018 10:46 PM    Nitrites NEGATIVE  02/25/2018 10:46 PM    Leukocyte Esterase NEGATIVE  02/25/2018 10:46 PM    Epithelial cells FEW 01/05/2014 04:34 PM    Bacteria NEGATIVE  01/05/2014 04:34 PM    WBC 0-4 01/05/2014 04:34 PM    RBC 0-5 01/05/2014 04:34 PM         Medications Reviewed:     Current Facility-Administered Medications   Medication Dose Route Frequency    albuterol-ipratropium (DUO-NEB) 2.5 MG-0.5 MG/3 ML  3 mL Nebulization Q4H RT    bisacodyl (DULCOLAX) suppository 10 mg  10 mg Rectal DAILY PRN    nortriptyline (PAMELOR) capsule 50 mg  50 mg Oral QHS    spironolactone (ALDACTONE) tablet 25 mg  25 mg Oral DAILY    furosemide (LASIX) tablet 60 mg  60 mg Oral DAILY    ferrous sulfate tablet 325 mg  1 Tab Oral DAILY WITH BREAKFAST    sacubitril-valsartan (ENTRESTO) 24-26 mg tablet 1 Tab  1 Tab Oral Q12H    atorvastatin (LIPITOR) tablet 80 mg  80 mg Oral DAILY    citalopram (CELEXA) tablet 40 mg  40 mg Oral DAILY    meclizine (ANTIVERT) tablet 12.5 mg  12.5 mg Oral TID PRN    therapeutic multivitamin (THERAGRAN) tablet 1 Tab  1 Tab Oral DAILY    nitroglycerin (NITROSTAT) tablet 0.4 mg  0.4 mg SubLINGual Q5MIN PRN    predniSONE (DELTASONE) tablet 5 mg  5 mg Oral Q MON, WED & FRI    rOPINIRole (REQUIP) tablet 8 mg  8 mg Oral QHS    zolpidem (AMBIEN) tablet 10 mg  10 mg Oral QHS    sodium chloride (NS) flush 5-10 mL  5-10 mL IntraVENous Q8H    sodium chloride (NS) flush 5-10 mL  5-10 mL IntraVENous PRN    ondansetron (ZOFRAN) injection 4 mg  4 mg IntraVENous Q4H PRN    enoxaparin (LOVENOX) injection 40 mg  40 mg SubCUTAneous Q24H    vancomycin dosing per pharmacy   Other Rx Dosing/Monitoring    vancomycin (VANCOCIN) 2000 mg in  ml infusion  2,000 mg IntraVENous Q12H    pantoprazole (PROTONIX) tablet 40 mg  40 mg Oral DAILY    arformoterol (BROVANA) neb solution 15 mcg  15 mcg Nebulization BID RT    And    budesonide (PULMICORT) 500 mcg/2 ml nebulizer suspension  500 mcg Nebulization BID RT    morphine CR (MS CONTIN) tablet 60 mg  60 mg Oral Q8H    nicotine (NICODERM CQ) 21 mg/24 hr patch 1 Patch  1 Patch TransDERmal DAILY    aspirin delayed-release tablet 81 mg  81 mg Oral DAILY    potassium chloride SR (KLOR-CON 10) tablet 40 mEq  40 mEq Oral BID    carvedilol (COREG) tablet 6.25 mg  6.25 mg Oral BID WITH MEALS    HYDROmorphone (DILAUDID) tablet 4 mg  4 mg Oral Q12H     ______________________________________________________________________  EXPECTED LENGTH OF STAY: 3d 12h  ACTUAL LENGTH OF STAY:          Dougie Cruz NP

## 2018-03-02 NOTE — PROGRESS NOTES
0935  Cardiac Cath Lab Procedure Area Arrival Note:    Louise Villalta arrived to Cardiac Cath Lab, Procedure Area. Patient identifiers verified with NAME and DATE OF BIRTH. Procedure verified with patient. Consent forms verified. Allergies verified. Patient informed of procedure and plan of care. Questions answered with review. Patient voiced understanding of procedure and plan of care. Patient on cardiac monitor, non-invasive blood pressure, SPO2 monitor. On O2 @ 3 lpm via NC.  IV of NS on pump at 25 ml/hr. Patient status doing well without problems. Patient is A&Ox 3. Patient reports Pain of 7 out of 10 generalized discomfort. Patient medicated during procedure with orders obtained and verified by Dr. Gail Caceres. Refer to patients Cardiac Cath Lab PROCEDURE REPORT for vital signs, assessment, status, and response during procedure, printed at end of case. Printed report on chart or scanned into chart. 1018  TRANSFER - OUT REPORT:    Verbal report given to Suresh on Louise Villalta being transferred to  for routine progression of care       Report consisted of patients Situation, Background, Assessment and   Recommendations(SBAR). Information from the following report(s) Procedure Summary and MAR was reviewed with the receiving nurse. Opportunity for questions and clarification was provided.

## 2018-03-02 NOTE — PROGRESS NOTES
As discussed with HF team, will proceed with RHC tomorrow. He had LHC on at [facility] ~ 1 month ago and did not have significant disease. Mr Villalta is in agreement with this plan.

## 2018-03-02 NOTE — PROCEDURES
Cardiac Catheterization Procedure Note   Patient: Senait Lopez  MRN: [id number]  SSN: [id number]   YOB: 1960 Age: 59 y.o.   Sex: male    Date of Procedure: 3/2/2018   Pre-procedure Diagnosis: Congestive Heart Failure  Post-procedure Diagnosis: Congestive Heart Failure  Procedure: Right Heart Cath  :  Dr. Jasmeet Mena MD    Assistant(s):  None  Anesthesia: Moderate Sedation   Estimated Blood Loss: Less than 10 mL   Specimens Removed: None  Findings: normal R heart pressures: PCWP 11 mmHg; PAP 32/13 mmHg; RV 36/8 mmHg; RA 6 mmHg  Complications: None   Implants:  None  Signed by:  Jasmeet Mena MD  3/2/2018  10:31 AM

## 2018-03-02 NOTE — PROGRESS NOTES
TRANSFER - IN REPORT:    Verbal report received from Naomi(name) on Juan Alberto S Nine  being received from Cath lab recovery(unit) for routine progression of care      Report consisted of patients Situation, Background, Assessment and   Recommendations(SBAR). Information from the following report(s) SBAR, Procedure Summary, Intake/Output and MAR was reviewed with the receiving nurse. Opportunity for questions and clarification was provided. Assessment completed upon patients arrival to unit and care assumed.

## 2018-03-02 NOTE — NURSE NAVIGATOR
Follow up scheduled with through [facility] with Elsa Calero NP for 3/8/18 at 11:30 am.  Information on After Visit Summary.

## 2018-03-02 NOTE — PROGRESS NOTES
Patient to discharge home with [facility]. CM s/w Jennifer at [facility], confirmed discharge tomorrow and start of care Jhonatan 3/4. Patient to have friend transport home. HF, PCP, and wound care follow-up scheduled and on AVS. No further CM needs anticipated.  Brianna Trammell, LAUREN/CRM

## 2018-03-02 NOTE — PROGRESS NOTES
ID Progress Note  3/2/2018    Subjective:     Up and around a bit    Objective:     Antibiotics:  1. Vancomycin       Vitals:   Visit Vitals    /74    Pulse 89    Temp 97.9 °F (36.6 °C)    Resp 18    Ht 6' 2\" (1.88 m)    Wt 120.3 kg (265 lb 3.2 oz)    SpO2 96%    BMI 34.05 kg/m2        Tmax:  Temp (24hrs), Av.9 °F (36.6 °C), Min:97.7 °F (36.5 °C), Max:98.1 °F (36.7 °C)      Exam:  Edema persists, erythema improved    Labs:      Recent Labs      18   0628  18   0323  18   0209   BUN  15  13  12   CREA  0.66*  0.73  0.78       Cultures:     Lab Results   Component Value Date/Time    Specimen Description: ESOPHAGEAL BRUSHING 2010 09:25 AM     Lab Results   Component Value Date/Time    Culture result: NO GROWTH 5 DAYS 2018 10:46 PM       Radiology:     Line/Insert Date:           Assessment:     1. Lymphedema   2. Debility   3. Erythema of legs more consistent with dermatitis/vascular insufficiency    Objective:     1. Oral antibiotics at time of discharge--Augmentin 875 mg po bid for another week   2. Edema control  3.  OK for discharge from my standpoint    Cori Thompson MD

## 2018-03-02 NOTE — PROGRESS NOTES
TRANSFER - IN REPORT:    Verbal report received from Alissa Smith RN(name) on Fausto Ding Nine  being received from [id number](unit) for routine progression of care      Report consisted of patients Situation, Background, Assessment and   Recommendations(SBAR). Information from the following report(s) SBAR, Procedure Summary, MAR, Recent Results and Cardiac Rhythm NSR was reviewed with the receiving nurse. Opportunity for questions and clarification was provided. Assessment completed upon patients arrival to unit and care assumed.

## 2018-03-02 NOTE — PROGRESS NOTES
TRANSFER - IN REPORT:    Verbal report received from beryl Villavicencio on Nico Villalta  being received from procedure area for routine progression of care. Report consisted of patients Situation, Background, Assessment and Recommendations(SBAR). Information from the following report(s) SBAR, Procedure Summary, MAR, Recent Results and Cardiac Rhythm NSR was reviewed with the receiving clinician. Opportunity for questions and clarification was provided. Assessment completed upon patients arrival to [address] [place] [address] and care assumed. Cardiac Cath Lab Recovery Arrival Note:    Nico Villalta arrived to [facility] recovery area. Patient procedure= RHC. Patient on cardiac monitor, non-invasive blood pressure, SPO2 monitor. On  O2 @ 3 lpm via NC.  IV  of NS on pump at 25 ml/hr. Patient status doing well without problems. Patient is A&Ox 4. Patient reports No Pain. PROCEDURE SITE CHECK:    Procedure site:without any bleeding and No Hematoma, No pain/discomfort reported at procedure site. No change in patient status. Continue to monitor patient and status.

## 2018-03-02 NOTE — PROGRESS NOTES
[facility] Consult Note      DOS:   3/2/2018  NAME:  Dominick Merida   MRN:   [id number]     REFERRING PROVIDER:  Dr. Amira Alvarado: Leona Hayes MD  PRIMARY CARDIOLOGIST: Dr. Ron Cho      Chief Complaint:   Chief Complaint   Patient presents with    Shortness of Breath    Chest Pain       HPI: 59y.o. year old male with a history of  ischemic cardiomyopathy with a history of a recent non-ST elevation infarct at [facility]. He underwent cardiac catheterization on 01/24/2018 which showed an EF of 20% without mitral regurgitation. He has had previous stents but this last cath showed no significant stenosis. He was sent home on 01/27/2018 and he presented to [facility] ER yesterday evening presenting with chest pain and shortness of breath, he placed himself on his nasal cannula (3L) and called EMA. In the ED, there were no acute EKG changes and his troponins have all been negative. He received some IV Lasix and has diuresed well. He was started on his home regimen including BB and Entresto. Due to concern for RV dysfunction, his BB was stopped and he underwent RHC on 3/2 which demonstrated normal RV pressures. Impression / Plan:   Heart Failure Status: NYHA Class IV on admission     Acute on chronic HFrEF, ICM, EF 30%, NYHA IIIb after treatment    Normal RV pressures on RHC today    Cont  Entresto 24/26mg, watch BP and renal function - samples given    No BB d/t concern for RV dysfunction    Cont 60mg lasix daily    Cont Spironolactone, watch potassium    Check Pro BNP daily   Will follow along, will follow as OP.         History:  Past Medical History:   Diagnosis Date    CAD (coronary artery disease)     [facility]est[facility]) [facility]     Chronic systolic heart failure ([facility] [address] 4/12/2011    COPD (chronic obstructive pulmonary disease) ([facility]     Coronary atherosclerosis of native coronary artery 4/12/2011    Diabetes ([facility])     High cholesterol     Hypertension  MI, old     Neurological disorder     Other primary cardiomyopathies 4/12/2011    Restless leg syndrome      Past Surgical History:   Procedure Laterality Date    CARDIAC SURG PROCEDURE UNLIST      stents x7    HX CARPAL TUNNEL RELEASE      right wrist     Social History     Social History    Marital status:      Spouse name: N/A    Number of children: N/A    Years of education: N/A     Occupational History    Not on file. Social History Main Topics    Smoking status: Current Every Day Smoker     Packs/day: 0.25    Smokeless tobacco: Not on file    Alcohol use No    Drug use: No    Sexual activity: Not on file     Other Topics Concern    Not on file     Social History Narrative     History reviewed. No pertinent family history.     Current Medications:   Current Facility-Administered Medications   Medication Dose Route Frequency Provider Last Rate Last Dose    albuterol-ipratropium (DUO-NEB) 2.5 MG-0.5 MG/3 ML  3 mL Nebulization Q4H PRN Ludy Clarke MD        insulin lispro (HUMALOG) injection   SubCUTAneous AC&HS Ludy Clarke MD        glucose chewable tablet 16 g  4 Tab Oral PRN Ludy Clarke MD        dextrose (D50W) injection syrg 12.5-25 g  12.5-25 g IntraVENous PRN Ludy Clarke MD        glucagon (GLUCAGEN) injection 1 mg  1 mg IntraMUSCular PRN Ludy Clarke MD        polyethylene glycol (MIRALAX) packet 17 g  17 g Oral DAILY Arneta Lowe, NP   17 g at 03/02/18 1225    bisacodyl (DULCOLAX) suppository 10 mg  10 mg Rectal DAILY PRN Jonatan Martinez, NP        nortriptyline (PAMELOR) capsule 50 mg  50 mg Oral QHS Arneta Lowe, NP   50 mg at 03/01/18 2105    spironolactone (ALDACTONE) tablet 25 mg  25 mg Oral DAILY Saratha Query, NP   25 mg at 03/02/18 1229    furosemide (LASIX) tablet 60 mg  60 mg Oral DAILY Saratha Query, NP   60 mg at 03/02/18 1226    ferrous sulfate tablet 325 mg  1 Tab Oral DAILY WITH BREAKFAST Lalito Wilson Pj Mata NP   Stopped at 03/02/18 0800    sacubitril-valsartan (ENTRESTO) 24-26 mg tablet 1 Tab  1 Tab Oral Q12H Demetri Chacon MD   1 Tab at 03/02/18 1238    atorvastatin (LIPITOR) tablet 80 mg  80 mg Oral DAILY Ngozi Urena MD   80 mg at 03/02/18 1228    citalopram (CELEXA) tablet 40 mg  40 mg Oral DAILY Ngozi Urena MD   40 mg at 03/02/18 1225    meclizine (ANTIVERT) tablet 12.5 mg  12.5 mg Oral TID PRN Ngozi Urena MD        therapeutic multivitamin [facility]) tablet 1 Tab  1 Tab Oral DAILY Ngozi Urena MD   1 Tab at 03/02/18 1225    nitroglycerin (NITROSTAT) tablet 0.4 mg  0.4 mg SubLINGual Q5MIN PRN Ngozi Urena MD        predniSONE (DELTASONE) tablet 5 mg  5 mg Oral Q MON, WED & Barrie Pritchett MD   5 mg at 02/28/18 1623    rOPINIRole (REQUIP) tablet 8 mg  8 mg Oral QHS Ngozi Urena MD   8 mg at 03/01/18 2105    zolpidem (AMBIEN) tablet 10 mg  10 mg Oral QHS Ngozi Urena MD   10 mg at 03/01/18 2105    sodium chloride (NS) flush 5-10 mL  5-10 mL IntraVENous Lacho Latham MD   10 mL at 03/02/18 1321    sodium chloride (NS) flush 5-10 mL  5-10 mL IntraVENous PRN Ngozi Urena MD   10 mL at 03/01/18 0127    ondansetron (ZOFRAN) injection 4 mg  4 mg IntraVENous Q4H PRN Ngozi Urena MD        enoxaparin (LOVENOX) injection 40 mg  40 mg SubCUTAneous Q24H Ngozi Urena MD   Stopped at 03/02/18 0152    vancomycin dosing per pharmacy   Other Rx Dosing/Monitoring Ngozi Urena MD        vancomycin (VANCOCIN) 2000 mg in  ml infusion  2,000 mg IntraVENous Q12H Ngozi Urena  mL/hr at 03/02/18 1321 2,000 mg at 03/02/18 1321    pantoprazole (PROTONIX) tablet 40 mg  40 mg Oral DAILY Ngozi Urena MD   40 mg at 03/02/18 1227    arformoterol (BROVANA) neb solution 15 mcg  15 mcg Nebulization BID RT Ngozi Urena MD   Stopped at 03/02/18 0900    And    budesonide (PULMICORT) 500 mcg/2 ml nebulizer suspension  500 mcg Nebulization BID RT Tara Tellez MD   Stopped at 03/02/18 0900    morphine CR (MS CONTIN) tablet 60 mg  60 mg Oral Q8H Mari Akins NP   60 mg at 03/02/18 1318    nicotine (NICODERM CQ) 21 mg/24 hr patch 1 Patch  1 Patch TransDERmal DAILY Toni Silva MD   1 Patch at 03/02/18 1225    aspirin delayed-release tablet 81 mg  81 mg Oral DAILY Alana Joy MD   81 mg at 03/02/18 1225    potassium chloride SR (KLOR-CON 10) tablet 40 mEq  40 mEq Oral BID Alana Joy MD   40 mEq at 03/02/18 1225    HYDROmorphone (DILAUDID) tablet 4 mg  4 mg Oral Q12H Toni Silva MD   4 mg at 03/02/18 1229       Allergies: No Known Allergies    ROS:    Review of Systems   Constitutional: Positive for malaise/fatigue. He states he just doesn't feel right. HENT: Negative. Eyes: Negative. Respiratory: Positive for shortness of breath. Cardiovascular: Positive for leg swelling. Gastrointestinal: Negative. Genitourinary: Negative. Skin: Positive for itching and rash. Neurological: Negative. Physical Exam:   Physical Exam   Constitutional: He is oriented to person, place, and time. He appears well-nourished. He is active and cooperative. Nasal cannula in place. HENT:   Head: Normocephalic. Eyes: Pupils are equal, round, and reactive to light. Neck: No hepatojugular reflux and no JVD present. Cardiovascular: Normal heart sounds. Exam reveals no S3. No murmur heard. Pulses:       Radial pulses are 2+ on the right side, and 2+ on the left side. Dorsalis pedis pulses are 1+ on the right side, and 1+ on the left side. Posterior tibial pulses are 1+ on the right side, and 1+ on the left side. Pulmonary/Chest: No accessory muscle usage. No respiratory distress. He has decreased breath sounds in the right lower field and the left lower field. Abdominal: Bowel sounds are normal. He exhibits distension. There is no hepatosplenomegaly. There is no tenderness. Feet:   Right Foot:   Skin Integrity: Positive for blister, skin breakdown and erythema. Left Foot:   Skin Integrity: Positive for blister, skin breakdown and erythema. Neurological: He is alert and oriented to person, place, and time. Skin: Skin is dry. Rash noted. There is erythema. Vitals:   Visit Vitals    /74    Pulse 89    Temp 97.9 °F (36.6 °C)    Resp 18    Ht 6' 2\" (1.88 m)    Wt 265 lb 3.2 oz (120.3 kg)    SpO2 96%    BMI 34.05 kg/m2         Temp (24hrs), Av.9 °F (36.6 °C), Min:97.7 °F (36.5 °C), Max:98.1 °F (36.7 °C)      Admission Weight: Last Weight   Weight: 274 lb 2 oz (124.3 kg) Weight: 265 lb 3.2 oz (120.3 kg)     Intake / Output / Drain:  Last 24 hrs.:     Intake/Output Summary (Last 24 hours) at 18 1533  Last data filed at 18 0300   Gross per 24 hour   Intake              660 ml   Output             1900 ml   Net            -1240 ml       Oxygen Therapy:  Oxygen Therapy  O2 Sat (%): 96 % (18 1208)  Pulse via Oximetry: 87 beats per minute (18 1116)  O2 Device: Nasal cannula (18 1208)  O2 Flow Rate (L/min): 3 l/min (18 1208)  O2 Temperature: 37.4 °F (3 °C) (18 0035)  FIO2 (%): 32 % (18 2307)    Recent Labs:   Labs Latest Ref Rng & Units 3/2/2018 3/1/2018 2018 2018 2018   WBC 4.1 - 11.1 K/uL - - - 7.4 9.2   RBC 4.10 - 5.70 M/uL - - - 4.25 4.47   Hemoglobin 12.1 - 17.0 g/dL - - - 8. 9(L) 9.5(L)   Hematocrit 36.6 - 50.3 % - - - 30. 0(L) 31. 5(L)   MCV 80.0 - 99.0 FL - - - 70. 6(L) 70. 5(L)   Platelets 775 - 178 K/uL - - - 365 401(H)   Lymphocytes 12 - 49 % - - - 20 26   Monocytes 5 - 13 % - - - 7 6   Eosinophils 0 - 7 % - - - 4 3   Basophils 0 - 1 % - - - 1 1   Albumin 3.5 - 5.0 g/dL - - - - 2. 9(L)   Calcium 8.5 - 10.1 MG/DL 9.5 9.1 9.0 8.4(L) 8.9   SGOT 15 - 37 U/L - - - - 16   Glucose 65 - 100 mg/dL 111(H) 130(H) 98 105(H) 136(H)   BUN 6 - 20 MG/DL 15 13 12 9 11   Creatinine 0.70 - 1.30 MG/DL 0.66(L) 0.73 0.78 0.65(L) 0.83   Sodium 136 - 145 mmol/L 134(L) 136 135(L) 138 137   Potassium 3.5 - 5.1 mmol/L 4.4 4.2 4.2 4.6 3.6   Some recent data might be hidden     EKG:   EKG Results     Procedure 720 Value Units Date/Time    SCANNED CARDIAC RHYTHM STRIP [[id number]] Collected:  03/01/18 0514    Order Status:  Completed Updated:  03/01/18 0601    EKG, 12 LEAD, INITIAL [[id number]] Collected:  02/27/18 0813    Order Status:  Completed Updated:  02/27/18 1024     Ventricular Rate 88 BPM      Atrial Rate 88 BPM      P-R Interval 218 ms      QRS Duration 144 ms      Q-T Interval 412 ms      QTC Calculation (Bezet) 498 ms      Calculated P Axis 48 degrees      Calculated R Axis 87 degrees      Calculated T Axis 58 degrees      Diagnosis --     Sinus rhythm with 1st degree AV block  Right bundle branch block  Abnormal ECG  When compared with ECG of 26-FEB-2018 15:26,  No significant change was found  Confirmed by Nevin Sarkar M.D., Oral Han ([id number]) on 2/27/2018 10:24:35 AM      ECG RHYTHM ANALYSIS ADULT [[id number]] Collected:  02/26/18 1526    Order Status:  Completed Updated:  02/27/18 0939     Ventricular Rate 88 BPM      Atrial Rate 88 BPM      P-R Interval 210 ms      QRS Duration 158 ms      Q-T Interval 416 ms      QTC Calculation (Bezet) 503 ms      Calculated P Axis 46 degrees      Calculated R Axis 79 degrees      Calculated T Axis 37 degrees      Diagnosis --     Sinus rhythm with 1st degree AV block  Right bundle branch block  Abnormal ECG  When compared with ECG of 25-FEB-2018 21:54,  OK interval has increased  Right bundle branch block is now present  Confirmed by Elder Lesch, MD ([id number]) on 2/27/2018 9:39:10 AM      EKG 12 LEAD INITIAL [[id number]] Collected:  02/25/18 2154    Order Status:  Completed Updated:  02/26/18 0932     Ventricular Rate 119 BPM      Atrial Rate 119 BPM      P-R Interval 128 ms      QRS Duration 110 ms      Q-T Interval 388 ms      QTC Calculation (Bezet) 545 ms      Calculated R Axis 135 degrees Calculated T Axis -14 degrees      Diagnosis --     Sinus tachycardia  Left posterior fascicular block  Right bundle branch block  Prolonged QT  When compared with ECG of 05-JUN-2017 21:36,  premature atrial complexes are no longer present  Left posterior fascicular block is now present  ST now depressed in Lateral leads  T wave inversion now evident in Inferior leads  T wave inversion now evident in Anterior leads  Confirmed by Asia Dickey MD [id number]) on 2/26/2018 9:32:48 AM          Echocardiogram:   2/26/18  Left ventricle: The ventricle was dilated. Systolic function was  moderately to markedly reduced. Ejection fraction was estimated in the  range of 30 % to 35 %. Suboptimal endocardial visualization limits wall  motion analysis. Left atrium: The atrium was dilated. Tricuspid valve: Insufficient tricuspid regurgitation to estimate  pulmonary artery pressure. Nuclear Studies:   5/2014  Myocardial SPECT study: LexiScan stress test was performed by the   cardiologist. A stress rest sestamibi study was performed per 2 day protocol   utilizing 32.8 mCi Tc-99 M  sestamibi for the stress portion and 33.0 mCi   Tc-99 M sestamibi for the rest portion. The examination demonstrates   reversible defect in the anterior and inferolateral walls compatible with   myocardium at ischemic risk. Gated images were obtained in a CINE format. Left ventricular ejection fraction is 40%. Gated images demonstrate global   hypokinesia.        IMPRESSION: Reversible defects in the anterior and inferolateral walls   compatible with myocardium at ischemic risk. Global hypokinesia with an EF   of 40 %.    Cardiac Catheterizations: 1/24/18 at [facility], no significant blockages. Radiology (CXR, CT scans):       Results from Hospital Encounter encounter on 02/25/18   XR CHEST PORT   Narrative Chest portable AP    History: Chest pain. Shortness of breath. Comparison: 6/5/2017    Findings: The lungs are well expanded.   No focal consolidation, pleural  effusion, or pneumothorax. The heart is mildly enlarged, but unchanged. The  visualized osseous structures are unremarkable. Impression Impression:  No acute cardiopulmonary process. CT Results (most recent):    Results from Hospital Encounter encounter on 02/25/18   CTA CHEST W OR W WO CONT   Narrative EXAM:  CTA CHEST W OR W WO CONT    INDICATION:   CP, SOB    COMPARISON: 5/1/2017. CONTRAST:  85 mL of Isovue-370. TECHNIQUE:   Precontrast  images were obtained to localize the volume for acquisition. Multislice helical CT arteriography was performed from the diaphragm to the  thoracic inlet during uneventful rapid bolus intravenous contrast  administration. Lung and soft tissue windows were generated. Coronal and  sagittal images were generated and 3D post processing consisting of coronal  maximum intensity images was performed. CT dose reduction was achieved through  use of a standardized protocol tailored for this examination and automatic  exposure control for dose modulation. FINDINGS:    THYROID: No nodule. MEDIASTINUM: No mass or lymphadenopathy. TAYLOR: No mass or lymphadenopathy. THORACIC AORTA: No dissection or aneurysm. PULMONARY ARTERIES: Normal in caliber. The pulmonary arteries are well enhanced. No evidence of pulmonary embolus. TRACHEA/BRONCHI: Patent. ESOPHAGUS: No wall thickening or dilatation. HEART: Mildly enlarged. Atherosclerotic calcifications. PLEURA: No effusion or pneumothorax. LUNGS: Mild emphysema. Bands of atelectasis or scarring the lung bases. No  nodule, mass, or airspace disease. INCIDENTALLY IMAGED UPPER ABDOMEN: No focal abnormality. BONES: No destructive bone lesion. Impression IMPRESSION:   1. Negative for pulmonary embolus. 2. Mild cardiomegaly. 3. Mild emphysema. BARBARA Merida [facility] [phone] [place] [id number]  [phone]  24 hour VAD/HF Pager: [phone]         Patient seen and examined. Data and note reviewed. I have discussed and agree with the plans as noted. 59year old male with a history of ICM who was admitted with chest pain. He received IV lasix with improvement in his symptoms. Start entresto today and follow up in the outpatient clinilc. Thank you for allowing us to participate in your patient's care. Nuris Peterson MD, [place] [id number] Director    [address] [place] [address]  [address], [address] [place] [address]  Office [phone]  Fax [phone]

## 2018-03-03 NOTE — PROGRESS NOTES
Bedside shift change report given to Bridget Salmeron RN (oncoming nurse) by Aylin Dhaliwal RN (offgoing nurse). Report included the following information SBAR, Intake/Output and MAR.

## 2018-03-03 NOTE — DISCHARGE INSTRUCTIONS
Please bring this form with you to show your primary care provider at your follow-up appointment. Primary care provider:  Dr. Jhon Moore MD    Discharging provider:  Janay Reddy MD    You have been admitted to the hospital with the following diagnoses:  · Acute systolic CHF (congestive heart failure) ([address]   · Cellulitis of Legs    FOLLOW-UP CARE RECOMMENDATIONS:    APPOINTMENTS:  Follow-up Information     Follow up With Details Comments Contact Info    [facility]  Referred for home health services. Service to begin the day after discharge. Please call the agency if no contact by 12 noon the day after discharge. [address] [facility]  Appointment on 3/28/18 at 10:25AM with Annika Bazan MD. Please call the Center before then if there are questions or concerns. [phone]    Jhon Moore MD  follow up with your pcp next week for hospital follow up [address] [facility] [address], [facility] [address] [place] [phone]      Lexi Fernandes NP On 3/8/2018 at 11:30 am [address] [facility] Silver recommended:   - follow up with Cardiology team as scheduled  - take medications as prescribed  - Lasix increased to 60mg daily  - STOP taking Toprol XL until further instructed  - Take Entresto- samples given by Cardiology    PENDING TEST RESULTS:  At the time of your discharge the following test results are still pending: NONE  Please make sure you review these results with your outpatient follow-up provider(s). SYMPTOMS to watch for: chest pain, shortness of breath, fever, chills, nausea, vomiting, diarrhea, change in mentation, falling, weakness, bleeding.     DIET/what to eat:  Diabetic Diet    ACTIVITY:  Activity as tolerated    WOUND CARE: NONE    EQUIPMENT needed:  NONE      What to do if new or unexpected symptoms occur? If you experience any of the above symptoms (or should other concerns or questions arise after discharge) please call your primary care physician. Return to the emergency room if you cannot get hold of your doctor. · It is very important that you keep your follow-up appointment(s). · Please bring discharge papers, medication list (and/or medication bottles) to your follow-up appointments for review by your outpatient provider(s). · Please check the list of medications and be sure it includes every medication (even non-prescription medications) that your provider wants you to take. · It is important that you take the medication exactly as they are prescribed. · Keep your medication in the bottles provided by the pharmacist and keep a list of the medication names, dosages, and times to be taken in your wallet. · Do not take other medications without consulting your doctor. · If you have any questions about your medications or other instructions, please talk to your nurse or care provider before you leave the hospital.    I understand that if any problems occur once I am at home I am to contact my physician. These instructions were explained to me and I had the opportunity to ask questions.

## 2018-03-03 NOTE — DISCHARGE SUMMARY
Discharge Summary     Patient:  Nara Nelson       MRN: [id number]       YOB: 1960       Age: 59 y.o. Date of admission:  2/25/2018    Date of discharge:  3/3/2018    Primary care provider: Dr. Patria Dykes MD    Admitting provider:  Kody Lopez MD    Discharging provider:  Reilly Clemens MD - [phone]  If unavailable, call [phone] and ask the  to page the triage hospitalist.    Consultations  · IP CONSULT TO HOSPITALIST  · IP CONSULT TO INFECTIOUS DISEASES  · IP CONSULT TO ADVANCED HEART FAILURE    Procedures  · * No surgery found *    Discharge destination: Home with [facility]. The patient is stable for discharge. Admission diagnosis  · Acute systolic CHF (congestive heart failure) ([facility] [address]    Current Discharge Medication List      START taking these medications    Details   potassium chloride SR (K-TAB) 20 mEq tablet Take 2 Tabs by mouth daily. Qty: 60 Tab, Refills: 0      spironolactone (ALDACTONE) 25 mg tablet Take 1 Tab by mouth daily. Qty: 30 Tab, Refills: 1      amoxicillin-clavulanate (AUGMENTIN) 875-125 mg per tablet Take 1 Tab by mouth two (2) times a day. Qty: 14 Tab, Refills: 0         CONTINUE these medications which have CHANGED    Details   furosemide (LASIX) 40 mg tablet Take 1.5 Tabs by mouth two (2) times a day. Qty: 45 Tab, Refills: 3      albuterol-ipratropium (DUO-NEB) 2.5 mg-0.5 mg/3 ml nebu 3 mL by Nebulization route every four (4) hours. Qty: 30 Nebule, Refills: 2      FLUoxetine (PROZAC) 20 mg capsule Take 1 Cap by mouth daily. Qty: 30 Cap, Refills: 0         CONTINUE these medications which have NOT CHANGED    Details   aspirin delayed-release 81 mg tablet Take 81 mg by mouth daily. citalopram (CELEXA) 40 mg tablet Take 40 mg by mouth daily.       nitroglycerin (NITROSTAT) 0.4 mg SL tablet 0.4 mg by SubLINGual route every five (5) minutes as needed for Chest Pain. nortriptyline (PAMELOR) 50 mg capsule Take 50 mg by mouth nightly. docusate sodium (COLACE) 100 mg capsule Take 100 mg by mouth two (2) times a day. metFORMIN (GLUCOPHAGE) 1,000 mg tablet Take 1,000 mg by mouth two (2) times daily (with meals). Indications: type 2 diabetes mellitus      omeprazole (PRILOSEC) 20 mg capsule Take 20 mg by mouth daily. Indications: gastroesophageal reflux disease      clopidogrel (PLAVIX) 75 mg tab Take 75 mg by mouth daily. atorvastatin (LIPITOR) 80 mg tablet Take 80 mg by mouth daily. rOPINIRole (REQUIP) 4 mg tab TAB Take 8 mg by mouth nightly. Indications: Restless Legs Syndrome      morphine CR (MS CONTIN) 30 mg CR tablet Take 60 mg by mouth every eight (8) hours. HYDROmorphone (DILAUDID) 4 mg tablet Take 4 mg by mouth two (2) times a day. zolpidem (AMBIEN) 10 mg tablet Take 1 Tab by mouth nightly. Qty: 30 Tab, Refills: 2      tiotropium (SPIRIVA WITH HANDIHALER) 18 mcg inhalation capsule Take 1 Cap by inhalation daily. fluticasone-salmeterol (ADVAIR DISKUS) 250-50 mcg/dose diskus inhaler Take 1 Puff by inhalation two (2) times a day. isosorbide mononitrate ER (IMDUR) 60 mg CR tablet Take 60 mg by mouth daily. sacubitril-valsartan (ENTRESTO) 24 mg/26 mg tablet Take 1 Tab by mouth two (2) times a day. Qty: 60 Tab, Refills: 4         STOP taking these medications       lisinopril (PRINIVIL, ZESTRIL) 2.5 mg tablet Comments:   Reason for Stopping:         metoprolol succinate (TOPROL-XL) 25 mg XL tablet Comments:   Reason for Stopping:         predniSONE (DELTASONE) 5 mg tablet Comments:   Reason for Stopping:         multivitamin (ONE A DAY) tablet Comments:   Reason for Stopping:         meclizine (ANTIVERT) 12.5 mg tablet Comments:   Reason for Stopping: Follow-up Information     Follow up With Details Comments [facility] [employer]  Referred for home health services.  Service to begin the day after discharge. Please call the agency if no contact by 12 noon the day after discharge. [address]  Appointment on 3/28/18 at 10:25AM with Ruthie Cooney MD. Please call the Center before then if there are questions or concerns. [phone]    Franklyn Quispe MD  follow up with your pcp next week for hospital follow up [facility], Jorge LuisLTAC, located within [facility] [address] [facility] 99      Kathy Kong, NP On 3/8/2018 at 11:30 am Venecia Avelar  [phone]            Final discharge diagnoses and brief hospital course  Please also refer to the admission H&P for details on the presenting problem. 78-year-old male with history of coronary artery disease, status post MI and COPD, who presented to the emergency room via EMS with complaints of chest pain and shortness of breath.  Patient reports that chest pain is of acute onset, located in the left anterior chest wall.  Shortness of breath is associated with the chest pain.  Symptoms started about one and a half to two hours prior to presentation to the emergency room.  The patient described chest pain as 10/10, radiating down the left arm.  According to the patient, he had just finished taking his nightly medications and laid down on the bed when he started feeling short of breath and chest pain.  The patient takes daily aspirin.  Patient reported that he put on his oxygen via nasal cannula at 3 liters.  Patient also called EMS who gave a nebulizer treatment.      Acute on Chronic Systolic Heart Failure NYHA class 4 on admission, NYHA difficult to assess at discharge due to chronic Oxygen use  -Lasix decreased to 60mg  -Started on Spironolactone and Entrestro (waiting prior auth)  - d/c BB due to concern for RV dysfunction  -5/1/17 echo ef 40%, repeat echo ef 30%  -cardiology following, recommending maximizing HF meds, no need for AICD at this time  -HF team following, discussed with today, would like to repeat labs in am, waiting prior auth on Entresto,  - Right Heart Cath done 3/2: Normal Rt heart pressures  -CM consulted for [facility]   -follows cardiology at [facility]   -HF educator  -strict I&Os  - outpatient follow up with [facility]      Chest Pain/Shortness of Breath   - likely 2/2 to the above   - resume home scheduled meds   -wear O2 3L at home prn, monitor O2 sats   -troponin negative x3   -recent NSTEMI on 1/24 at [facility] with heart cath that showed no blockages.  -cardiology following     BLE Chronic Lymphedema with Dermatitis   -has been following OP wound care at [facility] for this   -no fevers or chills, legs weeping however unsure if this acute infection, will await ID recs  -continue IV abx for now  -discussed with Dr Brian Mims, Augmentin BID X 1 week given   -wound care consulted, outpatient wound care clinic appointment made  -doppler negative     Chronic Respiratory Failure in the setting of COPD and chronic home O2 use   -continue home O2 at 3L     Chronic COPD with tobacco use   -does not appear to be in acute exacerbation, no wheezing on exam   -change nebs to every 4 hours, resume home Spiriva/Advair  -smoking cessation encouraged, nicotine patch      HTN   -BP stable   -resume home meds      Sleep Apnea   -resume home cpap     CAD  -resume home meds      Chronic Pain Syndrome   -on scheduled Oxycodone and Dilaudid at home prescribed by pcp   -pt reports it is at its baseline   -resume home meds, encouraged pt to follow up with pcp for further adjustments      Type 2 DM   -resume Metformin      Diffuse Rash   -has been on going >1 mo  -no recent travel   -ID following     Depression   -resume home meds     Obesity   -BMI 35.2     Constipation   -likely from narcotic use, no BM since last thursday   -change colace to pericolace  -add miralax  -prn suppository     Iron Deficiency Anemia -iron low  -s/p IV iron x2 doses  -add po iron      FOLLOW-UP TESTS recommended:   - follow up with Cardiology team as scheduled  - take medications as prescribed  - Lasix increased to 60mg daily  - STOP taking Toprol XL until further instructed  - Take Entresto- samples given by Cardiology     PENDING TEST RESULTS:  At the time of your discharge the following test results are still pending: NONE  Please make sure you review these results with your outpatient follow-up provider(s).     SYMPTOMS to watch for: chest pain, shortness of breath, fever, chills, nausea, vomiting, diarrhea, change in mentation, falling, weakness, bleeding.     DIET/what to eat:  Diabetic Diet     ACTIVITY:  Activity as tolerated     WOUND CARE: NONE     EQUIPMENT needed:  NONE    Physical examination at discharge  Visit Vitals    /79    Pulse 90    Temp 97.9 °F (36.6 °C)    Resp 16    Ht 6' 2\" (1.88 m)    Wt 120.3 kg (265 lb 3.2 oz)    SpO2 99%    BMI 34.05 kg/m2     AO3  PERRLA  EOIM  Lung: Decreased BS, no wheezing  CVS: RRR  Abd: soft NT ND  Ext: Chronic venous stasis changes in LE    Pertinent imaging studies:  See radiology section      No results for input(s): WBC, HGB, HCT, PLT, HGBEXT, HCTEXT, PLTEXT in the last 72 hours. Recent Labs      03/03/18   0510  03/02/18   0628  03/01/18   0323   NA  135*  134*  136   K  4.1  4.4  4.2   CL  101  100  100   CO2  26  27  28   BUN  12  15  13   CREA  0.58*  0.66*  0.73   GLU  108*  111*  130*   CA  8.5  9.5  9.1     No results for input(s): SGOT, GPT, AP, TBIL, TP, ALB, GLOB, GGT, AML, LPSE in the last 72 hours. No lab exists for component: AMYP, HLPSE  No results for input(s): INR, PTP, APTT in the last 72 hours. No lab exists for component: INREXT   No results for input(s): FE, TIBC, PSAT, FERR in the last 72 hours. No results for input(s): PH, PCO2, PO2 in the last 72 hours. No results for input(s): CPK, CKMB in the last 72 hours.     No lab exists for component: TROPONINI  No components found for: GLPOC    Chronic Diagnoses:    Problem List as of 3/3/2018  Date Reviewed: 2/26/2018          Codes Class Noted - Resolved    * (Principal)Systolic CHF, acute ([facility] ICD-10-CM: I50.21  ICD-9-CM: 428.21, 428.0  2/26/2018 - Present        Acute systolic CHF (congestive heart failure) ([facility] ICD-10-CM: I50.21  ICD-9-CM: 428.21, 428.0  2/26/2018 - Present        COPD with exacerbation ([facility] ICD-10-CM: J44.1  ICD-9-CM: 491.21  5/24/2014 - Present        Morbid obesity ([place] [address] ICD-10-CM: E66.01  ICD-9-CM: 278.01  1/8/2014 - Present        Hematemesis ICD-10-CM: K92.0  ICD-9-CM: 578.0  1/7/2014 - Present        Rectal bleeding ICD-10-CM: K62.5  ICD-9-CM: 569.3  1/7/2014 - Present        Obstructive sleep apnea (adult) (pediatric) ICD-10-CM: G47.33  ICD-9-CM: 327.23  1/7/2014 - Present        Chronic airway obstruction, not elsewhere classified ICD-10-CM: J44.9  ICD-9-CM: 963  1/7/2014 - Present        Essential hypertension, benign ICD-10-CM: I10  ICD-9-CM: 401.1  1/7/2014 - Present        Type II or unspecified type diabetes mellitus without mention of complication, not stated as uncontrolled ICD-10-CM: E11.9  ICD-9-CM: 250.00  1/7/2014 - Present        Coronary artery disease ICD-10-CM: I25.10  ICD-9-CM: 414.00  1/7/2014 - Present        Chest pain ICD-10-CM: R07.9  ICD-9-CM: 786.50  1/5/2014 - Present        Coronary atherosclerosis of native coronary artery ICD-10-CM: I25.10  ICD-9-CM: 414.01  4/12/2011 - Present        Other primary cardiomyopathies ICD-10-CM: I42.8  ICD-9-CM: 425.4  4/12/2011 - Present        Chronic systolic heart failure ([facility] [address] ICD-10-CM: I50.22  ICD-9-CM: 428.22  4/12/2011 - Present              Time spent on discharge related activities today greater than 30 minutes.       Signed:  Orville Dao MD                 Hospitalist, Internal Medicine      Cc: Katherin Riddle MD

## 2018-03-03 NOTE — PROGRESS NOTES
Problem: Falls - Risk of  Goal: *Absence of Falls  Document Jarad Fall Risk and appropriate interventions in the flowsheet.    Outcome: Resolved/Met Date Met: 03/03/18  Fall Risk Interventions:  Mobility Interventions: Communicate number of staff needed for ambulation/transfer, Patient to call before getting OOB         Medication Interventions: Evaluate medications/consider consulting pharmacy    Elimination Interventions: Call light in reach, Elevated toilet seat, Patient to call for help with toileting needs    History of Falls Interventions: Door open when patient unattended

## 2018-03-03 NOTE — PROGRESS NOTES
Bedside and Verbal shift change report given to Darin Weston RN (oncoming nurse) by Jake Ruelas RN (offgoing nurse). Report included the following information SBAR and Kardex.

## 2018-03-05 NOTE — TELEPHONE ENCOUNTER
Heart Failure Nurse Navigator placed post discharge follow up call to patient. Spoke directly to patient, HF NN identified self and explained reason for call. Patient states \"I'm doing ok, I'm resting now\". Patient reports home health called and said they will visit him today. Patient reminded of appointment at [facility] on 3/8/18 and given location of office. Patient states he \"should have no problem getting there\" to the appointment. Patient again said he was resting now, so HF NN ended call after giving patient opportunity to express concerns/ask questions, of which he had none.

## 2018-03-05 NOTE — LETTER
3/13/2018 10:13 AM 
 
Mr. Fausto [address]ard [address] [phone] My name is Kimberly Armstrong RN a nurse with [facility]. I would like to talk with you and see if you need assistance with follow up appointments due to your hospital stay 2/27/18. I left messages at [phone] asking you to return my call. Hope to hear from you soon. Sincerely, WALTER Sabillon RN, CMSRN  Nurse Navigator, [employer]® ACP Facilitator [address] [facility] [address] [address], [address] [place] [address] [address] 
[phone] (w)  [phone]. (c)  [phone] (f) [email or web address]

## 2018-03-09 NOTE — TELEPHONE ENCOUNTER
Prior auth completed for entresto via phone-covermymeds not working. Awaiting response. Reference number #[id number]    Prior auth approved for [facility].   I called pharmacy to notify them, co pay will be $3.07

## 2018-03-14 NOTE — PROGRESS NOTES
3/13/18  Late Entry for 3/12/18  Lora Villalta is a 59year old with a Dx of NYHA class 4 HF. He was hospitalized on 2/26/18-3/2/18. NN contacted him to confirm FU appointments. Mr Villalta declined for the NN to schedule appointments despite encouragement. NN asked to review medications and Mr Villalta declined. His reply was \"I will review the medications with doctor\". 3/13/18  NN called Dr. Karthik Roberson office at [phone] at [facility] to [facility] who confirmed that Mr. Villalta was at the office now  keeping his FU appointment with Dr. Michelle Conteh. NN asked for the nurse to call NN for a hand off .

## 2018-03-28 NOTE — PROGRESS NOTES
3/28/18   Late entry for 3/12/18. Goals      Patient verbalizes understanding of self-management goals of living with Congestive Heart Failure            3/28/18   Shyla Villalta will continued to be followed By Dr Casey Elizalde at [facility] for his HF. ( at Patient's request). NN confirmed with Dr Tammie Aguilar and provided a hand off. Mr Villalta completed his FU appointment on 3/12/18. No FU required.  IM

## 2018-05-01 PROBLEM — R06.02 SOB (SHORTNESS OF BREATH): Status: ACTIVE | Noted: 2018-01-01

## 2018-05-01 NOTE — ED PROVIDER NOTES
HPI Comments: 59 y.o. male with past medical history significant for CAD, CHF, HTN, DM, MI, COPD who presents from [facility] via EMS with chief complaint of chest pain. Pt c/o SOB onset last night, accompanied by CP this AM. Describes the pain as sharp pressure and is a 9/10. Pain is on the R and L side of his sternum and worse with inspiration. Pt received toradol, lasix, solumedrol and a duo neb PTA, as well as ASA and albuterol treatment en route. Pt denies relief of sx after these medication. Pt reports that he takes morphine and dilautid at home for pain, last dose of dilautid was last night. Pt is on 3L O2 at home. There are no other acute medical concerns at this time. Social hx: +tobacco smoker, denies EtOH consumption     PCP: Abdi Salas MD  Cardio: Joana Wright MD    Note written by Rich Nelson, as dictated by Reyes Sierras, MD 1:52 PM      The history is provided by the patient and the EMS personnel. No  was used. Past Medical History:   Diagnosis Date    CAD (coronary artery disease)     CHF (congestive heart failure) (HCC)     Chronic systolic heart failure ([facility] [address] 4/12/2011    COPD (chronic obstructive pulmonary disease) (HCC)     Coronary atherosclerosis of native coronary artery 4/12/2011    Diabetes ([facility] [address]     High cholesterol     Hypertension     MI, old     Neurological disorder     Other primary cardiomyopathies ([facility] [address] 4/12/2011    Restless leg syndrome        Past Surgical History:   Procedure Laterality Date    CARDIAC SURG PROCEDURE UNLIST      stents x7    HX CARPAL TUNNEL RELEASE      right wrist         No family history on file. Social History     Social History    Marital status:      Spouse name: N/A    Number of children: N/A    Years of education: N/A     Occupational History    Not on file.      Social History Main Topics    Smoking status: Current Every Day Smoker     Packs/day: 0.25    Smokeless tobacco: Not on file    Alcohol use No    Drug use: No    Sexual activity: Not on file     Other Topics Concern    Not on file     Social History Narrative         ALLERGIES: Review of patient's allergies indicates no known allergies. Review of Systems   Constitutional: Negative for appetite change, chills and fever. HENT: Negative for rhinorrhea, sore throat and trouble swallowing. Eyes: Negative for photophobia. Respiratory: Positive for shortness of breath. Cardiovascular: Positive for chest pain and leg swelling. Negative for palpitations. Gastrointestinal: Negative for abdominal pain, nausea and vomiting. Genitourinary: Negative for dysuria, frequency and hematuria. Musculoskeletal: Negative for arthralgias. Neurological: Negative for dizziness, syncope and weakness. Psychiatric/Behavioral: Negative for behavioral problems. The patient is not nervous/anxious. All other systems reviewed and are negative. There were no vitals filed for this visit. Physical Exam   Constitutional: He appears well-developed and well-nourished. Obese. Intermittent twitching of both legs. HENT:   Head: Normocephalic and atraumatic. Mouth/Throat: Oropharynx is clear and moist.   Eyes: EOM are normal. Pupils are equal, round, and reactive to light. Neck: Normal range of motion. Neck supple. Cardiovascular: Normal rate, regular rhythm, normal heart sounds and intact distal pulses. Exam reveals no gallop and no friction rub. No murmur heard. Pulmonary/Chest: No respiratory distress. He has no wheezes. He has no rales. Mildly SOB at rest.    Abdominal: Soft. There is no tenderness. There is no rebound. Musculoskeletal: Normal range of motion. He exhibits no tenderness. 2/4 pitting edema of lower extremities bilaterally. Neurological: He is alert. No cranial nerve deficit. Motor; symmetric   Skin: No erythema. Gauze dressings present on both legs.     Psychiatric: He has a normal mood and affect. His behavior is normal.   Nursing note and vitals reviewed. Note written by Rich Guerrero, as dictated by Marcos Maciel MD 2:24 PM        [facility]      ED Course       Procedures    ED EKG interpretation:  Rhythm: normal sinus rhythm; and regular . Rate (approx.): 90; Axis: normal; P wave: normal; QRS interval: prolonged; ST/T wave: non-specific changes; in  Lead: ; Other findings: RBBB. This EKG was interpreted by Marcos Maciel MD,ED Provider.  1:58 PM

## 2018-05-01 NOTE — IP AVS SNAPSHOT
[address] 
[phone] Patient: Amelia Bryant MRN: [id number] :[id number] About your hospitalization You were admitted on:  May 1, 2018 You last received care in the:  [address] [place] [address] You were discharged on: May 7, 2018 Why you were hospitalized Your primary diagnosis was:  Not on File Your diagnoses also included:  Sob (Shortness Of Breath), Copd With Exacerbation (Hcc), Fluid Overload Follow-up Information Follow up With Details Comments Contact Info Deepak Recio MD Go on 5/10/2018 Margie Biswas will call the patient on May 9  with appointment time. [facility] [address] [place] [address] 
[phone] Discharge Orders Procedure Order Date Status Priority Quantity Spec Type Associated Dx ACTIVITY AFTER DISCHARGE Patient should: Resume activity as tolerated. 18 1231 Normal Routine 1 Questions: Patient should:  Resume activity as tolerated. DIET DIABETIC CONSISTENT CARB Regular 18 1231 Normal Routine 1 Questions: Texture:  Regular A check dani indicates which time of day the medication should be taken. My Medications START taking these medications Instructions Each Dose to Equal  
 Morning Noon Evening Bedtime  
 predniSONE 20 mg tablet Commonly known as:  Mellissaanjelica Burgoser Start taking on:  2018 Your last dose was: Your next dose is: Take 1 Tab by mouth daily (with breakfast). 20 mg CHANGE how you take these medications Instructions Each Dose to Equal  
 Morning Noon Evening Bedtime  
 spironolactone 50 mg tablet Commonly known as:  ALDACTONE What changed:   
- medication strength 
- how much to take - when to take this Your last dose was: Your next dose is: Take 1 Tab by mouth two (2) times a day.   
 50 mg  
    
 CONTINUE taking these medications Instructions Each Dose to Equal  
 Morning Noon Evening Bedtime ADVAIR DISKUS 250-50 mcg/dose diskus inhaler Generic drug:  fluticasone-salmeterol Your last dose was: Your next dose is: Take 1 Puff by inhalation two (2) times a day. 1 Puff  
    
   
   
   
  
 albuterol 90 mcg/actuation inhaler Commonly known as:  PROVENTIL HFA, VENTOLIN HFA, PROAIR HFA Your last dose was: Your next dose is: Take 1 Puff by inhalation every four (4) hours as needed for Wheezing. 1 Puff  
    
   
   
   
  
 albuterol-ipratropium 2.5 mg-0.5 mg/3 ml Nebu Commonly known as:  Valjean Left Your last dose was: Your next dose is:    
   
   
 3 mL by Nebulization route every four (4) hours. 3 mL  
    
   
   
   
  
 aspirin delayed-release 81 mg tablet Your last dose was: Your next dose is: Take 81 mg by mouth daily. 81 mg  
    
   
   
   
  
 atorvastatin 80 mg tablet Commonly known as:  LIPITOR Your last dose was: Your next dose is: Take 80 mg by mouth daily. 80 mg  
    
   
   
   
  
 citalopram 40 mg tablet Commonly known as:  Nixon Abimbola Your last dose was: Your next dose is: Take 40 mg by mouth daily. 40 mg  
    
   
   
   
  
 clopidogrel 75 mg Tab Commonly known as:  PLAVIX Your last dose was: Your next dose is: Take 75 mg by mouth daily. 75 mg  
    
   
   
   
  
 DILAUDID 4 mg tablet Generic drug:  HYDROmorphone Your last dose was: Your next dose is: Take 4 mg by mouth two (2) times a day. 4 mg  
    
   
   
   
  
 docusate sodium 100 mg capsule Commonly known as:  Brian Mars Your last dose was: Your next dose is: Take 100 mg by mouth two (2) times a day.   
 100 mg  
    
   
   
   
  
 ferrous sulfate 325 mg (65 mg iron) tablet Your last dose was: Your next dose is: Take 325 mg by mouth Daily (before breakfast). 325 mg  
    
   
   
   
  
 furosemide 40 mg tablet Commonly known as:  LASIX Your last dose was: Your next dose is: Take 1.5 Tabs by mouth two (2) times a day. 60 mg  
    
   
   
   
  
 IMDUR 60 mg CR tablet Generic drug:  isosorbide mononitrate ER Your last dose was: Your next dose is: Take 60 mg by mouth daily. 60 mg  
    
   
   
   
  
 lisinopril 2.5 mg tablet Commonly known as:  Iesha Flight Your last dose was: Your next dose is: Take 2.5 mg by mouth daily. 2.5 mg  
    
   
   
   
  
 metFORMIN 1,000 mg tablet Commonly known as:  GLUCOPHAGE Your last dose was: Your next dose is: Take 1,000 mg by mouth two (2) times daily (with meals). Indications: type 2 diabetes mellitus 1000 mg  
    
   
   
   
  
 MS CONTIN 30 mg CR tablet Generic drug:  morphine CR Your last dose was: Your next dose is: Take 60 mg by mouth every eight (8) hours. 60 mg NITROSTAT 0.4 mg SL tablet Generic drug:  nitroglycerin Your last dose was: Your next dose is: 0.4 mg by SubLINGual route every five (5) minutes as needed for Chest Pain. 0.4 mg  
    
   
   
   
  
 nortriptyline 50 mg capsule Commonly known as:  PAMELOR Your last dose was: Your next dose is: Take 50 mg by mouth nightly. 50 mg  
    
   
   
   
  
 omeprazole 20 mg capsule Commonly known as:  PRILOSEC Your last dose was: Your next dose is: Take 20 mg by mouth daily. Indications: gastroesophageal reflux disease 20 mg  
    
   
   
   
  
 rOPINIRole 4 mg Tab TAB Commonly known as:  Tom Bautista Your last dose was: Your next dose is: Take 8 mg by mouth nightly. Indications: Restless Legs Syndrome 8 mg SPIRIVA WITH HANDIHALER 18 mcg inhalation capsule Generic drug:  tiotropium Your last dose was: Your next dose is: Take 1 Cap by inhalation daily. 1 Cap TOPROL XL 25 mg XL tablet Generic drug:  metoprolol succinate Your last dose was: Your next dose is: Take 25 mg by mouth daily. 25 mg  
    
   
   
   
  
 zolpidem 10 mg tablet Commonly known as:  AMBIEN Your last dose was: Your next dose is: Take 1 Tab by mouth nightly. 10 mg Where to Get Your Medications Information on where to get these meds will be given to you by the nurse or doctor. ! Ask your nurse or doctor about these medications  
  predniSONE 20 mg tablet  
 spironolactone 50 mg tablet Opioid Education Prescription Opioids: What You Need to Know: 
 
Prescription opioids can be used to help relieve moderate-to-severe pain and are often prescribed following a surgery or injury, or for certain health conditions. These medications can be an important part of treatment but also come with serious risks. Opioids are strong pain medicines. Examples include hydrocodone, oxycodone, fentanyl, and morphine. Heroin is an example of an illegal opioid. It is important to work with your health care provider to make sure you are getting the safest, most effective care. WHAT ARE THE RISKS AND SIDE EFFECTS OF OPIOID USE? Prescription opioids carry serious risks of addiction and overdose, especially with prolonged use. An opioid overdose, often marked by slow breathing, can cause sudden death. The use of prescription opioids can have a number of side effects as well, even when taken as directed. · Tolerance-meaning you might need to take more of a medication for the same pain relief · Physical dependence-meaning you have symptoms of withdrawal when the medication is stopped. Withdrawal symptoms can include nausea, sweating, chills, diarrhea, stomach cramps, and muscle aches. Withdrawal can last up to several weeks, depending on which drug you took and how long you took it. · Increased sensitivity to pain · Constipation · Nausea, vomiting, and dry mouth · Sleepiness and dizziness · Confusion · Depression · Low levels of testosterone that can result in lower sex drive, energy, and strength · Itching and sweating RISKS ARE GREATER WITH:      
· History of drug misuse, substance use disorder, or overdose · Mental health conditions (such as depression or anxiety) · Sleep apnea · Older age (72 years or older) · Pregnancy Avoid alcohol while taking prescription opioids. Also, unless specifically advised by your health care provider, medications to avoid include: · Benzodiazepines (such as Xanax or Valium) · Muscle relaxants (such as Soma or Flexeril) · Hypnotics (such as Ambien or Lunesta) · Other prescription opioids KNOW YOUR OPTIONS Talk to your health care provider about ways to manage your pain that don't involve prescription opioids. Some of these options may actually work better and have fewer risks and side effects. Options may include: 
· Pain relievers such as acetaminophen, ibuprofen, and naproxen · Some medications that are also used for depression or seizures · Physical therapy and exercise · Counseling to help patients learn how to cope better with triggers of pain and stress. · Application of heat or cold compress · Massage therapy · Relaxation techniques Be Informed Make sure you know the name of your medication, how much and how often to take it, and its potential risks & side effects.  
 
IF YOU ARE PRESCRIBED OPIOIDS FOR PAIN: 
 · Never take opioids in greater amounts or more often than prescribed. Remember the goal is not to be pain-free but to manage your pain at a tolerable level. · Follow up with your primary care provider to: · Work together to create a plan on how to manage your pain. · Talk about ways to help manage your pain that don't involve prescription opioids. · Talk about any and all concerns and side effects. · Help prevent misuse and abuse. · Never sell or share prescription opioids · Help prevent misuse and abuse. · Store prescription opioids in a secure place and out of reach of others (this may include visitors, children, friends, and family). · Safely dispose of unused/unwanted prescription opioids: Find your community drug take-back program or your pharmacy mail-back program, or flush them down the toilet, following guidance from the Food and Drug Administration (www.fda.gov/Drugs/ResourcesForYou). · Visit www.cdc.gov/drugoverdose to learn about the risks of opioid abuse and overdose. · If you believe you may be struggling with addiction, tell your health care provider and ask for guidance or call [facility] at 0-295-412-BQBJ. Discharge Instructions Smoking Cessation Program: This is a free, phone/text/email based, smoking cessation program. The program is individualized to meet each patient's needs. To enroll use the link https://ha.Accumetrics/ra/survey/3090 or text Eilas Pore to [phone] from any smart phone. Introducing [facility]! Corey Villanueva introduces [employer] patient portal. Now you can access parts of your medical record, email your doctor's office, and request medication refills online. 1. In your internet browser, go to [email or web address] 2. Click on the First Time User? Click Here link in the Sign In box. You will see the New Member Sign Up page. 3. Enter your Ensenda Access Code exactly as it appears below. You will not need to use this code after youve completed the sign-up process. If you do not sign up before the expiration date, you must request a new code. · Ensenda Access Code: [email or web address] Expires: 5/29/2018  8:02 AM 
 
4. Enter the last four digits of your Social Security Number (xxxx) and Date of Birth (mm/dd/yyyy) as indicated and click Submit. You will be taken to the next sign-up page. 5. Create a Ensenda ID. This will be your Ensenda login ID and cannot be changed, so think of one that is secure and easy to remember. 6. Create a Ensenda password. You can change your password at any time. 7. Enter your Password Reset Question and Answer. This can be used at a later time if you forget your password. 8. Enter your e-mail address. You will receive e-mail notification when new information is available in [address]. 9. Click Sign Up. You can now view and download portions of your medical record. 10. Click the Download Summary menu link to download a portable copy of your medical information. If you have questions, please visit the Frequently Asked Questions section of the Ensenda website. Remember, Ensenda is NOT to be used for urgent needs. For medical emergencies, dial 911. Now available from your iPhone and Android! Introducing Gideon Smith As a [facility]ie [facility] patient, I wanted to make you aware of our electronic visit tool called Gideon Smith. [facility] 24/7 allows you to connect within minutes with a medical provider 24 hours a day, seven days a week via a mobile device or tablet or logging into a secure website from your computer. You can access Gideon Jacobmkfin from anywhere in the [place].  
 
A virtual visit might be right for you when you have a simple condition and feel like you just dont want to get out of bed, or cant get away from work for an appointment, when your regular [facility] provider is not available (evenings, weekends or holidays), or when youre out of town and need minor care. Electronic visits cost only $49 and if the [employer] provider determines a prescription is needed to treat your condition, one can be electronically transmitted to a nearby pharmacy*. Please take a moment to enroll today if you have not already done so. The enrollment process is free and takes just a few minutes. To enroll, please download the [employer] arden to your tablet or phone, or visit [email or web address] to enroll on your computer. And, as an [address] [place] [address] patient with a [employer] account, the results of your visits will be scanned into your electronic medical record and your primary care provider will be able to view the scanned results. We urge you to continue to see your regular [facility] for your ongoing medical care. And while your primary care provider may not be the one available when you seek a [employer] virtual visit, the peace of mind you get from getting a real diagnosis real time can be priceless. For more information on [employer], view our Frequently Asked Questions (FAQs) at [email or web address]. Sincerely, 
 
Cristian Telles MD 
Chief Medical Officer Leilani Manuela Sanabria *:  certain medications cannot be prescribed via [employer] Providers Seen During Your Hospitalization Provider Specialty Primary office phone Barry Collins MD Emergency Medicine [phone] Raine Duque MD Internal Medicine [phone] Rocio Elder MD Internal Medicine [phone] Terrance Ríos MD Internal Medicine [phone] Your Primary Care Physician (PCP) Primary Care Physician Office Phone Office Fax Misa Wright [phone] You are allergic to the following No active allergies Recent Documentation Height Weight BMI Smoking Status 1.905 m 118.6 kg 32.68 kg/m2 Current Every Day Smoker Emergency Contacts Name Discharge Info Relation Home Work Mobile [facility] DISCHARGE CAREGIVER [3] Friend [5] [phone] Patient Belongings The following personal items are in your possession at time of discharge: 
     Visual Aid: Glasses Discharge Instructions Attachments/References HEART FAILURE: AVOIDING TRIGGERS (ENGLISH) Patient Handouts Avoiding Triggers With Heart Failure: Care Instructions Your Care Instructions Triggers are anything that make your heart failure flare up. A flare-up is also called \"sudden heart failure\" or \"acute heart failure. \" When you have a flare-up, fluid builds up in your lungs, and you have problems breathing. You might need to go to the hospital. By watching for changes in your condition and avoiding triggers, you can prevent heart failure flare-ups. Follow-up care is a key part of your treatment and safety. Be sure to make and go to all appointments, and call your doctor if you are having problems. It's also a good idea to know your test results and keep a list of the medicines you take. How can you care for yourself at home? Watch for changes in your weight and condition · Weigh yourself without clothing at the same time each day. Record your weight. Call your doctor if you have sudden weight gain, such as more than 2 to 3 pounds in a day or 5 pounds in a week. (Your doctor may suggest a different range of weight gain.) A sudden weight gain may mean that your heart failure is getting worse. · Keep a daily record of your symptoms. Write down any changes in how you feel, such as new shortness of breath, cough, or problems eating.  Also record if your ankles are more swollen than usual and if you feel more tired than usual. Note anything that you ate or did that could have triggered these changes. Limit sodium Sodium causes your body to hold on to extra water. This may cause your heart failure symptoms to get worse. People get most of their sodium from processed foods. Fast food and restaurant meals also tend to be very high in sodium. · Your doctor may suggest that you limit sodium to 2,000 milligrams (mg) a day or less. That is less than 1 teaspoon of salt a day, including all the salt you eat in cooking or in packaged foods. · Read food labels on cans and food packages. They tell you how much sodium you get in one serving. Check the serving size. If you eat more than one serving, you are getting more sodium. · Be aware that sodium can come in forms other than salt, including monosodium glutamate (MSG), sodium citrate, and sodium bicarbonate (baking soda). MSG is often added to Asian food. You can sometimes ask for food without MSG or salt. · Slowly reducing salt will help you adjust to the taste. Take the salt shaker off the table. · Flavor your food with garlic, lemon juice, onion, vinegar, herbs, and spices instead of salt. Do not use soy sauce, steak sauce, onion salt, garlic salt, mustard, or ketchup on your food, unless it is labeled \"low-sodium\" or \"low-salt. \" 
· Make your own salad dressings, sauces, and ketchup without adding salt. · Use fresh or frozen ingredients, instead of canned ones, whenever you can. Choose low-sodium canned goods. · Eat less processed food and food from restaurants, including fast food. Exercise as directed Moderate, regular exercise is very good for your heart. It improves your blood flow and helps control your weight. But too much exercise can stress your heart and cause a heart failure flare-up. · Check with your doctor before you start an exercise program. 
· Walking is an easy way to get exercise. Start out slowly.  Gradually increase the length and pace of your walk. Swimming, riding a bike, and using a treadmill are also good forms of exercise. · When you exercise, watch for signs that your heart is working too hard. You are pushing yourself too hard if you cannot talk while you are exercising. If you become short of breath or dizzy or have chest pain, stop, sit down, and rest. 
· Do not exercise when you do not feel well. Take medicines correctly · Take your medicines exactly as prescribed. Call your doctor if you think you are having a problem with your medicine. · Make a list of all the medicines you take. Include those prescribed to you by other doctors and any over-the-counter medicines, vitamins, or supplements you take. Take this list with you when you go to any doctor. · Take your medicines at the same time every day. It may help you to post a list of all the medicines you take every day and what time of day you take them. · Make taking your medicine as simple as you can. Plan times to take your medicines when you are doing other things, such as eating a meal or getting ready for bed. This will make it easier to remember to take your medicines. · Get organized. Use helpful tools, such as daily or weekly pill containers. When should you call for help? Call 911 if you have symptoms of sudden heart failure such as: 
? · You have severe trouble breathing. ? · You cough up pink, foamy mucus. ? · You have a new irregular or rapid heartbeat. ?Call your doctor now or seek immediate medical care if: 
? · You have new or increased shortness of breath. ? · You are dizzy or lightheaded, or you feel like you may faint. ? · You have sudden weight gain, such as more than 2 to 3 pounds in a day or 5 pounds in a week. (Your doctor may suggest a different range of weight gain.) ? · You have increased swelling in your legs, ankles, or feet. ? · You are suddenly so tired or weak that you cannot do your usual activities. ?Watch closely for changes in your health, and be sure to contact your doctor if you develop new symptoms. Where can you learn more? Go to http://clover-livia.info/. Enter Q332 in the search box to learn more about \"Avoiding Triggers With Heart Failure: Care Instructions. \" Current as of: September 21, 2016 Content Version: 11.4 © 5367-0316 [employer]. Care instructions adapted under license by [employer] (which disclaims liability or warranty for this information). If you have questions about a medical condition or this instruction, always ask your healthcare professional. [address] any warranty or liability for your use of this information. Please provide this summary of care documentation to your next provider. Signatures-by signing, you are acknowledging that this After Visit Summary has been reviewed with you and you have received a copy. Patient Signature:  ____________________________________________________________ Date:  ____________________________________________________________  
  
Eusebio Zamorano Provider Signature:  ____________________________________________________________ Date:  ____________________________________________________________

## 2018-05-01 NOTE — ED NOTES
TRANSFER - OUT REPORT:    Verbal report given to RN (name) on Kate Anguiano Nine  being transferred to Obs (unit) for routine progression of care       Report consisted of patients Situation, Background, Assessment and   Recommendations(SBAR). Information from the following report(s) SBAR and ED Summary was reviewed with the receiving nurse. Lines:   Peripheral IV 05/01/18 Left Antecubital (Active)   Site Assessment Clean, dry, & intact 5/1/2018  2:10 PM   Phlebitis Assessment 0 5/1/2018  2:10 PM   Infiltration Assessment 0 5/1/2018  2:10 PM   Dressing Status Clean, dry, & intact 5/1/2018  2:10 PM   Dressing Type Transparent 5/1/2018  2:10 PM        Opportunity for questions and clarification was provided.       Patient transported with:   [facility]

## 2018-05-01 NOTE — ED TRIAGE NOTES
Triage Note: Patient is coming in from [facility] with chest pain and shortness of breath, patient received Toradol 60 mg  and lasix 20 mg IM, Solumedrol 125 mg IM and 1 duo neb. En route to the hospital received ASA and albuterol treatment.

## 2018-05-01 NOTE — PROGRESS NOTES
Admission Medication Reconciliation:    Information obtained from: Patient, Medication list from [facility], patient's medication list, RX query, [facility] pharmacy    Significant PMH/Disease States:   Past Medical History:   Diagnosis Date    CAD (coronary artery disease)     [facility]) [facility]     Chronic systolic heart failure ([facility] [address] 4/12/2011    COPD (chronic obstructive pulmonary disease) ([facility] [address]     Coronary atherosclerosis of native coronary artery 4/12/2011    Diabetes ([facility] [address]     High cholesterol     Hypertension     MI, old     Neurological disorder     Other primary cardiomyopathies 4/12/2011    Restless leg syndrome        Chief Complaint for this Admission:  Chest pain    Allergies:  Review of patient's allergies indicates no known allergies. Prior to Admission Medications:   Prior to Admission Medications   Prescriptions Last Dose Informant Patient Reported? Taking? HYDROmorphone (DILAUDID) 4 mg tablet 4/30/2018 Self Yes Yes   Sig: Take 4 mg by mouth two (2) times a day. albuterol (PROVENTIL HFA, VENTOLIN HFA, PROAIR HFA) 90 mcg/actuation inhaler   Yes Yes   Sig: Take 1 Puff by inhalation every four (4) hours as needed for Wheezing. albuterol-ipratropium (DUO-NEB) 2.5 mg-0.5 mg/3 ml nebu   No Yes   Sig: 3 mL by Nebulization route every four (4) hours. aspirin delayed-release 81 mg tablet 4/30/2018  Yes Yes   Sig: Take 81 mg by mouth daily. atorvastatin (LIPITOR) 80 mg tablet 4/30/2018 Self Yes Yes   Sig: Take 80 mg by mouth daily. citalopram (CELEXA) 40 mg tablet 4/30/2018  Yes Yes   Sig: Take 40 mg by mouth daily. clopidogrel (PLAVIX) 75 mg tab 4/30/2018 Self Yes Yes   Sig: Take 75 mg by mouth daily. docusate sodium (COLACE) 100 mg capsule 4/30/2018  Yes Yes   Sig: Take 100 mg by mouth two (2) times a day. ferrous sulfate 325 mg (65 mg iron) tablet 4/30/2018  Yes Yes   Sig: Take 325 mg by mouth Daily (before breakfast).    fluticasone-salmeterol (ADVAIR DISKUS) 250-50 mcg/dose diskus inhaler 2018 Self Yes Yes   Sig: Take 1 Puff by inhalation two (2) times a day. furosemide (LASIX) 40 mg tablet 2018  No Yes   Sig: Take 1.5 Tabs by mouth two (2) times a day. isosorbide mononitrate ER (IMDUR) 60 mg CR tablet 2018 Self Yes Yes   Sig: Take 60 mg by mouth daily. lisinopril (PRINIVIL, ZESTRIL) 2.5 mg tablet 2018  Yes Yes   Sig: Take 2.5 mg by mouth daily. metFORMIN (GLUCOPHAGE) 1,000 mg tablet 2018 Self Yes Yes   Sig: Take 1,000 mg by mouth two (2) times daily (with meals). Indications: type 2 diabetes mellitus   metoprolol succinate (TOPROL XL) 25 mg XL tablet 2018  Yes Yes   Sig: Take 25 mg by mouth daily. morphine CR (MS CONTIN) 30 mg CR tablet 2018 Self Yes Yes   Sig: Take 60 mg by mouth every eight (8) hours. nitroglycerin (NITROSTAT) 0.4 mg SL tablet 2018  Yes Yes   Si.4 mg by SubLINGual route every five (5) minutes as needed for Chest Pain. nortriptyline (PAMELOR) 50 mg capsule 2018  Yes Yes   Sig: Take 50 mg by mouth nightly. omeprazole (PRILOSEC) 20 mg capsule 2018 Self Yes Yes   Sig: Take 20 mg by mouth daily. Indications: gastroesophageal reflux disease   rOPINIRole (REQUIP) 4 mg tab TAB 2018 Self Yes Yes   Sig: Take 8 mg by mouth nightly. Indications: Restless Legs Syndrome   spironolactone (ALDACTONE) 25 mg tablet 2018  No Yes   Sig: Take 1 Tab by mouth daily. tiotropium (SPIRIVA WITH HANDIHALER) 18 mcg inhalation capsule 2018 Self Yes Yes   Sig: Take 1 Cap by inhalation daily. zolpidem (AMBIEN) 10 mg tablet 2018 Self No Yes   Sig: Take 1 Tab by mouth nightly. Facility-Administered Medications: None     Comments/Recommendations:   1. Removed Augmentin, Prozac, potassium chloride  2. Added ferrous sulfate, lisinopril, Toprol  3.  Removed Entresto  - patient said he never started taking because insurance would not cover, however, per progress note posted 3/12/18, prior authorization had been accepted. [facility] pharmacy said the patient never picked up the prescription but it seems that he was not aware that it was covered. [facility] confirms it had been processed through insurance.

## 2018-05-01 NOTE — H&P
History & Physical    Primary Care Provider: Kevin Grady MD  Source of Information: Patient and chart    History of Presenting Illness:       CC: cp sob  Padma Villalta is a 59 y.o. male who presents with: claims sob, in setting of chronic syst heart failure and on appropriate meds but no BB 2n2 RV dysfunction and is on ace ( but did not fill the authorized Entresto), and diuretic both lasix and low dose spironolactone and is followed at lymphedema clinic, and who also c/o cp CP this AM. \"Describes the pain as sharp pressure and is a 9/10. Pain is on the R and L side of his sternum and worse with inspiration. \" , and pain is in setting of chronic pain management. , pt is placed in obs as only the bnp is up over baselilne and his 02 sats are ok on home 02 settings, pt when to lymphedema clinic this am. Will ask wound to change out dressing in am . But:  no nausea, vomiting, diarrhea,  , no seizures, no LOC episodes, no fever, no chills.        PCP: Kevin Grady MD  Cardio: Ethan Costello MD    + tobacco   Denies alcohol                          Review of Systems:  ROS      Review of Systems:    Constitutional       neg  Eyes      neg  Ears,Nose, Mouth, Throat   neg  Cardiovascular    + lymphedema,  And non cardic cp and sob   Respiratory     Sob no  Cought, some pleuritic pain   Gastrointestinal    Neg for n;v/d   Genitourinary     neg  Musculoskeletal    Neg except for chest wall pain   Integumentary (skin and/or breast)  neg   Neurological     Neg for seizure, focal neuro changes   Psychiatric     neg  Endocrine     neg  Hematologic/Lymphatic   neg  Allergic/Immunologic     neg         Past, Family, and/or Social History [facility])    Past Medical History:   Diagnosis Date    CAD (coronary artery disease)     CHF (congestive heart failure) (HCC)     [facility] systolic [facility] failure ([facility] [address] 4/12/2011    COPD (chronic obstructive pulmonary disease) [facility] [address]     Coronary atherosclerosis of native coronary artery 4/12/2011    Diabetes [facility] [address]     High cholesterol     Hypertension     MI, old     Neurological disorder     Other primary cardiomyopathies 4/12/2011    Restless leg syndrome       Past Surgical History:   Procedure Laterality Date    CARDIAC SURG PROCEDURE UNLIST      stents x7    HX CARPAL TUNNEL RELEASE      right wrist     Prior to Admission medications    Medication Sig Start Date End Date Taking? Authorizing Provider   metoprolol succinate (TOPROL XL) 25 mg XL tablet Take 25 mg by mouth daily. Yes Historical Provider   lisinopril (PRINIVIL, ZESTRIL) 2.5 mg tablet Take 2.5 mg by mouth daily. Yes Historical Provider   ferrous sulfate 325 mg (65 mg iron) tablet Take 325 mg by mouth Daily (before breakfast). Yes Historical Provider   albuterol (PROVENTIL HFA, VENTOLIN HFA, PROAIR HFA) 90 mcg/actuation inhaler Take 1 Puff by inhalation every four (4) hours as needed for Wheezing. Yes Historical Provider   furosemide (LASIX) 40 mg tablet Take 1.5 Tabs by mouth two (2) times a day. 3/3/18  Yes Kyaw Nelson MD   spironolactone (ALDACTONE) 25 mg tablet Take 1 Tab by mouth daily. 3/3/18  Yes Kyaw Nelson MD   albuterol-ipratropium (DUO-NEB) 2.5 mg-0.5 mg/3 ml nebu 3 mL by Nebulization route every four (4) hours. 3/3/18  Yes Kyaw Nelson MD   aspirin delayed-release 81 mg tablet Take 81 mg by mouth daily. Yes Gerald Caldwell MD   citalopram (CELEXA) 40 mg tablet Take 40 mg by mouth daily. Yes Gerald Caldwell MD   nitroglycerin (NITROSTAT) 0.4 mg SL tablet 0.4 mg by SubLINGual route every five (5) minutes as needed for Chest Pain. Yes Historical Provider   nortriptyline (PAMELOR) 50 mg capsule Take 50 mg by mouth nightly. Yes Historical Provider   docusate sodium (COLACE) 100 mg capsule Take 100 mg by mouth two (2) times a day. Yes Historical Provider   metFORMIN (GLUCOPHAGE) 1,000 mg tablet Take 1,000 mg by mouth two (2) times daily (with meals). Indications: type 2 diabetes mellitus   Yes Historical Provider   omeprazole (PRILOSEC) 20 mg capsule Take 20 mg by mouth daily. Indications: gastroesophageal reflux disease   Yes Historical Provider   clopidogrel (PLAVIX) 75 mg tab Take 75 mg by mouth daily. Yes Gerald Caldwell MD   atorvastatin (LIPITOR) 80 mg tablet Take 80 mg by mouth daily. Yes Historical Provider   rOPINIRole (REQUIP) 4 mg tab TAB Take 8 mg by mouth nightly. Indications: Restless Legs Syndrome   Yes Historical Provider   morphine CR (MS CONTIN) 30 mg CR tablet Take 60 mg by mouth every eight (8) hours. Yes Historical Provider   HYDROmorphone (DILAUDID) 4 mg tablet Take 4 mg by mouth two (2) times a day. Yes Historical Provider   zolpidem (AMBIEN) 10 mg tablet Take 1 Tab by mouth nightly. 5/24/14  Yes Frida Oliva MD   tiotropium (SPIRIVA WITH HANDIHALER) 18 mcg inhalation capsule Take 1 Cap by inhalation daily. Yes Gerald Caldwell MD   fluticasone-salmeterol (ADVAIR DISKUS) 250-50 mcg/dose diskus inhaler Take 1 Puff by inhalation two (2) times a day. Yes Gerald Caldwell MD   isosorbide mononitrate ER (IMDUR) 60 mg CR tablet Take 60 mg by mouth daily. Yes Gerald Caldwell MD     No Known Allergies   History reviewed. No pertinent family history. SOCIAL HISTORY:  Patient resides:  Independently x   Assisted Living    SNF    With family care       Smoking history:   None    Former    Chronic x     Alcohol history:   None x   Social    Chronic      Patient ambulates:  Independently x   With [facility]    With [facility]    With [facility]         Social History     Social History    Marital status:      Spouse name: N/A    Number of children: N/A    Years of education: N/A     Occupational History    Not on file.      Social History Main Topics    Smoking status: Current Every Day Smoker     Packs/day: 0.25    Smokeless tobacco: Never Used    Alcohol use No    Drug use: No    Sexual activity: Not on file     Other Topics Concern    Not on file Social History Narrative          Objective:                 PHYSICAL EXAM:      Vital signs below          Constitutional:    acute distress  ,      Alert;            Conversant;      Eyes: anicteric sclerae, moist conjunctivae;    no   lid-lag;    +  PERRLA;    Ears, nose,mouth,and throat:   Normal external ears  ; oropharynx clear with  moist   mucous membranes; no posterior oral lesions; and  trach is   mid line; Respiratory:    Trachea midline;  CTA    with normal   respiratory effort; and no    intercostal retractions;    Cardiovascular: RRR, no MRGs;    Gastrointestinal: Soft, non-tender  ; no   masses or HSM; no   guarding; no   rebound;    Musculoskelatal:  adri LE in pressure dressing for lymphedema, .  ties are supple;  no   peripheral edema;  neck with   FROM; atraumatic    Skin:  Temperature nl  ;  turgor is nl   ; the texture nl   ;  no   rash;     Neurologic: CN 2- 12   intact;  no    focal motor weakness; reflexes  ;    Psychiatric:  Appropriate  affect; alert   ; and   oriented to person, place  , and time ;    Heme/Lymph/Immune:  Cervical adenopathy not   present; axillary adenopathy not   present;       VITALS:   Last 24hrs VS reviewed since prior progress note. Most recent are:  Patient Vitals for the past 24 hrs:   Temp Pulse Resp BP SpO2   05/01/18 1558 - 94 19 132/73 90 %   05/01/18 1415 - 93 19 (!) 148/111 97 %   05/01/18 1350 97.6 °F (36.4 °C) 91 24 130/66 98 %     No intake or output data in the 24 hours ending 05/01/18 1654            EKG:  No convincing changes to ECG over serial , RBBB, and inverted to non specific T's in inferior leads.      Data Review:     Recent Days:  Recent Labs      05/01/18   1412   WBC  9.9   HGB  12.3   HCT  40.9   PLT  401*     Recent Labs      05/01/18   1412   NA  139   K  3.7   CL  104   CO2  28   GLU  122*   BUN  10   CREA  0.88   CA  9.6   MG  2.3   ALB  3.7   TBILI  0.5   SGOT  13*   ALT  14     No results for input(s): PH, PCO2, PO2, HCO3, FIO2 in the last 72 hours. 24 Hour Results:  Recent Results (from the past 24 hour(s))   EKG, 12 LEAD, INITIAL    Collection Time: 05/01/18  1:44 PM   Result Value Ref Range    Ventricular Rate 91 BPM    Atrial Rate 79 BPM    QRS Duration 142 ms    Q-T Interval 402 ms    QTC Calculation (Bezet) 494 ms    Calculated R Axis 93 degrees    Calculated T Axis 32 degrees    Diagnosis       Atrial-paced rhythm  Right bundle branch block  When compared with ECG of 27-FEB-2018 08:13,    Inverted T waves have replaced nonspecific T wave abnormality in Inferior   leads  Confirmed by Jarod Sprague MD, Pallavi Bryant ([id number]) on 5/1/2018 3:26:45 PM     CBC WITH AUTOMATED DIFF    Collection Time: 05/01/18  2:12 PM   Result Value Ref Range    WBC 9.9 4.1 - 11.1 K/uL    RBC 5.02 4.10 - 5.70 M/uL    HGB 12.3 12.1 - 17.0 g/dL    HCT 40.9 36.6 - 50.3 %    MCV 81.5 80.0 - 99.0 FL    MCH 24.5 (L) 26.0 - 34.0 PG    MCHC 30.1 30.0 - 36.5 g/dL    RDW 23.5 (H) 11.5 - 14.5 %    PLATELET 655 (H) 714 - 400 K/uL    MPV 9.1 8.9 - 12.9 FL    NRBC 0.0 0  WBC    ABSOLUTE NRBC 0.00 0.00 - 0.01 K/uL    NEUTROPHILS 86 (H) 32 - 75 %    LYMPHOCYTES 11 (L) 12 - 49 %    MONOCYTES 1 (L) 5 - 13 %    EOSINOPHILS 1 0 - 7 %    BASOPHILS 1 0 - 1 %    IMMATURE GRANULOCYTES 0 0.0 - 0.5 %    ABS. NEUTROPHILS 8.5 (H) 1.8 - 8.0 K/UL    ABS. LYMPHOCYTES 1.1 0.8 - 3.5 K/UL    ABS. MONOCYTES 0.1 0.0 - 1.0 K/UL    ABS. EOSINOPHILS 0.1 0.0 - 0.4 K/UL    ABS. BASOPHILS 0.1 0.0 - 0.1 K/UL    ABS. IMM.  GRANS. 0.0 0.00 - 0.04 K/UL    DF SMEAR SCANNED      RBC COMMENTS ANISOCYTOSIS  2+        RBC COMMENTS OVALOCYTES  PRESENT        RBC COMMENTS MICROCYTOSIS  1+       MAGNESIUM    Collection Time: 05/01/18  2:12 PM   Result Value Ref Range    Magnesium 2.3 1.6 - 2.4 mg/dL   METABOLIC PANEL, COMPREHENSIVE    Collection Time: 05/01/18  2:12 PM   Result Value Ref Range    Sodium 139 136 - 145 mmol/L    Potassium 3.7 3.5 - 5.1 mmol/L    Chloride 104 97 - 108 mmol/L    CO2 28 21 - 32 mmol/L Anion gap 7 5 - 15 mmol/L    Glucose 122 (H) 65 - 100 mg/dL    BUN 10 6 - 20 MG/DL    Creatinine 0.88 0.70 - 1.30 MG/DL    BUN/Creatinine ratio 11 (L) 12 - 20      GFR est AA >60 >60 ml/min/1.73m2    GFR est non-AA >60 >60 ml/min/1.73m2    Calcium 9.6 8.5 - 10.1 MG/DL    Bilirubin, total 0.5 0.2 - 1.0 MG/DL    ALT (SGPT) 14 12 - 78 U/L    AST (SGOT) 13 (L) 15 - 37 U/L    Alk. phosphatase 151 (H) 45 - 117 U/L    Protein, total 9.0 (H) 6.4 - 8.2 g/dL    Albumin 3.7 3.5 - 5.0 g/dL    Globulin 5.3 (H) 2.0 - 4.0 g/dL    A-G Ratio 0.7 (L) 1.1 - 2.2     TROPONIN I    Collection Time: 05/01/18  2:12 PM   Result Value Ref Range    Troponin-I, Qt. <0.04 <0.05 ng/mL   NT-PRO BNP    Collection Time: 05/01/18  2:12 PM   Result Value Ref Range    NT pro-BNP 1711 (H) 0 - 125 PG/ML   URINALYSIS W/ RFLX MICROSCOPIC    Collection Time: 05/01/18  3:16 PM   Result Value Ref Range    Color YELLOW/STRAW      Appearance CLEAR CLEAR      Specific gravity 1.007 1.003 - 1.030      pH (UA) 5.5 5.0 - 8.0      Protein NEGATIVE  NEG mg/dL    Glucose NEGATIVE  NEG mg/dL    Ketone NEGATIVE  NEG mg/dL    Bilirubin NEGATIVE  NEG      Blood NEGATIVE  NEG      Urobilinogen 0.2 0.2 - 1.0 EU/dL    Nitrites NEGATIVE  NEG      Leukocyte Esterase TRACE (A) NEG      WBC 0-4 0 - 4 /hpf    RBC 0-5 0 - 5 /hpf    Epithelial cells FEW FEW /lpf    Bacteria NEGATIVE  NEG /hpf    Hyaline cast 2-5 0 - 5 /lpf   URINE CULTURE HOLD SAMPLE    Collection Time: 05/01/18  3:16 PM   Result Value Ref Range    Urine culture hold        URINE ON HOLD IN MICROBIOLOGY DEPT FOR 3 DAYS. IF UNPRESERVED URINE IS SUBMITTED, IT CANNOT BE USED FOR ADDITIONAL TESTING AFTER 24 HRS, RECOLLECTION WILL BE REQUIRED. Imaging:   CXR reviewed by me personally = yes no aucte findings ;  ECG reviewed by me personally = yes as above no signifincant changes , see my inter above ; Old records:   Old records reviewed: = yes last admits ;    Assessment: and Plan Impression    · Cp chest wall likely  Treat the vol overload, con home narcotic regimen   · Vol overload, does not clinically seem to be failure as no rales/wheezing, , 02 sats stable on bedside 02 monitor at 90 + in smoker on home 02 at 3 liters, same percent used here . Will cont on home lasix, and increased the spironolactone. · Lymphedema, for on going wound care  · Active tobacco abuse, for nicotine  · Chronic syst heart failure  · Copd, cont nebs, pt is not in resp distress and on appreciable wheeziong on exam but strong smell of smoke on personage. · depresson  Cion ssri of record  · Chronic pain ;           Code: [id number]   PPX:  Heparin   Ambulation status PTA:  y  Expected DC:  1 day  Come form:  Home   Will need PT:  Pending progresss  Will need OT:  no  Will need Speech:  no  Will need Case: pending finding of needs.                 Signed By: Sona Stewart MD     May 1, 2018

## 2018-05-02 NOTE — PROGRESS NOTES
Hospitalist Progress Note  Tessa Guardado MD  Answering service: [phone] OR [phone] from in house phone        Date of Service:  2018  NAME:  Ama Villalta  :  1960  MRN:  [id number]      Admission Summary:   Amelia Bryant is a 59 y.o. male who presents with: claims sob, in setting of chronic syst heart failure and on appropriate meds but no BB 2n2 RV dysfunction and is on ace ( but did not fill the authorized Entresto), and diuretic both lasix and low dose spironolactone and is followed at lymphedema clinic, and who also c/o cp CP this AM. \"Describes the pain as sharp pressure and is a 9/10. Pain is on the R and L side of his sternum and worse with inspiration. \" , and pain is in setting of chronic pain management. , pt is placed in obs as only the bnp is up over baselilne and his 02 sats are ok on home 02 settings, pt when to lymphedema clinic this am. Will ask wound to change out dressing in am . But:  no nausea, vomiting, diarrhea,  , no seizures, no LOC episodes, no fever, no chills. Interval history / Subjective:     Currently, he is awake and alert. Major complaints are with his lower extremities. Chest pain has resolved. Nonproductive cough and continues to smoke. He reports noticing that he lower extremities are increasing in girth. Repots that he weight is up, but denies any change in his sputum production. Assessment & Plan:     *Impression     · Cp chest wall likely  Treat the vol overload, con home narcotic regimen   · Ruled out as an ACS and chest pain has resolved  · Vol overload, does not clinically seem to be failure as no rales/wheezing, , 02 sats stable on bedside 02 monitor at 90 + in smoker on home 02 at 3 liters, same percent used here . Will cont on home lasix, and increased the spironolactone. · No evidence of active cardiac decompensation  · Most recent echo in February of this year: Left ventricle:  The ventricle was dilated. Systolic function was  moderately to markedly reduced. Ejection fraction was estimated in the range of 30 % to 35 %. Suboptimal endocardial visualization limits wall motion analysis. · Lymphedema, for on going wound care - wound care has seen the patient and appreciate the recommendations  · Active tobacco abuse, for nicotine  · Chronic syst heart failure  · Copd, cont nebs, pt is not in resp distress and on appreciable wheeziong on exam but strong smell of smoke on personage. · depresson  Cion ssri of record  Chronic pain ;       Code status: full  DVT prophylaxis: heparin    Care Plan discussed with: Patient/Family  Disposition: Home w/Family and TBD     Hospital Problems  Date Reviewed: 3/13/2018          Codes Class Noted POA    SOB (shortness of breath) ICD-10-CM: R06.02  ICD-9-CM: 786.05  5/1/2018 Unknown                Review of Systems:   A comprehensive review of systems was negative except for that written in the HPI. Vital Signs:    Last 24hrs VS reviewed since prior progress note. Most recent are:  Visit Vitals    /61 (BP 1 Location: Left arm, BP Patient Position: At rest)    Pulse 91    Temp 97.6 °F (36.4 °C)    Resp 20    Ht 6' 3\" (1.905 m)    Wt 119.9 kg (264 lb 6.4 oz)    SpO2 91%    BMI 33.05 kg/m2         Intake/Output Summary (Last 24 hours) at 05/02/18 1624  Last data filed at 05/02/18 1358   Gross per 24 hour   Intake              200 ml   Output             1100 ml   Net             -900 ml        Physical Examination:             Constitutional:  No acute distress, cooperative, pleasant    ENT:  Oral mucous moist, oropharynx benign. Neck supple,    Resp:  CTA bilaterally. No wheezing/rhonchi/rales. No accessory muscle use   CV:  Regular rhythm, normal rate, grade 2/6 murmur; no gallops, rubs    GI:  Soft, non distended, non tender.  normoactive bowel sounds, no hepatosplenomegaly     Musculoskeletal:  marked 3+ edema bilaterally of the lower extremities; erythema present bilaterally in the lower extremities with stage 2 and 3 wounds draining. No foul smelling lesions. Neurologic:  Moves all extremities. AAOx3, CN II-XII reviewed            Data Review:    Review and/or order of clinical lab test      Labs:     Recent Labs      05/02/18   0327  05/01/18   1412   WBC  9.4  9.9   HGB  11.9*  12.3   HCT  38.5  40.9   PLT  396  401*     Recent Labs      05/02/18   0327  05/01/18   1412   NA  137  139   K  4.0  3.7   CL  102  104   CO2  28  28   BUN  17  10   CREA  1.00  0.88   GLU  167*  122*   CA  9.3  9.6   MG  2.0  2.3     Recent Labs      05/01/18   1412   SGOT  13*   ALT  14   AP  151*   TBILI  0.5   TP  9.0*   ALB  3.7   GLOB  5.3*     No results for input(s): INR, PTP, APTT in the last 72 hours. No lab exists for component: INREXT   No results for input(s): FE, TIBC, PSAT, FERR in the last 72 hours. Lab Results   Component Value Date/Time    Folate 7.9 02/27/2018 03:52 AM    Folate 7.5 02/27/2018 03:52 AM      No results for input(s): PH, PCO2, PO2 in the last 72 hours.   Recent Labs      05/01/18   1412   TROIQ  <0.04     Lab Results   Component Value Date/Time    Cholesterol, total 284 (H) 05/21/2014 09:59 AM    HDL Cholesterol 23 05/21/2014 09:59 AM    LDL,Direct 193 (H) 05/21/2014 09:59 AM    LDL, calculated Not calculated due to elevated triglyceride level 05/21/2014 09:59 AM    Triglyceride 482 (H) 05/21/2014 09:59 AM    CHOL/HDL Ratio 12.3 (H) 05/21/2014 09:59 AM     Lab Results   Component Value Date/Time    Glucose (POC) 102 (H) 03/03/2018 11:52 AM    Glucose (POC) 108 (H) 03/03/2018 06:36 AM    Glucose (POC) 142 (H) 03/02/2018 09:17 PM    Glucose (POC) 149 (H) 03/02/2018 05:26 PM    Glucose (POC) 106 (H) 03/02/2018 12:15 PM     Lab Results   Component Value Date/Time    Color YELLOW/STRAW 05/01/2018 03:16 PM    Appearance CLEAR 05/01/2018 03:16 PM    Specific gravity 1.007 05/01/2018 03:16 PM    pH (UA) 5.5 05/01/2018 03:16 PM Protein NEGATIVE  05/01/2018 03:16 PM    Glucose NEGATIVE  05/01/2018 03:16 PM    Ketone NEGATIVE  05/01/2018 03:16 PM    Bilirubin NEGATIVE  05/01/2018 03:16 PM    Urobilinogen 0.2 05/01/2018 03:16 PM    Nitrites NEGATIVE  05/01/2018 03:16 PM    Leukocyte Esterase TRACE (A) 05/01/2018 03:16 PM    Epithelial cells FEW 05/01/2018 03:16 PM    Bacteria NEGATIVE  05/01/2018 03:16 PM    WBC 0-4 05/01/2018 03:16 PM    RBC 0-5 05/01/2018 03:16 PM         Medications Reviewed:     Current Facility-Administered Medications   Medication Dose Route Frequency    pantothenic ac-min oil-pet,hyd (AQUAPHOR) 41 % ointment   Topical PRN    isosorbide mononitrate ER (IMDUR) tablet 60 mg  60 mg Oral DAILY    zolpidem (AMBIEN) tablet 10 mg  10 mg Oral QHS    morphine CR (MS CONTIN) tablet 60 mg  60 mg Oral Q8H    HYDROmorphone (DILAUDID) tablet 4 mg  4 mg Oral BID    rOPINIRole (REQUIP) tablet 8 mg  8 mg Oral QHS    clopidogrel (PLAVIX) tablet 75 mg  75 mg Oral DAILY    atorvastatin (LIPITOR) tablet 80 mg  80 mg Oral DAILY    metFORMIN (GLUCOPHAGE) tablet 1,000 mg  1,000 mg Oral BID WITH MEALS    aspirin delayed-release tablet 81 mg  81 mg Oral DAILY    citalopram (CELEXA) tablet 40 mg  40 mg Oral DAILY    nitroglycerin (NITROSTAT) tablet 0.4 mg  0.4 mg SubLINGual Q5MIN PRN    nortriptyline (PAMELOR) capsule 50 mg  50 mg Oral QHS    docusate sodium (COLACE) capsule 100 mg  100 mg Oral BID    furosemide (LASIX) tablet 60 mg  60 mg Oral BID    spironolactone (ALDACTONE) tablet 50 mg  50 mg Oral BID    albuterol-ipratropium (DUO-NEB) 2.5 MG-0.5 MG/3 ML  3 mL Nebulization Q4H RT    metoprolol succinate (TOPROL-XL) XL tablet 25 mg  25 mg Oral DAILY    lisinopril (PRINIVIL, ZESTRIL) tablet 2.5 mg  2.5 mg Oral DAILY    ferrous sulfate tablet 325 mg  325 mg Oral ACB    sodium chloride (NS) flush 5-10 mL  5-10 mL IntraVENous Q8H    sodium chloride (NS) flush 5-10 mL  5-10 mL IntraVENous PRN    LORazepam (ATIVAN) tablet 0.5 mg  0.5 mg Oral BID PRN    heparin (porcine) injection 5,000 Units  5,000 Units SubCUTAneous Q8H    nicotine (NICODERM CQ) 21 mg/24 hr patch 1 Patch  1 Patch TransDERmal DAILY    acetaminophen (TYLENOL) tablet 650 mg  650 mg Oral Q4H PRN    ketorolac (TORADOL) injection 30 mg  30 mg IntraVENous Q6H PRN    pantoprazole (PROTONIX) tablet 40 mg  40 mg Oral ACB    albuterol (PROVENTIL VENTOLIN) nebulizer solution 2.5 mg  2.5 mg Nebulization Q4H PRN    arformoterol (BROVANA) neb solution 15 mcg  15 mcg Nebulization BID RT    And    budesonide (PULMICORT) 500 mcg/2 ml nebulizer suspension  500 mcg Nebulization BID RT    nicotine (NICODERM CQ) 14 mg/24 hr patch 1 Patch  1 Patch TransDERmal Q24H     ______________________________________________________________________  EXPECTED LENGTH OF STAY:  ACTUAL LENGTH OF STAY:          0                 Naif Raines MD

## 2018-05-02 NOTE — PROGRESS NOTES
Patient arrived from the ED at [address] [place] [address] he is AOX4, VSS, he was given a frozen meal since the cafeteria was closed. He is being given Lasix to diurese him. He has a bandage on his right leg from his ankle to his calf for a wound and a bandage on his right foot up to his right calf for a wound it is red swollen and warm. He has been being treated for this prior to admission. Will continue to monitor patient for changes in his condition. 2110 Paged hospitalist per patient's request for a nicotine patch. Dr Geronimo Seats called back and a nicotine patch order was given    14000 13 [phone] 83 patient is resting on the side of the bed he is ready to lay down for the night. Will continue to monitor patient for changes in his condition. 1346 patient is resting in bed he complains of restless leg syndrome. Blood drawn and sent to lab.    0730 Bedside and Verbal shift change report given to Genesis Larson RN  (oncoming nurse) by Gosia Brown RN  (offgoing nurse). Report included the following information SBAR, MAR, Recent Results and Med Rec Status.

## 2018-05-02 NOTE — PROGRESS NOTES
Reviewed medical chart; met with the patient at the bedside. Patient is being seen for shortness of breath. Patient lives with his [employer] Foot, Kelsey Lujan, R#[phone]. He uses a cane to ambulate indoors, and a rolling walker to ambulate outside of the home. He is on home oxygen through [employer]. He also has a nebulizer at home. He has a PCP through [facility], Dr. Silvestre Nuno and gets his prescriptions at [facility] as well. He visits the [facility] facility 3 times per week for dressing changes to his legs. His fiance assists him with dressing changes on the off days. The patient has transportation assistance through [facility] to and from appointments. Patient has received home health through [facility] in the past.  He states that he has a limited form of Medicaid that only covers his monthly insurance premium. Care Management will continue to follow his disposition. LAUREN Kaur    Care Management Interventions  PCP Verified by CM:  Yes  Palliative Care Criteria Met (RRAT>21 & CHF Dx)?: No  Mode of Transport at Discharge:  (Patient will travel home via ambulance.  )  Transition of Care Consult (CM Consult): Discharge Planning  MyChart Signup: No  Discharge Durable Medical Equipment: No  Physical Therapy Consult: No  Occupational Therapy Consult: No  Speech Therapy Consult: No  Current Support Network: Lives with Spouse (Patient lives with his fiance.  )  Confirm Follow Up Transport:  (Patient will travel home via ambulance.  )  Plan discussed with Pt/Family/Caregiver: Yes  Freedom of Choice Offered: Yes  Discharge Location  Discharge Placement: Home with family assistance

## 2018-05-02 NOTE — WOUND CARE
WOCN Note:     New consult placed by Dr. Michelle Contreras for lymphedema needs. Seen on observation unit with Dr. Arely Ramachandran. Chart shows:  Admitted for SOB; history of CHF  WBC = 9.4  [facility] patient of Dr. Cipriano Watt and he reports he, his wife, and [facility] take care of his legs alternately. Assessment:   Patient is A&O x 4, continent and mobile - moved from sitting on side of bed to supine for wound care. Patient reports no pain. 1. POA, left lateral heel with deep, dry fissure = 3 x 0.1 x unknown depth cm  No weeping or bleeding, no purulent exudate. Aquaphor ointment applied. 2. POA, right medial heel with deep, dry fissure = 3 x 0.1 x unknown depth cm  No weeping or bleeding, no purulent exudate. Aquaphor ointment applied. 3. POA, bilateral lower legs with blanching erythema (more so on the left) with dry plaques mostly able to be cleaned away with warm soapy water which he tolerated well. Scattered areas of partial thickness erosions without odor or purulence. Scant amount of serous weeping. Aquaphor ointment applied. Wound Recommendations:    Aquaphor ointment to lower legs and feet twice daily. Please wrap left leg with dry ABD and stretch gauze to cover scattered erosions. KEEP LEGS ELEVATED. Discussed above plan with patient & Dr. Arely Ramachandran.      Transition of Care: Plan to follow weekly and as needed while admitted to hospital.     ASHUTOSH Gilbert, RN, [facility]  Certified Wound, Ostomy, Continence Nurse  office [phone]  pager [phone] or call  to page

## 2018-05-02 NOTE — NURSE NAVIGATOR
Chart reviewed by Heart Failure Nurse Navigator. Heart Failure database completed. EF:  30-35%  (echo dated 2/26/18)     ACEi/ARB: Lisinopril 2.5mg daily    BB: Toprol XL 25mg daily    Aldosterone Antagonist: Aldactone 50mg twice daily    CRT not indicated at this time. NYHA Functional Class not assigned. documentation requested for NYHA Functional Class via Provider message on the [address] [place] [address] Failure Teach Back in Patient Education. Heart Failure Avoiding Triggers on Discharge Instructions. Cardiologist: PAULO/Gonsalo-not consulted. Patient is Chick Terry patient.

## 2018-05-02 NOTE — PROGRESS NOTES
Reason for Admission:   Shortness of breath                RRAT Score: 22                 Resources/supports as identified by patient/family:   Support from his fiance                 Top Challenges facing patient (as identified by patient/family and CM):  Multiple health conditions. Finances/Medication cost?  Patient is on a limited budget. Transportation? Patient receives transportation to and from appointments at [facility]. Support system or lack thereof? Patient has support from his fiance. Living arrangements? Patient lives with his fiance. Self-care/ADLs/Cognition? Patient requires some assistance with ADLs. Patient has difficulty ambulating due to shortness of breath and leg wounds. Current Advanced Directive/Advance Care Plan:  Patient does not have an AMD on file. Plan for utilizing home health:  Patient stated that home health is not helpful at this time, as he visits [facility] days per [facility] for leg wound care and is not able to participate in PT at this time due to limited mobility. Likelihood of readmission: High                  Transition of Care Plan:  Return home with his fiance.

## 2018-05-02 NOTE — CARDIO/PULMONARY
Cardiac Rehab: Heart Failure education folder,  given to [employer]. Met with the pt to provide HF education. Educated using teach back method. Discussed diagnosis definition and assessed patient understanding. Reviewed importance of daily weight monitoring and Low Sodium diet (0683-0594 mg. Daily). Stressed the importance of reading food labels. Encouraged activity and rest periods within symptom limitations and as ordered by physician. Reviewed lasix purpose of medication, potential side effects, compliance, and what to do if dose is missed. Discussed importance of reporting signs and symptoms of exacerbation, and when to report them to the doctor, to prevent re-hospitalization. Magdalena Villalta was encouraged to keep all appointments with doctor. Smoking history assessed. Patient is a current smoker. Smoking Cessation Program link added to the AVS.     Discussed the Cardiac Rehab Program but with his COPD with home  o2 use and HF with minimal activity intolerance, he is high risk from  a safety prospective and increased debility.       Vianney Duffy RN

## 2018-05-03 NOTE — PROGRESS NOTES
Problem: Heart Failure: Day 1  Goal: *Anxiety reduced or absent  Outcome: Progressing Towards Goal  Patient educated on weight gain that contributes to fluid overload and diet    Comments: Educated on weighing himself daily with the same scales and same weight of clothing and signs to notify physician about

## 2018-05-03 NOTE — PROGRESS NOTES
Problem: Heart Failure: Day 3  Goal: *Demonstrates progressive activity  Outcome: Progressing Towards Goal  Weight control, pain control and diet control     Comments: Educated on signs and symptoms and ways to prevent fluid overload and wounds

## 2018-05-03 NOTE — PROGRESS NOTES
Problem: Falls - Risk of  Goal: *Absence of Falls  Document Jraad Fall Risk and appropriate interventions in the flowsheet. Outcome: Progressing Towards Goal  Fall Risk Interventions:  Mobility Interventions: Utilize walker, cane, or other assitive device         Medication Interventions: Patient to call before getting OOB                  Problem: Impaired Skin Integrity/Pressure Injury Treatment  Goal: *Prevention of pressure injury  Document Charlie Scale and appropriate interventions in the flowsheet. Outcome: Progressing Towards Goal  Pressure Injury Interventions: Activity Interventions: Increase time out of bed, Pressure redistribution bed/mattress(bed type)    Mobility Interventions: HOB 30 degrees or less, Pressure redistribution bed/mattress (bed type)                         Problem: Discharge Planning  Goal: *Discharge to safe environment  Outcome: Progressing Towards Goal  Patient educated on heart failure and ways to take care of himself at home with wife.     Comments: Call bell in reach room clutter free

## 2018-05-03 NOTE — PROGRESS NOTES
Hospitalist Progress Note  Terrance Ríos MD  Answering service: [phone] OR [phone] from in house phone        Date of Service:  5/3/2018  NAME:  Tre Villalta  :  1960  MRN:  [id number]      Admission Summary:   58 y/o M with PMH of CAD, CHF, oxygen dependent COPD, DM2 and HTN admitted on 18 for chest pain and SOB likely due to acute on chronic systolic CHF and COPD. Pt was apparently not complaint with taking his medications. Pt is still an active smoker. Since admission, pt has been started on diuretics and bronchodilators with improvement in his symptoms. Interval history / Subjective:   Pt seen and examined. Sitting in bed. Pt states his breathing is improved but not quite back to his baseline. He denies any chest pain. Assessment & Plan:     1. Chest pain:   -Atypical. Likely related to volume overload. Now resolved. -Ruled out for ACS with serial Butch and EKG. 2. Acute on chronic systolic CHF:   -Pt is improving.   -Will continue diuresis with Lasix, and Spironolactone. -Most recent echo in February of this year: Left ventricle: The ventricle was dilated. Systolic function was  moderately to markedly reduced. Ejection fraction was estimated in the range of 30 % to 35 %. Suboptimal endocardial visualization limits wall motion analysis. 3. Lymphedema:   -Continue wound care     4. Tobacco abuse:   -Continue Nicotine patch. 5. Depression:   -Continue Celexa. 6. Chronic pain:   -Continue pain meds prn.          Code status: full  DVT prophylaxis: heparin    Care Plan discussed with: Patient/Family  Disposition: Home w/Family and TBD     Hospital Problems  Date Reviewed: 3/13/2018          Codes Class Noted POA    SOB (shortness of breath) ICD-10-CM: R06.02  ICD-9-CM: 786.05  2018 Unknown                Review of Systems:   A comprehensive review of systems was negative except for that written in the HPI. Vital Signs:    Last 24hrs VS reviewed since prior progress note. Most recent are:  Visit Vitals    /57 (BP 1 Location: Left arm, BP Patient Position: Sitting)    Pulse (!) 104    Temp 98 °F (36.7 °C)    Resp 28    Ht 6' 3\" (1.905 m)    Wt 120.1 kg (264 lb 12.8 oz)    SpO2 93%    BMI 33.1 kg/m2         Intake/Output Summary (Last 24 hours) at 05/03/18 1928  Last data filed at 05/03/18 1852   Gross per 24 hour   Intake              240 ml   Output             3400 ml   Net            -3160 ml        Physical Examination:             Constitutional:  No acute distress, cooperative, pleasant    ENT:  Oral mucous moist, oropharynx benign. Neck supple,    Resp:  CTA bilaterally. No wheezing/rhonchi/rales. No accessory muscle use   CV:  Regular rhythm, normal rate, grade 2/6 murmur; no gallops, rubs    GI:  Soft, non distended, non tender. normoactive bowel sounds, no hepatosplenomegaly     Musculoskeletal:  marked 3+ edema bilaterally of the lower extremities; erythema present bilaterally in the lower extremities with stage 2 and 3 wounds draining. No foul smelling lesions. Neurologic:  Moves all extremities.   AAOx3, CN II-XII reviewed            Data Review:    Review and/or order of clinical lab test      Labs:     Recent Labs      05/03/18   0404  05/02/18   0327   WBC  12.5*  9.4   HGB  11.5*  11.9*   HCT  38.5  38.5   PLT  407*  396     Recent Labs      05/03/18   0404  05/02/18   0327  05/01/18   1412   NA  139  137  139   K  3.7  4.0  3.7   CL  102  102  104   CO2  29  28  28   BUN  17  17  10   CREA  1.02  1.00  0.88   GLU  96  167*  122*   CA  9.0  9.3  9.6   MG  1.8  2.0  2.3     Recent Labs      05/01/18   1412   SGOT  13*   ALT  14   AP  151*   TBILI  0.5   TP  9.0*   ALB  3.7   GLOB  5.3*         Lab Results   Component Value Date/Time    Folate 7.9 02/27/2018 03:52 AM    Folate 7.5 02/27/2018 03:52 AM        Recent Labs      05/01/18   1412   TROIQ  <0.04     Lab Results Component Value Date/Time    Cholesterol, total 284 (H) 05/21/2014 09:59 AM    HDL Cholesterol 23 05/21/2014 09:59 AM    LDL,Direct 193 (H) 05/21/2014 09:59 AM    LDL, calculated Not calculated due to elevated triglyceride level 05/21/2014 09:59 AM    Triglyceride 482 (H) 05/21/2014 09:59 AM    CHOL/HDL Ratio 12.3 (H) 05/21/2014 09:59 AM     Lab Results   Component Value Date/Time    Glucose (POC) 125 (H) 05/03/2018 04:55 PM    Glucose (POC) 139 (H) 05/03/2018 12:00 PM    Glucose (POC) 102 (H) 03/03/2018 11:52 AM    Glucose (POC) 108 (H) 03/03/2018 06:36 AM    Glucose (POC) 142 (H) 03/02/2018 09:17 PM     Lab Results   Component Value Date/Time    Color YELLOW/STRAW 05/01/2018 03:16 PM    Appearance CLEAR 05/01/2018 03:16 PM    Specific gravity 1.007 05/01/2018 03:16 PM    pH (UA) 5.5 05/01/2018 03:16 PM    Protein NEGATIVE  05/01/2018 03:16 PM    Glucose NEGATIVE  05/01/2018 03:16 PM    Ketone NEGATIVE  05/01/2018 03:16 PM    Bilirubin NEGATIVE  05/01/2018 03:16 PM    Urobilinogen 0.2 05/01/2018 03:16 PM    Nitrites NEGATIVE  05/01/2018 03:16 PM    Leukocyte Esterase TRACE (A) 05/01/2018 03:16 PM    Epithelial cells FEW 05/01/2018 03:16 PM    Bacteria NEGATIVE  05/01/2018 03:16 PM    WBC 0-4 05/01/2018 03:16 PM    RBC 0-5 05/01/2018 03:16 PM         Medications Reviewed:     Current Facility-Administered Medications   Medication Dose Route Frequency    pantothenic ac-min oil-pet,hyd (AQUAPHOR) 41 % ointment   Topical PRN    isosorbide mononitrate ER (IMDUR) tablet 60 mg  60 mg Oral DAILY    zolpidem (AMBIEN) tablet 10 mg  10 mg Oral QHS    morphine CR (MS CONTIN) tablet 60 mg  60 mg Oral Q8H    HYDROmorphone (DILAUDID) tablet 4 mg  4 mg Oral BID    rOPINIRole (REQUIP) tablet 8 mg  8 mg Oral QHS    clopidogrel (PLAVIX) tablet 75 mg  75 mg Oral DAILY    atorvastatin (LIPITOR) tablet 80 mg  80 mg Oral DAILY    metFORMIN (GLUCOPHAGE) tablet 1,000 mg  1,000 mg Oral BID WITH MEALS    aspirin delayed-release tablet 81 mg  81 mg Oral DAILY    citalopram (CELEXA) tablet 40 mg  40 mg Oral DAILY    nitroglycerin (NITROSTAT) tablet 0.4 mg  0.4 mg SubLINGual Q5MIN PRN    nortriptyline (PAMELOR) capsule 50 mg  50 mg Oral QHS    docusate sodium (COLACE) capsule 100 mg  100 mg Oral BID    furosemide (LASIX) tablet 60 mg  60 mg Oral BID    spironolactone (ALDACTONE) tablet 50 mg  50 mg Oral BID    albuterol-ipratropium (DUO-NEB) 2.5 MG-0.5 MG/3 ML  3 mL Nebulization Q4H RT    metoprolol succinate (TOPROL-XL) XL tablet 25 mg  25 mg Oral DAILY    lisinopril (PRINIVIL, ZESTRIL) tablet 2.5 mg  2.5 mg Oral DAILY    ferrous sulfate tablet 325 mg  325 mg Oral ACB    sodium chloride (NS) flush 5-10 mL  5-10 mL IntraVENous Q8H    sodium chloride (NS) flush 5-10 mL  5-10 mL IntraVENous PRN    LORazepam (ATIVAN) tablet 0.5 mg  0.5 mg Oral BID PRN    heparin (porcine) injection 5,000 Units  5,000 Units SubCUTAneous Q8H    nicotine (NICODERM CQ) 21 mg/24 hr patch 1 Patch  1 Patch TransDERmal DAILY    acetaminophen (TYLENOL) tablet 650 mg  650 mg Oral Q4H PRN    ketorolac (TORADOL) injection 30 mg  30 mg IntraVENous Q6H PRN    pantoprazole (PROTONIX) tablet 40 mg  40 mg Oral ACB    albuterol (PROVENTIL VENTOLIN) nebulizer solution 2.5 mg  2.5 mg Nebulization Q4H PRN    arformoterol (BROVANA) neb solution 15 mcg  15 mcg Nebulization BID RT    And    budesonide (PULMICORT) 500 mcg/2 ml nebulizer suspension  500 mcg Nebulization BID RT    nicotine (NICODERM CQ) 14 mg/24 hr patch 1 Patch  1 Patch TransDERmal Q24H     ______________________________________________________________________  EXPECTED LENGTH OF STAY:  ACTUAL LENGTH OF STAY:  2                 Adedoyin G.  Akintide, MD

## 2018-05-04 NOTE — PROGRESS NOTES
TRANSFER - OUT REPORT:    Verbal report given to harriett(name) on Juan Alberto Villalta  being transferred to [facility](unit) for routine progression of care       Report consisted of patients Situation, Background, Assessment and   Recommendations(SBAR). Information from the following report(s) Kardex, ED Summary, Procedure Summary, Intake/Output, MAR and Recent Results was reviewed with the receiving nurse. Lines:   Peripheral IV 05/01/18 Left Antecubital (Active)   Site Assessment Clean, dry, & intact 5/4/2018  8:36 AM   Phlebitis Assessment 0 5/4/2018  8:36 AM   Infiltration Assessment 0 5/4/2018  8:36 AM   Dressing Status Clean, dry, & intact 5/4/2018  8:36 AM   Dressing Type Transparent 5/4/2018  5:07 AM   Hub Color/Line Status Green;Capped 5/4/2018  5:07 AM   Alcohol Cap Used Yes 5/4/2018  5:07 AM        Opportunity for questions and clarification was provided.       Patient transported with:   [employer]

## 2018-05-04 NOTE — PROGRESS NOTES
Patient is resting sitting on the side of the bed. He has complaints of leg pain adri which is the same as he has normally. There is a dressing on his left lower leg and ankle clean dry and intact that will be changed tonight during my shift. The right leg the lower calf is red and warm and swollen but no weeping or drainage. Will continue to monitor patient for changes in his condition. 2115 left lower calf, ankle and foot dressing taken off washed with soap and water then applied Aquaphor to the entire lower leg, ankle and foot then redressed the leg with dry abd pads and wrapped it with gauze. I also washed the right lower leg, ankle, and foot and applied Aquaphor and then put on new clean skid socks. 2330 patient is sitting on the side of the bed getting ready to go down for the night. Still only complaint is lower leg pain. 0507 patient is resting in bed with eyes closed still complains of leg hurting medication given and blood drawn and sent to lab    0730 Bedside and Verbal shift change report given to Wendy Traore RN  (oncoming nurse) by Jony Platt RN  (offgoing nurse). Report included the following information SBAR, MAR, Recent Results and Med Rec Status.

## 2018-05-04 NOTE — PROGRESS NOTES
TRANSFER - IN REPORT:    Verbal report received fromSwetha(name) on Mamadou [address] Nine  being received from [facility](unit) for routine progression of care      Report consisted of patients Situation, Background, Assessment and   Recommendations(SBAR). Information from the following report(s) SBAR was reviewed with the receiving nurse. Opportunity for questions and clarification was provided. Assessment completed upon patients arrival to unit and care assumed.

## 2018-05-04 NOTE — PROGRESS NOTES
Hospitalist Progress Note  Terrance Ríos MD  Answering service: [phone] OR [phone] from in house phone        Date of Service:  2018  NAME:  Arash Villalta  :  1960  MRN:  [id number]      Admission Summary:   58 y/o M with PMH of CAD, CHF, oxygen dependent COPD, DM2 and HTN admitted on 18 for chest pain and SOB likely due to acute on chronic systolic CHF and COPD. Pt was apparently not complaint with taking his medications. Pt is still an active smoker. Since admission, pt has been started on diuretics and bronchodilators with improvement in his symptoms. Interval history / Subjective:   Pt seen and examined. Sitting in bed. Pt states his breathing is improved but not quite back to his baseline. He denies any chest pain. Assessment & Plan:     1. Chest pain:   -Atypical. Likely related to volume overload. Now resolved. -Ruled out for ACS with serial Butch and EKG. 2. Acute on chronic systolic CHF:   -Pt is improving.   -Will continue diuresis with Lasix, and Spironolactone. -Most recent echo in February of this year: Left ventricle: The ventricle was dilated. Systolic function was  moderately to markedly reduced. Ejection fraction was estimated in the range of 30 % to 35 %. Suboptimal endocardial visualization limits wall motion analysis. 3. Lymphedema:   -Continue wound care     4. Tobacco abuse:   -Continue Nicotine patch. 5. Depression:   -Continue Celexa. 6. Chronic pain:   -Continue pain meds prn.          Code status: full  DVT prophylaxis: heparin    Care Plan discussed with: Patient/Family  Disposition: Home w/Family and TBD     Hospital Problems  Date Reviewed: 3/13/2018          Codes Class Noted POA    SOB (shortness of breath) ICD-10-CM: R06.02  ICD-9-CM: 786.05  2018 Unknown                Review of Systems:   A comprehensive review of systems was negative except for that written in the HPI. Vital Signs:    Last 24hrs VS reviewed since prior progress note. Most recent are:  Visit Vitals    BP (P) 124/74    Pulse (P) 90    Temp (P) 98.2 °F (36.8 °C)    Resp (P) 18    Ht 6' 3\" (1.905 m)    Wt 119.6 kg (263 lb 9.6 oz)    SpO2 (P) 90%    BMI 32.95 kg/m2         Intake/Output Summary (Last 24 hours) at 05/04/18 1703  Last data filed at 05/04/18 1402   Gross per 24 hour   Intake              240 ml   Output             4050 ml   Net            -3810 ml        Physical Examination:             Constitutional:  No acute distress, cooperative, pleasant    ENT:  Oral mucous moist, oropharynx benign. Neck supple,    Resp:  Mild wheezing and rales. No accessory muscle use   CV:  Regular rhythm, normal rate, grade 2/6 murmur; no gallops, rubs    GI:  Soft, non distended, non tender. normoactive bowel sounds, no hepatosplenomegaly     Musculoskeletal:  marked 3+ edema bilaterally of the lower extremities; erythema present bilaterally in the lower extremities with stage 2 and 3 wounds draining. No foul smelling lesions. Neurologic:  Moves all extremities.   AAOx3, CN II-XII reviewed            Data Review:    Review and/or order of clinical lab test      Labs:     Recent Labs      05/04/18   0510  05/03/18   0404   WBC  10.6  12.5*   HGB  11.5*  11.5*   HCT  38.3  38.5   PLT  406*  407*     Recent Labs      05/04/18   0510  05/03/18   0404  05/02/18   0327   NA  136  139  137   K  3.8  3.7  4.0   CL  98  102  102   CO2  31  29  28   BUN  21*  17  17   CREA  1.00  1.02  1.00   GLU  116*  96  167*   CA  9.1  9.0  9.3   MG  1.5*  1.8  2.0         Lab Results   Component Value Date/Time    Folate 7.9 02/27/2018 03:52 AM    Folate 7.5 02/27/2018 03:52 AM          Lab Results   Component Value Date/Time    Cholesterol, total 284 (H) 05/21/2014 09:59 AM    HDL Cholesterol 23 05/21/2014 09:59 AM    LDL,Direct 193 (H) 05/21/2014 09:59 AM    LDL, calculated Not calculated due to elevated triglyceride level 05/21/2014 09:59 AM    Triglyceride 482 (H) 05/21/2014 09:59 AM    CHOL/HDL Ratio 12.3 (H) 05/21/2014 09:59 AM     Lab Results   Component Value Date/Time    Glucose (POC) 105 (H) 05/04/2018 11:20 AM    Glucose (POC) 103 (H) 05/03/2018 08:52 PM    Glucose (POC) 125 (H) 05/03/2018 04:55 PM    Glucose (POC) 139 (H) 05/03/2018 12:00 PM    Glucose (POC) 102 (H) 03/03/2018 11:52 AM     Lab Results   Component Value Date/Time    Color YELLOW/STRAW 05/01/2018 03:16 PM    Appearance CLEAR 05/01/2018 03:16 PM    Specific gravity 1.007 05/01/2018 03:16 PM    pH (UA) 5.5 05/01/2018 03:16 PM    Protein NEGATIVE  05/01/2018 03:16 PM    Glucose NEGATIVE  05/01/2018 03:16 PM    Ketone NEGATIVE  05/01/2018 03:16 PM    Bilirubin NEGATIVE  05/01/2018 03:16 PM    Urobilinogen 0.2 05/01/2018 03:16 PM    Nitrites NEGATIVE  05/01/2018 03:16 PM    Leukocyte Esterase TRACE (A) 05/01/2018 03:16 PM    Epithelial cells FEW 05/01/2018 03:16 PM    Bacteria NEGATIVE  05/01/2018 03:16 PM    WBC 0-4 05/01/2018 03:16 PM    RBC 0-5 05/01/2018 03:16 PM         Medications Reviewed:     Current Facility-Administered Medications   Medication Dose Route Frequency    pantothenic ac-min oil-pet,hyd (AQUAPHOR) 41 % ointment   Topical PRN    isosorbide mononitrate ER (IMDUR) tablet 60 mg  60 mg Oral DAILY    zolpidem (AMBIEN) tablet 10 mg  10 mg Oral QHS    morphine CR (MS CONTIN) tablet 60 mg  60 mg Oral Q8H    HYDROmorphone (DILAUDID) tablet 4 mg  4 mg Oral BID    rOPINIRole (REQUIP) tablet 8 mg  8 mg Oral QHS    clopidogrel (PLAVIX) tablet 75 mg  75 mg Oral DAILY    atorvastatin (LIPITOR) tablet 80 mg  80 mg Oral DAILY    metFORMIN (GLUCOPHAGE) tablet 1,000 mg  1,000 mg Oral BID WITH MEALS    aspirin delayed-release tablet 81 mg  81 mg Oral DAILY    citalopram (CELEXA) tablet 40 mg  40 mg Oral DAILY    nitroglycerin (NITROSTAT) tablet 0.4 mg  0.4 mg SubLINGual Q5MIN PRN    nortriptyline (PAMELOR) capsule 50 mg  50 mg Oral QHS    docusate sodium (COLACE) capsule 100 mg  100 mg Oral BID    furosemide (LASIX) tablet 60 mg  60 mg Oral BID    spironolactone (ALDACTONE) tablet 50 mg  50 mg Oral BID    albuterol-ipratropium (DUO-NEB) 2.5 MG-0.5 MG/3 ML  3 mL Nebulization Q4H RT    metoprolol succinate (TOPROL-XL) XL tablet 25 mg  25 mg Oral DAILY    lisinopril (PRINIVIL, ZESTRIL) tablet 2.5 mg  2.5 mg Oral DAILY    ferrous sulfate tablet 325 mg  325 mg Oral ACB    sodium chloride (NS) flush 5-10 mL  5-10 mL IntraVENous Q8H    sodium chloride (NS) flush 5-10 mL  5-10 mL IntraVENous PRN    LORazepam (ATIVAN) tablet 0.5 mg  0.5 mg Oral BID PRN    heparin (porcine) injection 5,000 Units  5,000 Units SubCUTAneous Q8H    nicotine (NICODERM CQ) 21 mg/24 hr patch 1 Patch  1 Patch TransDERmal DAILY    acetaminophen (TYLENOL) tablet 650 mg  650 mg Oral Q4H PRN    ketorolac (TORADOL) injection 30 mg  30 mg IntraVENous Q6H PRN    pantoprazole (PROTONIX) tablet 40 mg  40 mg Oral ACB    albuterol (PROVENTIL VENTOLIN) nebulizer solution 2.5 mg  2.5 mg Nebulization Q4H PRN    arformoterol (BROVANA) neb solution 15 mcg  15 mcg Nebulization BID RT    And    budesonide (PULMICORT) 500 mcg/2 ml nebulizer suspension  500 mcg Nebulization BID RT    nicotine (NICODERM CQ) 14 mg/24 hr patch 1 Patch  1 Patch TransDERmal Q24H     ______________________________________________________________________  EXPECTED LENGTH OF STAY:  ACTUAL LENGTH OF STAY:  2                 Terrance Ríos MD

## 2018-05-05 NOTE — PROGRESS NOTES
Bedside shift change report given to Lou Kennedy RN (oncoming nurse) by Kendall Hayden RN (offgoing nurse). Report included the following information SBAR, Kardex, MAR and Recent Results. 2210: Pt's BS-111. Per order, no bedtime correctional insulin due at this time. Will continue to monitor.

## 2018-05-05 NOTE — PROGRESS NOTES
ADULT PROTOCOL: JET AEROSOL ASSESSMENT    Patient  Padma Villalta     59 y.o.   male     5/5/2018  6:30 PM    Breath Sounds Pre Procedure: Right Breath Sounds: Diminished                               Left Breath Sounds: Diminished    Breath Sounds Post Procedure: Right Breath Sounds: Diminished                                 Left Breath Sounds: Diminished    Breathing pattern: Pre procedure Breathing Pattern: Regular          Post procedure Breathing Pattern: Regular    Heart Rate: Pre procedure Pulse: 94           Post procedure Pulse: 91    Resp Rate: Pre procedure Respirations: 20           Cough: Pre procedure Cough: Non-productive      Oxygen: O2 Device: Nasal cannula   3L     Changed: NO    SpO2: Pre procedure SpO2: 94 %   with oxygen              Post procedure SpO2: 94 %  with oxygen    Nebulizer Therapy: Current medications Aerosolized Medications: DuoNeb      Changed: YES  To PRN      Problem List:   Patient Active Problem List   Diagnosis Code    Coronary atherosclerosis of native coronary artery I25.10    Other primary cardiomyopathies I42.8    Chronic systolic heart failure (HCC) I50.22    Chest pain R07.9    Hematemesis K92.0    Rectal bleeding K62.5    Obstructive sleep apnea (adult) (pediatric) G47.33    Chronic airway obstruction, not elsewhere classified J44.9    Essential hypertension, benign I10    Type II or unspecified type diabetes mellitus without mention of complication, not stated as uncontrolled E11.9    Coronary artery disease I25.10    Morbid obesity ([address] E66.01    COPD with exacerbation ([address] A61.2    Systolic CHF, acute (HCC) Z82.78    Acute systolic CHF (congestive heart failure) (HCC) I50.21    SOB (shortness of breath) R06.02       Respiratory Therapist: Rene Fletcher, RT

## 2018-05-05 NOTE — PROGRESS NOTES
Hospitalist Progress Note  Terrance Ríos MD  Answering service: [phone] OR [phone] from in house phone        Date of Service:  2018  NAME:  Lita Villalta  :  1960  MRN:  [id number]      Admission Summary:   60 y/o M with PMH of CAD, CHF, oxygen dependent COPD, DM2 and HTN admitted on 18 for chest pain and SOB likely due to acute on chronic systolic CHF and COPD. Pt was apparently not complaint with taking his medications. Pt is still an active smoker. Since admission, pt has been started on diuretics and bronchodilators with improvement in his symptoms. Interval history / Subjective:   Pt seen and examined. Pt lying in bed. Pt states he is still not feeling as good today. He denies any chest pain. Had some bouts of cough during my eval.     Assessment & Plan:     1. Chest pain:   -Atypical. Likely related to volume overload. Now resolved. -Ruled out for ACS with serial Butch and EKG. 2. Acute on chronic systolic CHF:   -Pt is improving.   -Will continue diuresis with Lasix, and Spironolactone. -Most recent echo in February of this year: Left ventricle: The ventricle was dilated. Systolic function was  moderately to markedly reduced. Ejection fraction was estimated in the range of 30 % to 35 %. Suboptimal endocardial visualization limits wall motion analysis. 3. COPD exacerbation:   -Will continue pt on nebs and LABA. -Oxygen requirement is at his baseline of 3 LPM.   -Will add po steroids. 4. Lymphedema:   -Continue wound care     5. Tobacco abuse:   -Continue Nicotine patch. 6. Depression:   -Continue Celexa. 7. Chronic pain:   -Continue pain meds prn.          Code status: full  DVT prophylaxis: heparin    Care Plan discussed with: Patient/Family  Disposition: Home w/Family and TBD     Hospital Problems  Date Reviewed: 3/13/2018          Codes Class Noted POA    SOB (shortness of breath) ICD-10-CM: R06.02  ICD-9-CM: 786.05  5/1/2018 Unknown                Review of Systems:   A comprehensive review of systems was negative except for that written in the HPI. Vital Signs:    Last 24hrs VS reviewed since prior progress note. Most recent are:  Visit Vitals    /68    Pulse 94    Temp 98.2 °F (36.8 °C)    Resp 18    Ht 6' 3\" (1.905 m)    Wt 117 kg (257 lb 15 oz)    SpO2 94%    BMI 32.24 kg/m2         Intake/Output Summary (Last 24 hours) at 05/05/18 1631  Last data filed at 05/05/18 1033   Gross per 24 hour   Intake                0 ml   Output             3400 ml   Net            -3400 ml        Physical Examination:             Constitutional:  No acute distress, cooperative, pleasant    ENT:  Oral mucous moist, oropharynx benign. Neck supple,    Resp:  Mild wheezing and rales. No accessory muscle use   CV:  Regular rhythm, normal rate, grade 2/6 murmur; no gallops, rubs    GI:  Soft, non distended, non tender. normoactive bowel sounds, no hepatosplenomegaly     Musculoskeletal:  marked 3+ edema bilaterally of the lower extremities; erythema present bilaterally in the lower extremities with stage 2 and 3 wounds draining. No foul smelling lesions. Neurologic:  Moves all extremities.   AAOx3, CN II-XII reviewed            Data Review:    Review and/or order of clinical lab test      Labs:     Recent Labs      05/05/18   0416  05/04/18   0510   WBC  8.3  10.6   HGB  11.6*  11.5*   HCT  38.1  38.3   PLT  392  406*     Recent Labs      05/05/18   0416  05/04/18   0510  05/03/18   0404   NA  135*  136  139   K  3.8  3.8  3.7   CL  94*  98  102   CO2  33*  31  29   BUN  20  21*  17   CREA  0.92  1.00  1.02   GLU  103*  116*  96   CA  9.1  9.1  9.0   MG   --   1.5*  1.8         Lab Results   Component Value Date/Time    Folate 7.9 02/27/2018 03:52 AM    Folate 7.5 02/27/2018 03:52 AM          Lab Results   Component Value Date/Time    Cholesterol, total 284 (H) 05/21/2014 09:59 AM    HDL Cholesterol 23 05/21/2014 09:59 AM    LDL,Direct 193 (H) 05/21/2014 09:59 AM    LDL, calculated Not calculated due to elevated triglyceride level 05/21/2014 09:59 AM    Triglyceride 482 (H) 05/21/2014 09:59 AM    CHOL/HDL Ratio 12.3 (H) 05/21/2014 09:59 AM     Lab Results   Component Value Date/Time    Glucose (POC) 98 05/05/2018 11:48 AM    Glucose (POC) 106 (H) 05/05/2018 06:43 AM    Glucose (POC) 111 (H) 05/04/2018 09:38 PM    Glucose (POC) 105 (H) 05/04/2018 11:20 AM    Glucose (POC) 103 (H) 05/03/2018 08:52 PM     Lab Results   Component Value Date/Time    Color YELLOW/STRAW 05/01/2018 03:16 PM    Appearance CLEAR 05/01/2018 03:16 PM    Specific gravity 1.007 05/01/2018 03:16 PM    pH (UA) 5.5 05/01/2018 03:16 PM    Protein NEGATIVE  05/01/2018 03:16 PM    Glucose NEGATIVE  05/01/2018 03:16 PM    Ketone NEGATIVE  05/01/2018 03:16 PM    Bilirubin NEGATIVE  05/01/2018 03:16 PM    Urobilinogen 0.2 05/01/2018 03:16 PM    Nitrites NEGATIVE  05/01/2018 03:16 PM    Leukocyte Esterase TRACE (A) 05/01/2018 03:16 PM    Epithelial cells FEW 05/01/2018 03:16 PM    Bacteria NEGATIVE  05/01/2018 03:16 PM    WBC 0-4 05/01/2018 03:16 PM    RBC 0-5 05/01/2018 03:16 PM         Medications Reviewed:     Current Facility-Administered Medications   Medication Dose Route Frequency    pantothenic ac-min oil-pet,hyd (AQUAPHOR) 41 % ointment   Topical PRN    isosorbide mononitrate ER (IMDUR) tablet 60 mg  60 mg Oral DAILY    zolpidem (AMBIEN) tablet 10 mg  10 mg Oral QHS    morphine CR (MS CONTIN) tablet 60 mg  60 mg Oral Q8H    HYDROmorphone (DILAUDID) tablet 4 mg  4 mg Oral BID    rOPINIRole (REQUIP) tablet 8 mg  8 mg Oral QHS    clopidogrel (PLAVIX) tablet 75 mg  75 mg Oral DAILY    atorvastatin (LIPITOR) tablet 80 mg  80 mg Oral DAILY    metFORMIN (GLUCOPHAGE) tablet 1,000 mg  1,000 mg Oral BID WITH MEALS    aspirin delayed-release tablet 81 mg  81 mg Oral DAILY    citalopram (CELEXA) tablet 40 mg  40 mg Oral DAILY    nitroglycerin (NITROSTAT) tablet 0.4 mg  0.4 mg SubLINGual Q5MIN PRN    nortriptyline (PAMELOR) capsule 50 mg  50 mg Oral QHS    docusate sodium (COLACE) capsule 100 mg  100 mg Oral BID    furosemide (LASIX) tablet 60 mg  60 mg Oral BID    spironolactone (ALDACTONE) tablet 50 mg  50 mg Oral BID    albuterol-ipratropium (DUO-NEB) 2.5 MG-0.5 MG/3 ML  3 mL Nebulization Q4H RT    metoprolol succinate (TOPROL-XL) XL tablet 25 mg  25 mg Oral DAILY    lisinopril (PRINIVIL, ZESTRIL) tablet 2.5 mg  2.5 mg Oral DAILY    ferrous sulfate tablet 325 mg  325 mg Oral ACB    sodium chloride (NS) flush 5-10 mL  5-10 mL IntraVENous Q8H    sodium chloride (NS) flush 5-10 mL  5-10 mL IntraVENous PRN    LORazepam (ATIVAN) tablet 0.5 mg  0.5 mg Oral BID PRN    heparin (porcine) injection 5,000 Units  5,000 Units SubCUTAneous Q8H    nicotine (NICODERM CQ) 21 mg/24 hr patch 1 Patch  1 Patch TransDERmal DAILY    acetaminophen (TYLENOL) tablet 650 mg  650 mg Oral Q4H PRN    ketorolac (TORADOL) injection 30 mg  30 mg IntraVENous Q6H PRN    pantoprazole (PROTONIX) tablet 40 mg  40 mg Oral ACB    albuterol (PROVENTIL VENTOLIN) nebulizer solution 2.5 mg  2.5 mg Nebulization Q4H PRN    arformoterol (BROVANA) neb solution 15 mcg  15 mcg Nebulization BID RT    And    budesonide (PULMICORT) 500 mcg/2 ml nebulizer suspension  500 mcg Nebulization BID RT    nicotine (NICODERM CQ) 14 mg/24 hr patch 1 Patch  1 Patch TransDERmal Q24H     ______________________________________________________________________  EXPECTED LENGTH OF STAY:  ACTUAL LENGTH OF STAY:  2                 Terrance Ríos MD

## 2018-05-05 NOTE — PROGRESS NOTES
Problem: Falls - Risk of  Goal: *Absence of Falls  Document Jarad Fall Risk and appropriate interventions in the flowsheet. Fall Risk Interventions:  Mobility Interventions: Communicate number of staff needed for ambulation/transfer, OT consult for ADLs, PT Consult for mobility concerns, PT Consult for assist device competence         Medication Interventions: Evaluate medications/consider consulting pharmacy, Patient to call before getting OOB, Teach patient to arise slowly, Utilize gait belt for transfers/ambulation    Elimination Interventions: Call light in reach, Urinal in reach             Problem: Impaired Skin Integrity/Pressure Injury Treatment  Goal: *Prevention of pressure injury  Document Charlie Scale and appropriate interventions in the flowsheet.    Outcome: Progressing Towards Goal  Pressure Injury Interventions:  Sensory Interventions: Float heels, Keep linens dry and wrinkle-free, Maintain/enhance activity level    Moisture Interventions: Apply protective barrier, creams and emollients, Limit adult briefs    Activity Interventions: Increase time out of bed, Pressure redistribution bed/mattress(bed type), PT/OT evaluation    Mobility Interventions: Float heels, HOB 30 degrees or less, Pressure redistribution bed/mattress (bed type), PT/OT evaluation    Nutrition Interventions: Document food/fluid/supplement intake    Friction and Shear Interventions: Foam dressings/transparent film/skin sealants, HOB 30 degrees or less, Lift sheet

## 2018-05-06 PROBLEM — E87.70 FLUID OVERLOAD: Status: ACTIVE | Noted: 2018-01-01

## 2018-05-06 NOTE — PROGRESS NOTES
Hospitalist Progress Note  Terrance Ríos MD  Answering service: [phone] OR [phone] from in house phone        Date of Service:  2018  NAME:  Jessica Villalta  :  1960  MRN:  [id number]      Admission Summary:   58 y/o M with PMH of CAD, CHF, oxygen dependent COPD, DM2 and HTN admitted on 18 for chest pain and SOB likely due to acute on chronic systolic CHF and COPD. Pt was apparently not complaint with taking his medications. Pt is still an active smoker. Since admission, pt has been started on diuretics and bronchodilators with some improvement in his symptoms. Interval history / Subjective:   Pt seen and examined. Pt is lying all curled up in bed. Pt states he is still not feeling good today. He denies any chest pain. Had some bouts of cough during my eval.     Assessment & Plan:     1. Chest pain:   -Atypical. Likely related to volume overload. Now resolved. -Ruled out for ACS with serial Butch and EKG. 2. Acute on chronic systolic CHF:   -Pt is improving.   -Will continue diuresis with Lasix, and Spironolactone. -Most recent echo in February of this year: Left ventricle: The ventricle was dilated. Systolic function was  moderately to markedly reduced. Ejection fraction was estimated in the range of 30 % to 35 %. Suboptimal endocardial visualization limits wall motion analysis. -Continue Coreg and Lisinopril. 3. COPD exacerbation:   -Will continue pt on nebs and LABA. -Oxygen requirement is at his baseline of 3 LPM.   -Continue Prednisone.   -Pt has already been counseled to quit smoking. 4. Lymphedema:   -Continue wound care     5. Tobacco abuse:   -Continue Nicotine patch. 6. Depression:   -Continue Celexa. 7. Chronic pain:   -Continue pain meds prn.          Code status: full  DVT prophylaxis: heparin    Care Plan discussed with: Patient/Family  Disposition: Home w/Family and TBD     Hospital Problems  Date Reviewed: 3/13/2018          Codes Class Noted POA    Fluid overload ICD-10-CM: E87.70  ICD-9-CM: 276.69  5/6/2018 Unknown        SOB (shortness of breath) ICD-10-CM: R06.02  ICD-9-CM: 786.05  5/1/2018 Unknown        COPD with exacerbation [facility]) ICD-10-CM: J44.1  ICD-9-CM: 491.21  5/24/2014 Unknown                Review of Systems:   A comprehensive review of systems was negative except for that written in the HPI. Vital Signs:    Last 24hrs VS reviewed since prior progress note. Most recent are:  Visit Vitals    /74    Pulse 90    Temp 97.8 °F (36.6 °C)    Resp 18    Ht 6' 3\" (1.905 m)    Wt 113.7 kg (250 lb 10.6 oz)    SpO2 90%    BMI 31.33 kg/m2         Intake/Output Summary (Last 24 hours) at 05/06/18 1425  Last data filed at 05/05/18 2313   Gross per 24 hour   Intake                0 ml   Output             1050 ml   Net            -1050 ml        Physical Examination:             Constitutional:  No acute distress, cooperative, pleasant    ENT:  Oral mucous moist, oropharynx benign. Neck supple,    Resp:  Mild wheezing and rales. No accessory muscle use   CV:  Regular rhythm, normal rate, grade 2/6 murmur; no gallops, rubs    GI:  Soft, non distended, non tender. normoactive bowel sounds, no hepatosplenomegaly     Musculoskeletal:  marked 3+ edema bilaterally of the lower extremities; erythema present bilaterally in the lower extremities with stage 2 and 3 wounds draining. No foul smelling lesions. Neurologic:  Moves all extremities.   AAOx3, CN II-XII reviewed            Data Review:    Review and/or order of clinical lab test      Labs:     Recent Labs      05/06/18 0523 05/05/18 0416   WBC  8.4  8.3   HGB  12.4  11.6*   HCT  40.0  38.1   PLT  446*  392     Recent Labs      05/06/18   0523  05/05/18   0416  05/04/18   0510   NA  131*  135*  136   K  4.3  3.8  3.8   CL  90*  94*  98   CO2  32  33*  31   BUN  23*  20  21*   CREA  0.99  0.92  1.00   GLU  155*  103* 116*   CA  10.6*  9.1  9.1   MG   --    --   1.5*         Lab Results   Component Value Date/Time    Folate 7.9 02/27/2018 03:52 AM    Folate 7.5 02/27/2018 03:52 AM          Lab Results   Component Value Date/Time    Cholesterol, total 284 (H) 05/21/2014 09:59 AM    HDL Cholesterol 23 05/21/2014 09:59 AM    LDL,Direct 193 (H) 05/21/2014 09:59 AM    LDL, calculated Not calculated due to elevated triglyceride level 05/21/2014 09:59 AM    Triglyceride 482 (H) 05/21/2014 09:59 AM    CHOL/HDL Ratio 12.3 (H) 05/21/2014 09:59 AM     Lab Results   Component Value Date/Time    Glucose (POC) 152 (H) 05/06/2018 07:11 AM    Glucose (POC) 217 (H) 05/05/2018 10:22 PM    Glucose (POC) 98 05/05/2018 11:48 AM    Glucose (POC) 106 (H) 05/05/2018 06:43 AM    Glucose (POC) 111 (H) 05/04/2018 09:38 PM     Lab Results   Component Value Date/Time    Color YELLOW/STRAW 05/01/2018 03:16 PM    Appearance CLEAR 05/01/2018 03:16 PM    Specific gravity 1.007 05/01/2018 03:16 PM    pH (UA) 5.5 05/01/2018 03:16 PM    Protein NEGATIVE  05/01/2018 03:16 PM    Glucose NEGATIVE  05/01/2018 03:16 PM    Ketone NEGATIVE  05/01/2018 03:16 PM    Bilirubin NEGATIVE  05/01/2018 03:16 PM    Urobilinogen 0.2 05/01/2018 03:16 PM    Nitrites NEGATIVE  05/01/2018 03:16 PM    Leukocyte Esterase TRACE (A) 05/01/2018 03:16 PM    Epithelial cells FEW 05/01/2018 03:16 PM    Bacteria NEGATIVE  05/01/2018 03:16 PM    WBC 0-4 05/01/2018 03:16 PM    RBC 0-5 05/01/2018 03:16 PM         Medications Reviewed:     Current Facility-Administered Medications   Medication Dose Route Frequency    predniSONE (DELTASONE) tablet 40 mg  40 mg Oral DAILY WITH BREAKFAST    pantothenic ac-min oil-pet,hyd (AQUAPHOR) 41 % ointment   Topical PRN    isosorbide mononitrate ER (IMDUR) tablet 60 mg  60 mg Oral DAILY    zolpidem (AMBIEN) tablet 10 mg  10 mg Oral QHS    morphine CR (MS CONTIN) tablet 60 mg  60 mg Oral Q8H    HYDROmorphone (DILAUDID) tablet 4 mg  4 mg Oral BID    rOPINIRole (REQUIP) tablet 8 mg  8 mg Oral QHS    clopidogrel (PLAVIX) tablet 75 mg  75 mg Oral DAILY    atorvastatin (LIPITOR) tablet 80 mg  80 mg Oral DAILY    metFORMIN (GLUCOPHAGE) tablet 1,000 mg  1,000 mg Oral BID WITH MEALS    aspirin delayed-release tablet 81 mg  81 mg Oral DAILY    citalopram (CELEXA) tablet 40 mg  40 mg Oral DAILY    nitroglycerin (NITROSTAT) tablet 0.4 mg  0.4 mg SubLINGual Q5MIN PRN    nortriptyline (PAMELOR) capsule 50 mg  50 mg Oral QHS    docusate sodium (COLACE) capsule 100 mg  100 mg Oral BID    furosemide (LASIX) tablet 60 mg  60 mg Oral BID    spironolactone (ALDACTONE) tablet 50 mg  50 mg Oral BID    metoprolol succinate (TOPROL-XL) XL tablet 25 mg  25 mg Oral DAILY    lisinopril (PRINIVIL, ZESTRIL) tablet 2.5 mg  2.5 mg Oral DAILY    ferrous sulfate tablet 325 mg  325 mg Oral ACB    sodium chloride (NS) flush 5-10 mL  5-10 mL IntraVENous Q8H    sodium chloride (NS) flush 5-10 mL  5-10 mL IntraVENous PRN    LORazepam (ATIVAN) tablet 0.5 mg  0.5 mg Oral BID PRN    heparin (porcine) injection 5,000 Units  5,000 Units SubCUTAneous Q8H    nicotine (NICODERM CQ) 21 mg/24 hr patch 1 Patch  1 Patch TransDERmal DAILY    acetaminophen (TYLENOL) tablet 650 mg  650 mg Oral Q4H PRN    ketorolac (TORADOL) injection 30 mg  30 mg IntraVENous Q6H PRN    pantoprazole (PROTONIX) tablet 40 mg  40 mg Oral ACB    albuterol (PROVENTIL VENTOLIN) nebulizer solution 2.5 mg  2.5 mg Nebulization Q4H PRN    arformoterol (BROVANA) neb solution 15 mcg  15 mcg Nebulization BID RT    And    budesonide (PULMICORT) 500 mcg/2 ml nebulizer suspension  500 mcg Nebulization BID RT     ______________________________________________________________________  EXPECTED LENGTH OF STAY:  ACTUAL LENGTH OF STAY:  2                 Terrance Ríos MD

## 2018-05-06 NOTE — PROGRESS NOTES
Problem: Falls - Risk of  Goal: *Absence of Falls  Document Jarad Fall Risk and appropriate interventions in the flowsheet. Outcome: Progressing Towards Goal  Fall Risk Interventions:  Mobility Interventions: Communicate number of staff needed for ambulation/transfer, Patient to call before getting OOB, PT Consult for mobility concerns, PT Consult for assist device competence         Medication Interventions: Evaluate medications/consider consulting pharmacy, Patient to call before getting OOB, Teach patient to arise slowly    Elimination Interventions: Call light in reach, Patient to call for help with toileting needs, Urinal in reach             Problem: Impaired Skin Integrity/Pressure Injury Treatment  Goal: *Prevention of pressure injury  Document Charlie Scale and appropriate interventions in the flowsheet.    Outcome: Progressing Towards Goal  Pressure Injury Interventions:  Sensory Interventions: Keep linens dry and wrinkle-free, Maintain/enhance activity level, Minimize linen layers    Moisture Interventions: Maintain skin hydration (lotion/cream), Minimize layers, Apply protective barrier, creams and emollients    Activity Interventions: Increase time out of bed, Pressure redistribution bed/mattress(bed type), PT/OT evaluation    Mobility Interventions: Float heels, HOB 30 degrees or less, Pressure redistribution bed/mattress (bed type), PT/OT evaluation    Nutrition Interventions: Document food/fluid/supplement intake    Friction and Shear Interventions: HOB 30 degrees or less

## 2018-05-06 NOTE — PROGRESS NOTES
Bedside shift change report given to WALTER Tripp (oncoming nurse) by Devora Castealn RN (offgoing nurse). Report included the following information SBAR, Kardex, MAR and Recent Results. 2200: Dressing change performed. Pt tolerated dressing change well.       2350: Pt's BS-217. Per hospitalist, no orders for sliding scale insulin at this time. Pt does take oral metformin before breakfast and dinner. Will recheck pt's BS in the morning. Will continue to monitor.

## 2018-05-07 NOTE — CDMP QUERY
Please clarify if this patient is (was) being treated/managed for:     => Acute Hyponatremia as evidenced by Sodium @ 131 on 05/06, in the setting of pt receiving diuretics  including Lasix , close monitoring of daily CMP   => Other explanation of clinical findings  => Clinically Undetermined (no explanation for clinical findings)    The medical record reflects the following clinical findings, treatment, and risk factors. Risk Factors:  Acute on Chronic CHF on diuretics  Clinical Indicators:  Sodium down to 131  Treatment: Close monitoring of CMP labs     Please clarify and document your clinical opinion in the progress notes and discharge summary including the definitive and/or presumptive diagnosis, (suspected or probable), related to the above clinical findings. Please include clinical findings supporting your diagnosis.     Thank you  DELMY McclainN, RN, [address]

## 2018-05-07 NOTE — PROGRESS NOTES
Hospital PCP [facility] follow-up appointment with Padma Carbone on Thursday May 10,2018. Nestor Goodman will call the patient on Thursday May 9,2018 to schedule appointment time.   Added to AVS.   Neftali Suarez CM Specialist

## 2018-05-07 NOTE — PROGRESS NOTES
Problem: Falls - Risk of  Goal: *Absence of Falls  Document Jarad Fall Risk and appropriate interventions in the flowsheet.    Outcome: Progressing Towards Goal  Fall Risk Interventions:  Mobility Interventions: Communicate number of staff needed for ambulation/transfer, Patient to call before getting OOB         Medication Interventions: Patient to call before getting OOB, Teach patient to arise slowly, Utilize gait belt for transfers/ambulation    Elimination Interventions: Patient to call for help with toileting needs

## 2018-05-07 NOTE — DISCHARGE INSTRUCTIONS
Discharge Instructions       PATIENT ID: Kylie Villalta  MRN: [id number]   YOB: 1960    DATE OF ADMISSION: 5/1/2018  1:32 PM    DATE OF DISCHARGE: 5/7/2018    PRIMARY CARE PROVIDER: Dc Alvarado MD     ATTENDING PHYSICIAN: Robert Go. MD Khushbu  DISCHARGING PROVIDER: Robert Ríos MD    To contact this individual call [phone] and ask the  to page. If unavailable ask to be transferred the Adult Hospitalist Department. DISCHARGE DIAGNOSES:  1. Chest pain  2. Acute on chronic systolic CHF   3. COPD exacerbation   4. Lymphedema  5. Tobacco abuse   6. Depression  7. Chronic pain       CONSULTATIONS: IP CONSULT TO HOSPITALIST    PROCEDURES/SURGERIES: * No surgery found *    PENDING TEST RESULTS:   At the time of discharge the following test results are still pending: None    FOLLOW UP APPOINTMENTS:   Follow-up Information     Follow up With Details Comments Contact Info    Dc Alvarado MD Go on 5/10/2018 [employer] will call the patient on May 9  with appointment time. Livia Barrios  [employer]  [phone]             ADDITIONAL CARE RECOMMENDATIONS: None    DIET: Cardiac Diet and Diabetic Diet    ACTIVITY: Activity as tolerated    WOUND CARE: Aquaphor ointment to lower legs and feet twice daily. Please wrap left leg with dry ABD and stretch gauze to cover scattered erosions. KEEP LEGS ELEVATED. EQUIPMENT needed: None      DISCHARGE MEDICATIONS:   See Medication Reconciliation Form    · It is important that you take the medication exactly as they are prescribed. · Keep your medication in the bottles provided by the pharmacist and keep a list of the medication names, dosages, and times to be taken in your wallet. · Do not take other medications without consulting your doctor. NOTIFY YOUR PHYSICIAN FOR ANY OF THE FOLLOWING:   Fever over 101 degrees for 24 hours.    Chest pain, shortness of breath, fever, chills, nausea, vomiting, diarrhea, change in mentation, falling, weakness, bleeding. Severe pain or pain not relieved by medications. Or, any other signs or symptoms that you may have questions about. DISPOSITION:   X Home With: Family   OT  PT  HH  RN       SNF/Inpatient Rehab/LTAC    Independent/assisted living    Hospice    Other:     CDMP Checked: Yes X     PROBLEM LIST Updated: Yes X       Signed:   John Aguilar. MD [place]  5/7/2018  12:39 PM            Smoking Cessation Program: This is a free, phone/text/email based, smoking cessation program. The program is individualized to meet each patient's needs. To enroll use the link [email or web address] or text Jack Harrison to [phone] from any smart phone.

## 2018-05-07 NOTE — DISCHARGE SUMMARY
Discharge Summary       PATIENT ID: Warden Rolly Villalta  MRN: [id number]   YOB: 1960    DATE OF ADMISSION: 5/1/2018  1:32 PM    DATE OF DISCHARGE: 5/7/2018  PRIMARY CARE PROVIDER: Luis Ricardo MD     ATTENDING PHYSICIAN: Thea Vizcarra. MD Khushbu  DISCHARGING PROVIDER: Thea Ríos MD    To contact this individual call [phone] and ask the  to page. If unavailable ask to be transferred the Adult Hospitalist Department. CONSULTATIONS: IP CONSULT TO HOSPITALIST    PROCEDURES/SURGERIES: * No surgery found *    ADMITTING DIAGNOSES & HOSPITAL COURSE:     As per Dr Richard Bowling H&P:     Chief Complaint:   Chest pain, SOB    HPI:   Beulah Hand is a 59 y.o. male who presents with: claims sob, in setting of chronic syst heart failure and on appropriate meds but no BB 2n2 RV dysfunction and is on ace ( but did not fill the authorized Entresto), and diuretic both lasix and low dose spironolactone and is followed at lymphedema clinic, and who also c/o cp CP this AM. \"Describes the pain as sharp pressure and is a 9/10. Pain is on the R and L side of his sternum and worse with inspiration. \" , and pain is in setting of chronic pain management. , pt is placed in obs as only the bnp is up over baselilne and his 02 sats are ok on home 02 settings, pt when to lymphedema clinic this am. Will ask wound to change out dressing in am . But:  no nausea, vomiting, diarrhea,  , no seizures, no LOC episodes, no fever, no chills. Pt was subsequently admitted to the hospitalist service and managed as below:       [id number] [employer] E / PLAN:      1. Chest pain:   -Atypical. Likely related to volume overload. Now resolved. -Pt was ruled out for ACS with serial Butch and EKG.      2. Acute on chronic systolic CHF:   -Pt was aggressively diuresed with Lasix, and Spironolactone. Now improved. -Most recent echo in February of this year: Left ventricle: The ventricle was dilated.  Systolic function was  moderately to markedly reduced. Ejection fraction was estimated in the range of 30 % to 35 %. Suboptimal endocardial visualization limits wall motion analysis. -Pt was also continued on his usual doses of Coreg and Lisinopril.      3. COPD exacerbation/Acute on chronic respiratory failure   -Pt was treated with nebs, steroids and continued on supplemental oxygen. He is improved at this time.   -He will be discharge on a short course of Prednisone for 5 days.  -Pt has already been counseled to quit smoking.      4. Lymphedema:   -Pt will continue wound care as recommended by wound care service.      5. Tobacco abuse:   -Pt was on Nicotine patch in the hospital   -Pt was counseled to quit smoking.      6. Depression:   -Pt was continued on her usual dose of Celexa.      7. Chronic pain:   -Continued pain meds prn.     8. Acute hyponatremia:   -Likely related to diuretic use. Pt is asymptomatic.   -Pt will follow up with PCP as outpt for monitoring.          PENDING TEST RESULTS:   At the time of discharge the following test results are still pending: None    FOLLOW UP APPOINTMENTS:    Follow-up Information     Follow up With Details Comments Contact Info    Donato Newberry MD Go on 5/10/2018 Jeanne Roman will call the patient on May 9  with appointment time. [address] [facility]  [phone]             ADDITIONAL CARE RECOMMENDATIONS: None    DIET: Cardiac Diet and Diabetic Diet    ACTIVITY: Activity as tolerated    WOUND CARE: Aquaphor ointment to lower legs and feet twice daily. Please wrap left leg with dry ABD and stretch gauze to cover scattered erosions. KEEP LEGS ELEVATED. EQUIPMENT needed: None      DISCHARGE MEDICATIONS:  Current Discharge Medication List      START taking these medications    Details   predniSONE (DELTASONE) 20 mg tablet Take 1 Tab by mouth daily (with breakfast).   Qty: 5 Tab, Refills: 0         CONTINUE these medications which have CHANGED    Details   spironolactone (ALDACTONE) 50 mg tablet Take 1 Tab by mouth two (2) times a day. Qty: 60 Tab, Refills: 2         CONTINUE these medications which have NOT CHANGED    Details   metoprolol succinate (TOPROL XL) 25 mg XL tablet Take 25 mg by mouth daily. lisinopril (PRINIVIL, ZESTRIL) 2.5 mg tablet Take 2.5 mg by mouth daily. ferrous sulfate 325 mg (65 mg iron) tablet Take 325 mg by mouth Daily (before breakfast). albuterol (PROVENTIL HFA, VENTOLIN HFA, PROAIR HFA) 90 mcg/actuation inhaler Take 1 Puff by inhalation every four (4) hours as needed for Wheezing. furosemide (LASIX) 40 mg tablet Take 1.5 Tabs by mouth two (2) times a day. Qty: 45 Tab, Refills: 3      albuterol-ipratropium (DUO-NEB) 2.5 mg-0.5 mg/3 ml nebu 3 mL by Nebulization route every four (4) hours. Qty: 30 Nebule, Refills: 2      aspirin delayed-release 81 mg tablet Take 81 mg by mouth daily. citalopram (CELEXA) 40 mg tablet Take 40 mg by mouth daily. nitroglycerin (NITROSTAT) 0.4 mg SL tablet 0.4 mg by SubLINGual route every five (5) minutes as needed for Chest Pain. nortriptyline (PAMELOR) 50 mg capsule Take 50 mg by mouth nightly. docusate sodium (COLACE) 100 mg capsule Take 100 mg by mouth two (2) times a day. metFORMIN (GLUCOPHAGE) 1,000 mg tablet Take 1,000 mg by mouth two (2) times daily (with meals). Indications: type 2 diabetes mellitus      omeprazole (PRILOSEC) 20 mg capsule Take 20 mg by mouth daily. Indications: gastroesophageal reflux disease      clopidogrel (PLAVIX) 75 mg tab Take 75 mg by mouth daily. atorvastatin (LIPITOR) 80 mg tablet Take 80 mg by mouth daily. rOPINIRole (REQUIP) 4 mg tab TAB Take 8 mg by mouth nightly. Indications: Restless Legs Syndrome      morphine CR (MS CONTIN) 30 mg CR tablet Take 60 mg by mouth every eight (8) hours. HYDROmorphone (DILAUDID) 4 mg tablet Take 4 mg by mouth two (2) times a day.       zolpidem (AMBIEN) 10 mg tablet Take 1 Tab by mouth nightly. Qty: 30 Tab, Refills: 2      tiotropium (SPIRIVA WITH HANDIHALER) 18 mcg inhalation capsule Take 1 Cap by inhalation daily. fluticasone-salmeterol (ADVAIR DISKUS) 250-50 mcg/dose diskus inhaler Take 1 Puff by inhalation two (2) times a day. isosorbide mononitrate ER (IMDUR) 60 mg CR tablet Take 60 mg by mouth daily. NOTIFY YOUR PHYSICIAN FOR ANY OF THE FOLLOWING:   Fever over 101 degrees for 24 hours. Chest pain, shortness of breath, fever, chills, nausea, vomiting, diarrhea, change in mentation, falling, weakness, bleeding. Severe pain or pain not relieved by medications. Or, any other signs or symptoms that you may have questions about. DISPOSITION:  X  Home With: Family   OT  PT  HH  RN       Long term SNF/Inpatient Rehab    Independent/assisted living    Hospice    Other:       PATIENT CONDITION AT DISCHARGE:     Functional status    Poor     Deconditioned    X Independent      Cognition   X  Lucid     Forgetful     Dementia      Catheters/lines (plus indication)    Valle     PICC     PEG    X None      Code status   X  Full code     DNR      PHYSICAL EXAMINATION AT DISCHARGE:    Constitutional:  No acute distress, cooperative, pleasant    ENT:  Oral mucous moist, oropharynx benign. Neck supple,    Resp:  Mild wheezing and rales. No accessory muscle use   CV:  Regular rhythm, normal rate, grade 2/6 murmur; no gallops, rubs    GI:  Soft, non distended, non tender. normoactive bowel sounds, no hepatosplenomegaly     Musculoskeletal:  2+ edema bilaterally of the lower extremities; erythema present bilaterally in the lower extremities with stage 2 and 3 wounds draining. No foul smelling lesions. Neurologic:  Moves all extremities.   AAOx3, CN II-XII reviewed           CHRONIC MEDICAL DIAGNOSES:  Problem List as of 5/7/2018  Date Reviewed: 3/13/2018          Codes Class Noted - Resolved    Fluid overload ICD-10-CM: E87.70  ICD-9-CM: 276.69  5/6/2018 - Present        SOB (shortness of breath) ICD-10-CM: R06.02  ICD-9-CM: 786.05  5/1/2018 - Present        Systolic CHF, acute (HCC) ICD-10-CM: I50.21  ICD-9-CM: 428.21, 428.0  2/26/2018 - Present        Acute systolic CHF (congestive heart failure) ([facility] ICD-10-CM: I50.21  ICD-9-CM: 428.21, 428.0  2/26/2018 - Present        COPD with exacerbation ([facility] ICD-10-CM: J44.1  ICD-9-CM: 491.21  5/24/2014 - Present        Morbid obesity ([facility] ICD-10-CM: E66.01  ICD-9-CM: 278.01  1/8/2014 - Present        Hematemesis ICD-10-CM: K92.0  ICD-9-CM: 578.0  1/7/2014 - Present        Rectal bleeding ICD-10-CM: K62.5  ICD-9-CM: 569.3  1/7/2014 - Present        Obstructive sleep apnea (adult) (pediatric) ICD-10-CM: G47.33  ICD-9-CM: 327.23  1/7/2014 - Present        Chronic airway obstruction, not elsewhere classified ICD-10-CM: J44.9  ICD-9-CM: 760  1/7/2014 - Present        Essential hypertension, benign ICD-10-CM: I10  ICD-9-CM: 401.1  1/7/2014 - Present        Type II or unspecified type diabetes mellitus without mention of complication, not stated as uncontrolled ICD-10-CM: E11.9  ICD-9-CM: 250.00  1/7/2014 - Present        Coronary artery disease ICD-10-CM: I25.10  ICD-9-CM: 414.00  1/7/2014 - Present        Chest pain ICD-10-CM: R07.9  ICD-9-CM: 786.50  1/5/2014 - Present        Coronary atherosclerosis of native coronary artery ICD-10-CM: I25.10  ICD-9-CM: 414.01  4/12/2011 - Present        Other primary cardiomyopathies ICD-10-CM: I42.8  ICD-9-CM: 425.4  4/12/2011 - Present        Chronic systolic heart failure ([facility] ICD-10-CM: I50.22  ICD-9-CM: 428.22  4/12/2011 - Present              Greater than 30 minutes was spent with the patient on counseling and coordination of care    Signed:   Keri Torres.  MD Khushbu  5/7/2018  12:50 PM

## 2018-05-07 NOTE — PROGRESS NOTES
05/07/18 0813   Vital Signs   Temp 97.9 °F (36.6 °C)   Temp Source Oral   Pulse (Heart Rate) 90   Heart Rate Source Monitor   Resp Rate 16   O2 Sat (%) 95 %   Level of Consciousness Alert   /69   MAP (Calculated) 82   BP 1 Method Automatic   BP 1 Location Right arm   BP Patient Position Sitting   MEWS Score 1   Box Number 16   Electrodes Replaced No   Oxygen Therapy   O2 Device Nasal cannula   O2 Flow Rate (L/min) 3 l/min   Patient Observation   Patient Turned Turns self   Ambulate Ambulate to bathroom   Activity In bed;Staff present     0940-Spoke to  re:BP and due med,advised to hold all of them now.

## 2018-05-07 NOTE — CDMP QUERY
Please clarify if this patient is (was) being treated/managed for:     => Chronic respiratory failure as evidenced by \"pt requiring home o2\",  now admitted on 3 liters o2, telemetry and cont. pulse oximetry    => Other explanation of clinical findings  => Clinically Undetermined (no explanation for clinical findings)    The medical record reflects the following clinical findings, treatment, and risk factors. Risk Factors:  COPD, CHF,   Clinical Indicators:  Documentation  that pt uses o2 @ home and is O2 dependent. Treatment:  O2 @ 3 liters. Telemetry and cont. pulse ox. Please clarify and document your clinical opinion in the progress notes and discharge summary including the definitive and/or presumptive diagnosis, (suspected or probable), related to the above clinical findings.  Please include clinical findings supporting your diagnosis      Thank you,  DELMY MalagonN, RN, [facility], [facility]

## 2018-05-07 NOTE — PROGRESS NOTES
Cm informed patient had already left without appointment being made. Cm attempted to reach the heart failure nurse navigators with no success. Cm contacted Dr. Robert Lopez' office (per HF NN note this was the patients' cardiologist) and made an appointment for him for 5/16/18 at 12 pm. Cm contacted patient at his home to discuss. Patient stated that he no longer saw Dr. Maximo Cassidy, that he saw a cardiologist at the [facility] office and would be going to the office tomorrow. Cm will wait for one of the heart failure nurse navigators to call back before cancelling. Cm also received a voicemail as well as an email from Utilization Review nurse stating that the patient had been downgraded to observation and asking that I give patient his observation letter. Cm unable to do so as he has left.   [employer] , [place]

## 2018-05-14 NOTE — ADT AUTH CERT NOTES
Patient Demographics        Patient Name [address] Sex  Address Phone       Eliazar Givens [phone] Male 1960 [address] [id number] [phone] (Home)  [phone] (Mobile) *Preferred*           CSN:       [id number]           Admit Date: Admit Time Room Bed       May 1, 2018  1:32  [846] 01 [[id number]]           Attending Providers        Provider Pager From To       Reyes Sierras, MD  18       Princess Jacob MD  18       Elvis Collins MD  18       Terrance Ríos MD  18           Emergency Contact(s)        Name Relation Home Work [address] [phone]  [phone]         Utilization Review            ADDITIONAL CLINICAL by Betzy Davis RN        Review Entered Review Status       2018 In Primary       Details         Assessment & Plan:      1. Chest pain:   -Atypical. Likely related to volume overload. Now resolved. -Ruled out for ACS with serial Butch and EKG.      2. Acute on chronic systolic CHF:   -Pt is improving.   -Will continue diuresis with Lasix, and Spironolactone. -Most recent echo in February of this year: Left ventricle: The ventricle was dilated. Systolic function was  moderately to markedly reduced. Ejection fraction was estimated in the range of 30 % to 35 %. Suboptimal endocardial visualization limits wall motion analysis. -Continue Coreg and Lisinopril.      3. COPD exacerbation:   -Will continue pt on nebs and LABA. -Oxygen requirement is at his baseline of 3 LPM.   -Continue Prednisone.   -Pt has already been counseled to quit smoking.      4. Lymphedema:   -Continue wound care       LABS: WBC 8.4, HGB 12.4, , CL 90,   VITALS: T97.8, P 83, RR 18, /80, 93% 3LNC                   ADDITIONAL CLINICAL by Betzy Davis, WALTER        Review Entered Review Status       2018 In Primary       Details         Assessment & Plan:      1. Chest pain:   -Atypical. Likely related to volume overload. Now resolved. -Ruled out for ACS with serial Butch and EKG.      2. Acute on chronic systolic CHF:   -Pt is improving.   -Will continue diuresis with Lasix, and Spironolactone. -Most recent echo in February of this year: Left ventricle: The ventricle was dilated. Systolic function was  moderately to markedly reduced. Ejection fraction was estimated in the range of 30 % to 35 %. Suboptimal endocardial visualization limits wall motion analysis.     3. COPD exacerbation:   -Will continue pt on nebs and LABA. -Oxygen requirement is at his baseline of 3 LPM.   -Will add po steroids.      4. Lymphedema:   -Continue wound care       VITALS: T 98.2, P 94, RR 18, /68, 94% 2LNC  LABS: WBC 8.3, HHGB 11.6, HCT 38.1, , , K 3.8, GL 94, CO2 33                  5/4 ADDITIONAL CLINICAL by Denia Parra, WALTER        Review Entered Review Status       5/14/2018 In Primary       Details         Assessment & Plan:      1. Chest pain:   -Atypical. Likely related to volume overload. Now resolved. -Ruled out for ACS with serial Butch and EKG.      2. Acute on chronic systolic CHF:   -Pt is improving.   -Will continue diuresis with Lasix, and Spironolactone. -Most recent echo in February of this year: Left ventricle: The ventricle was dilated. Systolic function was  moderately to markedly reduced. Ejection fraction was estimated in the range of 30 % to 35 %. Suboptimal endocardial visualization limits wall motion analysis.     3. Lymphedema:   -Continue wound care       VITALS: T98, P 94, RR 18, /60, 90% 3LNC  LABS: WBC 10.6, HGB 11.5, HCT 38.3, , PROBNP 946                  5/3 PROGRESS NOTES by Denia Parra, WALTER        Review Entered Review Status       5/14/2018 In Primary       Details         5/3:  Assessment & Plan:      1. Chest pain:   -Atypical. Likely related to volume overload. Now resolved.    -Ruled out for ACS with serial Butch and EKG.      2. Acute on chronic systolic CHF:   -Pt is improving.   -Will continue diuresis with Lasix, and Spironolactone. -Most recent echo in February of this year: Left ventricle: The ventricle was dilated. Systolic function was  moderately to markedly reduced. Ejection fraction was estimated in the range of 30 % to 35 %. Suboptimal endocardial visualization limits wall motion analysis.     3. Lymphedema:   -Continue wound care      4. Tobacco abuse:   -Continue Nicotine patch.      5. Depression:   -Continue Celexa.      6.  Chronic pain:   -Continue pain meds prn.      Code status: full  DVT prophylaxis: heparin     Care Plan discussed with: Patient/Family  Disposition: Home w/Family and TBD      VITALS: T98, P 104, RR 28, /57, 93% 3LN  MEDS: DUONEB 4H, ASA 81MG PO DAILY, LIPTOR 80MG PO SNELL, PLAVIX 75MG PO DAILY, LASIX 60MG PO BID, HEPARIN 5000 UNITS SC 8H, DILAUDID 4MG PO BID, IMDUR 60MG PO DAILY, TORADOL 30MG IV Q6H, METFORMIN 1000MG BID, MS CONTIN 60MG PO Q8H, ALDACTONE 50MG PO BID  LABS: WBC 12.5, HCG 11.5, HCT 38.5, , PROBNP 1135

## 2018-06-03 NOTE — PROGRESS NOTES
Spiritual Care Assessment/Progress Note  [address]      NAME: Frances Clarke      MRN: [id number]  AGE: 59 y.o.  SEX: male  Taoism Affiliation: Church   Language: English     6/3/2018     Total Time (in minutes): 10     Spiritual Assessment begun in [facility] CV SERVICES UNIT through conversation with:         [x]Patient        [] Family    [] Friend(s)        Reason for Consult: Palliative Care, Initial/Spiritual Assessment     Spiritual beliefs: (Please include comment if needed)     [x] Identifies with a armando tradition:     [] Supported by a armando community:      [] Claims no spiritual orientation:      [] Seeking spiritual identity:           [] Adheres to an individual form of spirituality:      [] Not able to assess:                     Identified resources for coping:      [] Prayer                               [] Music                  [] Guided Imagery     [x] Family/friends                 [] Pet visits     [] Devotional reading                         [] Unknown     [] Other:                                              Interventions offered during this visit: (See comments for more details)    Patient Interventions: Affirmation of emotions/emotional suffering, Affirmation of armando, Catharsis/review of pertinent events in supportive environment, Coping skills reviewed/reinforced, Iconic (affirming the presence of God/Higher Power), Normalization of emotional/spiritual concerns, Prayer (assurance of)           Plan of Care:     [] Support spiritual and/or cultural needs    [] Support AMD and/or advance care planning process      [] Support grieving process   [] Coordinate Rites and/or Rituals    [] Coordination with community clergy   [x] No spiritual needs identified at this time   [] Detailed Plan of Care below (See Comments)  [] Make referral to Music Therapy  [] Make referral to Pet Therapy     [] Make referral to Addiction services  [] Make referral to [facility]  [] Make referral to Spiritual Care Partner  [] No future visits requested        [x] Follow up visits as needed     Visited pt on [facility]. Pt is a person of Yazdanism armando, however not active in any Religious at this time. He lives with his long time girl friend, Jorge Santos, who also has considerable health issues. Much of the conversation was about his ongoing heart issues. He reports having to seek emergency care often and wonders that his health has not improved. He misses his prior active life and work. Chaplains will follow as needed.   Chaplain Art, MDiv, MS, [facility]  [phone])

## 2018-06-03 NOTE — IP AVS SNAPSHOT
[address] 
[phone] Patient: Cristian Nicholson MRN: [id number] :1960 About your hospitalization You were admitted on:  Monica 3, 2018 You last received care in the:  [id number] [place] [address] You were discharged on:  2018 Why you were hospitalized Your primary diagnosis was:  Chest Pain Follow-up Information Follow up With Details Comments Contact Info Hakeem Costa MD  once discharged from [facility]  [facility] 7 [id number] 
[phone] [address] INPT WOUND CARE On 2018 at 9:00 am for lower extremity wounds if approved by Dr. Rayna Hauser at appointment on [address] [place], [place] [id number] 
[phone] Your Scheduled Appointments 2018  9:00 AM EDT  
WOUND CARE NEW PATIENT with [address] WOUND CARE 1, [address] WOUND CARE 2 [address] OP WOUND CARE TICO ([address]) 2249 [facility] 115 [facility] [id number] [phone] Discharge Orders None A check dani indicates which time of day the medication should be taken. My Medications START taking these medications Instructions Each Dose to Equal  
 Morning Noon Evening Bedtime  
 nicotine 14 mg/24 hr patch Commonly known as:  Seema Malhotra Start taking on:  2018 Your last dose was: Your next dose is:    
   
   
 1 Patch by TransDERmal route daily for 30 days. 1 Patch CHANGE how you take these medications Instructions Each Dose to Equal  
 Morning Noon Evening Bedtime  
 furosemide 20 mg tablet Commonly known as:  LASIX What changed:   
- medication strength - when to take this Your last dose was: Your next dose is: Take 3 Tabs by mouth daily. 60 mg CONTINUE taking these medications  Instructions Each Dose to Equal  
 Morning Noon Evening Bedtime ADVAIR DISKUS 250-50 mcg/dose diskus inhaler Generic drug:  fluticasone-salmeterol Your last dose was: Your next dose is: Take 1 Puff by inhalation two (2) times a day. 1 Puff  
    
   
   
   
  
 albuterol 90 mcg/actuation inhaler Commonly known as:  PROVENTIL HFA, VENTOLIN HFA, PROAIR HFA Your last dose was: Your next dose is: Take 1 Puff by inhalation every four (4) hours as needed for Wheezing. 1 Puff  
    
   
   
   
  
 albuterol-ipratropium 2.5 mg-0.5 mg/3 ml Nebu Commonly known as:  Lloyd Boothe Your last dose was: Your next dose is:    
   
   
 3 mL by Nebulization route every four (4) hours. 3 mL  
    
   
   
   
  
 aspirin delayed-release 81 mg tablet Your last dose was: Your next dose is: Take 81 mg by mouth daily. 81 mg  
    
   
   
   
  
 atorvastatin 80 mg tablet Commonly known as:  LIPITOR Your last dose was: Your next dose is: Take 80 mg by mouth daily. 80 mg  
    
   
   
   
  
 citalopram 40 mg tablet Commonly known as:  Julissa Macdonald Your last dose was: Your next dose is: Take 40 mg by mouth daily. 40 mg  
    
   
   
   
  
 clopidogrel 75 mg Tab Commonly known as:  PLAVIX Your last dose was: Your next dose is: Take 75 mg by mouth daily. 75 mg  
    
   
   
   
  
 docusate sodium 100 mg capsule Commonly known as:  Parris Lewis Your last dose was: Your next dose is: Take 100 mg by mouth two (2) times a day. 100 mg  
    
   
   
   
  
 ferrous sulfate 325 mg (65 mg iron) tablet Your last dose was: Your next dose is: Take 325 mg by mouth Daily (before breakfast). 325 mg HYDROmorphone 4 mg tablet Commonly known as:  DILAUDID Your last dose was: Your next dose is: Take 1 Tab by mouth two (2) times a day. Max Daily Amount: 8 mg. Indications: Pain  
 4 mg IMDUR 60 mg CR tablet Generic drug:  isosorbide mononitrate ER Your last dose was: Your next dose is: Take 60 mg by mouth daily. 60 mg  
    
   
   
   
  
 lisinopril 2.5 mg tablet Commonly known as:  Ayan Moiz Your last dose was: Your next dose is: Take 2.5 mg by mouth daily. 2.5 mg  
    
   
   
   
  
 metFORMIN 1,000 mg tablet Commonly known as:  GLUCOPHAGE Your last dose was: Your next dose is: Take 1,000 mg by mouth two (2) times daily (with meals). Indications: type 2 diabetes mellitus 1000 mg  
    
   
   
   
  
 morphine CR 30 mg CR tablet Commonly known as:  MS CONTIN Your last dose was: Your next dose is: Take 2 Tabs by mouth every eight (8) hours. Max Daily Amount: 180 mg.  
 60 mg NITROSTAT 0.4 mg SL tablet Generic drug:  nitroglycerin Your last dose was: Your next dose is: 0.4 mg by SubLINGual route every five (5) minutes as needed for Chest Pain. 0.4 mg  
    
   
   
   
  
 nortriptyline 50 mg capsule Commonly known as:  PAMELOR Your last dose was: Your next dose is: Take 50 mg by mouth nightly. 50 mg  
    
   
   
   
  
 omeprazole 20 mg capsule Commonly known as:  PRILOSEC Your last dose was: Your next dose is: Take 20 mg by mouth daily. Indications: gastroesophageal reflux disease 20 mg  
    
   
   
   
  
 rOPINIRole 4 mg Tab TAB Commonly known as:  Nori Rebolledo Your last dose was: Your next dose is: Take 8 mg by mouth nightly. Indications: Restless Legs Syndrome 8 mg SPIRIVA WITH HANDIHALER 18 mcg inhalation capsule Generic drug:  tiotropium Your last dose was: Your next dose is: Take 1 Cap by inhalation daily. 1 Cap  
    
   
   
   
  
 spironolactone 50 mg tablet Commonly known as:  ALDACTONE Your last dose was: Your next dose is: Take 1 Tab by mouth two (2) times a day. 50 mg  
    
   
   
   
  
 TOPROL XL 25 mg XL tablet Generic drug:  metoprolol succinate Your last dose was: Your next dose is: Take 25 mg by mouth daily. 25 mg  
    
   
   
   
  
 zolpidem 10 mg tablet Commonly known as:  AMBIEN Your last dose was: Your next dose is: Take 1 Tab by mouth nightly. 10 mg  
    
   
   
   
  
  
STOP taking these medications   
 predniSONE 20 mg tablet Commonly known as:  Thien Mcallister Where to Get Your Medications Information on where to get these meds will be given to you by the nurse or doctor. ! Ask your nurse or doctor about these medications  
  furosemide 20 mg tablet HYDROmorphone 4 mg tablet  
 morphine CR 30 mg CR tablet  
 nicotine 14 mg/24 hr patch Opioid Education Prescription Opioids: What You Need to Know: 
 
  
  
PATIENT ID: Yovani Villalta MRN: [id number] YOB: 1960 DATE OF ADMISSION: 6/3/2018  5:07 AM   
DATE OF DISCHARGE: 6/6/2018 PRIMARY CARE PROVIDER: Paula Desai MD  
  
ATTENDING PHYSICIAN: Teresa Salas 
DISCHARGING PROVIDER: Salvador Raman NP   
 To contact this individual call [phone] and ask the  to page. If unavailable ask to be transferred the Adult Hospitalist Department. 
  
CONSULTATIONS: IP CONSULT TO CARDIOLOGY 
IP CONSULT TO HOSPITALIST 
  
PROCEDURES/SURGERIES: * No surgery found * 
  
ADMITTING DIAGNOSES & HOSPITAL COURSE:  
Dx: Chest pain 
   
Per H&P \"Juan Alberto Villalta is a 59 y. o. male recently admitted to [facility] who returns for intermittent persistent chest pain. Known ischemic cardiomyopathy with low EF/systolc heart failure (does not have defibrillator) who is followed at [facility] which pt seems somewhat unhappy with the cardiology care there. He states he has seen Dr. Barbara Montague from non-[facility] practice where it was mentioned of some device? ? He has known copd, O2 dependant and still smokes (turns off his O2 when he does this) that complicates matter. CXR and troponin unremarkable for active process and therefore Cardiology has seen in ER and deferred admission to hospitalist group. At bedside, he denies any chest pain but state that it resumes to come and go without known specific patter. \" 
   
Cardiac w/u negative, seen by cardiology source unlikely cardiac. Patient found to be weak during hospitalization from chronic BLE pain. PT/OT consulted who recommended rehab. Patient stable for discharge to [address] [place] [address],[facility] for rehab.  
  
DISCHARGE DIAGNOSES / PLAN:   
  
Chest pain, atypical 
- Stress test negative - Cardiology signed off - c/w ASA/Plavix/Toprol XL/Lipitor 
-suspect anxiety playing a role 
   
Chronic Systolic CHF NYHA (difficult to assess due to chronically on h ome O2) -appears while compensated - Toprol XL/Lisinopril/Lasix/Aldactone - c/w Imdur 
   
Chronic Venous stasis b/l LE 
- NO CELLULITIS 
- d/c abx 
- wound care and outpatient wound care follow up  
   
Chronic Pain syndrome - c/w Dilaudid and MS CONTIN 
   
RLS 
- c/w Requip 
   
Hyponatremia, POA 
- resolved  
   
 Chronic Respiratory failure in the setting of CHF and COPD 
-continue home o2 at 3L via nasal cannula 
   
Generalized debility  
-PT/OT consulted-recommending rehab.  
   
  
  
PENDING TEST RESULTS:  
At the time of discharge the following test results are still pending: none 
  
FOLLOW UP APPOINTMENTS:   
Follow-up Information Follow up With Details Comments Contact Info  
  Luis Ricardo MD   once discharged from [facility] [address] [facility] [address] 
[phone]  
  [facility] INPT WOUND CARE On 6/12/2018 at 9:00 am for lower extremity wounds if approved by Dr. Vida Morrison at appointment on [address] [place], [place] [address] [facility] [address] 
[phone]  
  
  
  
ADDITIONAL CARE RECOMMENDATIONS:  
-Follow up with pcp once discharge from skilled nursing facility 
  
DIET: Cardiac Diet and Diabetic Diet 
  
ACTIVITY: Activity as tolerated and PT/OT Eval and Treat 
  
WOUND CARE: Bilateral low legs:  Every other day cleanse with mild soap and water, apply Xeroform, cover with dry roll gauze and ACE wrap until seen by [facility] needed: Oxygen 3 L via nasal cannula continuous 
  
  
DISCHARGE MEDICATIONS: 
     
Current Discharge Medication List  
   
START taking these medications  
  Details  
nicotine (NICODERM CQ) 14 mg/24 hr patch 1 Patch by TransDERmal route daily for 30 days. Qty: 30 Patch, Refills: 0  
   
   
     
CONTINUE these medications which have CHANGED  
  Details  
furosemide (LASIX) 20 mg tablet Take 3 Tabs by mouth daily. Qty: 90 Tab, Refills: 0  
   
HYDROmorphone (DILAUDID) 4 mg tablet Take 1 Tab by mouth two (2) times a day. Max Daily Amount: 8 mg. Indications: Pain 
Qty: 20 Tab, Refills: 0  
  Associated Diagnoses: Chest pain, unspecified type; Lymphedema of both lower extremities  
   
morphine CR (MS CONTIN) 30 mg CR tablet Take 2 Tabs by mouth every eight (8) hours. Max Daily Amount: 180 mg. 
Qty: 20 Tab, Refills: 0   Associated Diagnoses: Chest pain, unspecified type; Lymphedema of both lower extremities  
   
   
     
CONTINUE these medications which have NOT CHANGED  
  Details  
spironolactone (ALDACTONE) 50 mg tablet Take 1 Tab by mouth two (2) times a day. Qty: 60 Tab, Refills: 2  
   
metoprolol succinate (TOPROL XL) 25 mg XL tablet Take 25 mg by mouth daily.  
   
lisinopril (PRINIVIL, ZESTRIL) 2.5 mg tablet Take 2.5 mg by mouth daily.  
   
ferrous sulfate 325 mg (65 mg iron) tablet Take 325 mg by mouth Daily (before breakfast).  
   
albuterol (PROVENTIL HFA, VENTOLIN HFA, PROAIR HFA) 90 mcg/actuation inhaler Take 1 Puff by inhalation every four (4) hours as needed for Wheezing.  
   
albuterol-ipratropium (DUO-NEB) 2.5 mg-0.5 mg/3 ml nebu 3 mL by Nebulization route every four (4) hours. Qty: 30 Nebule, Refills: 2  
   
aspirin delayed-release 81 mg tablet Take 81 mg by mouth daily.  
   
citalopram (CELEXA) 40 mg tablet Take 40 mg by mouth daily.  
   
nitroglycerin (NITROSTAT) 0.4 mg SL tablet 0.4 mg by SubLINGual route every five (5) minutes as needed for Chest Pain.  
   
nortriptyline (PAMELOR) 50 mg capsule Take 50 mg by mouth nightly.  
   
docusate sodium (COLACE) 100 mg capsule Take 100 mg by mouth two (2) times a day.  
   
metFORMIN (GLUCOPHAGE) 1,000 mg tablet Take 1,000 mg by mouth two (2) times daily (with meals). Indications: type 2 diabetes mellitus  
   
omeprazole (PRILOSEC) 20 mg capsule Take 20 mg by mouth daily. Indications: gastroesophageal reflux disease  
   
clopidogrel (PLAVIX) 75 mg tab Take 75 mg by mouth daily.  
   
atorvastatin (LIPITOR) 80 mg tablet Take 80 mg by mouth daily.  
   
rOPINIRole (REQUIP) 4 mg tab TAB Take 8 mg by mouth nightly. Indications: Restless Legs Syndrome  
   
zolpidem (AMBIEN) 10 mg tablet Take 1 Tab by mouth nightly.  
Qty: 30 Tab, Refills: 2  
   
tiotropium (SPIRIVA WITH HANDIHALER) 18 mcg inhalation capsule Take 1 Cap by inhalation daily.  
   
 fluticasone-salmeterol (ADVAIR DISKUS) 250-50 mcg/dose diskus inhaler Take 1 Puff by inhalation two (2) times a day.  
   
isosorbide mononitrate ER (IMDUR) 60 mg CR tablet Take 60 mg by mouth daily.  
   
   
    
STOP taking these medications  
   
  predniSONE (DELTASONE) 20 mg tablet Comments:  
Reason for Stopping:   
     
   
  
  
  
NOTIFY YOUR PHYSICIAN FOR ANY OF THE FOLLOWING:  
Fever over 101 degrees for 24 hours. Chest pain, shortness of breath, fever, chills, nausea, vomiting, diarrhea, change in mentation, falling, weakness, bleeding. Severe pain or pain not relieved by medications. Or, any other signs or symptoms that you may have questions about. 
  
DISPOSITION: 
   Home With: 
  OT   PT   HH   RN  
  
X Long term SNF/Inpatient Rehab- [address] [place] [address]  
  Independent/assisted living  
  Hospice  
  Other:  
  
  
PATIENT CONDITION AT DISCHARGE:  
  
Functional status  
  Poor X Deconditioned   
  Independent   
  
Cognition X Lucid   
  Forgetful   
  Dementia   
  
Catheters/lines (plus indication)  
  Valle   
  PICC   
  PEG   
X None   
  
Code status X Full code   
  DNR   
  
PHYSICAL EXAMINATION AT DISCHARGE: 
Constitutional:  No acute distress, cooperative, pleasant   
ENT:  Oral mucous moist  
Resp:  CTA bilaterally. CV:  Regular rhythm, normal rate,  
 GI:  Soft, non distended, non tender. normoactive bowel sounds  
 Musculoskeletal:  No edema, warm, 2+ pulses throughout  
 Neurologic:  Moves all extremities.  AAOx3,  
                                          EOBW:  chronic venous stasis changes in LE, no warmth or tenderness, no drainage 
   
  
  
  
  
CHRONIC MEDICAL DIAGNOSES: 
Problem List as of 6/6/2018  Date Reviewed: 3/13/2018  
          Codes Class Noted - Resolved  
  Fluid overload ICD-10-CM: E87.70 ICD-9-CM: 276.69   5/6/2018 - Present  
     
  SOB (shortness of breath) ICD-10-CM: R06.02 
ICD-9-CM: 786.05   5/1/2018 - Present  
     
   Systolic CHF, acute ([facility] [[email or web address]] 75.) ICD-10-CM: I50.21 ICD-9-CM: 428.21, 428.0   2/26/2018 - Present  
     
  Acute systolic CHF (congestive heart failure) (HCC) ICD-10-CM: I50.21 ICD-9-CM: 428.21, 428.0   2/26/2018 - Present  
     
  COPD with exacerbation ([place] [address].) ICD-10-CM: J44.1 ICD-9-CM: 491.21   5/24/2014 - Present  
     
  Morbid obesity ([place] [address].) ICD-10-CM: E66.01 
ICD-9-CM: 278.01   1/8/2014 - Present  
     
  Hematemesis ICD-10-CM: K92.0 ICD-9-CM: 578.0   1/7/2014 - Present  
     
  Rectal bleeding ICD-10-CM: K62.5 ICD-9-CM: 186. 3   1/7/2014 - Present  
     
  Obstructive sleep apnea (adult) (pediatric) ICD-10-CM: G47.33 
ICD-9-CM: 327.23   1/7/2014 - Present  
     
  Chronic airway obstruction, not elsewhere classified ICD-10-CM: J44.9 ICD-9-CM: 544   1/7/2014 - Present  
     
  Essential hypertension, benign ICD-10-CM: I10 
ICD-9-CM: 729. 1   1/7/2014 - Present  
     
  Type II or unspecified type diabetes mellitus without mention of complication, not stated as uncontrolled ICD-10-CM: E11.9 ICD-9-CM: 250.00   1/7/2014 - Present  
     
  Coronary artery disease ICD-10-CM: I25.10 ICD-9-CM: 414.00   1/7/2014 - Present  
     
  * (Principal)Chest pain ICD-10-CM: R07.9 ICD-9-CM: 786.50   1/5/2014 - Present  
     
  Coronary atherosclerosis of native coronary artery ICD-10-CM: I25.10 ICD-9-CM: 414.01   4/12/2011 - Present  
     
  Other primary cardiomyopathies ICD-10-CM: I42.8 ICD-9-CM: 425. 4   4/12/2011 - Present  
     
  Chronic systolic heart failure (HCC) ICD-10-CM: I50.22 ICD-9-CM: 428.22   4/12/2011 - Present  
     
  
  
  
Greater than 40 minutes were spent with the patient on counseling and coordination of care 
  
Signed:  
Owen Perez NP 
6/6/2018 11:38 AM  
  
 
 
 
Smoking Cessation Program: This is a free, phone/text/email based, smoking cessation program. The program is individualized to meet each patient's needs.  To enroll use the link [email or web address] or text Rafael Kingsley go [phone] from any smart phone. Fanmode Announcement We are excited to announce that we are making your provider's discharge notes available to you in Fanmode. You will see these notes when they are completed and signed by the physician that discharged you from your recent hospital stay. If you have any questions or concerns about any information you see in Fanmode, please call the Health Information Department where you were seen or reach out to your Primary Care Provider for more information about your plan of care. Introducing [facility]! [employer] introduces Fanmode patient portal. Now you can access parts of your medical record, email your doctor's office, and request medication refills online. 1. In your internet browser, go to [email or web address] 2. Click on the First Time User? Click Here link in the Sign In box. You will see the New Member Sign Up page. 3. Enter your Fanmode Access Code exactly as it appears below. You will not need to use this code after youve completed the sign-up process. If you do not sign up before the expiration date, you must request a new code. · Fanmode Access Code: [email or web address] Expires: 9/1/2018  5:11 AM 
 
4. Enter the last four digits of your Social Security Number (xxxx) and Date of Birth (mm/dd/yyyy) as indicated and click Submit. You will be taken to the next sign-up page. 5. Create a Fanmode ID. This will be your Fanmode login ID and cannot be changed, so think of one that is secure and easy to remember. 6. Create a Fanmode password. You can change your password at any time. 7. Enter your Password Reset Question and Answer. This can be used at a later time if you forget your password. 8. Enter your e-mail address. You will receive e-mail notification when new information is available in [address] 9. Click Sign Up. You can now view and download portions of your medical record. 10. Click the Download Summary menu link to download a portable copy of your medical information. If you have questions, please visit the Frequently Asked Questions section of the Sodbustert website. Remember, [employer] is NOT to be used for urgent needs. For medical emergencies, dial 911. Now available from your iPhone and Android! Introducing Gideon Smith As a [facility] patient, I wanted to make you aware of our electronic visit tool called Gideon Smith. SantosMusic Messenger (MM) 24/7 allows you to connect within minutes with a medical provider 24 hours a day, seven days a week via a mobile device or tablet or logging into a secure website from your computer. You can access Gideon Smith from anywhere in the [place]. A virtual visit might be right for you when you have a simple condition and feel like you just dont want to get out of bed, or cant get away from work for an appointment, when your regular [facility] provider is not available (evenings, weekends or holidays), or when youre out of town and need minor care. Electronic visits cost only $49 and if the [facility] 24/7 provider determines a prescription is needed to treat your condition, one can be electronically transmitted to a nearby pharmacy*. Please take a moment to enroll today if you have not already done so. The enrollment process is free and takes just a few minutes. To enroll, please download the [employer] Live 24/7 arden to your tablet or phone, or visit [email or web address] to enroll on your computer. And, as an [address] [place] [address] patient with a [employer] account, the results of your visits will be scanned into your electronic medical record and your primary care provider will be able to view the scanned results.    
We urge you to continue to see your regular [facility] provider for your ongoing medical care. And while your primary care provider may not be the one available when you seek a Gideon Maguirefin virtual visit, the peace of mind you get from getting a real diagnosis real time can be priceless. For more information on Gideon Smith, view our Frequently Asked Questions (FAQs) at [email or web address]. Sincerely, 
 
Kelsey Doshi MD 
Chief Medical Officer [employer] *:  certain medications cannot be prescribed via Gideon TingEnforta Unresulted Labs-Please follow up with your PCP about these lab tests Order Current Status CULTURE, BLOOD, PAIRED Preliminary result Providers Seen During Your Hospitalization Provider Specialty Primary office phone Rich Martínez MD Emergency Medicine [phone] Santiago Alves MD Emergency Medicine [phone] Carley Azar MD Internal Medicine [phone] America Holt MD Internal Medicine [phone] Kaye Green MD Internal Medicine [phone] Your Primary Care Physician (PCP) Primary Care Physician Office Phone Office Fax Wiliam Diez [phone] You are allergic to the following No active allergies Recent Documentation Height Weight BMI Smoking Status 1.88 m 117.6 kg 33.29 kg/m2 Current Every Day Smoker Emergency Contacts Name Discharge Info Relation Home Work Mobile [facility] DISCHARGE CAREGIVER [3] Friend [5] [phone] Patient Belongings The following personal items are in your possession at time of discharge: 
  Dental Appliances: None  Visual Aid: Glasses      Home Medications: None   Jewelry: None  Clothing: Shorts, Socks, Undergarments, With patient, Shirt, Jacket/Coat, Hat    Other Valuables: Cane, [employer], Rosario Bulla Discharge Instructions Attachments/References HEART FAILURE: AVOIDING TRIGGERS (ENGLISH) Patient Handouts Avoiding Triggers With Heart Failure: Care Instructions Your Care Instructions Triggers are anything that make your heart failure flare up. A flare-up is also called \"sudden heart failure\" or \"acute heart failure. \" When you have a flare-up, fluid builds up in your lungs, and you have problems breathing. You might need to go to the hospital. By watching for changes in your condition and avoiding triggers, you can prevent heart failure flare-ups. Follow-up care is a key part of your treatment and safety. Be sure to make and go to all appointments, and call your doctor if you are having problems. It's also a good idea to know your test results and keep a list of the medicines you take. How can you care for yourself at home? Watch for changes in your weight and condition · Weigh yourself without clothing at the same time each day. Record your weight. Call your doctor if you have sudden weight gain, such as more than 2 to 3 pounds in a day or 5 pounds in a week. (Your doctor may suggest a different range of weight gain.) A sudden weight gain may mean that your heart failure is getting worse. · Keep a daily record of your symptoms. Write down any changes in how you feel, such as new shortness of breath, cough, or problems eating. Also record if your ankles are more swollen than usual and if you feel more tired than usual. Note anything that you ate or did that could have triggered these changes. Limit sodium Sodium causes your body to hold on to extra water. This may cause your heart failure symptoms to get worse. People get most of their sodium from processed foods. Fast food and restaurant meals also tend to be very high in sodium. · Your doctor may suggest that you limit sodium to 2,000 milligrams (mg) a day or less. That is less than 1 teaspoon of salt a day, including all the salt you eat in cooking or in packaged foods. · Read food labels on cans and food packages. They tell you how much sodium you get in one serving. Check the serving size. If you eat more than one serving, you are getting more sodium. · Be aware that sodium can come in forms other than salt, including monosodium glutamate (MSG), sodium citrate, and sodium bicarbonate (baking soda). MSG is often added to Asian food. You can sometimes ask for food without MSG or salt. · Slowly reducing salt will help you adjust to the taste. Take the salt shaker off the table. · Flavor your food with garlic, lemon juice, onion, vinegar, herbs, and spices instead of salt. Do not use soy sauce, steak sauce, onion salt, garlic salt, mustard, or ketchup on your food, unless it is labeled \"low-sodium\" or \"low-salt. \" 
· Make your own salad dressings, sauces, and ketchup without adding salt. · Use fresh or frozen ingredients, instead of canned ones, whenever you can. Choose low-sodium canned goods. · Eat less processed food and food from restaurants, including fast food. Exercise as directed Moderate, regular exercise is very good for your heart. It improves your blood flow and helps control your weight. But too much exercise can stress your heart and cause a heart failure flare-up. · Check with your doctor before you start an exercise program. 
· Walking is an easy way to get exercise. Start out slowly. Gradually increase the length and pace of your walk. Swimming, riding a bike, and using a treadmill are also good forms of exercise. · When you exercise, watch for signs that your heart is working too hard. You are pushing yourself too hard if you cannot talk while you are exercising. If you become short of breath or dizzy or have chest pain, stop, sit down, and rest. 
· Do not exercise when you do not feel well. Take medicines correctly · Take your medicines exactly as prescribed. Call your doctor if you think you are having a problem with your medicine. · Make a list of all the medicines you take. Include those prescribed to you by other doctors and any over-the-counter medicines, vitamins, or supplements you take. Take this list with you when you go to any doctor. · Take your medicines at the same time every day. It may help you to post a list of all the medicines you take every day and what time of day you take them. · Make taking your medicine as simple as you can. Plan times to take your medicines when you are doing other things, such as eating a meal or getting ready for bed. This will make it easier to remember to take your medicines. · Get organized. Use helpful tools, such as daily or weekly pill containers. When should you call for help? Call 911 if you have symptoms of sudden heart failure such as: 
? · You have severe trouble breathing. ? · You cough up pink, foamy mucus. ? · You have a new irregular or rapid heartbeat. ?Call your doctor now or seek immediate medical care if: 
? · You have new or increased shortness of breath. ? · You are dizzy or lightheaded, or you feel like you may faint. ? · You have sudden weight gain, such as more than 2 to 3 pounds in a day or 5 pounds in a week. (Your doctor may suggest a different range of weight gain.) ? · You have increased swelling in your legs, ankles, or feet. ? · You are suddenly so tired or weak that you cannot do your usual activities. ? Watch closely for changes in your health, and be sure to contact your doctor if you develop new symptoms. Where can you learn more? Go to http://clover-livia.info/. Enter Q152 in the search box to learn more about \"Avoiding Triggers With Heart Failure: Care Instructions. \" Current as of: September 21, 2016 Content Version: 11.4 © 7427-5664 [employer].  Care instructions adapted under license by [employer] (which disclaims liability or warranty for this information). If you have questions about a medical condition or this instruction, always ask your healthcare professional. [address] any warranty or liability for your use of this information. Please provide this summary of care documentation to your next provider. Signatures-by signing, you are acknowledging that this After Visit Summary has been reviewed with you and you have received a copy. Patient Signature:  ____________________________________________________________ Date:  ____________________________________________________________  
  
[facility] Provider Signature:  ____________________________________________________________ Date:  ____________________________________________________________

## 2018-06-03 NOTE — CONSULTS
Cardiology Consult Note    CC: CP  Reason for consult:  CP  Requesting MD:  Dr. Carmen Welch     Subjective:      Date of  Admission: 6/3/2018  5:07 AM     Admission type:Emergency    Misael Starr is a 59 y.o. male admitted for There are no admission diagnoses documented for this encounter. .Patient complains of CP with Lr arm radiation and SOB. It woke him up from sleep. He was recently discharged after presenting here with SOB and cellulitis. He also complains more leg pain and swelling. He states that his CP this am is same angina that he has had in past. His HUITRON is worse over last three days as well. Denies any fever or chill. Of note, he has multiple coronary stents but cath in 5/17 showed them to be all patent. His cardiomyopathy with EF down to 30% is well described in recent hospitalizations.     Patient Active Problem List    Diagnosis Date Noted    Fluid overload 05/06/2018    SOB (shortness of breath) 77/51/3806    Systolic CHF, acute ([address] 13/64/2052    Acute systolic CHF (congestive heart failure) ([address] 02/26/2018    COPD with exacerbation ([address] 05/24/2014    Morbid obesity ([address] 01/08/2014    Hematemesis 01/07/2014    Rectal bleeding 01/07/2014    Obstructive sleep apnea (adult) (pediatric) 01/07/2014    Chronic airway obstruction, not elsewhere classified 01/07/2014    Essential hypertension, benign 01/07/2014    Type II or unspecified type diabetes mellitus without mention of complication, not stated as uncontrolled 01/07/2014    Coronary artery disease 01/07/2014    Chest pain 01/05/2014    Coronary atherosclerosis of native coronary artery 04/12/2011    Other primary cardiomyopathies 04/12/2011    Chronic systolic heart failure ([address] 04/12/2011      Talia Rankin MD  Past Medical History:   Diagnosis Date    CAD (coronary artery disease)     CHF (congestive heart failure) ([address]     Chronic systolic heart failure ([address] 4/12/2011    COPD (chronic obstructive pulmonary disease) ([address]  Coronary atherosclerosis of native coronary artery 4/12/2011    Diabetes [facility]     High cholesterol     Hypertension     MI, old     Neurological disorder     Other primary cardiomyopathies 4/12/2011    Restless leg syndrome       Past Surgical History:   Procedure Laterality Date    CARDIAC SURG PROCEDURE UNLIST      stents x7    HX CARPAL TUNNEL RELEASE      right wrist     No Known Allergies   History reviewed. No pertinent family history. No current facility-administered medications for this encounter. Current Outpatient Prescriptions   Medication Sig    spironolactone (ALDACTONE) 50 mg tablet Take 1 Tab by mouth two (2) times a day.  predniSONE (DELTASONE) 20 mg tablet Take 1 Tab by mouth daily (with breakfast).  metoprolol succinate (TOPROL XL) 25 mg XL tablet Take 25 mg by mouth daily.  lisinopril (PRINIVIL, ZESTRIL) 2.5 mg tablet Take 2.5 mg by mouth daily.  ferrous sulfate 325 mg (65 mg iron) tablet Take 325 mg by mouth Daily (before breakfast).  albuterol (PROVENTIL HFA, VENTOLIN HFA, PROAIR HFA) 90 mcg/actuation inhaler Take 1 Puff by inhalation every four (4) hours as needed for Wheezing.  furosemide (LASIX) 40 mg tablet Take 1.5 Tabs by mouth two (2) times a day.  albuterol-ipratropium (DUO-NEB) 2.5 mg-0.5 mg/3 ml nebu 3 mL by Nebulization route every four (4) hours.  aspirin delayed-release 81 mg tablet Take 81 mg by mouth daily.  citalopram (CELEXA) 40 mg tablet Take 40 mg by mouth daily.  nitroglycerin (NITROSTAT) 0.4 mg SL tablet 0.4 mg by SubLINGual route every five (5) minutes as needed for Chest Pain.  nortriptyline (PAMELOR) 50 mg capsule Take 50 mg by mouth nightly.  docusate sodium (COLACE) 100 mg capsule Take 100 mg by mouth two (2) times a day.  metFORMIN (GLUCOPHAGE) 1,000 mg tablet Take 1,000 mg by mouth two (2) times daily (with meals).  Indications: type 2 diabetes mellitus    omeprazole (PRILOSEC) 20 mg capsule Take 20 mg by mouth daily. Indications: gastroesophageal reflux disease    clopidogrel (PLAVIX) 75 mg tab Take 75 mg by mouth daily.  atorvastatin (LIPITOR) 80 mg tablet Take 80 mg by mouth daily.  rOPINIRole (REQUIP) 4 mg tab TAB Take 8 mg by mouth nightly. Indications: Restless Legs Syndrome    morphine CR (MS CONTIN) 30 mg CR tablet Take 60 mg by mouth every eight (8) hours.  HYDROmorphone (DILAUDID) 4 mg tablet Take 4 mg by mouth two (2) times a day.  zolpidem (AMBIEN) 10 mg tablet Take 1 Tab by mouth nightly.  tiotropium (SPIRIVA WITH HANDIHALER) 18 mcg inhalation capsule Take 1 Cap by inhalation daily.  fluticasone-salmeterol (ADVAIR DISKUS) 250-50 mcg/dose diskus inhaler Take 1 Puff by inhalation two (2) times a day.  isosorbide mononitrate ER (IMDUR) 60 mg CR tablet Take 60 mg by mouth daily. Prior to Admission Medications:  Prior to Admission medications    Medication Sig Start Date End Date Taking? Authorizing Provider   spironolactone (ALDACTONE) 50 mg tablet Take 1 Tab by mouth two (2) times a day. 5/7/18   Terrance Ríos MD   predniSONE (DELTASONE) 20 mg tablet Take 1 Tab by mouth daily (with breakfast). 5/8/18   Terrance Ríos MD   metoprolol succinate (TOPROL XL) 25 mg XL tablet Take 25 mg by mouth daily. Historical Provider   lisinopril (PRINIVIL, ZESTRIL) 2.5 mg tablet Take 2.5 mg by mouth daily. Historical Provider   ferrous sulfate 325 mg (65 mg iron) tablet Take 325 mg by mouth Daily (before breakfast). Historical Provider   albuterol (PROVENTIL HFA, VENTOLIN HFA, PROAIR HFA) 90 mcg/actuation inhaler Take 1 Puff by inhalation every four (4) hours as needed for Wheezing. Historical Provider   furosemide (LASIX) 40 mg tablet Take 1.5 Tabs by mouth two (2) times a day. 3/3/18   Leann Park MD   albuterol-ipratropium (DUO-NEB) 2.5 mg-0.5 mg/3 ml nebu 3 mL by Nebulization route every four (4) hours.  3/3/18   Leann Park MD   aspirin delayed-release 81 mg tablet Take 81 mg by mouth daily. Gerald Caldwell MD   citalopram (CELEXA) 40 mg tablet Take 40 mg by mouth daily. Gerald Caldwell MD   nitroglycerin (NITROSTAT) 0.4 mg SL tablet 0.4 mg by SubLINGual route every five (5) minutes as needed for Chest Pain. Historical Provider   nortriptyline (PAMELOR) 50 mg capsule Take 50 mg by mouth nightly. Historical Provider   docusate sodium (COLACE) 100 mg capsule Take 100 mg by mouth two (2) times a day. Historical Provider   metFORMIN (GLUCOPHAGE) 1,000 mg tablet Take 1,000 mg by mouth two (2) times daily (with meals). Indications: type 2 diabetes mellitus    Historical Provider   omeprazole (PRILOSEC) 20 mg capsule Take 20 mg by mouth daily. Indications: gastroesophageal reflux disease    Historical Provider   clopidogrel (PLAVIX) 75 mg tab Take 75 mg by mouth daily. Gerald Caldwell MD   atorvastatin (LIPITOR) 80 mg tablet Take 80 mg by mouth daily. Historical Provider   rOPINIRole (REQUIP) 4 mg tab TAB Take 8 mg by mouth nightly. Indications: Restless Legs Syndrome    Historical Provider   morphine CR (MS CONTIN) 30 mg CR tablet Take 60 mg by mouth every eight (8) hours. Historical Provider   HYDROmorphone (DILAUDID) 4 mg tablet Take 4 mg by mouth two (2) times a day. Historical Provider   zolpidem (AMBIEN) 10 mg tablet Take 1 Tab by mouth nightly. 5/24/14   Fady Avalos MD   tiotropium (SPIRIVA WITH HANDIHALER) 18 mcg inhalation capsule Take 1 Cap by inhalation daily. Gerald Caldwell MD   fluticasone-salmeterol (ADVAIR DISKUS) 250-50 mcg/dose diskus inhaler Take 1 Puff by inhalation two (2) times a day. Gerald Caldwell MD   isosorbide mononitrate ER (IMDUR) 60 mg CR tablet Take 60 mg by mouth daily.     Gerald Caldwell MD        Review of Symptoms:  Except as noted in HPI, patient denies recent fever or chills, nausea, vomiting, diarrhea, hemoptysis, hematemesis, dysuria, myalgias, focal neurologic symptoms, ecchymosis, angioedema, odynophagia, dysphagia, sore throat, earache,rash, melena, hematochezia, depression, GERD, cold intolerance, petechia, bleeding gums, or significant weight loss. A comprehensive review of systems was negative except for that written in the HPI. Subjective:    24 hr VS reviewed, overall VSSAF  Temp (24hrs), Av.5 °F (36.9 °C), Min:98.1 °F (36.7 °C), Max:98.9 °F (37.2 °C)    Patient Vitals for the past 8 hrs:   Pulse   18 0730 88   18 0726 (!) 103   18 0545 93   18 0530 96    Patient Vitals for the past 8 hrs:   Resp   18 0730 17   18 0726 28   18 0545 16   18 0530 17   18 0519 18    Patient Vitals for the past 8 hrs:   BP   18 0800 129/65   18 0730 130/70   18 0726 139/73   18 0545 107/70   18 0530 117/69   18 0522 112/62   18 0519 112/62          Intake/Output Summary (Last 24 hours) at 18 0905  Last data filed at 18 0724   Gross per 24 hour   Intake                0 ml   Output              700 ml   Net             -700 ml         Physical Exam (complete single organ system exam)      Visit Vitals    /58    Pulse 78    Temp 98.1 °F (36.7 °C)    Resp 17    Ht 6' 2\" (1.88 m)    Wt 129.3 kg (285 lb)    SpO2 95%    BMI 36.59 kg/m2     General Appearance:  Well developed, well nourished,alert and oriented x 3, and individual in no acute distress. Ears/Nose/Mouth/Throat:   Hearing grossly normal.         Neck: Supple. Chest:   Lungs rales   Cardiovascular:  Regular rate and rhythm, S1, S2 normal, no murmur. Abdomen:   Soft, non-tender, bowel sounds are active. Extremities: 3+ edema bilaterally and erythematous with evidence of cellulitis    Skin: Warm and dry.                Cardiographics    Telemetry: normal sinus rhythm  ECG: normal EKG, normal sinus rhythm, unchanged from previous tracings, the official reading of ekg this am indication Afib was due to artifacts and misread  Echocardiogram: Not done    Labs:   Recent Results (from the past 24 hour(s))   EKG, 12 LEAD, INITIAL    Collection Time: 06/03/18  5:15 AM   Result Value Ref Range    Ventricular Rate 98 BPM    Atrial Rate 97 BPM    QRS Duration 112 ms    Q-T Interval 388 ms    QTC Calculation (Bezet) 495 ms    Calculated R Axis 90 degrees    Calculated T Axis 32 degrees    Diagnosis       Atrial fibrillation with premature ventricular or aberrantly conducted   complexes  Septal infarct , age undetermined  When compared with ECG of 01-MAY-2018 13:44,  Atrial fibrillation has replaced Electronic atrial pacemaker  Right bundle branch block is no longer present  Septal infarct is now present  Confirmed by James Jacinto ([id number]) on 6/3/2018 7:45:44 AM     CBC WITH AUTOMATED DIFF    Collection Time: 06/03/18  5:21 AM   Result Value Ref Range    WBC 7.7 4.1 - 11.1 K/uL    RBC 4.75 4.10 - 5.70 M/uL    HGB 11.6 (L) 12.1 - 17.0 g/dL    HCT 37.7 36.6 - 50.3 %    MCV 79.4 (L) 80.0 - 99.0 FL    MCH 24.4 (L) 26.0 - 34.0 PG    MCHC 30.8 30.0 - 36.5 g/dL    RDW 19.7 (H) 11.5 - 14.5 %    PLATELET 264 112 - 746 K/uL    MPV 9.3 8.9 - 12.9 FL    NRBC 0.0 0  WBC    ABSOLUTE NRBC 0.00 0.00 - 0.01 K/uL    NEUTROPHILS 66 32 - 75 %    LYMPHOCYTES 22 12 - 49 %    MONOCYTES 8 5 - 13 %    EOSINOPHILS 3 0 - 7 %    BASOPHILS 1 0 - 1 %    IMMATURE GRANULOCYTES 0 0.0 - 0.5 %    ABS. NEUTROPHILS 5.1 1.8 - 8.0 K/UL    ABS. LYMPHOCYTES 1.7 0.8 - 3.5 K/UL    ABS. MONOCYTES 0.6 0.0 - 1.0 K/UL    ABS. EOSINOPHILS 0.2 0.0 - 0.4 K/UL    ABS. BASOPHILS 0.1 0.0 - 0.1 K/UL    ABS. IMM.  GRANS. 0.0 0.00 - 0.04 K/UL    DF AUTOMATED     METABOLIC PANEL, COMPREHENSIVE    Collection Time: 06/03/18  5:21 AM   Result Value Ref Range    Sodium 132 (L) 136 - 145 mmol/L    Potassium 4.4 3.5 - 5.1 mmol/L    Chloride 97 97 - 108 mmol/L    CO2 26 21 - 32 mmol/L    Anion gap 9 5 - 15 mmol/L    Glucose 90 65 - 100 mg/dL    BUN 12 6 - 20 MG/DL    Creatinine 0.80 0.70 - 1.30 MG/DL    BUN/Creatinine ratio 15 12 - 20      GFR est AA >60 >60 ml/min/1.73m2    GFR est non-AA >60 >60 ml/min/1.73m2    Calcium 9.1 8.5 - 10.1 MG/DL    Bilirubin, total 0.5 0.2 - 1.0 MG/DL    ALT (SGPT) 13 12 - 78 U/L    AST (SGOT) 12 (L) 15 - 37 U/L    Alk.  phosphatase 155 (H) 45 - 117 U/L    Protein, total 8.2 6.4 - 8.2 g/dL    Albumin 3.0 (L) 3.5 - 5.0 g/dL    Globulin 5.2 (H) 2.0 - 4.0 g/dL    A-G Ratio 0.6 (L) 1.1 - 2.2     NT-PRO BNP    Collection Time: 06/03/18  5:21 AM   Result Value Ref Range    NT pro- (H) 0 - 125 PG/ML   TROPONIN I    Collection Time: 06/03/18  5:21 AM   Result Value Ref Range    Troponin-I, Qt. <0.04 <0.05 ng/mL        Assessment:     Assessment:   CP; consistent with [place]  CAD; s/p multiple stents in past  CMY; severe and ischemic  CHF; acute on chronic systolic HF, NYHA class IV     Plan:   Tele  Serial enzymes  Stress test in am  Continue cardiac regimen  NTP    Alecia Ambrose MD

## 2018-06-03 NOTE — PROGRESS NOTES
Problem: Discharge Planning  Goal: *Discharge to safe environment  Outcome: Progressing Towards Goal  Disposition Needs:  Anticipated plan is for patient to discharge home with family assistance. Disposition needs TBD/subject to change pending recommendations. There are no current CM consults or needs at this time. CM will continue to follow and assist with disposition needs as they arise. Mode of transport at discharge TBD.     LAUREN Young/LATRICIA  8:49 AM

## 2018-06-03 NOTE — ED NOTES
7:08 AM  Care assumed from Dr Carl Ribeiro. Labs pending. Will call hospitalist when back. 7:51 AM  Pt w/ ongoing chest pain. He has chronic pain too. Pt has stents and CHF. Pt received ASA by EMS. Nitro paste applied    Trop is negative, CXR is clear    Will discuss w/ cardiology    8:42 AM  Repaged cardiology for assistance with disposition    CONSULT NOTE:  8:48 AM Fermin Sharif MD spoke with Dr. Gabe Augustine, Consult for Cardiology. Discussed available diagnostic tests and clinical findings. Dr. Gabe Augustine will evaluate patient for admission.     9:16 AM  Dr Gabe Augustine asked for hospitalist to admit    CONSULT  I contacted Ree Craig from hospitalist NP to admit

## 2018-06-03 NOTE — PROGRESS NOTES
Reason for Readmission:     Chest Pain, unspecified type  RRAT Score and Risk Level:     22/ High Risk Level  Level of Readmission:    Level 1  Care Conference scheduled:   None needed at this time  Resources/supports as identified by patient/family:   Identified support is Shonna armenta [phone]  Top Challenges facing patient (as identified by patient/family and CM):    Health Challenges and decline   Finances/Medication cost?     Patient receives disability as source of income and reports no significant financial stressors or concerns at this time. Patient on limited budget. Transportation? Mode of transport at time of discharge to be provided by patient's fiance. Patient utilizes [facility] for transport to and from appointments. Support system or lack thereof? Identified support is abbinelida Shonna Gibson [phone]. Patient reports no other supports. Living arrangements? Patient resides with his fiance in their own 1 story home   Self-care/ADLs/Cognition? Independent with ADL's and IADL's. Alert and oriented. DME utilized in the home consists of walker, cane, and home oxygen [place] [employer])  Current Advanced Directive/Advance Care Plan: Will review at a later time. Plan for utilizing home health:   TBD/subject to change pending recommendations. Patient is followed by [facility] in the community. Likelihood of additional readmission:   Moderate             Transition of Care Plan:    Based on readmission, the patient's previous Plan of Care   has been evaluated and/or modified. The current Transition of Care Plan is:    Anticipated plan is for patient to discharge home with family assistance. Disposition needs TBD/subject to change pending recommendations. There are no current CM consults or needs at this time. CM will continue to follow and assist with disposition needs as they arise. Care Management Interventions  PCP Verified by CM:  Yes  Palliative Care Criteria Met (RRAT>21 & CHF Dx)?: No  Mode of Transport at Discharge: BLS  Transition of Care Consult (CM Consult):  (There are no CM consults or needs at this time)  Discharge Durable Medical Equipment: No  Physical Therapy Consult: No  Occupational Therapy Consult: No  Speech Therapy Consult: No  Current Support Network: Own Home, Other (Lives with Fiance)  Confirm Follow Up Transport: Family  Plan discussed with Pt/Family/Caregiver: Yes  Freedom of Choice Offered: Yes  Discharge Location  Discharge Placement: Home with family assistance (TBD/subject to change pending recommendations)    LAUREN Stanley/LATRICIA  8:47 AM

## 2018-06-03 NOTE — ED NOTES
Bedside and Verbal shift change report given to [address] [place] [address] [address] (oncoming nurse) by Rosario Power RN (offgoing nurse). Report included the following information SBAR, Kardex, ED Summary, [facility] and Recent Results.

## 2018-06-03 NOTE — PROGRESS NOTES
Pharmacist Note - Vancomycin Dosing    Consult provided for this 59 y.o. male for indication of possible cellulitis of lower extremities   Antibiotic regimen(s):Zosyn+Vancomycin    Recent Labs      18   0521   WBC  7.7   CREA  0.80   BUN  12     Frequency of BMP:daily  Height:188 cm  Weight: 119 kg  Est CrCl: >100 ml/min; Temp (24hrs), Av.2 °F (36.8 °C), Min:97.6 °F (36.4 °C), Max:98.9 °F (37.2 °C)    Cultures:  6/3 blood    Goal trough = 10 - 15 mcg/mL    Therapy will be initiated with a loading dose of 2000 mg IV x 1 to be followed by a maintenance dose of 1500 mg IV every 12 hours. Pharmacy to follow patient daily and order levels / make dose adjustments as appropriate.

## 2018-06-03 NOTE — ED TRIAGE NOTES
Triage:  Pt comes in from home by EMS with complaints of left sided chest pain. He describes the nature of the pain as a stabbing. Pt complains of tenderness upon palpation of the area. Pt also complaining of bilateral lower extremity edema. EMS treated the pt with 324 of aspirin en route.

## 2018-06-03 NOTE — H&P
Hospitalist Admission Note    NAME: Cristian Nicholson   :  1960   MRN:  [id number]     Date/Time:  6/3/2018 9:49 AM    Patient PCP: Hakeem Costa MD  ______________________________________________________________________  Given the patient's current clinical presentation, I have a high level of concern for decompensation if discharged from the emergency department. Complex decision making was performed, which includes reviewing the patient's available past medical records, laboratory results, and x-ray films. My assessment of this patient's clinical condition and my plan of care is as follows. Assessment / Plan:  Chest pain, atypical  Hx of ischemic cardiomyopathy, systolic heart failure, admitted with cp, troponin negative. Chest xray negative for acute process. Cardiology has seen in ER and recommended admission under hospitalist service. He is seen in [place] that has a cardiologist which pt states has done \"nothing\" for his heart stating his heart around 20 something percent. (he has no ICD). Cardiology service already on board.  -on home meds    Possible cellulitis of Lower extremities, hx of lymphedema  Empiric abx for now  Blood culture ordered  Wound consult    COPD, he states he is on 3 liters home O2 and still smokes cigarettes  -nicotine patches  -supplemental O2 here  -resume advair  -spiriva not located in this pharmacy formulary to give    Hx of depression  Resume home celexa    Hx of chronic pain  Resume home dose of morphine and dilaudid  Palliative care consult already ordered. Hx of hyponatremia in setting of heart failure history  Trend    Hx of restless leg  On an interesting dose of home ropinirole and nortriptylline for this which was resumed while inpatient. Hx of diabetes per pt verbal report,   Held home metformin and on ssi with poc    Insomnia,  Pt states he is dependant on this Myrene  that he takes 10 mg every day.   Therefore ordered while inpatient. Code Status: Full    DVT Prophylaxis: ambulate, on aspirin and plavix  GI Prophylaxis: ppi    Baseline: ambulatory      Subjective:   CHIEF COMPLAINT: recurrent chest pain    HISTORY OF PRESENT ILLNESS:     Chintan Villalta is a 59 y.o. male recently admitted to [facility] who returns for intermittent persistent chest pain. Known ischemic cardiomyopathy with low EF/systolc heart failure (does not have defibrillator) who is followed at [facility] which pt seems somewhat unhappy with the cardiology care there. He states he has seen Dr. Barbara Montague from non-[facility] practice where it was mentioned of some device? ? He has known copd, O2 dependant and still smokes (turns off his O2 when he does this) that complicates matter. CXR and troponin unremarkable for active process and therefore Cardiology has seen in ER and deferred admission to hospitalist group. At bedside, he denies any chest pain but state that it resumes to come and go without known specific patter. We were asked to admit for work up and evaluation of the above problems. Past Medical History:   Diagnosis Date    CAD (coronary artery disease)     [facility]     Chronic systolic heart failure ([facility] 4/12/2011    COPD (chronic obstructive pulmonary disease) ([facility]     Coronary atherosclerosis of native coronary artery 4/12/2011    Diabetes ([facility] [address] [facility]     High cholesterol     Hypertension     MI, old     Neurological disorder     Other primary cardiomyopathies 4/12/2011    Restless leg syndrome         Past Surgical History:   Procedure Laterality Date    CARDIAC SURG PROCEDURE UNLIST      stents x7    HX CARPAL TUNNEL RELEASE      right wrist       Social History   Substance Use Topics    Smoking status: Current Every Day Smoker     Packs/day: 0.25    Smokeless tobacco: Never Used    Alcohol use No        History reviewed. No pertinent family history.   No Known Allergies     Prior to Admission medications    Medication Sig Start Date End Date Taking? Authorizing Provider   spironolactone (ALDACTONE) 50 mg tablet Take 1 Tab by mouth two (2) times a day. 5/7/18   Terrance Ríos MD   predniSONE (DELTASONE) 20 mg tablet Take 1 Tab by mouth daily (with breakfast). 5/8/18   Terrance Ríos MD   metoprolol succinate (TOPROL XL) 25 mg XL tablet Take 25 mg by mouth daily. Historical Provider   lisinopril (PRINIVIL, ZESTRIL) 2.5 mg tablet Take 2.5 mg by mouth daily. Historical Provider   ferrous sulfate 325 mg (65 mg iron) tablet Take 325 mg by mouth Daily (before breakfast). Historical Provider   albuterol (PROVENTIL HFA, VENTOLIN HFA, PROAIR HFA) 90 mcg/actuation inhaler Take 1 Puff by inhalation every four (4) hours as needed for Wheezing. Historical Provider   furosemide (LASIX) 40 mg tablet Take 1.5 Tabs by mouth two (2) times a day. 3/3/18   Divya Gilman MD   albuterol-ipratropium (DUO-NEB) 2.5 mg-0.5 mg/3 ml nebu 3 mL by Nebulization route every four (4) hours. 3/3/18   Divya Gilman MD   aspirin delayed-release 81 mg tablet Take 81 mg by mouth daily. Gerald Caldwell MD   citalopram (CELEXA) 40 mg tablet Take 40 mg by mouth daily. Gerald Caldwell MD   nitroglycerin (NITROSTAT) 0.4 mg SL tablet 0.4 mg by SubLINGual route every five (5) minutes as needed for Chest Pain. Historical Provider   nortriptyline (PAMELOR) 50 mg capsule Take 50 mg by mouth nightly. Historical Provider   docusate sodium (COLACE) 100 mg capsule Take 100 mg by mouth two (2) times a day. Historical Provider   metFORMIN (GLUCOPHAGE) 1,000 mg tablet Take 1,000 mg by mouth two (2) times daily (with meals). Indications: type 2 diabetes mellitus    Historical Provider   omeprazole (PRILOSEC) 20 mg capsule Take 20 mg by mouth daily. Indications: gastroesophageal reflux disease    Historical Provider   clopidogrel (PLAVIX) 75 mg tab Take 75 mg by mouth daily.     Gerald Caldwell MD   atorvastatin (LIPITOR) 80 mg tablet Take 80 mg by mouth daily. Historical Provider   rOPINIRole (REQUIP) 4 mg tab TAB Take 8 mg by mouth nightly. Indications: Restless Legs Syndrome    Historical Provider   morphine CR (MS CONTIN) 30 mg CR tablet Take 60 mg by mouth every eight (8) hours. Historical Provider   HYDROmorphone (DILAUDID) 4 mg tablet Take 4 mg by mouth two (2) times a day. Historical Provider   zolpidem (AMBIEN) 10 mg tablet Take 1 Tab by mouth nightly. 5/24/14   Deepak Recio MD   tiotropium (SPIRIVA WITH HANDIHALER) 18 mcg inhalation capsule Take 1 Cap by inhalation daily. Gerald Caldwell MD   fluticasone-salmeterol (ADVAIR DISKUS) 250-50 mcg/dose diskus inhaler Take 1 Puff by inhalation two (2) times a day. Gerald Caldwell MD   isosorbide mononitrate ER (IMDUR) 60 mg CR tablet Take 60 mg by mouth daily. Gerald Caldwell MD       REVIEW OF SYSTEMS:     I am not able to complete the review of systems because:    The patient is intubated and sedated    The patient has altered mental status due to his acute medical problems    The patient has baseline aphasia from prior stroke(s)    The patient has baseline dementia and is not reliable historian    The patient is in acute medical distress and unable to provide information           Total of 12 systems reviewed as follows:       POSITIVE= bold  Negative = text not underlined  General:  fever, chills, sweats, generalized weakness, weight loss/gain,      loss of appetite   Eyes:    blurred vision, eye pain, loss of vision, double vision  ENT:    rhinorrhea, pharyngitis   Respiratory:   cough, sputum production, SOB that is chronic, HUITRON, wheezing, pleuritic pain   Cardiology:   chest pain that comes and goes, palpitations, orthopnea, PND, edema, syncope   Gastrointestinal:  abdominal pain , N/V, diarrhea, dysphagia, constipation, bleeding   Genitourinary:  frequency, urgency, dysuria, hematuria, incontinence   Muskuloskeletal :  arthralgia, myalgia, back pain, chronic pain  Hematology:  easy bruising, nose or gum bleeding, lymphadenopathy   Dermatological: rash, ulceration, pruritis, color change / jaundice  Endocrine:   hot flashes or polydipsia   Neurological:  headache, dizziness, confusion, focal weakness, paresthesia,     Speech difficulties, memory loss, gait difficulty  Psychological: Feelings of anxiety, depression, agitation    Objective:   VITALS:    Visit Vitals    /58    Pulse 78    Temp 98.1 °F (36.7 °C)    Resp 17    Ht 6' 2\" (1.88 m)    Wt 129.3 kg (285 lb)    SpO2 95%    BMI 36.59 kg/m2       PHYSICAL EXAM:    General:    Alert, cooperative, no distress, appears stated age. HEENT: Atraumatic, anicteric sclerae, pink conjunctivae     No oral ulcers, mucosa moist, throat clear  Lungs:   Clear to auscultation bilaterally. No Wheezing, No rales. Chest wall:  No tenderness  No Accessory muscle use. Heart:   Regular  Rhythm   Abdomen:   Soft, non-tender. Bowel sounds heard  Extremities: No cyanosis. No clubbing,      Skin turgor normal, Radial dial pulse 2+  Skin:     Not pale. Not Jaundiced  No rashes   Psych:  Good insight. Not depressed. Not anxious or agitated. Neurologic: No facial asymmetry. No aphasia or slurred speech. Sensation grossly intact.  Alert and oriented X 4.     _______________________________________________________________________  Care Plan discussed with:    Comments   Patient x    Family      RN     Care Manager                    Consultant:      _______________________________________________________________________  Expected  Disposition:   Home with Family x   HH/PT/OT/RN    SNF/LTC    ANGUS    ________________________________________________________________________  TOTAL TIME:   79 Minutes    Critical Care Provided     Minutes non procedure based      Comments     Reviewed previous records   >50% of visit spent in counseling and coordination of care  Discussion with patient and/or family and questions answered       ________________________________________________________________________  Signed: Garfield Virk MD    Procedures: see electronic medical records for all procedures/Xrays and details which were not copied into this note but were reviewed prior to creation of Plan. LAB DATA REVIEWED:    Recent Results (from the past 24 hour(s))   EKG, 12 LEAD, INITIAL    Collection Time: 06/03/18  5:15 AM   Result Value Ref Range    Ventricular Rate 98 BPM    Atrial Rate 97 BPM    QRS Duration 112 ms    Q-T Interval 388 ms    QTC Calculation (Bezet) 495 ms    Calculated R Axis 90 degrees    Calculated T Axis 32 degrees    Diagnosis       Atrial fibrillation with premature ventricular or aberrantly conducted   complexes  Septal infarct , age undetermined  When compared with ECG of 01-MAY-2018 13:44,  Atrial fibrillation has replaced Electronic atrial pacemaker  Right bundle branch block is no longer present  Septal infarct is now present  Confirmed by Oscar Castelan ([id number]) on 6/3/2018 7:45:44 AM     CBC WITH AUTOMATED DIFF    Collection Time: 06/03/18  5:21 AM   Result Value Ref Range    WBC 7.7 4.1 - 11.1 K/uL    RBC 4.75 4.10 - 5.70 M/uL    HGB 11.6 (L) 12.1 - 17.0 g/dL    HCT 37.7 36.6 - 50.3 %    MCV 79.4 (L) 80.0 - 99.0 FL    MCH 24.4 (L) 26.0 - 34.0 PG    MCHC 30.8 30.0 - 36.5 g/dL    RDW 19.7 (H) 11.5 - 14.5 %    PLATELET 161 847 - 059 K/uL    MPV 9.3 8.9 - 12.9 FL    NRBC 0.0 0  WBC    ABSOLUTE NRBC 0.00 0.00 - 0.01 K/uL    NEUTROPHILS 66 32 - 75 %    LYMPHOCYTES 22 12 - 49 %    MONOCYTES 8 5 - 13 %    EOSINOPHILS 3 0 - 7 %    BASOPHILS 1 0 - 1 %    IMMATURE GRANULOCYTES 0 0.0 - 0.5 %    ABS. NEUTROPHILS 5.1 1.8 - 8.0 K/UL    ABS. LYMPHOCYTES 1.7 0.8 - 3.5 K/UL    ABS. MONOCYTES 0.6 0.0 - 1.0 K/UL    ABS. EOSINOPHILS 0.2 0.0 - 0.4 K/UL    ABS. BASOPHILS 0.1 0.0 - 0.1 K/UL    ABS. IMM.  GRANS. 0.0 0.00 - 0.04 K/UL    DF AUTOMATED     METABOLIC PANEL, COMPREHENSIVE    Collection Time: 06/03/18  5:21 AM Result Value Ref Range    Sodium 132 (L) 136 - 145 mmol/L    Potassium 4.4 3.5 - 5.1 mmol/L    Chloride 97 97 - 108 mmol/L    CO2 26 21 - 32 mmol/L    Anion gap 9 5 - 15 mmol/L    Glucose 90 65 - 100 mg/dL    BUN 12 6 - 20 MG/DL    Creatinine 0.80 0.70 - 1.30 MG/DL    BUN/Creatinine ratio 15 12 - 20      GFR est AA >60 >60 ml/min/1.73m2    GFR est non-AA >60 >60 ml/min/1.73m2    Calcium 9.1 8.5 - 10.1 MG/DL    Bilirubin, total 0.5 0.2 - 1.0 MG/DL    ALT (SGPT) 13 12 - 78 U/L    AST (SGOT) 12 (L) 15 - 37 U/L    Alk.  phosphatase 155 (H) 45 - 117 U/L    Protein, total 8.2 6.4 - 8.2 g/dL    Albumin 3.0 (L) 3.5 - 5.0 g/dL    Globulin 5.2 (H) 2.0 - 4.0 g/dL    A-G Ratio 0.6 (L) 1.1 - 2.2     NT-PRO BNP    Collection Time: 06/03/18  5:21 AM   Result Value Ref Range    NT pro- (H) 0 - 125 PG/ML   TROPONIN I    Collection Time: 06/03/18  5:21 AM   Result Value Ref Range    Troponin-I, Qt. <0.04 <0.05 ng/mL

## 2018-06-03 NOTE — PROGRESS NOTES
1030: TRANSFER - IN REPORT:    Verbal report received from fransico oh(name) on Vinicio Campos [address]  being received from ed(unit) for routine progression of care      Report consisted of patients Situation, Background, Assessment and   Recommendations(SBAR). Information from the following report(s) SBAR, Kardex, ED Summary, Intake/Output, MAR and Recent Results was reviewed with the receiving nurse. Opportunity for questions and clarification was provided. 1119: pt arrived to floor tele placed and confirmed with monitor tech. 1125: wound care performed on BLE for suspected cellulitis. BLE cleansed with normal saline, petroleum gauze applied, 4x4 and bekah gauze, elastic wrap to secure. No obvious open wounds. Cracked skin to R foot and R heel, not open. Wound care consult placed in chart. 1930: Bedside and Verbal shift change report given to ricardo oreilly rn (oncoming nurse) by kaya kuhn rn (offgoing nurse). Report included the following information SBAR, Kardex, ED Summary, Intake/Output, MAR and Recent Results.

## 2018-06-03 NOTE — ED PROVIDER NOTES
HPI Comments: Mr. Twyla Mei is a 58 y/o male presenting to the ED for left sided chest pain. Pt states that he has been having intermittent CP that he describes as sharp/stabbing in nature. Says that it progressively become worse and woke him up this morning. He is also having SOB with CP. He denies any N/V, dizziness, fevers, chills. He has had a cough recently that he describes as non-productive. Patient is a 59 y.o. male presenting with chest pain. The history is provided by the patient. Chest Pain (Angina)    Associated symptoms include cough and shortness of breath. Pertinent negatives include no abdominal pain, no diaphoresis, no dizziness, no fever, no headaches, no nausea, no numbness, no palpitations, no vomiting and no weakness. Past Medical History:   Diagnosis Date    CAD (coronary artery disease)     [facility] ([facility]     Chronic systolic heart failure ([facility] [address] 4/12/2011    [facility]) [facility]     Coronary atherosclerosis of native coronary artery 4/12/2011    Diabetes ([facility] [address]     High cholesterol     Hypertension     MI, old     Neurological disorder     Other primary cardiomyopathies 4/12/2011    Restless leg syndrome        Past Surgical History:   Procedure Laterality Date    CARDIAC SURG PROCEDURE UNLIST      stents x7    HX CARPAL TUNNEL RELEASE      right wrist         History reviewed. No pertinent family history. Social History     Social History    Marital status:      Spouse name: N/A    Number of children: N/A    Years of education: N/A     Occupational History    Not on file.      Social History Main Topics    Smoking status: Current Every Day Smoker     Packs/day: 0.25    Smokeless tobacco: Never Used    Alcohol use No    Drug use: No    Sexual activity: Not on file     Other Topics Concern    Not on file     Social History Narrative         ALLERGIES: Review of patient's allergies indicates no known allergies. Review of Systems   Constitutional: Negative for chills, diaphoresis, fatigue and fever. HENT: Negative for congestion, rhinorrhea, sinus pressure, sore throat, trouble swallowing and voice change. Eyes: Negative for photophobia and visual disturbance. Respiratory: Positive for cough, chest tightness and shortness of breath. Negative for wheezing and stridor. Cardiovascular: Positive for chest pain. Negative for palpitations and leg swelling. Gastrointestinal: Negative for abdominal pain, blood in stool, constipation, diarrhea, nausea and vomiting. Musculoskeletal: Negative for arthralgias, myalgias and neck pain. Neurological: Negative for dizziness, weakness, light-headedness, numbness and headaches. Vitals:    06/03/18 0519 06/03/18 0522 06/03/18 0530 06/03/18 0545   BP: 112/62 112/62 117/69 107/70   Pulse:   96 93   Resp: 18  17 16   Temp: 98.9 °F (37.2 °C)      SpO2:  95% 97% 98%   Weight: 129.3 kg (285 lb)      Height: 6' 2\" (1.88 m)               Physical Exam   Constitutional: He is oriented to person, place, and time. He appears well-developed and well-nourished. No distress. HENT:   Head: Normocephalic and atraumatic. Nose: Nose normal.   Mouth/Throat: Oropharynx is clear and moist. No oropharyngeal exudate. Eyes: Conjunctivae and EOM are normal. Pupils are equal, round, and reactive to light. Right eye exhibits no discharge. Left eye exhibits no discharge. No scleral icterus. Neck: Normal range of motion. Neck supple. No JVD present. No tracheal deviation present. No thyromegaly present. Cardiovascular: Normal rate, regular rhythm, normal heart sounds and intact distal pulses. Exam reveals no gallop and no friction rub. No murmur heard. Diffuse bilateral lower ext. Edema with redness. Pulmonary/Chest: Effort normal and breath sounds normal. No stridor. No respiratory distress. He has no wheezes. He has no rales. He exhibits no tenderness.    Abdominal: Soft. Bowel sounds are normal. He exhibits distension. He exhibits no mass. There is no tenderness. There is no rebound. Musculoskeletal: Normal range of motion. He exhibits no edema or tenderness. Lymphadenopathy:     He has no cervical adenopathy. Neurological: He is alert and oriented to person, place, and time. No cranial nerve deficit. Coordination normal.   Skin: Skin is warm and dry. No rash noted. He is not diaphoretic. No erythema. No pallor. Psychiatric: He has a normal mood and affect. His behavior is normal. Judgment and thought content normal.   Nursing note and vitals reviewed. MDM  Number of Diagnoses or Management Options  Diagnosis management comments: ACS, CHF, COPD exacerbation. 58 y/o male presenting with intermittent severe chest pain. Concern for ACS vs CHF. Plan:  CBC, CMP, ce's, CXR, ECG. Amount and/or Complexity of Data Reviewed  Clinical lab tests: ordered and reviewed  Tests in the radiology section of CPT®: reviewed and ordered  Review and summarize past medical records: yes  Discuss the patient with other providers: yes  Independent visualization of images, tracings, or specimens: yes    Risk of Complications, Morbidity, and/or Mortality  Presenting problems: moderate  Diagnostic procedures: moderate  Management options: moderate    Patient Progress  Patient progress: stable        ED Course       Procedures    ED EKG interpretation:  Rhythm: normal sinus rhythm; and regular . Rate (approx.): 98; No ST/T wave changes from prior ECG.   EKG documented by Jael Stallings MD,

## 2018-06-03 NOTE — ROUTINE PROCESS
TRANSFER - OUT REPORT:    Verbal report given to   Mary Pablo RN (name) on Yovani Villalta  being transferred to 456(unit) for routine progression of care       Report consisted of patients Situation, Background, Assessment and   Recommendations(SBAR). Information from the following report(s) SBAR, Kardex, ED Summary, Intake/Output, MAR, Recent Results and Cardiac Rhythm NSR was reviewed with the receiving nurse. Lines:   Peripheral IV 06/03/18 Left Antecubital (Active)   Site Assessment Clean, dry, & intact 6/3/2018  5:39 AM   Phlebitis Assessment 0 6/3/2018  5:39 AM   Infiltration Assessment 0 6/3/2018  5:39 AM   Dressing Status Clean, dry, & intact 6/3/2018  5:39 AM   Dressing Type Transparent 6/3/2018  5:39 AM       Peripheral IV 06/03/18 Right Antecubital (Active)   Site Assessment Clean, dry, & intact 6/3/2018 10:31 AM   Phlebitis Assessment 0 6/3/2018 10:31 AM   Infiltration Assessment 0 6/3/2018 10:31 AM   Dressing Status Clean, dry, & intact 6/3/2018 10:31 AM        Opportunity for questions and clarification was provided.       Patient transported with:   Monitor  Registered Nurse  Tech

## 2018-06-04 NOTE — PROGRESS NOTES
CM following up on previous CM note - patient offered information on AMD to complete and he declined at this time-he understands all decision making would defer to his wife is was ever unable to make his own decisions has home O2 with [employer] and he is interested in smaller tanks and CM explained to speak with his MD or Roger Riley CM about getting evaluated for smaller tanks for home - patient's family will transport to home - CM spoke with Codey Middleton CM with [facility] and follow-up appintment made for patient for 6/6/18 around 10:10 am and they will call patient with a pick-up time. LAUREN Mcgee CM notified by nursing for need for an appointment for OP wound care clinic per wound care nurse- call placed to OP wound care clinic and CM contact information left on patient to schedule an appointment - awaiting call back at this time. LAUREN Mcgee      CM received voice mail from OP wound care clinic and received a message from Marychuy Raman at OP wound care clinic and CM returned call and left another message and awaiting a second call back from OP wound clinic for an appointment. LAUREN Mcgee      OP wound care clinic appointment scheduled for 6/12/18 at 9 am at [facility] OP wound care clinic - placed on AVS - call placed to [address] with Roger Riley and PCP will see patient on Weds to see if he will approve for OP wound care clinic appointment- CM requested to Codey Middleton if MD does not approve the appointment to please cancel the appointment.  LAUREN Mcgee

## 2018-06-04 NOTE — PROGRESS NOTES
Occupational Therapy Screening:  Services maybe indicated at this time. [facility] based on results obtained during the nursing admission assessment. The patients chart was reviewed . Please order a consult for occupational therapy if patient has had a decline in function from baseline and you would like an evaluation to be completed. Thank you.

## 2018-06-04 NOTE — PROGRESS NOTES
Stress test was negative for ischemia. EF 23%. Continue therapy for CHF. No further cardiac evaluation needed at this time.

## 2018-06-04 NOTE — PROGRESS NOTES
0730: Bedside shift change report given to Kristian Spencer RN (oncoming nurse) by Leroy Lay RN (offgoing nurse). Report included the following information SBAR, Kardex, Procedure Summary, Intake/Output, MAR, Accordion, Recent Results, Med Rec Status and Cardiac Rhythm NSR w/ 1st degree AVB. 4208: Pt off unit for stress test  1130: Patient arrived on unit. Tele-monitor placed and confirmed with monitor tech. 1450: Wound care nurse at bedside and dressing change completed on BLE.   1900: Uneventful shift. VSS. 1930: Bedside shift change report given to Leroy Lay RN (oncoming nurse) by Kristian Spencer RN (offgoing nurse). Report included the following information SBAR, Kardex, Procedure Summary, Intake/Output, MAR, Accordion, Recent Results, Med Rec Status and Cardiac Rhythm NSR w/ 1st degree AVB . Problem: Falls - Risk of  Goal: *Absence of Falls  Document Jarad Fall Risk and appropriate interventions in the flowsheet. Outcome: Progressing Towards Goal  Fall Risk Interventions:  Mobility Interventions: Communicate number of staff needed for ambulation/transfer, Patient to call before getting OOB, Utilize walker, cane, or other assitive device         Medication Interventions: Evaluate medications/consider consulting pharmacy, Patient to call before getting OOB, Teach patient to arise slowly    Elimination Interventions: Call light in reach, Patient to call for help with toileting needs, Toilet paper/wipes in reach, Toileting schedule/hourly rounds, Urinal in reach    History of Falls Interventions: Consult care management for discharge planning, Door open when patient unattended, Investigate reason for fall        Problem: Pressure Injury - Risk of  Goal: *Prevention of pressure injury  Document Charlie Scale and appropriate interventions in the flowsheet. Outcome: Progressing Towards Goal  Pressure Injury Interventions:             Activity Interventions: Increase time out of bed, Pressure redistribution bed/mattress(bed type)    Mobility Interventions: Chair cushion, Float heels, Pressure redistribution bed/mattress (bed type)    Nutrition Interventions: Document food/fluid/supplement intake    Friction and Shear Interventions: Lift sheet               Problem: Cellulitis Care Plan (Adult)  Goal: *Control of acute pain  Outcome: Progressing Towards Goal  Scheduled pain medications    Problem: Heart Failure: Day 2  Goal: Consults, if ordered  Outcome: Progressing Towards Goal  Cardiology consulted and following  Goal: Diagnostic Test/Procedures  Outcome: Progressing Towards Goal  Stress test completed today  Goal: Medications  Outcome: Progressing Towards Goal  Taking medications as prescribed

## 2018-06-04 NOTE — WOUND CARE
WOCN Note:     New consult placed by MD for assessment of lower extremities. Chart reviewed. Admitted DX: Chest pain   Past Medical History:  CAD, CHF, COPD, DM, HTN, MI, Restless leg syndrome  Admitted from home. Assessment:   Patient is A&O x 3 and communicative. Bed: total care    Diet: cardiac  Patient reports no pain. Bilateral heels intact and without erythema. Generalized edema and blanching erythema to lower legs and feet. Superficial 100% pink open areas scattered on bilateral legs; small serous exudate. Heels offloaded on pillow. Recommendations:    Patient would benefit from therapeutic compression (hose, layered wraps or intermittent pneumatic compression). Elevate legs often. Bilateral low legs:  Every other day cleanse with mild soap and water, apply Xeroform, cover with dry roll gauze and ACE wrap until seen by [address]. Skin Care & Pressure Prevention:  Minimize layers of linen/pads under patient to optimize support surface. Turn/reposition approximately every 2 hours and offload heels. Discussed above plan with patient and RN.     Transition of Care: Plan to follow weekly and as needed while admitted to hospital.    Duane Bolls, BSN RN [id number] [facility] [phone]  Pager [phone]

## 2018-06-04 NOTE — PROGRESS NOTES
1930: Bedside and Verbal shift change report given to Argelia Thompson RN (oncoming nurse) by Chauncey Carroll RN (offgoing nurse). Report included the following information SBAR, Kardex, Intake/Output, MAR, Recent Results and Cardiac Rhythm NSR.   0730: Bedside and Verbal shift change report given to Orion Cooper RN (oncoming nurse) by Argelia Thompson RN (offgoing nurse). Report included the following information SBAR, Kardex, Intake/Output, MAR, Recent Results and Cardiac Rhythm NSR. Problem: Falls - Risk of  Goal: *Absence of Falls  Document Jarad Fall Risk and appropriate interventions in the flowsheet. Outcome: Progressing Towards Goal  Fall Risk Interventions:  Mobility Interventions: Communicate number of staff needed for ambulation/transfer         Medication Interventions: Patient to call before getting OOB, Teach patient to arise slowly    Elimination Interventions: Call light in reach, Elevated toilet seat, Toileting schedule/hourly rounds, Urinal in reach, Toilet paper/wipes in reach, Patient to call for help with toileting needs    History of Falls Interventions: Consult care management for discharge planning        Problem: Pressure Injury - Risk of  Goal: *Prevention of pressure injury  Document Charlie Scale and appropriate interventions in the flowsheet. Outcome: Progressing Towards Goal  Pressure Injury Interventions:             Activity Interventions: Increase time out of bed, Pressure redistribution bed/mattress(bed type), PT/OT evaluation    Mobility Interventions: HOB 30 degrees or less, Pressure redistribution bed/mattress (bed type), PT/OT evaluation    Nutrition Interventions: Document food/fluid/supplement intake    Friction and Shear Interventions: Lift sheet

## 2018-06-04 NOTE — NURSE NAVIGATOR
Chart reviewed by Heart Failure Nurse Navigator. Heart Failure database completed. EF:  30/35     ACEi/ARB: Lisinopril    BB: Toprol XL    Aldosterone Antagonist: Aldactone    CRT  AICD qualification  EF < or equal to 35% for longer than 90 day on optimal medical therapy. NYHA Functional Class  IV. Heart Failure Teach Back in Patient Education. Heart Failure Avoiding Triggers on Discharge Instructions. Cardiologist: PAULO    Readmit - prior admission 5/1 5/7/18.

## 2018-06-04 NOTE — CDMP QUERY
Please clarify if this patient is (was) being treated/managed for:     => Chronic respiratory failure in the setting of COPD requiring continuous O2, inhalers, nebulizers  => Other explanation of clinical findings  => Clinically Undetermined (no explanation for clinical findings)    The medical record reflects the following clinical findings, treatment, and risk factors. Risk Factors:  COPD, h/o smoking  Clinical Indicators:  H&P- COPD, he states he is on 3 liters home O2  Treatment: O2 at 3L; PTA meds DuoNeb, Spiriva, Advair    Please clarify and document your clinical opinion in the progress notes and discharge summary including the definitive and/or presumptive diagnosis, (suspected or probable), related to the above clinical findings. Please include clinical findings supporting your diagnosis.       Thank you,    Summer Pollard RN  [facility]  [phone]

## 2018-06-04 NOTE — PROGRESS NOTES
Hospitalist Progress Note  Leann Park MD  Answering service: [phone] from in house phone  Cell: [phone]      Date of Service:  2018  NAME:  Abe Villalta  :  1960  MRN:  [id number]      Admission Summary:   59 y.o. male recently admitted to [facility] who returns for intermittent persistent chest pain. Pt's chest pain is palpable and reproducible. Interval history / Subjective:     Pt seen and examined  Still having sharp, reproducible chest pain   No sob     Assessment & Plan:     Chest pain, atypical  - Stress test negative  - Cardiology following  - c/w ASA/Plavix/Toprol XL/Lipitor    Chronic Systolic CHF NYHA (difficult to assess due to chronically on h ome O2)  - Toprol XL/Lisinopril/Lasix/Aldactone  - c/w Imdur    Chronic Venous stasis b/l LE  - NO CELLULITIS  - d/c abx  - wound care and outpatient wound care follow up     Chronic Pain syndrome  - c/w Dilaudid and MS CONTIN    RLS  - c/w Requip    Hyponatremia, POA  - resolved       Code status: FULL  DVT prophylaxis: SCDs    Care Plan discussed with: Patient/Family and Nurse  Disposition: Home w/Family     Hospital Problems  Date Reviewed: 3/13/2018          Codes Class Noted POA    Chest pain ICD-10-CM: R07.9  ICD-9-CM: 786.50  2014 Unknown                Review of Systems:   A comprehensive review of systems was negative except for that written in the HPI. Vital Signs:    Last 24hrs VS reviewed since prior progress note.  Most recent are:  Visit Vitals    /66 (BP 1 Location: Left arm, BP Patient Position: At rest;Head of bed elevated (Comment degrees))    Pulse 81    Temp 97.7 °F (36.5 °C)    Resp 16    Ht 6' 2\" (1.88 m)    Wt 119.6 kg (263 lb 10.7 oz)    SpO2 99%    BMI 33.85 kg/m2         Intake/Output Summary (Last 24 hours) at 18 1550  Last data filed at 18 1504   Gross per 24 hour   Intake             2140 ml   Output             3450 ml   Net            -1310 ml        Physical Examination:             Constitutional:  No acute distress, cooperative, pleasant    ENT:  Oral mucous moist   Resp:  CTA bilaterally. CV:  Regular rhythm, normal rate,    GI:  Soft, non distended, non tender. normoactive bowel sounds    Musculoskeletal:  No edema, warm, 2+ pulses throughout    Neurologic:  Moves all extremities. AAOx3,     Skin:  chronic venous stasis changes in LE, no warmth or tenderness, no drainage       Data Review:    Review and/or order of clinical lab test  Review and/or order of tests in the radiology section of CPT  Review and/or order of tests in the medicine section of CPT      Labs:     Recent Labs      06/03/18   0521   WBC  7.7   HGB  11.6*   HCT  37.7   PLT  382     Recent Labs      06/04/18   0321  06/03/18   0521   NA  137  132*   K  4.1  4.4   CL  101  97   CO2  28  26   BUN  11  12   CREA  0.82  0.80   GLU  92  90   CA  8.3*  9.1     Recent Labs      06/03/18   0521   SGOT  12*   ALT  13   AP  155*   TBILI  0.5   TP  8.2   ALB  3.0*   GLOB  5.2*     No results for input(s): INR, PTP, APTT in the last 72 hours. No lab exists for component: INREXT   No results for input(s): FE, TIBC, PSAT, FERR in the last 72 hours. Lab Results   Component Value Date/Time    Folate 7.9 02/27/2018 03:52 AM    Folate 7.5 02/27/2018 03:52 AM      No results for input(s): PH, PCO2, PO2 in the last 72 hours.   Recent Labs      06/04/18   0321  06/03/18   1827  06/03/18   1127   TROIQ  <0.04  <0.04  <0.04     Lab Results   Component Value Date/Time    Cholesterol, total 284 (H) 05/21/2014 09:59 AM    HDL Cholesterol 23 05/21/2014 09:59 AM    LDL,Direct 193 (H) 05/21/2014 09:59 AM    LDL, calculated Not calculated due to elevated triglyceride level 05/21/2014 09:59 AM    Triglyceride 482 (H) 05/21/2014 09:59 AM    CHOL/HDL Ratio 12.3 (H) 05/21/2014 09:59 AM     Lab Results   Component Value Date/Time    Glucose (POC) 101 (H) 06/04/2018 11:36 AM    Glucose (POC) 106 (H) 06/04/2018 06:39 AM    Glucose (POC) 103 (H) 06/03/2018 09:09 PM    Glucose (POC) 96 06/03/2018 04:35 PM    Glucose (POC) 107 (H) 05/07/2018 11:15 AM     Lab Results   Component Value Date/Time    Color YELLOW/STRAW 05/01/2018 03:16 PM    Appearance CLEAR 05/01/2018 03:16 PM    Specific gravity 1.007 05/01/2018 03:16 PM    pH (UA) 5.5 05/01/2018 03:16 PM    Protein NEGATIVE  05/01/2018 03:16 PM    Glucose NEGATIVE  05/01/2018 03:16 PM    Ketone NEGATIVE  05/01/2018 03:16 PM    Bilirubin NEGATIVE  05/01/2018 03:16 PM    Urobilinogen 0.2 05/01/2018 03:16 PM    Nitrites NEGATIVE  05/01/2018 03:16 PM    Leukocyte Esterase TRACE (A) 05/01/2018 03:16 PM    Epithelial cells FEW 05/01/2018 03:16 PM    Bacteria NEGATIVE  05/01/2018 03:16 PM    WBC 0-4 05/01/2018 03:16 PM    RBC 0-5 05/01/2018 03:16 PM         Medications Reviewed:     Current Facility-Administered Medications   Medication Dose Route Frequency    sodium chloride (NS) flush 5-10 mL  5-10 mL IntraVENous Q8H    sodium chloride (NS) flush 5-10 mL  5-10 mL IntraVENous PRN    lisinopril (PRINIVIL, ZESTRIL) tablet 2.5 mg  2.5 mg Oral DAILY    metoprolol succinate (TOPROL-XL) XL tablet 25 mg  25 mg Oral DAILY    isosorbide mononitrate ER (IMDUR) tablet 60 mg  60 mg Oral DAILY    clopidogrel (PLAVIX) tablet 75 mg  75 mg Oral DAILY    atorvastatin (LIPITOR) tablet 80 mg  80 mg Oral DAILY    pantoprazole (PROTONIX) tablet 20 mg  20 mg Oral ACB    nicotine (NICODERM CQ) 14 mg/24 hr patch 1 Patch  1 Patch TransDERmal DAILY    naloxone (NARCAN) injection 0.4 mg  0.4 mg IntraVENous EVERY 2 MINUTES AS NEEDED    insulin lispro (HUMALOG) injection   SubCUTAneous AC&HS    glucose chewable tablet 16 g  4 Tab Oral PRN    dextrose (D50W) injection syrg 12.5-25 g  12.5-25 g IntraVENous PRN    glucagon (GLUCAGEN) injection 1 mg  1 mg IntraMUSCular PRN    nortriptyline (PAMELOR) capsule 50 mg  50 mg Oral QHS    rOPINIRole (REQUIP) tablet 8 mg  8 mg Oral QHS    morphine CR (MS CONTIN) tablet 30 mg  30 mg Oral Q8H    HYDROmorphone (DILAUDID) tablet 4 mg  4 mg Oral Q12H    spironolactone (ALDACTONE) tablet 50 mg  50 mg Oral BID    furosemide (LASIX) tablet 60 mg  60 mg Oral DAILY    nitroglycerin (NITROSTAT) tablet 0.4 mg  0.4 mg SubLINGual PRN    ferrous sulfate tablet 325 mg  1 Tab Oral DAILY WITH BREAKFAST    citalopram (CELEXA) tablet 40 mg  40 mg Oral DAILY    zolpidem (AMBIEN) tablet 10 mg  10 mg Oral QHS    piperacillin-tazobactam (ZOSYN) 3.375 g in 0.9% sodium chloride (MBP/ADV) 100 mL  3.375 g IntraVENous Q8H    aspirin chewable tablet 81 mg  81 mg Oral DAILY    arformoterol (BROVANA) neb solution 15 mcg  15 mcg Nebulization BID RT    And    budesonide (PULMICORT) 500 mcg/2 ml nebulizer suspension  500 mcg Nebulization BID RT    vancomycin (VANCOCIN) 1500 mg in  ml infusion  1,500 mg IntraVENous Q12H    Vancomycin Pharmacy Dosing   Other PRN    albuterol-ipratropium (DUO-NEB) 2.5 MG-0.5 MG/3 ML  3 mL Nebulization Q4H PRN    aluminum-magnesium hydroxide (MAALOX) oral suspension 15 mL  15 mL Oral QID PRN    simethicone (MYLICON) tablet 80 mg  80 mg Oral QID PRN     ______________________________________________________________________  EXPECTED LENGTH OF STAY: 1d 21h  ACTUAL LENGTH OF STAY:          1                 Toni Freeman MD

## 2018-06-05 NOTE — PROGRESS NOTES
1930: Bedside and Verbal shift change report given to Heber Sandoval RN (oncoming nurse) by Troy Martinez RN (offgoing nurse). Report included the following information SBAR, Kardex, Intake/Output, MAR, Recent Results and Cardiac Rhythm NSR.   2200: Patient voicing concerns about discharge and not being able to care for self. Will communicate with dayshift RN about a PT consult prior to discharge. 0400: Assisted patient to standing scale. Patient is very weak and a heavy 1 assist. Could benefit from PT consult. 0730: Bedside and Verbal shift change report given to Troy Martinez RN (oncoming nurse) by Heber Sandoval RN (offgoing nurse). Report included the following information SBAR, Kardex, Intake/Output, MAR, Recent Results and Cardiac Rhythm NSR. Problem: Falls - Risk of  Goal: *Absence of Falls  Document Jarad Fall Risk and appropriate interventions in the flowsheet. Outcome: Progressing Towards Goal  Fall Risk Interventions:  Mobility Interventions: Communicate number of staff needed for ambulation/transfer         Medication Interventions: Patient to call before getting OOB, Teach patient to arise slowly    Elimination Interventions: Call light in reach, Toilet paper/wipes in reach, Patient to call for help with toileting needs, Toileting schedule/hourly rounds, Urinal in reach    History of Falls Interventions: Consult care management for discharge planning        Problem: Pressure Injury - Risk of  Goal: *Prevention of pressure injury  Document Charlie Scale and appropriate interventions in the flowsheet.    Outcome: Progressing Towards Goal  Pressure Injury Interventions:  Sensory Interventions: Assess changes in LOC, Check visual cues for pain, Float heels, Keep linens dry and wrinkle-free, Maintain/enhance activity level, Minimize linen layers, Monitor skin under medical devices    Moisture Interventions: Absorbent underpads    Activity Interventions: Pressure redistribution bed/mattress(bed type), Increase time out of bed, PT/OT evaluation    Mobility Interventions: HOB 30 degrees or less, Pressure redistribution bed/mattress (bed type), PT/OT evaluation    Nutrition Interventions: Document food/fluid/supplement intake    Friction and Shear Interventions: Lift sheet, Feet elevated on foot rest

## 2018-06-05 NOTE — PROGRESS NOTES
TRANSFER - IN REPORT:    Verbal report received from Nusrat(name) on Vinicio [facility]pos [facility]  being received from [facility](unit) for routine progression of care      Report consisted of patients Situation, Background, Assessment and   Recommendations(SBAR). Information from the following report(s) SBAR, Kardex, Intake/Output and MAR was reviewed with the receiving nurse. Opportunity for questions and clarification was provided. Assessment completed upon patients arrival to unit and care assumed.

## 2018-06-05 NOTE — PROGRESS NOTES
TRANSFER - OUT REPORT:    Verbal report given to Cami Norris CM(name) on Virl [facility]  being transferred to (unit) for routine progression of care       Report consisted of patients Situation, Background, Assessment and   Recommendations(SBAR). Information from the following report(s) SBAR was reviewed with the receiving CRM.

## 2018-06-05 NOTE — CARDIO/PULMONARY
Cardiac Rehab: Heart Failure education folder, with weight calendar and magnet, given to [employer]. Educated using teach back method. Discussed diagnosis definition and assessed patient understanding. Reviewed importance of daily weight monitoring and Low Sodium diet (4186-8017 mg. daily). Encouraged activity and rest periods within symptom limitations and as ordered by physician. Reviewed metoprolol, purpose of medication, potential side effects, compliance, and what to do if dose is missed. Discussed importance of reporting signs and symptoms of exacerbation, and when to report them to the doctor, to prevent re-hospitalization. Deisi Villalta was encouraged to keep all appointments with doctor. Smoking history assessed. Patient is a current smoker. Smoking Cessation Program link added to the AVS.     Discussed the Cardiac Rehab Program, benefits, format, and encouraged enrollment, when eligible. Patient does not think he will be physically able to participate in exercise and declines enrollment at this time. WALTER Flynn JOSE J Villalta could benefit from further education on the following HF topics: All topics.

## 2018-06-05 NOTE — PROGRESS NOTES
Bedside shift change report given to Nelson Ziegler (oncoming nurse) by Tila Foster (offgoing nurse). Report included the following information SBAR, Kardex, Intake/Output and MAR.

## 2018-06-05 NOTE — PROGRESS NOTES
Hospitalist Progress Note  Li Lawton NP  Answering service: [phone] from in house phone  Cell: [phone]      Date of Service:  2018  NAME:  Montana Villalta  :  1960  MRN:  [id number]      Admission Summary:   59 y.o. male recently admitted to [facility] who returns for intermittent persistent chest pain. Pt's chest pain is palpable and reproducible. Interval history / Subjective:   Patient reports occasional sharp chest pains that come and go. Same chest pain as before. Continues to report BLE pain. Seen by PT/OT today who recommended snf. CM working on insurance auth with [facility]. Patient seen with RN and CM at bedside. Assessment & Plan:     Chest pain, atypical  - Stress test negative  - Cardiology signed off  - c/w ASA/Plavix/Toprol XL/Lipitor  -suspect anxiety playing a role    Chronic Systolic CHF NYHA (difficult to assess due to chronically on h ome O2)  -appears while compensated  - Toprol XL/Lisinopril/Lasix/Aldactone  - c/w Imdur    Chronic Venous stasis b/l LE  - NO CELLULITIS  - d/c abx  - wound care and outpatient wound care follow up     Chronic Pain syndrome  - c/w Dilaudid and MS CONTIN    RLS  - c/w Requip    Hyponatremia, POA  - resolved     Chronic Respiratory failure in the setting of CHF and COPD  -continue home o2 at 3L via nasal cannula    Generalized debility   -PT/OT consulted-recommending rehab. CM working on auth    Code status: FULL  DVT prophylaxis: SCDs    Care Plan discussed with: Patient/Family, Nurse and   Disposition: [facility]/Rehab pending insurance [facility] Problems  Date Reviewed: 3/13/2018          Codes Class Noted POA    Chest pain ICD-10-CM: R07.9  ICD-9-CM: 786.50  2014 Unknown                Review of Systems:   A comprehensive review of systems was negative except for that written in the HPI.        Vital Signs:    Last 24hrs VS reviewed since prior progress note. Most recent are:  Visit Vitals    /63 (BP 1 Location: Left arm)    Pulse 95    Temp 98.1 °F (36.7 °C)    Resp 16    Ht 6' 2\" (1.88 m)    Wt 117.6 kg (259 lb 4.2 oz)    SpO2 97%    BMI 33.29 kg/m2         Intake/Output Summary (Last 24 hours) at 06/05/18 1421  Last data filed at 06/05/18 1314   Gross per 24 hour   Intake             1440 ml   Output             4400 ml   Net            -2960 ml        Physical Examination:             Constitutional:  No acute distress, cooperative, pleasant    ENT:  Oral mucous moist   Resp:  CTA bilaterally. CV:  Regular rhythm, normal rate,    GI:  Soft, non distended, non tender. normoactive bowel sounds    Musculoskeletal:  No edema, warm, 2+ pulses throughout    Neurologic:  Moves all extremities. AAOx3,     Skin:  chronic venous stasis changes in LE, no warmth or tenderness, no drainage       Data Review:    Review and/or order of clinical lab test  Review and/or order of tests in the radiology section of CPT  Review and/or order of tests in the medicine section of CPT      Labs:     Recent Labs      06/03/18   0521   WBC  7.7   HGB  11.6*   HCT  37.7   PLT  382     Recent Labs      06/05/18   0401  06/04/18   0321  06/03/18   0521   NA  137  137  132*   K  4.3  4.1  4.4   CL  103  101  97   CO2  28  28  26   BUN  9  11  12   CREA  0.69*  0.82  0.80   GLU  113*  92  90   CA  8.4*  8.3*  9.1     Recent Labs      06/03/18   0521   SGOT  12*   ALT  13   AP  155*   TBILI  0.5   TP  8.2   ALB  3.0*   GLOB  5.2*     No results for input(s): INR, PTP, APTT in the last 72 hours. No lab exists for component: INREXT, INREXT   No results for input(s): FE, TIBC, PSAT, FERR in the last 72 hours. Lab Results   Component Value Date/Time    Folate 7.9 02/27/2018 03:52 AM    Folate 7.5 02/27/2018 03:52 AM      No results for input(s): PH, PCO2, PO2 in the last 72 hours.   Recent Labs      06/04/18   0321  06/03/18   1827  06/03/18   1127   TROIQ  <0.04 <0.04  <0.04     Lab Results   Component Value Date/Time    Cholesterol, total 284 (H) 05/21/2014 09:59 AM    HDL Cholesterol 23 05/21/2014 09:59 AM    LDL,Direct 193 (H) 05/21/2014 09:59 AM    LDL, calculated Not calculated due to elevated triglyceride level 05/21/2014 09:59 AM    Triglyceride 482 (H) 05/21/2014 09:59 AM    CHOL/HDL Ratio 12.3 (H) 05/21/2014 09:59 AM     Lab Results   Component Value Date/Time    Glucose (POC) 149 (H) 06/05/2018 11:12 AM    Glucose (POC) 123 (H) 06/05/2018 06:34 AM    Glucose (POC) 138 (H) 06/04/2018 09:13 PM    Glucose (POC) 88 06/04/2018 04:32 PM    Glucose (POC) 101 (H) 06/04/2018 11:36 AM     Lab Results   Component Value Date/Time    Color YELLOW/STRAW 05/01/2018 03:16 PM    Appearance CLEAR 05/01/2018 03:16 PM    Specific gravity 1.007 05/01/2018 03:16 PM    pH (UA) 5.5 05/01/2018 03:16 PM    Protein NEGATIVE  05/01/2018 03:16 PM    Glucose NEGATIVE  05/01/2018 03:16 PM    Ketone NEGATIVE  05/01/2018 03:16 PM    Bilirubin NEGATIVE  05/01/2018 03:16 PM    Urobilinogen 0.2 05/01/2018 03:16 PM    Nitrites NEGATIVE  05/01/2018 03:16 PM    Leukocyte Esterase TRACE (A) 05/01/2018 03:16 PM    Epithelial cells FEW 05/01/2018 03:16 PM    Bacteria NEGATIVE  05/01/2018 03:16 PM    WBC 0-4 05/01/2018 03:16 PM    RBC 0-5 05/01/2018 03:16 PM         Medications Reviewed:     Current Facility-Administered Medications   Medication Dose Route Frequency    sodium chloride (NS) flush 5-10 mL  5-10 mL IntraVENous Q8H    sodium chloride (NS) flush 5-10 mL  5-10 mL IntraVENous PRN    lisinopril (PRINIVIL, ZESTRIL) tablet 2.5 mg  2.5 mg Oral DAILY    metoprolol succinate (TOPROL-XL) XL tablet 25 mg  25 mg Oral DAILY    isosorbide mononitrate ER (IMDUR) tablet 60 mg  60 mg Oral DAILY    clopidogrel (PLAVIX) tablet 75 mg  75 mg Oral DAILY    atorvastatin (LIPITOR) tablet 80 mg  80 mg Oral DAILY    pantoprazole (PROTONIX) tablet 20 mg  20 mg Oral ACB    nicotine (NICODERM CQ) 14 mg/24 hr patch 1 Patch  1 Patch TransDERmal DAILY    naloxone (NARCAN) injection 0.4 mg  0.4 mg IntraVENous EVERY 2 MINUTES AS NEEDED    insulin lispro (HUMALOG) injection   SubCUTAneous AC&HS    glucose chewable tablet 16 g  4 Tab Oral PRN    dextrose (D50W) injection syrg 12.5-25 g  12.5-25 g IntraVENous PRN    glucagon (GLUCAGEN) injection 1 mg  1 mg IntraMUSCular PRN    nortriptyline (PAMELOR) capsule 50 mg  50 mg Oral QHS    rOPINIRole (REQUIP) tablet 8 mg  8 mg Oral QHS    morphine CR (MS CONTIN) tablet 30 mg  30 mg Oral Q8H    HYDROmorphone (DILAUDID) tablet 4 mg  4 mg Oral Q12H    spironolactone (ALDACTONE) tablet 50 mg  50 mg Oral BID    furosemide (LASIX) tablet 60 mg  60 mg Oral DAILY    nitroglycerin (NITROSTAT) tablet 0.4 mg  0.4 mg SubLINGual PRN    ferrous sulfate tablet 325 mg  1 Tab Oral DAILY WITH BREAKFAST    citalopram (CELEXA) tablet 40 mg  40 mg Oral DAILY    zolpidem (AMBIEN) tablet 10 mg  10 mg Oral QHS    aspirin chewable tablet 81 mg  81 mg Oral DAILY    arformoterol (BROVANA) neb solution 15 mcg  15 mcg Nebulization BID RT    And    budesonide (PULMICORT) 500 mcg/2 ml nebulizer suspension  500 mcg Nebulization BID RT    albuterol-ipratropium (DUO-NEB) 2.5 MG-0.5 MG/3 ML  3 mL Nebulization Q4H PRN    aluminum-magnesium hydroxide (MAALOX) oral suspension 15 mL  15 mL Oral QID PRN    simethicone (MYLICON) tablet 80 mg  80 mg Oral QID PRN     ______________________________________________________________________  EXPECTED LENGTH OF STAY: 1d 21h  ACTUAL LENGTH OF STAY:          2                 Renny Apodaca NP

## 2018-06-05 NOTE — PROGRESS NOTES
Problem: Self Care Deficits Care Plan (Adult)  Goal: *Acute Goals and Plan of Care (Insert Text)  Occupational Therapy Goals  Initiated 6/5/2018  1. Patient will perform grooming at the sink with supervision/set-up within 7 day(s). 2.  Patient will perform bathing with minimal assistance/contact guard assist and AE PRN within 7 day(s). 3.  Patient will perform lower body dressing with moderate assistance within 7 day(s). 4.  Patient will perform toilet transfers with supervision/set-up and least restrictive DME within 7 day(s). 5.  Patient will perform all aspects of toileting with supervision/set-up within 7 day(s). 6.  Patient will participate in upper extremity therapeutic exercise/activities with independence for 10 minutes within 7 day(s). 7.  Patient will utilize energy conservation techniques during functional activities with verbal cues within 7 day(s). Occupational Therapy EVALUATION  Patient: Ximena Villalta (62 y.o. male)  Date: 6/5/2018  Primary Diagnosis: Chest pain        Precautions:  Fall    ASSESSMENT :  Based on the objective data described below, the patient presents with overall indepedence-Min A for upper body ADLs, Mod-Total A for lower body ADLs, and Min A for functional mobility s/p admission for chest pain. PTA, patient was receiving up to [address] for lower body ADLs 2* BLE pain & edema, reporting overall decline requiring increased assist over the past few months (independent, active prior). Now presents with impaired balance & functional mobility requiring RW for BUE stability, decreased functional reach 2* BLE pain & edema, generalized weakness, impulsivity with activity, and decreased safety awareness. Total A for donning socks, completing functional transfers with Min A & RW for balance, toilet transfer requiring grab bar use and verbal cues for safety & hand placement.  2 brief rest breaks & 3L NC (baseline) required with all mobility, patient completing toileting in standing with Min A and noted posterior leaning with urinal use. Returned to supine 2* BLE pain with SBA for line management & safety. Will benefit from brief rehab stay upon d/c to maximize safety & functional independence prior to returning home with wife. Patient will benefit from skilled intervention to address the above impairments. Patients rehabilitation potential is considered to be Good  Factors which may influence rehabilitation potential include:   []             None noted  []             Mental ability/status  []             Medical condition  []             Home/family situation and support systems  []             Safety awareness  []             Pain tolerance/management  []             Other:      PLAN :  Recommendations and Planned Interventions:  [x]               Self Care Training                  [x]        Therapeutic Activities  [x]               Functional Mobility Training    []        Cognitive Retraining  [x]               Therapeutic Exercises           [x]        Endurance Activities  [x]               Balance Training                   []        Neuromuscular Re-Education  []               Visual/Perceptual Training     [x]   Home Safety Training  [x]               Patient Education                 [x]        Family Training/Education  []               Other (comment):    Frequency/Duration: Patient will be followed by occupational therapy 5 times a week to address goals.   Discharge Recommendations: Rehab  Further Equipment Recommendations for Discharge: TBD by rehab      SUBJECTIVE:   Patient stated I can't sit down to use the bathroom, I have to stand to use that urinal.    OBJECTIVE DATA SUMMARY:   HISTORY:   Past Medical History:   Diagnosis Date    CAD (coronary artery disease)     [facility]     Chronic systolic [facility] failure [facility] 4/12/2011    COPD [facility]chronic obstructive pulmonary disease) [facility]     Coronary atherosclerosis of native coronary artery 4/12/2011    [facility]     High cholesterol     Hypertension     MI, old     Neurological disorder     Other primary cardiomyopathies 4/12/2011    Restless leg syndrome      Past Surgical History:   Procedure Laterality Date    CARDIAC SURG PROCEDURE UNLIST      stents x7    HX CARPAL TUNNEL RELEASE      right wrist       Prior Level of Function/Environment/Context: PTA, patient receiving assist for lower body dressing from wife 2* BLE pain & swelling, independent & active prior. Chronic 3L O2 at home, sleeps in hospital bed, has been using urinal recently 2* recent difficulty with ambulating to/from bathroom. Lives with wife, son comes by occasionally to assist  Home Situation  Home Environment: Private residence  # Steps to Enter: 1  One/Two Story Residence: One story  Living Alone: No  Support Systems: Spouse/Significant Other/Partner, Child(susan)  Patient Expects to be Discharged to[de-identified] Private residence  Current DME Used/Available at Home: Sathish Chhaya, straight, Glucometer, Oxygen, portable, Nebulizer, CPAP, Hospital bed, Commode, bedside, Walker  Tub or Shower Type: Tub/Shower combination    EXAMINATION OF PERFORMANCE DEFICITS:  Cognitive/Behavioral Status:  Neurologic State: Alert  Orientation Level: Oriented X4  Cognition: Follows commands; Impulsive  Perception: Appears intact  Perseveration: No perseveration noted  Safety/Judgement: Awareness of environment;Decreased awareness of need for assistance;Decreased awareness of need for safety    Skin: Appears intact, dressing applied to BLEs    Edema: None noted in BUEs, BLE edema    Hearing: Auditory  Auditory Impairment: None    Vision/Perceptual:    Tracking: Able to track stimulus in all quadrants w/o difficulty    Acuity: Within Defined Limits    Corrective Lenses: Glasses    Range of Motion:  In BUEs  AROM: Generally decreased, functional  PROM: Within functional limits     Strength: In BUEs  Strength:  Within functional limits Coordination:  Coordination: Grossly decreased, non-functional  Fine Motor Skills-Upper: Left Intact; Right Intact    Gross Motor Skills-Upper: Left Intact; Right Intact    Tone & Sensation:  In BUEs  Tone: Normal  Sensation: Intact    Balance:  Sitting: Intact  Standing: Impaired  Standing - Static: Constant support; Fair  Standing - Dynamic : Fair    Functional Mobility and Transfers for ADLs:  Bed Mobility:  Sit to Supine: Stand-by assistance (Line management & safety cues)  Scooting: Supervision    Transfers:  Sit to Stand: Minimum assistance;Assist x1  Stand to Sit: Minimum assistance;Assist x1  Bed to Chair: Minimum assistance;Assist x1 (Chair>bed)  Toilet Transfer : Minimum assistance;Assist x1;Adaptive equipment (Grab bars, RW, cues for safety)    ADL Assessment:  Feeding: Independent*    Oral Facial Hygiene/Grooming: Minimum assistance (Standing at sink)*    Bathing: Moderate assistance (For distal BLEs and standing balance)*    Upper Body Dressing: Independent*    Lower Body Dressing: Moderate assistance (For distal dressing, standing balance)    Toileting: Minimum assistance (For standing balance)   *Inferred per obs of functional mobility, functional reach, BUE ROM/strength, BLE pain and activity tolerance             ADL Intervention and task modifications:       Grooming  Grooming Assistance:  (Declined, reporting will complete when wife arrives)    Lower Body Dressing Assistance  Dressing Assistance: Total assistance(dependent)  Pants With Button/Zipper: Minimum assistance (Able to manipulate for toileting with Min A for balance)  Socks: Total assistance (dependent)  Leg Crossed Method Used: No  Position Performed: Seated in chair  Cues: Don;Physical assistance    Toileting  Toileting Assistance: Minimum assistance (For standing balance, posterior lean with urinal)  Bladder Hygiene: Minimum assistance (Urinal)  Clothing Management: Minimum assistance  Cues: Verbal cues provided; Tactile cues provided    Cognitive Retraining  Safety/Judgement: Awareness of environment;Decreased awareness of need for assistance;Decreased awareness of need for safety    Therapeutic Exercise:  Functional transfers completed with Min A and RW, 2 rest breaks required for ambulation to/from bathroom, verbal cues for safety and hand placement with toilet transfer     Functional Measure:  Barthel Index:    Bathin  Bladder: 10  Bowels: 10  Groomin  Dressin  Feeding: 10  Mobility: 10  Stairs: 0  Toilet Use: 5  Transfer (Bed to Chair and Back): 10  Total: 65       Barthel and G-code impairment scale:  Percentage of impairment CH  0% CI  1-19% CJ  20-39% CK  40-59% CL  60-79% CM  80-99% CN  100%   Barthel Score 0-100 100 99-80 79-60 59-40 20-39 1-19   0   Barthel Score 0-20 20 17-19 13-16 9-12 5-8 1-4 0      The Barthel ADL Index: Guidelines  1. The index should be used as a record of what a patient does, not as a record of what a patient could do. 2. The main aim is to establish degree of independence from any help, physical or verbal, however minor and for whatever reason. 3. The need for supervision renders the patient not independent. 4. A patient's performance should be established using the best available evidence. Asking the patient, friends/relatives and nurses are the usual sources, but direct observation and common sense are also important. However direct testing is not needed. 5. Usually the patient's performance over the preceding 24-48 hours is important, but occasionally longer periods will be relevant. 6. Middle categories imply that the patient supplies over 50 per cent of the effort. 7. Use of aids to be independent is allowed. Michelle Castellanos., Barthel, D.W. (1248). Functional evaluation: the Barthel Index. [address] [facility] (14)2. ARIN [place], Bib Hu., Jamas Riser., Bobbi, [address] ().  Measuring the change indisability after inpatient rehabilitation; comparison of the responsiveness of the Barthel Index and Functional [place] Measure. Journal of Neurology, Neurosurgery, and Psychiatry, 66(4), 331-292. KIAH Brito, ROLANDO Carlos, & Linda Tomas M.A. (2004.) Assessment of post-stroke quality of life in cost-effectiveness studies: The usefulness of the Barthel Index and the EuroQoL-5D. Quality of Life Research, 13, 435-04         G codes: In compliance with CMSs Claims Based Outcome Reporting, the following G-code set was chosen for this patient based on their primary functional limitation being treated: The outcome measure chosen to determine the severity of the functional limitation was the Barthel Index with a score of 65/100 which was correlated with the impairment scale. ? Self Care:     - CURRENT STATUS: CJ - 20%-39% impaired, limited or restricted    - GOAL STATUS: CI - 1%-19% impaired, limited or restricted    - D/C STATUS:  ---------------To be determined---------------     Occupational Therapy Evaluation Charge Determination   History Examination Decision-Making   LOW Complexity : Brief history review  MEDIUM Complexity : 3-5 performance deficits relating to physical, cognitive , or psychosocial skils that result in activity limitations and / or participation restrictions LOW Complexity : No comorbidities that affect functional and no verbal or physical assistance needed to complete eval tasks       Based on the above components, the patient evaluation is determined to be of the following complexity level: LOW   Pain:  Pain Scale 1: Numeric (0 - 10)  Pain Intensity 1: 4  Pain Location 1: Leg  Pain Orientation 1: Lower;Left;Right  Pain Description 1: Constant     Activity Tolerance:   Good, stable O2 with activity on 3L NC    Please refer to the flowsheet for vital signs taken during this treatment.   After treatment:   [] Patient left in no apparent distress sitting up in chair  [x] Patient left in no apparent distress in bed  [x] Call bell left within reach  [x] Nursing notified  [] Caregiver present  [x] Bed alarm activated    COMMUNICATION/EDUCATION:   The patients plan of care was discussed with: Physical Therapist and Registered Nurse. [x] Home safety education was provided and the patient/caregiver indicated understanding. [x] Patient/family have participated as able in goal setting and plan of care. [x] Patient/family agree to work toward stated goals and plan of care. [] Patient understands intent and goals of therapy, but is neutral about his/her participation. [] Patient is unable to participate in goal setting and plan of care. This patients plan of care is appropriate for delegation to [facility].     Thank you for this referral.  Justin Giron OT  Time Calculation: 30 mins

## 2018-06-05 NOTE — PROGRESS NOTES
Per PT/OT reccommendation noted for SNF rehab - listing of SNF's provided to patient - he lives closest to [facility] and then [facility] - referrals to be made via [facility]. Argelia Humphrey CM made aware. LAUREN Rico CM spoke with Aminta Box admissions with [facility] rehab and if they have a bed available and authorization is obtained then they can accept patient - [facility] authorization started via [employer] and medicals faxed to [facility] at [phone]. LAUREN Rico    The following information was submitted to [facility] for initial authorization:  Face Sheet/Demographics    (Include: Usual living situation)  History and Physical  Last 24 hours of MD and RN notes   (Include: Current Level of Functioning; cognitive status)  PT/OT/ST Evaluations and/or notes within the last 48 hours  MAR  MD orders  (Include: Attending or ordering MDs name and phone #; skilled nursing needs;    Wound care/etc)  Projected Date of Transfer to SNF  Requested SNF  SNF Point of Contact and Phone #  CM Point of Contact and Phone #  NPI # for SNF    LAUREN Rico

## 2018-06-05 NOTE — PROGRESS NOTES
0730: Bedside shift change report given to Kristian Spencer RN (oncoming nurse) by Leroy Lay RN (offgoing nurse). Report included the following information SBAR, Kardex, Procedure Summary, Intake/Output, MAR, Accordion, Recent Results, Med Rec Status and Cardiac Rhythm NSR w/ 1st degree AVB. 0815: Spoke with Rik Otero, NP will order PT/OT to evaluate patient. 0915: PT working with pt.   1450: TRANSFER - OUT REPORT:  Verbal report given to Kyaw Mistry RN(name) on [employer]  being transferred to (unit) for routine progression of care     Report consisted of patients Situation, Background, Assessment and   Recommendations(SBAR). Information from the following report(s) SBAR, Kardex, ED Summary, Procedure Summary, Intake/Output, MAR, Accordion, Recent Results, Med Rec Status and Cardiac Rhythm NSR w/ 1st degree AVB was reviewed with the receiving nurse. Lines:   Peripheral IV 06/03/18 Left Antecubital (Active)   Site Assessment Clean, dry, & intact 6/5/2018  8:24 AM   Phlebitis Assessment 0 6/5/2018  8:24 AM   Infiltration Assessment 0 6/5/2018  8:24 AM   Dressing Status Clean, dry, & intact 6/5/2018  8:24 AM   Dressing Type Transparent;Tape 6/5/2018  8:24 AM   Hub Color/Line Status Green;Capped 6/5/2018  8:24 AM   Action Taken Open ports on tubing capped 6/5/2018  8:24 AM   Alcohol Cap Used Yes 6/5/2018  8:24 AM       Peripheral IV 06/03/18 Right Antecubital (Active)   Site Assessment Clean, dry, & intact 6/5/2018  8:24 AM   Phlebitis Assessment 0 6/5/2018  8:24 AM   Infiltration Assessment 0 6/5/2018  8:24 AM   Dressing Status Clean, dry, & intact 6/5/2018  8:24 AM   Dressing Type Transparent;Tape 6/5/2018  8:24 AM   Hub Color/Line Status Pink;Capped 6/5/2018  8:24 AM   Action Taken Open ports on tubing capped 6/5/2018  8:24 AM   Alcohol Cap Used Yes 6/5/2018  8:24 AM   Opportunity for questions and clarification was provided.     Patient transported with:   O2 @ 3 liters  Registered Nurse                    Problem: Falls - Risk of  Goal: *Absence of Falls  Document Jarad Fall Risk and appropriate interventions in the flowsheet. Outcome: Progressing Towards Goal  Fall Risk Interventions:  Mobility Interventions: Communicate number of staff needed for ambulation/transfer, OT consult for ADLs, Patient to call before getting OOB, PT Consult for mobility concerns, PT Consult for assist device competence, Utilize walker, cane, or other assitive device         Medication Interventions: Evaluate medications/consider consulting pharmacy, Patient to call before getting OOB, Teach patient to arise slowly    Elimination Interventions: Call light in reach, Patient to call for help with toileting needs, Toilet paper/wipes in reach, Toileting schedule/hourly rounds, Urinal in reach    History of Falls Interventions: Consult care management for discharge planning, Door open when patient unattended, Investigate reason for fall, Room close to nurse's station        Problem: Pressure Injury - Risk of  Goal: *Prevention of pressure injury  Document Charlie Scale and appropriate interventions in the flowsheet.    Outcome: Progressing Towards Goal  Pressure Injury Interventions:  Sensory Interventions: Assess changes in LOC, Assess need for specialty bed, Check visual cues for pain, Float heels, Keep linens dry and wrinkle-free, Maintain/enhance activity level, Minimize linen layers, Monitor skin under medical devices    Moisture Interventions: Absorbent underpads    Activity Interventions: Increase time out of bed, Pressure redistribution bed/mattress(bed type), PT/OT evaluation    Mobility Interventions: Float heels, PT/OT evaluation, Pressure redistribution bed/mattress (bed type)    Nutrition Interventions: Document food/fluid/supplement intake    Friction and Shear Interventions: Lift sheet, Apply protective barrier, creams and emollients, Feet elevated on foot rest               Problem: Heart Failure: Day 3  Goal: Activity/Safety  Outcome: Progressing Towards Goal  PT/OT consulted  Goal: Nutrition/Diet  Outcome: Progressing Towards Goal  Following cardiac diet  Goal: Medications  Outcome: Progressing Towards Goal  Taking medication as prescribed

## 2018-06-05 NOTE — PROGRESS NOTES
Problem: Mobility Impaired (Adult and Pediatric)  Goal: *Acute Goals and Plan of Care (Insert Text)  Physical Therapy Goals  Initiated 06/05/18   1. Patient will move from supine to sit and sit to supine , scoot up and down and roll side to side in bed with modified independence within 7 day(s). 2.  Patient will transfer from bed to chair and chair to bed with modified independence using the least restrictive device within 7 day(s). 3.  Patient will perform sit to stand with modified independence within 7 day(s). 4.  Patient will ambulate with modified independence for 150 feet with the least restrictive device within 7 day(s). 5.  Patient will ascend/descend 1 stair with one handrail(s) with modified independence within 7 day(s). physical Therapy EVALUATION  Patient: Antionette Palmer (62 y.o. male)  Date: 6/5/2018  Primary Diagnosis: Chest pain        Precautions: Fall       ASSESSMENT :  Based on the objective data described below, the patient presents with decreased functional independence compared to baseline with associated decreased ROM, decreased strength, impaired standing balance, significantly decreased tolerance to activity, elevated pain with all LE WB/mobility, gait deviations, and classified as high fall risk. RN cleared patient for mobility and was received sitting at EOB agreeable to participate on 3L NC. Patient required Min A x 1 with additional time and repetitions for all transfers as well as Min A x 1 for ambulating 12 ft with RW. During gait training patient required one standing rest break and demos increased lateral sway as well as tremor-like movements in LLE (also observed during AROM and pre-ambulation screen - patient attributed to \"restless legs\"). O2 sats decreased to 91%, recovered well however at this time patient is inappropriate to attempt 6 MWT. Once in chair patient required Mod A x 1 to manage BLE to elevated first onto bed but then onto lower surface.  Patient scored an 8/28 on tinetti, classifying patient at a high risk for falls. At this time patient is unsafe to return home due to elevated pain and impaired balance/gait that places patient at an increased risk for falls and readmission. Recommending short stay at [facility] rehab. OT, CM, and RN notified of patient's mobility and discharge rec. Patient will benefit from skilled intervention to address the above impairments. Patients rehabilitation potential is considered to be Fair  Factors which may influence rehabilitation potential include:   []         None noted  []         Mental ability/status  [x]         Medical condition  [x]         Home/family situation and support systems  []         Safety awareness  [x]         Pain tolerance/management  []         Other:      PLAN :  Recommendations and Planned Interventions:  [x]           Bed Mobility Training             [x]    Neuromuscular Re-Education  [x]           Transfer Training                   []    Orthotic/Prosthetic Training  [x]           Gait Training                         []    Modalities  [x]           Therapeutic Exercises           []    Edema Management/Control  [x]           Therapeutic Activities            [x]    Patient and Family Training/Education  []           Other (comment):    Frequency/Duration: Patient will be followed by physical therapy  5 times a week to address goals.   Discharge Recommendations: Rehab  Further Equipment Recommendations for Discharge: TBD     SUBJECTIVE:   Patient stated Its been my legs for a year and a half now and I cant do what I used to.    OBJECTIVE DATA SUMMARY:   HISTORY:    Past Medical History:   Diagnosis Date    CAD (coronary artery disease)     [facility] [address]     Chronic systolic heart failure [facility] [address] 4/12/2011    COPD (chronic obstructive pulmonary disease) [facility] [address]     Coronary atherosclerosis of native coronary artery 4/12/2011    Diabetes [facility] [address]     High cholesterol     Hypertension     MI, old     Neurological disorder     Other primary cardiomyopathies 4/12/2011    Restless leg syndrome      Past Surgical History:   Procedure Laterality Date    CARDIAC SURG PROCEDURE UNLIST      stents x7    HX CARPAL TUNNEL RELEASE      right wrist     Prior Level of Function/Home Situation: utilizes urinal when on couch, sedentary lifestyle  Personal factors and/or comorbidities impacting plan of care: BLE wounds    Home Situation  Home Environment: Private residence  # Steps to Enter: 1  One/Two Story Residence: One story  Living Alone: No  Support Systems: Spouse/Significant Other/Partner, Child(susan)  Patient Expects to be Discharged to[de-identified] Private residence  Current DME Used/Available at Home: Cane, straight, Glucometer, Oxygen, portable, Nebulizer, CPAP    EXAMINATION/PRESENTATION/DECISION MAKING:   Critical Behavior:              Hearing: Auditory  Auditory Impairment: None  Skin:    Edema:   Range Of Motion:  AROM: Generally decreased, functional                       Strength:    Strength: Generally decreased, functional                    Tone & Sensation:                                  Coordination:  Coordination: Generally decreased, functional  Vision:      Functional Mobility:  Bed Mobility:        Sit to Supine: Moderate assistance (BLE management)     Transfers:  Sit to Stand: Assist x1;Minimum assistance (elevated bed)  Stand to Sit: Assist x1;Minimum assistance                       Balance:   Sitting: Intact  Standing: Impaired  Standing - Static: Constant support; Fair  Standing - Dynamic : Fair  Ambulation/Gait Training:  Distance (ft): 12 Feet (ft)  Assistive Device: Gait belt;Walker, rolling  Ambulation - Level of Assistance: Minimal assistance        Gait Abnormalities: Antalgic;Decreased step clearance;Trunk sway increased; Step to gait; Path deviations        Base of Support: Widened     Speed/Renetta: Shuffled; Slow  Step Length: Left shortened;Right shortened Stairs: Therapeutic Exercises:     Functional Measure:  Tinetti test:    Sitting Balance: 1  Arises: 0  Attempts to Rise: 0  Immediate Standing Balance: 0  Standing Balance: 1  Nudged: 1  Eyes Closed: 1  Turn 360 Degrees - Continuous/Discontinuous: 0  Turn 360 Degrees - Steady/Unsteady: 0  Sitting Down: 1  Balance Score: 5  Indication of Gait: 1  R Step Length/Height: 0  L Step Length/Height: 0  R Foot Clearance: 1  L Foot Clearance: 1  Step Symmetry: 0  Step Continuity: 0  Path: 0  Trunk: 0  Walking Time: 0  Gait Score: 3  Total Score: 8       Tinetti Test and G-code impairment scale:  Percentage of Impairment CH    0%   CI    1-19% CJ    20-39% CK    40-59% CL    60-79% CM    80-99% CN     100%   Tinetti  Score 0-28 28 23-27 17-22 12-16 6-11 1-5 0       Tinetti Tool Score Risk of Falls  <19 = High Fall Risk  19-24 = Moderate Fall Risk  25-28 = Low Fall Risk  Tinetti ME. Performance-Oriented Assessment of Mobility Problems in Elderly Patients. [address] 64; [id number]. (Scoring Description: PT Bulletin Feb. 10, 1993)    Older adults: Ilir Henriquez et al, 2009; n = 1000 [facility] elderly evaluated with ABC, ENZO, ADL, and IADL)  · Mean ENZO score for males aged 69-68 years = 26.21(3.40)  · Mean ENZO score for females age 69-68 years = 25.16(4.30)  · Mean ENZO score for males over 80 years = 23.29(6.02)  · Mean ENZO score for females over 80 years = 17.20(8.32)         G codes: In compliance with CMSs Claims Based Outcome Reporting, the following G-code set was chosen for this patient based on their primary functional limitation being treated: The outcome measure chosen to determine the severity of the functional limitation was the Tinetti with a score of 8/28 which was correlated with the impairment scale.     ? Mobility - Walking and Moving Around:     - CURRENT STATUS: CL - 60%-79% impaired, limited or restricted    - GOAL STATUS: CJ - 20%-39% impaired, limited or restricted    - D/C STATUS:  ---------------To be determined---------------      Physical Therapy Evaluation Charge Determination   History Examination Presentation Decision-Making   MEDIUM  Complexity : 1-2 comorbidities / personal factors will impact the outcome/ POC  LOW Complexity : 1-2 Standardized tests and measures addressing body structure, function, activity limitation and / or participation in recreation  LOW Complexity : Stable, uncomplicated  Other outcome measures Tinetti  HIGH       Based on the above components, the patient evaluation is determined to be of the following complexity level: LOW     Pain:  Pain Scale 1: Numeric (0 - 10)  Pain Intensity 1: 4  Pain Location 1: Leg  Pain Orientation 1: Lower;Left;Right  Pain Description 1: Constant     Activity Tolerance:   Fair, pain limited session  Please refer to the flowsheet for vital signs taken during this treatment. After treatment:   [x]         Patient left in no apparent distress sitting up in chair  []         Patient left in no apparent distress in bed  [x]         Call bell left within reach  [x]         Nursing notified  []         Caregiver present  []         Bed alarm activated    COMMUNICATION/EDUCATION:   The patients plan of care was discussed with: Occupational Therapist, Registered Nurse and CM. [x]         Fall prevention education was provided and the patient/caregiver indicated understanding. [x]         Patient/family have participated as able in goal setting and plan of care. [x]         Patient/family agree to work toward stated goals and plan of care. []         Patient understands intent and goals of therapy, but is neutral about his/her participation. []         Patient is unable to participate in goal setting and plan of care.     Thank you for this referral.  Monica Georges PT, DPT   Time Calculation: 21 mins

## 2018-06-05 NOTE — PROGRESS NOTES
Rounded on [employer] patients and provided Anointing of the Sick at request of patient    Fr. Bryn Infante

## 2018-06-06 NOTE — PROGRESS NOTES
11:11 AM  CM received updates that [facility] has obtained Authorization for SNF and is able to accept and accommodate patient for services. CM provided updates to Attending and RN. CM spoke with Yesenia Merino with Admissions. Patient will be going to room 407A. RN to call report to [phone] and ask for Chayito Miranda RN station. Facility requesting for patient to arrive after 1:00pm.  Mode of transport at time of discharge via stretcher. CM submitted a referral via [employer] to [facility]. CM currently awaiting receipt of referral and confirmation of trip transport time. 12:14 PM  CM received updates from [facility] that they are able to accommodate patient for transport today at 3:30 pm.  RN and Facility notified of transport time. 3:04 PM  CM received a call from [facility] representative, Daria Kang that patient has been downgraded from inpatient status to Observation Status. Code 44 Letter Completed. CM met with patient to provide updates. Patient provided with copy of Code 40 Letter and copy of copy of State Observation Notice. CM placed copy of Code 44 and Signed copy of Observation Notice in patient's chart.     LAUREN Nieves/LATRICIA

## 2018-06-06 NOTE — DISCHARGE SUMMARY
Discharge Summary       PATIENT ID: Charity Villalta  MRN: [id number]   YOB: 1960    DATE OF ADMISSION: 6/3/2018  5:07 AM    DATE OF DISCHARGE: 6/6/2018   PRIMARY CARE PROVIDER: Traci Meza MD     ATTENDING PHYSICIAN: Dung Reasonsandra  DISCHARGING PROVIDER: Yoon Langston NP    To contact this individual call [phone] and ask the  to page. If unavailable ask to be transferred the Adult Hospitalist Department. CONSULTATIONS: IP CONSULT TO CARDIOLOGY  IP CONSULT TO HOSPITALIST    PROCEDURES/SURGERIES: * No surgery found *    ADMITTING DIAGNOSES & HOSPITAL COURSE:   Dx: Chest pain     Per H&P \"Juan Alberto Villalta is a 59 y.o. male recently admitted to [facility] who returns for intermittent persistent chest pain. Known ischemic cardiomyopathy with low EF/systolc heart failure (does not have defibrillator) who is followed at [facility] which pt seems somewhat unhappy with the cardiology care there. He states he has seen Dr. Eli Smith from non-[facility] practice where it was mentioned of some device? ? He has known copd, O2 dependant and still smokes (turns off his O2 when he does this) that complicates matter. CXR and troponin unremarkable for active process and therefore Cardiology has seen in ER and deferred admission to hospitalist group. At bedside, he denies any chest pain but state that it resumes to come and go without known specific patter. \"     Cardiac w/u negative, seen by cardiology source unlikely cardiac. Patient found to be weak during hospitalization from chronic BLE pain. PT/OT consulted who recommended rehab. Patient stable for discharge to [address] [place] [address] for rehab.      DISCHARGE DIAGNOSES / PLAN:      Chest pain, atypical  - Stress test negative  - Cardiology signed off  - c/w ASA/Plavix/Toprol XL/Lipitor  -suspect anxiety playing a role     Chronic Systolic CHF NYHA (difficult to assess due to chronically on h ome O2)  -appears while compensated  - Toprol XL/Lisinopril/Lasix/Aldactone  - c/w Imdur     Chronic Venous stasis b/l LE  - NO CELLULITIS  - d/c abx  - wound care and outpatient wound care follow up      Chronic Pain syndrome  - c/w Dilaudid and MS CONTIN     RLS  - c/w Requip     Hyponatremia, POA  - resolved      Chronic Respiratory failure in the setting of CHF and COPD  -continue home o2 at 3L via nasal cannula     Generalized debility   -PT/OT consulted-recommending rehab.           PENDING TEST RESULTS:   At the time of discharge the following test results are still pending: none    FOLLOW UP APPOINTMENTS:    Follow-up Information     Follow up With Details Comments Contact Info    Deepak Recio MD  once discharged from [facility] [address] On 6/12/2018 at 9:00 am for lower extremity wounds if approved by Dr. Octaviano Moreira at appointment on [address]  [phone]           ADDITIONAL CARE RECOMMENDATIONS:   -Follow up with pcp once discharge from skilled nursing facility    DIET: Cardiac Diet and Diabetic Diet    ACTIVITY: Activity as tolerated and PT/OT Eval and Treat    WOUND CARE: Bilateral low legs:  Every other day cleanse with mild soap and water, apply Xeroform, cover with dry roll gauze and ACE wrap until seen by [address]. EQUIPMENT needed: Oxygen 3 L via nasal cannula continuous      DISCHARGE MEDICATIONS:  Current Discharge Medication List      START taking these medications    Details   nicotine (NICODERM CQ) 14 mg/24 hr patch 1 Patch by TransDERmal route daily for 30 days. Qty: 30 Patch, Refills: 0         CONTINUE these medications which have CHANGED    Details   furosemide (LASIX) 20 mg tablet Take 3 Tabs by mouth daily. Qty: 90 Tab, Refills: 0      HYDROmorphone (DILAUDID) 4 mg tablet Take 1 Tab by mouth two (2) times a day. Max Daily Amount: 8 mg.  Indications: Pain  Qty: 20 Tab, Refills: 0    Associated Diagnoses: Chest pain, unspecified type; Lymphedema of both lower extremities      morphine CR (MS CONTIN) 30 mg CR tablet Take 2 Tabs by mouth every eight (8) hours. Max Daily Amount: 180 mg.  Qty: 20 Tab, Refills: 0    Associated Diagnoses: Chest pain, unspecified type; Lymphedema of both lower extremities         CONTINUE these medications which have NOT CHANGED    Details   spironolactone (ALDACTONE) 50 mg tablet Take 1 Tab by mouth two (2) times a day. Qty: 60 Tab, Refills: 2      metoprolol succinate (TOPROL XL) 25 mg XL tablet Take 25 mg by mouth daily. lisinopril (PRINIVIL, ZESTRIL) 2.5 mg tablet Take 2.5 mg by mouth daily. ferrous sulfate 325 mg (65 mg iron) tablet Take 325 mg by mouth Daily (before breakfast). albuterol (PROVENTIL HFA, VENTOLIN HFA, PROAIR HFA) 90 mcg/actuation inhaler Take 1 Puff by inhalation every four (4) hours as needed for Wheezing. albuterol-ipratropium (DUO-NEB) 2.5 mg-0.5 mg/3 ml nebu 3 mL by Nebulization route every four (4) hours. Qty: 30 Nebule, Refills: 2      aspirin delayed-release 81 mg tablet Take 81 mg by mouth daily. citalopram (CELEXA) 40 mg tablet Take 40 mg by mouth daily. nitroglycerin (NITROSTAT) 0.4 mg SL tablet 0.4 mg by SubLINGual route every five (5) minutes as needed for Chest Pain. nortriptyline (PAMELOR) 50 mg capsule Take 50 mg by mouth nightly. docusate sodium (COLACE) 100 mg capsule Take 100 mg by mouth two (2) times a day. metFORMIN (GLUCOPHAGE) 1,000 mg tablet Take 1,000 mg by mouth two (2) times daily (with meals). Indications: type 2 diabetes mellitus      omeprazole (PRILOSEC) 20 mg capsule Take 20 mg by mouth daily. Indications: gastroesophageal reflux disease      clopidogrel (PLAVIX) 75 mg tab Take 75 mg by mouth daily. atorvastatin (LIPITOR) 80 mg tablet Take 80 mg by mouth daily. rOPINIRole (REQUIP) 4 mg tab TAB Take 8 mg by mouth nightly.  Indications: Restless Legs Syndrome      zolpidem (AMBIEN) 10 mg tablet Take 1 Tab by mouth nightly. Qty: 30 Tab, Refills: 2      tiotropium (SPIRIVA WITH HANDIHALER) 18 mcg inhalation capsule Take 1 Cap by inhalation daily. fluticasone-salmeterol (ADVAIR DISKUS) 250-50 mcg/dose diskus inhaler Take 1 Puff by inhalation two (2) times a day. isosorbide mononitrate ER (IMDUR) 60 mg CR tablet Take 60 mg by mouth daily. STOP taking these medications       predniSONE (DELTASONE) 20 mg tablet Comments:   Reason for Stopping:                 NOTIFY YOUR PHYSICIAN FOR ANY OF THE FOLLOWING:   Fever over 101 degrees for 24 hours. Chest pain, shortness of breath, fever, chills, nausea, vomiting, diarrhea, change in mentation, falling, weakness, bleeding. Severe pain or pain not relieved by medications. Or, any other signs or symptoms that you may have questions about. DISPOSITION:    Home With:   OT  PT  HH  RN      X Long term SNF/Inpatient [address]    Independent/assisted living    Hospice    Other:       PATIENT CONDITION AT DISCHARGE:     Functional status    Poor    X Deconditioned     Independent      Cognition    X Lucid     Forgetful     Dementia      Catheters/lines (plus indication)    Valle     PICC     PEG    X None      Code status    X Full code     DNR      PHYSICAL EXAMINATION AT DISCHARGE:  Constitutional:  No acute distress, cooperative, pleasant    ENT:  Oral mucous moist   Resp:  CTA bilaterally. CV:  Regular rhythm, normal rate,    GI:  Soft, non distended, non tender. normoactive bowel sounds    Musculoskeletal:  No edema, warm, 2+ pulses throughout    Neurologic:  Moves all extremities.   AAOx3,                                             Skin:  chronic venous stasis changes in LE, no warmth or tenderness, no drainage             CHRONIC MEDICAL DIAGNOSES:  Problem List as of 6/6/2018  Date Reviewed: 3/13/2018          Codes Class Noted - Resolved    Fluid overload ICD-10-CM: E87.70  ICD-9-CM: 276.69  5/6/2018 - Present SOB (shortness of breath) ICD-10-CM: R06.02  ICD-9-CM: 786.05  5/1/2018 - Present        Systolic CHF, acute (HCC) ICD-10-CM: I50.21  ICD-9-CM: 428.21, 428.0  2/26/2018 - Present        Acute systolic CHF (congestive heart failure) ([facility] ICD-10-CM: I50.21  ICD-9-CM: 428.21, 428.0  2/26/2018 - Present        COPD with exacerbation ([facility] ICD-10-CM: J44.1  ICD-9-CM: 491.21  5/24/2014 - Present        Morbid obesity ([facility] ICD-10-CM: E66.01  ICD-9-CM: 278.01  1/8/2014 - Present        Hematemesis ICD-10-CM: K92.0  ICD-9-CM: 578.0  1/7/2014 - Present        Rectal bleeding ICD-10-CM: K62.5  ICD-9-CM: 569.3  1/7/2014 - Present        Obstructive sleep apnea (adult) (pediatric) ICD-10-CM: G47.33  ICD-9-CM: 327.23  1/7/2014 - Present        Chronic airway obstruction, not elsewhere classified ICD-10-CM: J44.9  ICD-9-CM: 358  1/7/2014 - Present        Essential hypertension, benign ICD-10-CM: I10  ICD-9-CM: 401.1  1/7/2014 - Present        Type II or unspecified type diabetes mellitus without mention of complication, not stated as uncontrolled ICD-10-CM: E11.9  ICD-9-CM: 250.00  1/7/2014 - Present        Coronary artery disease ICD-10-CM: I25.10  ICD-9-CM: 414.00  1/7/2014 - Present        * (Principal)Chest pain ICD-10-CM: R07.9  ICD-9-CM: 786.50  1/5/2014 - Present        Coronary atherosclerosis of native coronary artery ICD-10-CM: I25.10  ICD-9-CM: 414.01  4/12/2011 - Present        Other primary cardiomyopathies ICD-10-CM: I42.8  ICD-9-CM: 425.4  4/12/2011 - Present        Chronic systolic heart failure ([facility] ICD-10-CM: I50.22  ICD-9-CM: 428.22  4/12/2011 - Present              Greater than 40 minutes were spent with the patient on counseling and coordination of care    Signed:   Erasmo Richardson NP  6/6/2018  11:38 AM

## 2018-06-06 NOTE — DISCHARGE INSTRUCTIONS
Yvonne Rose NP Nurse Practitioner [facility] Needed Hospitalist Discharge Summaries Date of Service: 06/06/18 1138         []Hide copied text     Discharge Summary         PATIENT ID: Dayton Villatla  MRN: [id number]   YOB: 1960    DATE OF ADMISSION: 6/3/2018  5:07 AM    DATE OF DISCHARGE: 6/6/2018   PRIMARY CARE PROVIDER: Amara Santa MD      ATTENDING PHYSICIAN: Mallory Galvin  DISCHARGING PROVIDER: Yvonne Rose NP    To contact this individual call [phone] and ask the  to page. If unavailable ask to be transferred the Adult Hospitalist Department.     CONSULTATIONS: IP CONSULT TO CARDIOLOGY  IP CONSULT TO HOSPITALIST     PROCEDURES/SURGERIES: * No surgery found *     ADMITTING DIAGNOSES & HOSPITAL COURSE:   Dx: Chest pain      Per H&P \"Juan Alberto Villalta is a 59 y. o. male recently admitted to [facility] who returns for intermittent persistent chest pain. Known ischemic cardiomyopathy with low EF/systolc heart failure (does not have defibrillator) who is followed at [facility] which pt seems somewhat unhappy with the cardiology care there. He states he has seen Dr. Martin Louie from non-[facility] practice where it was mentioned of some device? ? He has known copd, O2 dependant and still smokes (turns off his O2 when he does this) that complicates matter. CXR and troponin unremarkable for active process and therefore Cardiology has seen in ER and deferred admission to hospitalist group. At bedside, he denies any chest pain but state that it resumes to come and go without known specific patter. \"      Cardiac w/u negative, seen by cardiology source unlikely cardiac. Patient found to be weak during hospitalization from chronic BLE pain. PT/OT consulted who recommended rehab.  Patient stable for discharge to [address] [place] [address],[address] for rehab.      DISCHARGE DIAGNOSES / PLAN:       Chest pain, atypical  - Stress test negative  - Cardiology signed off  - c/w ASA/Plavix/Toprol XL/Lipitor  -suspect anxiety playing a role      Chronic Systolic CHF NYHA (difficult to assess due to chronically on h ome O2)  -appears while compensated  - Toprol XL/Lisinopril/Lasix/Aldactone  - c/w Imdur      Chronic Venous stasis b/l LE  - NO CELLULITIS  - d/c abx  - wound care and outpatient wound care follow up       Chronic Pain syndrome  - c/w Dilaudid and MS CONTIN      RLS  - c/w Requip      Hyponatremia, POA  - resolved       Chronic Respiratory failure in the setting of CHF and COPD  -continue home o2 at 3L via nasal cannula      Generalized debility   -PT/OT consulted-recommending rehab.             PENDING TEST RESULTS:   At the time of discharge the following test results are still pending: none     FOLLOW UP APPOINTMENTS:    Follow-up Information     Follow up With Details Comments Contact Info     Suzie Everett MD   once discharged from [facility] [address]  [phone]     [address] On 6/12/2018 at 9:00 am for lower extremity wounds if approved by Dr. Dilia Bach at appointment on [address]  [phone]            ADDITIONAL CARE RECOMMENDATIONS:   -Follow up with pcp once discharge from skilled nursing facility     DIET: Cardiac Diet and Diabetic Diet     ACTIVITY: Activity as tolerated and PT/OT Eval and Treat     WOUND CARE: Bilateral low legs:  Every other day cleanse with mild soap and water, apply Xeroform, cover with dry roll gauze and ACE wrap until seen by [address].     EQUIPMENT needed: Oxygen 3 L via nasal cannula continuous        DISCHARGE MEDICATIONS:        Current Discharge Medication List       START taking these medications     Details   nicotine (NICODERM CQ) 14 mg/24 hr patch 1 Patch by TransDERmal route daily for 30 days. Qty: 30 Patch, Refills: 0                 CONTINUE these medications which have CHANGED     Details   furosemide (LASIX) 20 mg tablet Take 3 Tabs by mouth daily.   Qty: 90 Tab, Refills: 0       HYDROmorphone (DILAUDID) 4 mg tablet Take 1 Tab by mouth two (2) times a day. Max Daily Amount: 8 mg. Indications: Pain  Qty: 20 Tab, Refills: 0     Associated Diagnoses: Chest pain, unspecified type; Lymphedema of both lower extremities       morphine CR (MS CONTIN) 30 mg CR tablet Take 2 Tabs by mouth every eight (8) hours. Max Daily Amount: 180 mg.  Qty: 20 Tab, Refills: 0     Associated Diagnoses: Chest pain, unspecified type; Lymphedema of both lower extremities                 CONTINUE these medications which have NOT CHANGED     Details   spironolactone (ALDACTONE) 50 mg tablet Take 1 Tab by mouth two (2) times a day. Qty: 60 Tab, Refills: 2       metoprolol succinate (TOPROL XL) 25 mg XL tablet Take 25 mg by mouth daily.       lisinopril (PRINIVIL, ZESTRIL) 2.5 mg tablet Take 2.5 mg by mouth daily.       ferrous sulfate 325 mg (65 mg iron) tablet Take 325 mg by mouth Daily (before breakfast).       albuterol (PROVENTIL HFA, VENTOLIN HFA, PROAIR HFA) 90 mcg/actuation inhaler Take 1 Puff by inhalation every four (4) hours as needed for Wheezing.       albuterol-ipratropium (DUO-NEB) 2.5 mg-0.5 mg/3 ml nebu 3 mL by Nebulization route every four (4) hours. Qty: 30 Nebule, Refills: 2       aspirin delayed-release 81 mg tablet Take 81 mg by mouth daily.       citalopram (CELEXA) 40 mg tablet Take 40 mg by mouth daily.       nitroglycerin (NITROSTAT) 0.4 mg SL tablet 0.4 mg by SubLINGual route every five (5) minutes as needed for Chest Pain.       nortriptyline (PAMELOR) 50 mg capsule Take 50 mg by mouth nightly.       docusate sodium (COLACE) 100 mg capsule Take 100 mg by mouth two (2) times a day.       metFORMIN (GLUCOPHAGE) 1,000 mg tablet Take 1,000 mg by mouth two (2) times daily (with meals). Indications: type 2 diabetes mellitus       omeprazole (PRILOSEC) 20 mg capsule Take 20 mg by mouth daily.  Indications: gastroesophageal reflux disease       clopidogrel (PLAVIX) 75 mg tab Take 75 mg by mouth daily.       atorvastatin (LIPITOR) 80 mg tablet Take 80 mg by mouth daily.       rOPINIRole (REQUIP) 4 mg tab TAB Take 8 mg by mouth nightly. Indications: Restless Legs Syndrome       zolpidem (AMBIEN) 10 mg tablet Take 1 Tab by mouth nightly. Qty: 30 Tab, Refills: 2       tiotropium (SPIRIVA WITH HANDIHALER) 18 mcg inhalation capsule Take 1 Cap by inhalation daily.       fluticasone-salmeterol (ADVAIR DISKUS) 250-50 mcg/dose diskus inhaler Take 1 Puff by inhalation two (2) times a day.       isosorbide mononitrate ER (IMDUR) 60 mg CR tablet Take 60 mg by mouth daily.                STOP taking these medications         predniSONE (DELTASONE) 20 mg tablet Comments:   Reason for Stopping:                       NOTIFY YOUR PHYSICIAN FOR ANY OF THE FOLLOWING:   Fever over 101 degrees for 24 hours. Chest pain, shortness of breath, fever, chills, nausea, vomiting, diarrhea, change in mentation, falling, weakness, bleeding. Severe pain or pain not relieved by medications. Or, any other signs or symptoms that you may have questions about.     DISPOSITION:     Home With:    OT   PT   HH   RN      X Long term SNF/Inpatient [address]     Independent/assisted living     Hospice     Other:         PATIENT CONDITION AT DISCHARGE:      Functional status     Poor    X Deconditioned      Independent       Cognition    X Lucid      Forgetful      Dementia       Catheters/lines (plus indication)     Valle      PICC      PEG    X None       Code status    X Full code      DNR       PHYSICAL EXAMINATION AT DISCHARGE:  Constitutional:  No acute distress, cooperative, pleasant    ENT:  Oral mucous moist   Resp:  CTA bilaterally. CV:  Regular rhythm, normal rate,    GI:  Soft, non distended, non tender. normoactive bowel sounds    Musculoskeletal:  No edema, warm, 2+ pulses throughout    Neurologic:  Moves all extremities.   AAOx3,                                             JCIP:  chronic venous stasis changes in LE, no warmth or tenderness, no drainage                  CHRONIC MEDICAL DIAGNOSES:  Problem List as of 6/6/2018  Date Reviewed: 3/13/2018             Codes Class Noted - Resolved     Fluid overload ICD-10-CM: E87.70  ICD-9-CM: 276.69   5/6/2018 - Present           SOB (shortness of breath) ICD-10-CM: R06.02  ICD-9-CM: 786.05   5/1/2018 - Present           Systolic CHF, acute (HCC) ICD-10-CM: I50.21  ICD-9-CM: 428.21, 428.0   2/26/2018 - Present           Acute systolic CHF (congestive heart failure) ([facility] ICD-10-CM: I50.21  ICD-9-CM: 428.21, 428.0   2/26/2018 - Present           COPD with exacerbation ([facility] ICD-10-CM: J44.1  ICD-9-CM: 491.21   5/24/2014 - Present           Morbid obesity ([facility] ICD-10-CM: E66.01  ICD-9-CM: 278.01   1/8/2014 - Present           Hematemesis ICD-10-CM: K92.0  ICD-9-CM: 578.0   1/7/2014 - Present           Rectal bleeding ICD-10-CM: K62.5  ICD-9-CM: 569. 3   1/7/2014 - Present           Obstructive sleep apnea (adult) (pediatric) ICD-10-CM: G47.33  ICD-9-CM: 327.23   1/7/2014 - Present           Chronic airway obstruction, not elsewhere classified ICD-10-CM: J44.9  ICD-9-CM: 496   1/7/2014 - Present           Essential hypertension, benign ICD-10-CM: I10  ICD-9-CM: 388. 1   1/7/2014 - Present           Type II or unspecified type diabetes mellitus without mention of complication, not stated as uncontrolled ICD-10-CM: E11.9  ICD-9-CM: 250.00   1/7/2014 - Present           Coronary artery disease ICD-10-CM: I25.10  ICD-9-CM: 414.00   1/7/2014 - Present           * (Principal)Chest pain ICD-10-CM: R07.9  ICD-9-CM: 786.50   1/5/2014 - Present           Coronary atherosclerosis of native coronary artery ICD-10-CM: I25.10  ICD-9-CM: 414.01   4/12/2011 - Present           Other primary cardiomyopathies ICD-10-CM: I42.8  ICD-9-CM: 425. 4   4/12/2011 - Present           Chronic systolic heart failure (HCC) ICD-10-CM: I50.22  ICD-9-CM: 428.22   4/12/2011 - Present             Greater than 40 minutes were spent with the patient on counseling and coordination of care     Signed:   Joon Brower NP  6/6/2018  11:38 AM            Smoking Cessation Program: This is a free, phone/text/email based, smoking cessation program. The program is individualized to meet each patient's needs. To enroll use the link https://ha.Solio/ra/survey/2860 or text Augusto Torres to [phone] from any smart phone.

## 2018-06-06 NOTE — ROUTINE PROCESS
Bedside shift change report given to Lolita (oncoming nurse) by Elenita Cavanaugh (offgoing nurse). Report included the following information SBAR.

## 2018-06-06 NOTE — PROGRESS NOTES
2:43 PM  Hospital to SNF SBAR Handoff - [address]                                                                        59 y.o.   male    [address]   Room: [address] [place] [address] Dipti Mathew  Unit Phone# :  [phone]        [facility] [place]  [address] [facility] [address]  Dept: [phone]  Loc: [phone]                    SITUATION     Admitted:  6/3/2018         Attending Provider:  Panfilo Arita MD       Consultations:  IP CONSULT TO CARDIOLOGY  IP CONSULT TO HOSPITALIST    PCP:  Amara Santa MD   [phone]    Treatment Team: Attending Provider: Panfilo Arita MD; Care Manager: Mauro Noguera; Primary Nurse: Anne Otero;  Consulting Provider: Christina Brody NP; Care Manager: LAUREN Hoover; Utilization Review: Kanchan Cheung RN    Admitting Dx:  Chest pain       Principal Problem: Chest pain    * No surgery found * of      BY: * Surgery not found *             ON: * No surgery found *                  Code Status: Full Code                Advance Directives:   Advance Care Planning 6/3/2018   Patient's Healthcare Decision Maker is: Legal Next of Darline 69   Primary Decision Maker Name Suzanne Flaherty   Primary Decision Maker Phone Number [phone]   Primary Decision Maker Relationship to Patient Friend   Confirm Advance Directive None   Patient Would Like to Complete Advance Directive -   Does the patient have other document types -    (Send w/patient)   No Doesnt Have       Isolation:  There are currently no Active Isolations       MDRO: No current active infections    Pain Medications given:  MS contin 30 mg    Last dose: 6/6/2018 at  1205      Special Equipment needed: no  Type of equipment:    (Not currently on dialysis)  (Not currently on dialysis)  (Not currently on dialysis)     BACKGROUND     Allergies:  No Known Allergies    Past Medical History:   Diagnosis Date    CAD (coronary artery disease)     [facility]estive [facility] failure) [facility]     Chronic systolic heart failure ([facility] 4/12/2011    COPD (chronic obstructive pulmonary disease) (HCC)     Coronary atherosclerosis of native coronary artery 4/12/2011    Diabetes ([facility]     High cholesterol     Hypertension     MI, old     Neurological disorder     Other primary cardiomyopathies 4/12/2011    Restless leg syndrome        Past Surgical History:   Procedure Laterality Date    CARDIAC SURG PROCEDURE UNLIST      stents x7    HX CARPAL TUNNEL RELEASE      right wrist       Prescriptions Prior to Admission   Medication Sig    spironolactone (ALDACTONE) 50 mg tablet Take 1 Tab by mouth two (2) times a day.  predniSONE (DELTASONE) 20 mg tablet Take 1 Tab by mouth daily (with breakfast).  metoprolol succinate (TOPROL XL) 25 mg XL tablet Take 25 mg by mouth daily.  lisinopril (PRINIVIL, ZESTRIL) 2.5 mg tablet Take 2.5 mg by mouth daily.  ferrous sulfate 325 mg (65 mg iron) tablet Take 325 mg by mouth Daily (before breakfast).  albuterol (PROVENTIL HFA, VENTOLIN HFA, PROAIR HFA) 90 mcg/actuation inhaler Take 1 Puff by inhalation every four (4) hours as needed for Wheezing.  furosemide (LASIX) 40 mg tablet Take 1.5 Tabs by mouth two (2) times a day.  albuterol-ipratropium (DUO-NEB) 2.5 mg-0.5 mg/3 ml nebu 3 mL by Nebulization route every four (4) hours.  aspirin delayed-release 81 mg tablet Take 81 mg by mouth daily.  citalopram (CELEXA) 40 mg tablet Take 40 mg by mouth daily.  nitroglycerin (NITROSTAT) 0.4 mg SL tablet 0.4 mg by SubLINGual route every five (5) minutes as needed for Chest Pain.  nortriptyline (PAMELOR) 50 mg capsule Take 50 mg by mouth nightly.  docusate sodium (COLACE) 100 mg capsule Take 100 mg by mouth two (2) times a day.  metFORMIN (GLUCOPHAGE) 1,000 mg tablet Take 1,000 mg by mouth two (2) times daily (with meals). Indications: type 2 diabetes mellitus    omeprazole (PRILOSEC) 20 mg capsule Take 20 mg by mouth daily. Indications: gastroesophageal reflux disease    clopidogrel (PLAVIX) 75 mg tab Take 75 mg by mouth daily.  atorvastatin (LIPITOR) 80 mg tablet Take 80 mg by mouth daily.  rOPINIRole (REQUIP) 4 mg tab TAB Take 8 mg by mouth nightly. Indications: Restless Legs Syndrome    zolpidem (AMBIEN) 10 mg tablet Take 1 Tab by mouth nightly.  tiotropium (SPIRIVA WITH HANDIHALER) 18 mcg inhalation capsule Take 1 Cap by inhalation daily.  fluticasone-salmeterol (ADVAIR DISKUS) 250-50 mcg/dose diskus inhaler Take 1 Puff by inhalation two (2) times a day.  isosorbide mononitrate ER (IMDUR) 60 mg CR tablet Take 60 mg by mouth daily. Hard scripts included in transfer packet yes    Vaccinations:    Immunization History   Administered Date(s) Administered    Influenza Vaccine 10/01/2013    Pneumococcal Vaccine (Unspecified Type) 01/01/2009       Readmission Risks:    Known Risks: Age, debility, hx of MI and extensive cardiac hx, COPD, current smoker, continuous O2 use. The Charlson CoMorbitiy Index tool is an evidenced based tool that has more automatic generated information. The tool looks at many different items such as the age of the patient, how many times they were admitted in the last calendar year, current length of stay in the hospital and their diagnosis. All of these items are pulled automatically from information documented in the chart from various places and will generate a score that predicts whether a patient is at low (less than 13), medium (13-20) or high (21 or greater) risk of being readmitted.         ASSESSMENT                Temp: 97.9 °F (36.6 °C) (06/06/18 1424) Pulse (Heart Rate): 83 (06/06/18 1424)     Resp Rate: 18 (06/06/18 1424)           BP: 107/69 (06/06/18 1424)     O2 Sat (%): 96 % (06/06/18 1424)     Weight: 117.6 kg (259 lb 4.2 oz)    Height: 6' 2\" (188 cm) (06/03/18 1115)       If above not within 1 hour of discharge:    BP:_____  P:____  R:____ T:_____ O2 Sat: ___% O2: ______    Active Orders   Diet    DIET CARDIAC Regular         Orientation: oriented to time, place, person and situation     Active Behaviors: None                                   Active Lines/Drains:  (Peg Tube / Valle / CL or S/L?): no    Urinary Status: Voiding     Last BM: Last Bowel Movement Date: 06/05/18     Skin Integrity: Abscess, Scars (comment), Tattoos (comment)             Mobility: Slightly limited   Weight Bearing Status: WBAT (Weight Bearing as Tolerated)      Gait Training  Assistive Device: Gait belt, Walker, rolling  Ambulation - Level of Assistance: Minimal assistance  Distance (ft): 12 Feet (ft)         Lab Results   Component Value Date/Time    Glucose 113 (H) 06/05/2018 04:01 AM    Hemoglobin A1c 7.1 (H) 05/01/2017 03:48 PM    INR 1.1 02/25/2018 10:05 PM    INR 1.1 06/05/2017 07:37 PM    HGB 11.6 (L) 06/03/2018 05:21 AM    HGB 12.4 05/06/2018 05:23 AM        RECOMMENDATION     See After Visit Summary (AVS) for:  · Discharge instructions  · After [address]   · Special equipment needed (entered pre-discharge by Care Management)  · Medication Reconciliation    · Follow up Appointment(s)         Report given/sent by:  Vashti To                    Verbal report given to: Pablo Montes at [phone], [facility] at [facility]  FAXED to:  See  notes         Estimated discharge time:  6/6/2018 at 0640

## 2018-07-09 NOTE — IP AVS SNAPSHOT
[address] [facility] 
[phone] Patient: Destiny Leblanc MRN: [id number] :[id number] About your hospitalization You were admitted on:  July 10, 2018 You last received care in the:  [facility] 3[facility] You were discharged on:  2018 Why you were hospitalized Your primary diagnosis was:  Pneumonia Your diagnoses also included:  Leukocytosis, Tachycardia, Hypotension, Fever, Sirs (Systemic Inflammatory Response Syndrome) (Hcc) Follow-up Information Follow up With Details Comments Contact Info Emy Savage MD In 1 day at 8:50 AM; Be ready for transportation by 8:00 am [address] [place] [address] 
[phone] Discharge Orders None A check dani indicates which time of day the medication should be taken. My Medications START taking these medications Instructions Each Dose to Equal  
 Morning Noon Evening Bedtime  
 levoFLOXacin 750 mg tablet Commonly known as:  Beba Joshs Your last dose was: Your next dose is: Take 1 Tab by mouth every twenty-four (24) hours for 10 days. 750 mg CHANGE how you take these medications Instructions Each Dose to Equal  
 Morning Noon Evening Bedtime  
 furosemide 20 mg tablet Commonly known as:  LASIX What changed:  how much to take Your last dose was: Your next dose is: Take 3 Tabs by mouth daily. 60 mg CONTINUE taking these medications Instructions Each Dose to Equal  
 Morning Noon Evening Bedtime ADVAIR DISKUS 250-50 mcg/dose diskus inhaler Generic drug:  fluticasone-salmeterol Your last dose was: Your next dose is: Take 1 Puff by inhalation two (2) times a day. 1 Puff  
    
   
   
   
  
 albuterol 90 mcg/actuation inhaler Commonly known as:  PROVENTIL HFA, VENTOLIN HFA, PROAIR HFA Your last dose was: Your next dose is: Take 1 Puff by inhalation every four (4) hours as needed for Wheezing. 1 Puff  
    
   
   
   
  
 albuterol-ipratropium 2.5 mg-0.5 mg/3 ml Nebu Commonly known as:  Jeannagiana Patton Your last dose was: Your next dose is:    
   
   
 3 mL by Nebulization route every four (4) hours. 3 mL  
    
   
   
   
  
 allopurinol 100 mg tablet Commonly known as:  Raf Chavez Your last dose was: Your next dose is: Take 100 mg by mouth daily. 100 mg  
    
   
   
   
  
 aspirin delayed-release 81 mg tablet Your last dose was: Your next dose is: Take 81 mg by mouth daily. 81 mg  
    
   
   
   
  
 atorvastatin 80 mg tablet Commonly known as:  LIPITOR Your last dose was: Your next dose is: Take 80 mg by mouth daily. 80 mg  
    
   
   
   
  
 buPROPion  mg SR tablet Commonly known as:  Twila Clamp Your last dose was: Your next dose is: Take 150 mg by mouth two (2) times a day. 150 mg  
    
   
   
   
  
 citalopram 40 mg tablet Commonly known as:  Mark Sheridan Your last dose was: Your next dose is: Take 40 mg by mouth daily. 40 mg  
    
   
   
   
  
 clopidogrel 75 mg Tab Commonly known as:  PLAVIX Your last dose was: Your next dose is: Take 75 mg by mouth daily. 75 mg  
    
   
   
   
  
 docusate sodium 100 mg capsule Commonly known as:  David Booze Your last dose was: Your next dose is: Take 100 mg by mouth two (2) times a day. 100 mg HYDROmorphone 4 mg tablet Commonly known as:  DILAUDID Your last dose was: Your next dose is: Take 1 Tab by mouth two (2) times a day. Max Daily Amount: 8 mg. Indications: Pain 4 mg  
    
   
   
   
  
 IMDUR 60 mg CR tablet Generic drug:  isosorbide mononitrate ER Your last dose was: Your next dose is: Take 60 mg by mouth daily. 60 mg  
    
   
   
   
  
 lisinopril 2.5 mg tablet Commonly known as:  Marti Franzcock Your last dose was: Your next dose is: Take 2.5 mg by mouth daily. 2.5 mg  
    
   
   
   
  
 metFORMIN 1,000 mg tablet Commonly known as:  GLUCOPHAGE Your last dose was: Your next dose is: Take 1,000 mg by mouth two (2) times daily (with meals). Indications: type 2 diabetes mellitus 1000 mg  
    
   
   
   
  
 morphine CR 60 mg CR tablet Commonly known as:  MS CONTIN Your last dose was: Your next dose is: Take 60 mg by mouth three (3) times daily. 60 mg NITROSTAT 0.4 mg SL tablet Generic drug:  nitroglycerin Your last dose was: Your next dose is: 0.4 mg by SubLINGual route every five (5) minutes as needed for Chest Pain. 0.4 mg  
    
   
   
   
  
 nortriptyline 50 mg capsule Commonly known as:  PAMELOR Your last dose was: Your next dose is: Take 50 mg by mouth nightly. 50 mg  
    
   
   
   
  
 omeprazole 20 mg capsule Commonly known as:  PRILOSEC Your last dose was: Your next dose is: Take 20 mg by mouth daily. Indications: gastroesophageal reflux disease 20 mg  
    
   
   
   
  
 rOPINIRole 4 mg Tab TAB Commonly known as:  Tommy Rice Your last dose was: Your next dose is: Take 8 mg by mouth nightly. Indications: Restless Legs Syndrome 8 mg SPIRIVA WITH HANDIHALER 18 mcg inhalation capsule Generic drug:  tiotropium Your last dose was: Your next dose is: Take 1 Cap by inhalation daily. 1 Cap spironolactone 25 mg tablet Commonly known as:  ALDACTONE Your last dose was: Your next dose is: Take 25 mg by mouth two (2) times a day. 25 mg  
    
   
   
   
  
 TOPROL XL 25 mg XL tablet Generic drug:  metoprolol succinate Your last dose was: Your next dose is: Take 25 mg by mouth daily. 25 mg  
    
   
   
   
  
 zolpidem 10 mg tablet Commonly known as:  AMBIEN Your last dose was: Your next dose is: Take 1 Tab by mouth nightly. 10 mg Where to Get Your Medications Information on where to get these meds will be given to you by the nurse or doctor. ! Ask your nurse or doctor about these medications  
  levoFLOXacin 750 mg tablet Opioid Education Prescription Opioids: What You Need to Know: 
 
 
ATTENDING PHYSICIAN: John Paul MD 
DISCHARGING PROVIDER: John Paul MD   
To contact this individual call [phone] and ask the  to page. If unavailable ask to be transferred the Adult Hospitalist Department. DISCHARGE DIAGNOSES Cellulitis and PNA 
 
CONSULTATIONS: IP CONSULT TO INFECTIOUS DISEASES PROCEDURES/SURGERIES: * No surgery found * PENDING TEST RESULTS:  
At the time of discharge the following test results are still pending: FOLLOW UP APPOINTMENTS:  
Follow-up Information Follow up With Details Comments Contact Info Wendy Mercer MD In 2 weeks  [place] [address] [address] [place] [address] 
[phone] ADDITIONAL CARE RECOMMENDATIONS:  
 
DIET: Regular Diet and Cardiac Diet ACTIVITY: Activity as tolerated WOUND CARE:  
 
EQUIPMENT needed:  
 
 
DISCHARGE MEDICATIONS: 
 See Medication Reconciliation Form · It is important that you take the medication exactly as they are prescribed. · Keep your medication in the bottles provided by the pharmacist and keep a list of the medication names, dosages, and times to be taken in your wallet. · Do not take other medications without consulting your doctor. NOTIFY YOUR PHYSICIAN FOR ANY OF THE FOLLOWING:  
Fever over 101 degrees for 24 hours. Chest pain, shortness of breath, fever, chills, nausea, vomiting, diarrhea, change in mentation, falling, weakness, bleeding. Severe pain or pain not relieved by medications. Or, any other signs or symptoms that you may have questions about. DISPOSITION: 
x  Home With: 
x OT x PT x HH  RN  
  
 SNF/Inpatient Rehab/LTAC Independent/assisted living Hospice Other: CDMP Checked:  
Yes x PROBLEM LIST Updated: 
Yes x Signed:  
Magdalena Valero MD 
7/12/2018 10:50 AM 
 
  
  
  
Solum Announcement We are excited to announce that we are making your provider's discharge notes available to you in Solum. You will see these notes when they are completed and signed by the physician that discharged you from your recent hospital stay. If you have any questions or concerns about any information you see in Solum, please call the Health Information Department where you were seen or reach out to your Primary Care Provider for more information about your plan of care. Introducing [facility]! [employer] introduces Solum patient portal. Now you can access parts of your medical record, email your doctor's office, and request medication refills online. 1. In your internet browser, go to [email or web address] 2. Click on the First Time User? Click Here link in the Sign In box. You will see the New Member Sign Up page. 3. Enter your Incuron Access Code exactly as it appears below. You will not need to use this code after youve completed the sign-up process. If you do not sign up before the expiration date, you must request a new code. · Incuron Access Code: [email or web address] Expires: 9/1/2018  5:11 AM 
 
4. Enter the last four digits of your Social Security Number (xxxx) and Date of Birth (mm/dd/yyyy) as indicated and click Submit. You will be taken to the next sign-up page. 5. Create a [employer] ID. This will be your Incuron login ID and cannot be changed, so think of one that is secure and easy to remember. 6. Create a Incuron password. You can change your password at any time. 7. Enter your Password Reset Question and Answer. This can be used at a later time if you forget your password. 8. Enter your e-mail address. You will receive e-mail notification when new information is available in [address] 9. Click Sign Up. You can now view and download portions of your medical record. 10. Click the Download Summary menu link to download a portable copy of your medical information. If you have questions, please visit the Frequently Asked Questions section of the Incuron website. Remember, Incuron is NOT to be used for urgent needs. For medical emergencies, dial 911. Now available from your iPhone and Android! Introducing Gideon Smith As a [facility] patient, I wanted to make you aware of our electronic visit tool called Gideon Smith. [facility] 24/7 allows you to connect within minutes with a medical provider 24 hours a day, seven days a week via a mobile device or tablet or logging into a secure website from your computer. You can access Gideon Smith from anywhere in the [place]. A virtual visit might be right for you when you have a simple condition and feel like you just dont want to get out of bed, or cant get away from work for an appointment, when your regular [employer] provider is not available (evenings, weekends or holidays), or when youre out of town and need minor care. Electronic visits cost only $49 and if the [employer] 24/7 provider determines a prescription is needed to treat your condition, one can be electronically transmitted to a nearby pharmacy*. Please take a moment to enroll today if you have not already done so. The enrollment process is free and takes just a few minutes. To enroll, please download the [employer] 24/7 arden to your tablet or phone, or visit [email or web address] to enroll on your computer. And, as an [address] [place] [address] patient with a Pay[employer] account, the results of your visits will be scanned into your electronic medical record and your primary care provider will be able to view the scanned results. We urge you to continue to see your regular [employer] provider for your ongoing medical care. And while your primary care provider may not be the one available when you seek a [employer] virtual visit, the peace of mind you get from getting a real diagnosis real time can be priceless. For more information on [employer], view our Frequently Asked Questions (FAQs) at [email or web address]. Sincerely, 
 
Anselmo Adams MD 
Chief Medical Officer [employer] *:  certain medications cannot be prescribed via [employer] Unresulted Labs-Please follow up with your PCP about these lab tests Order Current Status CULTURE, BLOOD, PAIRED Preliminary result Providers Seen During Your Hospitalization Provider Specialty Primary office phone Miguelito Delgado MD Emergency Medicine [phone] Fuetnes Bruce MD Internal Medicine [phone] Maria Fernanda Harmon MD Family Practice [phone] Your Primary Care Physician (PCP) Primary Care Physician Office Phone Office Fax Korina Arroyor [phone] You are allergic to the following No active allergies Recent Documentation Height Weight BMI Smoking Status 1.88 m 116.2 kg 32.89 kg/m2 Current Every Day Smoker Emergency Contacts Name Discharge Info Relation Home Work Mobile [facility] DISCHARGE CAREGIVER [3] Friend [5] [phone] Patient Belongings The following personal items are in your possession at time of discharge: 
  Dental Appliances: None  Visual Aid: At bedside, Glasses, With patient      Home Medications: None   Jewelry: None  Clothing: At bedside    Other Valuables: None Please provide this summary of care documentation to your next provider. Signatures-by signing, you are acknowledging that this After Visit Summary has been reviewed with you and you have received a copy. Patient Signature:  ____________________________________________________________ Date:  ____________________________________________________________  
  
[facility] Signature:  ____________________________________________________________ Date:  ____________________________________________________________

## 2018-07-10 PROBLEM — J18.9 PNEUMONIA: Status: ACTIVE | Noted: 2018-01-01

## 2018-07-10 PROBLEM — I95.9 HYPOTENSION: Status: ACTIVE | Noted: 2018-01-01

## 2018-07-10 PROBLEM — R65.10 SIRS (SYSTEMIC INFLAMMATORY RESPONSE SYNDROME) (HCC): Status: ACTIVE | Noted: 2018-01-01

## 2018-07-10 PROBLEM — R50.9 FEVER: Status: ACTIVE | Noted: 2018-01-01

## 2018-07-10 PROBLEM — D72.829 LEUKOCYTOSIS: Status: ACTIVE | Noted: 2018-01-01

## 2018-07-10 PROBLEM — R00.0 TACHYCARDIA: Status: ACTIVE | Noted: 2018-01-01

## 2018-07-10 NOTE — PROGRESS NOTES
Pharmacist Note - Vancomycin Dosing    Consult provided for this 59 y.o. male for indication of CAP. Antibiotic regimen(s): vancomycin, Levaquin    Recent Labs      07/10/18   0417  18   2111   WBC  16.8*  17.8*   CREA  0.87  1.04   BUN  8  7     Frequency of BMP: once  Height: 188 cm  Weight: 117.9 kg  Est CrCl: >100 ml/min  Temp (24hrs), Av.5 °F (37.5 °C), Min:98.4 °F (36.9 °C), Max:100.8 °F (38.2 °C)    Cultures:   blood - pending    Goal trough = 15 - 20 mcg/mL    Therapy will be initiated with a loading dose of 2000 mg IV x 1 to be followed by a maintenance dose of 1250 mg IV every 8 hours. Pharmacy to follow patient daily and order levels / make dose adjustments as appropriate.

## 2018-07-10 NOTE — ED NOTES
Pt continues to c/o of body aches. No pain prn meds available at this time. Hospitalist paged for pain meds.

## 2018-07-10 NOTE — PROGRESS NOTES
Problem: Falls - Risk of  Goal: *Absence of Falls  Document Jarad Fall Risk and appropriate interventions in the flowsheet.    Outcome: Progressing Towards Goal  Fall Risk Interventions:  Mobility Interventions: Patient to call before getting OOB         Medication Interventions: Assess postural VS orthostatic hypotension

## 2018-07-10 NOTE — ED NOTES
Urinal at bedside within reach. Patient resting comfortably in bed, bed locked & low, call bell within reach. Lights dimmed, patient denies further needs at this time.

## 2018-07-10 NOTE — ROUTINE PROCESS
TRANSFER - OUT REPORT:    Verbal report given to [facility] RN(name) on Olive Villalta  being transferred to [address] [place] [address](unit) for routine progression of care       Report consisted of patients Situation, Background, Assessment and   Recommendations(SBAR). Information from the following report(s) SBAR, ED Summary, [facility] and Recent Results was reviewed with the receiving nurse. Lines:   Peripheral IV 07/09/18 Left Antecubital (Active)   Site Assessment Clean, dry, & intact 7/9/2018  9:07 PM   Phlebitis Assessment 0 7/9/2018  9:07 PM   Infiltration Assessment 0 7/9/2018  9:07 PM   Dressing Status Clean, dry, & intact 7/9/2018  9:07 PM   Dressing Type Transparent 7/9/2018  9:07 PM   Hub Color/Line Status Pink 7/9/2018  9:07 PM       Peripheral IV 07/09/18 Left Hand (Active)   Site Assessment Clean, dry, & intact 7/9/2018  9:16 PM   Phlebitis Assessment 0 7/9/2018  9:16 PM   Infiltration Assessment 0 7/9/2018  9:16 PM   Dressing Status Clean, dry, & intact 7/9/2018  9:16 PM   Dressing Type Transparent 7/9/2018  9:16 PM   Hub Color/Line Status Pink 7/9/2018  9:16 PM        Opportunity for questions and clarification was provided.       Patient transported with:   Monitor  Tech

## 2018-07-10 NOTE — H&P
[address]   HISTORY AND PHYSICAL      Dois Jeff  MR#: [id number]  : 1960  ACCOUNT #: [de-identified]   ADMIT DATE: 2018    CHIEF COMPLAINT:  Leg pain and redness. HISTORY OF PRESENT ILLNESS:  A 61-year-old white male with past medical history of coronary artery disease, chronic systolic CHF, COPD, type 2 diabetes mellitus, hypertension, hyperlipidemia, myocardial infarction, restless leg syndrome, leg edema, gait abnormality presented to the emergency department via EMS from home with chief complaint of leg pain, swelling, and redness. Patient notes pain is severe, diffuse, located in bilateral legs, radiating from bilateral feet to bilateral knees without specific alleviating factors, exacerbated with any movement, touch. The patient is a limited historian. Majority of history was obtained from review of ED and electronic medical records. The patient has chronic leg redness of lower extremities and symptoms notably worsened. According to reports, he had complaints of some chills. The patient was last hospitalized from 2018 to 2018 with chest pain, chronic venous stasis, hyponatremia, chronic respiratory failure. He notably is on 3 liters of oxygen via nasal cannula at home at baseline. Patient was to have home wound care for bilateral legs. On arrival in the emergency department tonight, the patient was febrile with temperature of 100.8 degrees Fahrenheit, blood pressure 114/56, heart rate 116, respiratory rate of 20, O2 saturation 97% on 3 liters of O2 via nasal cannula. The patient, however, became hypotensive with blood pressure as low as 92/52. The patient has CHF, however, required IV fluids. Per the ED, the patient was given 0.9% normal saline with 1000 mL IV fluid bolus x1 dose. The patient, however, has remained hypotensive. Workup included chest x-ray portable showing right basilar airspace opacity likely representing an infection. The patient was started on IV antibiotics with levofloxacin 750 mg IV for antibiotic coverage. He is now seen for admission to the medical service for continued evaluation and treatment. Currently he is not complaining of any chest pain, palpitations, worsening shortness of breath, abdominal pain, nausea, vomiting, diarrhea, melena, dysuria, hematuria, dizziness, lightheadedness, new onset focal weakness, numbness, paresthesias, slurred speech, facial droop, headache, neck pain. Pain remains in both legs and notably has difficulty ambulating secondary to same. He notably uses a cane and walker for assist in mobilization at home. PAST MEDICAL HISTORY:    1. Coronary artery disease. 2.  Chronic diastolic congestive heart failure per chart records. Last 2D echocardiogram, 02/26/2018, showed reduced left ventricular systolic ejection fraction of 30%-3%5.    3.  Chronic hypoxic respiratory failure on 3 liters oxygen via nasal cannula. 4.  Type 2 diabetes mellitus. 5.  Hypertension. 6.  Hyperlipidemia. 7.  Myocardial infarction. 8.  Restless leg syndrome. 9.  Chronic leg swelling. 10.  Edema. PAST SURGICAL HISTORY:     1. PTCA and stents x7.    2.  Right carpal tunnel release. MEDICATIONS:  Complete medication list reviewed, noted on chart records.        Taking Last Dose Start Date End Date Provider        albuterol (PROVENTIL HFA, VENTOLIN HFA, PROAIR HFA) 90 mcg/actuation inhaler    --  --  Historical Provider      Take 1 Puff by inhalation every four (4) hours as needed for Wheezing.      albuterol-ipratropium (DUO-NEB) 2.5 mg-0.5 mg/3 ml nebu    03/03/18  -- Debby Luong MD      3 mL by Nebulization route every four (4) hours.      aspirin delayed-release 81 mg tablet    --  --  Gerald Caldwell MD      Take 81 mg by mouth daily.      atorvastatin (LIPITOR) 80 mg tablet    --  --  Historical Provider      Take 80 mg by mouth daily.      citalopram (CELEXA) 40 mg tablet  --  --  Gerald Caldwell MD      Take 40 mg by mouth daily.      clopidogrel (PLAVIX) 75 mg tab    --  --  Gerald Caldwell MD      Take 75 mg by mouth daily.      docusate sodium (COLACE) 100 mg capsule    --  --  Historical Provider      Take 100 mg by mouth two (2) times a day.      ferrous sulfate 325 mg (65 mg iron) tablet    --  --  Historical Provider      Take 325 mg by mouth Daily (before breakfast).      fluticasone-salmeterol (ADVAIR DISKUS) 250-50 mcg/dose diskus inhaler    --  --  Gerald Caldwell MD      Take 1 Puff by inhalation two (2) times a day.      furosemide (LASIX) 20 mg tablet    06/06/18  -- Edilia Martini NP      Take 3 Tabs by mouth daily.      HYDROmorphone (DILAUDID) 4 mg tablet    06/06/18  -- Edilia Martini NP      Take 1 Tab by mouth two (2) times a day. Max Daily Amount: 8 mg. Indications: Pain      isosorbide mononitrate ER (IMDUR) 60 mg CR tablet    --  --  Gerald Caldwell MD      Take 60 mg by mouth daily.      lisinopril (PRINIVIL, ZESTRIL) 2.5 mg tablet    --  --  Historical Provider      Take 2.5 mg by mouth daily.      metFORMIN (GLUCOPHAGE) 1,000 mg tablet    --  --  Historical Provider      Take 1,000 mg by mouth two (2) times daily (with meals). Indications: type 2 diabetes mellitus      metoprolol succinate (TOPROL XL) 25 mg XL tablet    --  --  Historical Provider      Take 25 mg by mouth daily.      morphine CR (MS CONTIN) 30 mg CR tablet    06/06/18  -- Edilia Martini NP      Take 2 Tabs by mouth every eight (8) hours. Max Daily Amount: 180 mg.      nitroglycerin (NITROSTAT) 0.4 mg SL tablet    --  --  Historical Provider      0.4 mg by SubLINGual route every five (5) minutes as needed for Chest Pain.      nortriptyline (PAMELOR) 50 mg capsule    --  --  Historical Provider      Take 50 mg by mouth nightly.      omeprazole (PRILOSEC) 20 mg capsule    --  --  Historical Provider      Take 20 mg by mouth daily.  Indications: gastroesophageal reflux disease      rOPINIRole (REQUIP) 4 mg tab TAB    --  --  Historical Provider      Take 8 mg by mouth nightly. Indications: Restless Legs Syndrome      spironolactone (ALDACTONE) 50 mg tablet    05/07/18  --  Terrance Ríos MD      Take 1 Tab by mouth two (2) times a day.      tiotropium (SPIRIVA WITH HANDIHALER) 18 mcg inhalation capsule    --  --  Gerald Caldwell MD      Take 1 Cap by inhalation daily.      zolpidem (AMBIEN) 10 mg tablet    05/24/14  -- Wendy Mercer MD      Take 1 Tab by mouth nightly.           ALLERGIES:  NO KNOWN DRUG ALLERGIES. SOCIAL HISTORY:  Positive for smoking cigarettes approximately 1/4 of a pack a day. Denies alcohol. No reports of illicit drugs. FAMILY HISTORY:  Unknown in regards to heart attacks, strokes. REVIEW OF SYSTEMS:  Pertinent positives are as noted in the HPI. All other systems were reviewed and negative. PHYSICAL EXAMINATION:    VITAL SIGNS:  Temperature 100.8 degrees Fahrenheit, blood pressure 95/51, heart rate 100, respiration rate of 20, O2 saturation 100% on 3 liters of oxygen via nasal cannula, recorded weight 260 pounds (117.9 kg), recorded height of 6 feet 2 inches tall. GENERAL:  Obese male in no acute respiratory distress. PSYCHIATRIC:  The patient is awake, alert, oriented x2 to person and place only. NEUROLOGIC:  GCS of 15. Moves extremities x4 with generalized weakness, unable to lift bilateral legs. Strength approximately a 3/5 in bilateral upper extremities. Sensation is grossly decreased in plantar surface of both feet without slurred speech, facial droop. HEENT:  Normocephalic, atraumatic. PERRLA. EOMs intact. Sclerae are anicteric. Conjunctivae are clear. Nares are patent. Oropharynx clear. Tongue is midline and nonedematous. NECK:  Supple, without lymphadenopathy, JVD, carotid bruit, or thyromegaly. LYMPHATIC:  Negative for cervical or supraclavicular adenopathy. RESPIRATORY:  Lungs:  Scattered rhonchi bilaterally. CARDIOVASCULAR:  Heart tachycardic, regular rhythm. No murmurs, rubs, or gallops. GASTROINTESTINAL:  Abdomen soft, nontender, nondistended. Normoactive bowel sounds. No rebound, guarding, rigidity. No auscultated abdominal bruits or pulsatile mass. BACK:  No CVA tenderness. No step-off deformity. MUSCULOSKELETAL:  Tenderness on palpation of both legs, which are markedly swollen and edematous with erythema diffusely located in bilateral legs and feet with some sloughing of dry skin. No acute palpable bony deformity. VASCULAR:  2+ radial, 1+ dorsalis pedis pulses, without cyanosis, positive clubbing. Marked nonpitting edema bilateral lower extremities. SKIN:  Warm and dry with erythema as mentioned. LABORATORY DATA:  I reviewed. Sodium 136, potassium 4.0, chloride 101, CO2 24, BUN 7, creatinine 1.04, glucose 88, anion gap of 11, calcium 8.7, GFR greater than 60, total bilirubin 0.9, total protein 8.0, albumin is 2.7, ALT 12, AST of 11, alkaline phosphatase of 151, lactic acid 1.5, troponin I of less than 0.35, WBC 17.8, hemoglobin of 10.8, hematocrit of 34.1, platelets 361, neutrophils 87%. Urinalysis:  Leuk esterase negative, nitrites negative, urobilinogen 1.0, bilirubin negative, blood negative, ketones negative, glucose negative, protein trace, pH 8.0, specific gravity of 1.017, WBCs 0-4, RBCs 0-5, bacteria negative. Chest x-ray portable results noted. A 12-lead EKG:  Sinus tachycardia, ST elevations noted in superior leads 114 beats per minute comparable to prior EKG on 06/03/2018. Change from noted atrial fibrillation at that time. IMPRESSION AND PLAN:    1. SIRS, systemic inflammatory response syndrome with fever, tachycardia, leukocytosis, hypotension. Admit patient to intensive care unit. Check blood cultures. The patient has already been started on levofloxacin. May continue on the same 750 mg IV every 24 hours for possible pneumonia.   May adjust antibiotic accordingly. Add vancomycin for cellulitis of bilateral legs. 2.  Pneumonia. Plan as noted above. 3.  Cellulitis, bilateral lower extremities. Continue workup and plan as noted above. Continue vancomycin. Pharmacy to dose. 4.  Fever. Treat accordingly. May have Tylenol p.r.n.    5.  Leukocytosis. Repeat CBC. 6.  Hypotension. Order 0.9% normal saline for IV fluid resuscitation. Check vital signs every 1 hour. Admit to intensive care unit. 7.  Anemia. Repeat hemoglobin and hematocrit. 8.  Chronic hypoxic respiratory failure. Continue with his O2 nasal cannula and titrate oxygen accordingly. Continue on pulse oximetry. 9.  Type 2 diabetes mellitus. Place on Humalog insulin correction coverage, schedule Accu-Cheks, and check hemoglobin A1c level. 10.  Bilateral leg pain. Provide pain management, supportive cares, avoiding narcotics with noted hypotension at this time. 11.  Chronic obstructive pulmonary disease. May have DuoNeb nebulizer treatments p.r.n.    12.  Chronic systolic congestive heart failure, not in overt overload. Monitor closely. 13.  Generalized weakness/debility. Place on fall precautions. 14.  Venous thromboembolism prophylaxis. Sequential compression devices to lower extremities. MD PARUL Dockery/  D: 07/10/2018 05:40     T: 07/10/2018 06:45  JOB #: [id number]

## 2018-07-10 NOTE — ED NOTES
Bedside and Verbal shift change report given to Carri Keller RN (oncoming nurse) by Yvonne Irizarry (offgoing nurse). Report included the following information SBAR, Kardex, ED Summary, MAR, Recent Results and Cardiac Rhythm NSR.    [id number]: Bedside and Verbal shift change report given to Vargas Olmos RN (oncoming nurse) by Carri Keller RN (offgoing nurse). Report included the following information SBAR, Kardex, ED Summary, MAR, Recent Results, Med Rec Status and Cardiac Rhythm NSR.

## 2018-07-10 NOTE — PROCEDURES
[facility]  *** FINAL REPORT ***    Name: Marni Tello  MRN: [id number]    Inpatient  : 11 Aug 1960  HIS Order #: [id number]  [id number] [facility] Visit #: [id number]  Date: 10 Jul 2018    TYPE OF TEST: Peripheral Venous Testing    REASON FOR TEST  Pain in limb, Limb swelling    Right Leg:-  Deep venous thrombosis:           No  Superficial venous thrombosis:    No  Deep venous insufficiency:        Not examined  Superficial venous insufficiency: Not examined    Left Leg:-  Deep venous thrombosis:           No  Superficial venous thrombosis:    No  Deep venous insufficiency:        Not examined  Superficial venous insufficiency: Not examined      INTERPRETATION/FINDINGS  PROCEDURE:  Color duplex ultrasound imaging of lower extremity veins. FINDINGS:       Right: The common femoral, deep femoral, femoral, popliteal,  posterior tibial and great saphenous are patent and without evidence  of thrombus;  each is fully compressible and there is no narrowing of  the flow channel on color Doppler imaging. There is limited  visualization of the posterior tibial.  The peroneal was not  visualized secondary to swelling. Phasic flow is observed in the  common femoral vein. Left:   The common femoral, deep femoral, femoral, popliteal,  posterior tibial, peroneal and great saphenous are patent and without  evidence of thrombus;  each is fully compressible and there is no  narrowing of the flow channel on color Doppler imaging. There is  limited visualization of the calf veins. Phasic flow is observed in  the common femoral vein. IMPRESSION:  No evidence of right or left lower extremity vein  thrombosis where seen. Due to limited visualization of calf veins,  isolated thrombosis cannot be completely excluded. There is an  incidental finding of bilateral prominent groin lymph nodes. ADDITIONAL COMMENTS    I have personally reviewed the data relevant to the interpretation of  this  study.     TECHNOLOGIST: Son Posadas Christiano Robles RVT  Signed: 07/10/2018 10:50 AM    PHYSICIAN: Sena Caldwell MD  Signed: 07/11/2018 07:03 AM

## 2018-07-10 NOTE — ED NOTES
Verbal shift change report given to Jesús RN (oncoming nurse) by Janie Callejas (offgoing nurse). Report included the following information SBAR, ED Summary and MAR.

## 2018-07-10 NOTE — ED NOTES
Bedside and Verbal shift change report received from WALTER Espinosa (offgoing nurse). Report included the following information SBAR, ED Summary, MAR and Recent Results. Pt resting on hospital bed and provided with extra blanket for comfort. Pt c/o of generalized 8/10 body aches.

## 2018-07-10 NOTE — ED NOTES
Pt provided with water per request. Pt requesting additional blankets. Explained to pt that I could provide him with one more blanket as oral temp is 99.

## 2018-07-10 NOTE — ED NOTES
Pt bp dropped to 101/49. All over vital signs stable. Pt appears to be in no distress at this time and is alert and oriented. Hospitalist paged. Spoke with Dr. Gutierrez Mooney and orders received for oral tylenol and IV fluids.

## 2018-07-10 NOTE — ED TRIAGE NOTES
Pt arrives via EMS from private residence cc of BLE pain, fever + chills that began yesterday. Patient has hx of COPD, supplemental oxygen use at home of 3L NC. [facility] visits patient 2x per month and notified PCP of patient's worsening BLE wounds. Patient arrives A&Ox4.

## 2018-07-10 NOTE — PROGRESS NOTES
Admission Medication Reconciliation:    Information obtained from:  Patient and list from home    Comments/Recommendations: All medications/allergies have been reviewed and updated; last medication administration times reviewed and recorded. Pt brought a list from home, however some of the medications on the list were outdated. Confirmed with pt about which medications he was taking. He was unsure if he was taking bupropion or spironolactone. Changes made to Prior to Admission (PTA) Medication List:   ?   Medications Added:   - Bupropion  mg 1 BID  - Allopurinol 100 mg 1 QD   ? Medications Changed:   - Furosemide 20 mg 3 every day to 1 every day   - Morphine CR 30 mg 2 Q8H to 60 mg TID  - Spironolactone from 50 mg BID to 25 mg BID (per RxQuery)   ? Medications Removed:   - Ferrous sulfate 325 mg       Significant PMH/Disease States:   Past Medical History:   Diagnosis Date    CAD (coronary artery disease)     [facility]     Chronic systolic heart failure ([facility] [address] 4/12/2011    COPD (chronic obstructive pulmonary disease) ([facility])     Coronary atherosclerosis of native coronary artery 4/12/2011    Diabetes ([facility] [address]     High cholesterol     Hypertension     MI, old     Neurological disorder     Other primary cardiomyopathies 4/12/2011    Restless leg syndrome        Chief Complaint for this Admission:    Chief Complaint   Patient presents with    Leg Pain    Chills       Allergies:  Review of patient's allergies indicates no known allergies. Prior to Admission Medications:   Prior to Admission Medications   Prescriptions Last Dose Informant Patient Reported? Taking? HYDROmorphone (DILAUDID) 4 mg tablet 7/9/2018 at Unknown time  No Yes   Sig: Take 1 Tab by mouth two (2) times a day. Max Daily Amount: 8 mg.  Indications: Pain   albuterol (PROVENTIL HFA, VENTOLIN HFA, PROAIR HFA) 90 mcg/actuation inhaler 7/9/2018 at Unknown time  Yes Yes   Sig: Take 1 Puff by inhalation every four (4) hours as needed for Wheezing. albuterol-ipratropium (DUO-NEB) 2.5 mg-0.5 mg/3 ml nebu 7/9/2018 at Unknown time  No Yes   Sig: 3 mL by Nebulization route every four (4) hours. allopurinol (ZYLOPRIM) 100 mg tablet 7/9/2018 at Unknown time  Yes Yes   Sig: Take 100 mg by mouth daily. aspirin delayed-release 81 mg tablet 7/9/2018 at Unknown time  Yes Yes   Sig: Take 81 mg by mouth daily. atorvastatin (LIPITOR) 80 mg tablet 7/9/2018 at Unknown time Self Yes Yes   Sig: Take 80 mg by mouth daily. buPROPion SR (WELLBUTRIN SR) 150 mg SR tablet Unknown at Unknown time  Yes No   Sig: Take 150 mg by mouth two (2) times a day. citalopram (CELEXA) 40 mg tablet 7/9/2018 at Unknown time  Yes Yes   Sig: Take 40 mg by mouth daily. clopidogrel (PLAVIX) 75 mg tab 7/9/2018 at Unknown time Self Yes Yes   Sig: Take 75 mg by mouth daily. docusate sodium (COLACE) 100 mg capsule 7/9/2018 at Unknown time  Yes Yes   Sig: Take 100 mg by mouth two (2) times a day. fluticasone-salmeterol (ADVAIR DISKUS) 250-50 mcg/dose diskus inhaler 7/9/2018 at Unknown time Self Yes Yes   Sig: Take 1 Puff by inhalation two (2) times a day. furosemide (LASIX) 20 mg tablet 7/9/2018 at Unknown time  No Yes   Sig: Take 3 Tabs by mouth daily. Patient taking differently: Take 20 mg by mouth daily. isosorbide mononitrate ER (IMDUR) 60 mg CR tablet 7/9/2018 at Unknown time Self Yes Yes   Sig: Take 60 mg by mouth daily. lisinopril (PRINIVIL, ZESTRIL) 2.5 mg tablet 7/9/2018 at Unknown time  Yes Yes   Sig: Take 2.5 mg by mouth daily. metFORMIN (GLUCOPHAGE) 1,000 mg tablet 7/9/2018 at Unknown time Self Yes Yes   Sig: Take 1,000 mg by mouth two (2) times daily (with meals). Indications: type 2 diabetes mellitus   metoprolol succinate (TOPROL XL) 25 mg XL tablet 7/9/2018 at Unknown time  Yes Yes   Sig: Take 25 mg by mouth daily.    morphine CR (MS CONTIN) 60 mg CR tablet 7/9/2018 at Unknown time  Yes Yes   Sig: Take 60 mg by mouth three (3) times daily. nitroglycerin (NITROSTAT) 0.4 mg SL tablet 2018 at Unknown time  Yes Yes   Si.4 mg by SubLINGual route every five (5) minutes as needed for Chest Pain. nortriptyline (PAMELOR) 50 mg capsule 2018 at Unknown time  Yes Yes   Sig: Take 50 mg by mouth nightly. omeprazole (PRILOSEC) 20 mg capsule 2018 at Unknown time Self Yes Yes   Sig: Take 20 mg by mouth daily. Indications: gastroesophageal reflux disease   rOPINIRole (REQUIP) 4 mg tab TAB 2018 at Unknown time Self Yes Yes   Sig: Take 8 mg by mouth nightly. Indications: Restless Legs Syndrome   spironolactone (ALDACTONE) 25 mg tablet Unknown at Unknown time  Yes No   Sig: Take 25 mg by mouth two (2) times a day. tiotropium (SPIRIVA WITH HANDIHALER) 18 mcg inhalation capsule 2018 at Unknown time Self Yes Yes   Sig: Take 1 Cap by inhalation daily. zolpidem (AMBIEN) 10 mg tablet 2018 at Unknown time Self No Yes   Sig: Take 1 Tab by mouth nightly. Facility-Administered Medications: None     Thank you for allowing pharmacy to participate in the coordination of this patient's care. If you have any other questions, please contact the medication reconciliation pharmacist at x [phone].      Sign-off:Marbin Duncan, PharmD Candidate [id number]

## 2018-07-10 NOTE — CDMP QUERY
There is noted documentation of SIRS on this record. Could this be further clarified as    =>Sepsis POA in the setting of Pneumonia requiring fluid resuscitation  =>Other Explanation of clinical findings  =>Clinically Undetermined (no explanation for clinical findings)    The medical record reflects the following clinical findings, treatment, and risk factors:    Risk Factors: Pneumonia  Clinical Indicators: WBC: 17.8,fever, tachycardia, leukocytosis, hypotension  Treatment: 3 lts fluid bolus, vancomycin      Please clarify and document your clinical opinion in the progress notes and discharge summary including the definitive and/or presumptive diagnosis, (suspected or probable), related to the above clinical findings.  Please include clinical findings supporting your diagnosis    Thank you,         Jaspreet Stage [address] [facility]

## 2018-07-10 NOTE — ED PROVIDER NOTES
HPI Comments: 59 y.o. male with past medical history significant for cardiac stents, atherosclerosis, CHF, CAD, HTN, DM, MI, COPD, high cholesterol, who presents from EMS with chief complaint of leg pain. Pt has onset today of diffuse b/l leg pain the radiates from the knee down. Pt has been suffering from chronic erythema of his b/l lower extremities. Pt does not have any more pain medication at home to help alleviate his sx. Accompanying sx include chills and erythema. Denies cough, n/v/d, and difficulty urinating. There are no other acute medical concerns at this time. PCP: Oscar Davidson MD    Chart Review: Pt admitted from 6/3-6/6. Advised wound care home treatment plan until treated by outpatient wound care center for b/l lower legs. Note written by Rich Noyola, as dictated by Gena Go MD 8:55 PM      The history is provided by the patient. No  was used. Past Medical History:   Diagnosis Date    CAD (coronary artery disease)     CHF (congestive heart failure) (HCC)     Chronic systolic heart failure ([address] 4/12/2011    COPD (chronic obstructive pulmonary disease) (HCC)     Coronary atherosclerosis of native coronary artery 4/12/2011    Diabetes ([facility]     High cholesterol     Hypertension     MI, old     Neurological disorder     Other primary cardiomyopathies 4/12/2011    Restless leg syndrome        Past Surgical History:   Procedure Laterality Date    CARDIAC SURG PROCEDURE UNLIST      stents x7    HX CARPAL TUNNEL RELEASE      right wrist         No family history on file. Social History     Social History    Marital status:      Spouse name: N/A    Number of children: N/A    Years of education: N/A     Occupational History    Not on file.      Social History Main Topics    Smoking status: Current Every Day Smoker     Packs/day: 0.25    Smokeless tobacco: Never Used    Alcohol use No    Drug use: No    Sexual activity: Not on file     Other Topics Concern    Not on file     Social History Narrative         ALLERGIES: Review of patient's allergies indicates no known allergies. Review of Systems   Constitutional: Positive for chills. Negative for diaphoresis and fever. HENT: Negative for facial swelling. Eyes: Negative for visual disturbance. Respiratory: Negative for cough. Cardiovascular: Negative for chest pain. Gastrointestinal: Negative for abdominal pain. Genitourinary: Negative for dysuria. Musculoskeletal: Positive for myalgias. Negative for joint swelling. Skin: Positive for color change. Negative for rash. Neurological: Negative for headaches. Hematological: Negative for adenopathy. Psychiatric/Behavioral: Negative for suicidal ideas. All other systems reviewed and are negative. There were no vitals filed for this visit. Physical Exam   Constitutional: He is oriented to person, place, and time. He appears well-developed and well-nourished. No distress. obese   HENT:   Head: Normocephalic and atraumatic. Mouth/Throat: Oropharynx is clear and moist.   Eyes: Pupils are equal, round, and reactive to light. Neck: Normal range of motion. Neck supple. Cardiovascular: Regular rhythm, normal heart sounds and intact distal pulses. Tachycardia present. Pulmonary/Chest: Effort normal and breath sounds normal. No respiratory distress. Abdominal: Soft. Bowel sounds are normal. He exhibits no distension. There is no tenderness. Musculoskeletal: Normal range of motion. He exhibits edema (chronic in b/l LE). Neurological: He is alert and oriented to person, place, and time. Skin: Skin is warm and dry. There is erythema (chronic in b/l LE). Nursing note and vitals reviewed. Note written by Rich Dorsey, as dictated by Clementina Lou MD 8:55 PM    [facility]      ED Course       Procedures    ED EKG interpretation:  Rhythm:  Sinus tach. Rate (approx.): 114.   Axis: normal.  ST segment:  No concerning ST elevations or depressions. No sig change from 6/3/2018. This EKG was interpreted by Julienne Khan MD,ED Provider. Portable Chest Xray:  IMPRESSION:  Right basilar airspace opacity likely represents infection. Follow-up to  resolution is recommended. WBC=17.8  Lactate:  1.5  UA neg  CMP unremarkable. Trop neg    Levaquin ordered for community acquired pneumonia. BP responding to IVF. Lactate normal.     11:36 PM  Discussed with Dr. Wang Mclain who will eval patient for admission. 11:38 PM  I have spent *36** minutes of critical care time involved in lab review, consultations with specialist, family decision-making, and documentation. During this entire length of time I was immediately available to the patient and/or family.

## 2018-07-10 NOTE — PROGRESS NOTES
Hospitalist Progress Note John Paul MD 
Office: [phone] Date of Service:  7/10/2018 NAME:  Gurwinder Cardenas :  1960 MRN:  [id number] Pt seen and examined discussed care plan D/w Jeanette Poe care doctor Hospital Problems  Date Reviewed: 7/10/2018 Codes Class Noted POA Leukocytosis ICD-10-CM: E03.843 ICD-9-CM: 288.60  7/10/2018 Unknown * (Principal)Pneumonia ICD-10-CM: J18.9 ICD-9-CM: 660  7/10/2018 Unknown Tachycardia ICD-10-CM: R00.0 ICD-9-CM: 785.0  7/10/2018 Unknown Hypotension ICD-10-CM: I95.9 ICD-9-CM: 458.9  7/10/2018 Unknown Fever ICD-10-CM: R50.9 ICD-9-CM: 780.60  7/10/2018 Unknown SIRS (systemic inflammatory response syndrome) (HCC) ICD-10-CM: R65.10 ICD-9-CM: 995.90  7/10/2018 Unknown Review of Systems:  
i am hurting all over Vital Signs:  
 Last 24hrs VS reviewed since prior progress note. Most recent are: 
Visit Vitals  /58  Pulse (!) 110  Temp (!) 101.2 °F (38.4 °C)  Resp 23  
 Ht 6' 2\" (1.88 m)  Wt 117.9 kg (260 lb)  SpO2 95%  BMI 33.38 kg/m2 Physical Examination:  
 
 
     
   
   
Resp:  CTA bilaterally. CV:  tachycardia GI:  Soft, non distended, non tender. bs+ Musculoskeletal:  No edema, Neurologic:  Moves all extremities. AAOx3, CN II-XII reviewed PLAN:  
 
1. Resume half of his home pain meds , he c/o pain all over may be tachy from that 2. Resume other home meds except BP meds 
______________________________________________________________________ EXPECTED LENGTH OF STAY: 3d 9h 
ACTUAL LENGTH OF STAY:          0 John Bream, MD

## 2018-07-10 NOTE — ED NOTES
Bedside and Verbal shift change report given to Guy Valverde RN (oncoming nurse) by Cristofer Prince RN (offgoing nurse). Report included the following information SBAR, ED Summary, MAR and Recent Results.

## 2018-07-10 NOTE — ED NOTES
MD informed regarding persistently elevated rectal temp and MEWS score of 5; received order to place ice packs to axilla/groin.

## 2018-07-11 NOTE — PROGRESS NOTES
Patient is resting in bed complaints of restless leg no other complaints at this time, He also requested me to call and see about his sleep aid 10 mg Ambien he takes QHS. Will continue to monitor patient for changes in his condition. 46 paged Dr. Everardo Clayton for patient's sleep aid Ambien 10 mg he takes every night per patient. He called back and one time dose was given. 2320 patient is resting in bed at this time he complains of abdominal pain Tylenol was given    0445 patient was getting up to be weighed and pulled his L hand IV out. Fluids now infusing in his Left AC. Patient was assisted up to get his weight he is very weak in his legs. 0730 Bedside and Verbal shift change report given to Annemarie Marte RN (oncoming nurse) by Lalitha Bush RN  (offgoing nurse). Report included the following information SBAR, MAR, Recent Results and Med Rec Status.

## 2018-07-11 NOTE — CONSULTS
Infectious Disease Consult    Today's Date: 7/11/2018   Admit Date: 7/9/2018    Impression:   · Weakness, fevers  · RLL CAP  · Chronic dermatitis BLE    Plan:   · Agree with antibiotic regimen  · Home on oral Levaquin to complete 7 days of therapy when ready for discharge    Anti-infectives:   ·     Subjective:   Date of Consultation:  July 11, 2018  Referring Physician: Dr Carin Alexis    Patient is a 59 y.o. male known to me from care given in the past. He states that he was last in the hospital about 3-4 months ago. He is admitted with weakness, aches and pains. He has been found to have a RLL pneumonia. He states that he is up to date on vaccinations. He is on antibiotic therapy and we are asked to see him in consultation.      Patient Active Problem List   Diagnosis Code    Coronary atherosclerosis of native coronary artery I25.10    Other primary cardiomyopathies I42.8    Chronic systolic heart failure (HCC) I50.22    Chest pain R07.9    Hematemesis K92.0    Rectal bleeding K62.5    Obstructive sleep apnea (adult) (pediatric) G47.33    Chronic airway obstruction, not elsewhere classified J44.9    Essential hypertension, benign I10    Type II or unspecified type diabetes mellitus without mention of complication, not stated as uncontrolled E11.9    Coronary artery disease I25.10    Morbid obesity ([address] E66.01    COPD with exacerbation ([address] K78.8    Systolic CHF, acute ([address] D89.18    Acute systolic CHF (congestive heart failure) ([facility]) I50.21    SOB (shortness of breath) R06.02    Fluid overload E87.70    Leukocytosis D72.829    Pneumonia J18.9    Tachycardia R00.0    Hypotension I95.9    Fever R50.9    SIRS (systemic inflammatory response syndrome) ([facility]) R65.10     Past Medical History:   Diagnosis Date    CAD (coronary artery disease)     CHF (congestive heart failure) ([facility])     Chronic systolic heart failure ([facility]) 4/12/2011    COPD [facility] disease) [facility]     Coronary atherosclerosis of native coronary artery 4/12/2011    Diabetes [facility]     High cholesterol     Hypertension     MI, old     Neurological disorder     Other primary cardiomyopathies 4/12/2011    Restless leg syndrome       History reviewed. No pertinent family history. Social History   Substance Use Topics    Smoking status: Current Every Day Smoker     Packs/day: 0.25    Smokeless tobacco: Never Used    Alcohol use No     Past Surgical History:   Procedure Laterality Date    CARDIAC SURG PROCEDURE UNLIST      stents x7    HX CARPAL TUNNEL RELEASE      right wrist      Prior to Admission medications    Medication Sig Start Date End Date Taking? Authorizing Provider   morphine CR (MS CONTIN) 60 mg CR tablet Take 60 mg by mouth three (3) times daily. Yes Historical Provider   allopurinol (ZYLOPRIM) 100 mg tablet Take 100 mg by mouth daily. Yes Historical Provider   furosemide (LASIX) 20 mg tablet Take 3 Tabs by mouth daily. Patient taking differently: Take 20 mg by mouth daily. 6/6/18  Yes Hortencia Villarreal NP   HYDROmorphone (DILAUDID) 4 mg tablet Take 1 Tab by mouth two (2) times a day. Max Daily Amount: 8 mg. Indications: Pain 6/6/18  Yes Hortencia Villarreal NP   metoprolol succinate (TOPROL XL) 25 mg XL tablet Take 25 mg by mouth daily. Yes Historical Provider   lisinopril (PRINIVIL, ZESTRIL) 2.5 mg tablet Take 2.5 mg by mouth daily. Yes Historical Provider   albuterol (PROVENTIL HFA, VENTOLIN HFA, PROAIR HFA) 90 mcg/actuation inhaler Take 1 Puff by inhalation every four (4) hours as needed for Wheezing. Yes Historical Provider   albuterol-ipratropium (DUO-NEB) 2.5 mg-0.5 mg/3 ml nebu 3 mL by Nebulization route every four (4) hours. 3/3/18  Yes Cb Lazo MD   aspirin delayed-release 81 mg tablet Take 81 mg by mouth daily. Yes Gearld Caldwell MD   citalopram (CELEXA) 40 mg tablet Take 40 mg by mouth daily.    Yes Gerald Caldwell MD   nitroglycerin (NITROSTAT) 0.4 mg SL tablet 0.4 mg by SubLINGual route every five (5) minutes as needed for Chest Pain. Yes Historical Provider   nortriptyline (PAMELOR) 50 mg capsule Take 50 mg by mouth nightly. Yes Historical Provider   docusate sodium (COLACE) 100 mg capsule Take 100 mg by mouth two (2) times a day. Yes Historical Provider   metFORMIN (GLUCOPHAGE) 1,000 mg tablet Take 1,000 mg by mouth two (2) times daily (with meals). Indications: type 2 diabetes mellitus   Yes Historical Provider   omeprazole (PRILOSEC) 20 mg capsule Take 20 mg by mouth daily. Indications: gastroesophageal reflux disease   Yes Historical Provider   clopidogrel (PLAVIX) 75 mg tab Take 75 mg by mouth daily. Yes Gerald Caldwell MD   atorvastatin (LIPITOR) 80 mg tablet Take 80 mg by mouth daily. Yes Historical Provider   rOPINIRole (REQUIP) 4 mg tab TAB Take 8 mg by mouth nightly. Indications: Restless Legs Syndrome   Yes Historical Provider   zolpidem (AMBIEN) 10 mg tablet Take 1 Tab by mouth nightly. 14  Yes Emre Alvarez MD   tiotropium (SPIRIVA WITH HANDIHALER) 18 mcg inhalation capsule Take 1 Cap by inhalation daily. Yes Gerald Caldwell MD   fluticasone-salmeterol (ADVAIR DISKUS) 250-50 mcg/dose diskus inhaler Take 1 Puff by inhalation two (2) times a day. Yes Gerald Caldwell MD   isosorbide mononitrate ER (IMDUR) 60 mg CR tablet Take 60 mg by mouth daily. Yes Gerald Caldwell MD   spironolactone (ALDACTONE) 25 mg tablet Take 25 mg by mouth two (2) times a day. Historical Provider   buPROPion SR [facility] SR) 150 mg SR tablet Take 150 mg by mouth two (2) times a day. Historical Provider       No Known Allergies     Review of Systems:  A comprehensive review of systems was negative.     Objective:     Visit Vitals    /74 (BP 1 Location: Right arm, BP Patient Position: Sitting)    Pulse 75    Temp 98 °F (36.7 °C)    Resp 16    Ht 6' 2\" (1.88 m)    Wt 112.9 kg (249 lb)    SpO2 100%    BMI 31.97 kg/m2     Temp (24hrs), Av.9 °F (37.2 °C), Min:97.5 °F (36.4 °C), Max:102.1 °F (38.9 °C)       Lines:  Peripheral IV:            Physical Exam:  Neck:  normal and no erythema or exudates noted. Lungs:  clear to auscultation bilaterally  Heart:  regular rate and rhythm  Abdomen:  soft, non-tender. Bowel sounds normal. No masses,  no organomegaly  Skin:  scale(s) noted on extremities    Data Review:     CBC:  Recent Labs      07/10/18   0417  07/09/18   2111   WBC  16.8*  17.8*   GRANS  86*  87*   MONOS  5  5   EOS  0  0   ANEU  14.5*  15.6*   ABL  1.3  1.0   HGB  10.0*  10.8*   HCT  32.8*  34.1*   PLT  328  359       BMP:  Recent Labs      07/10/18   0417  07/09/18   2111   CREA  0.87  1.04   BUN  8  7   NA  136  136   K  4.1  4.0   CL  105  101   CO2  24  24   AGAP  7  11   GLU  75  88       LFTS:  Recent Labs      07/09/18 2111   TBILI  0.9   ALT  12   SGOT  11*   AP  151*   TP  8.0   ALB  2.7*       Microbiology:     All Micro Results     Procedure Component Value Units Date/Time    CULTURE, BLOOD, PAIRED [[id number]] Collected:  07/09/18 2111    Order Status:  Completed Specimen:  Blood Updated:  07/11/18 0519     Special Requests: NO SPECIAL REQUESTS        Culture result: NO GROWTH 2 DAYS       URINE CULTURE HOLD SAMPLE [[id number]] Collected:  07/09/18 2239    Order Status:  Completed Specimen:  Urine from Serum Updated:  07/09/18 2246     Urine culture hold         URINE ON HOLD IN MICROBIOLOGY DEPT FOR 3 DAYS. IF UNPRESERVED URINE IS SUBMITTED, IT CANNOT BE USED FOR ADDITIONAL TESTING AFTER 24 HRS, RECOLLECTION WILL BE REQUIRED.           Imaging:   Viewed     Signed By: Callie Francis MD     July 11, 2018

## 2018-07-11 NOTE — PROGRESS NOTES
Problem: Discharge Planning  Goal: *Discharge to safe environment  Outcome: Progressing Towards Goal  See CM notes CRM: Amanda Garcia, MPH; Z: [phone]

## 2018-07-11 NOTE — PROGRESS NOTES
Problem: Falls - Risk of  Goal: *Absence of Falls  Document Jarad Fall Risk and appropriate interventions in the flowsheet.    Outcome: Progressing Towards Goal  Fall Risk Interventions:  Mobility Interventions: Patient to call before getting OOB         Medication Interventions: Patient to call before getting OOB

## 2018-07-11 NOTE — PROGRESS NOTES
Problem: Falls - Risk of  Goal: *Absence of Falls  Document Jarad Fall Risk and appropriate interventions in the flowsheet.    Outcome: Progressing Towards Goal  Fall Risk Interventions:  Mobility Interventions: Assess mobility with egress test, Patient to call before getting OOB, PT Consult for mobility concerns, PT Consult for assist device competence, Utilize walker, cane, or other assistive device         Medication Interventions: Patient to call before getting OOB, Teach patient to arise slowly                  Comments: Call bell in reach urinal in reach room clutter free cane is at the bedside

## 2018-07-11 NOTE — DIABETES MGMT
DTC Progress Note Recommendations/ Comments: Chart review for hypoglycemia - BG 69 mg/dL at 1254 yesterday (7/10/2018). Otherwise BG's in range 94 mg/l - 133 mg/dL. PO intake 100 %. No recommendations at this time. Current hospital DM medication: lispro correction scale, normal sensitivity - he has required none. Chart reviewed on [facility]. Patient is a 59 y.o. male with known DM on  Metformin 1000 mg BID at home. A1c:  
Lab Results Component Value Date/Time Hemoglobin A1c 6.0 07/10/2018 04:17 AM  
 Hemoglobin A1c 7.1 (H) 05/01/2017 03:48 PM  
 
 
Recent Glucose Results: Lab Results Component Value Date/Time GLUCPOC 105 (H) 07/11/2018 11:39 AM  
 GLUCPOC 114 (H) 07/11/2018 06:47 AM  
 GLUCPOC 133 (H) 07/10/2018 09:09 PM  
  
 
Lab Results Component Value Date/Time Creatinine 0.87 07/10/2018 04:17 AM  
 
Estimated Creatinine Clearance: 122.2 mL/min (based on Cr of 0.87). Active Orders Diet DIET MECHANICAL SOFT  
  
 
PO intake: Patient Vitals for the past 72 hrs: 
 % Diet Eaten  
07/11/18 1002 100 % Will continue to follow as needed. Thank you Maribell Hess RN, CDE

## 2018-07-11 NOTE — PROGRESS NOTES
Reason for Admission:   \"Chest Pain\"; SIRS                  RRAT Score:     20             Do you (patient/family) have any concerns for transition/discharge? Patient is followed by [facility]. Patient has hx of SNF at [address] at [facility] and is adamant that he does not want to return to this location. Plan for utilizing home health:     Patient is already open to [facility]. CM will submit return to [facility] referral to [facility] via [employer]. Likelihood of readmission? HIGH: due to patient's decompensated health status            Transition of Care Plan:      Patient will be seen for PT/OT consults. Patient identified pharmacy preference as [facility] [place] [facility].  Patient is semi-independent in ADLs, but he does not drive. [facility] transports for all appointments. Patient lives in a 1 story house with 1 exterior step with his wife and stepson. Care Management Interventions  PCP Verified by CM: Yes (Last saw Dr. Corrie Glass a week ago)  Palliative Care Criteria Met (RRAT>21 & CHF Dx)?: No  Mode of Transport at Discharge:  Other (see comment) (Spouse will transport)  Transition of Care Consult (CM Consult): Discharge Planning, [address]: No  Reason Outside [place]: Patient already serviced by other home care/hospice agency ([facility] )  [address]: No  Discharge Durable Medical Equipment: No (Has DME of oxygen, hospital bed, nebulizer, cane, walker)  Health Maintenance Reviewed: Yes (CM met with patient at bedside, with patient alert and oriented x 4)  Physical Therapy Consult: Yes  Occupational Therapy Consult: Yes  Speech Therapy Consult: No  Current Support Network: Lives with Spouse, Own Home, Has Personal Caregivers, Other (Lives with spouse, is followed by Bryon Mckeon)  Confirm Follow Up Transport: Other (see comment) Jessa Bernard)  Plan discussed with Pt/Family/Caregiver: Yes  Freedom of Choice Offered: Yes (Patient open to [facility] already)  Mi Provided?: No  Discharge Location  Discharge Placement: Home (Patient wants to go home but has does SNF before)      CRM: Roberto Stockton, MPH; Z: [phone]

## 2018-07-12 PROBLEM — R65.10 SIRS (SYSTEMIC INFLAMMATORY RESPONSE SYNDROME) (HCC): Status: RESOLVED | Noted: 2018-01-01 | Resolved: 2018-01-01

## 2018-07-12 PROBLEM — J18.9 PNEUMONIA: Status: RESOLVED | Noted: 2018-01-01 | Resolved: 2018-01-01

## 2018-07-12 PROBLEM — D72.829 LEUKOCYTOSIS: Status: RESOLVED | Noted: 2018-01-01 | Resolved: 2018-01-01

## 2018-07-12 PROBLEM — R00.0 TACHYCARDIA: Status: RESOLVED | Noted: 2018-01-01 | Resolved: 2018-01-01

## 2018-07-12 PROBLEM — I95.9 HYPOTENSION: Status: RESOLVED | Noted: 2018-01-01 | Resolved: 2018-01-01

## 2018-07-12 PROBLEM — R50.9 FEVER: Status: RESOLVED | Noted: 2018-01-01 | Resolved: 2018-01-01

## 2018-07-12 NOTE — PROGRESS NOTES
Clinical Pharmacy Note: Re: IV to PO Automatic Conversion - Antibiotic    Please note: Juan Alberto Smith medication(s) (levofloxacin) has/have been changed from IV to PO based on the following criteria:    The patient:  1. Has received IV therapy for at least 48 hours   2. Has a functioning GI tract  - Taking scheduled oral medications  - Tolerating tube feeds at goal rate or a full liquid, soft, or regular diet         3. Is clinically stable        - Temperature < 100.4F for at least 24 hours        - WBC is trending down    This IV to PO conversion is based on the [employer] approved automatic conversion policy for eligible patients. Please call with questions.

## 2018-07-12 NOTE — PROGRESS NOTES
Problem: Self Care Deficits Care Plan (Adult)  Goal: *Acute Goals and Plan of Care (Insert Text)  Occupational Therapy Goals  Initiated 7/12/2018  1. Patient will perform bathing / showering (if clearance) with modified independence within 7 day(s). 2.  Patient will perform tub transfer with minimal assistance/contact guard assist within 7 day(s). 3.  Patient will perform standing ADLs 10 mins with modified independence within 7 day(s). 4.  Patient will perform toilet transfers with modified independence within 7 day(s). 5.  Patient will perform gathering items in room 4/4 thigh high to high with modified independence within 7 day(s). 6.  Patient will utilize energy conservation techniques during functional activities with verbal cues within 7 day(s). Occupational Therapy EVALUATION  Patient: Edwin Villalta (62 y.o. male)  Date: 7/12/2018  Primary Diagnosis: SIRS (systemic inflammatory response syndrome) (HCC)  Fever  Leukocytosis  Tachycardia  Pneumonia  Hypotension        Precautions:   Fall    ASSESSMENT :  Based on the objective data described below, the patient presents with modified independence to setup upper body ADLs, min to max A lower body ADLs, sit<>stand supervision, functional mobility to and from bathroom with min A and standing tolerance 4.5 mins. ADLs limited by LE weeping, standing tolerance, standing balance, edema B LEs, and medical hx (COPD in which wears 3L, MIs, CVA). Patient education provided, patient desires to discharge home with wife A. Wife is disable though so she assists as able. Recommend with nursing patient to complete as able in order to maintain strength, endurance and independence: ADLs with supervision/setup, OOB to chair 3x/day and mobilizing to the bathroom for toileting with 1 assist, gait belt and cane. Thank you for your assistance. Patient will benefit from skilled intervention to address the above impairments.   Patients rehabilitation potential is considered to be Good  Factors which may influence rehabilitation potential include:   [x]             None noted  []             Mental ability/status  []             Medical condition  []             Home/family situation and support systems  []             Safety awareness  []             Pain tolerance/management  []             Other:      PLAN :  Recommendations and Planned Interventions:  [x]               Self Care Training                  [x]        Therapeutic Activities  [x]               Functional Mobility Training    []        Cognitive Retraining  [x]               Therapeutic Exercises           [x]        Endurance Activities  [x]               Balance Training                   []        Neuromuscular Re-Education  []               Visual/Perceptual Training     [x]   Home Safety Training  [x]               Patient Education                 [x]        Family Training/Education  []               Other (comment):    Frequency/Duration: Patient will be followed by occupational therapy 3 times a week to address goals. Discharge Recommendations: Home Health  Further Equipment Recommendations for Discharge: tub bench (but states that can not afford it and [address] declined him). SUBJECTIVE:   Patient stated This drainage is the worst, that is my real problem.     OBJECTIVE DATA SUMMARY:   HISTORY:   Past Medical History:   Diagnosis Date    CAD (coronary artery disease)     [facility]     Chronic systolic heart failure ([address] 4/12/2011    [facility]ch[facility]     Coronary atherosclerosis of native coronary artery 4/12/2011    Diabetes ([facility]     High cholesterol     Hypertension     MI, old     Neurological disorder     Other primary cardiomyopathies 4/12/2011    Restless leg syndrome      Past Surgical History:   Procedure Laterality Date    CARDIAC SURG PROCEDURE UNLIST      stents x7    HX CARPAL TUNNEL RELEASE right wrist       Prior Level of Function/Environment/Context: independent to moderate A ADLs. States wife A with bathing and dressing feet. Wife and or son A with tub transfers. That he is ruining the sheets with drainage and that is affecting his marriage so he has a hospital bed for trunk elevation 2* breathing and LE elevation due to edema. 3L NC baseline. Does not drive, uses [facility] for appointments. Occupations in which the patient is/was successful, what are the barriers preventing that success:   Performance Patterns (routines, roles, habits, and rituals):   Personal Interests and/or values:   Expanded or extensive additional review of patient history:     Home Situation  Home Environment: Private residence  # Steps to Enter: 1  One/Two Story Residence: One story  Living Alone: No  Support Systems: Spouse/Significant Other/Partner  Patient Expects to be Discharged to[de-identified] Private residence  Current DME Used/Available at Home: Liv Bacca, straight, Walker, rolling, Hospital bed, Oxygen, portable  Tub or Shower Type: Tub/Shower combination    Hand dominance: Right    EXAMINATION OF PERFORMANCE DEFICITS:  Cognitive/Behavioral Status:  Neurologic State: Alert  Orientation Level: Oriented X4  Cognition: Appropriate for age attention/concentration; Appropriate safety awareness; Appropriate decision making  Perception: Appears intact  Perseveration: No perseveration noted  Safety/Judgement: Awareness of environment    Skin: legs wrapped knees-ankles, weeping, scaly skin    Edema: LEs hard edema    Hearing: Auditory  Auditory Impairment: None    Vision/Perceptual:                           Acuity: Impaired near vision; Impaired far vision    Corrective Lenses: Glasses    Range of Motion:  B UEs WDL  AROM: Within functional limits                         Strength:  B UEs 5/5  Strength: Within functional limits                Coordination:     Fine Motor Skills-Upper: Left Intact; Right Intact    Gross Motor Skills-Upper: Left Intact; Right Intact    Tone & Sensation:  States none                         Balance:  Sitting: Intact  Standing: Impaired  Standing - Static: Good  Standing - Dynamic : Fair    Functional Mobility and Transfers for ADLs:  Bed Mobility:  Supine to Sit: Contact guard assistance; Additional time; Adaptive equipment (use of bed railings)  Sit to Supine: Contact guard assistance (use of bed railings)    Transfers:  Sit to Stand: Contact guard assistance  Stand to Sit: Contact guard assistance  Toilet Transfer : Supervision  Tub Transfer: Total assistance    ADL Assessment:  Feeding: modified Independent mechanical soft 2* no dentures    Oral Facial Hygiene/Grooming: Supervision stating completing at sink and setup on beside tray sitting EOB. Will have wife bring electric razor in. Bathing: Moderate assistance states setup, A knees-feet; sitting (usually stands at home)    Upper Body Dressing: Supervision setup inferred from ROM and strength    Lower Body Dressing: Maximum assistance functional reach to knees, fair standing balance, managing elastic waist shorts over bottom no A; total A otherwise    Toileting: Minimum assistance fair standing balance, no A clothing management, grab bar and cane use      Standing tolerance 4.5 mins          ADL Intervention and task modifications:                                     Cognitive Retraining  Safety/Judgement: Awareness of environment    Therapeutic Exercise:  Patient instruction on benefits increasing standing tolerance and balance in prep for ADLs, such as showering. Patient agreeable and demonstrated standing with cane shoulder flexion unilateral 10 reps 1 set supervision and then B 10 reps 1 set with min A with standing balance fair. Patient instruction on benefits of resistance band exercises, patient agreeable stating he has used it before. Left patient with medium hard resistance band and handout to complete sitting.    Functional Measure:  Barthel Index:    Bathin  Bladder: 5  Bowels: 10  Groomin  Dressin  Feeding: 10  Mobility: 5  Stairs: 0  Toilet Use: 5  Transfer (Bed to Chair and Back): 10  Total: 55       Barthel and G-code impairment scale:  Percentage of impairment CH  0% CI  1-19% CJ  20-39% CK  40-59% CL  60-79% CM  80-99% CN  100%   Barthel Score 0-100 100 99-80 79-60 59-40 20-39 1-19   0   Barthel Score 0-20 20 17-19 13-16 9-12 5-8 1-4 0      The Barthel ADL Index: Guidelines  1. The index should be used as a record of what a patient does, not as a record of what a patient could do. 2. The main aim is to establish degree of independence from any help, physical or verbal, however minor and for whatever reason. 3. The need for supervision renders the patient not independent. 4. A patient's performance should be established using the best available evidence. Asking the patient, friends/relatives and nurses are the usual sources, but direct observation and common sense are also important. However direct testing is not needed. 5. Usually the patient's performance over the preceding 24-48 hours is important, but occasionally longer periods will be relevant. 6. Middle categories imply that the patient supplies over 50 per cent of the effort. 7. Use of aids to be independent is allowed. Tom Cho., Barthel, DBiancaW. (1063). Functional evaluation: the Barthel Index. [facility] (14)2. Rogerio Quesada corey Edwige, TOPHER.J.M.SCAR, Jaja Perla., Bairon Galicia., [place], [address] [place] [address] (). Measuring the change indisability after inpatient rehabilitation; comparison of the responsiveness of the Barthel Index and Functional [place] Measure. Journal of Neurology, Neurosurgery, and Psychiatry, 66(4), 276-370. Gagan Castano, N.J.A, DEEPA CalrosJ.GUANACO, & Khushboo Fernandez MBiancaA. (2004.) Assessment of post-stroke quality of life in cost-effectiveness studies: The usefulness of the Barthel Index and the EuroQoL-5D.  Quality of Life Research, 13, 491-94         G codes:  In compliance with CMSs Claims Based Outcome Reporting, the following G-code set was chosen for this patient based on their primary functional limitation being treated: The outcome measure chosen to determine the severity of the functional limitation was the Barthel Index with a score of 55/100 which was correlated with the impairment scale. ? Self Care:     - CURRENT STATUS: CK - 40%-59% impaired, limited or restricted    - GOAL STATUS: CI - 1%-19% impaired, limited or restricted    - D/C STATUS:  ---------------To be determined---------------     Pain:  Pain Scale 1: Numeric (0 - 10)  Pain Intensity 1: 0              Activity Tolerance:   3L HR 92  Please refer to the flowsheet for vital signs taken during this treatment. After treatment:   [] Patient left in no apparent distress sitting up in chair  [x] Patient left in no apparent distress sitting edge of bed  [x] Call bell left within reach  [x] Nursing notified  [] Caregiver present  [] Bed alarm activated    COMMUNICATION/EDUCATION:   The patients plan of care was discussed with: Registered Nurse. [x] Home safety education was provided and the patient/caregiver indicated understanding. [x] Patient/family have participated as able in goal setting and plan of care. [x] Patient/family agree to work toward stated goals and plan of care. [] Patient understands intent and goals of therapy, but is neutral about his/her participation. [] Patient is unable to participate in goal setting and plan of care. This patients plan of care is appropriate for delegation to [facility].     Thank you for this referral.  Michel Caldwell  Time Calculation: 31 mins

## 2018-07-12 NOTE — PROGRESS NOTES
Pharmacist Note - Vancomycin Dosing  Therapy day 2  Indication: CAP  Current regimen: 1250 mg Q8H    A Trough Level resulted at 16.6 mcg/mL which was obtained 7.25 hrs post-dose. The extrapolated \"true\" trough is approximately 15.4 mcg/mL based on the patient's known kinetics. Goal trough: 15 - 20 mcg/mL     Plan: Continue current regimen. Pharmacy will continue to monitor this patient daily for changes in clinical status and renal function.

## 2018-07-12 NOTE — PROGRESS NOTES
7/12/18; 11:42 -   CM called Angela Malik (Lashawn: [phone]) to notify of pending [facility]. Patient will be discharging home in care of family. Patient is to follow up with Dr. Julio Fonseca tomorrow, 7/13/18 at 08:50. Patient needs to be ready for transport by 08:00. CM will place follow up on discharge summary.   CRM: Sheldon Villavicencio, MPH; Z: [phone]

## 2018-07-12 NOTE — DISCHARGE INSTRUCTIONS
Discharge Instructions       PATIENT ID: Izzy Villalta  MRN: [id number]   YOB: 1960    DATE OF ADMISSION: 7/9/2018  8:50 PM    DATE OF DISCHARGE: 7/12/2018    PRIMARY CARE PROVIDER: Yesi Rea MD     ATTENDING PHYSICIAN: Tim Buitrago MD  DISCHARGING PROVIDER: Tim Buitrago MD    To contact this individual call [phone] and ask the  to page. If unavailable ask to be transferred the Adult Hospitalist Department. DISCHARGE DIAGNOSES Cellulitis and PNA    CONSULTATIONS: IP CONSULT TO INFECTIOUS DISEASES    PROCEDURES/SURGERIES: * No surgery found *    PENDING TEST RESULTS:   At the time of discharge the following test results are still pending:     FOLLOW UP APPOINTMENTS:   Follow-up Information     Follow up With Details Comments Contact Info    Yesi Rea MD In 2 weeks  [address]  [phone]             ADDITIONAL CARE RECOMMENDATIONS:     DIET: Regular Diet and Cardiac Diet      ACTIVITY: Activity as tolerated    WOUND CARE:     EQUIPMENT needed:       DISCHARGE MEDICATIONS:   See Medication Reconciliation Form    · It is important that you take the medication exactly as they are prescribed. · Keep your medication in the bottles provided by the pharmacist and keep a list of the medication names, dosages, and times to be taken in your wallet. · Do not take other medications without consulting your doctor. NOTIFY YOUR PHYSICIAN FOR ANY OF THE FOLLOWING:   Fever over 101 degrees for 24 hours. Chest pain, shortness of breath, fever, chills, nausea, vomiting, diarrhea, change in mentation, falling, weakness, bleeding. Severe pain or pain not relieved by medications. Or, any other signs or symptoms that you may have questions about. DISPOSITION:  x  Home With:  x OT x PT x HH  RN       SNF/Inpatient Rehab/LTAC    Independent/assisted living    Hospice    Other:     CDMP Checked:   Yes x     PROBLEM LIST Updated:   Yes x       Signed:   John Paul MD  7/12/2018  10:50 AM

## 2018-07-12 NOTE — PROGRESS NOTES
Problem: Falls - Risk of  Goal: *Absence of Falls  Document Jarad Fall Risk and appropriate interventions in the flowsheet.    Outcome: Progressing Towards Goal  Fall Risk Interventions:  Mobility Interventions: Patient to call before getting OOB         Medication Interventions: Patient to call before getting OOB, Teach patient to arise slowly                  Problem: Discharge Planning  Goal: *Discharge to safe environment  Outcome: Progressing Towards Goal  Patient has been afebrile less SOB    Comments: Call bell in reach room clutter free urinal in reach

## 2018-07-12 NOTE — PROGRESS NOTES
Patient is resting sitting on the side of the bed. His legs are weeping and shedding patient requested I wrap them so they his bed would not get wet. Wound care consult put in. Will continue to monitor patient for changes in his condition. 2330 patient is resting in bed with eyes closed no complaints at this time. 0400 patient pulled out IV new one inserted patient requested IV fluids to be turned off. No other complaints at this time blood drawn and sent to lab.    0730 Bedside and Verbal shift change report given to Mable Feliciano RN (oncoming nurse) by Vitaly Mathis RN  (offgoing nurse). Report included the following information SBAR, MAR, Recent Results and Med Rec Status.

## 2018-07-12 NOTE — PROGRESS NOTES
Problem: Mobility Impaired (Adult and Pediatric)  Goal: *Acute Goals and Plan of Care (Insert Text)  Physical Therapy Goals  Initiated 7/12/2018  1. Patient will move from supine to sit and sit to supine  in bed with modified independence within 7 day(s). 2.  Patient will transfer from bed to chair and chair to bed with modified independence using the least restrictive device within 7 day(s). 3.  Patient will perform sit to stand with modified independence within 7 day(s). 4.  Patient will ambulate with modified independence for 200 feet with the least restrictive device within 7 day(s). 5.  Patient will ascend/descend 1 stairs with no handrail(s) with supervision/set-up within 7 day(s). physical Therapy EVALUATION  Patient: Tamanna Wadsworth (62 y.o. male)  Date: 7/12/2018  Primary Diagnosis: SIRS (systemic inflammatory response syndrome) (HCC)  Fever  Leukocytosis  Tachycardia  Pneumonia  Hypotension        Precautions:   Fall    ASSESSMENT :  Based on the objective data described below, the patient presented to the ED on 7/10/18 with complaints of leg pain. Patient diagnosed with BLE cellulitis and pneumonia. Patient received supine in bed on 3L of oxygen and agreeable to therapy. Noted BLEs wounds dressed, but increased drainage. Pt reported prior to admission, he was living with his spouse in a 1 story home with 1 HANNAH. Pt reported he was using a cane for ambulation. Pt stated he is on home oxygen (3L), owns a hospital bed, RW. Patient reported he was having home health PT/OT and nursing care for leg wounds. Patient tolerated evaluation fairly well. Patient completed supine to sit with heavy use of bed railings to assume sitting EOB. Patient performed sit<>stand with SPC with CGA and additional time to assume standing position. Pt ambulated in the hallway with SPC with CGA demonstrating step through gait pattern, wide KEVIN, decreased chucky.  Patient insisted on walking without the oxygen (\"I do it at home all the time\"). Noted slight increased in SOB; difficult to obtain accurate pleath reading on portable pulse ox. Patient returned to supine position with bilateral LEs elevated. Patient would benefit from from a short stay at SNF to improve tolerance to activity, balance and gait. However, pt seemed fairly adamant on returning home with continued HHPT services. Patient will benefit from skilled intervention to address the above impairments. Patients rehabilitation potential is considered to be Fair  Factors which may influence rehabilitation potential include:   []         None noted  []         Mental ability/status  [x]         Medical condition  []         Home/family situation and support systems  []         Safety awareness  []         Pain tolerance/management  []         Other:      PLAN :  Recommendations and Planned Interventions:  [x]           Bed Mobility Training             [x]    Neuromuscular Re-Education  [x]           Transfer Training                   []    Orthotic/Prosthetic Training  [x]           Gait Training                         []    Modalities  [x]           Therapeutic Exercises           []    Edema Management/Control  [x]           Therapeutic Activities            [x]    Patient and Family Training/Education  []           Other (comment):    Frequency/Duration: Patient will be followed by physical therapy  5 times a week to address goals. Discharge Recommendations: Skilled Nursing Facility  Further Equipment Recommendations for Discharge: none at this time     SUBJECTIVE:   Patient stated I take my oxygen off at home a lot.     OBJECTIVE DATA SUMMARY:   HISTORY:    Past Medical History:   Diagnosis Date    CAD (coronary artery disease)     [facility]     Chronic systolic heart failure [facility] 4/12/2011    COPD (chronic obstructive pulmonary disease) (HCC)     Coronary atherosclerosis of native coronary artery 4/12/2011    Diabetes [facility]     High cholesterol     Hypertension     MI, old     Neurological disorder     Other primary cardiomyopathies 4/12/2011    Restless leg syndrome      Past Surgical History:   Procedure Laterality Date    CARDIAC SURG PROCEDURE UNLIST      stents x7    HX CARPAL TUNNEL RELEASE      right wrist     Prior Level of Function/Home Situation: see above  Personal factors and/or comorbidities impacting plan of care:     Home Situation  Home Environment: Private residence  # Steps to Enter: 1  One/Two Story Residence: One story  Living Alone: No  Support Systems: Spouse/Significant Other/Partner  Patient Expects to be Discharged to[de-identified] Private residence  Current DME Used/Available at Home: Liv Bacca, straight, Walker, rolling, Hospital bed, Oxygen, portable  Tub or Shower Type: Tub/Shower combination    EXAMINATION/PRESENTATION/DECISION MAKING:   Critical Behavior:              Hearing: Auditory  Auditory Impairment: None  Skin:    Edema:   Range Of Motion:  AROM: Generally decreased, functional                       Strength:    Strength: Generally decreased, functional                    Tone & Sensation:                                  Coordination:     Vision:      Functional Mobility:  Bed Mobility:     Supine to Sit: Contact guard assistance; Additional time; Adaptive equipment (use of bed railings)  Sit to Supine: Contact guard assistance (use of bed railings)     Transfers:  Sit to Stand: Contact guard assistance  Stand to Sit: Contact guard assistance                       Balance:   Sitting: Intact  Standing: Impaired  Standing - Static: Good  Standing - Dynamic : Fair  Ambulation/Gait Training:  Distance (ft): 100 Feet (ft)  Assistive Device: Gait belt;Cane, straight  Ambulation - Level of Assistance: Contact guard assistance        Gait Abnormalities: Decreased step clearance;Trunk sway increased; Path deviations        Base of Support: Widened     Speed/Renetta: Pace decreased (<100 feet/min); Shuffled  Step Length: Right shortened;Left shortened       Functional Measure:  Tinetti test:    Sitting Balance: 1  Arises: 1  Attempts to Rise: 2  Immediate Standing Balance: 1  Standing Balance: 1  Nudged: 1  Eyes Closed: 1  Turn 360 Degrees - Continuous/Discontinuous: 1  Turn 360 Degrees - Steady/Unsteady: 1  Sitting Down: 1  Balance Score: 11  Indication of Gait: 1  R Step Length/Height: 1  L Step Length/Height: 1  R Foot Clearance: 1  L Foot Clearance: 1  Step Symmetry: 1  Step Continuity: 1  Path: 1  Trunk: 0  Walking Time: 0  Gait Score: 8  Total Score: 19       Tinetti Test and G-code impairment scale:  Percentage of Impairment CH    0%   CI    1-19% CJ    20-39% CK    40-59% CL    60-79% CM    80-99% CN     100%   Tinetti  Score 0-28 28 23-27 17-22 12-16 6-11 1-5 0       Tinetti Tool Score Risk of Falls  <19 = High Fall Risk  19-24 = Moderate Fall Risk  25-28 = Low Fall Risk  Tinetti ME. Performance-Oriented Assessment of Mobility Problems in Elderly Patients. [facility]; [id number]. (Scoring Description: PT Bulletin Feb. 10, 1993)    Older adults: Emiliano Swann et al, 2009; n = 1000 [facility] elderly evaluated with ABC, ENZO, ADL, and IADL)  · Mean ENZO score for males aged 69-68 years = 26.21(3.40)  · Mean ENZO score for females age 69-68 years = 25.16(4.30)  · Mean ENZO score for males over 80 years = 23.29(6.02)  · Mean ENZO score for females over 80 years = 17.20(8.32)         G codes: In compliance with CMSs Claims Based Outcome Reporting, the following G-code set was chosen for this patient based on their primary functional limitation being treated: The outcome measure chosen to determine the severity of the functional limitation was the Tinetti with a score of 19/28 which was correlated with the impairment scale.     ? Mobility - Walking and Moving Around:     - CURRENT STATUS: CJ - 20%-39% impaired, limited or restricted    - GOAL STATUS: CI - 1%-19% impaired, limited or restricted    - D/C STATUS:  ---------------To be determined---------------       Based on the above components, the patient evaluation is determined to be of the following complexity level: LOW     Pain:  Pain Scale 1: Numeric (0 - 10)  Pain Intensity 1: 0              Activity Tolerance:   Fair. VSS  Please refer to the flowsheet for vital signs taken during this treatment. After treatment:   []         Patient left in no apparent distress sitting up in chair  [x]         Patient left in no apparent distress in bed  [x]         Call bell left within reach  [x]         Nursing notified  []         Caregiver present  []         Bed alarm activated    COMMUNICATION/EDUCATION:   The patients plan of care was discussed with: Registered Nurse. [x]         Fall prevention education was provided and the patient/caregiver indicated understanding. [x]         Patient/family have participated as able in goal setting and plan of care. [x]         Patient/family agree to work toward stated goals and plan of care. []         Patient understands intent and goals of therapy, but is neutral about his/her participation. []         Patient is unable to participate in goal setting and plan of care.     Thank you for this referral.  Kaitlynn Briscoe, PT , DPT   Time Calculation: 19 mins

## 2018-07-12 NOTE — DISCHARGE SUMMARY
Discharge Summary PATIENT ID: Leno Villalta MRN: [id number] YOB: 1960 DATE OF ADMISSION: 7/9/2018  8:50 PM   
DATE OF DISCHARGE: 7/12/18 PRIMARY CARE PROVIDER: Anjelica Jacobo MD  
 
ATTENDING PHYSICIAN: Rafiq Hernandez DISCHARGING PROVIDER: Sugar Mike MD   
To contact this individual call [phone] and ask the  to page. If unavailable ask to be transferred the Adult Hospitalist Department. CONSULTATIONS: IP CONSULT TO INFECTIOUS DISEASES PROCEDURES/SURGERIES: * No surgery found * [address] COURSE:  
 A 68-year-old white male with past medical history of coronary artery disease, chronic systolic CHF, COPD, type 2 diabetes mellitus, hypertension, hyperlipidemia, myocardial infarction, restless leg syndrome, leg edema, gait abnormality presented to the emergency department via EMS from home with chief complaint of leg pain, swelling, and redness.  Patient notes pain is severe, diffuse, located in bilateral legs, radiating from bilateral feet to bilateral knees without specific alleviating factors, exacerbated with any movement, touch.  The patient is a limited historian. Francy Rue of history was obtained from review of ED and electronic medical records.  The patient has chronic leg redness of lower extremities and symptoms notably worsened 
  
  
  
Assessment & Plan:  
  
1.  Sepsis with fever, tachycardia, leukocytosis, hypotension from cellulitis and PNA 
- Cultures NGTD    
- Levaquin 750 mg daily for 10 days for  PNA and cellulitis - ID consulted and appreciate recommendation 
   
2.  Hypotension possibly related to Sepsis resolving with IVF  
- resolved 
  
3.  Chronic hypoxic respiratory failure due COPD stable 
- on home o2 3 l/ nc 
  
4.  Type 2 diabetes mellitus.   
Resume susan emds  
  
5.  Bilateral leg pain.  Provide pain management, supportive cares, 
- pt takes ms contin and short acting pain meds will resume half the dose watch for resp depression , d/w pt DVT scan negative 
  
6.  Chronic systolic congestive heart failure,stable NYHA 2-3 .  
- on BB  AND ACE  Monitor closely.   
  
7.  Generalized weakness/debility.  Pt.ot when able PENDING TEST RESULTS:  
At the time of discharge the following test results are still pending: FOLLOW UP APPOINTMENTS:   
Follow-up Information Follow up With Details Comments Contact Info Olive Zapata MD In 2 weeks  [address] [place] [address] 
[phone] ADDITIONAL CARE RECOMMENDATIONS:  
 
DIET: Regular Diet and Cardiac Diet ACTIVITY: Activity as tolerated WOUND CARE:  
 
EQUIPMENT needed:  
 
 
DISCHARGE MEDICATIONS: 
Current Discharge Medication List  
  
START taking these medications Details  
levoFLOXacin (LEVAQUIN) 750 mg tablet Take 1 Tab by mouth every twenty-four (24) hours for 10 days. Qty: 10 Tab, Refills: 0 CONTINUE these medications which have NOT CHANGED Details  
morphine CR (MS CONTIN) 60 mg CR tablet Take 60 mg by mouth three (3) times daily. allopurinol (ZYLOPRIM) 100 mg tablet Take 100 mg by mouth daily. furosemide (LASIX) 20 mg tablet Take 3 Tabs by mouth daily. Qty: 90 Tab, Refills: 0 HYDROmorphone (DILAUDID) 4 mg tablet Take 1 Tab by mouth two (2) times a day. Max Daily Amount: 8 mg. Indications: Pain 
Qty: 20 Tab, Refills: 0 Associated Diagnoses: Chest pain, unspecified type; Lymphedema of both lower extremities  
  
metoprolol succinate (TOPROL XL) 25 mg XL tablet Take 25 mg by mouth daily. lisinopril (PRINIVIL, ZESTRIL) 2.5 mg tablet Take 2.5 mg by mouth daily. albuterol (PROVENTIL HFA, VENTOLIN HFA, PROAIR HFA) 90 mcg/actuation inhaler Take 1 Puff by inhalation every four (4) hours as needed for Wheezing. albuterol-ipratropium (DUO-NEB) 2.5 mg-0.5 mg/3 ml nebu 3 mL by Nebulization route every four (4) hours.  
Qty: 30 Nebule, Refills: 2  
  
aspirin delayed-release 81 mg tablet Take 81 mg by mouth daily. citalopram (CELEXA) 40 mg tablet Take 40 mg by mouth daily. nitroglycerin (NITROSTAT) 0.4 mg SL tablet 0.4 mg by SubLINGual route every five (5) minutes as needed for Chest Pain. nortriptyline (PAMELOR) 50 mg capsule Take 50 mg by mouth nightly. docusate sodium (COLACE) 100 mg capsule Take 100 mg by mouth two (2) times a day. metFORMIN (GLUCOPHAGE) 1,000 mg tablet Take 1,000 mg by mouth two (2) times daily (with meals). Indications: type 2 diabetes mellitus  
  
omeprazole (PRILOSEC) 20 mg capsule Take 20 mg by mouth daily. Indications: gastroesophageal reflux disease  
  
clopidogrel (PLAVIX) 75 mg tab Take 75 mg by mouth daily. atorvastatin (LIPITOR) 80 mg tablet Take 80 mg by mouth daily. rOPINIRole (REQUIP) 4 mg tab TAB Take 8 mg by mouth nightly. Indications: Restless Legs Syndrome  
  
zolpidem (AMBIEN) 10 mg tablet Take 1 Tab by mouth nightly. Qty: 30 Tab, Refills: 2  
  
tiotropium (SPIRIVA WITH HANDIHALER) 18 mcg inhalation capsule Take 1 Cap by inhalation daily. fluticasone-salmeterol (ADVAIR DISKUS) 250-50 mcg/dose diskus inhaler Take 1 Puff by inhalation two (2) times a day. isosorbide mononitrate ER (IMDUR) 60 mg CR tablet Take 60 mg by mouth daily. spironolactone (ALDACTONE) 25 mg tablet Take 25 mg by mouth two (2) times a day. buPROPion SR (WELLBUTRIN SR) 150 mg SR tablet Take 150 mg by mouth two (2) times a day. NOTIFY YOUR PHYSICIAN FOR ANY OF THE FOLLOWING:  
Fever over 101 degrees for 24 hours. Chest pain, shortness of breath, fever, chills, nausea, vomiting, diarrhea, change in mentation, falling, weakness, bleeding. Severe pain or pain not relieved by medications. Or, any other signs or symptoms that you may have questions about. DISPOSITION: 
x  Home With: 
x OT x PT x HH  RN  
  
 Long term SNF/Inpatient Rehab Independent/assisted living Hospice  Other:  
 
 
PATIENT CONDITION AT DISCHARGE:  
 
Functional status Poor   
x Deconditioned Independent Cognition  
 x Lucid Forgetful Dementia Catheters/lines (plus indication) Valle PICC   
 PEG   
x None Code status  
 x Full code DNR   
 
PHYSICAL EXAMINATION AT DISCHARGE: 
  
Constitutional:  No acute distress, on NC   
ENT:  Oral mucous moist, Resp:  CTA bilaterally. CV:  Regular rhythm, normal rate GI:  Soft, +distended, non tender. bs+ Musculoskeletal:  red. Cellulitis 1 + oedema chronic skin changes Neurologic:  Moves all extremities. AAOx3, CN II-XII reviewed Psych:  Good insight, Not anxious nor agitated. 
   
 
 
 
 
CHRONIC MEDICAL DIAGNOSES: 
Problem List as of 7/12/2018  Date Reviewed: 7/12/2018 Codes Class Noted - Resolved Fluid overload ICD-10-CM: E87.70 ICD-9-CM: 276.69  5/6/2018 - Present SOB (shortness of breath) ICD-10-CM: R06.02 
ICD-9-CM: 786.05  5/1/2018 - Present Systolic CHF, acute ([facility] ICD-10-CM: I50.21 ICD-9-CM: 428.21, 428.0  2/26/2018 - Present Acute systolic CHF (congestive heart failure) (HCC) ICD-10-CM: I50.21 ICD-9-CM: 428.21, 428.0  2/26/2018 - Present COPD with exacerbation ([facility] ICD-10-CM: J44.1 ICD-9-CM: 491.21  5/24/2014 - Present Morbid obesity ([facility] ICD-10-CM: E66.01 
ICD-9-CM: 278.01  1/8/2014 - Present Hematemesis ICD-10-CM: K92.0 ICD-9-CM: 578.0  1/7/2014 - Present Rectal bleeding ICD-10-CM: K62.5 ICD-9-CM: 569.3  1/7/2014 - Present Obstructive sleep apnea (adult) (pediatric) ICD-10-CM: G76.46 
ICD-9-CM: 327.23  1/7/2014 - Present Chronic airway obstruction, not elsewhere classified ICD-10-CM: J44.9 ICD-9-CM: 041  1/7/2014 - Present Essential hypertension, benign ICD-10-CM: I10 
ICD-9-CM: 401.1  1/7/2014 - Present  Type II or unspecified type diabetes mellitus without mention of complication, not stated as uncontrolled ICD-10-CM: E11.9 ICD-9-CM: 250.00  1/7/2014 - Present Coronary artery disease ICD-10-CM: I25.10 ICD-9-CM: 414.00  1/7/2014 - Present Chest pain ICD-10-CM: R07.9 ICD-9-CM: 786.50  1/5/2014 - Present Coronary atherosclerosis of native coronary artery ICD-10-CM: I25.10 ICD-9-CM: 414.01  4/12/2011 - Present Other primary cardiomyopathies ICD-10-CM: I42.8 ICD-9-CM: 425.4  4/12/2011 - Present Chronic systolic heart failure (HCC) ICD-10-CM: I50.22 ICD-9-CM: 428.22  4/12/2011 - Present RESOLVED: Leukocytosis ICD-10-CM: O12.569 ICD-9-CM: 288.60  7/10/2018 - 7/12/2018 * (Principal)RESOLVED: Pneumonia ICD-10-CM: J18.9 ICD-9-CM: 027  7/10/2018 - 7/12/2018 RESOLVED: Tachycardia ICD-10-CM: R00.0 ICD-9-CM: 785.0  7/10/2018 - 7/12/2018 RESOLVED: Hypotension ICD-10-CM: I95.9 ICD-9-CM: 458.9  7/10/2018 - 7/12/2018 RESOLVED: Fever ICD-10-CM: R50.9 ICD-9-CM: 780.60  7/10/2018 - 7/12/2018 RESOLVED: SIRS (systemic inflammatory response syndrome) (HCC) ICD-10-CM: R65.10 ICD-9-CM: 995.90  7/10/2018 - 7/12/2018 Greater than 30  minutes were spent with the patient on counseling and coordination of care Signed:  
Mundo Arenas MD 
7/12/2018 10:51 AM

## 2018-07-12 NOTE — WOUND CARE
Wound Consult:  New Patient Visit. Chart reviewed. Consulted for lower legs - cellulitis with weeping - patient has CHF. Spoke with patients nurse,  Dave Vargas. Patient is resting on a total care bed - sitting on side - stands without assistance and uses cane. Assessment:  Bilateral lower legs - weeping serous fluid in moderate to large amount on dressings but appears to be slowing - superficially denuded skin on both lower legs with peeling epidermis throughout; no ulcerations noted; redness, warmth and edema extends from ankle area to posterior/inner mid thighs. Treatment:  Washed lower legs with wound cleanser/warm water; moisturized with aloe vesta ointment; used Xeroform dressings to lower legs and then used drain pads, kerlex and elastic netting to secure. Wound Recommendations:  Xeroform to lower legs with ABDs, kerlex, elastic netting to discharge home; [facility] follows for lower leg multilayer wraps and would continue once home. Skin Care Recommendations:  Continue to monitor nutrition, pain, and skin risk scale, and skin assessment. Plan:  Spoke with Dr. Lisbeth Valero at bedside; patient being discharged home today.   Annie Orellana, [address] Office [phone]  Pager [phone]

## 2018-08-13 PROBLEM — I21.4 NSTEMI (NON-ST ELEVATED MYOCARDIAL INFARCTION) (HCC): Status: ACTIVE | Noted: 2018-01-01

## 2018-08-13 NOTE — ED NOTES
4:00 PM  Change of shift. Care of patient taken over from Dr. [place]; H&P reviewed, bedside handoff complete. Awaiting imaging results, disposition pending. CONSULT NOTE:  7:31 PM Evangelina Carlson MD communicated with Dr. Tiara Sun, Consult for Hospitalist via [facility] Text. Discussed available diagnostic tests and clinical findings. He will admit pt. CONSULT NOTE:  7:44 PM Evangelina Carlson MD spoke with Dr. Siri Mclain, Consult for Cardiology. Discussed available diagnostic tests and clinical findings. Dr. Gabbie Rojas will come see pt.

## 2018-08-13 NOTE — ED TRIAGE NOTES
Triage Note: Patient is coming in via EMS for shortness of breath that started last night. Denies chest pain. Patient has pain to bilateral legs that have been going on for 2 years.

## 2018-08-13 NOTE — IP AVS SNAPSHOT
[facility] 
[phone] Patient: Destiny Leblanc MRN: [id number] :1960 About your hospitalization You were admitted on:  2018 You last received care in the:  [facility] 4 CV SERVICES UNIT You were discharged on:  2018 Why you were hospitalized Your primary diagnosis was:  Nstemi (Non-St Elevated Myocardial Infarction) (Hcc) Follow-up Information Follow up With Details Comments Contact Info Eym Savage MD On 2018 Hospital f/u PCP appointment Friday, 18. Irina Roger will  patient at 8:00 a.m. If unable to attend please call the office [phone]. [facility] 13 [address] [place] [address] 
[phone] Kirk Jean MD On 2018 8AM.  Office will call to confirm appt time. [address] [place] [address] 
[phone] [facility] SENT BY [facility]  skilled nursing [address] [address] [phone] Discharge Orders None A check dani indicates which time of day the medication should be taken. My Medications START taking these medications Instructions Each Dose to Equal  
 Morning Noon Evening Bedtime  
 amiodarone 200 mg tablet Commonly known as:  CORDARONE Your last dose was: Your next dose is: Take 1 Tab by mouth two (2) times daily (after meals). 200 mg  
    
   
   
   
  
 apixaban 5 mg tablet Commonly known as:  Clconstance Rubinstein Your last dose was: Your next dose is: Take 1 Tab by mouth two (2) times a day. 5 mg  
    
   
   
   
  
 ferrous sulfate 325 mg (65 mg iron) tablet Your last dose was: Your next dose is: Take 1 Tab by mouth daily (with breakfast). 325 mg  
    
   
   
   
  
 midodrine 5 mg tablet Commonly known as:  Stella Maxon Your last dose was: Your next dose is: Take 1 Tab by mouth three (3) times daily (with meals) for 30 days. 5 mg CONTINUE taking these medications Instructions Each Dose to Equal  
 Morning Noon Evening Bedtime ADVAIR DISKUS 250-50 mcg/dose diskus inhaler Generic drug:  fluticasone-salmeterol Your last dose was: Your next dose is: Take 1 Puff by inhalation two (2) times a day. 1 Puff  
    
   
   
   
  
 albuterol 90 mcg/actuation inhaler Commonly known as:  PROVENTIL HFA, VENTOLIN HFA, PROAIR HFA Your last dose was: Your next dose is: Take 1 Puff by inhalation every four (4) hours as needed for Wheezing. 1 Puff  
    
   
   
   
  
 albuterol-ipratropium 2.5 mg-0.5 mg/3 ml Nebu Commonly known as:  Earlene Peoples Your last dose was: Your next dose is:    
   
   
 3 mL by Nebulization route every four (4) hours. 3 mL  
    
   
   
   
  
 allopurinol 100 mg tablet Commonly known as:  Gaylan Elders Your last dose was: Your next dose is: Take 100 mg by mouth daily. 100 mg  
    
   
   
   
  
 aspirin delayed-release 81 mg tablet Your last dose was: Your next dose is: Take 81 mg by mouth daily. 81 mg  
    
   
   
   
  
 atorvastatin 80 mg tablet Commonly known as:  LIPITOR Your last dose was: Your next dose is: Take 80 mg by mouth daily. 80 mg  
    
   
   
   
  
 buPROPion  mg SR tablet Commonly known as:  Mima Bridegroom Your last dose was: Your next dose is: Take 150 mg by mouth two (2) times a day. 150 mg  
    
   
   
   
  
 docusate sodium 100 mg capsule Commonly known as:  Min Argueta Your last dose was: Your next dose is: Take 100 mg by mouth two (2) times a day. 100 mg  
    
   
   
   
  
 furosemide 20 mg tablet Commonly known as:  LASIX Your last dose was: Your next dose is: Take 20 mg by mouth daily. 20 mg HYDROmorphone 4 mg tablet Commonly known as:  DILAUDID Your last dose was: Your next dose is: Take 4 mg by mouth two (2) times daily as needed for Pain. 4 mg  
    
   
   
   
  
 morphine CR 60 mg CR tablet Commonly known as:  MS CONTIN Your last dose was: Your next dose is: Take 60 mg by mouth three (3) times daily. 60 mg NITROSTAT 0.4 mg SL tablet Generic drug:  nitroglycerin Your last dose was: Your next dose is: 0.4 mg by SubLINGual route every five (5) minutes as needed for Chest Pain. 0.4 mg  
    
   
   
   
  
 nortriptyline 50 mg capsule Commonly known as:  PAMELOR Your last dose was: Your next dose is: Take 50 mg by mouth nightly. 50 mg  
    
   
   
   
  
 omeprazole 20 mg capsule Commonly known as:  PRILOSEC Your last dose was: Your next dose is: Take 20 mg by mouth daily. Indications: gastroesophageal reflux disease 20 mg  
    
   
   
   
  
 rOPINIRole 4 mg Tab TAB Commonly known as:  Gavin Earl Your last dose was: Your next dose is: Take 8 mg by mouth nightly. Indications: Restless Legs Syndrome 8 mg SPIRIVA WITH HANDIHALER 18 mcg inhalation capsule Generic drug:  tiotropium Your last dose was: Your next dose is: Take 1 Cap by inhalation daily. 1 Cap TOPROL XL 25 mg XL tablet Generic drug:  metoprolol succinate Your last dose was: Your next dose is: Take 25 mg by mouth daily. 25 mg  
    
   
   
   
  
 zolpidem 10 mg tablet Commonly known as:  AMBIEN Your last dose was: Your next dose is: Take 1 Tab by mouth nightly. 10 mg  
    
   
   
   
  
  
STOP taking these medications   
 citalopram 40 mg tablet Commonly known as:  CELEXA  
   
  
 clopidogrel 75 mg Tab Commonly known as:  PLAVIX IMDUR 60 mg CR tablet Generic drug:  isosorbide mononitrate ER  
   
  
 lisinopril 2.5 mg tablet Commonly known as:  PRINIVIL, ZESTRIL  
   
  
 metFORMIN 1,000 mg tablet Commonly known as:  GLUCOPHAGE  
   
  
 spironolactone 25 mg tablet Commonly known as:  ALDACTONE Where to Get Your Medications Information on where to get these meds will be given to you by the nurse or doctor. ! Ask your nurse or doctor about these medications  
  amiodarone 200 mg tablet  
 apixaban 5 mg tablet  
 ferrous sulfate 325 mg (65 mg iron) tablet  
 midodrine 5 mg tablet Opioid Education Prescription Opioids: What You Need to Know: 
 
 
 
DISCHARGING PHYSICIAN: Jennifer Leong MD   
To contact this individual call [phone] and ask the  to page. If unavailable ask to be transferred the Adult Hospitalist Department. DISCHARGE DIAGNOSES Bilateral lower extremity Cellulitis, Sepsis, Acute on chronic systolic heart failure, Cardiogenic Shock CONSULTATIONS: IP CONSULT TO CARDIOLOGY 
IP CONSULT TO HOSPITALIST 
IP CONSULT TO INFECTIOUS DISEASES PROCEDURES/SURGERIES: * No surgery found * PENDING TEST RESULTS:  
At the time of discharge the following test results are still pending: FOLLOW UP APPOINTMENTS:  
Follow-up Information Follow up With Details Comments Contact Info MD Livia Santiago [address] [place] 
[phone] ADDITIONAL CARE RECOMMENDATIONS:  
 
DIET: Diabetic Diet ACTIVITY: Activity as tolerated WOUND CARE:  
 
EQUIPMENT needed:  
 Bilateral low legs:  Daily cleanse with soap and water; rinse with saline; apply Aquacel AG; cover with abd pad and dry gauze.  
   
 
 
  
 SNF/Inpatient Rehab/LTAC Independent/assisted living Hospice Other: CDMP Checked:  
Yes x Signed:  
Ghanshyam Foster MD 
8/23/2018 
9:20 AM 
 
  
  
  
[employer] Announcement We are excited to announce that we are making your provider's discharge notes available to you in WiWidet.   You will see these notes when they are completed and signed by the physician that discharged you from your recent hospital stay. If you have any questions or concerns about any information you see in GetFresh, please call the Health Information Department where you were seen or reach out to your Primary Care Provider for more information about your plan of care. Introducing [facility]! Zander Ziegler introduces GetFresh patient portal. Now you can access parts of your medical record, email your doctor's office, and request medication refills online. 1. In your internet browser, go to [email or web address] 2. Click on the First Time User? Click Here link in the Sign In box. You will see the New Member Sign Up page. 3. Enter your GetFresh Access Code exactly as it appears below. You will not need to use this code after youve completed the sign-up process. If you do not sign up before the expiration date, you must request a new code. · GetFresh Access Code: [email or web address] Expires: 9/1/2018  5:11 AM 
 
4. Enter the last four digits of your Social Security Number (xxxx) and Date of Birth (mm/dd/yyyy) as indicated and click Submit. You will be taken to the next sign-up page. 5. Create a GetFresh ID. This will be your GetFresh login ID and cannot be changed, so think of one that is secure and easy to remember. 6. Create a GetFresh password. You can change your password at any time. 7. Enter your Password Reset Question and Answer. This can be used at a later time if you forget your password. 8. Enter your e-mail address. You will receive e-mail notification when new information is available in [address] 9. Click Sign Up. You can now view and download portions of your medical record. 10. Click the Download Summary menu link to download a portable copy of your medical information. If you have questions, please visit the Frequently Asked Questions section of the GetFresh website. Remember, GetFresh is NOT to be used for urgent needs. For medical emergencies, dial 911. Now available from your iPhone and Android! Introducing Gideon Smith As a [facility] patient, I wanted to make you aware of our electronic visit tool called Gideon Smith. [employer] allows you to connect within minutes with a medical provider 24 hours a day, seven days a week via a mobile device or tablet or logging into a secure website from your computer. You can access Gideon Smith from anywhere in the [place]. A virtual visit might be right for you when you have a simple condition and feel like you just dont want to get out of bed, or cant get away from work for an appointment, when your regular [facility] provider is not available (evenings, weekends or holidays), or when youre out of town and need minor care. Electronic visits cost only $49 and if the [employer] provider determines a prescription is needed to treat your condition, one can be electronically transmitted to a nearby pharmacy*. Please take a moment to enroll today if you have not already done so. The enrollment process is free and takes just a few minutes. To enroll, please download the [employer]apa arden to your tablet or phone, or visit [email or web address] to enroll on your computer. And, as an [address] [place] [address] patient with a [employer] account, the results of your visits will be scanned into your electronic medical record and your primary care provider will be able to view the scanned results. We urge you to continue to see your regular [facility] provider for your ongoing medical care. And while your primary care provider may not be the one available when you seek a Gideon Smith virtual visit, the peace of mind you get from getting a real diagnosis real time can be priceless. For more information on Gideon Smith, view our Frequently Asked Questions (FAQs) at [email or web address]. Sincerely, 
 
Kate Hope MD 
Chief Medical Officer Pearl Sanabria *:  certain medications cannot be prescribed via Gideon Smith Providers Seen During Your Hospitalization Provider Specialty Primary office phone Joselyn Bocanegra MD Emergency Medicine [phone] Sheree Clarke MD Emergency Medicine [phone] Chaka Pugh MD Hospitalist [phone] Sarah Marin MD Internal Medicine [phone] Mehreen Rockwell MD Internal Medicine [phone] Your Primary Care Physician (PCP) Primary Care Physician Office Phone Office Fax Jean Cueto [phone] You are allergic to the following No active allergies Recent Documentation Height Weight BMI Smoking Status 1.88 m 108.8 kg 30.8 kg/m2 Current Every Day Smoker Emergency Contacts Name Discharge Info Relation Home Work Mobile [facility] DISCHARGE CAREGIVER [3] Friend [5] [phone] Patient Belongings The following personal items are in your possession at time of discharge: 
  Dental Appliances: None  Visual Aid: Glasses, With patient      Home Medications: None   Jewelry: None  Clothing: Footwear    Other Valuables: Cell Phone, [employer]  Personal Items Sent to Safe: none Discharge Instructions Attachments/References HEART FAILURE: AVOIDING TRIGGERS (ENGLISH) Patient Handouts Avoiding Triggers With Heart Failure: Care Instructions Your Care Instructions Triggers are anything that make your heart failure flare up. A flare-up is also called \"sudden heart failure\" or \"acute heart failure. \" When you have a flare-up, fluid builds up in your lungs, and you have problems breathing. You might need to go to the hospital. By watching for changes in your condition and avoiding triggers, you can prevent heart failure flare-ups. Follow-up care is a key part of your treatment and safety.  Be sure to make and go to all appointments, and call your doctor if you are having problems. It's also a good idea to know your test results and keep a list of the medicines you take. How can you care for yourself at home? Watch for changes in your weight and condition · Weigh yourself without clothing at the same time each day. Record your weight. Call your doctor if you have sudden weight gain, such as more than 2 to 3 pounds in a day or 5 pounds in a week. (Your doctor may suggest a different range of weight gain.) A sudden weight gain may mean that your heart failure is getting worse. · Keep a daily record of your symptoms. Write down any changes in how you feel, such as new shortness of breath, cough, or problems eating. Also record if your ankles are more swollen than usual and if you feel more tired than usual. Note anything that you ate or did that could have triggered these changes. Limit sodium Sodium causes your body to hold on to extra water. This may cause your heart failure symptoms to get worse. People get most of their sodium from processed foods. Fast food and restaurant meals also tend to be very high in sodium. · Your doctor may suggest that you limit sodium to 2,000 milligrams (mg) a day or less. That is less than 1 teaspoon of salt a day, including all the salt you eat in cooking or in packaged foods. · Read food labels on cans and food packages. They tell you how much sodium you get in one serving. Check the serving size. If you eat more than one serving, you are getting more sodium. · Be aware that sodium can come in forms other than salt, including monosodium glutamate (MSG), sodium citrate, and sodium bicarbonate (baking soda). MSG is often added to Asian food. You can sometimes ask for food without MSG or salt. · Slowly reducing salt will help you adjust to the taste. Take the salt shaker off the table.  
· Flavor your food with garlic, lemon juice, onion, vinegar, herbs, and spices instead of salt. Do not use soy sauce, steak sauce, onion salt, garlic salt, mustard, or ketchup on your food, unless it is labeled \"low-sodium\" or \"low-salt. \" 
· Make your own salad dressings, sauces, and ketchup without adding salt. · Use fresh or frozen ingredients, instead of canned ones, whenever you can. Choose low-sodium canned goods. · Eat less processed food and food from restaurants, including fast food. Exercise as directed Moderate, regular exercise is very good for your heart. It improves your blood flow and helps control your weight. But too much exercise can stress your heart and cause a heart failure flare-up. · Check with your doctor before you start an exercise program. 
· Walking is an easy way to get exercise. Start out slowly. Gradually increase the length and pace of your walk. Swimming, riding a bike, and using a treadmill are also good forms of exercise. · When you exercise, watch for signs that your heart is working too hard. You are pushing yourself too hard if you cannot talk while you are exercising. If you become short of breath or dizzy or have chest pain, stop, sit down, and rest. 
· Do not exercise when you do not feel well. Take medicines correctly · Take your medicines exactly as prescribed. Call your doctor if you think you are having a problem with your medicine. · Make a list of all the medicines you take. Include those prescribed to you by other doctors and any over-the-counter medicines, vitamins, or supplements you take. Take this list with you when you go to any doctor. · Take your medicines at the same time every day. It may help you to post a list of all the medicines you take every day and what time of day you take them. · Make taking your medicine as simple as you can. Plan times to take your medicines when you are doing other things, such as eating a meal or getting ready for bed. This will make it easier to remember to take your medicines. · Get organized. Use helpful tools, such as daily or weekly pill containers. When should you call for help? Call 911 if you have symptoms of sudden heart failure such as: 
  · You have severe trouble breathing.  
  · You cough up pink, foamy mucus.  
  · You have a new irregular or rapid heartbeat.  
 Call your doctor now or seek immediate medical care if: 
  · You have new or increased shortness of breath.  
  · You are dizzy or lightheaded, or you feel like you may faint.  
  · You have sudden weight gain, such as more than 2 to 3 pounds in a day or 5 pounds in a week. (Your doctor may suggest a different range of weight gain.)  
  · You have increased swelling in your legs, ankles, or feet.  
  · You are suddenly so tired or weak that you cannot do your usual activities.  
 Watch closely for changes in your health, and be sure to contact your doctor if you develop new symptoms. Where can you learn more? Go to http://clover-livia.info/. Enter C568 in the search box to learn more about \"Avoiding Triggers With Heart Failure: Care Instructions. \" Current as of: December 6, 2017 Content Version: 11.7 © 3901-5093 [employer]. Care instructions adapted under license by [employer] (which disclaims liability or warranty for this information). If you have questions about a medical condition or this instruction, always ask your healthcare professional. [address] any warranty or liability for your use of this information. Please provide this summary of care documentation to your next provider. Signatures-by signing, you are acknowledging that this After Visit Summary has been reviewed with you and you have received a copy. Patient Signature:  ____________________________________________________________ Date:  ____________________________________________________________  
  
Jasmine [facility]  Provider Signature: ____________________________________________________________ Date:  ____________________________________________________________

## 2018-08-13 NOTE — IP AVS SNAPSHOT
[address] 
[phone] Patient: Jermain Newton MRN: [id number] :1960 A check dani indicates which time of day the medication should be taken. My Medications START taking these medications Instructions Each Dose to Equal  
 Morning Noon Evening Bedtime  
 amiodarone 200 mg tablet Commonly known as:  CORDARONE Your last dose was: Your next dose is: Take 1 Tab by mouth two (2) times daily (after meals). 200 mg  
    
   
   
   
  
 apixaban 5 mg tablet Commonly known as:  [place] Your last dose was: Your next dose is: Take 1 Tab by mouth two (2) times a day. 5 mg  
    
   
   
   
  
 ferrous sulfate 325 mg (65 mg iron) tablet Your last dose was: Your next dose is: Take 1 Tab by mouth daily (with breakfast). 325 mg  
    
   
   
   
  
 midodrine 5 mg tablet Commonly known as:  Gonzales Mass Your last dose was: Your next dose is: Take 1 Tab by mouth three (3) times daily (with meals) for 30 days. 5 mg CONTINUE taking these medications Instructions Each Dose to Equal  
 Morning Noon Evening Bedtime ADVAIR DISKUS 250-50 mcg/dose diskus inhaler Generic drug:  fluticasone-salmeterol Your last dose was: Your next dose is: Take 1 Puff by inhalation two (2) times a day. 1 Puff  
    
   
   
   
  
 albuterol 90 mcg/actuation inhaler Commonly known as:  PROVENTIL HFA, VENTOLIN HFA, PROAIR HFA Your last dose was: Your next dose is: Take 1 Puff by inhalation every four (4) hours as needed for Wheezing. 1 Puff  
    
   
   
   
  
 albuterol-ipratropium 2.5 mg-0.5 mg/3 ml Nebu Commonly known as:  Wyn Layer Your last dose was: Your next dose is: 3 mL by Nebulization route every four (4) hours. 3 mL  
    
   
   
   
  
 allopurinol 100 mg tablet Commonly known as:  Wali Cons Your last dose was: Your next dose is: Take 100 mg by mouth daily. 100 mg  
    
   
   
   
  
 aspirin delayed-release 81 mg tablet Your last dose was: Your next dose is: Take 81 mg by mouth daily. 81 mg  
    
   
   
   
  
 atorvastatin 80 mg tablet Commonly known as:  LIPITOR Your last dose was: Your next dose is: Take 80 mg by mouth daily. 80 mg  
    
   
   
   
  
 buPROPion  mg SR tablet Commonly known as:  Kaya Ridley Your last dose was: Your next dose is: Take 150 mg by mouth two (2) times a day. 150 mg  
    
   
   
   
  
 docusate sodium 100 mg capsule Commonly known as:  Sonny Millardfoot Your last dose was: Your next dose is: Take 100 mg by mouth two (2) times a day. 100 mg  
    
   
   
   
  
 furosemide 20 mg tablet Commonly known as:  LASIX Your last dose was: Your next dose is: Take 20 mg by mouth daily. 20 mg HYDROmorphone 4 mg tablet Commonly known as:  DILAUDID Your last dose was: Your next dose is: Take 4 mg by mouth two (2) times daily as needed for Pain. 4 mg  
    
   
   
   
  
 morphine CR 60 mg CR tablet Commonly known as:  MS CONTIN Your last dose was: Your next dose is: Take 60 mg by mouth three (3) times daily. 60 mg NITROSTAT 0.4 mg SL tablet Generic drug:  nitroglycerin Your last dose was: Your next dose is: 0.4 mg by SubLINGual route every five (5) minutes as needed for Chest Pain. 0.4 mg  
    
   
   
   
  
 nortriptyline 50 mg capsule Commonly known as:  PAMELOR Your last dose was: Your next dose is: Take 50 mg by mouth nightly. 50 mg  
    
   
   
   
  
 omeprazole 20 mg capsule Commonly known as:  PRILOSEC Your last dose was: Your next dose is: Take 20 mg by mouth daily. Indications: gastroesophageal reflux disease 20 mg  
    
   
   
   
  
 rOPINIRole 4 mg Tab TAB Commonly known as:  Lara Ramus Your last dose was: Your next dose is: Take 8 mg by mouth nightly. Indications: Restless Legs Syndrome 8 mg SPIRIVA WITH HANDIHALER 18 mcg inhalation capsule Generic drug:  tiotropium Your last dose was: Your next dose is: Take 1 Cap by inhalation daily. 1 Cap TOPROL XL 25 mg XL tablet Generic drug:  metoprolol succinate Your last dose was: Your next dose is: Take 25 mg by mouth daily. 25 mg  
    
   
   
   
  
 zolpidem 10 mg tablet Commonly known as:  AMBIEN Your last dose was: Your next dose is: Take 1 Tab by mouth nightly. 10 mg  
    
   
   
   
  
  
STOP taking these medications   
 citalopram 40 mg tablet Commonly known as:  CELEXA  
   
  
 clopidogrel 75 mg Tab Commonly known as:  PLAVIX IMDUR 60 mg CR tablet Generic drug:  isosorbide mononitrate ER  
   
  
 lisinopril 2.5 mg tablet Commonly known as:  PRINIVIL, ZESTRIL  
   
  
 metFORMIN 1,000 mg tablet Commonly known as:  GLUCOPHAGE  
   
  
 spironolactone 25 mg tablet Commonly known as:  ALDACTONE Where to Get Your Medications Information on where to get these meds will be given to you by the nurse or doctor. ! Ask your nurse or doctor about these medications  
  amiodarone 200 mg tablet  
 apixaban 5 mg tablet  
 ferrous sulfate 325 mg (65 mg iron) tablet  
 midodrine 5 mg tablet

## 2018-08-13 NOTE — ED PROVIDER NOTES
HPI Comments: 59 y.o. male with past medical history significant for CAD (s/p MI, stents), CHF, hypertension, high cholesterol, COPD who presents via EMS with chief complaint of shortness of breath. Patient complains of increasing shortness of breath and intermittent chest pain. Patient also reports chills, intermittent cough, and redness/swelling to both legs. Patient is not currently seeing wound care. No known fever at home. No vomiting, diarrhea. There are no other acute medical concerns at this time. Social hx: Current everyday smoker (4-5 cigarettes per day)  PCP: Olive Zapata MD    Note written by Allen Mack. Magui Hector, as dictated by Walker Dominguez MD 2:55 PM      The history is provided by the patient. Past Medical History:   Diagnosis Date    CAD (coronary artery disease)     CHF (congestive heart failure) (HCC)     Chronic systolic heart failure ([facility] 4/12/2011    COPD (chronic obstructive pulmonary disease) (HCC)     Coronary atherosclerosis of native coronary artery 4/12/2011    Diabetes ([facility] [address] [facility]     High cholesterol     Hypertension     MI, old     Neurological disorder     Other primary cardiomyopathies 4/12/2011    Restless leg syndrome        Past Surgical History:   Procedure Laterality Date    CARDIAC SURG PROCEDURE UNLIST      stents x7    HX CARPAL TUNNEL RELEASE      right wrist         History reviewed. No pertinent family history. Social History     Social History    Marital status:      Spouse name: N/A    Number of children: N/A    Years of education: N/A     Occupational History    Not on file.      Social History Main Topics    Smoking status: Current Every Day Smoker     Packs/day: 0.25    Smokeless tobacco: Never Used    Alcohol use No    Drug use: No    Sexual activity: Not on file     Other Topics Concern    Not on file     Social History Narrative         ALLERGIES: Review of patient's allergies indicates no known allergies. Review of Systems   Constitutional: Negative for chills, diaphoresis, fatigue and fever. HENT: Negative for congestion, rhinorrhea, sinus pressure, sore throat, trouble swallowing and voice change. Eyes: Negative for photophobia and visual disturbance. Respiratory: Positive for shortness of breath. Negative for cough and chest tightness. Cardiovascular: Positive for chest pain and leg swelling. Negative for palpitations. Gastrointestinal: Negative for abdominal pain, blood in stool, constipation, diarrhea, nausea and vomiting. Musculoskeletal: Negative for arthralgias, myalgias and neck pain. Skin: Positive for color change. Neurological: Negative for dizziness, weakness, light-headedness, numbness and headaches. All other systems reviewed and are negative. Vitals:    08/13/18 1441   BP: 122/64   Pulse: (!) 130   Resp: 22   Temp: 99 °F (37.2 °C)   SpO2: 98%   Weight: 111.6 kg (246 lb 0.5 oz)   Height: 6' 2\" (1.88 m)            Physical Exam   Constitutional: He is oriented to person, place, and time. He appears well-developed and well-nourished. No distress. HENT:   Head: Normocephalic and atraumatic. Nose: Nose normal.   Mouth/Throat: Oropharynx is clear and moist. No oropharyngeal exudate. Eyes: Conjunctivae and EOM are normal. Right eye exhibits no discharge. Left eye exhibits no discharge. No scleral icterus. Neck: Normal range of motion. Neck supple. No JVD present. No tracheal deviation present. No thyromegaly present. Cardiovascular: Regular rhythm, normal heart sounds and intact distal pulses. Tachycardia present. Exam reveals no gallop and no friction rub. No murmur heard. Pulmonary/Chest: Effort normal. No stridor. No respiratory distress. He has no wheezes. He has rales. He exhibits no tenderness. Diffuse crackles in bases bilaterally. Abdominal: Bowel sounds are normal. He exhibits no distension and no mass. There is no tenderness.  There is no rebound. Musculoskeletal: Normal range of motion. He exhibits edema and tenderness. Both legs erythematous with open sores actively oozing malodorous serosanguinous fluid, edematous. Lymphadenopathy:     He has no cervical adenopathy. Neurological: He is alert and oriented to person, place, and time. No cranial nerve deficit. Coordination normal.   Skin: Skin is warm and dry. No rash noted. He is not diaphoretic. There is erythema. No pallor. Psychiatric: He has a normal mood and affect. His behavior is normal. Judgment and thought content normal.   Nursing note and vitals reviewed. Note written by João Lama, as dictated by Melony Cohen MD 2:58 PM       MDM  Number of Diagnoses or Management Options  Cellulitis of lower extremity, unspecified laterality:   NSTEMI (non-ST elevated myocardial infarction) [facility]):      Amount and/or Complexity of Data Reviewed  Clinical lab tests: ordered and reviewed  Tests in the radiology section of CPT®: ordered and reviewed  Review and summarize past medical records: yes  Discuss the patient with other providers: yes  Independent visualization of images, tracings, or specimens: yes    Risk of Complications, Morbidity, and/or Mortality  Presenting problems: moderate  Diagnostic procedures: moderate  Management options: moderate    Critical Care  Total time providing critical care: 30-74 minutes (Total critical care time spent exclusive of procedures:  45 min)    Patient Progress  Patient progress: stable        ED Course       Procedures      ED EKG interpretation:  Rhythm: sinus tachycardia; and regular . Rate (approx.): 132; No acute changes. Unchanged from EKG 7/9/2018  Note written by [address] [place], [place] [address] JIMENA Lama, as dictated by Melony Cohen MD 3:00 PM    11:32 AM  Patient is being admitted to the hospital.  The results of their tests and reasons for their admission have been discussed with them and/or available family.   They convey agreement and understanding for the need to be admitted and for their admission diagnosis. Consultation has been made with the inpatient physician specialist for hospitalization. LABORATORY TESTS:  Recent Results (from the past 12 hour(s))   HEMOGLOBIN A1C WITH EAG    Collection Time: 08/14/18  3:39 AM   Result Value Ref Range    Hemoglobin A1c 6.0 4.2 - 6.3 %    Est. average glucose 126 mg/dL   LIPID PANEL    Collection Time: 08/14/18  3:39 AM   Result Value Ref Range    LIPID PROFILE          Cholesterol, total 109 <200 MG/DL    Triglyceride 64 <150 MG/DL    HDL Cholesterol 24 MG/DL    LDL, calculated 72.2 0 - 100 MG/DL    VLDL, calculated 12.8 MG/DL    CHOL/HDL Ratio 4.5 0 - 5.0     TROPONIN I    Collection Time: 08/14/18  3:39 AM   Result Value Ref Range    Troponin-I, Qt. 0.61 (H) <0.05 ng/mL   CBC WITH AUTOMATED DIFF    Collection Time: 08/14/18  3:39 AM   Result Value Ref Range    WBC 10.5 4.1 - 11.1 K/uL    RBC 3.19 (L) 4.10 - 5.70 M/uL    HGB 7.7 (L) 12.1 - 17.0 g/dL    HCT 25.2 (L) 36.6 - 50.3 %    MCV 79.0 (L) 80.0 - 99.0 FL    MCH 24.1 (L) 26.0 - 34.0 PG    MCHC 30.6 30.0 - 36.5 g/dL    RDW 19.9 (H) 11.5 - 14.5 %    PLATELET 232 (H) 865 - 400 K/uL    MPV 9.1 8.9 - 12.9 FL    NRBC 0.0 0  WBC    ABSOLUTE NRBC 0.00 0.00 - 0.01 K/uL    NEUTROPHILS 79 (H) 32 - 75 %    LYMPHOCYTES 13 12 - 49 %    MONOCYTES 6 5 - 13 %    EOSINOPHILS 1 0 - 7 %    BASOPHILS 0 0 - 1 %    IMMATURE GRANULOCYTES 1 (H) 0.0 - 0.5 %    ABS. NEUTROPHILS 8.3 (H) 1.8 - 8.0 K/UL    ABS. LYMPHOCYTES 1.4 0.8 - 3.5 K/UL    ABS. MONOCYTES 0.6 0.0 - 1.0 K/UL    ABS. EOSINOPHILS 0.1 0.0 - 0.4 K/UL    ABS. BASOPHILS 0.0 0.0 - 0.1 K/UL    ABS. IMM.  GRANS. 0.1 (H) 0.00 - 0.04 K/UL    DF AUTOMATED     METABOLIC PANEL, BASIC    Collection Time: 08/14/18  3:39 AM   Result Value Ref Range    Sodium 135 (L) 136 - 145 mmol/L    Potassium 3.6 3.5 - 5.1 mmol/L    Chloride 100 97 - 108 mmol/L    CO2 24 21 - 32 mmol/L    Anion gap 11 5 - 15 mmol/L    Glucose 102 (H) 65 - 100 mg/dL    BUN 9 6 - 20 MG/DL    Creatinine 0.73 0.70 - 1.30 MG/DL    BUN/Creatinine ratio 12 12 - 20      GFR est AA >60 >60 ml/min/1.73m2    GFR est non-AA >60 >60 ml/min/1.73m2    Calcium 7.9 (L) 8.5 - 10.1 MG/DL   GLUCOSE, POC    Collection Time: 08/14/18  8:26 AM   Result Value Ref Range    Glucose (POC) 143 (H) 65 - 100 mg/dL    Performed by Aaliyah Vivas        IMAGING RESULTS:  CTA CHEST W OR W WO CONT   Final Result      XR CHEST PORT   Final Result      DUPLEX LOWER EXT VENOUS BILAT    (Results Pending)   ANKLE BRACHIAL INDEX    (Results Pending)     Cta Chest W Or W Wo Cont    Result Date: 8/13/2018  EXAM: CT ANGIOGRAPHY CHEST INDICATION: Chest pain. COMPARISON: . CONTRAST: mL of Isovue-370. TECHNIQUE: Precontrast  images were obtained to localize the volume for acquisition. Multislice helical CT arteriography was performed from the diaphragm to the thoracic inlet during uneventful rapid bolus intravenous contrast administration. Lung and soft tissue windows were generated. Coronal and sagittal  reformatted images were also generated and 3D post processing consisting of coronal maximum intensity projection images was performed. CT dose reduction was achieved through use of a standardized protocol tailored for this examination and automatic exposure control for dose modulation. FINDINGS: There is respiratory motion artifact on the images. LOWER NECK: The visualized portions of the thyroid and structures of the lower neck are within normal limits. LUNGS: The lungs are clear of mass, nodule, airspace disease or edema. There is atelectasis in the posterior lower lobes. PLEURA: There is no pleural effusion or pneumothorax. PULMONARY ARTERIES: The pulmonary arteries are well enhanced and no pulmonary emboli are identified. AORTA: The aorta enhances normally without evidence of aneurysm or dissection. There is coronary artery calcification with coronary stents.  MEDIASTINUM: There are borderline enlarged mediastinal lymph nodes. BONES AND SOFT TISSUES: The bones and soft tissues of the chest wall are within normal limits. UPPER ABDOMEN: The visualized portions of the upper abdominal organs are normal.     IMPRESSION: 1. No pulmonary embolus. 2. Atherosclerosis with coronary artery calcification and coronary stents. 3. Borderline enlarged mediastinal lymph nodes unchanged. Xr Chest Port    Result Date: 8/13/2018  EXAM:  XR CHEST PORT INDICATION:  Shortness of breath began last night. COMPARISON:  July 9 FINDINGS: A portable AP radiograph of the chest was obtained at 1547 hours. The patient is on a cardiac monitor. There are mildly increased markings in lungs without focal airspace process. The cardiac and mediastinal contours and pulmonary vascularity are normal.  The bones and soft tissues are grossly within normal limits. IMPRESSION: No acute findings.        MEDICATIONS GIVEN:  Medications   allopurinol (ZYLOPRIM) tablet 100 mg (100 mg Oral Given 8/14/18 0943)   aspirin delayed-release tablet 81 mg (81 mg Oral Given 8/14/18 0942)   atorvastatin (LIPITOR) tablet 80 mg (80 mg Oral Given 8/14/18 0942)   buPROPion SR [facility] SR) tablet 150 mg (150 mg Oral Refused 8/14/18 0943)   citalopram (CELEXA) tablet 40 mg (40 mg Oral Refused 8/14/18 0900)   clopidogrel (PLAVIX) tablet 75 mg (75 mg Oral Given 8/14/18 0942)   lisinopril (PRINIVIL, ZESTRIL) tablet 2.5 mg (0 mg Oral Held 8/14/18 0900)   rOPINIRole (REQUIP) tablet 8 mg (8 mg Oral Given 8/13/18 2245)   spironolactone (ALDACTONE) tablet 25 mg (25 mg Oral Given 8/14/18 0943)   sodium chloride (NS) flush 5-10 mL (10 mL IntraVENous Given 8/14/18 0820)   sodium chloride (NS) flush 5-10 mL (not administered)   glucose chewable tablet 16 g (not administered)   dextrose (D50W) injection syrg 12.5-25 g (not administered)   glucagon (GLUCAGEN) injection 1 mg (not administered)   sodium chloride (NS) flush 5-10 mL (10 mL IntraVENous Given 8/14/18 0820)   sodium chloride (NS) flush 5-10 mL (not administered)   nitroglycerin (NITROSTAT) tablet 0.4 mg (not administered)   insulin lispro (HUMALOG) injection (2 Units SubCUTAneous Given 8/14/18 0836)   acetaminophen (TYLENOL) tablet 650 mg (650 mg Oral Given 8/13/18 2153)   morphine injection 2 mg (2 mg IntraVENous Given 8/14/18 0820)   piperacillin-tazobactam (ZOSYN) 3.375 g in 0.9% sodium chloride (MBP/ADV) 100 mL (3.375 g IntraVENous New Bag 8/14/18 1113)   enoxaparin (LOVENOX) injection 111 mg (111 mg SubCUTAneous Given 8/14/18 0943)   arformoterol (BROVANA) neb solution 15 mcg (not administered)     And   budesonide (PULMICORT) 500 mcg/2 ml nebulizer suspension (not administered)   dilTIAZem (CARDIZEM) 125 mg in dextrose 5% 125 mL infusion (10 mg/hr IntraVENous New Bag 8/14/18 0837)   albuterol-ipratropium (DUO-NEB) 2.5 MG-0.5 MG/3 ML (not administered)   famotidine (PEPCID) tablet 20 mg (20 mg Oral Given 8/14/18 0942)   Vancomycin - pharmacy to dose (not administered)   vancomycin (VANCOCIN) 1750 mg in  ml infusion (1,750 mg IntraVENous New Bag 8/14/18 0941)   furosemide (LASIX) tablet 20 mg (20 mg Oral Given 8/14/18 1033)   morphine CR (MS CONTIN) tablet 60 mg (not administered)   metoprolol tartrate (LOPRESSOR) tablet 50 mg (not administered)   sodium chloride 0.9 % bolus infusion 1,000 mL (0 mL IntraVENous IV Completed 8/13/18 1713)   HYDROmorphone (DILAUDID) syringe 1 mg (1 mg IntraVENous Given 8/13/18 1613)   sodium chloride 0.9 % bolus infusion 100 mL (100 mL IntraVENous New Bag 8/13/18 1901)   iopamidol (ISOVUE-370) 76 % injection 100 mL (75 mL IntraVENous Given 8/13/18 1901)   sodium chloride (NS) flush 10 mL (10 mL IntraVENous Given 8/13/18 1901)   ampicillin-sulbactam (UNASYN) 3 g in 0.9% sodium chloride (MBP/ADV) 100 mL (3 g IntraVENous New Bag 8/13/18 2028)   aspirin chewable tablet 324 mg (324 mg Oral Given 8/13/18 2010)   furosemide (LASIX) injection 40 mg (40 mg IntraVENous Given 8/13/18 2155)   vancomycin (VANCOCIN) 2000 mg in  ml infusion (0 mg IntraVENous IV Completed 8/14/18 0039)   piperacillin-tazobactam (ZOSYN) 3.375 g in 0.9% sodium chloride (MBP/ADV) 100 mL (3.375 g IntraVENous New Bag 8/13/18 2150)   metoprolol (LOPRESSOR) injection 5 mg (5 mg IntraVENous Given 8/14/18 0518)       IMPRESSION:  1. Cellulitis of lower extremity, unspecified laterality    2. NSTEMI (non-ST elevated myocardial infarction) ([facility] [address]        PLAN:  1.  Admit to Hospitalist    Total critical care time spent exclusive of procedures:  45 minutes      April Santoro MD

## 2018-08-14 NOTE — CONSULTS
Infectious Disease Consult    Today's Date: 8/14/2018   Admit Date: 8/13/2018    Impression:   · Cellulitis--worse on left leg  · Chronic bilateral dermatitis/vascular insufficiency  · Debility     Plan:   · IV antibiotic therapy  · Follow up cultures  · Add clindamycin    Anti-infectives:   · Vancomycin  · Zosyn     Subjective:   Date of Consultation:  August 14, 2018  Referring Physician: Dr Lazarus Muzzy    Patient is a 59 y.o. male known to me from care given in the past who has chronic dermatitis of both legs. He has several areas of near full thickness skin loss and occasionally has problems with cellulitis. He is admitted with worsening leg pain, especially on the left and he has been febrile. He is on IV antibiotic therapy and we are asked to see him in consultation.      Patient Active Problem List   Diagnosis Code    Coronary atherosclerosis of native coronary artery I25.10    Other primary cardiomyopathies I42.8    Chronic systolic heart failure (HCC) I50.22    Chest pain R07.9    Hematemesis K92.0    Rectal bleeding K62.5    Obstructive sleep apnea (adult) (pediatric) G47.33    Chronic airway obstruction, not elsewhere classified J44.9    Essential hypertension, benign I10    Type II or unspecified type diabetes mellitus without mention of complication, not stated as uncontrolled E11.9    Coronary artery disease I25.10    Morbid obesity ([address] E66.01    COPD with exacerbation ([address] D95.7    Systolic CHF, acute ([address] Y72.40    Acute systolic CHF (congestive heart failure) ([facility]) I50.21    SOB (shortness of breath) R06.02    Fluid overload E87.70    NSTEMI (non-ST elevated myocardial infarction) ([address] I21.4     Past Medical History:   Diagnosis Date    CAD (coronary artery disease)     CHF (congestive heart failure) ([facility])     Chronic systolic heart failure ([address] 4/12/2011    COPD (chronic obstructive pulmonary disease) ([facility])     Coronary atherosclerosis of native coronary artery 4/12/2011    Diabetes ([facility] [address]     High cholesterol     Hypertension     MI, old     Neurological disorder     Other primary cardiomyopathies 4/12/2011    Restless leg syndrome       History reviewed. No pertinent family history. Social History   Substance Use Topics    Smoking status: Current Every Day Smoker     Packs/day: 0.25    Smokeless tobacco: Never Used    Alcohol use No     Past Surgical History:   Procedure Laterality Date    CARDIAC SURG PROCEDURE UNLIST      stents x7    HX CARPAL TUNNEL RELEASE      right wrist      Prior to Admission medications    Medication Sig Start Date End Date Taking? Authorizing Provider   furosemide (LASIX) 20 mg tablet Take 20 mg by mouth daily. Yes Historical Provider   HYDROmorphone (DILAUDID) 4 mg tablet Take 4 mg by mouth two (2) times daily as needed for Pain. Yes Historical Provider   morphine CR (MS CONTIN) 60 mg CR tablet Take 60 mg by mouth three (3) times daily. Yes Historical Provider   spironolactone (ALDACTONE) 25 mg tablet Take 25 mg by mouth two (2) times a day. Yes Historical Provider   allopurinol (ZYLOPRIM) 100 mg tablet Take 100 mg by mouth daily. Yes Historical Provider   buPROPion SR [facility] SR) 150 mg SR tablet Take 150 mg by mouth two (2) times a day. Yes Historical Provider   metoprolol succinate (TOPROL XL) 25 mg XL tablet Take 25 mg by mouth daily. Yes Historical Provider   lisinopril (PRINIVIL, ZESTRIL) 2.5 mg tablet Take 2.5 mg by mouth daily. Yes Historical Provider   albuterol (PROVENTIL HFA, VENTOLIN HFA, PROAIR HFA) 90 mcg/actuation inhaler Take 1 Puff by inhalation every four (4) hours as needed for Wheezing. Yes Historical Provider   albuterol-ipratropium (DUO-NEB) 2.5 mg-0.5 mg/3 ml nebu 3 mL by Nebulization route every four (4) hours. 3/3/18  Yes Grecia Aaron MD   aspirin delayed-release 81 mg tablet Take 81 mg by mouth daily. Yes Gerald Caldwell MD   citalopram (CELEXA) 40 mg tablet Take 40 mg by mouth daily.    Yes Gerald Caldwell MD   nitroglycerin (NITROSTAT) 0.4 mg SL tablet 0.4 mg by SubLINGual route every five (5) minutes as needed for Chest Pain. Yes Historical Provider   nortriptyline (PAMELOR) 50 mg capsule Take 50 mg by mouth nightly. Yes Historical Provider   docusate sodium (COLACE) 100 mg capsule Take 100 mg by mouth two (2) times a day. Yes Historical Provider   metFORMIN (GLUCOPHAGE) 1,000 mg tablet Take 1,000 mg by mouth two (2) times daily (with meals). Indications: type 2 diabetes mellitus   Yes Historical Provider   omeprazole (PRILOSEC) 20 mg capsule Take 20 mg by mouth daily. Indications: gastroesophageal reflux disease   Yes Historical Provider   clopidogrel (PLAVIX) 75 mg tab Take 75 mg by mouth daily. Yes Gerald Caldwell MD   atorvastatin (LIPITOR) 80 mg tablet Take 80 mg by mouth daily. Yes Historical Provider   rOPINIRole (REQUIP) 4 mg tab TAB Take 8 mg by mouth nightly. Indications: Restless Legs Syndrome   Yes Historical Provider   zolpidem (AMBIEN) 10 mg tablet Take 1 Tab by mouth nightly. 14  Yes Emre Alvarez MD   tiotropium (SPIRIVA WITH HANDIHALER) 18 mcg inhalation capsule Take 1 Cap by inhalation daily. Yes Gerald Caldwell MD   fluticasone-salmeterol (ADVAIR DISKUS) 250-50 mcg/dose diskus inhaler Take 1 Puff by inhalation two (2) times a day. Yes Gerald Caldwell MD   isosorbide mononitrate ER (IMDUR) 60 mg CR tablet Take 60 mg by mouth daily. Yes Gerald Caldwell MD       No Known Allergies     Review of Systems:  Pertinent items are noted in the History of Present Illness.     Objective:     Visit Vitals    /51 (BP 1 Location: Left arm, BP Patient Position: At rest)    Pulse 94    Temp 98.1 °F (36.7 °C)    Resp 20    Ht 6' 2\" (1.88 m)    Wt 111.6 kg (246 lb 0.5 oz)    SpO2 93%    BMI 31.59 kg/m2     Temp (24hrs), Av.1 °F (37.3 °C), Min:98.1 °F (36.7 °C), Max:101.1 °F (38.4 °C)       Lines:  Peripheral IV:            Physical Exam:  General:  alert, cooperative, appears stated age  Lungs:  clear to auscultation bilaterally  Heart:  regular rate and rhythm  Abdomen:  soft, non-tender. Bowel sounds normal. No masses,  no organomegaly  Cardiovascular:  Regular rate and rhythm  Skin:  Erythema of both legs, warm and tender. More extensive on the left.     Data Review:     CBC:  Recent Labs      08/14/18   0339  08/13/18   1512   WBC  10.5  11.7*   GRANS  79*  76*   MONOS  6  6   EOS  1  0   ANEU  8.3*  9.0*   ABL  1.4  1.9   HGB  7.7*  8.3*   HCT  25.2*  27.5*   PLT  489*  532*       BMP:  Recent Labs      08/14/18   0339  08/13/18   1512   CREA  0.73  0.75   BUN  9  9   NA  135*  131*   K  3.6  4.4   CL  100  97   CO2  24  26   AGAP  11  8   GLU  102*  115*       LFTS:  Recent Labs      08/13/18   1512   TBILI  0.8   ALT  14   SGOT  16   AP  210*   TP  8.0   ALB  2.2*       Microbiology:     All Micro Results     Procedure Component Value Units Date/Time    CULTURE, BLOOD, PAIRED [[id number]] Collected:  08/13/18 2019    Order Status:  Completed Specimen:  Blood Updated:  08/14/18 0513     Special Requests: NO SPECIAL REQUESTS        Culture result: NO GROWTH AFTER 7 HOURS             Imaging:   Results reviewed     Signed By: Jyothi Ponce MD     August 14, 2018

## 2018-08-14 NOTE — ED NOTES
Assumed care of patient at this time. Patient reports that he has severe pain in bilateral lower extremities at this time. Both legs are swollen and weeping fluid. Patient states this has been going on for some time. Blood cultures are being obtained at this time, and antibiotics will be hung once cultures are drawn. Cardiology and hospitalist both at bedside at this time. CM x 3. Call bell within reach at this time.

## 2018-08-14 NOTE — PROGRESS NOTES
TRANSFER - OUT REPORT:    Verbal report given to Rissa WATSON on [email or web address] and [facility] [facility]  being transferred to [facility] for routine progression of care       Report consisted of patients Situation, Background, Assessment and   Recommendations(SBAR). Information from the following report(s) SBAR, Kardex, [facility] and Cardiac Rhythm SR was reviewed with the receiving nurse. Lines:   Peripheral IV 08/13/18 Right Arm (Active)   Site Assessment Clean, dry, & intact 8/14/2018 12:30 PM   Phlebitis Assessment 0 8/14/2018 12:30 PM   Infiltration Assessment 0 8/14/2018 12:30 PM   Dressing Status Clean, dry, & intact 8/14/2018 12:30 PM   Dressing Type Transparent 8/14/2018 12:30 PM   Hub Color/Line Status Pink 8/14/2018 12:30 PM   Action Taken Blood drawn 8/13/2018  3:19 PM   Alcohol Cap Used Yes 8/14/2018 12:30 PM       Peripheral IV 08/13/18 Left Antecubital (Active)   Site Assessment Clean, dry, & intact 8/14/2018 12:30 PM   Phlebitis Assessment 0 8/14/2018 12:30 PM   Infiltration Assessment 0 8/14/2018 12:30 PM   Dressing Status Clean, dry, & intact 8/14/2018 12:30 PM   Dressing Type Transparent 8/14/2018 12:30 PM   Hub Color/Line Status Pink 8/14/2018 12:30 PM   Action Taken Blood drawn 8/13/2018  5:55 PM   Alcohol Cap Used Yes 8/14/2018 12:30 PM        Opportunity for questions and clarification was provided.       Patient transported with:   Monitor  O2 @ 3 liters  Registered Nurse  [employer]

## 2018-08-14 NOTE — PROGRESS NOTES
Pharmacist Note - Vancomycin Dosing    Consult provided for this 59 y.o. male for indication of cellulitis. Antibiotic regimen(s): Vanc + Zosyn    Recent Labs      18   1512   WBC  11.7*   CREA  0.75   BUN  9     Frequency of BMP: daily x 3  Height: 188 cm  Weight: 111.6 kg  Est CrCl: >100 ml/min; UO: -- ml/kg/hr  Temp (24hrs), Av °F (37.2 °C), Min:99 °F (37.2 °C), Max:99 °F (37.2 °C)    Cultures:   blood - pending    Goal trough = 10 - 15 mcg/mL    Therapy will be initiated with a loading dose of 2000 mg IV x 1 to be followed by a maintenance dose of 1500 mg IV every 12 hours. Pharmacy to follow patient daily and order levels / make dose adjustments as appropriate.

## 2018-08-14 NOTE — PROGRESS NOTES
Patient declined Duplex Lower Extremity Venous Bilateral exam. Patient stated he was in too much pain and didn't think he could tolerate the ultrasound exam at this time. Patient stated that maybe tomorrow would be better.

## 2018-08-14 NOTE — H&P
[address]  HISTORY AND PHYSICAL      Jackie Santana  MR#: [id number]  : 1960  ACCOUNT #: [de-identified]   ADMIT DATE: 2018    CHIEF COMPLAINT:  Shortness of breath. HISTORY OF PRESENT ILLNESS:  The patient is a 59-year-old gentleman with past medical history of coronary artery disease, chronic systolic CHF, NYHA class II, COPD, diabetes mellitus type 2, hypertension, hyperlipidemia, MI, restless leg syndrome, gait abnormality who was recently admitted to the hospital in July with fever, tachycardia, probably secondary to cellulitis of the legs and pneumonia, hypotension, chronic hypoxic respiratory failure due to COPD, he presents to the hospital today with the above-mentioned symptoms. The patient reports that he was diagnosed with cellulitis of his lower extremity, was prescribed antibiotics, but they are not getting better. The patient reports that for the past few days, he has been increasingly short of breath. Reports that the shortness of breath was to the point today when he was not able to breathe well. The patient reports that he was not able to lie down and had to be propped up to breathe well. The patient also reports that he had some chest tightness associated with symptoms and also had some cough associated with his symptoms. The patient reports his legs are \"burning and killing him\". Patient reports he is not currently seeing wound care, although he was prescribed the same. The patient also reports that he has been having some nausea, poor appetite and chills associated with it sometimes. The patient was seen in the ER, was found to have cellulitis of left lower extremity associated with non-ST elevation MI with elevated troponins and later was found to be in AFib and thus was requested to be admitted under the hospitalist service. Patient reports that he has been taking his medications on a regular basis.   Reports he currently still smokes about 4-7 cigarettes every day. The patient denies any other complaints or problems. Denies any headache, blurry vision, sore throat, trouble swallowing, trouble with speech. Denies any fever, but admits to chills. Denies any abdominal pain, constipation, diarrhea, urinary symptoms, focal or generalized neurological weakness, recent travel, sick contacts, any falls, injuries,  hematemesis, melena, hemoptysis or any other concerns or problems. PAST MEDICAL HISTORY:  See above. HOME MEDICATIONS:  Currently, the patient is on morphine 60 mg t.i.d., spironolactone 25 mg b.i.d., allopurinol 100 mg daily, Wellbutrin 150 mg b.i.d., furosemide 60 mg daily,  Dilaudid 4 mg b.i.d. as needed, metoprolol 25 mg daily, lisinopril 2.5 mg daily, albuterol p.r.n., aspirin 81 mg daily, Celexa 40 mg daily, nitroglycerin as needed, Pamelor 50 mg nightly, Colace 100 mg b.i.d., metformin 1000 mg b.i.d., omeprazole 20 mg daily, Plavix 75 mg daily, Lipitor 80 mg daily, Requip 4 mg nightly, Ambien, tiotropium and isosorbide mononitrate 60 mg daily. SOCIAL HISTORY:  Patient is a smoker currently smoking 4-7 cigarettes on a daily basis. Denies IV drug abuse. Denies alcohol use. ALLERGIES:  NO KNOWN DRUG ALLERGIES. FAMILY HISTORY:  Was discussed, was found to be noncontributory. REVIEW OF SYSTEMS:  All systems were reviewed and were found to be essentially negative except as what is mentioned above. PHYSICAL EXAMINATION:  VITAL SIGNS:  Temperature 99, pulse 130, respiratory rate 16, blood pressure 136/60 and pulse ox 98% on 2 liters. GENERAL:  Alert x2, awake, moderately distressed, pleasant male, appears to be stated age. HEENT:  Pupils equal, reactive to light. Dry mucous membranes. Tympanic membranes clear. NECK:  Supple. CHEST:  Decreased basal breath sounds, scattered crackles. HEART:  Irregularly irregular. ABDOMEN:  Soft, nontender, nondistended. Bowel sounds are physiologic.    EXTREMITIES:  Both lower extremities show edema with circumferential erythema, open sores with active oozing and malodorous serosanguineous fluid with scaly skin. Bilateral pulses appear to be palpable bilaterally. NEUROPSYCHIATRIC:  Pleasant mood and affect. Cranial nerves are grossly intact. No focal neurological deficits were noted. SKIN:  As above and warm. LABORATORY DATA:  White count 11.7 with hemoglobin 8.2, hematocrit 27.5, platelets 898. Sodium 131, potassium 4.4, chloride 97, bicarb 26, anion gap 8, glucose 115, BUN 9, creatinine 0.75, calcium 8.5, bilirubin total 0.8, ALT 14, AST 16, alkaline phosphatase 210. Lactic acid 2.0, troponin 0.93. Blood cultures are pending. CT of the chest shows no pulmonary emboli associated with coronary artery calcification, borderline enlarged mediastinal lymphadenopathy. EKG shows an undetermined rhythm, right bundle branch block. ASSESSMENT:  1.  Non-ST elevation myocardial infarction. Patient will be admitted on a telemetry bed. I spoke with Dr. Emilia Nelson and his recommendations are incorporated. We will continue the patient on aspirin and Plavix. We will continue beta blockers. We will cycle troponins, pain control, nitroglycerin, telemetry monitoring and possible stress test tomorrow or day after tomorrow. We will defer to cardiology. Echocardiogram has been ordered. If troponin trending up, patient may need a catheterization. Further intervention will be per hospital course. Dr. Emilia Neslon  recommends Lovenox 1 mg/kg b.i.d., which has been initiated. Continue to closely monitor. 2.  Atrial fibrillation with rapid ventricular response. When I went to reevaluate the patient, the patient was in AFib with a rate of 130s to 140s. We will start patient on diltiazem drip currently on Lovenox therapeutic dose, cardiac enzymes, troponins. Cardiology on board, closely monitor. Further intervention will be per hospital course.   We will switch to oral rate controlled agents once heart rate stabilizes. Continue to closely monitor. Reassess as needed. 3.  Congestive heart failure exacerbation. The patient appears to be in acute on chronic systolic congestive heart failure exacerbation. We will provide gentle IV diuresis, strict I's and O's, daily weights and close monitoring. Further intervention will be per hospital course. The patient on telemetry. Echocardiogram has been ordered and cardiology on board. We will reassess as needed. 4.  Cellulitis, bilateral lower extremity. Patient will be on broad spectrum IV antibiotics. Blood cultures drawn in the ER. Pain control, antipyretics and continue to closely monitor. May consider getting ID consult in the morning. 5.  Diabetes. The patient will be on sliding scale NovoLog insulin, Accu-Cheks, diet control, and close monitoring. Further intentional per hospital course. 6.  Gastroesophageal reflux disease. We will switch Protonix to Pepcid as the patient is on Plavix. 7.  Depression. Continue home medication. The patient denies homicidal or suicidal ideation. 8.  Hypertension. Continue home medication. 9.  Gastrointestinal and deep vein thrombosis prophylaxis. The patient is on Lovenox.       Steffi Mitchell MD [id number]/FALGUNI  D: 08/13/2018 21:59     T: 08/13/2018 23:05  JOB #: [id number]

## 2018-08-14 NOTE — PROGRESS NOTES
Admission Medication Reconciliation:    Information obtained from: patient    Significant PMH/Disease States:   Past Medical History:   Diagnosis Date    CAD (coronary artery disease)     [facility]     Chronic systolic heart failure ([facility] 4/12/2011    COPD (chronic obstructive pulmonary disease) ([facility])     Coronary atherosclerosis of native coronary artery 4/12/2011    [facility]     High cholesterol     Hypertension     MI, old     Neurological disorder     Other primary cardiomyopathies 4/12/2011    Restless leg syndrome        Chief Complaint for this Admission:    Chief Complaint   Patient presents with    Shortness of Breath       Allergies:  Review of patient's allergies indicates no known allergies. Prior to Admission Medications:   Prior to Admission Medications   Prescriptions Last Dose Informant Patient Reported? Taking? HYDROmorphone (DILAUDID) 4 mg tablet   No Yes   Sig: Take 1 Tab by mouth two (2) times a day prn. Max Daily Amount: 8 mg. Indications: Pain   albuterol (PROVENTIL HFA, VENTOLIN HFA, PROAIR HFA) 90 mcg/actuation inhaler   Yes Yes   Sig: Take 1 Puff by inhalation every four (4) hours as needed for Wheezing. albuterol-ipratropium (DUO-NEB) 2.5 mg-0.5 mg/3 ml nebu   No Yes   Sig: 3 mL by Nebulization route every four (4) hours. allopurinol (ZYLOPRIM) 100 mg tablet   Yes Yes   Sig: Take 100 mg by mouth daily. aspirin delayed-release 81 mg tablet   Yes Yes   Sig: Take 81 mg by mouth daily. atorvastatin (LIPITOR) 80 mg tablet  Self Yes Yes   Sig: Take 80 mg by mouth daily. buPROPion SR (WELLBUTRIN SR) 150 mg SR tablet   Yes Yes   Sig: Take 150 mg by mouth two (2) times a day. citalopram (CELEXA) 40 mg tablet   Yes Yes   Sig: Take 40 mg by mouth daily. clopidogrel (PLAVIX) 75 mg tab  Self Yes Yes   Sig: Take 75 mg by mouth daily. docusate sodium (COLACE) 100 mg capsule   Yes Yes   Sig: Take 100 mg by mouth two (2) times a day. fluticasone-salmeterol (ADVAIR DISKUS) 250-50 mcg/dose diskus inhaler  Self Yes Yes   Sig: Take 1 Puff by inhalation two (2) times a day. furosemide (LASIX) 20 mg tablet   Yes Yes   Sig: Take 20 mg by mouth daily. isosorbide mononitrate ER (IMDUR) 60 mg CR tablet  Self Yes Yes   Sig: Take 60 mg by mouth daily. lisinopril (PRINIVIL, ZESTRIL) 2.5 mg tablet   Yes Yes   Sig: Take 2.5 mg by mouth daily. metFORMIN (GLUCOPHAGE) 1,000 mg tablet  Self Yes Yes   Sig: Take 1,000 mg by mouth two (2) times daily (with meals). Indications: type 2 diabetes mellitus   metoprolol succinate (TOPROL XL) 25 mg XL tablet   Yes Yes   Sig: Take 25 mg by mouth daily. morphine CR (MS CONTIN) 60 mg CR tablet   Yes Yes   Sig: Take 60 mg by mouth three (3) times daily. nitroglycerin (NITROSTAT) 0.4 mg SL tablet   Yes Yes   Si.4 mg by SubLINGual route every five (5) minutes as needed for Chest Pain. nortriptyline (PAMELOR) 50 mg capsule   Yes Yes   Sig: Take 50 mg by mouth nightly. omeprazole (PRILOSEC) 20 mg capsule  Self Yes Yes   Sig: Take 20 mg by mouth daily. Indications: gastroesophageal reflux disease   rOPINIRole (REQUIP) 4 mg tab TAB  Self Yes Yes   Sig: Take 8 mg by mouth nightly. Indications: Restless Legs Syndrome   spironolactone (ALDACTONE) 25 mg tablet   Yes Yes   Sig: Take 25 mg by mouth two (2) times a day. tiotropium (SPIRIVA WITH HANDIHALER) 18 mcg inhalation capsule  Self Yes Yes   Sig: Take 1 Cap by inhalation daily. zolpidem (AMBIEN) 10 mg tablet  Self No Yes   Sig: Take 1 Tab by mouth nightly. Facility-Administered Medications: None         Comments/Recommendations:   Patient is a fair historian  States there have been no changes since discharge from [facility] several weeks ago. However-patient has only been taking 20mg furosemide every day - at discharge it was prescribed as 60mg bid.

## 2018-08-14 NOTE — PROGRESS NOTES
Cardiology Consult Note    CC: CP  Reason for consult:  Elevated troponin  Requesting MD:  Dr. Alexandra Franklin     Subjective:      Date of  Admission: 8/13/2018  2:31 PM     Admission type:Emergency    Jason Hernandez is a 59 y.o. male admitted for NSTEMI (non-ST elevated myocardial infarction) ([facility] [address] Patient complains of severe leg pains from cellulitis. He was apparently here one month ago with laith problem. His CP is migratory and hurts all around his chest and can last anywhere from a couple seconds to ten minutes. It is positional at times. His breathing is affected mainly due to severe leg pains. His past cardiac data include multiple stents in all three vessels and ischemic CMY; EF 45% from last cath. Dr. Nuria Boyd did [facility] in 2017 about one year ago and it showed patent stents in all three vessels. Noted first troponin at 0.93.     Patient Active Problem List    Diagnosis Date Noted    NSTEMI (non-ST elevated myocardial infarction) ([facility] [address] 08/13/2018    Fluid overload 05/06/2018    SOB (shortness of breath) 67/03/7628    Systolic CHF, acute ([facility] [address] 24/48/2045    Acute systolic CHF (congestive heart failure) ([facility] [address] 02/26/2018    COPD with exacerbation ([facility] [address] 05/24/2014    Morbid obesity ([facility] [address] 01/08/2014    Hematemesis 01/07/2014    Rectal bleeding 01/07/2014    Obstructive sleep apnea (adult) (pediatric) 01/07/2014    Chronic airway obstruction, not elsewhere classified 01/07/2014    Essential hypertension, benign 01/07/2014    Type II or unspecified type diabetes mellitus without mention of complication, not stated as uncontrolled 01/07/2014    Coronary artery disease 01/07/2014    Chest pain 01/05/2014    Coronary atherosclerosis of native coronary artery 04/12/2011    Other primary cardiomyopathies 04/12/2011    Chronic systolic heart failure ([address] 04/12/2011      Anjelica Jacobo MD  Past Medical History:   Diagnosis Date    CAD (coronary artery disease)     CHF (congestive heart failure) ([address]     Chronic systolic heart failure ([facility] [address] 4/12/2011    COPD (chronic obstructive pulmonary disease) (HCC)     Coronary atherosclerosis of native coronary artery 4/12/2011    Diabetes ([facility]     High cholesterol     Hypertension     MI, old     Neurological disorder     Other primary cardiomyopathies 4/12/2011    Restless leg syndrome       Past Surgical History:   Procedure Laterality Date    CARDIAC SURG PROCEDURE UNLIST      stents x7    HX CARPAL TUNNEL RELEASE      right wrist     No Known Allergies   History reviewed. No pertinent family history. Current Facility-Administered Medications   Medication Dose Route Frequency    ampicillin-sulbactam (UNASYN) 3 g in 0.9% sodium chloride (MBP/ADV) 100 mL  3 g IntraVENous NOW     Current Outpatient Prescriptions   Medication Sig    morphine CR (MS CONTIN) 60 mg CR tablet Take 60 mg by mouth three (3) times daily.  spironolactone (ALDACTONE) 25 mg tablet Take 25 mg by mouth two (2) times a day.  allopurinol (ZYLOPRIM) 100 mg tablet Take 100 mg by mouth daily.  buPROPion SR (WELLBUTRIN SR) 150 mg SR tablet Take 150 mg by mouth two (2) times a day.  furosemide (LASIX) 20 mg tablet Take 3 Tabs by mouth daily. (Patient taking differently: Take 20 mg by mouth daily.)    HYDROmorphone (DILAUDID) 4 mg tablet Take 1 Tab by mouth two (2) times a day. Max Daily Amount: 8 mg. Indications: Pain    metoprolol succinate (TOPROL XL) 25 mg XL tablet Take 25 mg by mouth daily.  lisinopril (PRINIVIL, ZESTRIL) 2.5 mg tablet Take 2.5 mg by mouth daily.  albuterol (PROVENTIL HFA, VENTOLIN HFA, PROAIR HFA) 90 mcg/actuation inhaler Take 1 Puff by inhalation every four (4) hours as needed for Wheezing.  albuterol-ipratropium (DUO-NEB) 2.5 mg-0.5 mg/3 ml nebu 3 mL by Nebulization route every four (4) hours.  aspirin delayed-release 81 mg tablet Take 81 mg by mouth daily.  citalopram (CELEXA) 40 mg tablet Take 40 mg by mouth daily.     nitroglycerin (NITROSTAT) 0.4 mg SL tablet 0.4 mg by SubLINGual route every five (5) minutes as needed for Chest Pain.  nortriptyline (PAMELOR) 50 mg capsule Take 50 mg by mouth nightly.  docusate sodium (COLACE) 100 mg capsule Take 100 mg by mouth two (2) times a day.  metFORMIN (GLUCOPHAGE) 1,000 mg tablet Take 1,000 mg by mouth two (2) times daily (with meals). Indications: type 2 diabetes mellitus    omeprazole (PRILOSEC) 20 mg capsule Take 20 mg by mouth daily. Indications: gastroesophageal reflux disease    clopidogrel (PLAVIX) 75 mg tab Take 75 mg by mouth daily.  atorvastatin (LIPITOR) 80 mg tablet Take 80 mg by mouth daily.  rOPINIRole (REQUIP) 4 mg tab TAB Take 8 mg by mouth nightly. Indications: Restless Legs Syndrome    zolpidem (AMBIEN) 10 mg tablet Take 1 Tab by mouth nightly.  tiotropium (SPIRIVA WITH HANDIHALER) 18 mcg inhalation capsule Take 1 Cap by inhalation daily.  fluticasone-salmeterol (ADVAIR DISKUS) 250-50 mcg/dose diskus inhaler Take 1 Puff by inhalation two (2) times a day.  isosorbide mononitrate ER (IMDUR) 60 mg CR tablet Take 60 mg by mouth daily. Prior to Admission Medications:  Prior to Admission medications    Medication Sig Start Date End Date Taking? Authorizing Provider   morphine CR (MS CONTIN) 60 mg CR tablet Take 60 mg by mouth three (3) times daily. Historical Provider   spironolactone (ALDACTONE) 25 mg tablet Take 25 mg by mouth two (2) times a day. Historical Provider   allopurinol (ZYLOPRIM) 100 mg tablet Take 100 mg by mouth daily. Historical Provider   [facility]ion SR [facility]) 150 mg SR tablet Take 150 mg by mouth two (2) times a day. Historical Provider   furosemide (LASIX) 20 mg tablet Take 3 Tabs by mouth daily. Patient taking differently: Take 20 mg by mouth daily. 6/6/18   Tahira Lu NP   HYDROmorphone (DILAUDID) 4 mg tablet Take 1 Tab by mouth two (2) times a day. Max Daily Amount: 8 mg.  Indications: Pain 6/6/18   Alysha Arteaga BARBARA Vidales   metoprolol succinate (TOPROL XL) 25 mg XL tablet Take 25 mg by mouth daily. Historical Provider   lisinopril (PRINIVIL, ZESTRIL) 2.5 mg tablet Take 2.5 mg by mouth daily. Historical Provider   albuterol (PROVENTIL HFA, VENTOLIN HFA, PROAIR HFA) 90 mcg/actuation inhaler Take 1 Puff by inhalation every four (4) hours as needed for Wheezing. Historical Provider   albuterol-ipratropium (DUO-NEB) 2.5 mg-0.5 mg/3 ml nebu 3 mL by Nebulization route every four (4) hours. 3/3/18   Jorge Noyola MD   aspirin delayed-release 81 mg tablet Take 81 mg by mouth daily. Gerald Caldwell MD   citalopram (CELEXA) 40 mg tablet Take 40 mg by mouth daily. Gerald Caldwell MD   nitroglycerin (NITROSTAT) 0.4 mg SL tablet 0.4 mg by SubLINGual route every five (5) minutes as needed for Chest Pain. Historical Provider   nortriptyline (PAMELOR) 50 mg capsule Take 50 mg by mouth nightly. Historical Provider   docusate sodium (COLACE) 100 mg capsule Take 100 mg by mouth two (2) times a day. Historical Provider   metFORMIN (GLUCOPHAGE) 1,000 mg tablet Take 1,000 mg by mouth two (2) times daily (with meals). Indications: type 2 diabetes mellitus    Historical Provider   omeprazole (PRILOSEC) 20 mg capsule Take 20 mg by mouth daily. Indications: gastroesophageal reflux disease    Historical Provider   clopidogrel (PLAVIX) 75 mg tab Take 75 mg by mouth daily. Gerald Caldwell MD   atorvastatin (LIPITOR) 80 mg tablet Take 80 mg by mouth daily. Historical Provider   rOPINIRole (REQUIP) 4 mg tab TAB Take 8 mg by mouth nightly. Indications: Restless Legs Syndrome    Historical Provider   zolpidem (AMBIEN) 10 mg tablet Take 1 Tab by mouth nightly. 5/24/14   Quirino Vilchis MD   tiotropium (SPIRIVA WITH HANDIHALER) 18 mcg inhalation capsule Take 1 Cap by inhalation daily. Gerald Caldwell MD   fluticasone-salmeterol (ADVAIR DISKUS) 250-50 mcg/dose diskus inhaler Take 1 Puff by inhalation two (2) times a day.     Phys MD Paulette   isosorbide mononitrate ER (IMDUR) 60 mg CR tablet Take 60 mg by mouth daily. Phys MD Paulette        Review of Symptoms:  Except as noted in HPI, patient denies recent fever or chills, nausea, vomiting, diarrhea, hemoptysis, hematemesis, dysuria, myalgias, focal neurologic symptoms, ecchymosis, angioedema, odynophagia, dysphagia, sore throat, earache,rash, melena, hematochezia, depression, GERD, cold intolerance, petechia, bleeding gums, or significant weight loss. A comprehensive review of systems was negative except for that written in the HPI. Subjective:    24 hr VS reviewed, overall VSSAF  Temp (24hrs), Av °F (37.2 °C), Min:99 °F (37.2 °C), Max:99 °F (37.2 °C)    Patient Vitals for the past 8 hrs:   Pulse   18 1500 (!) 130   18 1441 (!) 130    Patient Vitals for the past 8 hrs:   Resp   18 1500 16   18 1441 22    Patient Vitals for the past 8 hrs:   BP   18 1500 136/61   18 1441 122/64        No intake or output data in the 24 hours ending 18      Physical Exam (complete single organ system exam)    Cons: The patient is no distress. Appears stated age. HEENT: Normal conjunctivae and palate. No xanthelasma. Neck: Flat JVP without appreciable HJR. Resp: Normal respiratory effort with clear lungs bilaterally. CV: Regular rate and rhythm. PMI not palpated. Normal S1,S2  No gallop or rubs appreciated. No murmur apprciated. Intact carotid upstroke bilaterally without appreciated bruits. Abdominal aorta not palpated; no abdominal bruit noted. Normal femoral pulses without bruits. Intact pedal pulses. No peripheral edema. GI: No abd mass noted, soft; no organomegaly noted. Bowel sounds present. Muscular:  No significant kyphosis. Strength WNL for age. Ext: No cyanosis, clubbing, or stigmata of peripheral embolization. Derm: No ulcers or stasis dermatitis of lower extremities.    Neuro: Alert and oriented x 3;  Grossly non-focal. Normal mood and affect. Visit Vitals    /61    Pulse (!) 130    Temp 99 °F (37.2 °C)    Resp 16    Ht 6' 2\" (1.88 m)    Wt 111.6 kg (246 lb 0.5 oz)    SpO2 98%    BMI 31.59 kg/m2     General Appearance:  Well developed, well nourished,alert and oriented x 3, and individual in no acute distress. Ears/Nose/Mouth/Throat:   Hearing grossly normal.         Neck: Supple. Chest:   Lungs rales at bases   Cardiovascular:  irregular rate and rhythm, S1, S2 normal, no murmur. Abdomen:   Soft, non-tender, bowel sounds are active. Extremities: 2+ edema bilaterally. Red and tender   Skin: Warm and dry.                Cardiographics    Telemetry: AFIB, RBBB  ECG: atrial fibrillation, rate 120s  Echocardiogram: Not done    Labs:   Recent Results (from the past 24 hour(s))   EKG, 12 LEAD, INITIAL    Collection Time: 08/13/18  2:49 PM   Result Value Ref Range    Ventricular Rate 132 BPM    Atrial Rate 133 BPM    QRS Duration 128 ms    Q-T Interval 316 ms    QTC Calculation (Bezet) 468 ms    Calculated R Axis 94 degrees    Calculated T Axis -19 degrees    Diagnosis       Undetermined rhythm  Right bundle branch block  When compared with ECG of 09-JUL-2018 21:23,  Current undetermined rhythm precludes rhythm comparison, needs review  Right bundle branch block is now present  Confirmed by Wiliam Renee MD. ([id number]) on 8/13/2018 6:40:23 PM     CBC WITH AUTOMATED DIFF    Collection Time: 08/13/18  3:12 PM   Result Value Ref Range    WBC 11.7 (H) 4.1 - 11.1 K/uL    RBC 3.45 (L) 4.10 - 5.70 M/uL    HGB 8.3 (L) 12.1 - 17.0 g/dL    HCT 27.5 (L) 36.6 - 50.3 %    MCV 79.7 (L) 80.0 - 99.0 FL    MCH 24.1 (L) 26.0 - 34.0 PG    MCHC 30.2 30.0 - 36.5 g/dL    RDW 19.9 (H) 11.5 - 14.5 %    PLATELET 126 (H) 526 - 400 K/uL    MPV 9.0 8.9 - 12.9 FL    NRBC 0.0 0  WBC    ABSOLUTE NRBC 0.00 0.00 - 0.01 K/uL    NEUTROPHILS 76 (H) 32 - 75 %    LYMPHOCYTES 16 12 - 49 %    MONOCYTES 6 5 - 13 %    EOSINOPHILS 0 0 - 7 %    BASOPHILS 1 0 - 1 %    IMMATURE GRANULOCYTES 1 (H) 0.0 - 0.5 %    ABS. NEUTROPHILS 9.0 (H) 1.8 - 8.0 K/UL    ABS. LYMPHOCYTES 1.9 0.8 - 3.5 K/UL    ABS. MONOCYTES 0.7 0.0 - 1.0 K/UL    ABS. EOSINOPHILS 0.1 0.0 - 0.4 K/UL    ABS. BASOPHILS 0.1 0.0 - 0.1 K/UL    ABS. IMM. GRANS. 0.1 (H) 0.00 - 0.04 K/UL    DF AUTOMATED     METABOLIC PANEL, COMPREHENSIVE    Collection Time: 08/13/18  3:12 PM   Result Value Ref Range    Sodium 131 (L) 136 - 145 mmol/L    Potassium 4.4 3.5 - 5.1 mmol/L    Chloride 97 97 - 108 mmol/L    CO2 26 21 - 32 mmol/L    Anion gap 8 5 - 15 mmol/L    Glucose 115 (H) 65 - 100 mg/dL    BUN 9 6 - 20 MG/DL    Creatinine 0.75 0.70 - 1.30 MG/DL    BUN/Creatinine ratio 12 12 - 20      GFR est AA >60 >60 ml/min/1.73m2    GFR est non-AA >60 >60 ml/min/1.73m2    Calcium 8.5 8.5 - 10.1 MG/DL    Bilirubin, total 0.8 0.2 - 1.0 MG/DL    ALT (SGPT) 14 12 - 78 U/L    AST (SGOT) 16 15 - 37 U/L    Alk.  phosphatase 210 (H) 45 - 117 U/L    Protein, total 8.0 6.4 - 8.2 g/dL    Albumin 2.2 (L) 3.5 - 5.0 g/dL    Globulin 5.8 (H) 2.0 - 4.0 g/dL    A-G Ratio 0.4 (L) 1.1 - 2.2     TROPONIN I    Collection Time: 08/13/18  3:12 PM   Result Value Ref Range    Troponin-I, Qt. 0.93 (H) <0.05 ng/mL   LACTIC ACID    Collection Time: 08/13/18  3:12 PM   Result Value Ref Range    Lactic acid 2.0 0.4 - 2.0 MMOL/L        Assessment:     Assessment:   CP; atypical and different types  Elevated troponin; looks like it is going to be NSTEMI  PAF; new onset  Cellulitis; recurrent; ?ischemic  DM  HTN  CAD; s/p pervious stents      Plan:   Tele  BB  Lovenox  Serial enzymes  Echo  Will probably needs cath once cellulitis is better    Bárbara Brown MD

## 2018-08-14 NOTE — PROGRESS NOTES
Primary Nurse Torie Vega RN and WALTER Brand performed a dual skin assessment on this patient Impairment noted- see wound doc flow sheet  Pt has chronic both legs wounds,edema both legs

## 2018-08-14 NOTE — PROGRESS NOTES
Zosyn dose adjustment  Indication:  cellulitis  Current regimen:  3.375gm q6h  Abx regimen: vancomycin pharmacy to dose  Recent Labs      18   1512   WBC  11.7*   CREA  0.75   BUN  9     Est CrCl: >100 ml/min  Temp (24hrs), Av °F (37.2 °C), Min:99 °F (37.2 °C), Max:99 °F (37.2 °C)    Cultures: pending    Plan: Change to 3.375gm q8h with extended infusion time

## 2018-08-14 NOTE — PROGRESS NOTES
8/14/2018     Admit Date: 8/13/2018    Admit Diagnosis: NSTEMI (non-ST elevated myocardial infarction) [facility])    Principal Problem:    NSTEMI (non-ST elevated myocardial infarction) ([facility] [address] (8/13/2018)        Assessment/Plan: 1. Transiently elevated troponin levels with atypical CP: may be a NSTEMI   2. Has severe bilateral lower extremity cellulitis with large areas of skin loss and erythema migrating up his thighs  3. Atrial flutter with 2:1 AV block  Plan:  No cath at this time. Will manage medically  His cellulitis may be life threatening and is his most pressing problem at this time  Continue IV dilt gtt for AFL and will add po BB     Subjective:  Generalized pain, particularly in his legs       [place] denies chest pain, chest pressure/discomfort. Objective:     Visit Vitals    /58 (BP 1 Location: Right arm, BP Patient Position: At rest)    Pulse (!) 129    Temp 98.8 °F (37.1 °C)    Resp 25    Ht 6' 2\" (1.88 m)    Wt 111.6 kg (246 lb 0.5 oz)    SpO2 93%    BMI 31.59 kg/m2        Physical Exam:  Neck: supple, symmetrical, trachea midline, no adenopathy, thyroid: not enlarged, symmetric, no tenderness/mass/nodules, no carotid bruit and no JVD  Heart: irregularly irregular rhythm, S1, S2 normal  Lungs: clear to auscultation bilaterally, normal percussion bilaterally  Abdomen: soft, non-tender.  Bowel sounds normal. No masses,  no organomegaly  Extremities: venous stasis dermatitis noted, edema moderate  Pulses: 2+ and symmetric  Neurologic: Grossly normal   EXT: severe cellulitis of both lower legs with erythema extending to his upper thighs      Labs:    Recent Labs      08/14/18 0339   TROIQ  0.61*     Recent Labs      08/14/18 0339   NA  135*   K  3.6   CL  100   BUN  9   CREA  0.73   GLU  102*   CA  7.9*     Recent Labs      08/14/18 0339   WBC  10.5   HGB  7.7*   HCT  25.2*   PLT  489*     Recent Labs      08/14/18 0339   CHOL  109   LDLC  72.2       Telemetry: atrial flutter Data Review: current meds, labs,recent radiology, intake/output/weight and problem list reviewed

## 2018-08-14 NOTE — PROGRESS NOTES
Hospitalist Progress Note                               Makenzie Harris MD                                     Answering service: [phone] or [phone] from in house phone                                         Date of Service:  2018  NAME:  Ameena Villalta  :  1960  MRN:  [id number]      Admission Summary:   The patient is a 57-year-old gentleman with past medical history of coronary artery disease, chronic systolic CHF, NYHA class II, COPD, diabetes mellitus type 2, hypertension, hyperlipidemia, MI, restless leg syndrome, gait abnormality who was recently admitted to the hospital in July with fever, tachycardia, probably secondary to cellulitis of the legs and pneumonia, hypotension, chronic hypoxic respiratory failure due to COPD, he presents to the hospital today with SOB      Reason for follow up:Cellultiis   Patient with a lot of pain to the legs,but denied chest pain       Assessment & Plan:   Sepsis(fever,tachycardia,leukocytosis) due to bilateral cellulitis in the setting of chronic venous stasis. -This is chronic recurrent problem. Blood cx sent. Continue vancomycin dn zosyn started. Will ask ID to see    New onset atria fibrillation likely induced by sepsis  -On Cardizem gtt. On Lovenox. Cardiology on board. NSTEMI,no chest pain presently  -Elevated troponin. On asa,bb  -cardiology involved. Possible cardiac cath once infection has settled. Chronic systolic CHF NYHA class II  -Continue medical management,resume lasix. Chronic oxygen dependent respiratory failure due to COPD and CHF: on home oxygen 2-3 l/min via nasal canula. COPD without exacerbation: continue home regimen    DM II,well controlled. A1C 6. Hold metformin in light of iv contrast.Accucheks. Use humalog sliding scale. Depression. Continue home medication. The patient denies homicidal or suicidal ideation. Hypertension.   Continue home medication. Chronic pain syndrome with acute pain from leg cellulitis:continue home regimen,Ms contin 60 mg tid. Discussed with pharmacy who checked the ,he is using prn iv morphine due to acute pain;will transition to oral dilaudid as he does at home once    Anemia,drop in HB? No evidence of GI bleed or any blood loss  -monitor           Diet:cardiac  Code status: full  DVT prophylaxis: lovenox  Care Plan discussed with: patient,cadriology,Dr Moni Dykes. Hospital Problems  Date Reviewed: 8/13/2018          Codes Class Noted POA    * (Principal)NSTEMI (non-ST elevated myocardial infarction) [facility]) ICD-10-CM: I21.4  ICD-9-CM: 410.70  8/13/2018 Unknown                Review of Systems:   A comprehensive review of systems was negative except for that written in the HPI. Physical Examination:      Last 24hrs VS reviewed since prior progress note. Most recent are:  Visit Vitals    /58 (BP 1 Location: Right arm, BP Patient Position: At rest)    Pulse (!) 129    Temp 98.8 °F (37.1 °C)    Resp 25    Ht 6' 2\" (1.88 m)    Wt 111.6 kg (246 lb 0.5 oz)    SpO2 93%    BMI 31.59 kg/m2           Constitutional:  No acute distress, cooperative, pleasant    HEENT: Head is a traumatic,  Un icteric sclera. Pink conjunctiva,no erythema or discharge. Oral mucous moist, oropharynx benign. Neck supple,    Resp:  CTA bilaterally. No wheezing/rhonchi/rales. No accessory muscle use   CV:  Regular rhythm, normal rate, no murmurs, gallops, rubs    GI:  Soft, non distended, non tender. normoactive bowel sounds, no hepatosplenomegaly    :  No CVA or suprapubic tenderness   Skin  :  No erythema,rash,bullae,dipigmentation     Musculoskeletal:  Both legs swollen,weeping and erythema on the left leg extending to the groin medially. Neurologic:  AAOx3, CN II-XII reviewed. Moves all extremities.          No intake or output data in the 24 hours ending 08/14/18 1740       Data Review:    Review and/or order of clinical lab test  Review and/or order of tests in the radiology section of CPT  Review and/or order of tests in the medicine section of CPT      Labs:     Recent Labs      08/14/18   0339  08/13/18 1512   WBC  10.5  11.7*   HGB  7.7*  8.3*   HCT  25.2*  27.5*   PLT  489*  532*     Recent Labs      08/14/18   0339  08/13/18   1512   NA  135*  131*   K  3.6  4.4   CL  100  97   CO2  24  26   BUN  9  9   CREA  0.73  0.75   GLU  102*  115*   CA  7.9*  8.5     Recent Labs      08/13/18   1512   SGOT  16   ALT  14   AP  210*   TBILI  0.8   TP  8.0   ALB  2.2*   GLOB  5.8*     No results for input(s): INR, PTP, APTT in the last 72 hours. No lab exists for component: INREXT   No results for input(s): FE, TIBC, PSAT, FERR in the last 72 hours. Lab Results   Component Value Date/Time    Folate 7.9 02/27/2018 03:52 AM    Folate 7.5 02/27/2018 03:52 AM      No results for input(s): PH, PCO2, PO2 in the last 72 hours.   Recent Labs      08/14/18   0339 08/13/18 1512   TROIQ  0.61*  0.93*     Lab Results   Component Value Date/Time    Cholesterol, total 109 08/14/2018 03:39 AM    HDL Cholesterol 24 08/14/2018 03:39 AM    LDL,Direct 193 (H) 05/21/2014 09:59 AM    LDL, calculated 72.2 08/14/2018 03:39 AM    Triglyceride 64 08/14/2018 03:39 AM    CHOL/HDL Ratio 4.5 08/14/2018 03:39 AM     Lab Results   Component Value Date/Time    Glucose (POC) 143 (H) 08/14/2018 08:26 AM    Glucose (POC) 107 (H) 07/12/2018 11:43 AM    Glucose (POC) 91 07/12/2018 06:34 AM    Glucose (POC) 94 07/11/2018 10:00 PM    Glucose (POC) 108 (H) 07/11/2018 04:34 PM     Lab Results   Component Value Date/Time    Color DARK YELLOW 07/09/2018 10:39 PM    Appearance CLEAR 07/09/2018 10:39 PM    Specific gravity 1.017 07/09/2018 10:39 PM    pH (UA) 8.0 07/09/2018 10:39 PM    Protein TRACE (A) 07/09/2018 10:39 PM    Glucose NEGATIVE  07/09/2018 10:39 PM    Ketone NEGATIVE  07/09/2018 10:39 PM    Bilirubin NEGATIVE  07/09/2018 10:39 PM    Urobilinogen 1.0 07/09/2018 10:39 PM    Nitrites NEGATIVE  07/09/2018 10:39 PM    Leukocyte Esterase NEGATIVE  07/09/2018 10:39 PM    Epithelial cells FEW 07/09/2018 10:39 PM    Bacteria NEGATIVE  07/09/2018 10:39 PM    WBC 0-4 07/09/2018 10:39 PM    RBC 0-5 07/09/2018 10:39 PM         Medications Reviewed:     Current Facility-Administered Medications   Medication Dose Route Frequency    vancomycin (VANCOCIN) 1750 mg in  ml infusion  1,750 mg IntraVENous Q12H    allopurinol (ZYLOPRIM) tablet 100 mg  100 mg Oral DAILY    aspirin delayed-release tablet 81 mg  81 mg Oral DAILY    atorvastatin (LIPITOR) tablet 80 mg  80 mg Oral DAILY    buPROPion SR (WELLBUTRIN SR) tablet 150 mg  150 mg Oral BID    citalopram (CELEXA) tablet 40 mg  40 mg Oral DAILY    clopidogrel (PLAVIX) tablet 75 mg  75 mg Oral DAILY    isosorbide mononitrate ER (IMDUR) tablet 60 mg  60 mg Oral DAILY    lisinopril (PRINIVIL, ZESTRIL) tablet 2.5 mg  2.5 mg Oral DAILY    morphine CR (MS CONTIN) tablet 30 mg  30 mg Oral Q12H    rOPINIRole (REQUIP) tablet 8 mg  8 mg Oral QHS    spironolactone (ALDACTONE) tablet 25 mg  25 mg Oral BID    sodium chloride (NS) flush 5-10 mL  5-10 mL IntraVENous Q8H    sodium chloride (NS) flush 5-10 mL  5-10 mL IntraVENous PRN    glucose chewable tablet 16 g  4 Tab Oral PRN    dextrose (D50W) injection syrg 12.5-25 g  12.5-25 g IntraVENous PRN    glucagon (GLUCAGEN) injection 1 mg  1 mg IntraMUSCular PRN    sodium chloride (NS) flush 5-10 mL  5-10 mL IntraVENous Q8H    sodium chloride (NS) flush 5-10 mL  5-10 mL IntraVENous PRN    nitroglycerin (NITROSTAT) tablet 0.4 mg  0.4 mg SubLINGual Q5MIN PRN    insulin lispro (HUMALOG) injection   SubCUTAneous AC&HS    acetaminophen (TYLENOL) tablet 650 mg  650 mg Oral Q4H PRN    morphine injection 2 mg  2 mg IntraVENous Q4H PRN    piperacillin-tazobactam (ZOSYN) 3.375 g in 0.9% sodium chloride (MBP/ADV) 100 mL  3.375 g IntraVENous Q8H    metoprolol tartrate (LOPRESSOR) tablet 25 mg  25 mg Oral Q12H    enoxaparin (LOVENOX) injection 111 mg  1 mg/kg SubCUTAneous Q12H    arformoterol (BROVANA) neb solution 15 mcg  15 mcg Nebulization BID RT    And    budesonide (PULMICORT) 500 mcg/2 ml nebulizer suspension  500 mcg Nebulization BID RT    dilTIAZem (CARDIZEM) 125 mg in dextrose 5% 125 mL infusion  0-15 mg/hr IntraVENous TITRATE    albuterol-ipratropium (DUO-NEB) 2.5 MG-0.5 MG/3 ML  3 mL Nebulization Q4H PRN    famotidine (PEPCID) tablet 20 mg  20 mg Oral BID    Vancomycin - pharmacy to dose   Other Rx Dosing/Monitoring     Current Outpatient Prescriptions   Medication Sig    furosemide (LASIX) 20 mg tablet Take 20 mg by mouth daily.  morphine CR (MS CONTIN) 60 mg CR tablet Take 60 mg by mouth three (3) times daily.  spironolactone (ALDACTONE) 25 mg tablet Take 25 mg by mouth two (2) times a day.  allopurinol (ZYLOPRIM) 100 mg tablet Take 100 mg by mouth daily.  buPROPion SR (WELLBUTRIN SR) 150 mg SR tablet Take 150 mg by mouth two (2) times a day.  HYDROmorphone (DILAUDID) 4 mg tablet Take 1 Tab by mouth two (2) times a day. Max Daily Amount: 8 mg. Indications: Pain    metoprolol succinate (TOPROL XL) 25 mg XL tablet Take 25 mg by mouth daily.  lisinopril (PRINIVIL, ZESTRIL) 2.5 mg tablet Take 2.5 mg by mouth daily.  albuterol (PROVENTIL HFA, VENTOLIN HFA, PROAIR HFA) 90 mcg/actuation inhaler Take 1 Puff by inhalation every four (4) hours as needed for Wheezing.  albuterol-ipratropium (DUO-NEB) 2.5 mg-0.5 mg/3 ml nebu 3 mL by Nebulization route every four (4) hours.  aspirin delayed-release 81 mg tablet Take 81 mg by mouth daily.  citalopram (CELEXA) 40 mg tablet Take 40 mg by mouth daily.  nitroglycerin (NITROSTAT) 0.4 mg SL tablet 0.4 mg by SubLINGual route every five (5) minutes as needed for Chest Pain.  nortriptyline (PAMELOR) 50 mg capsule Take 50 mg by mouth nightly.     docusate sodium (COLACE) 100 mg capsule Take 100 mg by mouth two (2) times a day.  metFORMIN (GLUCOPHAGE) 1,000 mg tablet Take 1,000 mg by mouth two (2) times daily (with meals). Indications: type 2 diabetes mellitus    omeprazole (PRILOSEC) 20 mg capsule Take 20 mg by mouth daily. Indications: gastroesophageal reflux disease    clopidogrel (PLAVIX) 75 mg tab Take 75 mg by mouth daily.  atorvastatin (LIPITOR) 80 mg tablet Take 80 mg by mouth daily.  rOPINIRole (REQUIP) 4 mg tab TAB Take 8 mg by mouth nightly. Indications: Restless Legs Syndrome    zolpidem (AMBIEN) 10 mg tablet Take 1 Tab by mouth nightly.  tiotropium (SPIRIVA WITH HANDIHALER) 18 mcg inhalation capsule Take 1 Cap by inhalation daily.  fluticasone-salmeterol (ADVAIR DISKUS) 250-50 mcg/dose diskus inhaler Take 1 Puff by inhalation two (2) times a day.  isosorbide mononitrate ER (IMDUR) 60 mg CR tablet Take 60 mg by mouth daily.      ______________________________________________________________________  EXPECTED LENGTH OF STAY: - - -  ACTUAL LENGTH OF STAY:          1                 Marletta Cheadle, MD

## 2018-08-14 NOTE — WOUND CARE
WOCN Note:     New consult placed for assessment of lower extremity wounds. Chart reviewed. Admitted DX:  NSTEMI (non-ST elevated myocardial infarction) ([facility])  And Cellulitis  Past Medical History:  Chronic leg wounds; coronary artery disease, chronic systolic CHF, NYHA class II, COPD, diabetes mellitus type 2, hypertension, hyperlipidemia, MI, restless leg syndrome, gait abnormality. Assessment:   Patient is A&O x 3, communicative, continent and mobile to bedside commode. Bed: [facility]    Patient reports to lower leg wounds. Bilateral heel, buttocks, and sacral skin intact and without erythema. Generalized edema and erythema to lower legs and feet. The erythema extends to upper leg on the left. 1.  Right circumferential low leg, weepy venous leg ulcers: 75% pink 25% yellow; large serous exudate; no odor; mild periwound erythema with dry crusting. 2.  POA right anterior ankle, DTI:  0.5 x 3 x 0 cm; non blanching purple. 3.  Left circumferential low leg, venous leg ulcers:  80% pink 20% yellow; moderate serous exudate; dry crusting; no odor; periwound erythema extends to upper leg. Recommendations:    Elevate legs and heels. Bilateral low legs:  Daily cleanse with saline; apply Xeroform; cover with dry gauze. Skin Care & Pressure Prevention:  Minimize layers of linen/pads under patient to optimize support surface. Turn/reposition approximately every 2 hours and offload heels. Discussed above plan with patient and Mary Randle RN.     Transition of Care: Plan to follow weekly and as needed while admitted to hospital.    ASHUTOSH Alvarado RN [id number] [facility] [phone]  Pager [phone]

## 2018-08-14 NOTE — PROGRESS NOTES
Problem: Falls - Risk of  Goal: *Absence of Falls  Document Jarad Fall Risk and appropriate interventions in the flowsheet. Outcome: Progressing Towards Goal  Fall Risk Interventions:  Mobility Interventions: Communicate number of staff needed for ambulation/transfer, Patient to call before getting OOB, Utilize walker, cane, or other assistive device         Medication Interventions: Evaluate medications/consider consulting pharmacy, Patient to call before getting OOB, Teach patient to arise slowly                  Problem: Pressure Injury - Risk of  Goal: *Prevention of pressure injury  Document Charlie Scale and appropriate interventions in the flowsheet.    Outcome: Progressing Towards Goal  Pressure Injury Interventions:  Sensory Interventions: Assess changes in LOC, Float heels, Keep linens dry and wrinkle-free, Pressure redistribution bed/mattress (bed type)    Moisture Interventions: Absorbent underpads, Apply protective barrier, creams and emollients, Maintain skin hydration (lotion/cream)    Activity Interventions: Pressure redistribution bed/mattress(bed type)    Mobility Interventions: Pressure redistribution bed/mattress (bed type)    Nutrition Interventions: Document food/fluid/supplement intake, Offer support with meals,snacks and hydration

## 2018-08-15 NOTE — PROGRESS NOTES
Problem: Falls - Risk of  Goal: *Absence of Falls  Document Jarad Fall Risk and appropriate interventions in the flowsheet. Outcome: Progressing Towards Goal  Fall Risk Interventions:  Mobility Interventions: Communicate number of staff needed for ambulation/transfer         Medication Interventions: Evaluate medications/consider consulting pharmacy, Patient to call before getting OOB, Teach patient to arise slowly    Elimination Interventions: Call light in reach, Patient to call for help with toileting needs, Toilet paper/wipes in reach, Toileting schedule/hourly rounds, Urinal in reach             Problem: Pressure Injury - Risk of  Goal: *Prevention of pressure injury  Document Charlie Scale and appropriate interventions in the flowsheet.    Outcome: Progressing Towards Goal  Pressure Injury Interventions:  Sensory Interventions: Assess changes in LOC, Check visual cues for pain    Moisture Interventions: Absorbent underpads, Apply protective barrier, creams and emollients, Assess need for specialty bed, Contain wound drainage, Moisture barrier    Activity Interventions: Assess need for specialty bed, Increase time out of bed, PT/OT evaluation    Mobility Interventions: Assess need for specialty bed, Float heels, HOB 30 degrees or less    Nutrition Interventions: Document food/fluid/supplement intake, Offer support with meals,snacks and hydration    Friction and Shear Interventions: HOB 30 degrees or less

## 2018-08-15 NOTE — PROGRESS NOTES
8/15/2018     Admit Date: 8/13/2018    Admit Diagnosis: NSTEMI (non-ST elevated myocardial infarction) [facility])    Principal Problem:    NSTEMI (non-ST elevated myocardial infarction) ([facility] [address] (8/13/2018)        Assessment/Plan:  1. Persistent atrial flutter with variable AV block requiring IV dilt gtt for rate control  2. Elevated troponin level, possibly a NSTEMI but is lower than on admission; not having any chest pain  3. Extensive wounds and cellulitis looks better on IV ABO  4. Dilated CM with EF 25-30%; clinically well compensated at this time   5. Marked unplanned weight loss over the past year in excess of 50#s  Plan:  1. Increase po BB  2. Discontinue diltiazem as soon as is practical since his LV EF is low  3. May need DCCV to ablate AFL  4. No cath at this time     Subjective:  Feels better       Oakley Kehr Nine denies chest pain. Objective:     Visit Vitals    BP 90/51 (BP 1 Location: Left arm, BP Patient Position: At rest;Supine)    Pulse (!) 115    Temp 98.3 °F (36.8 °C)    Resp 20    Ht 6' 2\" (1.88 m)    Wt 107.6 kg (237 lb 4.8 oz)    SpO2 96%    BMI 30.47 kg/m2        Physical Exam:  Neck: supple, symmetrical, trachea midline, no adenopathy, thyroid: not enlarged, symmetric, no tenderness/mass/nodules, no carotid bruit and no JVD  Heart: irregularly irregular rhythm, S1, S2 normal, no rub  Lungs: clear to auscultation bilaterally, normal percussion bilaterally  Abdomen: soft, non-tender.  Bowel sounds normal. No masses,  no organomegaly  Extremities: venous stasis dermatitis noted, edema 2-3+  Pulses: 2+ and symmetric  Neurologic: Grossly normal      Labs:    Recent Labs      08/14/18   1035   TROIQ  0.50*     Recent Labs      08/15/18   0333   NA  132*   K  3.8   CL  100   BUN  12   CREA  0.89   GLU  110*   CA  7.9*     Recent Labs      08/15/18   0333   WBC  7.5   HGB  7.8*   HCT  26.5*   PLT  464*     Recent Labs      08/14/18   0339   CHOL  109   LDLC  72.2       Telemetry: atrial flutter Data Review: current meds, labs,recent radiology, intake/output/weight and problem list reviewed

## 2018-08-15 NOTE — CARDIO/PULMONARY
Cardiac Rehab: MI education folder, with catheterization brochure, to bedside of Iliana Villalta. Educated using teach back method. Reviewed MI diagnosis definition and purpose of intervention. Discussed risk factors for CAD to include the following: family history, elevated BMI, hyperlipidemia, hypertension, diabetes, stress, and smoking. Smoking Cessation Program link added to AVS. Discussed Heart Healthy/Low Sodium (2000 mg) diet. Reviewed the importance of medication compliance, the purpose of plavix, and potential side effects. Discussed follow up appointments with cardiologist, signs and symptoms of angina, and what to report to physician after discharge. Emphasized the value of cardiac rehab. Discussed Cardiac Rehab Program format, benefits, and encouraged enrollment to assist with risk modification and management. Iliana Villalta  has been referred before but declined to enroll  due to co morbidity and debility related to COPD with home o2, chronic pain and low activity tolerance. Cardiac Rehab: Living with Heart Failure Booklet given to Iliana Villalta. Educated using teach back method. Discussed diagnosis definition and assessed patient understanding. Reviewed importance of daily weight monitoring and Low Sodium diet (4036-7916 mg. Daily). Weights were documented on his HF calender and he has lsot about 10 lbs since admit. Stressed the importance of reading labels and limiting sodium. Encouraged activity and rest periods within symptom limitations and as ordered by physician. Reviewed lasix and metoprolol purpose of medication, potential side effects, compliance, and what to do if dose is missed. Discussed importance of reporting signs and symptoms of exacerbation, and when to report them to the doctor, to prevent re-hospitalization. Iliana Villalta was encouraged to keep all appointments with doctor. Smoking history assessed. Patient is a current smoker. Smoking Cessation Program link added to the AVS.       WALTER Arciniegay Nine verbalized understanding with questions answered.   Louise Lopez RN

## 2018-08-15 NOTE — PROGRESS NOTES
TRANSFER - IN REPORT:    Verbal report received from Nathan(name) on Blake Caruso S Nine  being received from ED(unit) for routine progression of care      Report consisted of patients Situation, Background, Assessment and   Recommendations(SBAR). Information from the following report(s) SBAR, ED Summary, Procedure Summary, MAR and Recent Results was reviewed with the receiving nurse. Opportunity for questions and clarification was provided. Assessment completed upon patients arrival to unit and care assumed. Bedside shift change report given to [address] [place] [address] (oncoming nurse) by MIC Miranda (offgoing nurse). Report included the following information SBAR, Kardex, ED Summary, Procedure Summary, MAR and Recent Results.

## 2018-08-15 NOTE — CARDIO/PULMONARY
Cardiac Rehab: Per EMR, patient is a current smoker.  Smoking Cessation Program information placed on the AVS.    Belgica Pimentel RN

## 2018-08-15 NOTE — PROGRESS NOTES
Hospitalist Progress Note                               Adele Velasco MD                                     Answering service: [phone]                               OR [phone] from in house phone                                         Date of Service:  8/15/2018  NAME:  Neftali Villalta  :  1960  MRN:  [id number]      Admission Summary:   The patient is a 56-year-old gentleman with past medical history of coronary artery disease, chronic systolic CHF, NYHA class II, COPD, diabetes mellitus type 2, hypertension, hyperlipidemia, MI, restless leg syndrome, gait abnormality who was recently admitted to the hospital in July with fever, tachycardia, probably secondary to cellulitis of the legs and pneumonia, hypotension, chronic hypoxic respiratory failure due to COPD, he presents to the hospital today with SOB      Reason for follow up:Cellultiis   Mr Villalta says the legs till hurt,but definitely better today. Asking for his Ambien and Pamelor and nicotine path. Assessment & Plan:   Sepsis(fever,tachycardia,leukocytosis) due to bilateral cellulitis in the setting of chronic venous stasis. -This is chronic recurrent problem. Blood cx sent. -ID consulted. On vancomycin,zosy and clindamycin. New onset atria fibrillation likely induced by sepsis  -On Cardizem gtt. On Lovenox. Cardiology on board. NSTEMI,no chest pain presently  -Elevated troponin. On asa,bb  -cardiology involved  -No cardiac cath at this time due to sepsis. Chronic systolic CHF NYHA class II  -Continue medical management,resume lasix. Chronic oxygen dependent respiratory failure due to COPD and CHF: on home oxygen 2-3 l/min via nasal canula. COPD without exacerbation: continue home regimen    DM II,well controlled. A1C 6. Hold metformin in light of iv contrast.Accucheks. Use humalog sliding scale. Depression. Continue home medication.   The patient denies homicidal or suicidal ideation. Hypertension. Continue home medication. Chronic pain syndrome with acute pain from leg cellulitis:continue home regimen,Ms contin 60 mg tid. Discussed with pharmacy who checked the ,he is using prn iv morphine due to acute pain;will transition to oral dilaudid as he does at home once    Anemia,drop in HB? No evidence of GI bleed or any blood loss  -monitor           Diet:cardiac  Code status: full  DVT prophylaxis: lovenox  Care Plan discussed with: patient. Hospital Problems  Date Reviewed: 8/13/2018          Codes Class Noted POA    * (Principal)NSTEMI (non-ST elevated myocardial infarction) [facility]) ICD-10-CM: I21.4  ICD-9-CM: 410.70  8/13/2018 Unknown                Review of Systems:   A comprehensive review of systems was negative except for that written in the HPI. Physical Examination:      Last 24hrs VS reviewed since prior progress note. Most recent are:  Visit Vitals    BP 99/66    Pulse (!) 105    Temp 98.2 °F (36.8 °C)    Resp 24    Ht 6' 2\" (1.88 m)    Wt 107.6 kg (237 lb 4.8 oz)    SpO2 96%    BMI 30.47 kg/m2           Constitutional:  No acute distress, cooperative, pleasant    HEENT: Head is a traumatic,  Un icteric sclera. Pink conjunctiva,no erythema or discharge. Oral mucous moist, oropharynx benign. Neck supple,    Resp:  CTA bilaterally. No wheezing/rhonchi/rales. No accessory muscle use   CV:  Regular rhythm, normal rate, no murmurs, gallops, rubs    GI:  Soft, non distended, non tender. normoactive bowel sounds, no hepatosplenomegaly    :  No CVA or suprapubic tenderness   Skin  :  No erythema,rash,bullae,dipigmentation     Musculoskeletal:  legs bandaged     Neurologic:  AAOx3, CN II-XII reviewed. Moves all extremities.              Intake/Output Summary (Last 24 hours) at 08/15/18 1122  Last data filed at 08/15/18 0721   Gross per 24 hour   Intake          1819.46 ml   Output              500 ml   Net          1319.46 ml          Data Review:    Review and/or order of clinical lab test  Review and/or order of tests in the radiology section of CPT  Review and/or order of tests in the medicine section of CPT      Labs:     Recent Labs      08/15/18   0333  08/14/18   0339   WBC  7.5  10.5   HGB  7.8*  7.7*   HCT  26.5*  25.2*   PLT  464*  489*     Recent Labs      08/15/18   0333  08/14/18   0339  08/13/18   1512   NA  132*  135*  131*   K  3.8  3.6  4.4   CL  100  100  97   CO2  23  24  26   BUN  12  9  9   CREA  0.89  0.73  0.75   GLU  110*  102*  115*   CA  7.9*  7.9*  8.5   MG  2.0   --    --      Recent Labs      08/15/18   0333  08/13/18   1512   SGOT  27  16   ALT  11*  14   AP  206*  210*   TBILI  0.7  0.8   TP  7.5  8.0   ALB  1.9*  2.2*   GLOB  5.6*  5.8*     No results for input(s): INR, PTP, APTT in the last 72 hours. No lab exists for component: INREXT, INREXT   No results for input(s): FE, TIBC, PSAT, FERR in the last 72 hours. Lab Results   Component Value Date/Time    Folate 7.9 02/27/2018 03:52 AM    Folate 7.5 02/27/2018 03:52 AM      No results for input(s): PH, PCO2, PO2 in the last 72 hours.   Recent Labs      08/14/18   1035  08/14/18 0339  08/13/18   1512   TROIQ  0.50*  0.61*  0.93*     Lab Results   Component Value Date/Time    Cholesterol, total 109 08/14/2018 03:39 AM    HDL Cholesterol 24 08/14/2018 03:39 AM    LDL,Direct 193 (H) 05/21/2014 09:59 AM    LDL, calculated 72.2 08/14/2018 03:39 AM    Triglyceride 64 08/14/2018 03:39 AM    CHOL/HDL Ratio 4.5 08/14/2018 03:39 AM     Lab Results   Component Value Date/Time    Glucose (POC) 126 (H) 08/15/2018 06:26 AM    Glucose (POC) 147 (H) 08/14/2018 09:38 PM    Glucose (POC) 126 (H) 08/14/2018 04:33 PM    Glucose (POC) 95 08/14/2018 12:00 PM    Glucose (POC) 143 (H) 08/14/2018 08:26 AM     Lab Results   Component Value Date/Time    Color DARK YELLOW 07/09/2018 10:39 PM    Appearance CLEAR 07/09/2018 10:39 PM    Specific gravity 1.017 07/09/2018 10:39 PM    pH (UA) 8.0 07/09/2018 10:39 PM Protein TRACE (A) 07/09/2018 10:39 PM    Glucose NEGATIVE  07/09/2018 10:39 PM    Ketone NEGATIVE  07/09/2018 10:39 PM    Bilirubin NEGATIVE  07/09/2018 10:39 PM    Urobilinogen 1.0 07/09/2018 10:39 PM    Nitrites NEGATIVE  07/09/2018 10:39 PM    Leukocyte Esterase NEGATIVE  07/09/2018 10:39 PM    Epithelial cells FEW 07/09/2018 10:39 PM    Bacteria NEGATIVE  07/09/2018 10:39 PM    WBC 0-4 07/09/2018 10:39 PM    RBC 0-5 07/09/2018 10:39 PM         Medications Reviewed:     Current Facility-Administered Medications   Medication Dose Route Frequency    nicotine (NICODERM CQ) 21 mg/24 hr patch 1 Patch  1 Patch TransDERmal DAILY    zolpidem (AMBIEN) tablet 10 mg  10 mg Oral QHS    nortriptyline (PAMELOR) capsule 50 mg  50 mg Oral QHS    nitroglycerin (NITROSTAT) tablet 0.4 mg  0.4 mg SubLINGual Q5MIN PRN    vancomycin (VANCOCIN) 1750 mg in  ml infusion  1,750 mg IntraVENous Q12H    furosemide (LASIX) tablet 20 mg  20 mg Oral DAILY    morphine CR (MS CONTIN) tablet 60 mg  60 mg Oral TID    metoprolol tartrate (LOPRESSOR) tablet 50 mg  50 mg Oral Q12H    clindamycin (CLEOCIN) 600mg D5W 50mL IVPB (premix)  600 mg IntraVENous Q8H    allopurinol (ZYLOPRIM) tablet 100 mg  100 mg Oral DAILY    aspirin delayed-release tablet 81 mg  81 mg Oral DAILY    atorvastatin (LIPITOR) tablet 80 mg  80 mg Oral DAILY    buPROPion SR (WELLBUTRIN SR) tablet 150 mg  150 mg Oral BID    citalopram (CELEXA) tablet 40 mg  40 mg Oral DAILY    clopidogrel (PLAVIX) tablet 75 mg  75 mg Oral DAILY    lisinopril (PRINIVIL, ZESTRIL) tablet 2.5 mg  2.5 mg Oral DAILY    rOPINIRole (REQUIP) tablet 8 mg  8 mg Oral QHS    spironolactone (ALDACTONE) tablet 25 mg  25 mg Oral BID    sodium chloride (NS) flush 5-10 mL  5-10 mL IntraVENous Q8H    sodium chloride (NS) flush 5-10 mL  5-10 mL IntraVENous PRN    glucose chewable tablet 16 g  4 Tab Oral PRN    dextrose (D50W) injection syrg 12.5-25 g  12.5-25 g IntraVENous PRN    glucagon (GLUCAGEN) injection 1 mg  1 mg IntraMUSCular PRN    sodium chloride (NS) flush 5-10 mL  5-10 mL IntraVENous Q8H    sodium chloride (NS) flush 5-10 mL  5-10 mL IntraVENous PRN    nitroglycerin (NITROSTAT) tablet 0.4 mg  0.4 mg SubLINGual Q5MIN PRN    insulin lispro (HUMALOG) injection   SubCUTAneous AC&HS    acetaminophen (TYLENOL) tablet 650 mg  650 mg Oral Q4H PRN    morphine injection 2 mg  2 mg IntraVENous Q4H PRN    piperacillin-tazobactam (ZOSYN) 3.375 g in 0.9% sodium chloride (MBP/ADV) 100 mL  3.375 g IntraVENous Q8H    enoxaparin (LOVENOX) injection 111 mg  1 mg/kg SubCUTAneous Q12H    arformoterol (BROVANA) neb solution 15 mcg  15 mcg Nebulization BID RT    And    budesonide (PULMICORT) 500 mcg/2 ml nebulizer suspension  500 mcg Nebulization BID RT    dilTIAZem (CARDIZEM) 125 mg in dextrose 5% 125 mL infusion  0-15 mg/hr IntraVENous TITRATE    albuterol-ipratropium (DUO-NEB) 2.5 MG-0.5 MG/3 ML  3 mL Nebulization Q4H PRN    famotidine (PEPCID) tablet 20 mg  20 mg Oral BID    Vancomycin - pharmacy to dose   Other Rx Dosing/Monitoring     ______________________________________________________________________  EXPECTED LENGTH OF STAY: 4d 21h  ACTUAL LENGTH OF STAY:          2                 Duane Pott, MD

## 2018-08-15 NOTE — ROUTINE PROCESS
Bedside shift change report given to Radha Ojeda RN (oncoming nurse) by Nick Olguin RN (offgoing nurse). Report included the following information A-Fib/Flutter  .

## 2018-08-15 NOTE — PROGRESS NOTES
Spiritual Care Partner Volunteer visited patient in 46 on 8.15.18.   Documented by:    [id number] Areli Hayden M.Div M.S, Sulma Deshpande6 available [phone]

## 2018-08-15 NOTE — PROGRESS NOTES
Problem: Heart Failure: Day 1  Goal: Activity/Safety  Outcome: Progressing Towards Goal  Pt sits on edge of bed for meals. Pt instructed to utilize call bell for assistance. Pt belongings within reach. Goal: Consults, if ordered  Outcome: Progressing Towards Goal  Cardiology consult in place. Continues to follow pt. Goal: Diagnostic Test/Procedures  Outcome: Progressing Towards Goal  ECHO indicates EF of 25%. Pt plans includes cardiac cath during hospital stay. Goal: Nutrition/Diet  Outcome: Progressing Towards Goal  Cardiac diet in place. Pt eats about % of meals. Goal: Medications  Outcome: Progressing Towards Goal  Cardizem drip discontinued on 8-15-18 at midnight by nightshift RN. Pt receiving Lasix, Lisinopril, Metoprolol, and Spironolactone during shift. Goal: Respiratory  Outcome: Progressing Towards Goal  Breath sounds diminished bilaterally on auscultation. Pt is dyspneic with exertion despite being on 4L NC. Energy conservation encouraged throughout shift. Goal: Treatments/Interventions/Procedures  Outcome: Progressing Towards Goal  Pt on continuous cardiac monitoring d/t A-fib/flutter. Vital signs monitored Q4. Goal: Psychosocial  Outcome: Progressing Towards Goal  RN includes emotional and educational support throughout shift regarding pt concerns. Goal: *Oxygen saturation within defined limits  Outcome: Progressing Towards Goal  O2 saturations remain about 94% throughout shift while on 2L NC. Pt does not display any s/s of anxiety during shift.

## 2018-08-15 NOTE — PROGRESS NOTES
ID Progress Note  8/15/2018    Subjective:     Still with dyspnea--leg is feeling better, however    Objective:     Antibiotics:  1. Vancomycin   2. Clindamycin   3. Zosyn       Vitals:   Visit Vitals    BP 98/57 (BP 1 Location: Left arm, BP Patient Position: At rest;Supine)    Pulse 89    Temp 98.1 °F (36.7 °C)    Resp 15    Ht 6' 2\" (1.88 m)    Wt 107.6 kg (237 lb 4.8 oz)    SpO2 96%    BMI 30.47 kg/m2        Tmax:  Temp (24hrs), Av.6 °F (37 °C), Min:98.1 °F (36.7 °C), Max:99.1 °F (37.3 °C)      Exam:  Lungs:  clear to auscultation bilaterally  Heart:  regular rate and rhythm  Skin:  Less erythema and warmth of left leg    Labs:      Recent Labs      08/15/18   0333  18   0339  18   1512   WBC  7.5  10.5  11.7*   HGB  7.8*  7.7*  8.3*   PLT  464*  489*  532*   BUN  12  9  9   CREA  0.89  0.73  0.75   SGOT  27   --   16   AP  206*   --   210*   TBILI  0.7   --   0.8       Cultures:     Lab Results   Component Value Date/Time    Specimen Description: ESOPHAGEAL BRUSHING 2010 09:25 AM     Lab Results   Component Value Date/Time    Culture result: NO GROWTH 2 DAYS 2018 08:19 PM    Culture result: NO GROWTH 5 DAYS 2018 09:11 PM    Culture result: NO GROWTH 5 DAYS 2018 10:18 AM       Radiology:     Line/Insert Date:           Assessment:     1. Cellulitis  2. CHF  3. Debility  4. Dermatitis                 Objective:     1. Continue current therapy  2.  Follow up cultures and studies    Cinthia Salas MD

## 2018-08-16 NOTE — PROGRESS NOTES
8/16/2018     Admit Date: 8/13/2018    Admit Diagnosis: NSTEMI (non-ST elevated myocardial infarction) [facility])    Principal Problem:    NSTEMI (non-ST elevated myocardial infarction) ([facility] [address] (8/13/2018)        Assessment/Plan:  1. Persistent atrial flutter  2. Now with low blood pressure  3. Sepsis/cellulitis is better  4. Low EF but is not in distress  5. No angina today  Plan: With persistent atrial flutter and hypotension I recommended to Mr Villalta that he have LANA followed by cardioversion. The procedures and possible complications were explained to the patient including: perforation, bleeding, laryngospasm and embolic stroke. He agrees to proceed. The patient denies any chest pain, odynophagia, dysphagia, esophageal stricture, regurgitation of undigested food, hemoptysis or hematesis, hematochezia or abdominal pain. Transfer to the ICU for pressor support     Subjective:  Feels better today; even though his blood pressure is low, he does not feel bad       Kendra Villalta denies chest pain, chest pressure/discomfort, near-syncope, syncope, dizziness. Objective:     Visit Vitals    BP (!) 73/44 (BP 1 Location: Left arm, BP Patient Position: At rest;Sitting)    Pulse (!) 110    Temp 97.7 °F (36.5 °C)    Resp 24    Ht 6' 2\" (1.88 m)    Wt 112.7 kg (248 lb 7.3 oz)    SpO2 98%    BMI 31.9 kg/m2        Physical Exam:  Neck: supple, symmetrical, trachea midline, no adenopathy, thyroid: not enlarged, symmetric, no tenderness/mass/nodules, no carotid bruit and no JVD  Heart: irregularly irregular rhythm, S1, S2 normal, no S3 or S4  Lungs: rales R base  Abdomen: soft, non-tender.  Bowel sounds normal. No masses,  no organomegaly  Extremities: extremities normal, atraumatic, no cyanosis or edema  Pulses: 2+ and symmetric  Neurologic: Grossly normal      Labs:    Recent Labs      08/14/18   1035   TROIQ  0.50*     Recent Labs      08/16/18   0244   NA  134*   K  4.2   CL  102   BUN  15   CREA  1.48*   GLU  97   CA 8.0*     Recent Labs      08/16/18   0244   WBC  8.1   HGB  7.9*   HCT  26.0*   PLT  568*     Recent Labs      08/14/18   0339   CHOL  109   LDLC  72.2       Telemetry: atrial flutter     Data Review: current meds, labs,recent radiology, intake/output/weight and problem list reviewed

## 2018-08-16 NOTE — PROGRESS NOTES
Hospitalist Progress Note                               Reyes Blunt, MD                                     Answering service: [phone]                               OR [phone] from in house phone                                         Date of Service:  2018  NAME:  Ilan Villalta  :  1960  MRN:  [id number]      Admission Summary:   The patient is a 52-year-old gentleman with past medical history of coronary artery disease, chronic systolic CHF, NYHA class II, COPD, diabetes mellitus type 2, hypertension, hyperlipidemia, MI, restless leg syndrome, gait abnormality who was recently admitted to the hospital in July with fever, tachycardia, probably secondary to cellulitis of the legs and pneumonia, hypotension, chronic hypoxic respiratory failure due to COPD, he presents to the hospital today with SOB      Reason for follow up:Cellultiis   Mr Villalta has no complaints. He is lethargic but arousable,he says he has pain medicine recently. Assessment & Plan:   Sepsis(fever,tachycardia,leukocytosis) due to bilateral cellulitis in the setting of chronic venous stasis. -This is chronic recurrent problem. Blood cx sent. -ID consulted. On vancomycin,zosy and clindamycin. New onset atria fibrillation likely induced by sepsis  -rate variable. On lopressor. Cardizem gtt stopped. Cardiology on board.  -Long term anticoagulation per cards    NSTEMI,no chest pain presently  -Elevated troponin. On asa,bb  -Cardiology involved  -No cardiac cath at this time due to sepsis. Chronic systolic CHF NYHA class II  -Continue medical management,resume lasix. Chronic oxygen dependent respiratory failure due to COPD and CHF: on home oxygen 2-3 l/min via nasal canula. COPD without exacerbation: continue home regimen    DM II,well controlled. A1C 6. Hold metformin in light of iv contrast.Accucheks. Use humalog sliding scale. Depression. Continue home medication. The patient denies homicidal or suicidal ideation. Hypertension. Continue home medication. Chronic pain syndrome with acute pain from leg cellulitis:continue home regimen,Ms contin 60 mg tid. Discussed with pharmacy who checked the ,he is using prn iv morphine due to acute pain;will transition to oral dilaudid as he does at home once    Anemia,drop in HB? No evidence of GI bleed or any blood loss  -monitor     Bump in creatinine,could be contrast related  -Hold aldactone and lisinopril for today  -BMP in AM          Diet:cardiac  Code status: full  DVT prophylaxis: lovenox  Care Plan discussed with: patient. Hospital Problems  Date Reviewed: 8/13/2018          Codes Class Noted POA    * (Principal)NSTEMI (non-ST elevated myocardial infarction) [facility]) ICD-10-CM: I21.4  ICD-9-CM: 410.70  8/13/2018 Unknown                Review of Systems:   A comprehensive review of systems was negative except for that written in the HPI. Physical Examination:      Last 24hrs VS reviewed since prior progress note. Most recent are:  Visit Vitals    /72 (BP 1 Location: Left arm, BP Patient Position: At rest;Sitting)    Pulse (!) 110    Temp 97.4 °F (36.3 °C)    Resp 20    Ht 6' 2\" (1.88 m)    Wt 112.7 kg (248 lb 7.3 oz)    SpO2 98%    BMI 31.9 kg/m2           Constitutional:  No acute distress, cooperative, pleasant    HEENT: Head is a traumatic,  Un icteric sclera. Pink conjunctiva,no erythema or discharge. Oral mucous moist, oropharynx benign. Neck supple,    Resp:  CTA bilaterally. No wheezing/rhonchi/rales. No accessory muscle use   CV:  Regular rhythm, normal rate, no murmurs, gallops, rubs    GI:  Soft, non distended, non tender. normoactive bowel sounds, no hepatosplenomegaly    :  No CVA or suprapubic tenderness   Skin  :  No erythema,rash,bullae,dipigmentation     Musculoskeletal:  legs bandaged     Neurologic:  AAOx3, CN II-XII reviewed. Moves all extremities.              Intake/Output Summary (Last 24 hours) at 08/16/18 0851  Last data filed at 08/15/18 2000   Gross per 24 hour   Intake                0 ml   Output              100 ml   Net             -100 ml          Data Review:    Review and/or order of clinical lab test  Review and/or order of tests in the radiology section of [facility]  Review and/or order of tests in the medicine section of [facility]      Labs:     Recent Labs      08/16/18   0244  08/15/18   0333   WBC  8.1  7.5   HGB  7.9*  7.8*   HCT  26.0*  26.5*   PLT  568*  464*     Recent Labs      08/16/18   0244  08/15/18   0333  08/14/18   0339   NA  134*  132*  135*   K  4.2  3.8  3.6   CL  102  100  100   CO2  20*  23  24   BUN  15  12  9   CREA  1.48*  0.89  0.73   GLU  97  110*  102*   CA  8.0*  7.9*  7.9*   MG   --   2.0   --      Recent Labs      08/15/18   0333  08/13/18   1512   SGOT  27  16   ALT  11*  14   AP  206*  210*   TBILI  0.7  0.8   TP  7.5  8.0   ALB  1.9*  2.2*   GLOB  5.6*  5.8*     No results for input(s): INR, PTP, APTT in the last 72 hours. No lab exists for component: INREXT, INREXT   No results for input(s): FE, TIBC, PSAT, FERR in the last 72 hours. Lab Results   Component Value Date/Time    Folate 7.9 02/27/2018 03:52 AM    Folate 7.5 02/27/2018 03:52 AM      No results for input(s): PH, PCO2, PO2 in the last 72 hours.   Recent Labs      08/14/18   1035  08/14/18   0339  08/13/18   1512   TROIQ  0.50*  0.61*  0.93*     Lab Results   Component Value Date/Time    Cholesterol, total 109 08/14/2018 03:39 AM    HDL Cholesterol 24 08/14/2018 03:39 AM    LDL,Direct 193 (H) 05/21/2014 09:59 AM    LDL, calculated 72.2 08/14/2018 03:39 AM    Triglyceride 64 08/14/2018 03:39 AM    CHOL/HDL Ratio 4.5 08/14/2018 03:39 AM     Lab Results   Component Value Date/Time    Glucose (POC) 119 (H) 08/16/2018 06:36 AM    Glucose (POC) 108 (H) 08/15/2018 09:19 PM    Glucose (POC) 112 (H) 08/15/2018 04:32 PM    Glucose (POC) 109 (H) 08/15/2018 11:26 AM    Glucose (POC) 126 (H) 08/15/2018 06:26 AM Lab Results   Component Value Date/Time    Color DARK YELLOW 07/09/2018 10:39 PM    Appearance CLEAR 07/09/2018 10:39 PM    Specific gravity 1.017 07/09/2018 10:39 PM    pH (UA) 8.0 07/09/2018 10:39 PM    Protein TRACE (A) 07/09/2018 10:39 PM    Glucose NEGATIVE  07/09/2018 10:39 PM    Ketone NEGATIVE  07/09/2018 10:39 PM    Bilirubin NEGATIVE  07/09/2018 10:39 PM    Urobilinogen 1.0 07/09/2018 10:39 PM    Nitrites NEGATIVE  07/09/2018 10:39 PM    Leukocyte Esterase NEGATIVE  07/09/2018 10:39 PM    Epithelial cells FEW 07/09/2018 10:39 PM    Bacteria NEGATIVE  07/09/2018 10:39 PM    WBC 0-4 07/09/2018 10:39 PM    RBC 0-5 07/09/2018 10:39 PM         Medications Reviewed:     Current Facility-Administered Medications   Medication Dose Route Frequency    nicotine (NICODERM CQ) 21 mg/24 hr patch 1 Patch  1 Patch TransDERmal DAILY    zolpidem (AMBIEN) tablet 10 mg  10 mg Oral QHS    nortriptyline (PAMELOR) capsule 50 mg  50 mg Oral QHS    Vancomycin trough- please draw prior to 10 a.m. vanc dose on 8/16. Thanks!    Other ONCE    metoprolol tartrate (LOPRESSOR) tablet 75 mg  75 mg Oral Q12H    enoxaparin (LOVENOX) injection 40 mg  40 mg SubCUTAneous Q24H    vancomycin (VANCOCIN) 1750 mg in  ml infusion  1,750 mg IntraVENous Q12H    furosemide (LASIX) tablet 20 mg  20 mg Oral DAILY    morphine CR (MS CONTIN) tablet 60 mg  60 mg Oral TID    clindamycin (CLEOCIN) 600mg D5W 50mL IVPB (premix)  600 mg IntraVENous Q8H    allopurinol (ZYLOPRIM) tablet 100 mg  100 mg Oral DAILY    aspirin delayed-release tablet 81 mg  81 mg Oral DAILY    atorvastatin (LIPITOR) tablet 80 mg  80 mg Oral DAILY    buPROPion SR (WELLBUTRIN SR) tablet 150 mg  150 mg Oral BID    citalopram (CELEXA) tablet 40 mg  40 mg Oral DAILY    clopidogrel (PLAVIX) tablet 75 mg  75 mg Oral DAILY    lisinopril (PRINIVIL, ZESTRIL) tablet 2.5 mg  2.5 mg Oral DAILY    rOPINIRole (REQUIP) tablet 8 mg  8 mg Oral QHS    spironolactone (ALDACTONE) tablet 25 mg  25 mg Oral BID    sodium chloride (NS) flush 5-10 mL  5-10 mL IntraVENous Q8H    sodium chloride (NS) flush 5-10 mL  5-10 mL IntraVENous PRN    glucose chewable tablet 16 g  4 Tab Oral PRN    dextrose (D50W) injection syrg 12.5-25 g  12.5-25 g IntraVENous PRN    glucagon (GLUCAGEN) injection 1 mg  1 mg IntraMUSCular PRN    sodium chloride (NS) flush 5-10 mL  5-10 mL IntraVENous Q8H    sodium chloride (NS) flush 5-10 mL  5-10 mL IntraVENous PRN    nitroglycerin (NITROSTAT) tablet 0.4 mg  0.4 mg SubLINGual Q5MIN PRN    insulin lispro (HUMALOG) injection   SubCUTAneous AC&HS    acetaminophen (TYLENOL) tablet 650 mg  650 mg Oral Q4H PRN    morphine injection 2 mg  2 mg IntraVENous Q4H PRN    piperacillin-tazobactam (ZOSYN) 3.375 g in 0.9% sodium chloride (MBP/ADV) 100 mL  3.375 g IntraVENous Q8H    arformoterol (BROVANA) neb solution 15 mcg  15 mcg Nebulization BID RT    And    budesonide (PULMICORT) 500 mcg/2 ml nebulizer suspension  500 mcg Nebulization BID RT    albuterol-ipratropium (DUO-NEB) 2.5 MG-0.5 MG/3 ML  3 mL Nebulization Q4H PRN    famotidine (PEPCID) tablet 20 mg  20 mg Oral BID    Vancomycin - pharmacy to dose   Other Rx Dosing/Monitoring     ______________________________________________________________________  EXPECTED LENGTH OF STAY: 4d 21h  ACTUAL LENGTH OF STAY:          3                 [address] MD

## 2018-08-16 NOTE — PROGRESS NOTES
Problem: Heart Failure: Day 2  Goal: Activity/Safety  Outcome: Progressing Towards Goal  Pt belongings within reach. Pt instructed to utilize call bell for assistance. Pt uses cane to ambulate in room with Assist x1. Goal: Consults, if ordered  Outcome: Progressing Towards Goal  Cardiology consult in place. PT/OT consults in place as well. Goal: Diagnostic Test/Procedures  Outcome: Progressing Towards Goal  ECHO indicates EF of 25%. Diltiazam drip turned off on 8-15-18 at midnight and pt has not required restart of the medication. CXR was negative. Will continue to monitor. Goal: Nutrition/Diet  Outcome: Progressing Towards Goal  Pt on cardiac diet and tolerating intake. Goal: Medications  Outcome: Progressing Towards Goal  Pt prescribed Lasix and metoprolol during shift. Held both of these medications d/t SBP in 90s. BPs continue to remain on lower end of normal throughout shift. Will continue to monitor. Goal: Respiratory  Outcome: Progressing Towards Goal  Breath sounds clear bilaterally. Oxygen saturations remain above 95% throughout shift while on 4L NC. Goal: Treatments/Interventions/Procedures  Outcome: Progressing Towards Goal  Q4 vital signs and assessments included in daily care. BG monitored ACHS.   Pt may receive cardiac cath before discharge.

## 2018-08-16 NOTE — ROUTINE PROCESS
Bedside shift change report given to Mariposa Luna RN (oncoming nurse) by Sanket Judd RN (offgoing nurse). Report included the following information SBAR, Kardex, ED Summary, Intake/Output, MAR, Recent Results, Med Rec Status and Cardiac Rhythm A-Flutter/Fib.

## 2018-08-16 NOTE — PROGRESS NOTES
ID Progress Note  2018    Subjective:     Still with dyspnea--leg is feeling better, however    Objective:     Antibiotics:  1. Vancomycin   2. Clindamycin   3. Zosyn       Vitals:   Visit Vitals    BP (!) 73/44 (BP 1 Location: Left arm, BP Patient Position: At rest;Sitting)    Pulse (!) 110    Temp 97.7 °F (36.5 °C)    Resp 24    Ht 6' 2\" (1.88 m)    Wt 112.7 kg (248 lb 7.3 oz)    SpO2 98%    BMI 31.9 kg/m2        Tmax:  Temp (24hrs), Av °F (36.7 °C), Min:97.4 °F (36.3 °C), Max:98.3 °F (36.8 °C)      Exam:  Lungs:  clear to auscultation bilaterally  Heart:  regular rate and rhythm  Skin:  Less erythema and warmth of left leg than yesterday    Labs:      Recent Labs      18   0244  08/15/18   0333  18   0339   WBC  8.1  7.5  10.5   HGB  7.9*  7.8*  7.7*   PLT  568*  464*  489*   BUN  15  12  9   CREA  1.48*  0.89  0.73   SGOT   --   27   --    AP   --   206*   --    TBILI   --   0.7   --        Cultures:     Lab Results   Component Value Date/Time    Specimen Description: ESOPHAGEAL BRUSHING 2010 09:25 AM     Lab Results   Component Value Date/Time    Culture result: NO GROWTH 3 DAYS 2018 08:19 PM    Culture result: NO GROWTH 5 DAYS 2018 09:11 PM    Culture result: NO GROWTH 5 DAYS 2018 10:18 AM       Radiology:     Line/Insert Date:           Assessment:     1. Cellulitis  2. CHF  3. Debility  4. Dermatitis                 Objective:     1. Continue current therapy  2.  Follow up cultures and studies    Cinthia Salas MD

## 2018-08-16 NOTE — PROGRESS NOTES
Problem: Falls - Risk of  Goal: *Absence of Falls  Document Jarad Fall Risk and appropriate interventions in the flowsheet. Outcome: Progressing Towards Goal  Fall Risk Interventions:  Mobility Interventions: Bed/chair exit alarm, Patient to call before getting OOB         Medication Interventions: Evaluate medications/consider consulting pharmacy, Patient to call before getting OOB, Teach patient to arise slowly    Elimination Interventions: Call light in reach, Patient to call for help with toileting needs, Toileting schedule/hourly rounds             0745 Bedside and Verbal shift change report given to Senora Brunner RN (oncoming nurse) by Lashawn Valencia (offgoing nurse).  Report included the following information SBAR, Kardex, Intake/Output, MAR, Recent Results and Cardiac Rhythm ST.

## 2018-08-16 NOTE — PROGRESS NOTES
Problem: Self Care Deficits Care Plan (Adult)  Goal: *Acute Goals and Plan of Care (Insert Text)  Occupational Therapy Goals  Initiated 8/16/2018  1. Patient will perform grooming standing at sink for 5 minutes with no LOB with modified independence within 7 day(s). 2.  Patient will perform bathing with modified independence within 7 day(s). 3.  Patient will perform lower body dressing with modified independence within 7 day(s). 4.  Patient will perform toilet transfers with modified independence within 7 day(s). 5.  Patient will perform all aspects of toileting with independence within 7 day(s). 6.  Patient will participate in upper extremity therapeutic exercise/activities with independence for 5 minutes within 7 day(s). 7.  Patient will utilize energy conservation techniques during functional activities with no cues within 7 day(s). Occupational Therapy EVALUATION  Patient: Leno Villalta (55 y.o. male)  Date: 8/16/2018  Primary Diagnosis: NSTEMI (non-ST elevated myocardial infarction) ([facility] [address] [address]        Precautions: falls, skin       ASSESSMENT :  Based on the objective data described below, the patient presents with overall supervision for functional mobility, IND for upper body ADLs and min A-CGA for lower body ADLs s/p NSTEMI. Today, patient ADLs limited by BLE edema/cellulitis, generalized weakness, and decreased functional activity tolerance. Patient reports he requires assist to get on/off toilet, in/out of tub and for lower body ADLs at baseline. Appears patient is performing close to his functional baseline and anticipate no OT needs once patient is medically cleared. Recommend with nursing patient to complete as able in order to maintain strength, endurance and independence: ADLs with supervision/setup, OOB to chair 3x/day and mobilizing to the [facility] for toileting with 1 assist (with RW and gait belt). Thank you for your assistance.        Patient will benefit from skilled intervention to address the above impairments. Patients rehabilitation potential is considered to be Fair  Factors which may influence rehabilitation potential include:   []             None noted  []             Mental ability/status  []             Medical condition  []             Home/family situation and support systems  []             Safety awareness  []             Pain tolerance/management  []             Other:      PLAN :  Recommendations and Planned Interventions:  []               Self Care Training                  []        Therapeutic Activities  []               Functional Mobility Training    []        Cognitive Retraining  []               Therapeutic Exercises           []        Endurance Activities  []               Balance Training                   []        Neuromuscular Re-Education  []               Visual/Perceptual Training     []   Home Safety Training  []               Patient Education                 []        Family Training/Education  []               Other (comment):    Frequency/Duration: Patient will be followed by occupational therapy 4 times a week to address goals. Discharge Recommendations: None  Further Equipment Recommendations for Discharge: possible shower chair. 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     OBJECTIVE DATA SUMMARY:   HISTORY:   Past Medical History:   Diagnosis Date    CAD (coronary artery disease)     [facility]     Chronic systolic heart failure ([facility] [address] 4/12/2011    COPD [facility]     Coronary atherosclerosis of native coronary artery 4/12/2011    Diabetes ([facility] [address]     High cholesterol     Hypertension     MI, old     Neurological disorder     Other primary cardiomyopathies 4/12/2011    Restless leg syndrome      Past Surgical History:   Procedure Laterality Date    CARDIAC SURG PROCEDURE UNLIST      stents x7    HX CARPAL TUNNEL RELEASE      right wrist       Prior Level of Function/Environment/Context: Patient reports living in a 1 level home with his geovany, mod I for ADLs and functional mobility with cane (min A for sit<>stand from low surfaces and for lower body dressing at baseline)    Home Situation  Home Environment: Private residence  # Steps to Enter: 0  One/Two Story Residence: One story  Living Alone: No  Support Systems: Family member(s)  Patient Expects to be Discharged to[de-identified] Private residence  Current DME Used/Available at Home: Dietra Hives, straight, Walker, rolling, Commode, bedside  Tub or Shower Type: Tub    Hand dominance: Right    EXAMINATION OF PERFORMANCE DEFICITS:  Cognitive/Behavioral Status:  Neurologic State: Alert  Orientation Level: Oriented X4  Cognition: Appropriate for age attention/concentration; Follows commands  Perception: Appears intact  Perseveration: No perseveration noted  Safety/Judgement: Awareness of environment; Fall prevention    Skin: Appears grossly intact, BLE wrapped in dressings 2* weeping cellulitis    Edema: 2+ in BLE, none noted in BUEs    Hearing: Auditory  Auditory Impairment: None    Vision/Perceptual:    Tracking: Able to track stimulus in all quadrants w/o difficulty    Diplopia: No    Acuity: Impaired near vision; Impaired far vision (blind in L eye )    Corrective Lenses: Glasses    Range of Motion:  In BUEs  AROM: Generally decreased, functional    Strength: In BUEs  Strength: Within functional limits    Coordination:  Coordination: Within functional limits  Fine Motor Skills-Upper: Left Intact; Right Intact    Gross Motor Skills-Upper: Left Intact; Right Intact    Tone & Sensation:  In BUEs  Tone: Normal  Sensation: Intact    Balance:  Sitting: Intact  Standing: Intact; With support    Functional Mobility and Transfers for ADLs:  Bed Mobility:  Rolling:  (NT - received sitting on EOB)    Transfers:  Sit to Stand: Supervision  Stand to Sit: Supervision  Toilet Transfer : Stand-by assistance  Tub Transfer: (Infer per obs of functional mobility, strength, balance)    ADL Assessment:  Feeding: Independent    Oral Facial Hygiene/Grooming: Independent*    Bathing: Minimum assistance*    Upper Body Dressing: Independent*    Lower Body Dressing: Minimum assistance    Toileting: Contact guard assistance*    *Infer per obs of functional mobility, functional reach, BUE ROM, strength, balance and cognition    ADL Intervention and task modifications:  Feeding  Feeding Assistance: Independent  Container Management: Independent  Cutting Food: Independent  Utensil Management: Independent  Food to Mouth: Independent  Drink to Mouth: Independent     Lower Body Dressing Assistance  Socks: Total assistance (dependent)  Leg Crossed Method Used: No  Position Performed: Seated edge of bed    Cognitive Retraining  Safety/Judgement: Awareness of environment; Fall prevention    Functional Measure:  Barthel Index:    Bathin  Bladder: 10  Bowels: 10  Groomin  Dressin  Feeding: 10  Mobility: 5  Stairs: 0  Toilet Use: 10  Transfer (Bed to Chair and Back): 10  Total: 70       Barthel and G-code impairment scale:  Percentage of impairment CH  0% CI  1-19% CJ  20-39% CK  40-59% CL  60-79% CM  80-99% CN  100%   Barthel Score 0-100 100 99-80 79-60 59-40 20-39 1-19   0   Barthel Score 0-20 20 17-19 13-16 9-12 5-8 1-4 0      The Barthel ADL Index: Guidelines  1. The index should be used as a record of what a patient does, not as a record of what a patient could do. 2. The main aim is to establish degree of independence from any help, physical or verbal, however minor and for whatever reason. 3. The need for supervision renders the patient not independent. 4. A patient's performance should be established using the best available evidence. Asking the patient, friends/relatives and nurses are the usual sources, but direct observation and common sense are also important. However direct testing is not needed.   5. Usually the patient's performance over the preceding 24-48 hours is important, but occasionally longer periods will be relevant. 6. Middle categories imply that the patient supplies over 50 per cent of the effort. 7. Use of aids to be independent is allowed. Ana Mercedes., Barthel, D.W. (5755). Functional evaluation: the Barthel Index. [facility] (14)2. ARIN Healy Ace, Mary Calabrese., Courtney Huynh., [place], [address] [place] [address] (1999). Measuring the change indisability after inpatient rehabilitation; comparison of the responsiveness of the Barthel Index and Functional [place] Measure. Journal of Neurology, Neurosurgery, and Psychiatry, 66(4), 122-607. Virginia Long, WILMAJ.EUGENE, ROLANDO Carlos, & Obey Morrison M.A. (2004.) Assessment of post-stroke quality of life in cost-effectiveness studies: The usefulness of the Barthel Index and the EuroQoL-5D. Quality of Life Research, 13, 961-00       G codes: In compliance with CMSs Claims Based Outcome Reporting, the following G-code set was chosen for this patient based on their primary functional limitation being treated: The outcome measure chosen to determine the severity of the functional limitation was the Barthel Index with a score of 70/100 which was correlated with the impairment scale. ? Self Care:     - CURRENT STATUS: CJ - 20%-39% impaired, limited or restricted    - GOAL STATUS: CI - 1%-19% impaired, limited or restricted    - D/C STATUS:  ---------------To be determined---------------     Occupational Therapy Evaluation Charge Determination   History Examination Decision-Making   LOW Complexity : Brief history review  LOW Complexity : 1-3 performance deficits relating to physical, cognitive , or psychosocial skils that result in activity limitations and / or participation restrictions  MEDIUM Complexity : Patient may present with comorbidities that affect occupational performnce.  Miniml to moderate modification of tasks or assistance (eg, physical or verbal ) with assesment(s) is necessary to enable patient to complete evaluation       Based on the above components, the patient evaluation is determined to be of the following complexity level: LOW   Pain:  Pain Scale 1: Numeric (0 - 10)  Pain Intensity 1: 8    Activity Tolerance:   Fair, VSS  Please refer to the flowsheet for vital signs taken during this treatment. After treatment:   [] Patient left in no apparent distress sitting up in chair  [x] Patient left in no apparent distress on edge of bed  [x] Call bell left within reach  [x] Nursing notified  [] Caregiver present  [] Bed alarm activated    COMMUNICATION/EDUCATION:   The patients plan of care was discussed with: Physical Therapist and Registered Nurse. [x] Home safety education was provided and the patient/caregiver indicated understanding. [x] Patient/family have participated as able in goal setting and plan of care. [] Patient/family agree to work toward stated goals and plan of care. [] Patient understands intent and goals of therapy, but is neutral about his/her participation. [] Patient is unable to participate in goal setting and plan of care. This patients plan of care is appropriate for delegation to [facility].     Thank you for this referral.  Tracie Goodpasture, OT  Time Calculation: 22 mins

## 2018-08-16 NOTE — PROGRESS NOTES
Problem: Mobility Impaired (Adult and Pediatric)  Goal: *Acute Goals and Plan of Care (Insert Text)  Physical Therapy Goals   8/15/2018  1. Patient will ambulate with independence for 100 feet with the least restrictive device within 7 day(s). physical Therapy TREATMENT  Patient: Ta Villalta (55 y.o. male)  Date: 8/16/2018  Diagnosis: NSTEMI (non-ST elevated myocardial infarction) [facility]) NSTEMI (non-ST elevated myocardial infarction) [facility])       Precautions:   fall  Chart, physical therapy assessment, plan of care and goals were reviewed. ASSESSMENT:  Pt received in sitting at EOB nasal cannula out of his nose with resting 02 sats at 94%. Had him put on the nasal cannula and his 02 sats remained at upper 90s for rest of session on 4 liters. Did orthostatics and in standing BP 72/47 , see doc flow from 1500 to 1526 for all vital signs. Had to return him to bed given low BP and symptomatic, discussed with his nurse. Anticipate steady gains once BP is stable. Progression toward goals:  []    Improving appropriately and progressing toward goals  []    Improving slowly and progressing toward goals  [x]    Not making progress toward goals and plan of care will be adjusted     PLAN:  Patient continues to benefit from skilled intervention to address the above impairments. Continue treatment per established plan of care.   Discharge Recommendations:  Home Health, To Be Determined, None  Further Equipment Recommendations for Discharge:  none     SUBJECTIVE:   Patient stated I guess both, light headed and dizzy in standing    OBJECTIVE DATA SUMMARY:   Chart checked, pt cleared by nursing  Critical Behavior:  Neurologic State: Alert  Orientation Level: Oriented X4  Cognition: Appropriate for age attention/concentration, Follows commands  Safety/Judgement: Awareness of environment, Fall prevention  Functional Mobility Training:  Bed Mobility:  Rolling:  (NT - received sitting on EOB)     Sit to Supine: Supervision Transfers:  Sit to Stand: Stand-by assistance  Stand to Sit: Stand-by assistance                             Balance:  Sitting: Intact; Without support  Standing: Intact; With support  Ambulation/Gait Training:  Distance (ft): 1 Feet (ft)  Assistive Device: Gait belt;Walker, rolling  Ambulation - Level of Assistance: Contact guard assistance        Gait Abnormalities: Decreased step clearance        Base of Support: Widened     Speed/Renetta: Slow;Pace decreased (<100 feet/min)  Step Length: Left shortened;Right shortened                    Stairs:              Neuro Re-Education:    Therapeutic Exercises: Ankle pumps  Pain:  Pain Scale 1: Numeric (0 - 10)  Pain Intensity 1: 0  Pain Location 1: Leg  Pain Orientation 1: Left;Right  Pain Description 1: Aching  Pain Intervention(s) 1: Medication (see MAR)  Activity Tolerance:   See assessment above  Please refer to the flowsheet for vital signs taken during this treatment.   After treatment:   []    Patient left in no apparent distress sitting up in chair  [x]    Patient left in no apparent distress in bed to modified chair position  [x]    Call bell left within reach  [x]    Nursing notified  []    Caregiver present  []    Bed alarm activated    COMMUNICATION/COLLABORATION:   The patients plan of care was discussed with: Occupational Therapist and Registered Nurse    Iram Line   Time Calculation: 26 mins

## 2018-08-16 NOTE — ROUTINE PROCESS
1251 - Critical result of 28.5 for vancomycin trough received from Lab.    1255 - Attempted to page MD regarding critical result for vancomycin trough. Waiting for returned phone call. [phone] - Repaged MD regarding critical result for vancomycin trough. [facility] texted MD regarding critical result for vancomycin trough.   Orders received to speak to pharmacist.

## 2018-08-17 NOTE — PROGRESS NOTES
notes that patient has just returned from the cardiac catherization lab where her had a LANA and cardioversion. Patient is very sleepy at this time.   Per staff if he is stable he may be transferred to a stepdown unit in am.

## 2018-08-17 NOTE — ROUTINE PROCESS
ID Progress Note  2018    Subjective:     He is in the ICU for blood pressure issues    Objective:     Antibiotics:  1. Vancomycin   2. Clindamycin   3. Zosyn       Vitals:   Visit Vitals    BP 90/58    Pulse 78    Temp 96.6 °F (35.9 °C)    Resp 11    Ht 6' 2\" (1.88 m)    Wt 112.4 kg (247 lb 11.2 oz)    SpO2 99%    BMI 31.8 kg/m2        Tmax:  Temp (24hrs), Av.7 °F (36.5 °C), Min:96.6 °F (35.9 °C), Max:98 °F (36.7 °C)      Exam:  Significant improvement in leg erythema continues    Labs:      Recent Labs      18   0326  18   0244  08/15/18   0333   WBC  5.1  8.1  7.5   HGB  7.2*  7.9*  7.8*   PLT  510*  568*  464*   BUN  18  15  12   CREA  1.88*  1.48*  0.89   SGOT   --    --   27   AP   --    --   206*   TBILI   --    --   0.7       Cultures:     Lab Results   Component Value Date/Time    Specimen Description: ESOPHAGEAL BRUSHING 2010 09:25 AM     Lab Results   Component Value Date/Time    Culture result: NO GROWTH 4 DAYS 2018 08:19 PM    Culture result: NO GROWTH 5 DAYS 2018 09:11 PM    Culture result: NO GROWTH 5 DAYS 2018 10:18 AM       Radiology:     Line/Insert Date:           Assessment:     1. Chronic dermatitis of legs  2. CHF  3. Cellulitis of legs--worse on left--improved    Objective:     1. Change to IV nafcillin alone  2. If discharged, can go home on one more week of oral cephalexin  3.  Group will see over the weekend if asked    Sherita Esteban MD

## 2018-08-17 NOTE — INTERDISCIPLINARY ROUNDS
IDR/SLIDR Summary          Patient: Elsie Souza MRN: [id number]    Age: 59 y.o. YOB: 1960 Room/Bed: [address] [place] [address]   Admit Diagnosis: NSTEMI (non-ST elevated myocardial infarction) [facility]  Principal Diagnosis: NSTEMI (non-ST elevated myocardial infarction) ([facility]   Goals: stable cardiac, infection free  Readmission: YES  Quality Measure: CHF, SEPSIS and COPD  VTE Prophylaxis: Not needed  Influenza Vaccine screening completed? NO  Pneumococcal Vaccine screening completed? NO  Mobility needs: Yes   Nutrition plan:No  Consults: P. T and Case Management    Financial concerns:No  Escalated to CM? YES  RRAT Score: 32   Interventions:Home Health, Palliative Care , COPD Educator to follow  and [facility]   Testing due for pt today? YES  LOS: 3 days Expected length of stay ?  days  Discharge plan: rehab   PCP: Marguerite Cullen MD  Transportation needs: No    Days before discharge:two or more days before discharge  and longer than expected LOS  Discharge disposition: Rehab    Signed:     Shon Freed RN  8/16/2018  11:17 PM

## 2018-08-17 NOTE — PROGRESS NOTES
Problem: Falls - Risk of  Goal: *Absence of Falls  Document Jarad Fall Risk and appropriate interventions in the flowsheet. Outcome: Progressing Towards Goal  Fall Risk Interventions:  Mobility Interventions: Communicate number of staff needed for ambulation/transfer, PT Consult for mobility concerns, PT Consult for assist device competence, OT consult for ADLs, Patient to call before getting OOB         Medication Interventions: Evaluate medications/consider consulting pharmacy, Patient to call before getting OOB, Teach patient to arise slowly    Elimination Interventions: Call light in reach, Elevated toilet seat, Patient to call for help with toileting needs, Urinal in reach             Problem: Pressure Injury - Risk of  Goal: *Prevention of pressure injury  Document Charlie Scale and appropriate interventions in the flowsheet.    Outcome: Progressing Towards Goal  Pressure Injury Interventions:  Sensory Interventions: Assess changes in LOC, Assess need for specialty bed, Sit a 90-degree angle/use footstool if needed, Minimize linen layers, Keep linens dry and wrinkle-free    Moisture Interventions: Absorbent underpads, Check for incontinence Q2 hours and as needed, Moisture barrier    Activity Interventions: Increase time out of bed, Pressure redistribution bed/mattress(bed type), PT/OT evaluation    Mobility Interventions: HOB 30 degrees or less, Pressure redistribution bed/mattress (bed type), PT/OT evaluation    Nutrition Interventions: Document food/fluid/supplement intake    Friction and Shear Interventions: Foam dressings/transparent film/skin sealants, HOB 30 degrees or less, Lift sheet

## 2018-08-17 NOTE — PROGRESS NOTES
Problem: Mobility Impaired (Adult and Pediatric)  Goal: *Acute Goals and Plan of Care (Insert Text)  Physical Therapy Goals   8/15/2018  1. Patient will ambulate with independence for 100 feet with the least restrictive device within 7 day(s). physical Therapy REEVALUATION  Patient: Elsie Souza (55 y.o. male)  Date: 8/17/2018  Primary Diagnosis: NSTEMI (non-ST elevated myocardial infarction) [facility])        Precautions: Fall     Chart, physical therapy assessment, plan of care and goals were reviewed. ASSESSMENT :  Based on the objective data described below, the patient presents with decreased functional independence compared to baseline with associated decreased ROM, decreased strength, decreased tolerance to activity, orthostatic hypotension, and gait deviations. RN cleared patient for mobility and was received in bed agreeable to participate in a short session. Both SBP and DBP dropped 20mmHg initially upon standing, recovered 10 with minimal symptom onset. Patient ambulated 6 ft with SPC (+HHA on L for 3ft) with slow shuffling gait. Pulse ox had poor pleth with inconsistent numbers, patient reported SOB following with RR in high 20s, recovered within 2 minutes. Patient scheduled for cardioversion today, will reassess gait and progress as able and appropriate after. Discharge rec - IPR as patient is below functional baseline but is highly motivated and could benefit from 3 hours of therapy from >1 discipline in order to return to performing all ADLs and IADLs independently. Patient will benefit from skilled intervention to address the above impairments.   Patients rehabilitation potential is considered to be Good  Factors which may influence rehabilitation potential include:   []           None noted  []           Mental ability/status  []           Medical condition  []           Home/family situation and support systems  []           Safety awareness  []           Pain tolerance/management  [] Other: PLAN :  Recommendations and Planned Interventions:  []             Bed Mobility Training             []      Neuromuscular Re-Education  []             Transfer Training                   []      Orthotic/Prosthetic Training  []             Gait Training                         []      Modalities  []             Therapeutic Exercises           []      Edema Management/Control  []             Therapeutic Activities            []      Patient and Family Training/Education  []             Other (comment):  Frequency/Duration: Patient will be followed by physical therapy 5 times a week to address goals. Discharge Recommendations: Inpatient Rehab  Further Equipment Recommendations for Discharge: TBD     SUBJECTIVE:   Patient stated Jessica Nguyen been up and down all morning to use that urinal but yeah I'll give this a go .     OBJECTIVE DATA SUMMARY:     Past Medical History:   Diagnosis Date    CAD (coronary artery disease)     [facility]congestive [facility] failure[facility]     Chronic systolic heart failure ([facility]) 4/12/2011    COPD (chronic obstructive pulmonary disease) ([facility])     Coronary atherosclerosis of native coronary artery 4/12/2011    Diabetes ([facility] [address]     High cholesterol     Hypertension     MI, old     Neurological disorder     Other primary cardiomyopathies 4/12/2011    Restless leg syndrome      Past Surgical History:   Procedure Laterality Date    CARDIAC SURG PROCEDURE UNLIST      stents x7    HX CARPAL TUNNEL RELEASE      right wrist     Hospital course since last seen and reason for reevaluation: low BP, transfer to CCU for pressure support  Critical Behavior:  Neurologic State: Alert  Orientation Level: Oriented X4  Cognition: Appropriate for age attention/concentration  Safety/Judgement: Awareness of environment, Fall prevention  Skin:    Strength:    Strength: Generally decreased, functional                      Tone & Sensation:                                  Range Of Motion:  AROM: Generally decreased, functional                       Coordination:  Coordination: Within functional limits    Functional Mobility:  Bed Mobility:     Supine to Sit: Independent; Additional time  Sit to Supine: Independent; Additional time     Transfers:  Sit to Stand: Supervision  Stand to Sit: Supervision                    Balance:   Sitting: Intact; Without support  Standing: Intact; With support  Ambulation/Gait Training:  Distance (ft): 6 Feet (ft)  Assistive Device: Gait belt;Cane, straight (+ left HHA x 3ft)  Ambulation - Level of Assistance: Contact guard assistance        Gait Abnormalities: Decreased step clearance;Trunk sway increased; Step to gait        Base of Support: Widened     Speed/Renetta: Slow;Shuffled  Step Length: Right shortened;Left shortened                    Therapeutic Exercises:       Functional Measure:  Tinetti test:    Sitting Balance: 1  Arises: 1  Attempts to Rise: 2  Immediate Standing Balance: 1  Standing Balance: 1  Nudged: 0  Eyes Closed: 1  Turn 360 Degrees - Continuous/Discontinuous: 0  Turn 360 Degrees - Steady/Unsteady: 1  Sitting Down: 1  Balance Score: 9  Indication of Gait: 1  R Step Length/Height: 0  L Step Length/Height: 0  R Foot Clearance: 1  L Foot Clearance: 1  Step Symmetry: 1  Step Continuity: 1  Path: 1  Trunk: 0  Walking Time: 0  Gait Score: 6  Total Score: 15       Tinetti Test and G-code impairment scale:  Percentage of Impairment CH    0%   CI    1-19% CJ    20-39% CK    40-59% CL    60-79% CM    80-99% CN     100%   Tinetti  Score 0-28 28 23-27 17-22 12-16 6-11 1-5 0       Tinetti Tool Score Risk of Falls  <19 = High Fall Risk  19-24 = Moderate Fall Risk  25-28 = Low Fall Risk  Tinetti ME. Performance-Oriented Assessment of Mobility Problems in Elderly Patients. [address]; [id number].  (Scoring Description: PT Bulletin Feb. 10, 1993)    Older adults: Ean Munoz et al, 2009; n = [address] elderly evaluated with ABC, ENZO, ADL, and IADL)  · Mean ENZO score for males aged 69-68 years = 26.21(3.40)  · Mean ENZO score for females age 69-68 years = 25.16(4.30)  · Mean ENZO score for males over 80 years = 23.29(6.02)  · Mean ENZO score for females over 80 years = 17.20(8.32)        G codes: In compliance with CMSs Claims Based Outcome Reporting, the following G-code set was chosen for this patient based on their primary functional limitation being treated: The outcome measure chosen to determine the severity of the functional limitation was the tinetti with a score of 15/28 which was correlated with the impairment scale. ? Mobility - Walking and Moving Around:     - CURRENT STATUS: CK - 40%-59% impaired, limited or restricted    - GOAL STATUS: CJ - 20%-39% impaired, limited or restricted    - D/C STATUS:  ---------------To be determined---------------     Pain:  Pain Scale 1: Numeric (0 - 10)  Pain Intensity 1: 0              Activity Tolerance:   Fair, soft BP, minimal symptom onset, SOB following minimal activity but able to recover within minutes  Please refer to the flowsheet for vital signs taken during this treatment. After treatment:   []  Patient left in no apparent distress sitting up in chair  [x]  Patient left in no apparent distress in bed  [x]  Call bell left within reach  [x]  Nursing notified  []  Caregiver present  []  Bed alarm activated    COMMUNICATION/EDUCATION:   The patients plan of care was discussed with: Registered Nurse. [x]  Fall prevention education was provided and the patient/caregiver indicated understanding. [x]  Patient/family have participated as able in goal setting and plan of care. [x]  Patient/family agree to work toward stated goals and plan of care. []  Patient understands intent and goals of therapy, but is neutral about his/her participation. []  Patient is unable to participate in goal setting and plan of care.     Thank you for this referral.  Suleman Blanco PT, DPT   Time Calculation: 26 mins                  -

## 2018-08-17 NOTE — PROCEDURES
Cardiac Catheterization Procedure Note   Patient: Alesia Augustine  MRN: [id number]  SSN: [id number]   YOB: 1960 Age: 59 y.o. Sex: male    Date of Procedure: 8/17/2018   Pre-procedure Diagnosis: Atrial Fibrillation/Atrial Flutter  Post-procedure Diagnosis: normal sinus rhythm  Procedure: LANA and Cardioversion  :  Dr. Pan Lindsey MD    Assistant(s):  None  Anesthesia: Moderate Sedation   Estimated Blood Loss: Less than 10 mL   Specimens Removed: None  Findings: moderate spontaneous echo contrast in the atria, no clots in any of the cardiac chambers, small PFO with a small right to left shunt; DCCV converted AFL ton NSR with 1 shock at 297 joules  Complications:  The LANA insertion was mildly traumatic with some bloody secretions noted; the patient's oxygen saturations are normal and he is awake and alert  Implants:  None  Signed by:  Pan Lindsey MD  8/17/2018  2:49 PM

## 2018-08-17 NOTE — PROGRESS NOTES
Pharmacist Note - Vancomycin Dosing  Therapy day 4  Indication: Bilateral cellulitis  Current regimen:  1750 mg Q12h (dose was on hold for elevated trough this morning)    A Random Level resulted at 28 mcg/mL at 21:20  Goal trough: 10 - 15 mcg/mL     Plan: continue to hold Vancomycin; review am labs to determine when to schedule next level (SrCr trending up)  Pharmacy will continue to monitor this patient daily for changes in clinical status and renal function.

## 2018-08-17 NOTE — ANESTHESIA POSTPROCEDURE EVALUATION
Post-Anesthesia Evaluation and Assessment    Patient: Adonis Anand MRN: [id number]  SSN: [id number]    YOB: 1960  Age: 59 y.o. Sex: male       Cardiovascular Function/Vital Signs  Visit Vitals    BP 91/65    Pulse 80    Temp 35.9 °C (96.6 °F)    Resp 15    Ht 6' 2\" (1.88 m)    Wt 112.4 kg (247 lb 11.2 oz)    SpO2 95%    BMI 31.8 kg/m2       Patient is status post MAC anesthesia for * No procedures listed *. Nausea/Vomiting: None    Postoperative hydration reviewed and adequate. Pain:  Pain Scale 1: Numeric (0 - 10) (08/17/18 1325)  Pain Intensity 1: 0 (08/17/18 1325)   Managed    Neurological Status:   Neuro  Neurologic State: Alert (08/17/18 0800)  Orientation Level: Oriented X4 (08/17/18 0800)  Cognition: Appropriate for age attention/concentration (08/17/18 0800)  LLE Motor Response: Purposeful (08/17/18 0800)  RLE Motor Response: Purposeful (08/17/18 0800)   At baseline    Mental Status and Level of Consciousness: Arousable    Pulmonary Status:   O2 Device: CO2 nasal cannula (08/17/18 1312)   Adequate oxygenation and airway patent    Complications related to anesthesia: None    Post-anesthesia assessment completed.  No concerns    Signed By: Edy Zuleta MD     August 17, 2018

## 2018-08-17 NOTE — PROGRESS NOTES
TRANSFER - OUT REPORT:    Verbal report given to Rachid Rae RN (name) on Ginny Villalta  being transferred to CCU (unit) for routine progression of care       Report consisted of patients Situation, Background, Assessment and   Recommendations(SBAR). Information from the following report(s) SBAR, Kardex, Intake/Output, MAR, Recent Results and Cardiac Rhythm A. fib/ A. flutter was reviewed with the receiving nurse. Lines:   Peripheral IV 08/16/18 Right Antecubital (Active)   Site Assessment Clean, dry, & intact 8/16/2018  4:00 PM   Phlebitis Assessment 0 8/16/2018  4:00 PM   Infiltration Assessment 0 8/16/2018  4:00 PM   Dressing Status Clean, dry, & intact 8/16/2018  4:00 PM   Dressing Type Transparent;Tape 8/16/2018  4:00 PM   Hub Color/Line Status Blue; Infusing 8/16/2018  4:00 PM   Action Taken Open ports on tubing capped 8/16/2018  4:00 PM   Alcohol Cap Used Yes 8/16/2018  4:00 PM        Opportunity for questions and clarification was provided.       Patient transported with:   Monitor  O2 @ 3 liters  Registered Nurse   Patient belongings

## 2018-08-17 NOTE — PROGRESS NOTES
TRANSFER - OUT REPORT:    Verbal report given to WALTER Noe (name) on Lorenza Plaster Nine  being transferred to CCU 24(unit) for routine progression of care       Report consisted of patients Situation, Background, Assessment and   Recommendations(SBAR). Information from the following report(s) SBAR, Procedure Summary and MAR was reviewed with the receiving nurse. Lines:   Peripheral IV 08/16/18 Right Antecubital (Active)   Site Assessment Clean, dry, & intact 8/17/2018  8:00 AM   Phlebitis Assessment 0 8/17/2018  8:00 AM   Infiltration Assessment 0 8/17/2018  8:00 AM   Dressing Status Clean, dry, & intact 8/17/2018  8:00 AM   Dressing Type Transparent 8/17/2018  8:00 AM   Hub Color/Line Status Blue; Infusing 8/17/2018  8:00 AM   Action Taken Open ports on tubing capped 8/17/2018  8:00 AM   Alcohol Cap Used Yes 8/17/2018  8:00 AM       Peripheral IV 08/17/18 Right Wrist (Active)   Site Assessment Clean, dry, & intact 8/17/2018  8:00 AM   Phlebitis Assessment 0 8/17/2018  8:00 AM   Infiltration Assessment 0 8/17/2018  8:00 AM   Dressing Status Clean, dry, & intact 8/17/2018  8:00 AM   Dressing Type Transparent 8/17/2018  8:00 AM   Hub Color/Line Status Pink;Capped;Flushed 8/17/2018  8:00 AM   Action Taken Open ports on tubing capped 8/17/2018  8:00 AM   Alcohol Cap Used Yes 8/17/2018  8:00 AM        Opportunity for questions and clarification was provided.       Patient transported with:   Monitor  O2 @ 6 liters  Registered Nurse  [facility]

## 2018-08-17 NOTE — PROGRESS NOTES
Checked with RN regarding venous duplex ordered for patient a few days ago. Patient refused as he could not tolerate probe pressure at that time. Per RN, patient is still in too much pain for the venous duplex exam.  Please consider discontinuing exam if patient cannot tolerate testing.     Remington Dalal, T  [facility]

## 2018-08-17 NOTE — PROGRESS NOTES
Hospitalist Progress Note                               Adan Cosme MD                                     Answering service: [phone]                               OR [phone] from in house phone                                         Date of Service:  2018  NAME:  Ginny Villalta  :  1960  MRN:  [id number]      Admission Summary:   The patient is a 23-year-old gentleman with past medical history of coronary artery disease, chronic systolic CHF, NYHA class II, COPD, diabetes mellitus type 2, hypertension, hyperlipidemia, MI, restless leg syndrome, gait abnormality who was recently admitted to the hospital in July with fever, tachycardia, probably secondary to cellulitis of the legs and pneumonia, hypotension, chronic hypoxic respiratory failure due to COPD, he presents to the hospital today with SOB      Reason for follow up:Cellultiis   Mr Pawan was moved to CCU last night for inotrope/pressor support due to low BPs. He did not  Go on to pressors and SBP is stable in the low 90s. Cardiac cath,LANA and cardioversion planned for today. Assessment & Plan:   Sepsis(fever,tachycardia,leukocytosis) due to bilateral cellulitis in the setting of chronic venous stasis. -This is chronic recurrent problem. Blood cx negative   -ID consulted. On vancomycin,zosy and clindamycin. New onset atria fibrillation likely induced by sepsis  -rate variable. On lopressor. Started on eliqis. LANA and cardioversion planned for today. NSTEMI,no chest pain presently  -Elevated troponin. On asa,bb  -Cardiology involved  -Cath today. Chronic systolic CHF NYHA class II  -Continue medical management,resume lasix. Chronic oxygen dependent respiratory failure due to COPD and CHF: on home oxygen 2-3 l/min via nasal canula. COPD without exacerbation: continue home regimen    DM II,well controlled. A1C 6. Hold metformin in light of iv contrast.Accucheks. Use humalog sliding scale. Depression. Continue home medication. The patient denies homicidal or suicidal ideation. Hypertension. Continue home medication. Chronic pain syndrome with acute pain from leg cellulitis:continue home regimen,Ms contin 60 mg tid. Discussed with pharmacy who checked the ,he is using prn iv morphine due to acute pain;will transition to oral dilaudid as he does at home once    Anemia,drop in HB? No evidence of GI bleed or any blood loss  -monitor     Bump in creatinine,worsening. Could be contrast related or hypoperfusion due to low BP,cardiomyopathy  -Holding aldactone and lisinopril.transferred to CCU per cards for possible vasopressor/inotrope   -He is scheduled for cardiac cath. Renal function may worsen. will closely monitor. May consider renal involvement. Diet:cardiac  Code status: full  DVT prophylaxis: lovenox  Care Plan discussed with: patient. Hospital Problems  Date Reviewed: 8/13/2018          Codes Class Noted POA    * (Principal)NSTEMI (non-ST elevated myocardial infarction) [facility]) ICD-10-CM: I21.4  ICD-9-CM: 410.70  8/13/2018 Unknown                Review of Systems:   A comprehensive review of systems was negative except for that written in the HPI. Physical Examination:      Last 24hrs VS reviewed since prior progress note. Most recent are:  Visit Vitals    BP 94/64 (BP 1 Location: Left arm, BP Patient Position: At rest)    Pulse (!) 112    Temp 98 °F (36.7 °C)    Resp 19    Ht 6' 2\" (1.88 m)    Wt 112.4 kg (247 lb 11.2 oz)    SpO2 97%    BMI 31.8 kg/m2           Constitutional:  No acute distress, cooperative, pleasant    HEENT: Head is a traumatic,  Un icteric sclera. Pink conjunctiva,no erythema or discharge. Oral mucous moist, oropharynx benign. Neck supple,    Resp:  CTA bilaterally. No wheezing/rhonchi/rales. No accessory muscle use   CV:  Regular rhythm, normal rate, no murmurs, gallops, rubs    GI:  Soft, non distended, non tender.  normoactive bowel sounds, no hepatosplenomegaly    :  No CVA or suprapubic tenderness   Skin  :  No erythema,rash,bullae,dipigmentation     Musculoskeletal:  legs bandaged     Neurologic:  AAOx3, CN II-XII reviewed. Moves all extremities. Intake/Output Summary (Last 24 hours) at 08/17/18 0849  Last data filed at 08/17/18 0300   Gross per 24 hour   Intake                0 ml   Output              710 ml   Net             -710 ml          Data Review:    Review and/or order of clinical lab test  Review and/or order of tests in the radiology section of CPT  Review and/or order of tests in the medicine section of CPT      Labs:     Recent Labs      08/17/18   0326  08/16/18   0244   WBC  5.1  8.1   HGB  7.2*  7.9*   HCT  24.3*  26.0*   PLT  510*  568*     Recent Labs      08/17/18   0326  08/16/18   0244  08/15/18   0333   NA  136  134*  132*   K  4.2  4.2  3.8   CL  104  102  100   CO2  22  20*  23   BUN  18  15  12   CREA  1.88*  1.48*  0.89   GLU  84  97  110*   CA  7.7*  8.0*  7.9*   MG   --    --   2.0     Recent Labs      08/15/18   0333   SGOT  27   ALT  11*   AP  206*   TBILI  0.7   TP  7.5   ALB  1.9*   GLOB  5.6*     No results for input(s): INR, PTP, APTT in the last 72 hours. No lab exists for component: INREXT, INREXT   No results for input(s): FE, TIBC, PSAT, FERR in the last 72 hours. Lab Results   Component Value Date/Time    Folate 7.9 02/27/2018 03:52 AM    Folate 7.5 02/27/2018 03:52 AM      No results for input(s): PH, PCO2, PO2 in the last 72 hours.   Recent Labs      08/14/18   1035   TROIQ  0.50*     Lab Results   Component Value Date/Time    Cholesterol, total 109 08/14/2018 03:39 AM    HDL Cholesterol 24 08/14/2018 03:39 AM    LDL,Direct 193 (H) 05/21/2014 09:59 AM    LDL, calculated 72.2 08/14/2018 03:39 AM    Triglyceride 64 08/14/2018 03:39 AM    CHOL/HDL Ratio 4.5 08/14/2018 03:39 AM     Lab Results   Component Value Date/Time    Glucose (POC) 115 (H) 08/17/2018 06:44 AM    Glucose (POC) 137 (H) 08/16/2018 10:11 PM    Glucose (POC) 105 (H) 08/16/2018 04:46 PM    Glucose (POC) 123 (H) 08/16/2018 11:32 AM    Glucose (POC) 119 (H) 08/16/2018 06:36 AM     Lab Results   Component Value Date/Time    Color DARK YELLOW 07/09/2018 10:39 PM    Appearance CLEAR 07/09/2018 10:39 PM    Specific gravity 1.017 07/09/2018 10:39 PM    pH (UA) 8.0 07/09/2018 10:39 PM    Protein TRACE (A) 07/09/2018 10:39 PM    Glucose NEGATIVE  07/09/2018 10:39 PM    Ketone NEGATIVE  07/09/2018 10:39 PM    Bilirubin NEGATIVE  07/09/2018 10:39 PM    Urobilinogen 1.0 07/09/2018 10:39 PM    Nitrites NEGATIVE  07/09/2018 10:39 PM    Leukocyte Esterase NEGATIVE  07/09/2018 10:39 PM    Epithelial cells FEW 07/09/2018 10:39 PM    Bacteria NEGATIVE  07/09/2018 10:39 PM    WBC 0-4 07/09/2018 10:39 PM    RBC 0-5 07/09/2018 10:39 PM         Medications Reviewed:     Current Facility-Administered Medications   Medication Dose Route Frequency    metoprolol tartrate (LOPRESSOR) tablet 25 mg  25 mg Oral Q12H    apixaban (ELIQUIS) tablet 5 mg  5 mg Oral BID    nicotine (NICODERM CQ) 21 mg/24 hr patch 1 Patch  1 Patch TransDERmal DAILY    zolpidem (AMBIEN) tablet 10 mg  10 mg Oral QHS    furosemide (LASIX) tablet 20 mg  20 mg Oral DAILY    morphine CR (MS CONTIN) tablet 60 mg  60 mg Oral TID    clindamycin (CLEOCIN) 600mg D5W 50mL IVPB (premix)  600 mg IntraVENous Q8H    allopurinol (ZYLOPRIM) tablet 100 mg  100 mg Oral DAILY    aspirin delayed-release tablet 81 mg  81 mg Oral DAILY    atorvastatin (LIPITOR) tablet 80 mg  80 mg Oral DAILY    buPROPion SR (WELLBUTRIN SR) tablet 150 mg  150 mg Oral BID    citalopram (CELEXA) tablet 40 mg  40 mg Oral DAILY    rOPINIRole (REQUIP) tablet 8 mg  8 mg Oral QHS    sodium chloride (NS) flush 5-10 mL  5-10 mL IntraVENous Q8H    sodium chloride (NS) flush 5-10 mL  5-10 mL IntraVENous PRN    glucose chewable tablet 16 g  4 Tab Oral PRN    dextrose (D50W) injection syrg 12.5-25 g  12.5-25 g IntraVENous PRN    glucagon (GLUCAGEN) injection 1 mg  1 mg IntraMUSCular PRN    sodium chloride (NS) flush 5-10 mL  5-10 mL IntraVENous Q8H    sodium chloride (NS) flush 5-10 mL  5-10 mL IntraVENous PRN    nitroglycerin (NITROSTAT) tablet 0.4 mg  0.4 mg SubLINGual Q5MIN PRN    insulin lispro (HUMALOG) injection   SubCUTAneous AC&HS    acetaminophen (TYLENOL) tablet 650 mg  650 mg Oral Q4H PRN    morphine injection 2 mg  2 mg IntraVENous Q4H PRN    piperacillin-tazobactam (ZOSYN) 3.375 g in 0.9% sodium chloride (MBP/ADV) 100 mL  3.375 g IntraVENous Q8H    arformoterol (BROVANA) neb solution 15 mcg  15 mcg Nebulization BID RT    And    budesonide (PULMICORT) 500 mcg/2 ml nebulizer suspension  500 mcg Nebulization BID RT    albuterol-ipratropium (DUO-NEB) 2.5 MG-0.5 MG/3 ML  3 mL Nebulization Q4H PRN    famotidine (PEPCID) tablet 20 mg  20 mg Oral BID    Vancomycin - pharmacy to dose   Other Rx Dosing/Monitoring     ______________________________________________________________________  EXPECTED LENGTH OF STAY: 4d 21h  ACTUAL LENGTH OF STAY:          4                 José Miguel Tom MD

## 2018-08-17 NOTE — ANESTHESIA PREPROCEDURE EVALUATION
Anesthetic History   No history of anesthetic complications            Review of Systems / Medical History  Patient summary reviewed, nursing notes reviewed and pertinent labs reviewed    Pulmonary    COPD: mild    Sleep apnea: No treatment           Neuro/Psych   Within defined limits           Cardiovascular    Hypertension: well controlled          CAD         GI/Hepatic/Renal  Within defined limits              Endo/Other    Diabetes: well controlled, type 2    Morbid obesity     Other Findings              Physical Exam    Airway  Mallampati: II  TM Distance: > 6 cm  Neck ROM: normal range of motion   Mouth opening: Normal     Cardiovascular  Regular rate and rhythm,  S1 and S2 normal,  no murmur, click, rub, or gallop             Dental  No notable dental hx       Pulmonary  Breath sounds clear to auscultation               Abdominal  GI exam deferred       Other Findings            Anesthetic Plan    ASA: 3  Anesthesia type: MAC          Induction: Intravenous  Anesthetic plan and risks discussed with: Patient

## 2018-08-17 NOTE — PROGRESS NOTES
0730 Bedside shift change report given to Estefani Conteh (oncoming nurse) by Vonnie Zavala (offgoing nurse). Report included the following information SBAR, Kardex, Procedure Summary, Intake/Output, MAR, Recent Results and Cardiac Rhythm A flutter. 1130 TRANSFER - OUT REPORT:  Verbal report given to Kyaw To and Leann Oden (name) on Jessica Marroquin Nine  being transferred to Cath Lab (unit) for ordered procedure     Report consisted of patients Situation, Background, Assessment and   Recommendations(SBAR). Information from the following report(s) SBAR, Kardex, Procedure Summary, Intake/Output, MAR, Recent Results and Cardiac Rhythm A flutter was reviewed with the receiving nurse. Lines:   Peripheral IV 08/16/18 Right Antecubital (Active)   Site Assessment Clean, dry, & intact 8/17/2018  8:00 AM   Phlebitis Assessment 0 8/17/2018  8:00 AM   Infiltration Assessment 0 8/17/2018  8:00 AM   Dressing Status Clean, dry, & intact 8/17/2018  8:00 AM   Dressing Type Transparent 8/17/2018  8:00 AM   Hub Color/Line Status Blue; Infusing 8/17/2018  8:00 AM   Action Taken Open ports on tubing capped 8/17/2018  8:00 AM   Alcohol Cap Used Yes 8/17/2018  8:00 AM       Peripheral IV 08/17/18 Right Wrist (Active)   Site Assessment Clean, dry, & intact 8/17/2018  8:00 AM   Phlebitis Assessment 0 8/17/2018  8:00 AM   Infiltration Assessment 0 8/17/2018  8:00 AM   Dressing Status Clean, dry, & intact 8/17/2018  8:00 AM   Dressing Type Transparent 8/17/2018  8:00 AM   Hub Color/Line Status Pink;Capped;Flushed 8/17/2018  8:00 AM   Action Taken Open ports on tubing capped 8/17/2018  8:00 AM   Alcohol Cap Used Yes 8/17/2018  8:00 AM   Opportunity for questions and clarification was provided.     Patient transported with:   Monitor  O2 @ 4 liters  Registered Nurse  1325 TRANSFER - IN REPORT:  Verbal report received from Kyaw To (name) on Jessica Marroquin Nine  being received from Cath Lab (unit) for routine post - op    Report consisted of patients Situation, Background, Assessment and   Recommendations(SBAR). Information from the following report(s) SBAR, Kardex, Procedure Summary, Intake/Output, MAR, Recent Results and Cardiac Rhythm AVB 1 degree was reviewed with the receiving nurse. Opportunity for questions and clarification was provided. Assessment completed upon patients arrival to unit and care assumed. 1330 Pt arrived to unit, sleepy and lethargic. Blood glucose 79, given 12.5 g of Dextrose. Recheck was 113. Orders for Timbo discontinued during transfer, still on 80 mcg/min of Timbo with MAP 65.  1530 Amio bolus given, labs drawn. New IV started. 1630 Increased LPM from 4L to 6L, O2 sats 90%, tachypneic respirations into the 30s, c/o SOB. Page out to hospitalist. Pt not tolerating weaning Timbo.  1645 Bladder scanned patient, 0 mL of urine. Pt has not voided since this morning. 1730 CPAP initiated, immediate increase in O2 sats from low 80s to 95%  1830 Valle catheter inserted. 1930 Bedside report given to Stephanie Garvey        Problem: Falls - Risk of  Goal: *Absence of Falls  Document Jarad Fall Risk and appropriate interventions in the flowsheet. Outcome: Progressing Towards Goal  Fall Risk Interventions:  Mobility Interventions: Communicate number of staff needed for ambulation/transfer         Medication Interventions: Evaluate medications/consider consulting pharmacy    Elimination Interventions: Call light in reach             Problem: Pressure Injury - Risk of  Goal: *Prevention of pressure injury  Document Charlie Scale and appropriate interventions in the flowsheet.    Outcome: Progressing Towards Goal  Pressure Injury Interventions:  Sensory Interventions: Assess changes in LOC    Moisture Interventions: Absorbent underpads    Activity Interventions: Increase time out of bed    Mobility Interventions: HOB 30 degrees or less    Nutrition Interventions: Document food/fluid/supplement intake    Friction and Shear Interventions: Foam dressings/transparent film/skin sealants

## 2018-08-17 NOTE — PROGRESS NOTES
Cardiac Cath Lab Procedure Area Arrival Note:    Neftali Villalta arrived to Cardiac Cath Lab, Procedure Area. Patient identifiers verified with NAME and DATE OF BIRTH. Procedure verified with patient. Consent forms verified. Allergies verified. Patient informed of procedure and plan of care. Questions answered with review. Patient voiced understanding of procedure and plan of care. Patient on cardiac monitor, non-invasive blood pressure, SPO2 monitor. On O2 @ 4 lpm via NC.  IV of normal saline on pump at 25 ml/hr. Patient status doing well with some problems : shortness of breath. Patient is A&Ox 3. Patient reports no pain. Patient medicated during procedure with orders obtained and verified by Dr. Brian Sterling. Refer to patients Cardiac Cath Lab PROCEDURE REPORT for vital signs, assessment, status, and response during procedure, printed at end of case. Printed report on chart or scanned into chart.

## 2018-08-17 NOTE — PROGRESS NOTES
2100 Paged Dr. Jones Schaefer. Pt has levophed orders with no central line access. 2115 Dr. Jones Schaefer paged in, Received orders to start mica if SBP gets below 80 and if needed to titrate obtain central line. 0730 Bedside and Verbal shift change report given to [address] [place] [address] (oncoming nurse) by Ela Trevino RN (offgoing nurse). Report included the following information SBAR, ED Summary, Procedure Summary, Recent Results and Cardiac Rhythm a-flutter. 2020 TRANSFER - IN REPORT:    Verbal report received from [facility] [address] RN(name) on Sandrine Self Nine  being received from [facility] (unit) for change in patient condition(hypotensive)      Report consisted of patients Situation, Background, Assessment and   Recommendations(SBAR). Information from the following report(s) SBAR, ED Summary, Recent Results and Cardiac Rhythm a flutter was reviewed with the receiving nurse. Opportunity for questions and clarification was provided. Assessment completed upon patients arrival to unit and care assumed.      Primary Nurse Shon Freed RN and cat, RN performed a dual skin assessment on this patient Impairment noted- see wound doc flow sheet  Charlie score is charted    TOTAL SPORT BED

## 2018-08-18 NOTE — PROGRESS NOTES
Problem: Falls - Risk of  Goal: *Absence of Falls  Document Jarad Fall Risk and appropriate interventions in the flowsheet. Outcome: Progressing Towards Goal  Fall Risk Interventions:  Mobility Interventions: Communicate number of staff needed for ambulation/transfer         Medication Interventions: Evaluate medications/consider consulting pharmacy    Elimination Interventions: Call light in reach             Problem: Pressure Injury - Risk of  Goal: *Prevention of pressure injury  Document Charlie Scale and appropriate interventions in the flowsheet.    Outcome: Progressing Towards Goal  Pressure Injury Interventions:  Sensory Interventions: Assess changes in LOC    Moisture Interventions: Absorbent underpads    Activity Interventions: Increase time out of bed    Mobility Interventions: HOB 30 degrees or less, Pressure redistribution bed/mattress (bed type)    Nutrition Interventions: Document food/fluid/supplement intake    Friction and Shear Interventions: Feet elevated on foot rest, Lift sheet, HOB 30 degrees or less, Minimize layers

## 2018-08-18 NOTE — PROGRESS NOTES
1845 Bedside report received from [facility] Pt BP dropping. Increased Timbo, laid supine in bed  1930 Bedside report given to Ashukayleigh Fariba      Problem: Falls - Risk of  Goal: *Absence of Falls  Document Jarad Fall Risk and appropriate interventions in the flowsheet. Outcome: Progressing Towards Goal  Fall Risk Interventions:  Mobility Interventions: Communicate number of staff needed for ambulation/transfer         Medication Interventions: Evaluate medications/consider consulting pharmacy    Elimination Interventions: Call light in reach             Problem: Pressure Injury - Risk of  Goal: *Prevention of pressure injury  Document Charlie Scale and appropriate interventions in the flowsheet.    Outcome: Progressing Towards Goal  Pressure Injury Interventions:  Sensory Interventions: Assess changes in LOC    Moisture Interventions: Absorbent underpads    Activity Interventions: Increase time out of bed    Mobility Interventions: HOB 30 degrees or less, Pressure redistribution bed/mattress (bed type)    Nutrition Interventions: Document food/fluid/supplement intake    Friction and Shear Interventions: Feet elevated on foot rest, Lift sheet, HOB 30 degrees or less, Minimize layers

## 2018-08-18 NOTE — PROGRESS NOTES
Hospitalist Progress Note                               Socorro Stewart MD                                     Answering service: [phone]                               OR 1224 from in house phone                                         Date of Service:  2018  NAME:  Ta Villalta  :  1960  MRN:  [id number]      Admission Summary:   The patient is a 78-year-old gentleman with past medical history of coronary artery disease, chronic systolic CHF, NYHA class II, COPD, diabetes mellitus type 2, hypertension, hyperlipidemia, MI, restless leg syndrome, gait abnormality who was recently admitted to the hospital in July with fever, tachycardia, probably secondary to cellulitis of the legs and pneumonia, hypotension, chronic hypoxic respiratory failure due to COPD, he presents to the hospital today with SOB      Reason for follow up:Cellultiis   Mr Pawan is much better. Diuresed well over night. Assessment & Plan:   Acute on Chronic systolic CHF NYHA class IV with acute pul edema  -Diuresed well,continue lasix.  -Continue other  medical management  -On pressors. Sepsis(fever,tachycardia,leukocytosis) due to bilateral cellulitis in the setting of chronic venous stasis. -This is chronic recurrent problem. Blood cx negative   -ID consulted,stopped vancomycin,zosy and clindamycin.  -On Nafcillin now     New onset atria fibrillation likely induced by sepsis  -rate variable. On lopressor. Started on eliqis. S/P LANA and cardioversion. NSTEMI,no chest pain presently  -Elevated troponin. On asa,bb  -Cardiology involved  -Cath today. Chronic oxygen dependent respiratory failure due to COPD and CHF: on home oxygen 2-3 l/min via nasal canula. COPD without exacerbation: continue home regimen    DM II,well controlled. A1C 6. Hold metformin in light of iv contrast.Accucheks. Use humalog sliding scale. Depression. Continue home medication.   The patient denies homicidal or suicidal ideation. Hypertension. Continue home medication. Chronic pain syndrome with acute pain from leg cellulitis:continue home regimen,Ms contin 60 mg tid. Discussed with pharmacy who checked the ,he is using prn iv morphine due to acute pain;will transition to oral dilaudid as he does at home once    Anemia,drop in HB? No evidence of GI bleed or any blood loss  -monitor     Bump in creatinine,worsening. Could be contrast related or hypoperfusion due to low BP,cardiomyopathy  -Holding aldactone and lisinopril. On vasopressor/inotrope   -Diuresing well. Creatinien better or stable. -Collins placed for strict I and O. Remove collins. Diet:cardiac  Code status: full  DVT prophylaxis: lovenox  Care Plan discussed with: patient. Hospital Problems  Date Reviewed: 8/13/2018          Codes Class Noted POA    * (Principal)NSTEMI (non-ST elevated myocardial infarction) [facility]) ICD-10-CM: I21.4  ICD-9-CM: 410.70  8/13/2018 Unknown                Review of Systems:   A comprehensive review of systems was negative except for that written in the HPI. Physical Examination:      Last 24hrs VS reviewed since prior progress note. Most recent are:  Visit Vitals    /70    Pulse 78    Temp 97.4 °F (36.3 °C)    Resp 12    Ht 6' 2\" (1.88 m)    Wt 110 kg (242 lb 6.4 oz)    SpO2 94%    BMI 31.12 kg/m2           Constitutional:  No acute distress, cooperative, pleasant    HEENT: Head is a traumatic,  Un icteric sclera. Pink conjunctiva,no erythema or discharge. Oral mucous moist, oropharynx benign. Neck supple,    Resp:  CTA bilaterally. No wheezing/rhonchi/rales. No accessory muscle use   CV:  Regular rhythm, normal rate, no murmurs, gallops, rubs    GI:  Soft, non distended, non tender.  normoactive bowel sounds, no hepatosplenomegaly    :  No CVA or suprapubic tenderness   Skin  :  No erythema,rash,bullae,dipigmentation     Musculoskeletal:  legs bandaged     Neurologic:  AAOx3, CN II-XII reviewed. Moves all extremities. Intake/Output Summary (Last 24 hours) at 08/18/18 0839  Last data filed at 08/18/18 0620   Gross per 24 hour   Intake          1678.66 ml   Output             4325 ml   Net         -2646.34 ml          Data Review:    Review and/or order of clinical lab test  Review and/or order of tests in the radiology section of CPT  Review and/or order of tests in the medicine section of CPT      Labs:     Recent Labs      08/17/18   0326  08/16/18   0244   WBC  5.1  8.1   HGB  7.2*  7.9*   HCT  24.3*  26.0*   PLT  510*  568*     Recent Labs      08/18/18   0433  08/17/18   0326  08/16/18   0244   NA  139  136  134*   K  4.2  4.2  4.2   CL  103  104  102   CO2  24  22  20*   BUN  18  18  15   CREA  1.80*  1.88*  1.48*   GLU  76  84  97   CA  7.9*  7.7*  8.0*     No results for input(s): SGOT, GPT, ALT, AP, TBIL, TBILI, TP, ALB, GLOB, GGT, AML, LPSE in the last 72 hours. No lab exists for component: AMYP, HLPSE  No results for input(s): INR, PTP, APTT in the last 72 hours. No lab exists for component: INREXT, INREXT   No results for input(s): FE, TIBC, PSAT, FERR in the last 72 hours. Lab Results   Component Value Date/Time    Folate 7.9 02/27/2018 03:52 AM    Folate 7.5 02/27/2018 03:52 AM      No results for input(s): PH, PCO2, PO2 in the last 72 hours. No results for input(s): CPK, CKNDX, TROIQ in the last 72 hours.     No lab exists for component: CPKMB  Lab Results   Component Value Date/Time    Cholesterol, total 109 08/14/2018 03:39 AM    HDL Cholesterol 24 08/14/2018 03:39 AM    LDL,Direct 193 (H) 05/21/2014 09:59 AM    LDL, calculated 72.2 08/14/2018 03:39 AM    Triglyceride 64 08/14/2018 03:39 AM    CHOL/HDL Ratio 4.5 08/14/2018 03:39 AM     Lab Results   Component Value Date/Time    Glucose (POC) 84 08/18/2018 06:34 AM    Glucose (POC) 98 08/17/2018 11:01 PM    Glucose (POC) 81 08/17/2018 04:35 PM    Glucose (POC) 113 (H) 08/17/2018 01:54 PM    Glucose (POC) 79 08/17/2018 01:34 PM     Lab Results   Component Value Date/Time    Color DARK YELLOW 07/09/2018 10:39 PM    Appearance CLEAR 07/09/2018 10:39 PM    Specific gravity 1.017 07/09/2018 10:39 PM    pH (UA) 8.0 07/09/2018 10:39 PM    Protein TRACE (A) 07/09/2018 10:39 PM    Glucose NEGATIVE  07/09/2018 10:39 PM    Ketone NEGATIVE  07/09/2018 10:39 PM    Bilirubin NEGATIVE  07/09/2018 10:39 PM    Urobilinogen 1.0 07/09/2018 10:39 PM    Nitrites NEGATIVE  07/09/2018 10:39 PM    Leukocyte Esterase NEGATIVE  07/09/2018 10:39 PM    Epithelial cells FEW 07/09/2018 10:39 PM    Bacteria NEGATIVE  07/09/2018 10:39 PM    WBC 0-4 07/09/2018 10:39 PM    RBC 0-5 07/09/2018 10:39 PM         Medications Reviewed:     Current Facility-Administered Medications   Medication Dose Route Frequency    PHENYLephrine (PF)(DAYA-SYNEPHRINE) 30 mg in 0.9% sodium chloride 250 mL infusion   mcg/min IntraVENous TITRATE    amiodarone (CORDARONE) tablet 400 mg  400 mg Oral BIDPC    nafcillin (NALLPEN) 2 g in 0.9% sodium chloride (MBP/ADV) 100 mL  2 g IntraVENous Q6H    DOBUTamine (DOBUTREX) 2,000 mcg/ml infusion  5 mcg/kg/min IntraVENous CONTINUOUS    furosemide (LASIX) injection 40 mg  40 mg IntraVENous DAILY    metoprolol tartrate (LOPRESSOR) tablet 25 mg  25 mg Oral Q12H    apixaban (ELIQUIS) tablet 5 mg  5 mg Oral BID    nicotine (NICODERM CQ) 21 mg/24 hr patch 1 Patch  1 Patch TransDERmal DAILY    zolpidem (AMBIEN) tablet 10 mg  10 mg Oral QHS    morphine CR (MS CONTIN) tablet 60 mg  60 mg Oral TID    allopurinol (ZYLOPRIM) tablet 100 mg  100 mg Oral DAILY    aspirin delayed-release tablet 81 mg  81 mg Oral DAILY    atorvastatin (LIPITOR) tablet 80 mg  80 mg Oral DAILY    buPROPion SR (WELLBUTRIN SR) tablet 150 mg  150 mg Oral BID    rOPINIRole (REQUIP) tablet 8 mg  8 mg Oral QHS    glucose chewable tablet 16 g  4 Tab Oral PRN    dextrose (D50W) injection syrg 12.5-25 g  12.5-25 g IntraVENous PRN    glucagon (GLUCAGEN) injection 1 mg  1 mg IntraMUSCular PRN    sodium chloride (NS) flush 5-10 mL  5-10 mL IntraVENous Q8H    sodium chloride (NS) flush 5-10 mL  5-10 mL IntraVENous PRN    nitroglycerin (NITROSTAT) tablet 0.4 mg  0.4 mg SubLINGual Q5MIN PRN    insulin lispro (HUMALOG) injection   SubCUTAneous AC&HS    acetaminophen (TYLENOL) tablet 650 mg  650 mg Oral Q4H PRN    morphine injection 2 mg  2 mg IntraVENous Q4H PRN    arformoterol (BROVANA) neb solution 15 mcg  15 mcg Nebulization BID RT    And    budesonide (PULMICORT) 500 mcg/2 ml nebulizer suspension  500 mcg Nebulization BID RT    albuterol-ipratropium (DUO-NEB) 2.5 MG-0.5 MG/3 ML  3 mL Nebulization Q4H PRN    famotidine (PEPCID) tablet 20 mg  20 mg Oral BID     ______________________________________________________________________  EXPECTED LENGTH OF STAY: 4d 21h  ACTUAL LENGTH OF STAY:          5                 1100 Ruben Gant MD

## 2018-08-18 NOTE — PROGRESS NOTES
Cardiology Progress Note  2018     Admit Date: 2018  Admit Diagnosis: NSTEMI (non-ST elevated myocardial infarction) ([facility].)  CC: none currently    Assessment:   Principal Problem:    NSTEMI (non-ST elevated myocardial infarction) ([facility] (2018)      Plan:   Remains on Timbo and dobutamine. Less SOB. Diuresing well and maintaining SR. Volume status:euvolemic  Renal function: stable    For other plans, see orders.   Subjective: Claudia Nichole Nine reports   Chest Pain:  [x]   none,  consistent with  []   non-cardiac   []   atypical   []   angina             [x]   none now    []      on-going  Dyspnea: [x]   none  []   at rest  []   with exertion     []   improved   []   unchanged   []   worsening  PND:       [x]   none  []   overnight    Orthopnea: [x]   none  []   improved  []   unchanged  []   worsening  Presyncope: [x]   none   []   improved    []   unchanged    []   worsening  Ambulated in hallway without symptoms  []   Yes  Ambulated in room without symptoms  []   Yes    Objective:    Physical Exam:  Overall VSSAF;    Visit Vitals    /70    Pulse 78    Temp 97.4 °F (36.3 °C)    Resp 12    Ht 6' 2\" (1.88 m)    Wt 110 kg (242 lb 6.4 oz)    SpO2 94%    BMI 31.12 kg/m2     Temp (24hrs), Av.1 °F (36.2 °C), Min:96.6 °F (35.9 °C), Max:97.4 °F (36.3 °C)    Patient Vitals for the past 8 hrs:   Pulse   18 0700 78   18 0630 81   18 0600 83   18 0530 80   18 0500 79   18 0430 85   18 0400 79   18 0330 85   18 0300 84   18 0230 86   18 0200 78   18 0130 88   18 0100 76    Patient Vitals for the past 8 hrs:   Resp   18 0700 12   18 0630 15   18 0600 15   18 0530 15   18 0500 14   18 0430 19   18 0400 15   18 0330 21   18 0300 20   18 0230 21   18 0200 14   18 0130 (!) 35   18 0100 13    Patient Vitals for the past 8 hrs:   BP   18 0700 127/70   08/18/18 0630 128/70   08/18/18 0600 130/73   08/18/18 0530 123/70   08/18/18 0500 122/70   08/18/18 0430 124/78   08/18/18 0400 117/63   08/18/18 0330 102/82   08/18/18 0300 115/63   08/18/18 0230 114/69   08/18/18 0200 106/67   08/18/18 0130 123/82   08/18/18 0100 121/74        Intake/Output Summary (Last 24 hours) at 08/18/18 0822  Last data filed at 08/18/18 0226   Gross per 24 hour   Intake          1678.66 ml   Output             4325 ml   Net         -2646.34 ml       General Appearance: No acute distress. Ears/Nose/Mouth/Throat:   Normal MM; anicteric. JVP: WNL   Resp:   Lungs clear to auscultation bilaterally. Mild increased  resp effort. Cardiovascular:  RRR, S1, S2 normal, no new murmur. No gallop or rub. Abdomen:   Soft, non-tender, bowel sounds are present. Extremities: No edema bilaterally. Skin:  Neuro: Warm and dry. A/O x3, grossly nonfocal    []      cath site intact w/o hematoma or bruit; distal pulse unchanged. Data Review:     Telemetry independently reviewed : [x]   sinus  []   chronic afib   []   par afib  []      NSVT    ECG independently reviewed:  []   NSR   []   no significant changes  []   no new ECG provided for review  Lab results reviewed as noted below. Current medications reviewed as noted below. No results for input(s): PH, PCO2, PO2 in the last 72 hours. No results for input(s): CPK, CKMB, TROIQ in the last 72 hours. Recent Labs      08/18/18   0433  08/17/18   0326  08/16/18   0244   NA  139  136  134*   K  4.2  4.2  4.2   CL  103  104  102   CO2  24  22  20*   BUN  18  18  15   CREA  1.80*  1.88*  1.48*   GLU  76  84  97   CA  7.9*  7.7*  8.0*   WBC   --   5.1  8.1   HGB   --   7.2*  7.9*   HCT   --   24.3*  26.0*   PLT   --   510*  568*     No results for input(s): SGOT, GPT, ALT, AP, TBIL, TBILI, TP, ALB, GLOB, GGT, AML, LPSE in the last 72 hours.     No lab exists for component: AMYP, HLPSE  No results for input(s): INR, PTP, APTT in the last 72 hours. No lab exists for component: INREXT   No results for input(s): FE, TIBC, PSAT, FERR in the last 72 hours.    Lab Results   Component Value Date/Time    Glucose (POC) 84 08/18/2018 06:34 AM    Glucose (POC) 98 08/17/2018 11:01 PM    Glucose (POC) 81 08/17/2018 04:35 PM    Glucose (POC) 113 (H) 08/17/2018 01:54 PM    Glucose (POC) 79 08/17/2018 01:34 PM       Current Facility-Administered Medications   Medication Dose Route Frequency    PHENYLephrine (PF)(DAYA-SYNEPHRINE) 30 mg in 0.9% sodium chloride 250 mL infusion   mcg/min IntraVENous TITRATE    amiodarone (CORDARONE) tablet 400 mg  400 mg Oral BIDPC    nafcillin (NALLPEN) 2 g in 0.9% sodium chloride (MBP/ADV) 100 mL  2 g IntraVENous Q6H    DOBUTamine (DOBUTREX) 2,000 mcg/ml infusion  5 mcg/kg/min IntraVENous CONTINUOUS    furosemide (LASIX) injection 40 mg  40 mg IntraVENous DAILY    metoprolol tartrate (LOPRESSOR) tablet 25 mg  25 mg Oral Q12H    apixaban (ELIQUIS) tablet 5 mg  5 mg Oral BID    nicotine (NICODERM CQ) 21 mg/24 hr patch 1 Patch  1 Patch TransDERmal DAILY    zolpidem (AMBIEN) tablet 10 mg  10 mg Oral QHS    morphine CR (MS CONTIN) tablet 60 mg  60 mg Oral TID    allopurinol (ZYLOPRIM) tablet 100 mg  100 mg Oral DAILY    aspirin delayed-release tablet 81 mg  81 mg Oral DAILY    atorvastatin (LIPITOR) tablet 80 mg  80 mg Oral DAILY    buPROPion SR (WELLBUTRIN SR) tablet 150 mg  150 mg Oral BID    rOPINIRole (REQUIP) tablet 8 mg  8 mg Oral QHS    glucose chewable tablet 16 g  4 Tab Oral PRN    dextrose (D50W) injection syrg 12.5-25 g  12.5-25 g IntraVENous PRN    glucagon (GLUCAGEN) injection 1 mg  1 mg IntraMUSCular PRN    sodium chloride (NS) flush 5-10 mL  5-10 mL IntraVENous Q8H    sodium chloride (NS) flush 5-10 mL  5-10 mL IntraVENous PRN    nitroglycerin (NITROSTAT) tablet 0.4 mg  0.4 mg SubLINGual Q5MIN PRN    insulin lispro (HUMALOG) injection   SubCUTAneous AC&HS    acetaminophen (TYLENOL) tablet 650 mg  650 mg Oral Q4H PRN    morphine injection 2 mg  2 mg IntraVENous Q4H PRN    arformoterol (BROVANA) neb solution 15 mcg  15 mcg Nebulization BID RT    And    budesonide (PULMICORT) 500 mcg/2 ml nebulizer suspension  500 mcg Nebulization BID RT    albuterol-ipratropium (DUO-NEB) 2.5 MG-0.5 MG/3 ML  3 mL Nebulization Q4H PRN    famotidine (PEPCID) tablet 20 mg  20 mg Oral BID        Rik Diaz MD

## 2018-08-18 NOTE — INTERDISCIPLINARY ROUNDS
IDR/SLIDR Summary          Patient: Brett Rod MRN: [id number]    Age: 59 y.o. YOB: 1960 Room/Bed: [address] [place] [address]   Admit Diagnosis: NSTEMI (non-ST elevated myocardial infarction) ([facility]  Principal Diagnosis: NSTEMI (non-ST elevated myocardial infarction) ([facility]   Goals: maintain BP WNL  Readmission: YES  Quality Measure: CHF and COPD  VTE Prophylaxis: Chemical  Influenza Vaccine screening completed? YES  Pneumococcal Vaccine screening completed? YES  Mobility needs: No Nutrition plan:Yes  Consults:Respiratory    Financial concerns:Yes  Escalated to CM? YES  RRAT Score: 15   Interventions:H2H  Testing due for pt today?  Yes  LOS: 5 days Expected length of stay 7 days  Discharge plan: Home   PCP: Giselle Jarrett MD  Transportation needs: No    Days before discharge:two or more days before discharge   Discharge disposition: Home    Signed:     Jeison Le RN  8/18/2018  6:01 AM

## 2018-08-18 NOTE — PROGRESS NOTES
2000  Bedside shift change report given to Raphael Talamantes (oncoming nurse) by Hakeem Johnson (offgoing nurse). Report included the following information SBAR, Kardex, Intake/Output, MAR, Recent Results and Cardiac Rhythm 1ST Degree AVB. Complete assessment done. 2200  Complete CHG bath and linens changed. Sched meds and nourishment given. Bilat leg dressings changed. 0000  No changes in assessment. Nourishment given. 0430  Labs drawn and sent. Complete assessment done. 0730  Bedside shift change report given to Hakeem Johnson (oncoming nurse) by Raphael Talamantes (offgoing nurse). Report included the following information SBAR, Kardex, Intake/Output, MAR, Recent Results and Cardiac Rhythm 1ST degree AVB.

## 2018-08-19 NOTE — INTERDISCIPLINARY ROUNDS
IDR/SLIDR Summary          Patient: Shanna Hernandez MRN: [id number]    Age: 59 y.o. YOB: 1960 Room/Bed: [address] [place] [address]   Admit Diagnosis: NSTEMI (non-ST elevated myocardial infarction) ([facility]  Principal Diagnosis: NSTEMI (non-ST elevated myocardial infarction) ([facility]   Goals: maintain BP WNL  Readmission: YES  Quality Measure: CHF and COPD  VTE Prophylaxis: Chemical  Influenza Vaccine screening completed? YES  Pneumococcal Vaccine screening completed? YES  Mobility needs: No Nutrition plan:Yes  Consults:Respiratory    Financial concerns:Yes  Escalated to CM? YES  RRAT Score: 15   Interventions:H2H  Testing due for pt today?  Yes  LOS: 6 days Expected length of stay 7 days  Discharge plan: Home   PCP: Aide Ch MD  Transportation needs: No    Days before discharge:two or more days before discharge   Discharge disposition: Home    Signed:     Sonal Lu RN  8/19/2018  6:01 AM

## 2018-08-19 NOTE — PROGRESS NOTES
Cardiology Progress Note  2018     Admit Date: 2018  Admit Diagnosis: NSTEMI (non-ST elevated myocardial infarction) [facility])  CC: none currently    Assessment:   Principal Problem:    NSTEMI (non-ST elevated myocardial infarction) ([address] (2018)      Plan:   Looks and feels better. Off Timbo, diuresing well and creatinine is improved. No changes for now. Volume status:euvolemic  Renal function: stable    For other plans, see orders.   Subjective: [address] reports   Chest Pain:  [x]   none,  consistent with  []   non-cardiac   []   atypical   []   angina             [x]   none now    []      on-going  Dyspnea: [x]   none  []   at rest  []   with exertion     []   improved   []   unchanged   []   worsening  PND:       [x]   none  []   overnight    Orthopnea: [x]   none  []   improved  []   unchanged  []   worsening  Presyncope: [x]   none   []   improved    []   unchanged    []   worsening  Ambulated in hallway without symptoms  []   Yes  Ambulated in room without symptoms  []   Yes    Objective:    Physical Exam:  Overall VSSAF;    Visit Vitals    /69    Pulse 77    Temp 97.3 °F (36.3 °C)    Resp 26    Ht 6' 2\" (1.88 m)    Wt 107.1 kg (236 lb 3.2 oz)    SpO2 91%    BMI 30.33 kg/m2     Temp (24hrs), Av.9 °F (36.6 °C), Min:97.3 °F (36.3 °C), Max:98.7 °F (37.1 °C)    Patient Vitals for the past 8 hrs:   Pulse   18 0900 77   18 0800 72   18 0730 75   18 0700 72   18 0630 80   18 0600 75   18 0500 76   18 0430 78   18 0400 77   18 0330 75   18 0300 75    Patient Vitals for the past 8 hrs:   Resp   18 0900 26   18 0800 13   18 0730 20   18 0700 19   08/19/18 0630 22   18 0600 17   18 0500 19   18 0430 18   18 0400 21   18 0330 18   18 0300 18    Patient Vitals for the past 8 hrs:   BP   18 0900 110/69   18 0800 98/67   18 0730 102/57 08/19/18 0700 105/60   08/19/18 0630 103/67   08/19/18 0600 105/55   08/19/18 0530 106/60   08/19/18 0500 105/61   08/19/18 0430 104/77   08/19/18 0400 109/68   08/19/18 0330 112/63   08/19/18 0300 106/58        Intake/Output Summary (Last 24 hours) at 08/19/18 1031  Last data filed at 08/19/18 0930   Gross per 24 hour   Intake          1602.97 ml   Output             4160 ml   Net         -2557.03 ml       General Appearance: No acute distress. Ears/Nose/Mouth/Throat:   Normal MM; anicteric. JVP: WNL   Resp:   Lungs clear to auscultation bilaterally. Nl resp effort. Cardiovascular:  RRR, S1, S2 normal, no new murmur. No gallop or rub. Abdomen:   Soft, non-tender, bowel sounds are present. Extremities: Wrapped legs and no change   Skin:  Neuro: Warm and dry. A/O x3, grossly nonfocal    []      cath site intact w/o hematoma or bruit; distal pulse unchanged. Data Review:     Telemetry independently reviewed : [x]   sinus  []   chronic afib   []   par afib  []      NSVT    ECG independently reviewed:  []   NSR   []   no significant changes  []   no new ECG provided for review  Lab results reviewed as noted below. Current medications reviewed as noted below. No results for input(s): PH, PCO2, PO2 in the last 72 hours. No results for input(s): CPK, CKMB, TROIQ in the last 72 hours. Recent Labs      08/19/18   0422  08/18/18   0433  08/17/18   0326   NA  138  139  136   K  3.4*  4.2  4.2   CL  102  103  104   CO2  29  24  22   BUN  16  18  18   CREA  1.58*  1.80*  1.88*   GLU  105*  76  84   CA  7.6*  7.9*  7.7*   WBC  8.2   --   5.1   HGB  8.1*   --   7.2*   HCT  27.3*   --   24.3*   PLT  488*   --   510*     No results for input(s): SGOT, GPT, ALT, AP, TBIL, TBILI, TP, ALB, GLOB, GGT, AML, LPSE in the last 72 hours. No lab exists for component: AMYP, HLPSE  No results for input(s): INR, PTP, APTT in the last 72 hours.     No lab exists for component: INREXT   No results for input(s): FE, TIBC, PSAT, FERR in the last 72 hours.    Lab Results   Component Value Date/Time    Glucose (POC) 141 (H) 08/19/2018 06:50 AM    Glucose (POC) 111 (H) 08/18/2018 10:04 PM    Glucose (POC) 93 08/18/2018 05:32 PM    Glucose (POC) 100 08/18/2018 11:06 AM    Glucose (POC) 84 08/18/2018 06:34 AM       Current Facility-Administered Medications   Medication Dose Route Frequency    PHENYLephrine (PF)(DAYA-SYNEPHRINE) 30 mg in 0.9% sodium chloride 250 mL infusion   mcg/min IntraVENous TITRATE    amiodarone (CORDARONE) tablet 400 mg  400 mg Oral BIDPC    nafcillin (NALLPEN) 2 g in 0.9% sodium chloride (MBP/ADV) 100 mL  2 g IntraVENous Q6H    DOBUTamine (DOBUTREX) 2,000 mcg/ml infusion  5 mcg/kg/min IntraVENous CONTINUOUS    furosemide (LASIX) injection 40 mg  40 mg IntraVENous DAILY    metoprolol tartrate (LOPRESSOR) tablet 25 mg  25 mg Oral Q12H    apixaban (ELIQUIS) tablet 5 mg  5 mg Oral BID    nicotine (NICODERM CQ) 21 mg/24 hr patch 1 Patch  1 Patch TransDERmal DAILY    zolpidem (AMBIEN) tablet 10 mg  10 mg Oral QHS    morphine CR (MS CONTIN) tablet 60 mg  60 mg Oral TID    allopurinol (ZYLOPRIM) tablet 100 mg  100 mg Oral DAILY    aspirin delayed-release tablet 81 mg  81 mg Oral DAILY    atorvastatin (LIPITOR) tablet 80 mg  80 mg Oral DAILY    buPROPion SR (WELLBUTRIN SR) tablet 150 mg  150 mg Oral BID    rOPINIRole (REQUIP) tablet 8 mg  8 mg Oral QHS    glucose chewable tablet 16 g  4 Tab Oral PRN    dextrose (D50W) injection syrg 12.5-25 g  12.5-25 g IntraVENous PRN    glucagon (GLUCAGEN) injection 1 mg  1 mg IntraMUSCular PRN    sodium chloride (NS) flush 5-10 mL  5-10 mL IntraVENous Q8H    sodium chloride (NS) flush 5-10 mL  5-10 mL IntraVENous PRN    nitroglycerin (NITROSTAT) tablet 0.4 mg  0.4 mg SubLINGual Q5MIN PRN    insulin lispro (HUMALOG) injection   SubCUTAneous AC&HS    acetaminophen (TYLENOL) tablet 650 mg  650 mg Oral Q4H PRN    morphine injection 2 mg  2 mg IntraVENous Q4H PRN    arformoterol (BROVANA) neb solution 15 mcg  15 mcg Nebulization BID RT    And    budesonide (PULMICORT) 500 mcg/2 ml nebulizer suspension  500 mcg Nebulization BID RT    albuterol-ipratropium (DUO-NEB) 2.5 MG-0.5 MG/3 ML  3 mL Nebulization Q4H PRN    famotidine (PEPCID) tablet 20 mg  20 mg Oral BID        Leandro Hebert MD

## 2018-08-19 NOTE — PROGRESS NOTES
2000  Bedside shift change report given to Johanny Gordon (oncoming nurse) by Paulina (offgoing nurse). Report included the following information SBAR, Kardex, Intake/Output, MAR, Recent Results and Cardiac Rhythm 1st degree AVB. 2110  Sched meds given. Complete CHG bath done and linens changed. Bilat leg dsg's changed per orders. 2309  Timbo gtt weaning in progress. Cont to monitor. 0000  No changes in assessment. VSS. Weaning of TIMBO in progress. 0430  Labs drawn and sent. No changes in assessment. Timbo gtt weaning in progress. 0600  sched. meds given. 0730  Bedside shift change report given to Paulina (oncoming nurse) by Johanny Gordon (offgoing nurse). Report included the following information SBAR, Kardex, Intake/Output, MAR, Recent Results and Cardiac Rhythm 1st degree AVB. Nivia Liao

## 2018-08-19 NOTE — PROGRESS NOTES
Problem: Falls - Risk of  Goal: *Absence of Falls  Document Jarad Fall Risk and appropriate interventions in the flowsheet.    Outcome: Progressing Towards Goal  Fall Risk Interventions:  Mobility Interventions: Communicate number of staff needed for ambulation/transfer, Patient to call before getting OOB, Strengthening exercises (ROM-active/passive)         Medication Interventions: Evaluate medications/consider consulting pharmacy    Elimination Interventions: Call light in reach

## 2018-08-19 NOTE — PROCEDURES
[address]    Torsten Martin  MR#: [id number]  : 1960  ACCOUNT #: [de-identified]   DATE OF SERVICE: 2018    Please note that the patient had a transesophageal echocardiogram just prior to cardioversion. This will be reported separately. In summary, no clots are seen and the patient was deemed an appropriate candidate for cardioversion. The patient's underlying rhythm is atrial flutter which has been difficult to control with medications both intravenous and oral.  The patient was monitored by the anesthesia service who administered Versed and propofol 150 joules of biphasic energy was delivered and one shock to convert the rhythm to sinus rhythm without incident. CONCLUSION:  Successful conversion of atrial flutter to normal sinus rhythm requiring one cardioversion and one shock. The procedure was well tolerated and there were no untoward complications.       MD SHRAVAN Stewart / PN  D: 2018 11:31     T: 2018 20:18  JOB #: [id number]

## 2018-08-19 NOTE — PROGRESS NOTES
Hospitalist Progress Note                               Lori Murray MD                                     Answering service: [phone]                               OR 1601 from in house phone                                         Date of Service:  2018  NAME:  Laine Villalta  :  1960  MRN:  [id number]      Admission Summary:   The patient is a 78-year-old gentleman with past medical history of coronary artery disease, chronic systolic CHF, NYHA class II, COPD, diabetes mellitus type 2, hypertension, hyperlipidemia, MI, restless leg syndrome, gait abnormality who was recently admitted to the hospital in July with fever, tachycardia, probably secondary to cellulitis of the legs and pneumonia, hypotension, chronic hypoxic respiratory failure due to COPD, he presents to the hospital today with SOB      Reason for follow up:Cellultiis   Sleeping. No new complaints. Assessment & Plan:   Acute on Chronic systolic CHF NYHA class IV with acute pul edema,cardiogenic shock  -Diuresed well with good urine out put,continue current dose of  lasix.  -Continue other  medical management  -Dobutamine,Timbo just stopped. Sepsis(fever,tachycardia,leukocytosis) due to bilateral cellulitis in the setting of chronic venous stasis. -This is chronic recurrent problem. Blood cx negative   -ID consulted,stopped vancomycin,zosy and clindamycin.  -On Nafcillin now     New onset atria fibrillation likely induced by sepsis  -rate variable. On lopressor. Started on eliqis. S/P LANA and cardioversion. NSTEMI,no chest pain presently  -Elevated troponin. On asa,bb  -Cardiology involved          Chronic oxygen dependent respiratory failure due to COPD and CHF: on home oxygen 2-3 l/min via nasal canula. SIRISHA: cpap  COPD without exacerbation: continue home regimen    DM II,well controlled. A1C 6. Hold metformin in light of iv contrast.Accucheks. Use humalog sliding scale. Depression. Continue home medication. The patient denies homicidal or suicidal ideation. Hypertension. Continue home medication. Chronic pain syndrome with acute pain from leg cellulitis:continue home regimen,Ms contin 60 mg tid. Discussed with pharmacy who checked the ,he is using prn iv morphine due to acute pain;will transition to oral dilaudid as he does at home once    Anemia,drop in HB? No evidence of GI bleed or any blood loss  -monitor     Bump in creatinine,worsening. Could be contrast related or hypoperfusion due to low BP,cardiomyopathy  -Holding aldactone and lisinopril. On vasopressor/inotrope   -Diuresing well. Creatinien continues to improve. Diet:cardiac  Code status: full  DVT prophylaxis: Eliquis. Care Plan discussed with: patient,nurse. Hospital Problems  Date Reviewed: 8/13/2018          Codes Class Noted POA    * (Principal)NSTEMI (non-ST elevated myocardial infarction) [facility]) ICD-10-CM: I21.4  ICD-9-CM: 410.70  8/13/2018 Unknown                Review of Systems:   A comprehensive review of systems was negative except for that written in the HPI. Physical Examination:      Last 24hrs VS reviewed since prior progress note. Most recent are:  Visit Vitals    /57    Pulse 75    Temp 98.1 °F (36.7 °C)    Resp 20    Ht 6' 2\" (1.88 m)    Wt 107.1 kg (236 lb 3.2 oz)    SpO2 94%    BMI 30.33 kg/m2           Constitutional:  No acute distress, cooperative, pleasant    HEENT: Head is a traumatic,  Un icteric sclera. Pink conjunctiva,no erythema or discharge. Oral mucous moist, oropharynx benign. Neck supple,    Resp:  CTA bilaterally. No wheezing/rhonchi/rales. No accessory muscle use   CV:  Regular rhythm, normal rate, no murmurs, gallops, rubs    GI:  Soft, non distended, non tender. normoactive bowel sounds, no hepatosplenomegaly    :  No CVA or suprapubic tenderness    Musculoskeletal:  legs bandaged,erythema to the left thigh almost cleared.     Neurologic: AAOx3, CN II-XII reviewed. Moves all extremities. Intake/Output Summary (Last 24 hours) at 08/19/18 0809  Last data filed at 08/19/18 0600   Gross per 24 hour   Intake          1908.74 ml   Output             3960 ml   Net         -2051.26 ml          Data Review:    Review and/or order of clinical lab test  Review and/or order of tests in the radiology section of CPT  Review and/or order of tests in the medicine section of CPT      Labs:     Recent Labs      08/19/18 0422  08/17/18   0326   WBC  8.2  5.1   HGB  8.1*  7.2*   HCT  27.3*  24.3*   PLT  488*  510*     Recent Labs      08/19/18   0422  08/18/18   0433  08/17/18   0326   NA  138  139  136   K  3.4*  4.2  4.2   CL  102  103  104   CO2  29  24  22   BUN  16  18  18   CREA  1.58*  1.80*  1.88*   GLU  105*  76  84   CA  7.6*  7.9*  7.7*     No results for input(s): SGOT, GPT, ALT, AP, TBIL, TBILI, TP, ALB, GLOB, GGT, AML, LPSE in the last 72 hours. No lab exists for component: AMYP, HLPSE  No results for input(s): INR, PTP, APTT in the last 72 hours. No lab exists for component: INREXT, INREXT   No results for input(s): FE, TIBC, PSAT, FERR in the last 72 hours. Lab Results   Component Value Date/Time    Folate 7.9 02/27/2018 03:52 AM    Folate 7.5 02/27/2018 03:52 AM      No results for input(s): PH, PCO2, PO2 in the last 72 hours. No results for input(s): CPK, CKNDX, TROIQ in the last 72 hours.     No lab exists for component: CPKMB  Lab Results   Component Value Date/Time    Cholesterol, total 109 08/14/2018 03:39 AM    HDL Cholesterol 24 08/14/2018 03:39 AM    LDL,Direct 193 (H) 05/21/2014 09:59 AM    LDL, calculated 72.2 08/14/2018 03:39 AM    Triglyceride 64 08/14/2018 03:39 AM    CHOL/HDL Ratio 4.5 08/14/2018 03:39 AM     Lab Results   Component Value Date/Time    Glucose (POC) 141 (H) 08/19/2018 06:50 AM    Glucose (POC) 111 (H) 08/18/2018 10:04 PM    Glucose (POC) 93 08/18/2018 05:32 PM    Glucose (POC) 100 08/18/2018 11:06 AM Glucose (POC) 84 08/18/2018 06:34 AM     Lab Results   Component Value Date/Time    Color DARK YELLOW 07/09/2018 10:39 PM    Appearance CLEAR 07/09/2018 10:39 PM    Specific gravity 1.017 07/09/2018 10:39 PM    pH (UA) 8.0 07/09/2018 10:39 PM    Protein TRACE (A) 07/09/2018 10:39 PM    Glucose NEGATIVE  07/09/2018 10:39 PM    Ketone NEGATIVE  07/09/2018 10:39 PM    Bilirubin NEGATIVE  07/09/2018 10:39 PM    Urobilinogen 1.0 07/09/2018 10:39 PM    Nitrites NEGATIVE  07/09/2018 10:39 PM    Leukocyte Esterase NEGATIVE  07/09/2018 10:39 PM    Epithelial cells FEW 07/09/2018 10:39 PM    Bacteria NEGATIVE  07/09/2018 10:39 PM    WBC 0-4 07/09/2018 10:39 PM    RBC 0-5 07/09/2018 10:39 PM         Medications Reviewed:     Current Facility-Administered Medications   Medication Dose Route Frequency    PHENYLephrine (PF)(DAYA-SYNEPHRINE) 30 mg in 0.9% sodium chloride 250 mL infusion   mcg/min IntraVENous TITRATE    amiodarone (CORDARONE) tablet 400 mg  400 mg Oral BIDPC    nafcillin (NALLPEN) 2 g in 0.9% sodium chloride (MBP/ADV) 100 mL  2 g IntraVENous Q6H    DOBUTamine (DOBUTREX) 2,000 mcg/ml infusion  5 mcg/kg/min IntraVENous CONTINUOUS    furosemide (LASIX) injection 40 mg  40 mg IntraVENous DAILY    metoprolol tartrate (LOPRESSOR) tablet 25 mg  25 mg Oral Q12H    apixaban (ELIQUIS) tablet 5 mg  5 mg Oral BID    nicotine (NICODERM CQ) 21 mg/24 hr patch 1 Patch  1 Patch TransDERmal DAILY    zolpidem (AMBIEN) tablet 10 mg  10 mg Oral QHS    morphine CR (MS CONTIN) tablet 60 mg  60 mg Oral TID    allopurinol (ZYLOPRIM) tablet 100 mg  100 mg Oral DAILY    aspirin delayed-release tablet 81 mg  81 mg Oral DAILY    atorvastatin (LIPITOR) tablet 80 mg  80 mg Oral DAILY    buPROPion SR (WELLBUTRIN SR) tablet 150 mg  150 mg Oral BID    rOPINIRole (REQUIP) tablet 8 mg  8 mg Oral QHS    glucose chewable tablet 16 g  4 Tab Oral PRN    dextrose (D50W) injection syrg 12.5-25 g  12.5-25 g IntraVENous PRN    glucagon (GLUCAGEN) injection 1 mg  1 mg IntraMUSCular PRN    sodium chloride (NS) flush 5-10 mL  5-10 mL IntraVENous Q8H    sodium chloride (NS) flush 5-10 mL  5-10 mL IntraVENous PRN    nitroglycerin (NITROSTAT) tablet 0.4 mg  0.4 mg SubLINGual Q5MIN PRN    insulin lispro (HUMALOG) injection   SubCUTAneous AC&HS    acetaminophen (TYLENOL) tablet 650 mg  650 mg Oral Q4H PRN    morphine injection 2 mg  2 mg IntraVENous Q4H PRN    arformoterol (BROVANA) neb solution 15 mcg  15 mcg Nebulization BID RT    And    budesonide (PULMICORT) 500 mcg/2 ml nebulizer suspension  500 mcg Nebulization BID RT    albuterol-ipratropium (DUO-NEB) 2.5 MG-0.5 MG/3 ML  3 mL Nebulization Q4H PRN    famotidine (PEPCID) tablet 20 mg  20 mg Oral BID     ______________________________________________________________________  EXPECTED LENGTH OF STAY: 4d 21h  ACTUAL LENGTH OF STAY:          6                 Nick Hargrove MD

## 2018-08-19 NOTE — PROGRESS NOTES
0730 Bedside shift change report given to Phan Miller (oncoming nurse) by Tessie Garcia (offgoing nurse). Report included the following information SBAR, Kardex, Procedure Summary, Intake/Output, MAR and Recent Results. Problem: Falls - Risk of  Goal: *Absence of Falls  Document Jarad Fall Risk and appropriate interventions in the flowsheet. Outcome: Progressing Towards Goal  Fall Risk Interventions:  Mobility Interventions: Communicate number of staff needed for ambulation/transfer         Medication Interventions: Evaluate medications/consider consulting pharmacy    Elimination Interventions: Call light in reach       Problem: Pressure Injury - Risk of  Goal: *Prevention of pressure injury  Document Charlie Scale and appropriate interventions in the flowsheet.    Outcome: Progressing Towards Goal  Pressure Injury Interventions:  Sensory Interventions: Assess need for specialty bed    Moisture Interventions: Absorbent underpads    Activity Interventions: Increase time out of bed    Mobility Interventions: HOB 30 degrees or less    Nutrition Interventions: Document food/fluid/supplement intake    Friction and Shear Interventions: Foam dressings/transparent film/skin sealants

## 2018-08-20 NOTE — PROGRESS NOTES
2000  Bedside shift change report given to Faye Torres (oncoming nurse) by Cinthya Engle (offgoing nurse). Report included the following information SBAR, Kardex, Intake/Output, MAR, Recent Results and Cardiac Rhythm 1st degree AVB. Complete assessment done. 2230  Sched meds given. Pt noted to be bleeding from both LE secondary to spouse picking off dead skin per report. Bialt leg dressing changed per orders. 0000  Sched  meds given. No changes in assessment. 0200  Pt resting eyes closed. VSS. Pt's sats 96-98 % on 3L NC    0630  Incont of urine sheldon care done. sched meds given. 0740  TRANSFER - OUT REPORT:    Verbal report given to Mayra(name) on Juan Alberto Villalta  being transferred to [facility](unit) for routine progression of care       Report consisted of patients Situation, Background, Assessment and   Recommendations(SBAR). Information from the following report(s) SBAR, Kardex, Intake/Output, MAR, Recent Results and Cardiac Rhythm 1st degree AVB was reviewed with the receiving nurse.     Lines:   Peripheral IV 08/16/18 Right Antecubital (Active)   Site Assessment Clean, dry, & intact 8/20/2018 12:00 AM   Phlebitis Assessment 0 8/20/2018 12:00 AM   Infiltration Assessment 0 8/20/2018 12:00 AM   Dressing Status Clean, dry, & intact 8/20/2018 12:00 AM   Dressing Type Transparent 8/20/2018 12:00 AM   Hub Color/Line Status Blue 8/20/2018 12:00 AM   Action Taken Open ports on tubing capped 8/20/2018 12:00 AM   Alcohol Cap Used Yes 8/20/2018 12:00 AM       Peripheral IV 08/17/18 Right Wrist (Active)   Site Assessment Clean, dry, & intact 8/20/2018 12:00 AM   Phlebitis Assessment 0 8/20/2018 12:00 AM   Infiltration Assessment 0 8/20/2018 12:00 AM   Dressing Status Clean, dry, & intact 8/20/2018 12:00 AM   Dressing Type Transparent 8/20/2018 12:00 AM   Hub Color/Line Status Pink 8/20/2018 12:00 AM   Action Taken Open ports on tubing capped 8/20/2018 12:00 AM   Alcohol Cap Used Yes 8/20/2018 12:00 AM       Peripheral IV 08/17/18 Left Antecubital (Active)   Site Assessment Clean, dry, & intact 8/20/2018 12:00 AM   Phlebitis Assessment 0 8/20/2018 12:00 AM   Infiltration Assessment 0 8/20/2018 12:00 AM   Dressing Status Clean, dry, & intact 8/20/2018 12:00 AM   Dressing Type Transparent 8/20/2018 12:00 AM   Hub Color/Line Status Pink 8/20/2018 12:00 AM   Action Taken Open ports on tubing capped 8/20/2018 12:00 AM   Alcohol Cap Used Yes 8/20/2018 12:00 AM        Opportunity for questions and clarification was provided. Patient transported with:   Monitor  O2 @ 3 liters  Registered Nurse          .

## 2018-08-20 NOTE — PROGRESS NOTES
Problem: Mobility Impaired (Adult and Pediatric)  Goal: *Acute Goals and Plan of Care (Insert Text)  Physical Therapy Goals   8/15/2018  1. Patient will ambulate with independence for 100 feet with the least restrictive device within 7 day(s). physical Therapy TREATMENT  Patient: Mary Villalta (55 y.o. male)  Date: 8/20/2018  Diagnosis: NSTEMI (non-ST elevated myocardial infarction) [facility]) NSTEMI (non-ST elevated myocardial infarction) [facility])       Precautions:    Chart, physical therapy assessment, plan of care and goals were reviewed. ASSESSMENT:  Chart reviewed, RN cleared patient for mobility, and patient received in bed on 3L NC. Patient required CGA/supervision for all mobility including bed mobility, transfers, and gait training for 100ft with RW. HR increased from low 80s to high 90s following 50ft, PT directed 2 min rest break, HR returned to baseline and patient returned to chair. PT recommending patient transfer to chair for all meals and ambulate with RN, RW, and gait belt 3x/day as able and appropriate. RN notified, Discharge rec - HHPT when able to independently stand and ambulate community distances independently. Progression toward goals:  [x]    Improving appropriately and progressing toward goals  []    Improving slowly and progressing toward goals  []    Not making progress toward goals and plan of care will be adjusted     PLAN:  Patient continues to benefit from skilled intervention to address the above impairments. Continue treatment per established plan of care. Discharge Recommendations:  Home Health  Further Equipment Recommendations for Discharge:  TBD     SUBJECTIVE:   Patient stated I think I can do a lap. No Im not trying to ruin your record of no falls.     OBJECTIVE DATA SUMMARY:   Critical Behavior:  Neurologic State: Alert  Orientation Level: Oriented X4  Cognition: Follows commands, Appropriate for age attention/concentration  Safety/Judgement: Awareness of environment, Fall prevention  Functional Mobility Training:  Bed Mobility:     Supine to Sit: Stand-by assistance              Transfers:  Sit to Stand: Contact guard assistance; Additional time  Stand to Sit: Supervision                             Balance:  Sitting: Intact  Standing: Intact; With support  Ambulation/Gait Training:  Distance (ft): 100 Feet (ft) (one seated, two standing rest breaks)  Assistive Device: Gait belt;Walker, rolling (trialed cane 10ft, switched to RW)  Ambulation - Level of Assistance: Contact guard assistance        Gait Abnormalities: Decreased step clearance;Trunk sway increased        Base of Support: Widened     Speed/Renetta: Slow  Step Length: Right shortened;Left shortened                    Stairs:              Neuro Re-Education:    Therapeutic Exercises:     Pain:  Pain Scale 1: Numeric (0 - 10)  Pain Intensity 1: 5  Pain Location 1: Generalized  Pain Orientation 1: Anterior  Pain Description 1: Aching  Pain Intervention(s) 1: Repositioned; Rest  Activity Tolerance:   Good, HR increased with activity, returned with standing rest   Please refer to the flowsheet for vital signs taken during this treatment.   After treatment:   [x]    Patient left in no apparent distress sitting up in chair  []    Patient left in no apparent distress in bed  [x]    Call bell left within reach  [x]    Nursing notified  []    Caregiver present  []    Bed alarm activated    COMMUNICATION/COLLABORATION:   The patients plan of care was discussed with: Registered Nurse    Pari King PT, DPT   Time Calculation: 25 mins

## 2018-08-20 NOTE — PROGRESS NOTES
ID Progress Note  2018    Subjective:     Still with dyspnea--leg is feeling better, however    Objective:     Antibiotics:  1. Vancomycin   2. Clindamycin   3. Zosyn       Vitals:   Visit Vitals    BP (!) 78/47 (BP 1 Location: Right arm, BP Patient Position: Sitting)    Pulse 82    Temp 98.3 °F (36.8 °C)    Resp 14    Ht 6' 2\" (1.88 m)    Wt 106.4 kg (234 lb 8 oz)    SpO2 98%    BMI 30.11 kg/m2        Tmax:  Temp (24hrs), Av.4 °F (36.9 °C), Min:97.4 °F (36.3 °C), Max:98.9 °F (37.2 °C)      Exam:  Lungs:  clear to auscultation bilaterally  Heart:  regular rate and rhythm  Skin:  Less erythema and warmth of left leg than yesterday    Labs:      Recent Labs      18   0552  18   0422  18   0433   WBC   --   8.2   --    HGB   --   8.1*   --    PLT   --   488*   --    BUN  13  16  18   CREA  1.34*  1.58*  1.80*       Cultures:     Lab Results   Component Value Date/Time    Specimen Description: ESOPHAGEAL BRUSHING 2010 09:25 AM     Lab Results   Component Value Date/Time    Culture result: NO GROWTH 5 DAYS 2018 08:19 PM    Culture result: NO GROWTH 5 DAYS 2018 09:11 PM    Culture result: NO GROWTH 5 DAYS 2018 10:18 AM       Radiology:     Line/Insert Date:           Assessment:     1. Cellulitis--improving  2. CHF  3. Debility  4. Dermatitis                 Objective:     1. Continue current therapy  2.  Will be able to treat with oral antibiotic therapy at the time of discharge, it appears    Nicanor Bower MD

## 2018-08-20 NOTE — PROGRESS NOTES
Hospitalist Progress Note                               Marletta Cheadle, MD                                     Answering service: [phone]                               OR [phone] from in house phone                                         Date of Service:  2018  NAME:  Xu Villalta  :  1960  MRN:  [id number]      Admission Summary:   The patient is a 43-year-old gentleman with past medical history of coronary artery disease, chronic systolic CHF, NYHA class II, COPD, diabetes mellitus type 2, hypertension, hyperlipidemia, MI, restless leg syndrome, gait abnormality who was recently admitted to the hospital in July with fever, tachycardia, probably secondary to cellulitis of the legs and pneumonia, hypotension, chronic hypoxic respiratory failure due to COPD, he presents to the hospital today with SOB      Reason for follow up:Cellultiis   Seen in [id number],transferred out of CCU this morning   Sitting in chair,doing well. He walked with PT. Dobutamine being weaned off per Dr Brandy Dewey. He may need rehab on discharge,anticipate Wednesday -Thursday time frame. Assessment & Plan:   Acute on Chronic systolic CHF NYHA class IV with acute pul edema,cardiogenic shock  -Diuresed well with good urine out put,continue current dose of  lasix.  -Continue other  medical management  -[facility] off per cardiology. Sepsis(fever,tachycardia,leukocytosis) due to bilateral cellulitis in the setting of chronic venous stasis. -This is chronic recurrent problem. Blood cx negative   -ID consulted,stopped vancomycin,zosy and clindamycin.  -On Nafcillin now     New onset atria fibrillation likely induced by sepsis  -rate variable. On lopressor. Started on eliqis. S/P LANA and cardioversion. NSTEMI,no chest pain presently  -Elevated troponin. On asa,bb  -Cardiology involved          Chronic oxygen dependent respiratory failure due to COPD and CHF: on home oxygen 2-3 l/min via nasal canula. SIRISHA: cpap  COPD without exacerbation: continue home regimen    DM II,well controlled. A1C 6. Hold metformin in light of iv contrast.Accucheks. Use humalog sliding scale. Depression. Continue home medication. The patient denies homicidal or suicidal ideation. Hypertension. Continue home medication. Chronic pain syndrome with acute pain from leg cellulitis:continue home regimen,Ms contin 60 mg tid. Discussed with pharmacy who checked the ,he is using prn iv morphine due to acute pain;will transition to oral dilaudid as he does at home once    Anemia,drop in HB? No evidence of GI bleed or any blood loss  -monitor     Bump in creatinine,worsening. Could be contrast related or hypoperfusion due to low BP,cardiomyopathy  -Holding aldactone and lisinopril. On vasopressor/inotrope   -Diuresing well. Creatinien continues to improve. Diet:cardiac  Code status: full  DVT prophylaxis: Eliquis. Care Plan discussed with: patient,nurse. Hospital Problems  Date Reviewed: 8/13/2018          Codes Class Noted POA    * (Principal)NSTEMI (non-ST elevated myocardial infarction) [facility]) ICD-10-CM: I21.4  ICD-9-CM: 410.70  8/13/2018 Unknown                Review of Systems:   A comprehensive review of systems was negative except for that written in the HPI. Physical Examination:      Last 24hrs VS reviewed since prior progress note. Most recent are:  Visit Vitals    BP 98/49 (BP 1 Location: Right arm, BP Patient Position: Sitting)    Pulse 79    Temp 98.8 °F (37.1 °C)    Resp 20    Ht 6' 2\" (1.88 m)    Wt 106.4 kg (234 lb 8 oz)    SpO2 95%    BMI 30.11 kg/m2           Constitutional:  No acute distress, cooperative, pleasant    HEENT: Head is a traumatic,  Un icteric sclera. Pink conjunctiva,no erythema or discharge. Oral mucous moist, oropharynx benign. Neck supple,    Resp:  CTA bilaterally. No wheezing/rhonchi/rales.  No accessory muscle use   CV:  Regular rhythm, normal rate, no murmurs, gallops, rubs    GI:  Soft, non distended, non tender. normoactive bowel sounds, no hepatosplenomegaly    :  No CVA or suprapubic tenderness    Musculoskeletal:  legs bandaged,erythema to the left thigh almost cleared. Neurologic:  AAOx3, CN II-XII reviewed. Moves all extremities. Intake/Output Summary (Last 24 hours) at 08/20/18 1447  Last data filed at 08/20/18 1445   Gross per 24 hour   Intake           601.67 ml   Output             2250 ml   Net         -1648.33 ml          Data Review:    Review and/or order of clinical lab test  Review and/or order of tests in the radiology section of CPT  Review and/or order of tests in the medicine section of CPT      Labs:     Recent Labs      08/19/18   0422   WBC  8.2   HGB  8.1*   HCT  27.3*   PLT  488*     Recent Labs      08/20/18   0552  08/19/18   0422  08/18/18   0433   NA  139  138  139   K  3.2*  3.4*  4.2   CL  102  102  103   CO2  28  29  24   BUN  13  16  18   CREA  1.34*  1.58*  1.80*   GLU  93  105*  76   CA  8.2*  7.6*  7.9*   MG  1.8   --    --      No results for input(s): SGOT, GPT, ALT, AP, TBIL, TBILI, TP, ALB, GLOB, GGT, AML, LPSE in the last 72 hours. No lab exists for component: AMYP, HLPSE  No results for input(s): INR, PTP, APTT in the last 72 hours. No lab exists for component: INREXT, INREXT   No results for input(s): FE, TIBC, PSAT, FERR in the last 72 hours. Lab Results   Component Value Date/Time    Folate 7.9 02/27/2018 03:52 AM    Folate 7.5 02/27/2018 03:52 AM      No results for input(s): PH, PCO2, PO2 in the last 72 hours. No results for input(s): CPK, CKNDX, TROIQ in the last 72 hours.     No lab exists for component: CPKMB  Lab Results   Component Value Date/Time    Cholesterol, total 109 08/14/2018 03:39 AM    HDL Cholesterol 24 08/14/2018 03:39 AM    LDL,Direct 193 (H) 05/21/2014 09:59 AM    LDL, calculated 72.2 08/14/2018 03:39 AM    Triglyceride 64 08/14/2018 03:39 AM    CHOL/HDL Ratio 4.5 08/14/2018 03:39 AM     Lab Results   Component Value Date/Time    Glucose (POC) 119 (H) 08/20/2018 12:23 PM    Glucose (POC) 98 08/20/2018 05:49 AM    Glucose (POC) 112 (H) 08/19/2018 11:35 PM    Glucose (POC) 198 (H) 08/19/2018 07:04 PM    Glucose (POC) 93 08/19/2018 12:50 PM     Lab Results   Component Value Date/Time    Color DARK YELLOW 07/09/2018 10:39 PM    Appearance CLEAR 07/09/2018 10:39 PM    Specific gravity 1.017 07/09/2018 10:39 PM    pH (UA) 8.0 07/09/2018 10:39 PM    Protein TRACE (A) 07/09/2018 10:39 PM    Glucose NEGATIVE  07/09/2018 10:39 PM    Ketone NEGATIVE  07/09/2018 10:39 PM    Bilirubin NEGATIVE  07/09/2018 10:39 PM    Urobilinogen 1.0 07/09/2018 10:39 PM    Nitrites NEGATIVE  07/09/2018 10:39 PM    Leukocyte Esterase NEGATIVE  07/09/2018 10:39 PM    Epithelial cells FEW 07/09/2018 10:39 PM    Bacteria NEGATIVE  07/09/2018 10:39 PM    WBC 0-4 07/09/2018 10:39 PM    RBC 0-5 07/09/2018 10:39 PM         Medications Reviewed:     Current Facility-Administered Medications   Medication Dose Route Frequency    potassium chloride SR (KLOR-CON 10) tablet 40 mEq  40 mEq Oral BID    amiodarone (CORDARONE) tablet 400 mg  400 mg Oral BIDPC    nafcillin (NALLPEN) 2 g in 0.9% sodium chloride (MBP/ADV) 100 mL  2 g IntraVENous Q6H    DOBUTamine (DOBUTREX) 2,000 mcg/ml infusion  2.5 mcg/kg/min IntraVENous CONTINUOUS    furosemide (LASIX) injection 40 mg  40 mg IntraVENous DAILY    metoprolol tartrate (LOPRESSOR) tablet 25 mg  25 mg Oral Q12H    apixaban (ELIQUIS) tablet 5 mg  5 mg Oral BID    nicotine (NICODERM CQ) 21 mg/24 hr patch 1 Patch  1 Patch TransDERmal DAILY    zolpidem (AMBIEN) tablet 10 mg  10 mg Oral QHS    morphine CR (MS CONTIN) tablet 60 mg  60 mg Oral TID    allopurinol (ZYLOPRIM) tablet 100 mg  100 mg Oral DAILY    aspirin delayed-release tablet 81 mg  81 mg Oral DAILY    atorvastatin (LIPITOR) tablet 80 mg  80 mg Oral DAILY    buPROPion SR (WELLBUTRIN SR) tablet 150 mg  150 mg Oral BID    rOPINIRole (REQUIP) tablet 8 mg  8 mg Oral QHS    glucose chewable tablet 16 g  4 Tab Oral PRN    dextrose (D50W) injection syrg 12.5-25 g  12.5-25 g IntraVENous PRN    glucagon (GLUCAGEN) injection 1 mg  1 mg IntraMUSCular PRN    sodium chloride (NS) flush 5-10 mL  5-10 mL IntraVENous Q8H    sodium chloride (NS) flush 5-10 mL  5-10 mL IntraVENous PRN    nitroglycerin (NITROSTAT) tablet 0.4 mg  0.4 mg SubLINGual Q5MIN PRN    insulin lispro (HUMALOG) injection   SubCUTAneous AC&HS    acetaminophen (TYLENOL) tablet 650 mg  650 mg Oral Q4H PRN    morphine injection 2 mg  2 mg IntraVENous Q4H PRN    arformoterol (BROVANA) neb solution 15 mcg  15 mcg Nebulization BID RT    And    budesonide (PULMICORT) 500 mcg/2 ml nebulizer suspension  500 mcg Nebulization BID RT    albuterol-ipratropium (DUO-NEB) 2.5 MG-0.5 MG/3 ML  3 mL Nebulization Q4H PRN    famotidine (PEPCID) tablet 20 mg  20 mg Oral BID     ______________________________________________________________________  EXPECTED LENGTH OF STAY: 4d 21h  ACTUAL LENGTH OF STAY:          7                 Roberto Loyd MD

## 2018-08-20 NOTE — PROGRESS NOTES
Problem: Falls - Risk of  Goal: *Absence of Falls  Document Jarad Fall Risk and appropriate interventions in the flowsheet. Outcome: Progressing Towards Goal  Fall Risk Interventions:  Mobility Interventions: Communicate number of staff needed for ambulation/transfer         Medication Interventions: Evaluate medications/consider consulting pharmacy    Elimination Interventions: Call light in reach             Problem: Pressure Injury - Risk of  Goal: *Prevention of pressure injury  Document Charlie Scale and appropriate interventions in the flowsheet.    Outcome: Progressing Towards Goal  Pressure Injury Interventions:  Sensory Interventions: Assess changes in LOC    Moisture Interventions: Absorbent underpads    Activity Interventions: Increase time out of bed    Mobility Interventions: HOB 30 degrees or less    Nutrition Interventions: Document food/fluid/supplement intake    Friction and Shear Interventions: Foam dressings/transparent film/skin sealants

## 2018-08-20 NOTE — PROGRESS NOTES
0800 Reported called from CCU, Zeenat Fritz.    [id number] TRANSFER - IN REPORT:    Verbal report received from Rizwana(name) on [facility]  being received from CCU(unit) for routine progression of care      Report consisted of patients Situation, Background, Assessment and   Recommendations(SBAR). Information from the following report(s) SBAR, ED Summary, Procedure Summary, MAR, Recent Results, Med Rec Status and Cardiac Rhythm 1AVB with PAC was reviewed with the receiving nurse. Opportunity for questions and clarification was provided. Assessment completed upon patients arrival to unit and care assumed. Problem: Falls - Risk of  Goal: *Absence of Falls  Document Jarad Fall Risk and appropriate interventions in the flowsheet. Outcome: Progressing Towards Goal  Fall Risk Interventions:  Mobility Interventions: Communicate number of staff needed for ambulation/transfer, PT Consult for mobility concerns, PT Consult for assist device competence, OT consult for ADLs         Medication Interventions: Evaluate medications/consider consulting pharmacy    Elimination Interventions: Call light in reach, Urinal in reach      Problem: Patient Education: Go to Patient Education Activity  Goal: Patient/Family Education  Outcome: Progressing Towards Goal  Education regarding medications and diet. 1430 Patient informed of stress test tomorrow. No caffeine after seven and NPO after midnight. White board updated. 1555 patient Blood pressure has dipped to 78/47. Patient is not symptomatic and sitting up in chair watching TV. Tiger Text to Dr. Gerson Clarke, to see if he would like patient Dobutamine 2.5 mcg to increase back to 4 mcg. Per Avinger Text Dr. Gerson Clarke stated \"yes\". MAR adjusted and Dobutamine increased. 1930 Bedside and Verbal shift change report given to Maribel (oncoming nurse) by Maria T Lester (offgoing nurse).  Report included the following information SBAR, ED Summary, OR Summary, MAR, Med Rec Status and Cardiac Rhythm 1AVB with PAC.

## 2018-08-20 NOTE — PROGRESS NOTES
8/20/2018     Admit Date: 8/13/2018    Admit Diagnosis: NSTEMI (non-ST elevated myocardial infarction) [facility])    Principal Problem:    NSTEMI (non-ST elevated myocardial infarction) ([facility] [address] (8/13/2018)        Assessment/Plan:  Maintaining sinus rhythm and otherwise did well over the weekend  Off pressors  Acute kidney injury: renal function is improving  The pulmonary edema has resolved  Transfer to telemetry  Discontinue dobutamine over 2 days  Increase activity  Medical therapy for CAD for now     Subjective:  Feels well this morning       [facility] denies chest pain. Objective:     Visit Vitals    /66    Pulse 74    Temp 98.4 °F (36.9 °C)    Resp 22    Ht 6' 2\" (1.88 m)    Wt 106.4 kg (234 lb 8 oz)    SpO2 95%    BMI 30.11 kg/m2        Physical Exam:  Neck: supple, symmetrical, trachea midline, no adenopathy, thyroid: not enlarged, symmetric, no tenderness/mass/nodules, no carotid bruit and no JVD  Heart: regular rate and rhythm, S1, S2 normal, no rub  Lungs: clear to auscultation bilaterally, normal percussion bilaterally  Abdomen: soft, non-tender. Bowel sounds normal. No masses,  no organomegaly  Extremities: extremities normal, atraumatic, no cyanosis or edema  Pulses: 2+ and symmetric  Neurologic: Grossly normal      Labs:    No results for input(s): CPK, CKMB, TROIQ in the last 72 hours. No lab exists for component: CKQMB, CPKMB  Recent Labs      08/20/18   0552   NA  139   K  3.2*   CL  102   BUN  13   CREA  1.34*   GLU  93   CA  8.2*     Recent Labs      08/19/18   0422   WBC  8.2   HGB  8.1*   HCT  27.3*   PLT  488*     No results for input(s): CHOL, LDLC in the last 72 hours.     No lab exists for component: TGL, HDLC,  HBA1C    Telemetry: normal sinus rhythm, PACs     Data Review: current meds, labs,recent radiology, intake/output/weight and problem list reviewed

## 2018-08-20 NOTE — PROGRESS NOTES
NUTRITION- DIETETIC tECHnICIAN    Pt seen for:       [x]                  Rescreen  []                  Food preferences/tolerances  []                  Food Allergies  []                  PO intake check  []                  Supplements  []                  Diet order clarification  []                  Education  []                  Other     Rescreen:    [x]                  Not at Nutrition Risk, rescreen per screening protocol  []                  At Nutrition Risk- RD referral         SUBJECTIVE/OBJECTIVE:     Information obtained from:  patient      Diet:  Mechanical Soft Cardiac    Intake: satisfactory    Patient Vitals for the past 100 hrs:   % Diet Eaten   08/18/18 1800 80 %   08/18/18 0900 80 %       Weight Changes: Wt Readings from Last 5 Encounters:   08/20/18 106.4 kg (234 lb 8 oz)   07/12/18 116.2 kg (256 lb 3.2 oz)   06/06/18 117.7 kg (259 lb 7.7 oz)   05/07/18 118.6 kg (261 lb 7.5 oz)   03/02/18 120.3 kg (265 lb 3.2 oz)       Problems Identified:      [x]                  Patient is edentulous and finding joey a mechanical soft texture a challenge to eat. While a pureed texture may not be appropriate, chewy meat and hard vegetables probably still not appropriate for him. Will have the kitchen grind meat up finely and add gravy. Will also have the vegetables chopped finely. Patient can tolerate soft eggs, puddings, mashed potatoes without difficulty. He refused an oral nutritional supplement.    []                  Specified food preferences   []                  Dislikes supplements              []                  Allergies:   []                  Difficulty chewing      []                  Dentition    []                  Nausea/Vomiting   []                  Constipation   []                  Diarrhea    PLAN:     [x]                   Continue current diet and encourage intake  [x]                   Have kitchen grind meats with added gravy and have vegetables finely chopped  [] Dislikes supplements will try a substitution  []                   Modify diet for food allergies  []                   Adjust texture due to difficulty chewing   []                   Educated patient  []                   RD Referral  [x]                   Rescreen per screening protocol          Lois Duong, DTR

## 2018-08-20 NOTE — NURSE NAVIGATOR
Chart reviewed by Heart Failure Nurse Navigator. Heart Failure database completed. EF:  25/30    ACEi/ARB: Lisinopril    BB: Toprol XL    Aldosterone Antagonist: Aldactone    CRT   AICD qualifications EF <= to 35% for longer than 90 days on optimal medical therapy. 2/26/18 echo EF 30/35%    NYHA Functional Class **. Heart Failure Teach Back in Patient Education. Heart Failure Avoiding Triggers on Discharge Instructions. Cardiologist: PAULO    Readmission - prior admission 7/9/18 to 7/12/18 for Sepsis      Post discharge follow up phone call to be made within 48-72 hours of discharge.

## 2018-08-21 NOTE — WOUND CARE
WOCN Note:      New consult placed for assessment of lower extremity wounds.      Chart reviewed. Admitted DX:  NSTEMI (non-ST elevated myocardial infarction) ([address]  And Cellulitis  Past Medical History:  Chronic leg wounds; coronary artery disease, chronic systolic CHF, NYHA class II, COPD, diabetes mellitus type 2, hypertension, hyperlipidemia, MI, restless leg syndrome, gait abnormality.     Assessment:   Patient is A&O x 3, communicative, continent and mobile to bedside commode. Bed: [facility]    Patient reports to lower leg wounds.     Bilateral heel, buttocks, and sacral skin intact and without erythema.     Generalized edema and erythema to lower legs and feet. The erythema extends to upper leg on the left.      1. Right circumferential low leg, weepy venous leg ulcers: 90% pink 10% yellow; moderate serous exudate; no odor; mild periwound erythema.     2. POA right anterior ankle, DTI: resolved. 3.  Left circumferential low leg, venous leg ulcers:  100% pink; moderate serous exudate; no odor; periwound erythema extends to upper leg. Recommendations:    Elevate legs and heels. Bilateral low legs:  Daily cleanse with saline; apply Aquacel AG; cover with dry gauze.     Skin Care & Pressure Prevention:  Minimize layers of linen/pads under patient to optimize support surface.     Turn/reposition approximately every 2 hours and offload heels.     Discussed above plan with patient and RN.     Transition of Care: Plan to follow weekly and as needed while admitted to hospital.     ASHUTOSH Watson RN [id number] [facility] [phone]  Pager [phone]

## 2018-08-21 NOTE — PROGRESS NOTES
Problem: Self Care Deficits Care Plan (Adult)  Goal: *Acute Goals and Plan of Care (Insert Text)  Occupational Therapy Goals  Initiated 8/16/2018  1. Patient will perform grooming standing at sink for 5 minutes with no LOB with modified independence within 7 day(s). 2.  Patient will perform bathing with modified independence within 7 day(s). 3.  Patient will perform lower body dressing with modified independence within 7 day(s). 4.  Patient will perform toilet transfers with modified independence within 7 day(s). 5.  Patient will perform all aspects of toileting with independence within 7 day(s). 6.  Patient will participate in upper extremity therapeutic exercise/activities with independence for 5 minutes within 7 day(s). 7.  Patient will utilize energy conservation techniques during functional activities with no cues within 7 day(s). Occupational Therapy TREATMENT  Patient: Edwin Villalta (55 y.o. male)  Date: 8/21/2018  Diagnosis: NSTEMI (non-ST elevated myocardial infarction) [facility]) NSTEMI (non-ST elevated myocardial infarction) [facility])       Precautions:    Chart, occupational therapy assessment, plan of care, and goals were reviewed. ASSESSMENT:  Patient progressing with bathroom ADLs, standing tolerance 4 mins and functional mobility. ADLs limited by cardiac tolerance, LE weeping and edema, standing tolerance, standing balance, and medical hx (COPD in which wears 3L, MIs, CVA). Patient education provided, patient desires to discharge home with wife A. Wife is disable though so she assists as able with bathing and dressing feet. Wife and or son A with tub transfers. Has a hospital bed for trunk elevation 2* breathing and LE elevation due to edema. Uses [facility] for appointments. Next session continue standing tolerance during ADLs, gathering ADLs.      Progression toward goals:  [x]       Improving appropriately and progressing toward goals  []       Improving slowly and progressing toward goals  []       Not making progress toward goals and plan of care will be adjusted     PLAN:  Patient continues to benefit from skilled intervention to address the above impairments. Continue treatment per established plan of care. Discharge Recommendations:  Home Health  Further Equipment Recommendations for Discharge:  Tub bench - but states [address] declined him and he can not afford to buy one     SUBJECTIVE:   Patient stated I have been getting up.     OBJECTIVE DATA SUMMARY:   Cognitive/Behavioral Status:  Neurologic State: Alert  Orientation Level: Oriented X4  Cognition: Follows commands; Appropriate for age attention/concentration; Appropriate safety awareness             Functional Mobility and Transfers for ADLs:  Bed Mobility:       Transfers:  Sit to Stand: Supervision  Functional Transfers  Bathroom Mobility: Supervision/set up (opened door and turned on lights, cane,supervision encouragement to manage his own O2 line; patient then removed it as management. Instruction on benefits maintaining O2.)  Tub Transfer: Maximum assistance (grab bars (does not have them at home), instruction instead of stepping forward to try side stepping in with knee flexion. Patient with poor flexion R LE > L but better hip flexion R >L. Instruction again on benefits of using tub bench)       Balance:  Sitting: Intact; Without support  Standing: Impaired; Without support  Standing - Static: Fair (one hand intermittently on supportive surface)  Standing - Dynamic : Fair (B hands on supportive surface)    ADL Intervention:                            Patient received sitting EOB and asking about tub transfers as he is completing all ADLs and standing ADLs with nursing/pacing. Toileting  Bladder Hygiene: Modified independent  Clothing Management: Modified independent   Encouragement to use O2 while bathing, patient stating he takes it off and will not wear it in there.  Education provided on the benefits, safety and can dry hose off once completed. Neuro Re-Education:           Therapeutic Exercises:   Encouraged to continue daily  Pain:  Pain Scale 1: Numeric (0 - 10)  Pain Intensity 1: 0  Pain Location 1: Generalized  Pain Orientation 1:  (\"kind of all over\")  Pain Description 1: Aching  Pain Intervention(s) 1: Rest  Activity Tolerance:   3L NC, HR 89  Please refer to the flowsheet for vital signs taken during this treatment.   After treatment:   [] Patient left in no apparent distress sitting up in chair  [x] Patient left in no apparent distress sitting edge of bed  [x] Call bell left within reach  [x] Nursing notified  [] Caregiver present  [] Bed alarm activated    COMMUNICATION/COLLABORATION:   The patients plan of care was discussed with: Physical Therapist and Registered Nurse    Marquez Jasso  Time Calculation: 10 mins

## 2018-08-21 NOTE — PROGRESS NOTES
Rounded on [facility] patients and provided Anointing of the Sick at request of patient    FrBianca Crockett.

## 2018-08-21 NOTE — PROGRESS NOTES
8/21/2018     Admit Date: 8/13/2018    Admit Diagnosis: NSTEMI (non-ST elevated myocardial infarction) [facility])    Principal Problem:    NSTEMI (non-ST elevated myocardial infarction) ([facility] [address] (8/13/2018)        Assessment/Plan:  Better overall today and is tolerating dobutamine wean  Pharmacologic MPI tomorrow  Maintaining sinu     Subjective:  Feels well       Juan Alberto Villalta denies chest pain. Objective:     Visit Vitals    /61 (BP 1 Location: Left arm, BP Patient Position: Sitting)    Pulse 79    Temp 98.1 °F (36.7 °C)    Resp 16    Ht 6' 2\" (1.88 m)    Wt 106.6 kg (235 lb 0.2 oz)    SpO2 100%    BMI 30.17 kg/m2        Physical Exam:  Neck: supple, symmetrical, trachea midline, no adenopathy, thyroid: not enlarged, symmetric, no tenderness/mass/nodules, no carotid bruit and no JVD  Heart: irregularly irregular rhythm, S1, S2 normal  Lungs: rhonchi R anterior, R base, L anterior, L base, diminished breath sounds R anterior, L anterior  Abdomen: soft, non-tender. Bowel sounds normal. No masses,  no organomegaly  Extremities: extremities normal, atraumatic, no cyanosis or edema  Pulses: 2+ and symmetric  Neurologic: Grossly normal      Labs:    No results for input(s): CPK, CKMB, TROIQ in the last 72 hours. No lab exists for component: CKQMB, CPKMB  Recent Labs      08/21/18   0512   NA  137   K  3.5   CL  99   BUN  15   CREA  1.37*   GLU  83   CA  8.5     Recent Labs      08/21/18   0510   WBC  7.7   HGB  9.2*   HCT  30.8*   PLT  507*     No results for input(s): CHOL, LDLC in the last 72 hours.     No lab exists for component: TGL, HDLC,  HBA1C    Telemetry: sinus rhythm   Data Review: current meds, labs,recent radiology, intake/output/weight and problem list reviewed

## 2018-08-21 NOTE — PROGRESS NOTES
Problem: Discharge Planning  Goal: *Discharge to safe environment  Outcome: Progressing Towards Goal  See cm notes.  LAUREN Marley

## 2018-08-21 NOTE — PROGRESS NOTES
Hospitalist Progress Note              dAele Barger MD.                                                             Cell: [phone]                               NAME:  Rolan Villalta  :  1960  MRN:  [id number]  Date of Service:  2018    Summary: The patient is a 77-year-old male with past medical history of coronary artery disease, chronic systolic CHF, NYHA class II, COPD, diabetes mellitus type 2, hypertension, hyperlipidemia, MI, restless leg syndrome, gait abnormality who was recently admitted to the hospital in July with fever, tachycardia, probably secondary to cellulitis of the legs and pneumonia, hypotension, chronic hypoxic respiratory failure due to COPD, he presents to the hospital today with SOB. Assessment/Plan:  Acute on Chronic systolic CHF NYHA class IV with acute pul edema, cardiogenic shock  -requiring dobutamine gtt which is being weaned off.  -Continue other  medical management  -Lennox Pin off per cardiology. - continue oral lasix. Metoprolol held due to hypotension.  - management as per cardiology   Echo: EF 25-30% with diffuse hypokinesis. LA mildly dilated, Mitral valve mod regurgitation. Sepsis(fever,tachycardia,leukocytosis) due to bilateral cellulitis in the setting of chronic venous stasis. -This is chronic recurrent problem. Blood cx negative   -ID consulted,stopped vancomycin,zosy and clindamycin.  -On Nafcillin   - sepsis criteria resolved      New onset atria fibrillation likely induced by sepsis  -rate variable. On lopressor. Started on eliqis. S/P LANA and cardioversion. Reate controlled     NSTEMI,no chest pain presently  -Elevated troponin. On asa,bb  -Cardiology involved    Chronic oxygen dependent respiratory failure due to COPD and CHF: on home oxygen 2-3 l/min via nasal canula. SIRISHA: cpap  COPD without exacerbation: continue home regimen     DM II,well controlled. A1C 6. Hold metformin in light of iv contrast.Accucheks. Use humalog sliding scale.     Depression.  Continue home medication.  The patient denies homicidal or suicidal ideation.   Hypertension.  Continue home medication.     Chronic pain syndrome with acute pain from leg cellulitis: on MS contin, continue     Normocytic Anemia. No evidence of GI bleed or any blood loss  Check stool occult, iron panel  -H/h stable     Bump in creatinine,worsening. Could be contrast related or hypoperfusion due to low BP,cardiomyopathy  -Holding aldactone and lisinopril. On vasopressor/inotrope   - renal indices improving     Code status: full  DVT prophylaxsis: eliquis  Dispo: to be determined         Interval History/Subjective:  F/u for SOB. CHF    No acute overnight event. Feeling better overall but states his leg feels terrible. \" I can hardly work\". Review of Systems:  Pertinent items are noted in HPI. Objective:     VITALS:   Last 24hrs VS reviewed since prior progress note. Most recent are:  Visit Vitals    /81 (BP 1 Location: Left arm, BP Patient Position: Sitting)    Pulse 80    Temp 97.6 °F (36.4 °C)    Resp 16    Ht 6' 2\" (1.88 m)    Wt 106.6 kg (235 lb 0.2 oz)    SpO2 100%    BMI 30.17 kg/m2       Intake/Output Summary (Last 24 hours) at 08/21/18 1250  Last data filed at 08/21/18 1154   Gross per 24 hour   Intake          1421.68 ml   Output             2650 ml   Net         -1228.32 ml        PHYSICAL EXAM:  General: No acute distress, cooperative, pleasant   EENT: EOMI. Anicteric sclerae. Oral mucous moist, oropharynx benign  Resp: CTA bilaterally. No wheezing/rhonchi/rales. No accessory muscle use  CV: Regular rhythm, normal rate, no murmurs, gallops, rubs  GI: Soft, non distended, non tender. normoactive bowel sounds, no hepatosplenomegaly Extremities: Bilateral lower extremity dressing clean, d/i.  Neurologic: Moves all extremities. AAOx3, CN II-XII grossly intact  Psych: Good insight. Not anxious nor agitated.   Skin: Good Turgor, no rashes or ulcers    Lab Data Personally Reviewed: (see below)     Medications list Personally Reviewed:  x YES  NO     _______________________________________________________________________  Care Plan discussed with:  Patient/Family and Nurse    Total NON critical care TIME:  30 minutes    Figueroa Alonso MD     Procedures: see electronic medical records for all procedures/Xrays and details which were not copied into this note but were reviewed prior to creation of Plan. LABS:  Recent Labs      08/21/18   0510  08/19/18   0422   WBC  7.7  8.2   HGB  9.2*  8.1*   HCT  30.8*  27.3*   PLT  507*  488*     Recent Labs      08/21/18   0512  08/20/18   0552  08/19/18   0422   NA  137  139  138   K  3.5  3.2*  3.4*   CL  99  102  102   CO2  28  28  29   BUN  15  13  16   CREA  1.37*  1.34*  1.58*   GLU  83  93  105*   CA  8.5  8.2*  7.6*   MG  1.9  1.8   --      Recent Labs      08/21/18 0512   SGOT  80*   ALT  75   AP  203*   TBILI  1.3*   TP  8.7*   ALB  2.3*   GLOB  6.4*     No results for input(s): INR, PTP, APTT in the last 72 hours. No lab exists for component: INREXT   No results for input(s): FE, TIBC, PSAT, FERR in the last 72 hours. Lab Results   Component Value Date/Time    Folate 7.9 02/27/2018 03:52 AM    Folate 7.5 02/27/2018 03:52 AM      No results for input(s): PH, PCO2, PO2 in the last 72 hours. No results for input(s): CPK, CKNDX, TROIQ in the last 72 hours.     No lab exists for component: CPKMB  Lab Results   Component Value Date/Time    Cholesterol, total 109 08/14/2018 03:39 AM    HDL Cholesterol 24 08/14/2018 03:39 AM    LDL,Direct 193 (H) 05/21/2014 09:59 AM    LDL, calculated 72.2 08/14/2018 03:39 AM    Triglyceride 64 08/14/2018 03:39 AM    CHOL/HDL Ratio 4.5 08/14/2018 03:39 AM     Lab Results   Component Value Date/Time    Glucose (POC) 107 (H) 08/21/2018 11:52 AM    Glucose (POC) 86 08/21/2018 06:34 AM    Glucose (POC) 94 08/20/2018 09:46 PM    Glucose (POC) 117 (H) 08/20/2018 04:35 PM    Glucose (POC) 119 (H) 08/20/2018 12:23 PM     Lab Results   Component Value Date/Time    Color DARK YELLOW 07/09/2018 10:39 PM    Appearance CLEAR 07/09/2018 10:39 PM    Specific gravity 1.017 07/09/2018 10:39 PM    pH (UA) 8.0 07/09/2018 10:39 PM    Protein TRACE (A) 07/09/2018 10:39 PM    Glucose NEGATIVE  07/09/2018 10:39 PM    Ketone NEGATIVE  07/09/2018 10:39 PM    Bilirubin NEGATIVE  07/09/2018 10:39 PM    Urobilinogen 1.0 07/09/2018 10:39 PM    Nitrites NEGATIVE  07/09/2018 10:39 PM    Leukocyte Esterase NEGATIVE  07/09/2018 10:39 PM    Epithelial cells FEW 07/09/2018 10:39 PM    Bacteria NEGATIVE  07/09/2018 10:39 PM    WBC 0-4 07/09/2018 10:39 PM    RBC 0-5 07/09/2018 10:39 PM

## 2018-08-21 NOTE — PROGRESS NOTES
2005: Bedside shift change report given to [employer] (oncoming nurse) by Luis Felipe Polk (offgoing nurse). Report included the following information SBAR, Intake/Output, MAR, Accordion, Recent Results, Med Rec Status, Cardiac Rhythm NSR w/ 1st AVB & PACs and Alarm Parameters . 2130: [address], spoke with [facility] to verify dose of 8mg Requip seems appropriate for this patient as it is larger than doses normally administered by this RN. [facility] verified this was patient's PTA dosage and he has been taking it for an extended period. Will administer as scheduled    0020: Wound care performed on bilateral lower extremities (leg wraps changed per order)    0620: Received call from Dr. Brooks Gutierres; pt will not have stress test today as dobutamine has not been able to be weaned yet. Per Dr. Brooks Gutierres, ok to order the patient a breakfast tray    0750: Bedside shift change report given to Luis Felipe Polk (oncoming nurse) by [employer] Flo (offgoing nurse). Report included the following information SBAR, Intake/Output, MAR, Accordion, Recent Results, Med Rec Status and Cardiac Rhythm NSR/Sinus Tacg with 1st AVB, PACs & PVCs.

## 2018-08-21 NOTE — PROGRESS NOTES
Bedside and Verbal shift change report given to Maria T Lester (oncoming nurse) by Amy Rodriguez (offgoing nurse). Report included the following information SBAR, Kardex, Procedure Summary, MAR, Recent Results and Cardiac Rhythm NSR. AVB PAC PVC    Problem: Falls - Risk of  Goal: *Absence of Falls  Document Jarad Fall Risk and appropriate interventions in the flowsheet. Outcome: Progressing Towards Goal  Fall Risk Interventions:  Mobility Interventions: Communicate number of staff needed for ambulation/transfer         Medication Interventions: Evaluate medications/consider consulting pharmacy    Elimination Interventions: Call light in reach             Problem: Discharge Planning  Goal: *Discharge to safe environment  Outcome: Progressing Towards Goal  Working with     1845 Stopped Dobutamine per order in preparation for stress test tomorrow. 1930 Bedside and Verbal shift change report given to Eric Guerra (oncoming nurse) by Maria T Lester (offgoing nurse). Report included the following information SBAR, Kardex, Intake/Output, MAR, Recent Results and Cardiac Rhythm NSR.  AVB PAC PVC

## 2018-08-21 NOTE — PROGRESS NOTES
Physical Therapy Note     Chart reviewed and discussed with RN. PT instructed rehab tech to have patient ambulate 150 ft. Patient was unavailable initially as he was completing OT session. On second attempt from rehab tech patient refused, stating the wound care nurse just wrapped his legs and he wanted to eat lunch. PT recommending patient ambulate with RN/PCT 3x/day as able and appropriate with RW. PT will follow up tomorrow. Discharge rec - rehab at this time. Could progress to HHPT if patient can transfer and ambulate community distances with modified independence.      Jone Raymundo PT, DPT

## 2018-08-21 NOTE — PROGRESS NOTES
Reason for Admission:   CHF                RRAT Score:     35             Resources/supports as identified by patient/family:   Misael Stratton, friend and                 Top Challenges facing patient (as identified by patient/family and CM): Finances/Medication cost?       Has Phyllis Co- gets meds at [facility] and a few at [facility]? Kamjasmeet Rileynicole or Mai              Support system or lack thereof? Mai                     Living arrangements? Lives with Mai           Self-care/ADLs/Cognition? Requires assistance with wound care and needs HH PT - able to make own decisions regarding his care          Current Advanced Directive/Advance Care Plan:  Not on file- patient is agreeable to reviewing information - referral to be made to [facility]      Plan for utilizing home health:    Referral made to [facility] - will need home health SN CHF wound care orders and PT/?OT - patient does not want SNF rehab                      Likelihood of readmission: high                 Transition of Care Plan:              Home with home health and PCP/cardiology follow-up      Care Management Interventions  PCP Verified by CM:  Yes [address] [facility] [place]  Palliative Care Criteria Met (RRAT>21 & CHF Dx)?: Yes  Palliative Consult Recommended?: Yes (nursing made aware - Erving Fruit leave note for MD to consider consult)  Transition of Care Consult (CM Consult): [address]: No  Reason Outside [facility]: Managed care specific requirement (Kieran Giovanasuresh)  [address] [place]: No  Discharge Durable Medical Equipment: No (has Home O2 ( Callie Fairbanks), 2 canes, walkser, hospital bed, jet neb, CPAP ) - provided info to [address] for [facility] and [facility])  Physical Therapy Consult: Yes  Occupational Therapy Consult: Yes  Speech Therapy Consult: No  Current Support Network: Own Home  Confirm Follow Up Transport: Friends (Misael Stratton, friend will transport to home - for follow-up appts Davy Watts provides Tiffanie Orellana transport)  Plan discussed with Pt/Family/Caregiver: Yes  Freedom of Choice Offered: Yes  Discharge Location  Discharge Placement: Home with home health

## 2018-08-21 NOTE — PROGRESS NOTES
ID Progress Note  2018    Subjective:     Still with dyspnea--leg is feeling better, however    Objective:     Antibiotics:  1. Vancomycin   2. Clindamycin   3. Zosyn       Vitals:   Visit Vitals    /61 (BP 1 Location: Left arm, BP Patient Position: Sitting)    Pulse 79    Temp 98.1 °F (36.7 °C)    Resp 16    Ht 6' 2\" (1.88 m)    Wt 106.6 kg (235 lb 0.2 oz)    SpO2 100%    BMI 30.17 kg/m2        Tmax:  Temp (24hrs), Av.1 °F (36.7 °C), Min:97.6 °F (36.4 °C), Max:98.5 °F (36.9 °C)      Exam:  Lungs:  clear to auscultation bilaterally  Heart:  regular rate and rhythm  Skin:  Less erythema and warmth of left leg than yesterday    Labs:      Recent Labs      18   0512  18   0510  18   0552  18   0422   WBC   --   7.7   --   8.2   HGB   --   9.2*   --   8.1*   PLT   --   507*   --   488*   BUN  15   --   13  16   CREA  1.37*   --   1.34*  1.58*   SGOT  80*   --    --    --    AP  203*   --    --    --    TBILI  1.3*   --    --    --        Cultures:     Lab Results   Component Value Date/Time    Specimen Description: ESOPHAGEAL BRUSHING 2010 09:25 AM     Lab Results   Component Value Date/Time    Culture result: NO GROWTH 5 DAYS 2018 08:19 PM    Culture result: NO GROWTH 5 DAYS 2018 09:11 PM    Culture result: NO GROWTH 5 DAYS 2018 10:18 AM       Radiology:     Line/Insert Date:           Assessment:     1. Cellulitis--improving  2. CHF  3. Debility  4. Dermatitis                 Objective:     1. Continue current therapy  2.  Will be able to treat with oral antibiotic therapy at the time of discharge, it appears    Lesley Hernandez MD

## 2018-08-22 NOTE — PROGRESS NOTES
8/22/2018     Admit Date: 8/13/2018    Admit Diagnosis: NSTEMI (non-ST elevated myocardial infarction) [facility])    Principal Problem:    NSTEMI (non-ST elevated myocardial infarction) ([facility] [address] (8/13/2018)        Assessment/Plan:  Mr Villalta declined to proceed with the pharmacologic MPI d/t fear of a reaction to the medication Nixon Fermin). From a cardiac POV the patient is stable and can be discharged home and follow up with his [facility] PCP and cardiologist later this week. I will review and reconcile his cardiac meds  Renal function is too unstable and BP too low to resume ACE-I and aldosterone antagonist  Thanks     Subjective:  Feels ok today       Sheree Villalta denies chest pain, dyspnea, palpitations, dizziness. Objective:     Visit Vitals    BP 90/58 (BP 1 Location: Right arm, BP Patient Position: Sitting)    Pulse 69    Temp 97.6 °F (36.4 °C)    Resp 16    Ht 6' 2\" (1.88 m)    Wt 108.1 kg (238 lb 5.1 oz)    SpO2 100%    BMI 30.6 kg/m2        Physical Exam:  Neck: supple, symmetrical, trachea midline, no adenopathy, thyroid: not enlarged, symmetric, no tenderness/mass/nodules, no carotid bruit and no JVD  Heart: regular rate and rhythm, S1, S2 normal, no S3 or S4, no rub  Lungs: clear to auscultation bilaterally, normal percussion bilaterally  Abdomen: soft, non-tender. Bowel sounds normal. No masses,  no organomegaly  Extremities: edema moderate BLE edema (legs are wrapped)  Pulses: 2+ and symmetric  Neurologic: Grossly normal      Labs:    No results for input(s): CPK, CKMB, TROIQ in the last 72 hours. No lab exists for component: CKQMB, CPKMB  Recent Labs      08/22/18   0351   NA  136   K  4.1   CL  100   BUN  18   CREA  1.39*   GLU  110*   CA  9.1     Recent Labs      08/21/18   0510   WBC  7.7   HGB  9.2*   HCT  30.8*   PLT  507*     No results for input(s): CHOL, LDLC in the last 72 hours.     No lab exists for component: TGL, HDLC,  HBA1C    Telemetry: normal sinus rhythm     Data Review: current meds, labs,recent radiology, intake/output/weight and problem list reviewed

## 2018-08-22 NOTE — PROGRESS NOTES
Hospitalist Progress Note              Wero Lora MD.                                                             Cell: [phone]                               NAME:  Ameena Villalta  :  1960  MRN:  [id number]  Date of Service:  2018    Summary: The patient is a 68-year-old male with past medical history of coronary artery disease, chronic systolic CHF, NYHA class II, COPD, diabetes mellitus type 2, hypertension, hyperlipidemia, MI, restless leg syndrome, gait abnormality who was recently admitted to the hospital in July with fever, tachycardia, probably secondary to cellulitis of the legs and pneumonia, hypotension, chronic hypoxic respiratory failure due to COPD, he presents to the hospital today with SOB. Assessment/Plan:  Acute on Chronic systolic CHF NYHA class IV with acute pul edema, cardiogenic shock  -requiring dobutamine gtt which is being weaned off.  -Dobutamine wean off.  - continue oral lasix, Metoprolol.   Echo: EF 25-30% with diffuse hypokinesis. LA mildly dilated, Mitral valve mod regurgitation.  - for stress test tomorrow  -  Management as per cardio     Sepsis(fever,tachycardia,leukocytosis) due to bilateral cellulitis in the setting of chronic venous stasis. -This is chronic recurrent problem. Blood cx negative   -ID consulted,stopped vancomycin,zosy and clindamycin.  -On Nafcillin   - sepsis criteria resolved  - continue antibiotics as per infectious disease   - continue current wound care     New onset atria fibrillation likely induced by sepsis  S/P LANA and cardioversion. Rate controlled and in SR  Continue eliquis and amiodarone.     NSTEMI,no chest pain presently  -Elevated troponin. On asa,bb  -Cardiology involved    Chronic oxygen dependent respiratory failure due to COPD and CHF: on home oxygen 2-3 l/min via nasal canula. SIRISHA: cpap  COPD without exacerbation: continue home regimen     DM II,well controlled. A1C 6.Hold metformin in light of iv contrast.Accucheks. Use humalog sliding scale.     Depression.  Continue home medication.     Hypertension.  BP stable. Continue current anti-hypertensive's. .     Chronic pain syndrome with acute pain from leg cellulitis: on MS contin, continue     Normocytic Anemia. No evidence of GI bleed or any blood loss  Check stool occult, iron panel  - iron sat is low at 14%. F/u on stool occult. Start ferrous sulfate     Bump in creatinine,worsening. Could be contrast related or hypoperfusion due to low BP,cardiomyopathy  -Holding aldactone and lisinopril. On vasopressor/inotrope   - renal indices improving     Code status: full  DVT prophylaxsis: eliquis  Dispo: to be determined         Interval History/Subjective:  F/u for SOB. CHF    No acute overnight event. Constipated. Denies SOB or chest pain at rest. No n/v or abdominal pain. Review of Systems:  Pertinent items are noted in HPI. Objective:     VITALS:   Last 24hrs VS reviewed since prior progress note. Most recent are:  Visit Vitals    /51 (BP 1 Location: Right arm, BP Patient Position: Sitting)    Pulse 73    Temp 97.5 °F (36.4 °C)    Resp 20    Ht 6' 2\" (1.88 m)    Wt 108.1 kg (238 lb 5.1 oz)    SpO2 100%    BMI 30.6 kg/m2       Intake/Output Summary (Last 24 hours) at 08/22/18 1511  Last data filed at 08/22/18 1156   Gross per 24 hour   Intake           600.46 ml   Output             2300 ml   Net         -1699.54 ml        PHYSICAL EXAM:  General: No acute distress, cooperative, pleasant   EENT: EOMI. Anicteric sclerae. Oral mucous moist, oropharynx benign  Resp: CTA bilaterally. No wheezing/rhonchi/rales. No accessory muscle use  CV: Regular rhythm, normal rate, no murmurs, gallops, rubs  GI: Soft, non distended, non tender. normoactive bowel sounds, no hepatosplenomegaly Extremities: Bilateral lower extremity dressing clean, d/i.  Neurologic: Moves all extremities.   AAOx3, CN II-XII grossly intact  Psych: Good insight. Not anxious nor agitated. Skin: Good Turgor, no rashes or ulcers    Lab Data Personally Reviewed: (see below)     Medications list Personally Reviewed:  x YES  NO     _______________________________________________________________________  Care Plan discussed with:  Patient/Family and Nurse    Total NON critical care TIME:  30 minutes    Graham Figueredo MD     Procedures: see electronic medical records for all procedures/Xrays and details which were not copied into this note but were reviewed prior to creation of Plan. LABS:  Recent Labs      08/21/18   0510   WBC  7.7   HGB  9.2*   HCT  30.8*   PLT  507*     Recent Labs      08/22/18   0351  08/21/18   0512  08/20/18   0552   NA  136  137  139   K  4.1  3.5  3.2*   CL  100  99  102   CO2  25  28  28   BUN  18  15  13   CREA  1.39*  1.37*  1.34*   GLU  110*  83  93   CA  9.1  8.5  8.2*   MG   --   1.9  1.8     Recent Labs      08/21/18   0512   SGOT  80*   ALT  75   AP  203*   TBILI  1.3*   TP  8.7*   ALB  2.3*   GLOB  6.4*     No results for input(s): INR, PTP, APTT in the last 72 hours. No lab exists for component: INREXT, INREXT   Recent Labs      08/22/18   0351   TIBC  283   PSAT  14*   FERR  115      Lab Results   Component Value Date/Time    Folate 7.9 02/27/2018 03:52 AM    Folate 7.5 02/27/2018 03:52 AM      No results for input(s): PH, PCO2, PO2 in the last 72 hours. No results for input(s): CPK, CKNDX, TROIQ in the last 72 hours.     No lab exists for component: CPKMB  Lab Results   Component Value Date/Time    Cholesterol, total 109 08/14/2018 03:39 AM    HDL Cholesterol 24 08/14/2018 03:39 AM    LDL,Direct 193 (H) 05/21/2014 09:59 AM    LDL, calculated 72.2 08/14/2018 03:39 AM    Triglyceride 64 08/14/2018 03:39 AM    CHOL/HDL Ratio 4.5 08/14/2018 03:39 AM     Lab Results   Component Value Date/Time    Glucose (POC) 95 08/22/2018 11:42 AM    Glucose (POC) 95 08/22/2018 06:49 AM    Glucose (POC) 115 (H) 08/21/2018 09:28 PM Glucose (POC) 109 (H) 08/21/2018 04:23 PM    Glucose (POC) 107 (H) 08/21/2018 11:52 AM     Lab Results   Component Value Date/Time    Color DARK YELLOW 07/09/2018 10:39 PM    Appearance CLEAR 07/09/2018 10:39 PM    Specific gravity 1.017 07/09/2018 10:39 PM    pH (UA) 8.0 07/09/2018 10:39 PM    Protein TRACE (A) 07/09/2018 10:39 PM    Glucose NEGATIVE  07/09/2018 10:39 PM    Ketone NEGATIVE  07/09/2018 10:39 PM    Bilirubin NEGATIVE  07/09/2018 10:39 PM    Urobilinogen 1.0 07/09/2018 10:39 PM    Nitrites NEGATIVE  07/09/2018 10:39 PM    Leukocyte Esterase NEGATIVE  07/09/2018 10:39 PM    Epithelial cells FEW 07/09/2018 10:39 PM    Bacteria NEGATIVE  07/09/2018 10:39 PM    WBC 0-4 07/09/2018 10:39 PM    RBC 0-5 07/09/2018 10:39 PM

## 2018-08-22 NOTE — PROGRESS NOTES
Spiritual Care Assessment/Progress Note  [address]      NAME: Brijesh Trimble      MRN: [id number]  AGE: 59 y.o. SEX: male  Voodoo Affiliation: Sabianist   Language: English     8/22/2018     Total Time (in minutes): 30     Spiritual Assessment begun in [facility] through conversation with:         [x]Patient        [] Family    [] Friend(s)        Reason for Consult: Advance medical directive consult     Spiritual beliefs: (Please include comment if needed)     [] Identifies with a armando tradition:         [] Supported by a armando community:            [] Claims no spiritual orientation:           [] Seeking spiritual identity:                [] Adheres to an individual form of spirituality:           [x] Not able to assess:                           Identified resources for coping:      [] Prayer                               [] Music                  [] Guided Imagery     [] Family/friends                 [] Pet visits     [] Devotional reading                         [] Unknown     [] Other:                                             Interventions offered during this visit: (See comments for more details)    Patient Interventions: Advance medical directive consult           Plan of Care:     [] Support spiritual and/or cultural needs    [x] Support AMD and/or advance care planning process      [] Support grieving process   [] Coordinate Rites and/or Rituals    [] Coordination with community clergy   [] No spiritual needs identified at this time   [] Detailed Plan of Care below (See Comments)  [] Make referral to Music Therapy  [] Make referral to Pet Therapy     [] Make referral to Addiction services  [] Make referral to [facility]  [] Make referral to Spiritual Care Partner  [] No future visits requested        [x] Follow up visits as needed     Visited pt in response to an In-Basket request to assist with an Advance Medical Directive (AMD).  Explained document to patient, who chose not to complete an AMD at this time. He was encouraged to have a  paged if he wishes additional information or if decides to complete an AMD. Chaplains will follow as needed.   Chaplain Richter MDiv, MS, [place] [phone] PRAY ([phone])

## 2018-08-22 NOTE — PROGRESS NOTES
ID Progress Note  2018    Subjective:     Still with dyspnea--leg is feeling better, however    Objective:     Antibiotics:  1. Vancomycin   2. Clindamycin   3. Zosyn   4. None       Vitals:   Visit Vitals    /51 (BP 1 Location: Right arm, BP Patient Position: Sitting)    Pulse 73    Temp 97.5 °F (36.4 °C)    Resp 20    Ht 6' 2\" (1.88 m)    Wt 108.1 kg (238 lb 5.1 oz)    SpO2 100%    BMI 30.6 kg/m2        Tmax:  Temp (24hrs), Av.8 °F (36.6 °C), Min:97.5 °F (36.4 °C), Max:98.2 °F (36.8 °C)      Exam:  Lungs:  clear to auscultation bilaterally  Heart:  regular rate and rhythm  Skin:  Less erythema and warmth of left leg than yesterday--legs actually look the best I've seen in some time    Labs:      Recent Labs      18   0351  18   0512  18   0510  18   0552   WBC   --    --   7.7   --    HGB   --    --   9.2*   --    PLT   --    --   507*   --    BUN  18  15   --   13   CREA  1.39*  1.37*   --   1.34*   SGOT   --   80*   --    --    AP   --   203*   --    --    TBILI   --   1.3*   --    --        Cultures:     Lab Results   Component Value Date/Time    Specimen Description: ESOPHAGEAL BRUSHING 2010 09:25 AM     Lab Results   Component Value Date/Time    Culture result: NO GROWTH 5 DAYS 2018 08:19 PM    Culture result: NO GROWTH 5 DAYS 2018 09:11 PM    Culture result: NO GROWTH 5 DAYS 2018 10:18 AM       Radiology:     Line/Insert Date:           Assessment:     1. Cellulitis--improving  2. CHF  3. Debility  4. Dermatitis                 Objective:     1.  Continue current therapy    Agustin Pat MD

## 2018-08-22 NOTE — PROGRESS NOTES
Problem: Falls - Risk of  Goal: *Absence of Falls  Document Jarad Fall Risk and appropriate interventions in the flowsheet. Outcome: Progressing Towards Goal  Fall Risk Interventions:  Mobility Interventions: Communicate number of staff needed for ambulation/transfer, Patient to call before getting OOB, PT Consult for mobility concerns, Utilize walker, cane, or other assistive device         Medication Interventions: Evaluate medications/consider consulting pharmacy, Patient to call before getting OOB, Teach patient to arise slowly    Elimination Interventions: Call light in reach, Patient to call for help with toileting needs, Urinal in reach, Toileting schedule/hourly rounds       Pt walks with unsteady gait,       Problem: Pressure Injury - Risk of  Goal: *Prevention of pressure injury  Document Charlie Scale and appropriate interventions in the flowsheet. Outcome: Progressing Towards Goal  Pressure Injury Interventions:  Sensory Interventions: Assess changes in LOC, Check visual cues for pain    Moisture Interventions: Absorbent underpads, Limit adult briefs, Apply protective barrier, creams and emollients    Activity Interventions: Chair cushion, Increase time out of bed, PT/OT evaluation    Mobility Interventions: Pressure redistribution bed/mattress (bed type), PT/OT evaluation, Chair cushion    Nutrition Interventions: Document food/fluid/supplement intake, Discuss nutritional consult with provider    Friction and Shear Interventions: Lift sheet, Minimize layers         Pt turns self and readjusts as necessary       Problem: Heart Failure: Discharge Outcomes  Goal: *Demonstrates ability to perform prescribed activity without shortness of breath or discomfort  Outcome: Not Progressing Towards Goal  Pt still has Dyspnea on exertion and at rest. Pt requests to leave and be discharged home.  Refusing recommended rehab  Goal: *Describes smoking cessation resources  Outcome: Not Progressing Towards Goal  Pt has had smoking cessation discussed with him  Goal: *Understands and describes signs and symptoms to report to providers(Stroke Metric)  Outcome: Progressing Towards Goal  Pt agitated and unwilling to speak further about discharge outcomes      Problem: Discharge Planning  Goal: *Discharge to safe environment  Outcome: Progressing Towards Goal  Pt to be discharged home, refusing recommended rehabilitation facility

## 2018-08-22 NOTE — PROGRESS NOTES
1700: PRECEPTOR REVIEW OF RN ORIENTEE'S WORK:    8/22/2018    Shift times- 1700 to 1953    The RN orientee's documentation of patient care for [facility] has been reviewed and approved. All medications have been administered under the direct supervision of the preceptor. Gabriella Rodriguez

## 2018-08-22 NOTE — ACP (ADVANCE CARE PLANNING)
Visited pt in response to an In-Basket request to assist with an Advance Medical Directive (AMD). Explained document to patient, who chose not to complete an AMD at this time. He was encouraged to have a  paged if he wishes additional information or if decides to complete an AMD. Chaplains will follow as needed.   Chaplain Leon MDiv, MS, [facility]  [phone] PRAY ([phone])

## 2018-08-22 NOTE — ROUTINE PROCESS
Pt picked up from Nuclear to Stress Lab. When consenting the pt for the stress test, pt verbalized not having a good experience during his most recent stress test. He said they used a reversal agent. Discussed the test,reversal agent and pt's concerns. He states he is scared and not comfortable with having the Lexiscan part of the test. Called and discussed with pt's nurse so she is aware. Pt transported back to room.

## 2018-08-22 NOTE — PROGRESS NOTES
Problem: Mobility Impaired (Adult and Pediatric)  Goal: *Acute Goals and Plan of Care (Insert Text)  Physical Therapy Goals   8/15/2018  1. Patient will ambulate with independence for 100 feet with the least restrictive device within 7 day(s). physical Therapy TREATMENT  Patient: Xochitl Villalta (55 y.o. male)  Date: 8/22/2018  Diagnosis: NSTEMI (non-ST elevated myocardial infarction) [facility]) NSTEMI (non-ST elevated myocardial infarction) [facility])   Precautions:  Chart, physical therapy assessment, plan of care and goals were reviewed. ASSESSMENT:  Ambulation distance progressed to 400 ft with pre-morbid assistive device [facility]). Patient's gait chucky remains slow, however, methodical and cautious. Introduced dual-tasking during gait training (head turns, conversation) with no observable overt LOB. HR between 80-90s. Patient's O2 sats mid-90s on pre-morbid supplemental oxygen level (3 L/min). In order to maximize patient's functional endurance, independence, and safety, recommend HHPT follow-up. Recommend continued hallway ambulation with nursing staff - RW, gait belt, portable O2 BID     Progression toward goals:  [x]    Improving appropriately and progressing toward goals  []    Improving slowly and progressing toward goals  []    Not making progress toward goals and plan of care will be adjusted     PLAN:  Patient continues to benefit from skilled intervention to address the above impairments. Continue treatment per established plan of care. Discharge Recommendations:  Home Health  Further Equipment Recommendations for Discharge: Owns [facility] & RW     SUBJECTIVE:   Patient stated No, I don't need rehab - you see old men running around naked.     OBJECTIVE DATA SUMMARY:   Critical Behavior:  Neurologic State: Alert  Orientation Level: Oriented X4  Cognition: Appropriate safety awareness  Safety/Judgement: Awareness of environment, Fall prevention  Functional Mobility Training:  Bed Mobility:  Supine to Sit: Supervision  Transfers:  Sit to Stand: Supervision  Stand to Sit: Supervision  Balance:  Sitting: Intact; Without support  Standing: Intact; With support [facility]  Ambulation/Gait Training:  Distance (ft): 400 Feet (ft)  Assistive Device: Gait belt;Cane, straight  Ambulation - Level of Assistance: Stand-by assistance  Gait Abnormalities: Decreased step clearance  Base of Support: Widened     Speed/Renetta: Slow (Cautious/Methodical)  Step Length: Right shortened;Left shortened  Interventions: Safety awareness training; Tactile cues; Verbal cues    Pain:  Pain Scale 1: Numeric (0 - 10)  Pain Intensity 1: 0  Pain Location 1: Generalized  Pain Orientation 1: Other (comment) (bilateral legs)  Pain Description 1: Aching  Pain Intervention(s) 1: Medication (see MAR)     Activity Tolerance:  Please refer to the flowsheet for vital signs taken during this treatment.   After treatment:   [x]    Patient left in no apparent distress sitting up in chair  []    Patient left in no apparent distress in bed  [x]    Call bell left within reach  [x]    Nursing notified  []    Caregiver present  []    Bed alarm activated    COMMUNICATION/COLLABORATION:   The patients plan of care was discussed with: Registered Nurse and     Ximena Smith, PT, DPT   Time Calculation: 23 mins

## 2018-08-22 NOTE — PROGRESS NOTES
0730 Bedside and Verbal shift change report given to WALTER Bruce (oncoming nurse) by Wade Lloyd (offgoing nurse). Report included the following information SBAR, Kardex, Procedure Summary, Intake/Output, MAR, Recent Results and Cardiac Rhythm NSR.   0900 Bedside and Verbal shift change report given to WALTER Ramirez (oncoming nurse) by Wali Zendejas (offgoing nurse). Report included the following information SBAR, Kardex, Procedure Summary, Intake/Output, MAR, Recent Results and Med Rec Status.

## 2018-08-22 NOTE — PROGRESS NOTES
1600: Bedside shift change report received by Brandyn. Report included the following information SBAR, Kardex, MAR, Recent Results and Cardiac Rhythm NSR with 1st degree AVB and BBB. 1930: Bedside shift change report given to [facility] (oncoming nurse). Report included the following information SBAR, Procedure Summary, Intake/Output, MAR, Recent Results and Cardiac Rhythm NSR. Care Plan    Problem: Falls - Risk of  Goal: *Absence of Falls  Document Jarad Fall Risk and appropriate interventions in the flowsheet. Outcome: Progressing Towards Goal  Fall Risk Interventions:  Mobility Interventions: Communicate number of staff needed for ambulation/transfer         Medication Interventions: Patient to call before getting OOB, Teach patient to arise slowly    Elimination Interventions: Call light in reach, Patient to call for help with toileting needs, Urinal in reach, Toileting schedule/hourly rounds             Problem: Pressure Injury - Risk of  Goal: *Prevention of pressure injury  Document Charlie Scale and appropriate interventions in the flowsheet. Pressure Injury Interventions:  Sensory Interventions: Assess changes in LOC, Check visual cues for pain    Moisture Interventions: Absorbent underpads, Limit adult briefs, Apply protective barrier, creams and emollients    Activity Interventions: Pressure redistribution bed/mattress(bed type), Increase time out of bed    Mobility Interventions: Pressure redistribution bed/mattress (bed type), Turn and reposition approx.  every two hours(pillow and wedges)    Nutrition Interventions: Document food/fluid/supplement intake    Friction and Shear Interventions: Lift sheet, Minimize layers

## 2018-08-22 NOTE — PROGRESS NOTES
7487 Bedside shift change report given to Travis Leary (oncoming nurse) by Silvana Edouard (offgoing nurse). Report included the following information SBAR, Kardex, ED Summary, OR Summary, Procedure Summary, MAR, Accordion, Recent Results, Med Rec Status, Cardiac Rhythm nsr, Alarm Parameters  and Pre Procedure Checklist.       3379 off floor/off tele for nuclear stress test    3447 Marsha Bunch from [employer] called to let me know pt refused Namon Going. He is anxious and nervous about procedure. Will call [facility] to discuss progress. 2300 [facility] paged again for addition of stool softeners and miralax.

## 2018-08-22 NOTE — PROGRESS NOTES
1930: Bedside and Verbal shift change report given to Adarsh Ibrahim (oncoming nurse) by Iraj Allison (offgoing nurse). Report included the following information SBAR, Kardex, ED Summary, Intake/Output, MAR, Recent Results and Cardiac Rhythm NSR    0730: Bedside and Verbal shift change report given to Jesus Manuel Cronin (oncoming nurse) by Adarsh Ibrahim (offgoing nurse). Report included the following information SBAR, Kardex, Procedure Summary, Intake/Output, MAR, Recent Results and Cardiac Rhythm NSR. Problem: Falls - Risk of  Goal: *Absence of Falls  Document Jarad Fall Risk and appropriate interventions in the flowsheet. Outcome: Progressing Towards Goal  Fall Risk Interventions:  Mobility Interventions: Assess mobility with egress test, Communicate number of staff needed for ambulation/transfer, OT consult for ADLs, Patient to call before getting OOB, PT Consult for mobility concerns, Strengthening exercises (ROM-active/passive), Utilize walker, cane, or other assistive device     Medication Interventions: Evaluate medications/consider consulting pharmacy, Patient to call before getting OOB, Teach patient to arise slowly, Utilize gait belt for transfers/ambulation    Elimination Interventions: Call light in reach, Patient to call for help with toileting needs, Toilet paper/wipes in reach, Urinal in reach      Call bell and personal effects within reach. Bed locked and in low position. Non skid footwear in place. \    Problem: Pressure Injury - Risk of  Goal: *Prevention of pressure injury  Document Charlie Scale and appropriate interventions in the flowsheet.    Outcome: Progressing Towards Goal  Pressure Injury Interventions:  Sensory Interventions: Assess changes in LOC, Assess need for specialty bed, Discuss PT/OT consult with provider, Float heels, Keep linens dry and wrinkle-free, Minimize linen layers    Moisture Interventions: Absorbent underpads, Apply protective barrier, creams and emollients, Contain wound drainage, Minimize layers    Activity Interventions: Increase time out of bed, Pressure redistribution bed/mattress(bed type), PT/OT evaluation    Mobility Interventions: Float heels, Pressure redistribution bed/mattress (bed type), PT/OT evaluation, Turn and reposition approx.  every two hours(pillow and wedges)    Nutrition Interventions: Document food/fluid/supplement intake    Friction and Shear Interventions: Minimize layers, Lift sheet    Turns self at appropriate intervals; skin assessed Q4H; blood glucose controlled               Problem: Arrhythmia Pathway (Adult)  Goal: *Absence of arrhythmia  Outcome: Progressing Towards Goal  Remains in normal sinus rhythm

## 2018-08-23 NOTE — PROGRESS NOTES
-CM noted discharge today - CM awaiting orders for home health SN CHF and specific wound care orders and PT to send to [facility] to request made to see if they can accept patient- note sent to [facility] to notify of discharge today without IV ABX - noted follow-up appointments on record for follow-up with PCP and cardiologist - CM left VM for mely Ruiz CM at [phone] and notified her that patient provided with AMD info at this time per pastoral care but that patient had not completed info at this time. LAUREN Yanes CM sent orders written for [facility] wound care to [facility] via [facility] - VM left for Jennifer with [facility] and awaiting call back at this time.  LAUREN Yanes

## 2018-08-23 NOTE — DISCHARGE SUMMARY
Discharge Summary       PATIENT ID: Bandar Villalta  MRN: [id number]   YOB: 1960    DATE OF ADMISSION: 8/13/2018  2:31 PM    DATE OF DISCHARGE: 8/23/2018  PRIMARY CARE PROVIDER: Meredith Tello MD       DISCHARGING PHYSICIAN: Mehreen Rockwell MD    To contact this individual call [phone] and ask the  to page. If unavailable ask to be transferred the Adult Hospitalist Department. CONSULTATIONS: IP CONSULT TO CARDIOLOGY  IP CONSULT TO HOSPITALIST  IP CONSULT TO INFECTIOUS DISEASES    PROCEDURES/SURGERIES: * No surgery found *    ADMITTING DIAGNOSES & HOSPITAL COURSE:     The patient is a 78-year-old male with past medical history of coronary artery disease, chronic systolic CHF, NYHA class II, COPD, diabetes mellitus type 2, hypertension, hyperlipidemia, MI, restless leg syndrome, gait abnormality who was recently admitted to the hospital in July with fever, tachycardia, probably secondary to cellulitis of the legs and pneumonia, hypotension, chronic hypoxic respiratory failure due to COPD, he presents to the hospital today with SOB.        DISCHARGE DIAGNOSES / PLAN:      Acute on Chronic systolic CHF NYHA class IV with acute pul edema, cardiogenic shock  -requiring dobutamine gtt which is being weaned off.  -Dobutamine wean off.  - continue oral lasix, Metoprolol.   Echo: EF 25-30% with diffuse hypokinesis. LA mildly dilated, Mitral valve mod regurgitation.  - he declined to proceed with stress test, due to fear he will react to Justin Curry. He was cleared fro discharged from cardiac point of view   - Continue lasix, metoprolol and aspirin. No ACEI due to tenous BP and renal insuffiencey. F/u with cardiology within one week of discharge     Sepsis(fever,tachycardia,leukocytosis) due to bilateral cellulitis in the setting of chronic venous stasis. -This is chronic recurrent problem.  Blood cx negative   -ID consulted,stopped vancomycin,zosy and clindamycin.  -On Nafcillin from 8/17-8/22.  - continue daily wound care  - sepsis criteria resolved      New onset atria fibrillation likely induced by sepsis  S/P LANA and cardioversion. Rate controlled and in atrial fibrillation  Continue eliquis and amiodarone and metoprolol.      NSTEMI,no chest pain presently  -Elevated troponin. On asa,bb  -Cardiology involved     Chronic oxygen dependent respiratory failure due to COPD and CHF: on home oxygen 2-3 l/min via nasal canula. SIRISHA: cpap  COPD without exacerbation: continue home regimen      DM II,well controlled. A1C 6. Hold metformin in light of iv contrast.Accucheks. Use humalog sliding scale. Continue diabetic diet. BG stable      Depression.  Continue home medication.     Hypertension.  BP stable. Continue current anti-hypertensive's. [place] Carrel  Chronic pain syndrome with acute pain from leg cellulitis: on MS contin, continue      Normocytic Anemia. No evidence of GI bleed or any blood loss  Check stool occult, iron panel  - iron sat is low at 14%. stool occult not done. Started on ferrous sulfate      Bump in creatinine,worsening. Could be contrast related or hypoperfusion due to low BP,cardiomyopathy  -Holding aldactone and lisinopril. On vasopressor/inotrope   - renal indices improving. F/u with PCP   Code status: full    Disposition prior to discharge: patient is hemodynamically stable and has improved.             PENDING TEST RESULTS:   At the time of discharge the following test results are still pending:     FOLLOW UP APPOINTMENTS:    Follow-up Information     Follow up With Details Comments 32 MD Livia Hope 13  [address] [facility] [place] [place]  [phone]             ADDITIONAL CARE RECOMMENDATIONS:     DIET: Cardiac Diet and Diabetic Diet    ACTIVITY: Activity as tolerated    WOUND CARE: Bilateral low legs:  Daily cleanse with soap and water; rinse with saline; apply Aquacel AG; cover with abd pad and dry gauze.     EQUIPMENT needed: none      DISCHARGE MEDICATIONS:  Current Discharge Medication List      START taking these medications    Details   ferrous sulfate 325 mg (65 mg iron) tablet Take 1 Tab by mouth daily (with breakfast). Qty: 30 Tab, Refills: 1      amiodarone (CORDARONE) 200 mg tablet Take 1 Tab by mouth two (2) times daily (after meals). Qty: 60 Tab, Refills: 0      apixaban (ELIQUIS) 5 mg tablet Take 1 Tab by mouth two (2) times a day. Qty: 60 Tab, Refills: 0      midodrine (PROAMITINE) 5 mg tablet Take 1 Tab by mouth three (3) times daily (with meals) for 30 days. Qty: 90 Tab, Refills: 0         CONTINUE these medications which have NOT CHANGED    Details   furosemide (LASIX) 20 mg tablet Take 20 mg by mouth daily. HYDROmorphone (DILAUDID) 4 mg tablet Take 4 mg by mouth two (2) times daily as needed for Pain. morphine CR (MS CONTIN) 60 mg CR tablet Take 60 mg by mouth three (3) times daily. allopurinol (ZYLOPRIM) 100 mg tablet Take 100 mg by mouth daily. buPROPion SR (WELLBUTRIN SR) 150 mg SR tablet Take 150 mg by mouth two (2) times a day. metoprolol succinate (TOPROL XL) 25 mg XL tablet Take 25 mg by mouth daily. albuterol (PROVENTIL HFA, VENTOLIN HFA, PROAIR HFA) 90 mcg/actuation inhaler Take 1 Puff by inhalation every four (4) hours as needed for Wheezing. albuterol-ipratropium (DUO-NEB) 2.5 mg-0.5 mg/3 ml nebu 3 mL by Nebulization route every four (4) hours. Qty: 30 Nebule, Refills: 2      aspirin delayed-release 81 mg tablet Take 81 mg by mouth daily. nitroglycerin (NITROSTAT) 0.4 mg SL tablet 0.4 mg by SubLINGual route every five (5) minutes as needed for Chest Pain. nortriptyline (PAMELOR) 50 mg capsule Take 50 mg by mouth nightly. docusate sodium (COLACE) 100 mg capsule Take 100 mg by mouth two (2) times a day. omeprazole (PRILOSEC) 20 mg capsule Take 20 mg by mouth daily.  Indications: gastroesophageal reflux disease      atorvastatin (LIPITOR) 80 mg tablet Take 80 mg by mouth daily.      rOPINIRole (REQUIP) 4 mg tab TAB Take 8 mg by mouth nightly. Indications: Restless Legs Syndrome      zolpidem (AMBIEN) 10 mg tablet Take 1 Tab by mouth nightly. Qty: 30 Tab, Refills: 2      tiotropium (SPIRIVA WITH HANDIHALER) 18 mcg inhalation capsule Take 1 Cap by inhalation daily. fluticasone-salmeterol (ADVAIR DISKUS) 250-50 mcg/dose diskus inhaler Take 1 Puff by inhalation two (2) times a day. STOP taking these medications       spironolactone (ALDACTONE) 25 mg tablet Comments:   Reason for Stopping:         lisinopril (PRINIVIL, ZESTRIL) 2.5 mg tablet Comments:   Reason for Stopping:         citalopram (CELEXA) 40 mg tablet Comments:   Reason for Stopping:         metFORMIN (GLUCOPHAGE) 1,000 mg tablet Comments:   Reason for Stopping:         clopidogrel (PLAVIX) 75 mg tab Comments:   Reason for Stopping:         isosorbide mononitrate ER (IMDUR) 60 mg CR tablet Comments:   Reason for Stopping:                 NOTIFY YOUR PHYSICIAN FOR ANY OF THE FOLLOWING:   Fever over 101 degrees for 24 hours. Chest pain, shortness of breath, fever, chills, nausea, vomiting, diarrhea, change in mentation, falling, weakness, bleeding. Severe pain or pain not relieved by medications. Or, any other signs or symptoms that you may have questions about. DISPOSITION:   x Home With:   OT  PT  HHx  RN       Long term SNF/Inpatient Rehab    Independent/assisted living    Hospice    Other:       PATIENT CONDITION AT DISCHARGE:     Functional status    Poor     Deconditioned    x Independent      Cognition   x  Lucid     Forgetful     Dementia      Catheters/lines (plus indication)    Valle     PICC     PEG    x None      Code status    x Full code     DNR      PHYSICAL EXAMINATION AT DISCHARGE:   Refer to Progress Note  General: No acute distress, cooperative, pleasant   EENT: EOMI. Anicteric sclerae. Oral mucous moist, oropharynx benign  Resp: CTA bilaterally. No wheezing/rhonchi/rales.  No accessory muscle use  CV: S1 and S2, irregularly irregular, no murmurs, gallops, rubs  GI: Soft, non distended, non tender. normoactive bowel sounds, no hepatosplenomegaly   Extremities: Bilateral lower extremity erythema- improving. Wound dressing is clean and dry. Neurologic: Moves all extremities. AAOx3, CN II-XII grossly intact  Psych: Good insight. Not anxious nor agitated.   Skin: Good Turgor, no rashes or ulcers    CHRONIC MEDICAL DIAGNOSES:  Problem List as of 8/23/2018  Date Reviewed: 8/13/2018          Codes Class Noted - Resolved    * (Principal)NSTEMI (non-ST elevated myocardial infarction) ([facility] ICD-10-CM: I21.4  ICD-9-CM: 410.70  8/13/2018 - Present        Fluid overload ICD-10-CM: E87.70  ICD-9-CM: 276.69  5/6/2018 - Present        SOB (shortness of breath) ICD-10-CM: R06.02  ICD-9-CM: 786.05  5/1/2018 - Present        Systolic CHF, acute ([facility] ICD-10-CM: I50.21  ICD-9-CM: 428.21, 428.0  2/26/2018 - Present        Acute systolic CHF (congestive heart failure) ([facility] ICD-10-CM: I50.21  ICD-9-CM: 428.21, 428.0  2/26/2018 - Present        COPD with exacerbation ([facility] ICD-10-CM: J44.1  ICD-9-CM: 491.21  5/24/2014 - Present        Morbid obesity ([facility] ICD-10-CM: E66.01  ICD-9-CM: 278.01  1/8/2014 - Present        Hematemesis ICD-10-CM: K92.0  ICD-9-CM: 578.0  1/7/2014 - Present        Rectal bleeding ICD-10-CM: K62.5  ICD-9-CM: 569.3  1/7/2014 - Present        Obstructive sleep apnea (adult) (pediatric) ICD-10-CM: G47.33  ICD-9-CM: 327.23  1/7/2014 - Present        Chronic airway obstruction, not elsewhere classified ICD-10-CM: J44.9  ICD-9-CM: 400  1/7/2014 - Present        Essential hypertension, benign ICD-10-CM: I10  ICD-9-CM: 401.1  1/7/2014 - Present        Type II or unspecified type diabetes mellitus without mention of complication, not stated as uncontrolled ICD-10-CM: E11.9  ICD-9-CM: 250.00  1/7/2014 - Present        Coronary artery disease ICD-10-CM: I25.10  ICD-9-CM: 414.00  1/7/2014 - Present Chest pain ICD-10-CM: R07.9  ICD-9-CM: 786.50  1/5/2014 - Present        Coronary atherosclerosis of native coronary artery ICD-10-CM: I25.10  ICD-9-CM: 414.01  4/12/2011 - Present        Other primary cardiomyopathies ICD-10-CM: I42.8  ICD-9-CM: 425.4  4/12/2011 - Present        Chronic systolic heart failure (HCC) ICD-10-CM: I50.22  ICD-9-CM: 428.22  4/12/2011 - Present        RESOLVED: Leukocytosis ICD-10-CM: D72.829  ICD-9-CM: 288.60  7/10/2018 - 7/12/2018        RESOLVED: Pneumonia ICD-10-CM: J18.9  ICD-9-CM: 057  7/10/2018 - 7/12/2018        RESOLVED: Tachycardia ICD-10-CM: R00.0  ICD-9-CM: 785.0  7/10/2018 - 7/12/2018        RESOLVED: Hypotension ICD-10-CM: I95.9  ICD-9-CM: 458.9  7/10/2018 - 7/12/2018        RESOLVED: Fever ICD-10-CM: R50.9  ICD-9-CM: 780.60  7/10/2018 - 7/12/2018        RESOLVED: SIRS (systemic inflammatory response syndrome) ([facility] ICD-10-CM: R65.10  ICD-9-CM: 995.90  7/10/2018 - 7/12/2018              Greater than 30 minutes were spent with the patient on counseling and coordination of care    Signed:   Graham Figueredo MD  8/23/2018  9:03 AM

## 2018-08-23 NOTE — PROGRESS NOTES
Hospital follow-up PCP transitional care appointment has been scheduled with Dr. Oscar Davidson for Friday, 8/24/18. Transporation will  patient at 8:00 a.m. If unable to attend appointment, please call the office at [phone]. Pending patient discharge.   Caitie Wiggins, Care Management Specialist.

## 2018-08-23 NOTE — DISCHARGE INSTRUCTIONS
Smoking Cessation Program: This is a free, phone/text/email based, smoking cessation program. The program is individualized to meet each patient's needs. To enroll use the link [email or web address]/survey/[email or web address] or text Emelina Pena to [phone] from any smart phone. Discharge Instructions       PATIENT ID: Rolan Villalta  MRN: [id number]   YOB: 1960    DATE OF ADMISSION: 8/13/2018  2:31 PM    DATE OF DISCHARGE: 8/23/2018    PRIMARY CARE PROVIDER: Emy Savage MD       DISCHARGING PHYSICIAN: Agnieszka Heredia MD    To contact this individual call [phone] and ask the  to page. If unavailable ask to be transferred the Adult Hospitalist Department. DISCHARGE DIAGNOSES Bilateral lower extremity Cellulitis, Sepsis, Acute on chronic systolic heart failure, Cardiogenic Shock    CONSULTATIONS: IP CONSULT TO CARDIOLOGY  IP CONSULT TO HOSPITALIST  IP CONSULT TO INFECTIOUS DISEASES    PROCEDURES/SURGERIES: * No surgery found *    PENDING TEST RESULTS:   At the time of discharge the following test results are still pending:     FOLLOW UP APPOINTMENTS:   Follow-up Information     Follow up With Details Comments Contact Info    Emy Savage MD   [address] [place] [address]  [phone]             ADDITIONAL CARE RECOMMENDATIONS:     DIET: Diabetic Diet    ACTIVITY: Activity as tolerated    WOUND CARE:     EQUIPMENT needed:    Bilateral low legs:  Daily cleanse with soap and water; rinse with saline; apply Aquacel AG; cover with abd pad and dry gauze.           DISCHARGE MEDICATIONS:   See Medication Reconciliation Form    · It is important that you take the medication exactly as they are prescribed. · Keep your medication in the bottles provided by the pharmacist and keep a list of the medication names, dosages, and times to be taken in your wallet. · Do not take other medications without consulting your doctor.        NOTIFY [facility] ANY OF THE FOLLOWING:   Fever over 101 degrees for 24 hours. Chest pain, shortness of breath, fever, chills, nausea, vomiting, diarrhea, change in mentation, falling, weakness, bleeding. Severe pain or pain not relieved by medications. Or, any other signs or symptoms that you may have questions about.       DISPOSITION:   x Home With:   OT  PT  HH  RN       SNF/Inpatient Rehab/LTAC    Independent/assisted living    Hospice    Other:     CDMP Checked:   Yes x       Signed:   Richard Clarke MD  8/23/2018  9:20 AM

## 2018-08-23 NOTE — PROGRESS NOTES
1422hrs:  Patient completed 6-minute walk test in hallway. Pre-walk  SpO2 99% on 3L/min nasal cannula.   Patient tolerated activity well, SpO2 dropped to 90% but returned to high 90's at rest.  Upon completion of walk SpO2 99% at rest.

## 2018-08-23 NOTE — PROGRESS NOTES
1930: Bedside and Verbal shift change report given to [facility], RN (oncoming nurse) by Jayne Rice RN (offgoing nurse). Report included the following information SBAR, Kardex, ED Summary, Intake/Output, MAR and Recent Results. 0222: Patient converted to A.fib with HR in 90s. Nurse went to check on patient and found him standing up with blood on side of leg and on floor. Assisted patient back to bed to assess legs. Redressed patient's dressings on legs. Patient lying in bed in no apparent distress.      7649: Patient heart rhythm in A.flutter, heart rate - 95

## 2018-08-23 NOTE — NURSE NAVIGATOR
HFNN noted discharge order. Post discharge followup appt with Dr. Izabel Linda scheduled for 8/31/18. Office will call day before to confirm time of appt. AVS updated.   Charlotte Shafer RN/CHFN/JESSICA

## 2018-08-23 NOTE — PROGRESS NOTES
Problem: Self Care Deficits Care Plan (Adult)  Goal: *Acute Goals and Plan of Care (Insert Text)  Occupational Therapy Goals  Initiated 8/16/2018; Reviewed for weekly reassess 8/23, continue all  1. Patient will perform grooming standing at sink for 5 minutes with no LOB with modified independence within 7 day(s). 2.  Patient will perform bathing with modified independence within 7 day(s). 3.  Patient will perform lower body dressing with modified independence within 7 day(s). 4.  Patient will perform toilet transfers with modified independence within 7 day(s). 5.  Patient will perform all aspects of toileting with independence within 7 day(s). 6.  Patient will participate in upper extremity therapeutic exercise/activities with independence for 5 minutes within 7 day(s). 7.  Patient will utilize energy conservation techniques during functional activities with no cues within 7 day(s). Occupational Therapy TREATMENT: WEEKLY REASSESSMENT  Patient: Zain Leyva (55 y.o. male)  Date: 8/23/2018  Diagnosis: NSTEMI (non-ST elevated myocardial infarction) [facility]) NSTEMI (non-ST elevated myocardial infarction) [facility])       Precautions:    Chart, occupational therapy assessment, plan of care, and goals were reviewed. ASSESSMENT:  Cleared by RN to see pt for therapy session. Pt received seated EOB. He reported he had already been to the bathroom and performed grooming ADLs, and already donned shorts for impending discharge. Pt did don slip on shoes with setup, using SPC to assist pushing shoe onto foot as he does at home. Discussed home safety techniques such as avoiding using movable objects for support when ambulating and removing throw rugs that would be tripping hazards. Also discussed use of tub transfer bench for safety and energy conservation with bathing. Pt with no further questions or concerns for OT at end of session, requested to remain sitting EOB, nurse aware.  Recommend HHOT and family assistance at discharge. Progression toward goals:  []            Improving appropriately and progressing toward goals  [x]            Improving slowly and progressing toward goals  []            Not making progress toward goals and plan of care will be adjusted     PLAN:  Goals have been updated based on progression since last assessment. Patient continues to benefit from skilled intervention to address the above impairments. Continue to follow patient 4 times a week to address goals. Planned Interventions:  [x]                    Self Care Training                  [x]             Therapeutic Activities  [x]                    Functional Mobility Training    []             Cognitive Retraining  [x]                    Therapeutic Exercises           [x]             Endurance Activities  [x]                    Balance Training                   []             Neuromuscular Re-Education  []                    Visual/Perceptual Training     [x]        Home Safety Training  [x]                    Patient Education                 [x]             Family Training/Education  []                    Other (comment):  Discharge Recommendations: Home Health and family assistance  Further Equipment Recommendations for Discharge: none     SUBJECTIVE:   Patient stated I think the tub bench would be good, my family is going to get me one.     OBJECTIVE DATA SUMMARY:   Cognitive/Behavioral Status:  Neurologic State: Alert  Orientation Level: Oriented X4  Cognition: Follows commands  Perception: Appears intact  Perseveration: No perseveration noted  Safety/Judgement: Awareness of environment; Fall prevention    Functional Mobility and Transfers for ADLs:  Bed Mobility:       Transfers:              Balance:  Sitting: Intact  Standing: Intact; With support [facility])    ADL Intervention:    Discussed home safety techniques such as avoiding using movable objects for support when ambulating and removing throw rugs that would be tripping hazards. Also discussed use of tub transfer bench for safety and energy conservation with bathing. Lower Body Dressing Assistance  Slip on Shoes Without Back: Supervision/set-up  Position Performed: Seated edge of bed  Adaptive Equipment Used:  (pt used SPC to push shoes on to feet)         Cognitive Retraining  Safety/Judgement: Awareness of environment; Fall prevention    Pain:   Reported no pain during session. Activity Tolerance:   Fair  Please refer to the flowsheet for vital signs taken during this treatment.   After treatment:   [] Patient left in no apparent distress sitting up in chair  [x] Patient left in no apparent distress in bed  [x] Call bell left within reach  [x] Nursing notified  [] Caregiver present  [] Bed alarm activated    COMMUNICATION/COLLABORATION:   The patients plan of care was discussed with: Registered Nurse    Harmony Andrade OT  Time Calculation: 11 mins

## 2018-08-23 NOTE — PROGRESS NOTES
Patient managed to walk for six minutes on 3L nc. Wears o2 at home. Sats remained at 99% and dropped to 90% towards end of walk. Recovered within one minute back to 99% on 3L nc.

## 2018-10-15 PROBLEM — R60.1 GENERALIZED EDEMA: Status: ACTIVE | Noted: 2018-01-01

## 2018-10-15 NOTE — ED TRIAGE NOTES
Pt c/o bilateral leg swelling and wounds x one year, pt now stated he is having pain and swelling in his groins too, having difficulty walking and urinating, denies fever , pt with foul smelling drainage noted, bilateral leg dressings intact with purulent drainage noted

## 2018-10-15 NOTE — PROGRESS NOTES
Date of previous inpatient admission/ ED visit? 8/13/18-8/23/18 STEMI 
7/9/18-7/12/18  Pneumonia What brought the patient back to ED? To [address] [place] [address] ED with complaints of leg pain, bilateral leg pain, chronic wounds. He has swelling of testicles. Did patient decline recommended services during last admission/ ED visit (if yes, what)? Yes Has patient seen a provider since their last inpatient admission/ED visit (if yes, when)? Yes, [facility] Karel Baez is a 59year old male to [address] [place] [address] ED with complaints of leg pain and swelling of testicles. ED workup:  EKG, CXR, C reactive Protein, blood cultures,  Consulted hospitalist for admission, ID Consult. Call made to Mai, caregiver/common law wife X 15 years. He is open to home health, she thinks it is [facility]. She reports falls in the home. He has been going from recliner to hospital bed. [facility] does transport to and from MD appointments. Verified face sheet and demographics. PCP: Marcelo Tobias MD 
 
Care Management Interventions PCP Verified by CM: Yes (Dr. Ramiro Patiño) Palliative Care Criteria Met (RRAT>21 & CHF Dx)?: Yes (RRAT 3231  Heart Failure diagnosis) Palliative Consult Recommended?: Yes (Recommend Palliative to see) Transition of Care Consult (CM Consult): Home Health, Other (CM Consult - Heart Failure, RRAT 32/3/1) MyChart Signup: No 
Discharge Durable Medical Equipment: No [facility] bed, recliner, no ramp) Physical Therapy Consult: Yes Occupational Therapy Consult: Yes Speech Therapy Consult: No 
Current Support Network: Own Home (Live in one story home, steps 4 to enter, railing on top. Mai is common law wife. Estranged from children.  ) Plan discussed with Pt/Family/Caregiver: Yes (Adeel Stair Mr. Julianne Cordova and he asked me to call Mai. Mai is common law wife for 15 years) Mr. Julianne Cordova is not able to give the name of home health agnecy. Will attempt to get information. Geovanni Schreiber, RN, BSN, [facility] 
ED Care Management [phone]

## 2018-10-15 NOTE — H&P
HISTORY AND PHYSICAL 
 
 
PCP: Frankie White MD 
History source: Patient and medical records CC: Swelling and bilateral leg wounds HPI: A 59year old male patient with PMH of CAD s/p 5 stents, DM, HTN, CHFrEF 25% NYHA class IV, COPA, Afib on Eliquis, SIRISHA, chronic bilateral wounds with pain presented to ED for evaluation of swelling. Patient had similar complaints in the past and had multiple admissions. He was just recently discharged in 8/2018. He has noticed increase swelling of his legs, abdomen and scrotum since few days. He became more pronounced sob on exertion. Denied chest pain. His leg wounds have completed healed per patent after discharge from hospital. He noticed erythema and discharge from legs since few days. He laso c/o worsening of pain in both legs. He takes morphine ER 60mg tid and Hydrocodone 4mg bid at home. He called his PCP thi morning who suggested o go to hospital. He denied fever, chills, chest pain, palpitations, abd pain, urinary or bowel changes. He states he is complaint with his medication. IN ED, patient was noticed in volume overload and was given Lasix and vancomycin for cellulitis of both legs. hospitalist consulted for admission. PMH/PSH: 
Past Medical History:  
Diagnosis Date  CAD (coronary artery disease)  CHF (congestive heart failure) ([address]  Chronic systolic heart failure ([address] 4/12/2011  COPD (chronic obstructive pulmonary disease) (HCC)  Coronary atherosclerosis of native coronary artery 4/12/2011  Diabetes ([address]  High cholesterol  Hypertension  MI, old  Neurological disorder  Other primary cardiomyopathies 4/12/2011  Restless leg syndrome Past Surgical History:  
Procedure Laterality Date  CARDIAC SURG PROCEDURE UNLIST    
 stents x7  
 HX CARPAL TUNNEL RELEASE    
 right wrist  
 
 
Home meds:  
Prior to Admission medications Medication Sig Start Date End Date Taking? Authorizing Provider clopidogrel (PLAVIX) 75 mg tab Take 75 mg by mouth daily. Yes Historical Provider  
lisinopril (PRINIVIL, ZESTRIL) 2.5 mg tablet Take 2.5 mg by mouth daily. Yes Historical Provider  
ferrous sulfate 325 mg (65 mg iron) tablet Take 1 Tab by mouth daily (with breakfast). 8/23/18  Yes Roney Wick MD  
amiodarone (CORDARONE) 200 mg tablet Take 1 Tab by mouth two (2) times daily (after meals). 8/22/18  Yes Felton Emanuel MD  
apixaban (ELIQUIS) 5 mg tablet Take 1 Tab by mouth two (2) times a day. 8/22/18  Yes Felton Emanuel MD  
furosemide (LASIX) 20 mg tablet Take 20 mg by mouth daily. Yes Historical Provider  
morphine CR (MS CONTIN) 60 mg CR tablet Take 60 mg by mouth three (3) times daily. Yes Historical Provider  
allopurinol (ZYLOPRIM) 100 mg tablet Take 100 mg by mouth daily. Yes Historical Provider buPROPion SR (WELLBUTRIN SR) 150 mg SR tablet Take 150 mg by mouth two (2) times a day. Yes Historical Provider  
metoprolol succinate (TOPROL XL) 25 mg XL tablet Take 25 mg by mouth daily. Yes Historical Provider  
aspirin delayed-release 81 mg tablet Take 81 mg by mouth daily. Yes Gerald Caldwell MD  
nortriptyline (PAMELOR) 50 mg capsule Take 50 mg by mouth nightly. Yes Historical Provider  
docusate sodium (COLACE) 100 mg capsule Take 100 mg by mouth two (2) times a day. Yes Historical Provider  
omeprazole (PRILOSEC) 20 mg capsule Take 20 mg by mouth daily. Indications: gastroesophageal reflux disease   Yes Historical Provider  
atorvastatin (LIPITOR) 80 mg tablet Take 80 mg by mouth daily. Yes Historical Provider  
rOPINIRole (REQUIP) 4 mg tab TAB Take 8 mg by mouth nightly. Indications: Restless Legs Syndrome   Yes Historical Provider  
zolpidem (AMBIEN) 10 mg tablet Take 1 Tab by mouth nightly. 5/24/14  Yes David Ghosh MD  
tiotropium (SPIRIVA WITH HANDIHALER) 18 mcg inhalation capsule Take 1 Cap by inhalation daily.    Yes Gerald Caldwell MD  
 fluticasone-salmeterol (ADVAIR DISKUS) 250-50 mcg/dose diskus inhaler Take 1 Puff by inhalation two (2) times a day. Yes Phys Other, MD  
HYDROmorphone (DILAUDID) 4 mg tablet Take 4 mg by mouth two (2) times daily as needed for Pain. Historical Provider  
albuterol (PROVENTIL HFA, VENTOLIN HFA, PROAIR HFA) 90 mcg/actuation inhaler Take 1 Puff by inhalation every four (4) hours as needed for Wheezing. Historical Provider  
albuterol-ipratropium (DUO-NEB) 2.5 mg-0.5 mg/3 ml nebu 3 mL by Nebulization route every four (4) hours. 3/3/18   Gilda Farr MD  
nitroglycerin (NITROSTAT) 0.4 mg SL tablet 0.4 mg by SubLINGual route every five (5) minutes as needed for Chest Pain. Historical Provider Allergies: 
No Known Allergies FH: 
History reviewed. No pertinent family history. SH: Social History Substance Use Topics  Smoking status: Current Every Day Smoker Packs/day: 0.25  Smokeless tobacco: Never Used  Alcohol use No  
 
 
ROS: A comprehensive review of systems was negative. PHYSICAL EXAM: 
Visit Vitals  /82  Pulse (!) 114  Temp 99 °F (37.2 °C)  Resp 20  
 Ht 6' 2\" (1.88 m)  Wt 124.7 kg (275 lb)  SpO2 100%  BMI 35.31 kg/m2 Gen:in distress due to pain HEENT: anicteric sclerae, normal conjunctiva, oropharynx clear, MM moist 
Neck: supple, trachea midline, no adenopathy Heart: irregular Lungs: Decreased breath sounds at bases Abd: soft, non tender, distended. Scrotum distended and erythematous Extr: erythematous and swollen, weeping Neuro: CN II-XII grossly intact, normal speech, moves all extremities Labs/Imaging: 
Recent Results (from the past 24 hour(s)) CBC WITH AUTOMATED DIFF Collection Time: 10/15/18  2:18 PM  
Result Value Ref Range WBC 7.0 4.1 - 11.1 K/uL  
 RBC 4.11 4.10 - 5.70 M/uL  
 HGB 10.1 (L) 12.1 - 17.0 g/dL HCT 34.2 (L) 36.6 - 50.3 %  MCV 83.2 80.0 - 99.0 FL  
 MCH 24.6 (L) 26.0 - 34.0 PG  
 MCHC 29.5 (L) 30.0 - 36.5 g/dL  
 RDW 19.9 (H) 11.5 - 14.5 % PLATELET 100 (H) 208 - 400 K/uL MPV 8.8 (L) 8.9 - 12.9 FL  
 NRBC 0.3 (H) 0  WBC ABSOLUTE NRBC 0.02 (H) 0.00 - 0.01 K/uL NEUTROPHILS 49 32 - 75 % LYMPHOCYTES 33 12 - 49 % MONOCYTES 9 5 - 13 % EOSINOPHILS 7 0 - 7 % BASOPHILS 1 0 - 1 % IMMATURE GRANULOCYTES 1 (H) 0.0 - 0.5 % ABS. NEUTROPHILS 3.4 1.8 - 8.0 K/UL  
 ABS. LYMPHOCYTES 2.3 0.8 - 3.5 K/UL  
 ABS. MONOCYTES 0.7 0.0 - 1.0 K/UL  
 ABS. EOSINOPHILS 0.5 (H) 0.0 - 0.4 K/UL  
 ABS. BASOPHILS 0.1 0.0 - 0.1 K/UL  
 ABS. IMM. GRANS. 0.1 (H) 0.00 - 0.04 K/UL  
 DF AUTOMATED METABOLIC PANEL, COMPREHENSIVE Collection Time: 10/15/18  2:18 PM  
Result Value Ref Range Sodium 129 (L) 136 - 145 mmol/L Potassium 4.5 3.5 - 5.1 mmol/L Chloride 95 (L) 97 - 108 mmol/L  
 CO2 27 21 - 32 mmol/L Anion gap 7 5 - 15 mmol/L Glucose 117 (H) 65 - 100 mg/dL BUN 17 6 - 20 MG/DL Creatinine 1.03 0.70 - 1.30 MG/DL  
 BUN/Creatinine ratio 17 12 - 20 GFR est AA >60 >60 ml/min/1.73m2 GFR est non-AA >60 >60 ml/min/1.73m2 Calcium 8.8 8.5 - 10.1 MG/DL Bilirubin, total 1.0 0.2 - 1.0 MG/DL  
 ALT (SGPT) 19 12 - 78 U/L  
 AST (SGOT) 31 15 - 37 U/L Alk. phosphatase 202 (H) 45 - 117 U/L Protein, total 8.4 (H) 6.4 - 8.2 g/dL Albumin 2.4 (L) 3.5 - 5.0 g/dL Globulin 6.0 (H) 2.0 - 4.0 g/dL A-G Ratio 0.4 (L) 1.1 - 2.2 SAMPLES BEING HELD Collection Time: 10/15/18  2:18 PM  
Result Value Ref Range SAMPLES BEING HELD 1RED 1BLU 1BC(NAVY) 1BC(LAV) COMMENT Add-on orders for these samples will be processed based on acceptable specimen integrity and analyte stability, which may vary by analyte. NT-PRO BNP Collection Time: 10/15/18  2:18 PM  
Result Value Ref Range NT pro-BNP 8157 (H) 0 - 125 PG/ML  
SED RATE (ESR) Collection Time: 10/15/18  2:18 PM  
Result Value Ref Range Sed rate, automated 46 (H) 0 - 20 mm/hr TROPONIN I  
 Collection Time: 10/15/18  2:18 PM  
Result Value Ref Range Troponin-I, Qt. <0.05 <0.05 ng/mL C REACTIVE PROTEIN, QT Collection Time: 10/15/18  2:18 PM  
Result Value Ref Range C-Reactive protein 9.20 (H) 0.00 - 0.60 mg/dL POC LACTIC ACID Collection Time: 10/15/18  2:44 PM  
Result Value Ref Range Lactic Acid (POC) 1.3 0.4 - 2.0 mmol/L  
EKG, 12 LEAD, INITIAL Collection Time: 10/15/18  3:07 PM  
Result Value Ref Range Ventricular Rate 111 BPM  
 Atrial Rate 111 BPM  
 P-R Interval 168 ms QRS Duration 150 ms  
 Q-T Interval 368 ms QTC Calculation (Bezet) 500 ms Calculated R Axis 154 degrees Calculated T Axis -18 degrees Diagnosis Sinus tachycardia Right bundle branch block Septal infarct , age undetermined When compared with ECG of 14-AUG-2018 12:10, Sinus rhythm has replaced Atrial flutter Septal infarct is now present T wave inversion now evident in Inferior leads T wave inversion no longer evident in Anterior leads Recent Labs 10/15/18 [address] [place] [address] 1 WBC  7.0 HGB  10.1* HCT  34.2*  
PLT  405* Recent Labs 10/15/18 [address] [place] [address] 1 NA  129*  
K  4.5  
CL  95* CO2  27 BUN  17 CREA  1.03  
GLU  117* CA  8.8 Recent Labs 10/15/18 [address] [place] [address] 1 SGOT  31 ALT  19  
AP  202* TBILI  1.0 TP  8.4* ALB  2.4*  
GLOB  6.0* Recent Labs 10/15/18 [address] [place] [address] 1 TROIQ  <0.05 No results for input(s): INR, PTP, APTT in the last 72 hours. No lab exists for component: INREXT No results for input(s): PH, PCO2, PO2 in the last 72 hours. Assessment & Plan:  
Acute on Chronic heart failure 
- admit to telemetry 
- last CHF in 8/2018 EF 25-30% - BNP today 8157 
- hx similar admissions in the past. 
- presented with abd and scrotum distension and leg swelling - c/w home meds toprol, lisnopril 
- started on IV Lasix 60mg tid 
- cardiology consulted Acute on chronic bilateral cellulitis 
- normal wbc, lactic acid 
- blood cx sent - Nafcillin given last discharge 
- will start on IV Vanco and zosyn - ID consulted - wound care Acute on chronic pain of both legs - patient c/o excruciating pain 
- restarted home meds Morphine and hydrocodone - IV dilaudid for breakthrough pain Hyponatremia - possible due to volume overload 
- will monitor Afib 
- rate controlled - amiodarone, toprol - Eliquis for [facility] 
 
CAD s/p stents 
- no chest pain now - c/w aspirin, plavix, BB, statin COPD - stable, c/w nebs Restless leg syndrome - requip DM- ISS Depression - c/w home meds wellbutrin, nortriptyline Gout - allopurinol Anemia - Hb stable, c/w iron supplemets DVT ppx:  Eliquis Code status: Full Disposition: TBD Signed By: Hansa Traore MD   
 October 15, 2018

## 2018-10-15 NOTE — PROGRESS NOTES
Admission Medication Reconciliation: 
 
Information obtained from:  Patient/RxQuery Comments/Recommendations: Updated PTA meds/reviewed patient's allergies. 1)  Spoke with patient at bedside, who states he took his morning medications at 5 o'clock this morning. Patient denies any changes in his medications since his medication history review in August. 
 
2)  Medication changes (since last review): Added 
-Clopidogrel 75mg 1 tab po daily 
-Lisinopril 2.5mg 1 tab po daily Adjusted 
-None Removed 
-None Allergies:  Review of patient's allergies indicates no known allergies. Significant PMH/Disease States:  
Past Medical History:  
Diagnosis Date  CAD (coronary artery disease)  [facility] ([facility]estive [facility]) [facility]  Chronic systolic heart failure ([facility] 4/12/2011  COPD (chronic obstructive pulmonary disease) ([facility]  Coronary atherosclerosis of native coronary artery 4/12/2011  Diabetes ([facility]  High cholesterol  Hypertension  MI, old  Neurological disorder  Other primary cardiomyopathies 4/12/2011  Restless leg syndrome Chief Complaint for this Admission: Chief Complaint Patient presents with  Leg Swelling  Skin Problem Prior to Admission Medications:  
Prior to Admission Medications Prescriptions Last Dose Informant Patient Reported? Taking? HYDROmorphone (DILAUDID) 4 mg tablet   Yes No  
Sig: Take 4 mg by mouth two (2) times daily as needed for Pain. albuterol (PROVENTIL HFA, VENTOLIN HFA, PROAIR HFA) 90 mcg/actuation inhaler   Yes No  
Sig: Take 1 Puff by inhalation every four (4) hours as needed for Wheezing. albuterol-ipratropium (DUO-NEB) 2.5 mg-0.5 mg/3 ml nebu   No No  
Sig: 3 mL by Nebulization route every four (4) hours. allopurinol (ZYLOPRIM) 100 mg tablet 10/15/2018 at 0500  Yes Yes Sig: Take 100 mg by mouth daily. amiodarone (CORDARONE) 200 mg tablet 10/15/2018 at 0500  No Yes Sig: Take 1 Tab by mouth two (2) times daily (after meals). apixaban (ELIQUIS) 5 mg tablet 10/15/2018 at 0500  No Yes Sig: Take 1 Tab by mouth two (2) times a day. aspirin delayed-release 81 mg tablet 10/15/2018 at 0500  Yes Yes Sig: Take 81 mg by mouth daily. atorvastatin (LIPITOR) 80 mg tablet 10/15/2018 at 0500 Self Yes Yes Sig: Take 80 mg by mouth daily. buPROPion SR [facility] SR) 150 mg SR tablet 10/15/2018 at 0500  Yes Yes Sig: Take 150 mg by mouth two (2) times a day. clopidogrel (PLAVIX) 75 mg tab 10/15/2018 at 0500  Yes Yes Sig: Take 75 mg by mouth daily. docusate sodium (COLACE) 100 mg capsule 10/15/2018 at 0500  Yes Yes Sig: Take 100 mg by mouth two (2) times a day. ferrous sulfate 325 mg (65 mg iron) tablet 10/15/2018 at 0500  No Yes Sig: Take 1 Tab by mouth daily (with breakfast). fluticasone-salmeterol (ADVAIR DISKUS) 250-50 mcg/dose diskus inhaler 10/15/2018 at Unknown time Self Yes Yes Sig: Take 1 Puff by inhalation two (2) times a day. furosemide (LASIX) 20 mg tablet 10/15/2018 at 0500  Yes Yes Sig: Take 20 mg by mouth daily. lisinopril (PRINIVIL, ZESTRIL) 2.5 mg tablet 10/15/2018 at 0500  Yes Yes Sig: Take 2.5 mg by mouth daily. metoprolol succinate (TOPROL XL) 25 mg XL tablet 10/15/2018 at 0500  Yes Yes Sig: Take 25 mg by mouth daily. morphine CR (MS CONTIN) 60 mg CR tablet 10/15/2018 at 0500  Yes Yes Sig: Take 60 mg by mouth three (3) times daily. nitroglycerin (NITROSTAT) 0.4 mg SL tablet   Yes No  
Si.4 mg by SubLINGual route every five (5) minutes as needed for Chest Pain. nortriptyline (PAMELOR) 50 mg capsule 10/14/2018 at 2100  Yes Yes Sig: Take 50 mg by mouth nightly. omeprazole (PRILOSEC) 20 mg capsule 10/15/2018 at 0500 Self Yes Yes Sig: Take 20 mg by mouth daily. Indications: gastroesophageal reflux disease  
rOPINIRole (REQUIP) 4 mg tab TAB 10/14/2018 at 2100 Self Yes Yes Sig: Take 8 mg by mouth nightly. Indications: Restless Legs Syndrome  
tiotropium (SPIRIVA WITH HANDIHALER) 18 mcg inhalation capsule 10/15/2018 at 0500 Self Yes Yes Sig: Take 1 Cap by inhalation daily. zolpidem (AMBIEN) 10 mg tablet 10/14/2018 at 2100 Self No Yes Sig: Take 1 Tab by mouth nightly. Facility-Administered Medications: None

## 2018-10-15 NOTE — ED PROVIDER NOTES
HPI Comments: 59 y.o. male with past medical history significant for coronary atherosclerosis, CAD, HTN, DM, CHF, COPD, who presents from home with chief complaint of leg pain. Pt has acute on chronic onset of b/l leg pain resultant of chronic wounds. Pt states that he now has swelling in his testicles, and his \"penis is inverted. \" Pt presents to ED with b/l legs wrapped, erythematous, and swollen. Other accompanying sx include dysuria and SOB. Currently taking Lasix; dose taken today. There are no other acute medical concerns at this time. PCP: Prabha Ngo MD 
 
Note written by Rich Sánchez, as dictated by Giovana Armstrong MD 2:20 PM 
 
The history is provided by the patient. No  was used. Past Medical History:  
Diagnosis Date  CAD (coronary artery disease)  [facility] (congestive heart failure) [facility]  Chronic systolic heart failure ([facility] 4/12/2011  COPD (chronic obstructive pulmonary disease) (HCC)  Coronary atherosclerosis of native coronary artery 4/12/2011  Diabetes ([facility]  High cholesterol  Hypertension  MI, old  Neurological disorder  Other primary cardiomyopathies 4/12/2011  Restless leg syndrome Past Surgical History:  
Procedure Laterality Date  CARDIAC SURG PROCEDURE UNLIST    
 stents x7  
 HX CARPAL TUNNEL RELEASE    
 right wrist  
 
   
History reviewed. No pertinent family history. Social History Social History  Marital status:  Spouse name: N/A  
 Number of children: N/A  
 Years of education: N/A Occupational History  Not on file. Social History Main Topics  Smoking status: Current Every Day Smoker Packs/day: 0.25  Smokeless tobacco: Never Used  Alcohol use No  
 Drug use: No  
 Sexual activity: Not on file Other Topics Concern  Not on file Social History Narrative ALLERGIES: Review of patient's allergies indicates no known allergies. Review of Systems Respiratory: Positive for shortness of breath. Cardiovascular: Positive for leg swelling. Negative for chest pain. Genitourinary: Positive for dysuria. Musculoskeletal: Positive for myalgias. Skin: Positive for color change and wound. All other systems reviewed and are negative. Vitals:  
 10/15/18 1407 BP: 126/83 Pulse: 83 Resp: 20 Temp: 99 °F (37.2 °C) SpO2: 95% Height: 6' 2\" (1.88 m) Physical Exam  
Constitutional: He is oriented to person, place, and time. He appears well-developed and well-nourished. No distress. HENT:  
Head: Normocephalic and atraumatic. Eyes: Conjunctivae are normal.  
Neck: Neck supple. No tracheal deviation present. Cardiovascular: Normal rate, regular rhythm and normal heart sounds. Exam reveals no gallop and no friction rub. No murmur heard. Pulmonary/Chest: Effort normal and breath sounds normal. No respiratory distress. He has no wheezes. He has no rales. Abdominal: He exhibits no distension. abd wall anasarca Musculoskeletal: Normal range of motion. b/l LE 4+ pitting edema w/ overlying erythema and warmth up to level of thigh, w/ foul serous drainage Neurological: He is alert and oriented to person, place, and time. Skin: Skin is warm and dry. There is erythema. Psychiatric: He has a normal mood and affect. Nursing note and vitals reviewed. Note written by Rich Floyd, as dictated by Saul Galeazzi, MD 2:20 PM 
 
MDM 
 
59 y.o. male presents with diffuse anasarca of his lower body that appears to be d/t volume overload. He has an acute on chronic cellulitis of his lower extremities which has been an ongoing struggle. He is failing home diuresis, provided IV lasix 80 mg here to begin inpatient diuresis and will cover for cellulitis with vancomycin pending inpatient workup with elevation of inflammatory markers.  Hospitalist was consulted for admission and will see the patient in the emergency department. ED Course ED EKG interpretation: 
Rhythm: sinus tachycardia; Rate (approx.): 111; RBBB Note written by Rich Tam, as dictated by Kevan France MD 3:10 PM 
 
Procedures

## 2018-10-16 NOTE — CONSULTS
Infectious Disease Consult Today's Date: 10/16/2018 Admit Date: 10/15/2018 Impression: · Severe CHF · Lymphedema · Erythema of legs--vascular stasis +/- cellulitis Plan:  
· Edema control/fluid management · Antibiotic therapy for now Anti-infectives: · Vancomycin · Zosyn Subjective:  
Date of Consultation:  October 16, 2018 Referring Physician: Dr Mena Host Patient is a 59 y.o. male well-known to me from care given in the past. He has chronic problems with fluid overload, venous stasis and stasis dermatitis. He is admitted with severe shortness of breath and edema of the legs. He is complaining of pain with his collins catheter and is feeling miserable in general.  
 
Patient Active Problem List  
Diagnosis Code  Coronary atherosclerosis of native coronary artery I25.10  Other primary cardiomyopathies I42.8  Chronic systolic heart failure ([facility]) I50.22  
 Chest pain R07.9  Hematemesis K92.0  Rectal bleeding K62.5  Obstructive sleep apnea (adult) (pediatric) G47.33  
 Chronic airway obstruction, not elsewhere classified J44.9  Essential hypertension, benign I10  Type II or unspecified type diabetes mellitus without mention of complication, not stated as uncontrolled E11.9  Coronary artery disease I25.10  Morbid obesity ([address] E66.01  
 COPD with exacerbation ([address] J44.1  Systolic CHF, acute ([facility]) V43.74  
 Acute systolic CHF (congestive heart failure) ([facility]) I50.21  
 SOB (shortness of breath) R06.02  
 Fluid overload E87.70  
 NSTEMI (non-ST elevated myocardial infarction) ([facility]) I21.4  Generalized edema R60.1 Past Medical History:  
Diagnosis Date  CAD (coronary artery disease)  CHF (congestive heart failure) ([address]  Chronic systolic heart failure ([address] 4/12/2011  COPD (chronic obstructive pulmonary disease) ([facility])  Coronary atherosclerosis of native coronary artery 4/12/2011  Diabetes ([address]  High cholesterol  Hypertension  MI, old  Neurological disorder  Other primary cardiomyopathies 4/12/2011  Restless leg syndrome History reviewed. No pertinent family history. Social History Substance Use Topics  Smoking status: Current Every Day Smoker Packs/day: 0.25  Smokeless tobacco: Never Used  Alcohol use No  
 
Past Surgical History:  
Procedure Laterality Date  CARDIAC SURG PROCEDURE UNLIST    
 stents x7  
 HX CARPAL TUNNEL RELEASE    
 right wrist  
  
Prior to Admission medications Medication Sig Start Date End Date Taking? Authorizing Provider  
clopidogrel (PLAVIX) 75 mg tab Take 75 mg by mouth daily. Yes Historical Provider  
lisinopril (PRINIVIL, ZESTRIL) 2.5 mg tablet Take 2.5 mg by mouth daily. Yes Historical Provider  
ferrous sulfate 325 mg (65 mg iron) tablet Take 1 Tab by mouth daily (with breakfast). 8/23/18  Yes Bam Dailey MD  
amiodarone (CORDARONE) 200 mg tablet Take 1 Tab by mouth two (2) times daily (after meals). 8/22/18  Yes Stephanie Macias MD  
apixaban (ELIQUIS) 5 mg tablet Take 1 Tab by mouth two (2) times a day. 8/22/18  Yes Stephanie Macias MD  
furosemide (LASIX) 20 mg tablet Take 20 mg by mouth daily. Yes Historical Provider  
morphine CR (MS CONTIN) 60 mg CR tablet Take 60 mg by mouth three (3) times daily. Yes Historical Provider  
allopurinol (ZYLOPRIM) 100 mg tablet Take 100 mg by mouth daily. Yes Historical Provider buPROPion SR (WELLBUTRIN SR) 150 mg SR tablet Take 150 mg by mouth two (2) times a day. Yes Historical Provider  
metoprolol succinate (TOPROL XL) 25 mg XL tablet Take 25 mg by mouth daily. Yes Historical Provider  
aspirin delayed-release 81 mg tablet Take 81 mg by mouth daily. Yes Gerald Caldwell MD  
nortriptyline (PAMELOR) 50 mg capsule Take 50 mg by mouth nightly. Yes Historical Provider  
docusate sodium (COLACE) 100 mg capsule Take 100 mg by mouth two (2) times a day. Yes Historical Provider omeprazole (PRILOSEC) 20 mg capsule Take 20 mg by mouth daily. Indications: gastroesophageal reflux disease   Yes Historical Provider  
atorvastatin (LIPITOR) 80 mg tablet Take 80 mg by mouth daily. Yes Historical Provider  
rOPINIRole (REQUIP) 4 mg tab TAB Take 8 mg by mouth nightly. Indications: Restless Legs Syndrome   Yes Historical Provider  
zolpidem (AMBIEN) 10 mg tablet Take 1 Tab by mouth nightly. 14  Yes Abdirashid Jiménez MD  
tiotropium (SPIRIVA WITH HANDIHALER) 18 mcg inhalation capsule Take 1 Cap by inhalation daily. Yes Gerald Caldwell MD  
fluticasone-salmeterol (ADVAIR DISKUS) 250-50 mcg/dose diskus inhaler Take 1 Puff by inhalation two (2) times a day. Yes Gerald Caldwell MD  
HYDROmorphone (DILAUDID) 4 mg tablet Take 4 mg by mouth two (2) times daily as needed for Pain. Historical Provider  
albuterol (PROVENTIL HFA, VENTOLIN HFA, PROAIR HFA) 90 mcg/actuation inhaler Take 1 Puff by inhalation every four (4) hours as needed for Wheezing. Historical Provider  
albuterol-ipratropium (DUO-NEB) 2.5 mg-0.5 mg/3 ml nebu 3 mL by Nebulization route every four (4) hours. 3/3/18   Belgica Galarza MD  
nitroglycerin (NITROSTAT) 0.4 mg SL tablet 0.4 mg by SubLINGual route every five (5) minutes as needed for Chest Pain. Historical Provider No Known Allergies Review of Systems:  Constitutional: positive for fatigue and malaise Respiratory: positive for dyspnea on exertion Cardiovascular: negative Gastrointestinal: negative He has complaints of generalized edema Objective:  
 
Visit Vitals  /67 (BP 1 Location: Right arm, BP Patient Position: At rest)  Pulse (!) 105  Temp 98.3 °F (36.8 °C)  Resp 24  
 Ht 6' 2\" (1.88 m)  Wt 124.7 kg (275 lb)  SpO2 94%  BMI 35.31 kg/m2 Temp (24hrs), Av.6 °F (37 °C), Min:98.3 °F (36.8 °C), Max:99.1 °F (37.3 °C) Lines:  Peripheral IV:   
   
 
 
Physical Exam:  Lungs:  rales R base, L base Heart:  regular rate and rhythm Abdomen:  soft, non-tender. Bowel sounds normal. No masses,  no organomegaly Skin:  no rash or abnormalities Massive edema Data Review: CBC: 
Recent Labs 10/16/18 
 071 [id number]  10/15/18 [address] [facility] 1 WBC  8.1  7.0 GRANS  48  49 MONOS  11  9 EOS  7  7 ANEU  3.8  3.4 ABL  2.6  2.3 HGB  9.8*  10.1* HCT  33.9*  34.2*  
PLT  370  405* BMP: 
Recent Labs 10/16/18 
 [phone] 37  10/15/18 [address] [facility] 1 CREA  0.92  1.03  
BUN  16  17 NA  135*  129*  
K  3.8  4.5  
CL  99  95* CO2  27  27 AGAP  9  7 GLU  113*  117* LFTS: 
Recent Labs 10/15/18 [address] [facility] 1 TBILI  1.0 ALT  19 SGOT  31  
AP  202* TP  8.4* ALB  2.4* Microbiology:  
 
All Micro Results Procedure Component Value Units Date/Time CULTURE, BLOOD, PAIRED [[id number]] Collected:  10/15/18 1434 Order Status:  Completed Specimen:  Blood Updated:  10/16/18 0500 Special Requests: NO SPECIAL REQUESTS Culture result: NO GROWTH AFTER 13 HOURS     
 CULTURE, BLOOD [[id number]] Order Status:  Canceled Specimen:  Blood from Blood Imaging:  
Reviewed Signed By: Judy Morgan MD   
 October 16, 2018

## 2018-10-16 NOTE — ROUTINE PROCESS
TRANSFER - OUT REPORT: 
 
Verbal report given to [facility] RN(name) on Seminole Cea Nine  being transferred to [facility](unit) for routine progression of care Report consisted of patients Situation, Background, Assessment and  
Recommendations(SBAR). Information from the following report(s) SBAR was reviewed with the receiving nurse. Lines:  
Peripheral IV 10/15/18 Left Antecubital (Active) Site Assessment Clean, dry, & intact 10/15/2018  2:21 PM  
Phlebitis Assessment 0 10/15/2018  2:21 PM  
Infiltration Assessment 0 10/15/2018  2:21 PM  
Dressing Status Clean, dry, & intact 10/15/2018  2:21 PM  
Dressing Type Transparent 10/15/2018  2:21 PM  
Hub Color/Line Status Pink;Flushed;Patent 10/15/2018  2:21 PM  
Action Taken Blood drawn 10/15/2018  2:21 PM  
   
Peripheral IV 10/16/18 Right Antecubital (Active) Site Assessment Clean, dry, & intact 10/16/2018  6:38 AM  
Phlebitis Assessment 0 10/16/2018  6:38 AM  
Infiltration Assessment 0 10/16/2018  6:38 AM  
Dressing Status Clean, dry, & intact 10/16/2018  6:38 AM  
Dressing Type Transparent 10/16/2018  6:38 AM  
Hub Color/Line Status Green 10/16/2018  6:38 AM  
Action Taken Catheter retaped 10/16/2018  6:38 AM  
Alcohol Cap Used Yes 10/16/2018  6:38 AM  
  
 
Opportunity for questions and clarification was provided. Patient transported with: 
 Monitor and oxygen 5 liters.

## 2018-10-16 NOTE — PROGRESS NOTES
Pharmacist Note - Vancomycin Dosing Consult provided for this 59 y.o. male for indication of SSTI. -LE cellulitis Antibiotic regimen(s): Vanc + Zosyn Recent Labs 10/15/18 [address] WBC  7.0  
CREA  1.03  
BUN  17 Height: 188 cm Weight: 125 kg Est CrCl: >100 ml/min Temp (24hrs), Av °F (37.2 °C), Min:99 °F (37.2 °C), Max:99 °F (37.2 °C) Goal trough = 10 - 15 mcg/mL A 2000 mg loading dose was given in the ED @16:39; to be followed by a maintenance dose of 1750 mg IV every 12 hours. Pharmacy to follow patient daily and order levels / make dose adjustments as appropriate.

## 2018-10-16 NOTE — PROGRESS NOTES
Peg Dumont RN on the phone with hospitalist, patient's BP dropping; O2 sat's 94 on 6 L; drops quickly with any activity. Moving patient to [facility] when bed available

## 2018-10-16 NOTE — PROGRESS NOTES
Physical Therapy Note: 
Consult received and chart reviewed. Patient per nsg documentation, patient is severely SOB, on 5L on via NC. Spoke with Jing Neighbor, RN and the patient's respiratory rate increases to 30's-40's rolling in bed. Will defer today and follow up tomorrow pending improved medical stability.  
Ever Cap, PT, DPT

## 2018-10-16 NOTE — ED NOTES
Bedside shift change report given to Clara Calabrese (oncoming nurse) by Julianne Sanches (offgoing nurse). Report included the following information SBAR and ED Summary.

## 2018-10-16 NOTE — NURSE NAVIGATOR
Chart reviewed by Heart Failure Nurse Navigator. Heart Failure database completed. EF:  25/30% ACEi/ARB/ARNi: lisinopril 2.5 mg daily BB: toprol xl 25 mg daily Aldosterone Antagonist: not yet prescribed/no contraindication noted CRT:   
 
NYHA Functional Class documentation requested via Provider Message on [address] Heart Failure Teach Back in Patient Education. Heart Failure Avoiding Triggers on Discharge Instructions. Cardiologist: Reginald Harris has been consulted. Post discharge follow up phone call to be made within 48-72 hours of discharge.

## 2018-10-16 NOTE — CONSULTS
Palliative Medicine Consult Hassan: [phone] Patient Name: Eugenie Homans Nine YOB: 1960 Date of Initial Consult: 2018 Reason for Consult: Care Decisions Requesting Provider: Dr. Tj Wall Primary Care Physician: Reggie Jarrett MD 
 
 SUMMARY:  
Isela Barragan is a 59 y.o. male admitted on 10/15/2018 from home with a diagnosis of cellulitis of the lower extremities, anasarca 2/2 FVO, Chf.  Pt had worsening C/o bilateral leg edema with wounds and PCP directed him to the ED. Pt is followed by the [facility] team.  He has had 5 admission in the past 7 months Current medical issues leading to Palliative Medicine involvement include: support with care decisions given multiple morbidities PMH: CAD s/p 5 stents, HTN, DM, CHF (25%),  afib on Eliquis, COPD, SIRISHA, chronic bilateral wounds, RLS, MI, HLD, Gout, depression, hypercholesterolemia, chronic pain Social: lives at home with larry (Karyle Monte), has a 32yo son in [place], wife  in [address] [place] [address] and pt stopped working shortly after that, former  x 22 years, sedentary lifestyle now due to morbidities, chronic pain for years, says he is able to [employer] ACP: full code, no AMD  
  
 PALLIATIVE DIAGNOSES:  
1. Anasarca 2. Hypoalbuminemia 3. Cough 4. Physical Debility PLAN:  
1. Met with the pt without family present. Reviewed our services and why we were consulted 2. Pt unable to verbalize a clear understanding of this admission . Reviewed his medical condition and why he was admitted. Explained that he has multiple complex medical issues and we should discuss his care goals. 3. Pt symptomatic and had difficulty staying awake during the conversation. Pt reports that he would trust Karyle Monte to make medical decisions if he could not. 4. He did affirm his wish to maintain full code status. We agreed to continue discussions tomorrow. 5. Pt willing to complete an AMD.  We will attempt to do this tomorrow 6. Will obtain permission to contact larry to update her on the patient's condition 7. Initial consult note routed to primary continuity provider 8. Communicated plan of care with: Palliative IDT 
 
 
 GOALS OF CARE / TREATMENT PREFERENCES:  
 
GOALS OF CARE: 
Patient/Health Care Proxy Stated Goals: Prolong life TREATMENT PREFERENCES:  
Code Status: Full Code Advance Care Planning: 
Advance Care Planning 10/16/2018 Patient's Healthcare Decision Maker is: Verbal statement (Legal Next of Kin remains as decision maker) Primary Decision Maker Name Tommy Scruggs Primary Decision Maker Phone Number [phone] Primary Decision Maker Relationship to Patient Friend Confirm Advance Directive None Patient Would Like to Complete Advance Directive Yes Does the patient have other document types - Medical Interventions: Full interventions Other Instructions: Other: As far as possible, the palliative care team has discussed with patient / health care proxy about goals of care / treatment preferences for patient. HISTORY:  
 
History obtained from: chart CHIEF COMPLAINT:  None offered. Pt admitted with fvo and cellulitis of lower ext HPI/SUBJECTIVE: The patient is:  
[x] Verbal and participatory [] Non-participatory due to:  
Chronic pain s/s. Visibly dyspneic at rest but does not complain Clinical Pain Assessment (nonverbal scale for severity on nonverbal patients):  
Clinical Pain Assessment Severity: 2 Location: all over Character: unalbe to describe Duration: chonic, years Effect: limited activity Factors: opioids help Frequency: all the time Duration: for how long has pt been experiencing pain (e.g., 2 days, 1 month, years) Frequency: how often pain is an issue (e.g., several times per day, once every few days, constant)  FUNCTIONAL ASSESSMENT:  
 
 Palliative Performance Scale (PPS): PPS: 50 PSYCHOSOCIAL/SPIRITUAL SCREENING:  
 
Palliative IDT has assessed this patient for cultural preferences / practices and a referral made as appropriate to needs (Cultural Services, Patient Advocacy, Ethics, etc.) Advance Care Planning: 
Advance Care Planning 10/16/2018 Patient's Healthcare Decision Maker is: Verbal statement (Legal Next of Kin remains as decision maker) Primary Decision Maker Name Stacia Stage Primary Decision Maker Phone Number [phone] Primary Decision Maker Relationship to Patient Friend Confirm Advance Directive None Patient Would Like to Complete Advance Directive Yes Does the patient have other document types - Any spiritual / Confucianist concerns: 
[] Yes /  [] No 
 
Caregiver Burnout: 
[] Yes /  [] No /  [] No Caregiver Present Anticipatory grief assessment:  
[] Normal  / [] Maladaptive ESAS Anxiety: Anxiety: 0 
 
ESAS Depression:    
 
 
 REVIEW OF SYSTEMS:  
 
Positive and pertinent negative findings in ROS are noted above in HPI. The following systems were [] reviewed / [] unable to be reviewed as noted in HPI Other findings are noted below. Systems: constitutional, ears/nose/mouth/throat, respiratory, gastrointestinal, genitourinary, musculoskeletal, integumentary, neurologic, psychiatric, endocrine. Positive findings noted below. Modified ESAS Completed by: provider Fatigue: 3 Drowsiness: 0 Pain: 2 Anxiety: 0 Nausea: 0 Anorexia: 0 Dyspnea: 3 PHYSICAL EXAM:  
 
From RN flowsheet: 
Wt Readings from Last 3 Encounters:  
10/15/18 275 lb (124.7 kg) 08/23/18 239 lb 13.8 oz (108.8 kg) 07/12/18 256 lb 3.2 oz (116.2 kg) Blood pressure 144/67, pulse (!) 105, temperature 98.3 °F (36.8 °C), resp. rate 24, height 6' 2\" (1.88 m), weight 275 lb (124.7 kg), SpO2 94 %. Pain Scale 1: Numeric (0 - 10) Pain Intensity 1: 8 Pain Location 1: Leg 
 Pain Orientation 1: Left, Right Pain Description 1: Burning Pain Intervention(s) 1: Medication (see MAR) Last bowel movement, if known:  
 
Constitutional: falls asleep during conversation Eyes: pupils equal, anicteric ENMT: no nasal discharge, dry mucous membranes Cardiovascular: regular rhythm, anasarca Respiratory: breathing labored, symmetric Gastrointestinal: firm, distended, non-tender Musculoskeletal: no deformity, no tenderness to palpation Skin: legs wrapped, cellulitis noted bilateral lower ext Neurologic: following commands, moving all extremities Psychiatric: full affect, no hallucinations Other: 
 
 
 HISTORY:  
 
Active Problems: 
  Generalized edema (10/15/2018) Past Medical History:  
Diagnosis Date  CAD (coronary artery disease)  [facility]  Chronic systolic heart failure ([facility] 4/12/2011  COPD (chronic obstructive pulmonary disease) ([facility])  Coronary atherosclerosis of native coronary artery 4/12/2011  Diabetes ([facility]  High cholesterol  Hypertension  MI, old  Neurological disorder  Other primary cardiomyopathies 4/12/2011  Restless leg syndrome Past Surgical History:  
Procedure Laterality Date  CARDIAC SURG PROCEDURE UNLIST    
 stents x7  
 HX CARPAL TUNNEL RELEASE    
 right wrist  
  
History reviewed. No pertinent family history. History reviewed, no pertinent family history. Social History Substance Use Topics  Smoking status: Current Every Day Smoker Packs/day: 0.25  Smokeless tobacco: Never Used  Alcohol use No  
 
No Known Allergies Current Facility-Administered Medications Medication Dose Route Frequency  furosemide (LASIX) injection 40 mg  40 mg IntraVENous Q12H  
 albumin human 25% (BUMINATE) solution 12.5 g  12.5 g IntraVENous Q12H  
 sodium chloride (NS) flush 5-10 mL  5-10 mL IntraVENous Q8H  
 sodium chloride (NS) flush 5-10 mL  5-10 mL IntraVENous PRN  
  albuterol (PROVENTIL VENTOLIN) nebulizer solution 2.5 mg  2.5 mg Nebulization Q4H PRN  
 allopurinol (ZYLOPRIM) tablet 100 mg  100 mg Oral DAILY  amiodarone (CORDARONE) tablet 200 mg  200 mg Oral BIDPC  apixaban (ELIQUIS) tablet 5 mg  5 mg Oral BID  aspirin delayed-release tablet 81 mg  81 mg Oral DAILY  atorvastatin (LIPITOR) tablet 80 mg  80 mg Oral DAILY  buPROPion SR [facility] SR) tablet 150 mg  150 mg Oral BID  docusate sodium (COLACE) capsule 100 mg  100 mg Oral BID  ferrous sulfate tablet 325 mg  325 mg Oral DAILY WITH BREAKFAST  lisinopril (PRINIVIL, ZESTRIL) tablet 2.5 mg  2.5 mg Oral DAILY  metoprolol succinate (TOPROL-XL) XL tablet 25 mg  25 mg Oral DAILY  morphine CR (MS CONTIN) tablet 60 mg  60 mg Oral TID  nortriptyline (PAMELOR) capsule 50 mg  50 mg Oral QHS  rOPINIRole (REQUIP) tablet 8 mg  8 mg Oral QHS  
 HYDROmorphone (DILAUDID) tablet 4 mg  4 mg Oral Q12H  
 HYDROmorphone (DILAUDID) injection 1 mg  1 mg IntraVENous Q6H PRN  piperacillin-tazobactam (ZOSYN) 3.375 g in 0.9% sodium chloride (MBP/ADV) 100 mL  3.375 g IntraVENous Q8H  
 insulin regular (NOVOLIN R, HUMULIN R) injection   SubCUTAneous AC&HS  
 glucose chewable tablet 16 g  4 Tab Oral PRN  
 dextrose (D50W) injection syrg 12.5-25 g  12.5-25 g IntraVENous PRN  
 glucagon (GLUCAGEN) injection 1 mg  1 mg IntraMUSCular PRN  
 zolpidem (AMBIEN) tablet 10 mg  10 mg Oral QHS  Vancomycin- pharmacy to dose   Other PRN  
 vancomycin (VANCOCIN) 1750 mg in  ml infusion  1,750 mg IntraVENous Q12H  
 arformoterol (BROVANA) neb solution 15 mcg  15 mcg Nebulization BID RT And  
 budesonide (PULMICORT) 500 mcg/2 ml nebulizer suspension  500 mcg Nebulization BID RT  
 pantoprazole (PROTONIX) tablet 40 mg  40 mg Oral ACB  
 
 
 
 LAB AND IMAGING FINDINGS:  
 
Lab Results Component Value Date/Time  WBC 8.1 10/16/2018 03:57 AM  
 HGB 9.8 (L) 10/16/2018 03:57 AM  
 PLATELET 109 11/65/0100 03:57 AM  
 
Lab Results Component Value Date/Time Sodium 135 (L) 10/16/2018 03:57 AM  
 Potassium 3.8 10/16/2018 03:57 AM  
 Chloride 99 10/16/2018 03:57 AM  
 CO2 27 10/16/2018 03:57 AM  
 BUN 16 10/16/2018 03:57 AM  
 Creatinine 0.92 10/16/2018 03:57 AM  
 Calcium 8.0 (L) 10/16/2018 03:57 AM  
 Magnesium 1.9 08/21/2018 05:12 AM  
 Phosphorus 2.4 (L) 01/07/2014 04:37 AM  
  
Lab Results Component Value Date/Time AST (SGOT) 31 10/15/2018 02:18 PM  
 Alk. phosphatase 202 (H) 10/15/2018 02:18 PM  
 Protein, total 8.4 (H) 10/15/2018 02:18 PM  
 Albumin 2.4 (L) 10/15/2018 02:18 PM  
 Globulin 6.0 (H) 10/15/2018 02:18 PM  
 
Lab Results Component Value Date/Time INR 1.1 02/25/2018 10:05 PM  
 Prothrombin time 10.9 02/25/2018 10:05 PM  
 aPTT 28.1 02/25/2018 10:05 PM  
  
Lab Results Component Value Date/Time Iron 40 08/22/2018 03:51 AM  
 TIBC 283 08/22/2018 03:51 AM  
 Iron % saturation 14 (L) 08/22/2018 03:51 AM  
 Ferritin 115 08/22/2018 03:51 AM  
  
No results found for: PH, PCO2, PO2 No components found for: [place] Lab Results Component Value Date/Time  02/27/2018 03:52 AM  
 CK - MB 2.7 02/27/2018 03:52 AM  
  
 
 
   
 
Total time: 70 minutes Counseling / coordination time, spent as noted above: 45 minutes 
> 50% counseling / coordination?: y 
 
Prolonged service was provided for  []30 min   []75 min in face to face time in the presence of the patient, spent as noted above. Time Start:  
Time End:  
Note: this can only be billed with 25281 (initial) or 65021 (follow up). If multiple start / stop times, list each separately.

## 2018-10-16 NOTE — WOUND CARE
WOCN Note:  
 
New consult placed for assessment of bilateral low legs. Patient seen in ED. Chart reviewed. Admitted DX:  Generalized edema Past Medical History:  DM, CAD, CHF, SIRISHA, chronic bilateral low leg wounds. Admitted from home. Assessment:  
Patient is A&O x 3, communicative, continent and requires assist of 1 with repositioning. Bed: advance Patient reports discomfort to bilateral legs. Bilateral buttocks and sacral skin intact and with blanching pink erythema. POA Left heel, dry maroon non blanching area:  0.5 x 1.5 x 0 cm Bilateral low legs, circumferential weepy red edematous legs. Recommendations:   
Upgrade bed Float heels Bilateral low legs:  Daily cleanse with saline; apply Xeroform (6 pieces for each leg); cover with abd pads and dry roll gauze (2 for each leg). Leave heels exposed for heel assessments. Skin Care & Pressure Prevention: 
Minimize layers of linen/pads under patient to optimize support surface. Turn/reposition approximately every 2 hours and offload heels. Specialty bed: [facility] Basket on [employer] ordered via [facility]. Use only flat sheet and one incontinence pad. Please call Viviana Swan if not delivered. Ref #[id number]. Discussed above plan with patient and RN. Transition of Care: Plan to follow weekly and as needed while admitted to hospital. 
 
ASHUTOSH Sanches RN [facility], [facility]. Wound Care Office [phone] Pager [phone]

## 2018-10-16 NOTE — PROGRESS NOTES
CM noted patient admitted from ER, also reviewed note from previous CRM. Patient with volume overload, Chronic syst heart failure with acute exacerbation for diruetic and I and o following, NYHA 4. SYST heart failue, EF of 20-30%, Cellulitis adri lower ext for iv abx, Chronic pain on opioids from home, DM for SSI. Patient is being followed by Williams Chatman and opened to [facility]. He lives with his wife and [employer] transports patient to his medical appointments. Disposition will be determined when patient is medically stable. Dejon Mckeon MSA, RN, CRM.

## 2018-10-16 NOTE — PROGRESS NOTES
Hospitalist Progress Note Mauricio Lozano MD 
Answering service: [phone] OR [phone] from in house phone Date of Service:  10/16/2018 NAME:  Bindu Mcneal :  1960 MRN:  [id number] Admission Summary:  
Admitted with massive edema and sob Interval history / Subjective:  
   
10/16/2018 : For albumin for fluid shifts as alessandro as anasarca and cont iv lasix. , relief of his acute and severe vol overload process will be slow,  Cellulitic changes of legs noted adri left > rigth, would seeing, abx continue; will need collins for  Tracking I and O. Assessment & Plan:  
 
Volume overload,  For diuretic, albuin as alessandro Anasarca, for iv albumin as alessandro CM with EF Of  25-30 range, for diuretic use and bb as can, and cont on current ACE Chronic syst heart failure with acute exacerbation for diruetic and I and o followng, NYHA 4. SYST heart failue Cellulitis adri lower ext for iv abx. Chronic pain on opioids from home. DM for SSI Code status: full DVT prophylaxis: Eliquis ( NOAC) Care Plan discussed with: Patient/Family and Nurse Disposition: Home w/Family and TBD Hospital Problems  Date Reviewed: 2018 Codes Class Noted POA Generalized edema ICD-10-CM: R60.1 ICD-9-CM: 782.3  10/15/2018 Unknown Review of Systems:  
 
Sob no cp, adri leg and peritoneal discomfort, difficulties urinaint 2n2 w/d penis in edematous scrotum Vital Signs:  
 Last 24hrs VS reviewed since prior progress note. Most recent are: 
Visit Vitals  /62  Pulse (!) 116  Temp 98.5 °F (36.9 °C)  Resp 27  
 Ht 6' 2\" (1.88 m)  Wt 124.7 kg (275 lb)  SpO2 98%  BMI 35.31 kg/m2 Intake/Output Summary (Last 24 hours) at 10/16/18 1244 Last data filed at 10/16/18 1151 Gross per 24 hour Intake              560 ml Output             2000 ml Net            -1440 ml  
  
 
 Physical Examination:  
 
 
     
Constitutional:  +acute distress, cooperative, pleasant   
ENT:  Oral mucous moist, oropharynx benign. Neck supple, Resp:  CTA bilaterally. No wheezing/rhonchi/rales. No accessory muscle use CV:  Regular rhythm, normal rate, no murmurs, gallops, rubs GI:  +distended, non tender. normoactive bowel sounds,  pawel Musculoskeletal:  anasarca like edema to mid abdomen; , warm, 1+ pulses throughout Neurologic:  Moves all extremities. AAOx3, CN II-XII reviewed Psych:  axnious, else appropriated, not agitated. Data Review:  
 Review and/or order of clinical lab test 
 
 
Labs:  
 
Recent Labs 10/16/18 
 [phone]  10/15/18 [address] [place] [address] 1 WBC  8.1  7.0 HGB  9.8*  10.1* HCT  33.9*  34.2*  
PLT  370  405* Recent Labs 10/16/18 
 [phone]  10/15/18 [address] [place] [address] 1 NA  135*  129*  
K  3.8  4.5  
CL  99  95* CO2  27  27 BUN  16  17 CREA  0.92  1.03  
GLU  113*  117* CA  8.0*  8.8 Recent Labs 10/15/18 [address] [place] [address] 1 SGOT  31 ALT  19  
AP  202* TBILI  1.0 TP  8.4* ALB  2.4*  
GLOB  6.0* No results for input(s): INR, PTP, APTT in the last 72 hours. No lab exists for component: INREXT No results for input(s): FE, TIBC, PSAT, FERR in the last 72 hours. Lab Results Component Value Date/Time Folate 7.9 02/27/2018 03:52 AM  
 Folate 7.5 02/27/2018 03:52 AM  
  
No results for input(s): PH, PCO2, PO2 in the last 72 hours. Recent Labs 10/16/18 
 0836  10/15/18 [address] [place] [address] 1 TROIQ  0.05*  <0.05 Lab Results Component Value Date/Time Cholesterol, total 109 08/14/2018 03:39 AM  
 HDL Cholesterol 24 08/14/2018 03:39 AM  
 LDL,Direct 193 (H) 05/21/2014 09:59 AM  
 LDL, calculated 72.2 08/14/2018 03:39 AM  
 Triglyceride 64 08/14/2018 03:39 AM  
 CHOL/HDL Ratio 4.5 08/14/2018 03:39 AM  
 
Lab Results Component Value Date/Time  Glucose (POC) 121 (H) 10/16/2018 11:31 AM  
 Glucose (POC) 133 (H) 10/16/2018 07:06 AM  
 Glucose (POC) 131 (H) 10/16/2018 04:51 AM  
 Glucose (POC) 153 (H) 10/15/2018 10:56 PM  
 Glucose (POC) 122 (H) 08/23/2018 11:29 AM  
 
Lab Results Component Value Date/Time Color DARK YELLOW 07/09/2018 10:39 PM  
 Appearance CLEAR 07/09/2018 10:39 PM  
 Specific gravity 1.017 07/09/2018 10:39 PM  
 pH (UA) 8.0 07/09/2018 10:39 PM  
 Protein TRACE (A) 07/09/2018 10:39 PM  
 Glucose NEGATIVE  07/09/2018 10:39 PM  
 Ketone NEGATIVE  07/09/2018 10:39 PM  
 Bilirubin NEGATIVE  07/09/2018 10:39 PM  
 Urobilinogen 1.0 07/09/2018 10:39 PM  
 Nitrites NEGATIVE  07/09/2018 10:39 PM  
 Leukocyte Esterase NEGATIVE  07/09/2018 10:39 PM  
 Epithelial cells FEW 07/09/2018 10:39 PM  
 Bacteria NEGATIVE  07/09/2018 10:39 PM  
 WBC 0-4 07/09/2018 10:39 PM  
 RBC 0-5 07/09/2018 10:39 PM  
 
 
 
Medications Reviewed:  
 
Current Facility-Administered Medications Medication Dose Route Frequency  furosemide (LASIX) injection 40 mg  40 mg IntraVENous Q12H  
 albumin human 25% (BUMINATE) solution 12.5 g  12.5 g IntraVENous Q12H  
 sodium chloride (NS) flush 5-10 mL  5-10 mL IntraVENous Q8H  
 sodium chloride (NS) flush 5-10 mL  5-10 mL IntraVENous PRN  
 albuterol (PROVENTIL VENTOLIN) nebulizer solution 2.5 mg  2.5 mg Nebulization Q4H PRN  
 allopurinol (ZYLOPRIM) tablet 100 mg  100 mg Oral DAILY  amiodarone (CORDARONE) tablet 200 mg  200 mg Oral BIDPC  apixaban (ELIQUIS) tablet 5 mg  5 mg Oral BID  aspirin delayed-release tablet 81 mg  81 mg Oral DAILY  atorvastatin (LIPITOR) tablet 80 mg  80 mg Oral DAILY  buPROPion [facility] SR) tablet 150 mg  150 mg Oral BID  docusate sodium (COLACE) capsule 100 mg  100 mg Oral BID  ferrous sulfate tablet 325 mg  325 mg Oral DAILY WITH BREAKFAST  lisinopril (PRINIVIL, ZESTRIL) tablet 2.5 mg  2.5 mg Oral DAILY  metoprolol succinate (TOPROL-XL) XL tablet 25 mg  25 mg Oral DAILY  morphine CR (MS CONTIN) tablet 60 mg  60 mg Oral TID  nortriptyline (PAMELOR) capsule 50 mg  50 mg Oral QHS  rOPINIRole (REQUIP) tablet 8 mg  8 mg Oral QHS  
 HYDROmorphone (DILAUDID) tablet 4 mg  4 mg Oral Q12H  
 HYDROmorphone (DILAUDID) injection 1 mg  1 mg IntraVENous Q6H PRN  piperacillin-tazobactam (ZOSYN) 3.375 g in 0.9% sodium chloride (MBP/ADV) 100 mL  3.375 g IntraVENous Q8H  
 insulin regular (NOVOLIN R, HUMULIN R) injection   SubCUTAneous AC&HS  
 glucose chewable tablet 16 g  4 Tab Oral PRN  
 dextrose (D50W) injection syrg 12.5-25 g  12.5-25 g IntraVENous PRN  
 glucagon (GLUCAGEN) injection 1 mg  1 mg IntraMUSCular PRN  
 zolpidem (AMBIEN) tablet 10 mg  10 mg Oral QHS  Vancomycin- pharmacy to dose   Other PRN  
 vancomycin (VANCOCIN) 1750 mg in  ml infusion  1,750 mg IntraVENous Q12H  
 arformoterol (BROVANA) neb solution 15 mcg  15 mcg Nebulization BID RT And  
 budesonide (PULMICORT) 500 mcg/2 ml nebulizer suspension  500 mcg Nebulization BID RT  
 pantoprazole (PROTONIX) tablet 40 mg  40 mg Oral ACB  
 
______________________________________________________________________ EXPECTED LENGTH OF STAY: 3d 7h 
ACTUAL LENGTH OF STAY:          1 Fahad Cordero MD

## 2018-10-16 NOTE — PROGRESS NOTES
Clinical Care Lead Arrival Summary Name: Maddison Sheehan  MRN: [id number]  Date: 10/16/2018 11:45 AM 
Admission Date: 10/15/2018  : 1960 Situation: 
10/16/18 ER ADMIT FOR CHF/CELLULITIS-INFECTED LEG WOUNDS >1 YEAR-HOSPITALIST Background: 
  
Start Ordered     
  10/15/18 1815  IP CONSULT TO CARDIOLOGY [[id number]] ONE TIME    Discontinue    
Provider: Yvonne Yost MD    
Question Answer Comment    
Reason for Consult: HEART FAILURE     
Did you call or speak to the consulting provider? No     
Consult To Dr. Leigha Valdivia     
    
 10/15/18 1808    
  10/15/18 1815  IP CONSULT TO INFECTIOUS DISEASES [[id number]] ONE TIME    Discontinue    
Provider: Bony Alvarez MD    
Question Answer Comment    
Reason for Consult: Bilateral acute on chronic cellulitis     
Did you call or speak to the consulting provider? No     
Consult To Dr. Hunter Hickey     
    
 10/15/18 1810    
  10/15/18 1745  IP CONSULT TO PALLIATIVE CARE - PROVIDER (PALLIATIVE CARE CONSULT ) [[id number]] ONE TIME    Discontinue    
Provider: (Not yet assigned)    
Question Answer Comment    
Reason for Consult Care Decisions     
Did you call or speak to the consulting provider? No     
Consult To on call     
    
 10/15/18 1733    
  10/15/18 1615  IP CONSULT TO HOSPITALIST [[id number]] ONE TIME    Discontinue    
Provider: Guerrero Huertas MD    
Question Answer Comment    
Reason for Consult: TO ADMIT     
Did you call or speak to the consulting provider? Yes     
    
 10/15/18 1612    
   
   
  BACKGROUND  
   
  Chief Complaint     
  Leg Swelling [503758]     
  Skin Problem [841665]     
   
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  
IA CARDIAC SURG PROCEDURE UNLIST[64530]   stents x7 Social History Category History Smoking Status Current Every Day Smoker; Start date: ; Last attempt to quit: ; 0.25 packs/day Smokeless Tobacco Status Never Used Alcohol Use No  
Drug Use No  
Sexually Active Not Asked ADL Not Asked The Beta blocker the patient is taking is [unfilled], TOPROL XL 25 MG AT 1043 AM IN ER 
 
     
STAND: ON ADMISSION IF NEEDED Voice quality/secretions: Hx of dysphagia:   
O2 sat:   
Alertness:   
 
Flu vaccination received for current season: SCREEN FOR FLU VACCINE-COMPLETE ADMISSION DATABASE WITHIN 24 HOURS BEEN IN ER SINCE 2200 10/15/18 VTE Documentation VTE Risk Assessment Mechanical VTE orders:   
Venous Foot Pump:   
Graduated Compression Stockings:   
Sequential Compression Devices:   
Patient Refused VTE:   
 
 
Diet: DIET ONE TIME MESSAGE 
DIET CARDIAC Regular; 2 GM NA (House Low NA); Consistent Carb 1500-1600kcal; FR 1800ML 
DIET ONE TIME MESSAGE Assessment: 
Lines/Drains/Airways:  
Peripheral IV 10/15/18 Left Antecubital (Active) Site Assessment Clean, dry, & intact 10/15/2018  2:21 PM  
Phlebitis Assessment 0 10/15/2018  2:21 PM  
Infiltration Assessment 0 10/15/2018  2:21 PM  
Dressing Status Clean, dry, & intact 10/15/2018  2:21 PM  
Dressing Type Transparent 10/15/2018  2:21 PM  
Hub Color/Line Status Pink;Flushed;Patent 10/15/2018  2:21 PM  
Action Taken Blood drawn 10/15/2018  2:21 PM  
   
Peripheral IV 10/16/18 Right Antecubital (Active) Site Assessment Clean, dry, & intact 10/16/2018  6:38 AM  
 Phlebitis Assessment 0 10/16/2018  6:38 AM  
Infiltration Assessment 0 10/16/2018  6:38 AM  
Dressing Status Clean, dry, & intact 10/16/2018  6:38 AM  
Dressing Type Transparent 10/16/2018  6:38 AM  
Hub Color/Line Status Green 10/16/2018  6:38 AM  
Action Taken Catheter retaped 10/16/2018  6:38 AM  
Alcohol Cap Used Yes 10/16/2018  6:38 AM  
   
Wound Leg Lower Left (Active) Wound Leg Lower Right (Active) Wound Leg Right (Active) Wound Scrotum Left;Mid;Right (Active) Last 3 Weights: 
Last 3 Recorded Weights in this Encounter 10/15/18 1549 Weight: 124.7 kg (275 lb) DAILY STANDING WEIGHT WHILE ON TELEMETRY Recommendations: chf PATHWAY/CELLULITIS PATHWAY Consult Orders: )IP CONSULT TO HOSPITALIST 
IP CONSULT TO PALLIATIVE CARE - PROVIDER 
IP CONSULT TO INFECTIOUS DISEASES 
IP CONSULT TO CARDIOLOGY Recent orders:  POC LACTIC ACID CARDIAC MONITORING 
NURSING-MISCELLANEOUS: 
STRICT I & O 
WEIGH PATIENT 
FLUID RESTRICTION: SPECIFY 
PRESCRIBED VIA MEDICATION RECONCILIATION ORDER 
PRESCRIBED VIA MEDICATION RECONCILIATION ORDER 
VITAL SIGNS PER UNIT ROUTINE AMBULATE WITH ASSISTANCE 
NURSING-MISCELLANEOUS: 
XR CHEST PORT 
IP CONSULT TO HOSPITALIST INSERT PERIPHERAL IV 
SALINE LOCK IV 
IP CONSULT TO CASE MANAGEMENT 
IP CONSULT TO CARDIAC REHAB 
IP CONSULT TO PHARMACY - VANCOMYCIN DOSING 
IP CONSULT TO WOUND CARE 
IP CONSULT TO PALLIATIVE CARE - PROVIDER 
IP CONSULT TO INFECTIOUS DISEASES 
FULL CODE 
DIET ONE TIME MESSAGE 
DIET ONE TIME MESSAGE 
IP CONSULT TO CARDIOLOGY 
DIET CARDIAC INITIAL PHYSICIAN ORDER: INPATIENT Core Measures Compliant CHF:YES-EDUCATE Pneumonia:NA Vaccines:SCREEN FOR FLU VACCINE  
SCIP:NA Valle:NA AMI:NA WOUND CARE NURSE SEEN PATIENT IN ER-MAY ORDER SPECIALTY BED

## 2018-10-16 NOTE — PROGRESS NOTES
Orders received, chart reviewed and patient received moaning. , RR 32 and on 5L NC (wears 3L at baseline). Informed patient of OT and benefits. Patient declining due to pain and that he is waiting for a nurse. Informed patient of the benefits of PLB, calming techniques and patient then slower breath and focusing on the Care channel. Still declining OOB, stating urinating too frequently and still in pain. Informed nurse, she stated she was just in there with the patient and is working on pain management at this time. PLOF: 8/21/2018 ADLs limited by cardiac tolerance, LE weeping and edema, standing tolerance, standing balance, and medical hx (COPD in which wears 3L, MIs, CVA). Significant other is disabled, she assists as able with bathing and dressing feet. Wife and or son A with tub transfers. Has a hospital bed for trunk elevation 2* breathing and LE elevation due to edema. Uses [facility] [place] for appointments. Prior Level of Function/Environment/Context: Patient reports living in a 1 level home with his fiance, mod I for ADLs and functional mobility with cane (min A for sit<>stand from low surfaces and for lower body dressing at baseline) 
  Home Situation Home Environment: Private residence # Steps to Enter: 0 One/Two Story Residence: One story Living Alone: No 
Support Systems: Family member(s) Patient Expects to be Discharged to[de-identified] Private residence Current DME Used/Available at Home: Cane, straight, Walker, rolling, Commode, bedside Tub or Shower Type: Tub

## 2018-10-16 NOTE — PROGRESS NOTES
Assumed care of pt this afternoon from ED. Pt is severely SOB, now on 5 liters NC, saturations are maintaining in 90's, RR ranging from 24-35. MEWS score elevated at time of admission to 4, no change from ED MEWS. MD called and made aware of SOB, and elevated MEWS. MD asked to hold albumin as this could worsen SOB in near term. Nurse called respiratory for possible breathing treatment. Respiratory in room. RR has improved to 22, saturations 98%. Pt on new bed. BP in the 70's, , MD paged. MD has placed hold parameters on BP medications and lasix, nurse to give albumin early. Albumin now infusing. Pt transferred to [facility], bedside report given to nurse. Rapid response called in IMCU for BP. Team arrived. Hospitalist called. Pt asymptomatic at this time, MD ordered 500 ml bolus to be given. Infusion started prior to nurse handing off pt. Receiving nurse has no questions at this time.

## 2018-10-17 NOTE — CONSULTS
Palliative Medicine Consult Mo: [phone] ([id number]) Patient Name: Annalee Villalta YOB: 1960 Date of Initial Consult: 2018 Reason for Consult: Care Decisions Requesting Provider: Dr. Hodge [place] Primary Care Physician: Michelle Ramirez MD 
 
 SUMMARY:  
Roney Baires is a 59 y.o. male admitted on 10/15/2018 from home with a diagnosis of cellulitis of the lower extremities, anasarca 2/2 FVO, Chf.  Pt had worsening C/o bilateral leg edema with wounds and PCP directed him to the ED. Pt is followed by the [facility] team.  He has had 5 admission in the past 7 months Current medical issues leading to Palliative Medicine involvement include: support with care decisions given multiple morbidities PMH: CAD s/p 5 stents, HTN, DM, CHF (25%),  afib on Eliquis, COPD, SIRISHA, chronic bilateral wounds, RLS, MI, HLD, Gout, depression, hypercholesterolemia, chronic pain Social: lives at home with abbinelidatatiana (Peg Rashid), has a 32yo son in [place], wife  in [address] [place] [address] and pt stopped working shortly after that, former  x 22 years, sedentary lifestyle now due to morbidities, chronic pain for years, says he is able to [employer] ACP: full code, no AMD  
  
Interval History: 
10/17/18: trans to ICU for respiratory distress and hypotension. Pressors initiated PALLIATIVE DIAGNOSES:  
1. Anasarca 2. ACP 3. Hypoalbuminemia 4. Cough 5. Physical Debility PLAN:  
1. Pt seen without family. MPOA called and message left inviting her to the family meeting today 2. Pt completed an AMD appointing his geovanye~ Peg Rashid 3. Care goals reviewed today and pt shared that he would like resuscitative attempts but would not want to be prolonged on machines at end of life or if he were in a vegetative state. 4. Chronic pain controlled 5. Will follow peripherally. Please call for urgent needs 6. Initial consult note routed to primary continuity provider 7. Communicated plan of care with: Palliative IDT 
 
 
 GOALS OF CARE / TREATMENT PREFERENCES:  
 
GOALS OF CARE: 
Patient/Health Care Proxy Stated Goals: Prolong life TREATMENT PREFERENCES:  
Code Status: Full Code Advance Care Planning: 
Advance Care Planning 10/17/2018 Patient's Healthcare Decision Maker is: Named in scanned ACP document Primary Decision Maker Name -  
Primary Decision Maker Phone Number -  
Primary Decision Maker Relationship to Patient -  
Confirm Advance Directive Yes, on file Patient Would Like to Complete Advance Directive - Does the patient have other document types - Medical Interventions: Full interventions Other Instructions:  
Artificially Administered Nutrition: No feeding tube Other: As far as possible, the palliative care team has discussed with patient / health care proxy about goals of care / treatment preferences for patient. HISTORY:  
 
History obtained from: chart CHIEF COMPLAINT:  None offered. Pt admitted with fvo and cellulitis of lower ext HPI/SUBJECTIVE: The patient is:  
[x] Verbal and participatory [] Non-participatory due to:  
Denies shortness of breath. Chronic pain controlled Clinical Pain Assessment (nonverbal scale for severity on nonverbal patients):  
Clinical Pain Assessment Severity: 0 Location: all over Character: unalbe to describe Duration: chonic, years Effect: limited activity Factors: opioids help Frequency: all the time Duration: for how long has pt been experiencing pain (e.g., 2 days, 1 month, years) Frequency: how often pain is an issue (e.g., several times per day, once every few days, constant) FUNCTIONAL ASSESSMENT:  
 
Palliative Performance Scale (PPS): PPS: 50 PSYCHOSOCIAL/SPIRITUAL SCREENING:  
 
Palliative IDT has assessed this patient for cultural preferences / practices and a referral made as appropriate to needs (Cultural Services, Patient Advocacy, Ethics, etc.) Advance Care Planning: 
Advance Care Planning 10/17/2018 Patient's Healthcare Decision Maker is: Named in scanned ACP document Primary Decision Maker Name -  
Primary Decision Maker Phone Number -  
Primary Decision Maker Relationship to Patient -  
Confirm Advance Directive Yes, on file Patient Would Like to Complete Advance Directive - Does the patient have other document types - Any spiritual / Shinto concerns: 
[] Yes /  [x] No 
 
Caregiver Burnout: 
[] Yes /  [x] No /  [] No Caregiver Present Anticipatory grief assessment:  
[] Normal  / [] Maladaptive ESAS Anxiety: Anxiety: 0 
 
ESAS Depression: Depression: 3 REVIEW OF SYSTEMS:  
 
Positive and pertinent negative findings in ROS are noted above in HPI. The following systems were [x] reviewed / [] unable to be reviewed as noted in HPI Other findings are noted below. Systems: constitutional, ears/nose/mouth/throat, respiratory, gastrointestinal, genitourinary, musculoskeletal, integumentary, neurologic, psychiatric, endocrine. Positive findings noted below. Modified ESAS Completed by: provider Fatigue: 0 Drowsiness: 0 Depression: 3 Pain: 0 Anxiety: 0 Nausea: 0 Anorexia: 0 Dyspnea: 1 PHYSICAL EXAM:  
 
From RN flowsheet: 
Wt Readings from Last 3 Encounters:  
10/17/18 270 lb 8.1 oz (122.7 kg) 08/23/18 239 lb 13.8 oz (108.8 kg) 07/12/18 256 lb 3.2 oz (116.2 kg) Blood pressure 95/60, pulse 95, temperature 97.5 °F (36.4 °C), resp. rate 14, height 6' 2\" (1.88 m), weight 270 lb 8.1 oz (122.7 kg), SpO2 98 %. Pain Scale 1: Numeric (0 - 10) Pain Intensity 1: 0 Pain Location 1: Leg 
Pain Orientation 1: Left, Right Pain Description 1: Burning Pain Intervention(s) 1: Medication (see MAR) Last bowel movement, if known:  
 
Constitutional: falls asleep during conversation Eyes: pupils equal, anicteric ENMT: no nasal discharge, dry mucous membranes Cardiovascular: regular rhythm, anasarca Respiratory: breathing labored, symmetric Gastrointestinal: firm, distended, non-tender Musculoskeletal: no deformity, no tenderness to palpation Skin: legs wrapped, cellulitis noted bilateral lower ext Neurologic: following commands, moving all extremities Psychiatric: full affect, no hallucinations Other: 
 
 
 HISTORY:  
 
Active Problems: 
  Generalized edema (10/15/2018) Past Medical History:  
Diagnosis Date  CAD (coronary artery disease)  [facility]  Chronic systolic heart failure ([facility] 4/12/2011  COPD [facility] obstructive [facility]  Coronary atherosclerosis of native coronary artery 4/12/2011  Diabetes ([facility]  High cholesterol  Hypertension  MI, old  Neurological disorder  Other primary cardiomyopathies 4/12/2011  Restless leg syndrome Past Surgical History:  
Procedure Laterality Date  CARDIAC SURG PROCEDURE UNLIST    
 stents x7  
 HX CARPAL TUNNEL RELEASE    
 right wrist  
  
History reviewed. No pertinent family history. History reviewed, no pertinent family history. Social History Tobacco Use  Smoking status: Current Every Day Smoker Packs/day: 0.25  Smokeless tobacco: Never Used Substance Use Topics  Alcohol use: No  
 
No Known Allergies Current Facility-Administered Medications Medication Dose Route Frequency  PHENYLephrine (PF)(DAYA-SYNEPHRINE) 30 mg in 0.9% sodium chloride 250 mL infusion   mcg/min IntraVENous TITRATE  [START ON 10/18/2018] vancomycin (VANCOCIN) 1750 mg in  ml infusion  1,750 mg IntraVENous Q16H  
 furosemide (LASIX) injection 40 mg  40 mg IntraVENous Q12H  
 albumin human 25% (BUMINATE) solution 12.5 g  12.5 g IntraVENous Q12H  
 sodium chloride (NS) flush 5-10 mL  5-10 mL IntraVENous Q8H  
 sodium chloride (NS) flush 5-10 mL  5-10 mL IntraVENous PRN  
  albuterol (PROVENTIL VENTOLIN) nebulizer solution 2.5 mg  2.5 mg Nebulization Q4H PRN  
 allopurinol (ZYLOPRIM) tablet 100 mg  100 mg Oral DAILY  amiodarone (CORDARONE) tablet 200 mg  200 mg Oral BIDPC  apixaban (ELIQUIS) tablet 5 mg  5 mg Oral BID  aspirin delayed-release tablet 81 mg  81 mg Oral DAILY  atorvastatin (LIPITOR) tablet 80 mg  80 mg Oral DAILY  buPROPion SR [facility] SR) tablet 150 mg  150 mg Oral BID  docusate sodium (COLACE) capsule 100 mg  100 mg Oral BID  ferrous sulfate tablet 325 mg  325 mg Oral DAILY WITH BREAKFAST  morphine CR (MS CONTIN) tablet 60 mg  60 mg Oral TID  nortriptyline (PAMELOR) capsule 50 mg  50 mg Oral QHS  rOPINIRole (REQUIP) tablet 8 mg  8 mg Oral QHS  
 HYDROmorphone (DILAUDID) tablet 4 mg  4 mg Oral Q12H  
 HYDROmorphone (DILAUDID) injection 1 mg  1 mg IntraVENous Q6H PRN  piperacillin-tazobactam (ZOSYN) 3.375 g in 0.9% sodium chloride (MBP/ADV) 100 mL  3.375 g IntraVENous Q8H  
 insulin regular (NOVOLIN R, HUMULIN R) injection   SubCUTAneous AC&HS  
 glucose chewable tablet 16 g  4 Tab Oral PRN  
 dextrose (D50W) injection syrg 12.5-25 g  12.5-25 g IntraVENous PRN  
 glucagon (GLUCAGEN) injection 1 mg  1 mg IntraMUSCular PRN  
 zolpidem (AMBIEN) tablet 10 mg  10 mg Oral QHS  Vancomycin- pharmacy to dose   Other PRN  
 arformoterol (BROVANA) neb solution 15 mcg  15 mcg Nebulization BID RT And  
 budesonide (PULMICORT) 500 mcg/2 ml nebulizer suspension  500 mcg Nebulization BID RT  
 pantoprazole (PROTONIX) tablet 40 mg  40 mg Oral ACB  
 
 
 
 LAB AND IMAGING FINDINGS:  
 
Lab Results Component Value Date/Time WBC 8.1 10/16/2018 03:57 AM  
 HGB 9.8 (L) 10/16/2018 03:57 AM  
 PLATELET 780 85/00/7718 03:57 AM  
 
Lab Results Component Value Date/Time  Sodium 135 (L) 10/17/2018 07:57 AM  
 Potassium 4.2 10/17/2018 07:57 AM  
 Chloride 97 10/17/2018 07:57 AM  
 CO2 28 10/17/2018 07:57 AM  
 BUN 19 10/17/2018 07:57 AM  
 Creatinine 1.09 10/17/2018 07:57 AM  
 Calcium 8.4 (L) 10/17/2018 07:57 AM  
 Magnesium 1.9 08/21/2018 05:12 AM  
 Phosphorus 2.4 (L) 01/07/2014 04:37 AM  
  
Lab Results Component Value Date/Time AST (SGOT) 31 10/15/2018 02:18 PM  
 Alk. phosphatase 202 (H) 10/15/2018 02:18 PM  
 Protein, total 8.4 (H) 10/15/2018 02:18 PM  
 Albumin 2.4 (L) 10/15/2018 02:18 PM  
 Globulin 6.0 (H) 10/15/2018 02:18 PM  
 
Lab Results Component Value Date/Time INR 1.1 02/25/2018 10:05 PM  
 Prothrombin time 10.9 02/25/2018 10:05 PM  
 aPTT 28.1 02/25/2018 10:05 PM  
  
Lab Results Component Value Date/Time Iron 40 08/22/2018 03:51 AM  
 TIBC 283 08/22/2018 03:51 AM  
 Iron % saturation 14 (L) 08/22/2018 03:51 AM  
 Ferritin 115 08/22/2018 03:51 AM  
  
No results found for: PH, PCO2, PO2 No components found for: [place] Lab Results Component Value Date/Time  02/27/2018 03:52 AM  
 CK - MB 2.7 02/27/2018 03:52 AM  
  
 
 
   
 
Total time: 70 minutes Counseling / coordination time, spent as noted above: 45 minutes 
> 50% counseling / coordination?: y 
 
Prolonged service was provided for  []30 min   []75 min in face to face time in the presence of the patient, spent as noted above. Time Start:  
Time End:  
Note: this can only be billed with 68356 (initial) or 06146 (follow up). If multiple start / stop times, list each separately.

## 2018-10-17 NOTE — PROGRESS NOTES
RN received phone order from MD to place pt on HHFNC. Pt refused. He stated that he didn't want HHFNC or BIPAP put on him. RN at bedside when conversation took place with pt. Increased O2 flow to 6 LPM via N/C.

## 2018-10-17 NOTE — PROGRESS NOTES
ID Progress Note 10/17/2018 Subjective: He is in the ICU for more aggressive fluid management Objective: Antibiotics: 1. Vancomycin 2. Zosyn Vitals:  
Visit Vitals BP 95/60 Pulse 95 Temp 97.5 °F (36.4 °C) Resp 14 Ht 6' 2\" (1.88 m) Wt 122.7 kg (270 lb 8.1 oz) SpO2 98% BMI 34.73 kg/m² Tmax:  Temp (24hrs), Av °F (36.7 °C), Min:97.2 °F (36.2 °C), Max:98.3 °F (36.8 °C) Exam:  Lungs:  clear to auscultation bilaterally Heart:  regular rate and rhythm Abdomen:  soft, non-tender. Bowel sounds normal. No masses,  no organomegaly Decreased edema of the legs, somewhat Labs:     
Recent Labs 10/17/18 
69 430 23 60 10/16/18 
0357 10/15/18 [address] WBC  --  8.1 7.0 HGB  --  9.8* 10.1* PLT  --  370 405* BUN 19 16 17 CREA 1.09 0.92 1.03  
SGOT  --   --  31  
AP  --   --  202* TBILI  --   --  1.0 Cultures:  
 
Lab Results Component Value Date/Time Specimen Description: ESOPHAGEAL BRUSHING 2010 09:25 AM  
 
Lab Results Component Value Date/Time Culture result: NO GROWTH 2 DAYS 10/15/2018 02:34 PM  
 Culture result: NO GROWTH 5 DAYS 2018 08:19 PM  
 Culture result: NO GROWTH 5 DAYS 2018 09:11 PM  
 
 
Radiology:  
 
Line/Insert Date:        
 
Assessment: 1. Anasarca 2. Stasis dermatitis 3. CHF 4. Debility Objective: 1. Continue IV therapy for the short term Jordan Alan MD

## 2018-10-17 NOTE — PROGRESS NOTES
Problem: Mobility Impaired (Adult and Pediatric) Goal: *Acute Goals and Plan of Care (Insert Text) Physical Therapy Goals Initiated 10/17/2018 1. Patient will move from supine to sit and sit to supine  in bed with modified independence within 7 day(s). 2.  Patient will transfer from bed to chair and chair to bed with modified independence using the least restrictive device within 7 day(s). 3.  Patient will perform sit to stand with modified independence within 7 day(s). 4.  Patient will ambulate with modified independence for 100 feet with the least restrictive device within 7 day(s). 5.  Patient will ascend/descend 3 stairs with 1 handrail(s) with modified independence within 7 day(s). physical Therapy EVALUATION Patient: Roney Baires (55 y.o. male) Date: 10/17/2018 Primary Diagnosis: Generalized edema Precautions:   Fall, Skin ASSESSMENT : 
Based on the objective data described below, the patient presents with HUITRON, limited endurance, and decreased awareness of need for safety following admission for generalized edema. Prior to admission this patient was living with his fiance and  modified independent using a rollator for functional mobility while on 3L O2 continuously via NC. He endorses being a household ambulator but was unable to ambulate community distances. During evaluation both legs were severly edematous and wrapped in dressings saturated by serous fluid. Gait training x20' in his room using a RW while on 6L O2. His SpO2 sats remained stable throughout. Returned to a bedside chair in NAD with all needs met and with his LEs elevated. He presents below his functional baseline with reduced endurance and increased O2 demand. Recommend discharge to a SNF level rehab based on today's presentation vs home with HHPT pending progress. Patient will benefit from skilled intervention to address the above impairments. Patients rehabilitation potential is considered to be Good Factors which may influence rehabilitation potential include:  
[x]         None noted 
[]         Mental ability/status []         Medical condition 
[]         Home/family situation and support systems 
[]         Safety awareness 
[]         Pain tolerance/management 
[]         Other: PLAN : 
Recommendations and Planned Interventions: 
[x]           Bed Mobility Training             [x]    Neuromuscular Re-Education 
[x]           Transfer Training                   []    Orthotic/Prosthetic Training 
[x]           Gait Training                         []    Modalities [x]           Therapeutic Exercises           []    Edema Management/Control 
[x]           Therapeutic Activities            []    Patient and Family Training/Education 
[]           Other (comment): Frequency/Duration: Patient will be followed by physical therapy  5 times a week to address goals. Discharge Recommendations: Biju Mendoza Further Equipment Recommendations for Discharge: to be determined SUBJECTIVE:  
Patient stated I can sit in the recliner. That is what I do most of the day at home.  OBJECTIVE DATA SUMMARY:  
HISTORY:   
Past Medical History:  
Diagnosis Date  CAD (coronary artery disease)  [facility]congestive [facility]) [facility]  Chronic systolic heart failure ([facility] [address] 4/12/2011  COPD [facility]ch[facility] obstructive pulmonary disease) ([facility])  Coronary atherosclerosis of native coronary artery 4/12/2011  Diabetes ([facility] [address]  High cholesterol  Hypertension  MI, old  Neurological disorder  Other primary cardiomyopathies 4/12/2011  Restless leg syndrome Past Surgical History:  
Procedure Laterality Date  CARDIAC SURG PROCEDURE UNLIST    
 stents x7  
 HX CARPAL TUNNEL RELEASE    
 right wrist  
 
Prior Level of Function/Home Situation:  
Personal factors and/or comorbidities impacting plan of care:  
 
Home Situation Home Environment: Private residence # Steps to Enter: 0 One/Two Story Residence: One story Living Alone: No 
Support Systems: Spouse/Significant Other/Partner Current DME Used/Available at Home: Adaptive dressing aides, Commode, bedside, Walker, rollator, Cane, straight Tub or Shower Type: Tub/Shower combination EXAMINATION/PRESENTATION/DECISION MAKING: Critical Behavior: 
Neurologic State: Alert Orientation Level: Oriented X4 Cognition: Appropriate for age attention/concentration, Follows commands Safety/Judgement: Awareness of environment, Fall prevention Hearing: Auditory Auditory Impairment: NoneSkin:  Dry and scaly, left thigh weeping blood from scratching Edema:  
Range Of Motion: 
AROM: Generally decreased, functional 
  
  
  
  
  
  
  
Strength:   
Strength: Generally decreased, functional 
  
  
  
  
  
  
Tone & Sensation:  
Tone: Normal 
  
  
  
  
Sensation: Impaired Coordination: 
Coordination: Within functional limits Functional Mobility: 
Bed Mobility: 
  
Supine to Sit: Stand-by assistance Scooting: Stand-by assistance Transfers: 
Sit to Stand: Contact guard assistance;Assist x2 Stand to Sit: Contact guard assistance;Assist x2 Balance:  
Sitting: Intact Standing: Impaired; With support Standing - Static: Good Standing - Dynamic : FairAmbulation/Gait Training:Distance (ft): 30 Feet (ft) Assistive Device: Gait belt;Walker, rolling Ambulation - Level of Assistance: Minimal assistance Gait Description (WDL): Exceptions to [facility] Gait Abnormalities: Decreased step clearance Base of Support: Widened Stance: Right decreased; Left decreased Speed/Renetta: Shuffled; Slow Stairs: Therapeutic Exercises:  
 
 
Functional Measure: 
Tinetti test: 
 
Sitting Balance: 1 Arises: 1 Attempts to Rise: 2 Immediate Standing Balance: 1 Standing Balance: 1 Nudged: 2 Eyes Closed: 1 Turn 360 Degrees - Continuous/Discontinuous: 1 Turn 360 Degrees - Steady/Unsteady: 1 Sitting Down: 1 Balance Score: 12 Indication of Gait: 1 
R Step Length/Height: 1 L Step Length/Height: 1 
R Foot Clearance: 1 L Foot Clearance: 1 Step Symmetry: 1 Step Continuity: 1 Path: 1 Trunk: 1 Walking Time: 0 Gait Score: 9 Total Score: 21 Tinetti Test and G-code impairment scale: 
Percentage of Impairment CH 
 
0% 
 CI 
 
1-19% CJ 
 
20-39% CK 
 
40-59% CL 
 
60-79% CM 
 
80-99% CN  
 
100% Tinetti Score 0-28 28 23-27 17-22 12-16 6-11 1-5 0 Tinetti Tool Score Risk of Falls 
<19 = High Fall Risk 19-24 = Moderate Fall Risk 25-28 = Low Fall Risk Tinetti ME. Performance-Oriented Assessment of Mobility Problems in Elderly Patients. [facility]; [id number]. (Scoring Description: PT Bulletin Feb. 10, 1993) Older adults: Viola Sotelo et al, 2009; n = [address] [facility] elderly evaluated with ABC, ENZO, ADL, and IADL) · Mean ENZO score for males aged 69-68 years = 26.21(3.40) · Mean ENZO score for females age 69-68 years = 25.16(4.30) · Mean ENZO score for males over 80 years = 23.29(6.02) · Mean ENZO score for females over 80 years = 17.20(8.32) G codes: In compliance with CMSs Claims Based Outcome Reporting, the following G-code set was chosen for this patient based on their primary functional limitation being treated: The outcome measure chosen to determine the severity of the functional limitation was the Tinetti with a score of 21/28 which was correlated with the impairment scale. ? Mobility - Walking and Moving Around:  
  - CURRENT STATUS: CJ - 20%-39% impaired, limited or restricted  - GOAL STATUS: CJ - 20%-39% impaired, limited or restricted  - D/C STATUS:  ---------------To be determined--------------- Physical Therapy Evaluation Charge Determination History Examination Presentation Decision-Making MEDIUM  Complexity : 1-2 comorbidities / personal factors will impact the outcome/ POC  MEDIUM Complexity : 3 Standardized tests and measures addressing body structure, function, activity limitation and / or participation in recreation  LOW Complexity : Stable, uncomplicated  LOW Complexity : FOTO score of  Based on the above components, the patient evaluation is determined to be of the following complexity level: LOW Pain: 
Pain Scale 1: Numeric (0 - 10) Pain Intensity 1: 0 Activity Tolerance:  
 
Please refer to the flowsheet for vital signs taken during this treatment. After treatment:  
[x]         Patient left in no apparent distress sitting up in chair 
[]         Patient left in no apparent distress in bed 
[x]         Call bell left within reach [x]         Nursing notified 
[]         Caregiver present 
[]         Bed alarm activated COMMUNICATION/EDUCATION:  
The patients plan of care was discussed with: Registered Nurse. [x]         Fall prevention education was provided and the patient/caregiver indicated understanding. [x]         Patient/family have participated as able in goal setting and plan of care. [x]         Patient/family agree to work toward stated goals and plan of care. []         Patient understands intent and goals of therapy, but is neutral about his/her participation. []         Patient is unable to participate in goal setting and plan of care. Thank you for this referral. 
Delio Dominguez, PT, DPT Time Calculation: 34 mins

## 2018-10-17 NOTE — PROGRESS NOTES
0730-Bedside shift change report given to [address] (oncoming nurse) by Gamaliel Bean (offgoing nurse). Report included the following information SBAR, Kardex, ED Summary, Intake/Output, MAR and Recent Results.

## 2018-10-17 NOTE — PROGRESS NOTES
PCCM 
 
Pt presented with weakness Has a h/o CHF with EF 25% (Dr Master Long) and h/o lymphedema and cellulitis on abx per Dr Gavino Powerbush Was transferred to ICU for hypotension Started on Timbo for bp support He says his normal blood pressure is variable. He denies fevers chills, cough, sputum production or dysuria. His leg swelling is fairly stable for him He is on continuous O2 at home at 3 L/min CXR - CM with mild pulm edema On vancomycin and zosyn for cellulitis He is receiving lasix and albumin to try to mobilize fluid Past Medical History:  
Diagnosis Date  CAD (coronary artery disease)  CHF (congestive heart failure) ([facility] [address]  Chronic systolic heart failure ([facility] [address] 4/12/2011  COPD (chronic obstructive pulmonary disease) ([facility])  Coronary atherosclerosis of native coronary artery 4/12/2011  Diabetes ([facility] [address]  High cholesterol  Hypertension  MI, old  Neurological disorder  Other primary cardiomyopathies 4/12/2011  Restless leg syndrome Past Surgical History:  
Procedure Laterality Date  CARDIAC SURG PROCEDURE UNLIST    
 stents x7  
 HX CARPAL TUNNEL RELEASE    
 right wrist  
 
Prior to Admission medications Medication Sig Start Date End Date Taking? Authorizing Provider  
clopidogrel (PLAVIX) 75 mg tab Take 75 mg by mouth daily. Yes Provider, Historical  
lisinopril (PRINIVIL, ZESTRIL) 2.5 mg tablet Take 2.5 mg by mouth daily. Yes Provider, Historical  
ferrous sulfate 325 mg (65 mg iron) tablet Take 1 Tab by mouth daily (with breakfast). 8/23/18  Yes Jayda Sheth MD  
amiodarone (CORDARONE) 200 mg tablet Take 1 Tab by mouth two (2) times daily (after meals). 8/22/18  Yes Heron Arrington MD  
apixaban (ELIQUIS) 5 mg tablet Take 1 Tab by mouth two (2) times a day. 8/22/18  Yes Heron Arrington MD  
furosemide (LASIX) 20 mg tablet Take 20 mg by mouth daily.    Yes Provider, Historical  
 morphine CR (MS CONTIN) 60 mg CR tablet Take 60 mg by mouth three (3) times daily. Yes Provider, Historical  
allopurinol (ZYLOPRIM) 100 mg tablet Take 100 mg by mouth daily. Yes Provider, Historical  
buPROPion SR (WELLBUTRIN SR) 150 mg SR tablet Take 150 mg by mouth two (2) times a day. Yes Provider, Historical  
metoprolol succinate (TOPROL XL) 25 mg XL tablet Take 25 mg by mouth daily. Yes Provider, Historical  
aspirin delayed-release 81 mg tablet Take 81 mg by mouth daily. Yes Other, MD Gerald  
nortriptyline (PAMELOR) 50 mg capsule Take 50 mg by mouth nightly. Yes Provider, Historical  
docusate sodium (COLACE) 100 mg capsule Take 100 mg by mouth two (2) times a day. Yes Provider, Historical  
omeprazole (PRILOSEC) 20 mg capsule Take 20 mg by mouth daily. Indications: gastroesophageal reflux disease   Yes Provider, Historical  
atorvastatin (LIPITOR) 80 mg tablet Take 80 mg by mouth daily. Yes Provider, Historical  
rOPINIRole (REQUIP) 4 mg tab TAB Take 8 mg by mouth nightly. Indications: Restless Legs Syndrome   Yes Provider, Historical  
zolpidem (AMBIEN) 10 mg tablet Take 1 Tab by mouth nightly. 5/24/14  Yes Jeffrey Tinajero MD  
tiotropium (SPIRIVA WITH HANDIHALER) 18 mcg inhalation capsule Take 1 Cap by inhalation daily. Yes Other, MD Gerald  
fluticasone-salmeterol (ADVAIR DISKUS) 250-50 mcg/dose diskus inhaler Take 1 Puff by inhalation two (2) times a day. Yes Other, MD Gerald  
HYDROmorphone (DILAUDID) 4 mg tablet Take 4 mg by mouth two (2) times daily as needed for Pain. Provider, Historical  
albuterol (PROVENTIL HFA, VENTOLIN HFA, PROAIR HFA) 90 mcg/actuation inhaler Take 1 Puff by inhalation every four (4) hours as needed for Wheezing. Provider, Historical  
albuterol-ipratropium (DUO-NEB) 2.5 mg-0.5 mg/3 ml nebu 3 mL by Nebulization route every four (4) hours.  3/3/18   Carlos Calle MD  
nitroglycerin (NITROSTAT) 0.4 mg SL tablet 0.4 mg by SubLINGual route every five (5) minutes as needed for Chest Pain. Provider, Historical  
 
Social History Socioeconomic History  Marital status:  Spouse name: Not on file  Number of children: Not on file  Years of education: Not on file  Highest education level: Not on file Social Needs  Financial resource strain: Not on file  Food insecurity - worry: Not on file  Food insecurity - inability: Not on file  Transportation needs - medical: Not on file  Transportation needs - non-medical: Not on file Occupational History  Not on file Tobacco Use  Smoking status: Current Every Day Smoker Packs/day: 0.25  Smokeless tobacco: Never Used Substance and Sexual Activity  Alcohol use: No  
 Drug use: No  
 Sexual activity: Not on file Other Topics Concern  Not on file Social History Narrative  Not on file History reviewed. No pertinent family history. ROS as above Patient Vitals for the past 4 hrs: 
 BP Temp Pulse Resp SpO2  
10/17/18 0810     92 % 10/17/18 0800  97.2 °F (36.2 °C)     
10/17/18 0600 104/71  (!) 106 22 (!) 82 % Temp (24hrs), Av.2 °F (36.8 °C), Min:97.2 °F (36.2 °C), Max:98.5 °F (36.9 °C) Intake/Output Summary (Last 24 hours) at 10/17/2018 7204 Last data filed at 10/17/2018 0600 Gross per 24 hour Intake 2327.33 ml Output 2130 ml Net 197.33 ml Alert No distress Trachea midline No accessory use MMM Basilar rales RRR Soft Chronic stasis changes CXR - CM, Mild edema Lab: 
Recent Labs 10/17/18 
69 430 23 60 10/16/18 
7359 10/16/18 
0357 10/15/18 [address] WBC  --   --  8.1 7.0 HGB  --   --  9.8* 10.1* PLT  --   --  370 405* *  --  135* 129*  
K 4.2  --  3.8 4.5  
CL 97  --  99 95* CO2 28  --  27 27 BUN 19  --  16 17 CREA 1.09  --  0.92 1.03  
GLU 95  --  113* 117* CA 8.4*  --  8.0* 8.8  
TROIQ  --  0.05*  --  <0.05 TBILI  --   --   --  1.0 SGOT  --   --   --  31  
 
ABG: 
 Recent Labs 10/17/18 
6783 PHI 7.468* PCO2I 35.8 PO2I 49* HCO3I 26.0 SO2I 87* All Micro Results Procedure Component Value Units Date/Time CULTURE, BLOOD, PAIRED [[id number]] Collected:  10/15/18 1434 Order Status:  Completed Specimen:  Blood Updated:  10/17/18 1480 Special Requests: NO SPECIAL REQUESTS Culture result: NO GROWTH 2 DAYS     
 CULTURE, BLOOD [[id number]] Order Status:  Canceled Specimen:  Blood Impression Acute on chronic hypoxemic resp failure - on home O2. Has some pulmonary edema on CXR - receiving albumin, lasix and timbo for marginal bp and trying to diurese Hypotension as above Cellulitis Mike Luna DM Systolic CHF with EF 63% H/o CAD s/p stent placement 
 
--albumin / lasix --Timbo to MAP of 60 
--abx per ID 
--consider addition of midodrine if unable to wean off of timbo --in ICU while on vasopressors Michelle Curtis MD

## 2018-10-17 NOTE — PROGRESS NOTES
Pt has been hypotensive. He has no complaints. Will try giving some fluid back to see if that helps. Otherwise may need transfer to ICU for vasopressors or ionotropes. Pt remains hypotensive will transfer to ICU and start neosynephrine and reassess.

## 2018-10-17 NOTE — PROGRESS NOTES
Spiritual Care Assessment/Progress Note [address] 
 
 
NAME: Leticia Daniel      MRN: [id number] AGE: 59 y.o. SEX: male Jew Affiliation: Yarsanism Language: English  
 
10/17/2018     Total Time (in minutes): 10 Spiritual Assessment begun in [facility] 7S1 INTENSIVE CARE through conversation with: 
  
    [x]Patient        [] Family    [] Friend(s) Reason for Consult: Palliative Care, Initial/Spiritual Assessment Spiritual beliefs: (Please include comment if needed) [x] Identifies with a armando tradition:     
   [] Supported by a armando community:        
   [] Claims no spiritual orientation:       
   [] Seeking spiritual identity:            
   [] Adheres to an individual form of spirituality:       
   [] Not able to assess:                   
 
    
Identified resources for coping:  
   [] Prayer                           
   [] Music                  [] Guided Imagery [x] Family/friends                 [] Pet visits [] Devotional reading                         [] Unknown 
   [] Other:                                        
 
 
Interventions offered during this visit: (See comments for more details) Patient Interventions: Affirmation of emotions/emotional suffering, Affirmation of armando, Catharsis/review of pertinent events in supportive environment, Coping skills reviewed/reinforced, Normalization of emotional/spiritual concerns Plan of Care: 
 
 [x] Support spiritual and/or cultural needs  
 [] Support AMD and/or advance care planning process    
 [] Support grieving process 
 [] Coordinate Rites and/or Rituals  
 [] Coordination with community clergy [] No spiritual needs identified at this time 
 [] Detailed Plan of Care below (See Comments)  [] Make referral to Music Therapy 
[] Make referral to Pet Therapy    
[] Make referral to Addiction services 
[] Make referral to [facility] 
[] Make referral to Spiritual Care Partner [] No future visits requested       
[x] Follow up visits as needed Visited pt in ICU. Nurse indicated that he attempted to leave AMA earlier. Upon entering the room he immediately began to talk about the incident. Tearful at times he spoke of his frustration and desire to leave. He admitted to being physically unable to actually leave and actually needing to allow the medical treatment to continue without his resistance. Listened to his concerns. Chaplains will follow as needed. Chaplain Brasher MDiv, MS, [id number] ([phone])

## 2018-10-17 NOTE — PROGRESS NOTES
Orders received, chart reviewed and patient evaluated by occupational therapy. Recommend patient to discharge to rehab vs. [facility] pending progression with skilled acute occupational therapy. Recommend with nursing patient to complete as able in order to maintain strength, endurance and independence: OOB to chair 3x/day, ADLs with supervision/setup and mobilizing to the bathroom for toileting with 1-2 assist (2nd for line management). Thank you for your assistance. Full evaluation to follow. Edwin Cruz MS, OTR/L

## 2018-10-17 NOTE — PROGRESS NOTES
1930-Bedside and Verbal shift change report given to Sebastian Hanna (oncoming nurse) by Nalini Graves (offgoing nurse). Report included the following information SBAR, Kardex, ED Summary, Intake/Output, MAR and Recent Results.

## 2018-10-17 NOTE — PROGRESS NOTES
Notified the patient wants to leave AMA. I spoke to him and explained to him that he is critically-ill and not medically stable to leave. He is adamant on leaving but doesn't give me a specific reason, just says he doesn't want to be in the hospital any more. He reiterated the risks of leaving now including death and seems to understand the repercussions of his decision.

## 2018-10-17 NOTE — PROGRESS NOTES
Day #3 of Vancomycin Indication:  Empiric for erythema of B/L LE - vascular stasis +/- cellulitis Current regimen:  1750 mg IV Q 12 hr Abx regimen:  Zosyn + Vancomycin ID Following ?: YES Concomitant nephrotoxic drugs (requires more frequent monitoring): Loop diuretics and Vasopressors Frequency of BMP?: Daily Recent Labs 10/17/18 
69 430 23 60 10/16/18 
0357 10/15/18 [address] WBC  --  8.1 7.0  
CREA 1.09 0.92 1.03  
BUN 19 16 17 Est CrCl: ~100 ml/min; UO: ~0.7 ml/kg/hr Temp (24hrs), Av.1 °F (36.7 °C), Min:97.2 °F (36.2 °C), Max:98.5 °F (36.9 °C) Cultures:  
10/15 Blood: NGTD Goal trough = 10 - 15 mcg/mL Recent trough history (date/time/level/dose/action taken): 
None Plan: Dose given about 3.5 hours late this morning because patient was planning to leave [place] and all lines had been removed when he decided to stay. Scr also up slightly today. Given the slight bump in Scr and history of supratherapeutic levels on this regimen, the dose of vancomycin will be preemptively adjusted to 1750 mg IV Q 16 hr.  Pharmacy will continue to monitor patient daily and will make dosage adjustments based upon changing renal function.

## 2018-10-17 NOTE — PROGRESS NOTES
Pt was visited by Abeba Sheridan RN Acute Case Manger from [facility], phone [phone] or center [phone] [facility]). Added business card in thinned chart area. She would like to be included in discharge planning, transitions of care needs LAUREN Flood

## 2018-10-17 NOTE — PROGRESS NOTES
0811- Dr. Blanca Joseph paged for increasing RR, with O2 saturation 87 % and trending down. Order received for ABG, result showed hypoxia. Order received for Hi-Osman which patient adamantly refused, despite coaching from RN X2, AND RT X1. PT still wants full code status. 0500- PT refusing meds, lab draw, and chest x-ray, asking to leave. Charge notified. 0- Charge talking to PT. Dr. Blanca Joseph called. 0530- PT adamant about leaving now. Dr. Jelani Mcgee coming to speak with him. 
 
0600- AMA form signed, all LDAs removed, PT getting dressed to leave. 0700- PT realizes he cannot make it out of the unit himself, asking to stay. PCT's placing patient back into gown and bed, RN going to attempt to place a PIV. 0730- PT back in bed, on 6 lpm NC, L. AC PIV placed, Timbo restarted.

## 2018-10-17 NOTE — PROGRESS NOTES
Problem: Self Care Deficits Care Plan (Adult) Goal: *Acute Goals and Plan of Care (Insert Text) Occupational Therapy Goals Initiated 10/17/2018 1. Patient will perform grooming standing at sink for 5 minutes with minimal HUITRON with contact guard assist within 7 day(s). 2.  Patient will perform upper body ADLs with independence within 7 day(s). 3.  Patient will perform lower body ADLs using AE PRN with minimal assistance/contact guard assist within 7 day(s). 4.  Patient will perform toilet transfers with contact guard assist within 7 day(s). 5.  Patient will perform all aspects of toileting with supervision/set-up within 7 day(s). 6.  Patient will participate in upper extremity therapeutic exercise/activities with independence for 5 minutes within 7 day(s). 7.  Patient will utilize energy conservation techniques during functional activities with verbal cues within 7 day(s). Occupational Therapy EVALUATION Patient: Pepito Dowd (55 y.o. male) Date: 10/17/2018 Primary Diagnosis: Generalized edema Precautions:  Fall, Skin ASSESSMENT : 
Based on the objective data described below, the patient presents with overall min A-CGA for bed mobility and functional mobility, s/u for upper body ADLs and min-total A for lower body ADLs s/p admission for generalized edema. Today, patient ADLs limited by impaired balance, decreased functional activity tolerance, decreased cardiopulmonary endurance (typical 3L at baseline, now on 6L), and generalized weakness. Patient may benefit from rehab vs. Lacey Mcqueen pending progress with acute rehab and medical management. Recommend with nursing patient to complete as able in order to maintain strength, endurance and independence: ADLs with supervision/setup, OOB to chair 3x/day and mobilizing to the bathroom for toileting with 1-2 assist. Thank you for your assistance. Patient will benefit from skilled intervention to address the above impairments. Patients rehabilitation potential is considered to be Fair Factors which may influence rehabilitation potential include:  
[]             None noted []             Mental ability/status []             Medical condition []             Home/family situation and support systems []             Safety awareness []             Pain tolerance/management 
[]             Other: PLAN : 
Recommendations and Planned Interventions: 
[x]               Self Care Training                  [x]        Therapeutic Activities [x]               Functional Mobility Training    []        Cognitive Retraining 
[x]               Therapeutic Exercises           [x]        Endurance Activities [x]               Balance Training                   []        Neuromuscular Re-Education []               Visual/Perceptual Training     [x]   Home Safety Training 
[x]               Patient Education                 [x]        Family Training/Education []               Other (comment): Frequency/Duration: Patient will be followed by occupational therapy 5 times a week to address goals. Discharge Recommendations: Rehab vs. Home Health Further Equipment Recommendations for Discharge: TBD by rehab - shower chair? SUBJECTIVE:  
Patient stated My fiance helps me with a lot of this.  (referring to lower body ADLs) OBJECTIVE DATA SUMMARY:  
HISTORY:  
Past Medical History:  
Diagnosis Date  CAD (coronary artery disease)  CHF [facility]estive heart failure[facility]  Chronic systolic heart failure ([facility] 4/12/2011  COPD (chronic obstructive pulmonary disease) ([facility])  Coronary atherosclerosis of native coronary artery 4/12/2011  Diabetes ([facility]  High cholesterol  Hypertension  MI, old  Neurological disorder  Other primary cardiomyopathies 4/12/2011  Restless leg syndrome Past Surgical History:  
Procedure Laterality Date  CARDIAC SURG PROCEDURE UNLIST    
 stents x7  
  HX CARPAL TUNNEL RELEASE    
 right wrist  
 
 
Prior Level of Function/Environment/Context: Patient lives in 1 level home with fiance and was mod I (occasional assist) with ADLs and functional mobility PTA. Patient uses sock aide and reacher for lower body dressing at baseline. Home Situation Home Environment: Private residence # Steps to Enter: 0 One/Two Story Residence: One story Living Alone: No 
Support Systems: Spouse/Significant Other/Partner Current DME Used/Available at Home: Adaptive dressing aides, Commode, bedside, Walker, rollator, Cane, straight Tub or Shower Type: Tub/Shower combination Hand dominance: RightEXAMINATION OF PERFORMANCE DEFICITS: 
Cognitive/Behavioral Status: 
Neurologic State: Alert Orientation Level: Oriented X4 Cognition: Appropriate for age attention/concentration; Follows commands Perception: Appears intact Perseveration: No perseveration noted Safety/Judgement: Awareness of environment; Fall prevention Skin: Appears grossly intact, dressing intact on BLE Edema: Significant in BLE, mild in BUE Hearing: Auditory Auditory Impairment: None Vision/Perceptual:   
Tracking: Able to track stimulus in all quadrants w/o difficulty Diplopia: No   
Acuity: Within Defined Limits Corrective Lenses: Glasses Range of Motion: In [address] AROM: Generally decreased, functional 
Strength: In [address] [address]  equal 4+/5 Strength: Generally decreased, functional 
Coordination: 
Coordination: Within functional limits Fine Motor Skills-Upper: Left Intact; Right Intact Gross Motor Skills-Upper: Left Intact; Right Intact Tone & Sensation: In [address] Tone: Normal 
Sensation: Impaired Balance: 
Sitting: Intact Standing: Impaired; With support Standing - Static: Good Standing - Dynamic : Fair Functional Mobility and Transfers for ADLs:Bed Mobility: 
Supine to Sit: Stand-by assistance Scooting: Stand-by assistance Transfers: Sit to Stand: Contact guard assistance;Assist x2 Stand to Sit: Contact guard assistance;Assist x2 Toilet Transfer : Contact guard assistance;Assist x2(to [facility]) ADL Assessment: 
Feeding: Setup* Oral Facial Hygiene/Grooming: Setup* Bathing: Moderate assistance* Upper Body Dressing: Setup* Lower Body Dressing: Total assistance Toileting: Minimum assistance *Infer per obs of functional mobility, functional reach, BUE ROM, strength, balance and cardiopulmonary endurance ADL Intervention and task modifications: 
 
Lower Body Dressing Assistance Socks: Total assistance (dependent) Leg Crossed Method Used: No 
Position Performed: Seated edge of bed Cues: Don;Physical assistance Toileting Bladder Hygiene: Stand-by assistance Bowel Hygiene: Stand-by assistance Clothing Management: Minimum assistance Adaptive Equipment: Shay Frazier Cognitive Retraining Safety/Judgement: Awareness of environment; Fall prevention Functional Measure: 
Barthel Index: 
 
Bathin Bladder: 10 Bowels: 10 
Groomin Dressin Feeding: 10 Mobility: 5 Stairs: 0 Toilet Use: 5 Transfer (Bed to Chair and Back): 10 Total: 60 Barthel and G-code impairment scale: 
Percentage of impairment CH 
0% CI 
1-19% CJ 
20-39% CK 
40-59% CL 
60-79% CM 
80-99% CN 
100% Barthel Score 0-100 100 99-80 79-60 59-40 20-39 1-19 
 0 Barthel Score 0-20 20 17-19 13-16 9-12 5-8 1-4 0 The Barthel ADL Index: Guidelines 1. The index should be used as a record of what a patient does, not as a record of what a patient could do. 2. The main aim is to establish degree of independence from any help, physical or verbal, however minor and for whatever reason. 3. The need for supervision renders the patient not independent. 4. A patient's performance should be established using the best available evidence.  Asking the patient, friends/relatives and nurses are the usual sources, but direct observation and common sense are also important. However direct testing is not needed. 5. Usually the patient's performance over the preceding 24-48 hours is important, but occasionally longer periods will be relevant. 6. Middle categories imply that the patient supplies over 50 per cent of the effort. 7. Use of aids to be independent is allowed. Anoop Patel., Barthel, D.W. (7737). Functional evaluation: the Barthel Index. [address] [facility] (14)2. Shey Vela corey ARIN Gibbons, Myron Miranda., Aide Toney., [place], [address] [facility] (1999). Measuring the change indisability after inpatient rehabilitation; comparison of the responsiveness of the Barthel Index and Functional [place] Measure. Journal of Neurology, Neurosurgery, and Psychiatry, 66(4), 254-358. WILMA BurgosJYAMILA, ROLANDO Carlos, & Carlos Stephenson M.A. (2004.) Assessment of post-stroke quality of life in cost-effectiveness studies: The usefulness of the Barthel Index and the EuroQoL-5D. [facility], 13, 409-98 G codes: In compliance with CMSs Claims Based Outcome Reporting, the following G-code set was chosen for this patient based on their primary functional limitation being treated: The outcome measure chosen to determine the severity of the functional limitation was the Barthel Index with a score of 60/100 which was correlated with the impairment scale. ? Self Care:  
  - CURRENT STATUS: CJ - 20%-39% impaired, limited or restricted  - GOAL STATUS: CI - 1%-19% impaired, limited or restricted  - D/C STATUS:  ---------------To be determined--------------- Occupational Therapy Evaluation Charge Determination History Examination Decision-Making LOW Complexity : Brief history review  LOW Complexity : 1-3 performance deficits relating to physical, cognitive , or psychosocial skils that result in activity limitations and / or participation restrictions  MEDIUM Complexity : Patient may present with comorbidities that affect occupational performnce. Miniml to moderate modification of tasks or assistance (eg, physical or verbal ) with assesment(s) is necessary to enable patient to complete evaluation Based on the above components, the patient evaluation is determined to be of the following complexity level: LOW Pain: 
Pain Scale 1: Numeric (0 - 10) Pain Intensity 1: 0 Activity Tolerance:  
Fair, VSS (SPO2 89% on 6L with exertion, PLB back up to 95%) Please refer to the flowsheet for vital signs taken during this treatment. After treatment:  
[x] Patient left in no apparent distress sitting up in chair 
[] Patient left in no apparent distress in bed 
[x] Call bell left within reach [x] Nursing notified 
[] Caregiver present 
[] Bed alarm activated COMMUNICATION/EDUCATION:  
The patients plan of care was discussed with: Physical Therapist and Registered Nurse. [x] Home safety education was provided and the patient/caregiver indicated understanding. [x] Patient/family have participated as able in goal setting and plan of care. [] Patient/family agree to work toward stated goals and plan of care. [] Patient understands intent and goals of therapy, but is neutral about his/her participation. [] Patient is unable to participate in goal setting and plan of care. This patients plan of care is appropriate for delegation to \[facility] [place] [facility]\"". Thank you for this referral. 
Owen Clarke OT Time Calculation: 31 mins

## 2018-10-17 NOTE — PROGRESS NOTES
2015 The patient was brought to me by primary nurse Leticia Bledsoe and charge nurse Adin Severance from [facility] at 2015 with no report given. Patient to be transferred to [facility] due to hypotension. Did not feel patient was appropriate for this unit with current vitals. Attempted to call nursing supervisor to discuss if transfer was appropriate. No answer received. Patient's VS unstable with last BP 77/48 taken at [facility]. When patient arrived to unit BP 75/50. RR called. Dr. Sunitha Bentley came to see patient and order received for 500 mL bolus to be infused over 3 hours. Dr. Sunitha Bentley will transfer patient for pressors if BP does not respond. Primary nurse from Miguel Mike RN remained at bedside with patient until orders received. [address] [facility] Dr. Sunitha Bentley paged concerning patient's BP. BP 66/46, remains hypotensive despite fluid bolus. 0000 TRANSFER - OUT REPORT: 
 
Verbal report given to WALTER Real (name) on Gibran Villalta  being transferred to ICU (unit) for change in patient condition(remains hypotensive despite fluid bolus) Report consisted of patients Situation, Background, Assessment and  
Recommendations(SBAR). Information from the following report(s) SBAR, Kardex, ED Summary, Intake/Output, MAR, Recent Results and Cardiac Rhythm ST  was reviewed with the receiving nurse. Lines:  
Peripheral IV 10/15/18 Left Antecubital (Active) Site Assessment Clean, dry, & intact 10/16/2018  3:17 PM  
Phlebitis Assessment 0 10/16/2018  3:17 PM  
Infiltration Assessment 0 10/16/2018  3:17 PM  
Dressing Status Clean, dry, & intact 10/16/2018  3:17 PM  
Dressing Type Transparent;Tape 10/16/2018  3:17 PM  
Hub Color/Line Status Pink;Flushed;Patent 10/15/2018  2:21 PM  
Action Taken Blood drawn 10/15/2018  2:21 PM  
   
Peripheral IV 10/16/18 Right Antecubital (Active) Site Assessment Clean, dry, & intact 10/16/2018  3:17 PM  
Phlebitis Assessment 0 10/16/2018  3:17 PM  
Infiltration Assessment 0 10/16/2018  3:17 PM  
 Dressing Status Clean, dry, & intact 10/16/2018  3:17 PM  
Dressing Type Transparent;Tape 10/16/2018  3:17 PM  
Hub Color/Line Status Green 10/16/2018  6:38 AM  
Action Taken Catheter retaped 10/16/2018  6:38 AM  
Alcohol Cap Used Yes 10/16/2018  6:38 AM  
  
 
Opportunity for questions and clarification was provided. Patient transported with: 
 Monitor O2 @ 6 liters Registered Nurse Tech Patient belongings

## 2018-10-17 NOTE — PROGRESS NOTES
Hospitalist Progress Note Fahad Cordero MD 
Answering service: [phone] OR [phone] from in house phone Date of Service:  10/17/2018 NAME:  Diogo Krause :  1960 MRN:  [id number] Admission Summary:  
Admitted with massive edema and sob Interval history / Subjective:  
   
10/16/2018 : For albumin for fluid shifts as alessandro as anasarca and cont iv lasix. , relief of his acute and severe vol overload process will be slow,  Cellulitic changes of legs noted adri left > rigth, would seeing, abx continue; will need collins for  Tracking I and O.    
 
10/17/2018 : 
Events overnight noted now on pressors; feels some better, adri LE wrapped. Seen by wound, ID and others;   Remains in ICU for now on pressors, ,may need dobutamin ; cont for now albumin/diruetic, Assessment & Plan:  
 
Volume overload,  For diuretic, albuin as alessandro Anasarca, for iv albumin as alessandro CM with EF Of  25-30 range, for diuretic use and bb as can, and cont on current ACE on pressors 10/17/2018 for drop in bp, Chronic syst heart failure with acute exacerbation for diruetic and I and o followng, NYHA 4. SYST heart failue; as above. Possible Cellulitis adri lower ext for iv abx. ; see ID note Chronic pain on opioids from home. DM for SSI Code status: full DVT prophylaxis: Eliquis ( NOAC) Care Plan discussed with: Patient/Family and Nurse Disposition: Home w/Family and TBD Hospital Problems  Date Reviewed: 2018 Codes Class Noted POA Generalized edema ICD-10-CM: R60.1 ICD-9-CM: 782.3  10/15/2018 Unknown Review of Systems:  
 
Less sob, no cp cont w edema, Vital Signs:  
 Last 24hrs VS reviewed since prior progress note. Most recent are: 
Visit Vitals BP 93/68 Pulse (!) 104 Temp 97.2 °F (36.2 °C) Resp 16 Ht 6' 2\" (1.88 m) Wt 122.7 kg (270 lb 8.1 oz) SpO2 95% BMI 34.73 kg/m² Intake/Output Summary (Last 24 hours) at 10/17/2018 1118 Last data filed at 10/17/2018 0930 Gross per 24 hour Intake 2707.33 ml Output 2130 ml Net 577.33 ml Physical Examination:  
 
 
     
Constitutional:  +acute distress, cooperative icu on pressors ENT:  Oral mucous moist, oropharynx benign. Neck supple, Resp:  CTA bilaterally. No wheezing/rhonchi/rales. No accessory muscle use CV:  Regular rhythm, normal rate, no murmurs, gallops, rubs GI:  +distended, non tender. normoactive bowel sounds, Musculoskeletal:  anasarca like edema to mid abdomen; , warm, 1+ pulses throughout Neurologic:  Moves all extremities. AAOx3, CN II-XII reviewed Psych:  axnious, else appropriated, not agitated. Data Review:  
 Review and/or order of clinical lab test 
 
 
Labs:  
 
Recent Labs 10/16/18 
071 977 34 37 10/15/18 [address] [place] [address] 1 WBC 8.1 7.0 HGB 9.8* 10.1* HCT 33.9* 34.2*  
 405* Recent Labs 10/17/18 
69 430 23 60 10/16/18 
0357 10/15/18 [address] [place] [address] 1 * 135* 129*  
K 4.2 3.8 4.5  
CL 97 99 95* CO2 28 27 27 BUN 19 16 17 CREA 1.09 0.92 1.03  
GLU 95 113* 117* CA 8.4* 8.0* 8.8 Recent Labs 10/15/18 [address] [place] [address] 1 SGOT 31 ALT 19  
* TBILI 1.0 TP 8.4* ALB 2.4*  
GLOB 6.0* No results for input(s): INR, PTP, APTT in the last 72 hours. No lab exists for component: INREXT, INREXT No results for input(s): FE, TIBC, PSAT, FERR in the last 72 hours. Lab Results Component Value Date/Time Folate 7.9 02/27/2018 03:52 AM  
 Folate 7.5 02/27/2018 03:52 AM  
  
No results for input(s): PH, PCO2, PO2 in the last 72 hours. Recent Labs 10/16/18 
0836 10/15/18 [address] [place] [address] 1 TROIQ 0.05* <0.05 Lab Results Component Value Date/Time  Cholesterol, total 109 08/14/2018 03:39 AM  
 HDL Cholesterol 24 08/14/2018 03:39 AM  
 LDL,Direct 193 (H) 05/21/2014 09:59 AM  
 LDL, calculated 72.2 08/14/2018 03:39 AM  
 Triglyceride 64 08/14/2018 03:39 AM  
 CHOL/HDL Ratio 4.5 08/14/2018 03:39 AM  
 
Lab Results Component Value Date/Time Glucose (POC) 100 10/17/2018 07:12 AM  
 Glucose (POC) 115 (H) 10/16/2018 10:27 PM  
 Glucose (POC) 106 (H) 10/16/2018 04:00 PM  
 Glucose (POC) 121 (H) 10/16/2018 11:31 AM  
 Glucose (POC) 133 (H) 10/16/2018 07:06 AM  
 
Lab Results Component Value Date/Time Color DARK YELLOW 07/09/2018 10:39 PM  
 Appearance CLEAR 07/09/2018 10:39 PM  
 Specific gravity 1.017 07/09/2018 10:39 PM  
 pH (UA) 8.0 07/09/2018 10:39 PM  
 Protein TRACE (A) 07/09/2018 10:39 PM  
 Glucose NEGATIVE  07/09/2018 10:39 PM  
 Ketone NEGATIVE  07/09/2018 10:39 PM  
 Bilirubin NEGATIVE  07/09/2018 10:39 PM  
 Urobilinogen 1.0 07/09/2018 10:39 PM  
 Nitrites NEGATIVE  07/09/2018 10:39 PM  
 Leukocyte Esterase NEGATIVE  07/09/2018 10:39 PM  
 Epithelial cells FEW 07/09/2018 10:39 PM  
 Bacteria NEGATIVE  07/09/2018 10:39 PM  
 WBC 0-4 07/09/2018 10:39 PM  
 RBC 0-5 07/09/2018 10:39 PM  
 
 
 
Medications Reviewed:  
 
Current Facility-Administered Medications Medication Dose Route Frequency  PHENYLephrine (PF)(DAYA-SYNEPHRINE) 30 mg in 0.9% sodium chloride 250 mL infusion   mcg/min IntraVENous TITRATE  furosemide (LASIX) injection 40 mg  40 mg IntraVENous Q12H  
 albumin human 25% (BUMINATE) solution 12.5 g  12.5 g IntraVENous Q12H  
 sodium chloride (NS) flush 5-10 mL  5-10 mL IntraVENous Q8H  
 sodium chloride (NS) flush 5-10 mL  5-10 mL IntraVENous PRN  
 albuterol (PROVENTIL VENTOLIN) nebulizer solution 2.5 mg  2.5 mg Nebulization Q4H PRN  
 allopurinol (ZYLOPRIM) tablet 100 mg  100 mg Oral DAILY  amiodarone (CORDARONE) tablet 200 mg  200 mg Oral BIDPC  apixaban (ELIQUIS) tablet 5 mg  5 mg Oral BID  aspirin delayed-release tablet 81 mg  81 mg Oral DAILY  atorvastatin (LIPITOR) tablet 80 mg  80 mg Oral DAILY  buPROPion SR [facility]) tablet 150 mg  150 mg Oral BID  
  docusate sodium (COLACE) capsule 100 mg  100 mg Oral BID  ferrous sulfate tablet 325 mg  325 mg Oral DAILY WITH BREAKFAST  morphine CR (MS CONTIN) tablet 60 mg  60 mg Oral TID  nortriptyline (PAMELOR) capsule 50 mg  50 mg Oral QHS  rOPINIRole (REQUIP) tablet 8 mg  8 mg Oral QHS  
 HYDROmorphone (DILAUDID) tablet 4 mg  4 mg Oral Q12H  
 HYDROmorphone (DILAUDID) injection 1 mg  1 mg IntraVENous Q6H PRN  piperacillin-tazobactam (ZOSYN) 3.375 g in 0.9% sodium chloride (MBP/ADV) 100 mL  3.375 g IntraVENous Q8H  
 insulin regular (NOVOLIN R, HUMULIN R) injection   SubCUTAneous AC&HS  
 glucose chewable tablet 16 g  4 Tab Oral PRN  
 dextrose (D50W) injection syrg 12.5-25 g  12.5-25 g IntraVENous PRN  
 glucagon (GLUCAGEN) injection 1 mg  1 mg IntraMUSCular PRN  
 zolpidem (AMBIEN) tablet 10 mg  10 mg Oral QHS  Vancomycin- pharmacy to dose   Other PRN  
 vancomycin (VANCOCIN) 1750 mg in  ml infusion  1,750 mg IntraVENous Q12H  
 arformoterol (BROVANA) neb solution 15 mcg  15 mcg Nebulization BID RT And  
 budesonide (PULMICORT) 500 mcg/2 ml nebulizer suspension  500 mcg Nebulization BID RT  
 pantoprazole (PROTONIX) tablet 40 mg  40 mg Oral ACB  
 
______________________________________________________________________ EXPECTED LENGTH OF STAY: 3d 7h 
ACTUAL LENGTH OF STAY:          2 Dominique Fernández MD

## 2018-10-18 NOTE — PROGRESS NOTES
0800 received verbal bedside report from [facility]- alarms checked and drips 0930 pt will moan and groan off and on- when asked if he was having pain -he said no but that he does this -1100 Interdisciplinary team rounds were held 10/18/2018 with the following team members:Care Management, Nursing, Nutrition, Physical Therapy and Physician and the patient. Plan of care discussed. See clinical pathway and/or care plan for interventions and desired outcomes. 1200 Dr Nereyda Byrd in to see pt 
1430 wound care done on lower legs - linen changed 1700 down to 15 mcg on the mica drip 
1900 shift summary: primacor started - have been weaning the mica drip - appetite good- voiding with periods of spillage in bed- received his routine pain meds Report given to the on coming rn using sbar

## 2018-10-18 NOTE — PROGRESS NOTES
ID Progress Note 10/18/2018 Subjective: He is in the ICU for more aggressive fluid management Objective: Antibiotics: 1. Vancomycin 2. Zosyn Vitals:  
Visit Vitals BP (!) 84/48 Pulse (!) 113 Temp 99.3 °F (37.4 °C) Resp 17 Ht 6' 2\" (1.88 m) Wt 120.9 kg (266 lb 8.6 oz) SpO2 90% BMI 34.22 kg/m² Tmax:  Temp (24hrs), Av.4 °F (36.9 °C), Min:97.4 °F (36.3 °C), Max:99.8 °F (37.7 °C) Exam:  Lungs:  clear to auscultation bilaterally Heart:  regular rate and rhythm Abdomen:  soft, non-tender. Bowel sounds normal. No masses,  no organomegaly Decreased edema of the legs, somewhat Labs:     
Recent Labs 10/18/18 
0401 10/17/18 
0757 10/16/18 
1828 WBC 9.2  --  8.1 HGB 9.0*  --  9.8* *  --  370 BUN 18 19 16 CREA 1.18 1.09 0.92  
SGOT 25  --   --   
*  --   --   
TBILI 1.2*  --   --   
 
 
Cultures:  
 
Lab Results Component Value Date/Time Specimen Description: ESOPHAGEAL BRUSHING 2010 09:25 AM  
 
Lab Results Component Value Date/Time Culture result: NO GROWTH 3 DAYS 10/15/2018 02:34 PM  
 Culture result: NO GROWTH 5 DAYS 2018 08:19 PM  
 Culture result: NO GROWTH 5 DAYS 2018 09:11 PM  
 
 
Radiology:  
 
Line/Insert Date:        
 
Assessment: 1. Anasarca 2. Stasis dermatitis 3. CHF 4. Debility 5. Worsening cardiac status Objective: 1. Continue IV therapy for the short term 2. Follow up cultures Ricarda Garcia MD

## 2018-10-18 NOTE — PROGRESS NOTES
Hospitalist Progress Note Shiloh Arrieta MD 
Answering service: [phone] OR [phone] from in house phone Date of Service:  10/18/2018 NAME:  Maddison Sheehan :  1960 MRN:  [id number] Admission Summary:  
Admitted with massive edema and sob Interval history / Subjective:  
   
10/16/2018 : For albumin for fluid shifts as alessandro as anasarca and cont iv lasix. , relief of his acute and severe vol overload process will be slow,  Cellulitic changes of legs noted adri left > rigth, would seeing, abx continue; will need collins for  Tracking I and O.    
 
10/18/2018 : 
Events overnight noted now on pressors; feels some better, adri LE wrapped. Seen by wound, ID and others;   Remains in ICU for now on pressors, ,may need dobutamin ; cont for now albumin/diruetic, Assessment & Plan:  
 
Volume overload,  For diuretic, albuin as alessandro ; now on milrinone Anasarca, for iv albumin as alessandro; adding milrinone CM with EF Of  25-30 range, for diuretic use and bb as can, and cont on current ACE on pressors 10/18/2018 for drop in bp,   See above Chronic syst heart failure with acute exacerbation for diruetic and I and o followng, NYHA 4. SYST heart failue; as above. Possible Cellulitis adri lower ext for iv abx. ; see ID note Chronic pain on opioids from home. DM for SSI Code status: full DVT prophylaxis: Eliquis ( NOAC) Care Plan discussed with: Patient/Family and Nurse Disposition: Home w/Family and TBD Hospital Problems  Date Reviewed: 2018 Codes Class Noted POA Generalized edema ICD-10-CM: R60.1 ICD-9-CM: 782.3  10/15/2018 Unknown Review of Systems:  
 
Less sob, no cp cont w edema, but overall improved. Vital Signs:  
 Last 24hrs VS reviewed since prior progress note. Most recent are: 
Visit Vitals BP 97/61 Pulse (!) 114 Temp 99.8 °F (37.7 °C) Resp 19 Ht 6' 2\" (1.88 m) Wt 120.9 kg (266 lb 8.6 oz) SpO2 91% BMI 34.22 kg/m² Intake/Output Summary (Last 24 hours) at 10/18/2018 1224 Last data filed at 10/18/2018 1018 Gross per 24 hour Intake 2261.09 ml Output 1825 ml Net 436.09 ml Physical Examination:  
 
 
     
Constitutional:  +acute distress, cooperative icu on pressors ENT:  Oral mucous moist, oropharynx benign. Neck supple, Resp:  CTA bilaterally. No wheezing/rhonchi/rales. No accessory muscle use CV:  Regular rhythm, normal rate, no murmurs, gallops, rubs GI:  +distended, non tender. normoactive bowel sounds, Musculoskeletal:  anasarca like edema to mid abdomen; , warm, 1+ pulses throughout Neurologic:  Moves all extremities. AAOx3, CN II-XII reviewed Psych:  axnious, else appropriated, not agitated. Data Review:  
 Review and/or order of clinical lab test 
 
 
Labs:  
 
Recent Labs 10/18/18 
0401 10/16/18 
0721 WBC 9.2 8.1 HGB 9.0* 9.8* HCT 31.2* 33.9*  
* 370 Recent Labs 10/18/18 
0401 10/17/18 
0757 10/16/18 
8046 * 135* 135* K 3.7 4.2 3.8 CL 98 97 99 CO2 26 28 27 BUN 18 19 16 CREA 1.18 1.09 0.92  
* 95 113* CA 8.0* 8.4* 8.0*  
MG 1.7  --   --   
PHOS 3.0  --   --   
 
Recent Labs 10/18/18 
0401 10/15/18 4[facility] 1 SGOT 25 31 ALT 14 19 * 202* TBILI 1.2* 1.0 TP 7.2 8.4* ALB 2.1* 2.4*  
GLOB 5.1* 6.0* No results for input(s): INR, PTP, APTT in the last 72 hours. No lab exists for component: INREXT, INREXT No results for input(s): FE, TIBC, PSAT, FERR in the last 72 hours. Lab Results Component Value Date/Time Folate 7.9 02/27/2018 03:52 AM  
 Folate 7.5 02/27/2018 03:52 AM  
  
No results for input(s): PH, PCO2, PO2 in the last 72 hours. Recent Labs 10/16/18 
0836 10/15/18 [address] [place] [address] TROIQ 0.05* <0.05 Lab Results Component Value Date/Time  Cholesterol, total 109 08/14/2018 03:39 AM  
 HDL Cholesterol 24 08/14/2018 03:39 AM  
 LDL,Direct 193 (H) 05/21/2014 09:59 AM  
 LDL, calculated 72.2 08/14/2018 03:39 AM  
 Triglyceride 64 08/14/2018 03:39 AM  
 CHOL/HDL Ratio 4.5 08/14/2018 03:39 AM  
 
Lab Results Component Value Date/Time Glucose (POC) 113 (H) 10/18/2018 06:38 AM  
 Glucose (POC) 115 (H) 10/17/2018 09:46 PM  
 Glucose (POC) 110 (H) 10/17/2018 03:55 PM  
 Glucose (POC) 131 (H) 10/17/2018 11:18 AM  
 Glucose (POC) 100 10/17/2018 07:12 AM  
 
Lab Results Component Value Date/Time Color DARK YELLOW 07/09/2018 10:39 PM  
 Appearance CLEAR 07/09/2018 10:39 PM  
 Specific gravity 1.017 07/09/2018 10:39 PM  
 pH (UA) 8.0 07/09/2018 10:39 PM  
 Protein TRACE (A) 07/09/2018 10:39 PM  
 Glucose NEGATIVE  07/09/2018 10:39 PM  
 Ketone NEGATIVE  07/09/2018 10:39 PM  
 Bilirubin NEGATIVE  07/09/2018 10:39 PM  
 Urobilinogen 1.0 07/09/2018 10:39 PM  
 Nitrites NEGATIVE  07/09/2018 10:39 PM  
 Leukocyte Esterase NEGATIVE  07/09/2018 10:39 PM  
 Epithelial cells FEW 07/09/2018 10:39 PM  
 Bacteria NEGATIVE  07/09/2018 10:39 PM  
 WBC 0-4 07/09/2018 10:39 PM  
 RBC 0-5 07/09/2018 10:39 PM  
 
 
 
Medications Reviewed:  
 
Current Facility-Administered Medications Medication Dose Route Frequency  [START ON 10/19/2018] Vancomycin trough 10/19 @ 0930   Other ONCE  
 milrinone (PRIMACOR) 20 MG/100 ML D5W infusion  0.375 mcg/kg/min IntraVENous CONTINUOUS  
 PHENYLephrine (PF)(DAYA-SYNEPHRINE) 30 mg in 0.9% sodium chloride 250 mL infusion   mcg/min IntraVENous TITRATE  vancomycin (VANCOCIN) 1750 mg in  ml infusion  1,750 mg IntraVENous Q16H  
 furosemide (LASIX) injection 40 mg  40 mg IntraVENous Q12H  
 albumin human 25% (BUMINATE) solution 12.5 g  12.5 g IntraVENous Q12H  
 sodium chloride (NS) flush 5-10 mL  5-10 mL IntraVENous Q8H  
 sodium chloride (NS) flush 5-10 mL  5-10 mL IntraVENous PRN  
 albuterol (PROVENTIL VENTOLIN) nebulizer solution 2.5 mg  2.5 mg Nebulization Q4H PRN  
  allopurinol (ZYLOPRIM) tablet 100 mg  100 mg Oral DAILY  amiodarone (CORDARONE) tablet 200 mg  200 mg Oral BIDPC  apixaban (ELIQUIS) tablet 5 mg  5 mg Oral BID  aspirin delayed-release tablet 81 mg  81 mg Oral DAILY  atorvastatin (LIPITOR) tablet 80 mg  80 mg Oral DAILY  buPROPion SR [facility] SR) tablet 150 mg  150 mg Oral BID  docusate sodium (COLACE) capsule 100 mg  100 mg Oral BID  ferrous sulfate tablet 325 mg  325 mg Oral DAILY WITH BREAKFAST  morphine CR (MS CONTIN) tablet 60 mg  60 mg Oral TID  nortriptyline (PAMELOR) capsule 50 mg  50 mg Oral QHS  rOPINIRole (REQUIP) tablet 8 mg  8 mg Oral QHS  
 HYDROmorphone (DILAUDID) tablet 4 mg  4 mg Oral Q12H  
 HYDROmorphone (DILAUDID) injection 1 mg  1 mg IntraVENous Q6H PRN  piperacillin-tazobactam (ZOSYN) 3.375 g in 0.9% sodium chloride (MBP/ADV) 100 mL  3.375 g IntraVENous Q8H  
 insulin regular (NOVOLIN R, HUMULIN R) injection   SubCUTAneous AC&HS  
 glucose chewable tablet 16 g  4 Tab Oral PRN  
 dextrose (D50W) injection syrg 12.5-25 g  12.5-25 g IntraVENous PRN  
 glucagon (GLUCAGEN) injection 1 mg  1 mg IntraMUSCular PRN  
 zolpidem (AMBIEN) tablet 10 mg  10 mg Oral QHS  Vancomycin- pharmacy to dose   Other PRN  
 arformoterol (BROVANA) neb solution 15 mcg  15 mcg Nebulization BID RT And  
 budesonide (PULMICORT) 500 mcg/2 ml nebulizer suspension  500 mcg Nebulization BID RT  
 pantoprazole (PROTONIX) tablet 40 mg  40 mg Oral ACB  
 
______________________________________________________________________ EXPECTED LENGTH OF STAY: 3d 7h 
ACTUAL LENGTH OF STAY:          3 Helga Moss MD

## 2018-10-18 NOTE — PROGRESS NOTES
Patient transferred to ICU with respiratory failure. CXR shows Pulmonary edema, currently on hi flow and a Milranone gtt. . Patient lives with his wife and is followed by [facility]. Care management will continue to follow. Brenden Smith RN,CRM

## 2018-10-18 NOTE — PROGRESS NOTES
PCCM 
 
Remains in ICU on low dose timbo On High flow NC O2 at 45 L/min Remains in a positive fluid balance despite diuretics and albumin Patient Vitals for the past 4 hrs: 
 BP Pulse Resp SpO2  
10/18/18 0841 96/64 (!) 108    
10/18/18 0800 96/68 (!) 107 20 (!) 77 % 10/18/18 0700 99/62 (!) 107 17 96 % 10/18/18 0600 93/59 (!) 106 17 94 % Temp (24hrs), Av.6 °F (36.4 °C), Min:97.4 °F (36.3 °C), Max:98.1 °F (36.7 °C) Intake/Output Summary (Last 24 hours) at 10/18/2018 9149 Last data filed at 10/18/2018 0700 Gross per 24 hour Intake 2934.75 ml Output 1550 ml Net 1384.75 ml No resp distress On high flow O2 Anicteric 
MMM 
+JVD Diminished bs at bases Irreg Soft protuberant Warm and dry with chronic stasis changes Diffuse edema CXR - increased edema Lab: 
Recent Labs 10/18/18 
0401 10/17/18 
0757 10/16/18 
6131 10/16/18 
0357 10/15/18 4[address] [facility] 1 WBC 9.2  --   --  8.1 7.0 HGB 9.0*  --   --  9.8* 10.1* *  --   --  370 405* * 135*  --  135* 129*  
K 3.7 4.2  --  3.8 4.5  
CL 98 97  --  99 95* CO2 26 28  --  27 27 BUN 18 19  --  16 17 CREA 1.18 1.09  --  0.92 1.03  
* 95  --  113* 117* CA 8.0* 8.4*  --  8.0* 8.8 MG 1.7  --   --   --   --   
PHOS 3.0  --   --   --   --   
TROIQ  --   --  0.05*  --  <0.05 TBILI 1.2*  --   --   --  1.0 SGOT 25  --   --   --  31 Impression Acute on chronic hypoxemic resp failure - on home O2. Has some pulmonary edema on CXR - receiving albumin, lasix and timbo for marginal bp and trying to diurese 
  
Hypotension as above  
  
Cellulitis 
  
Lymphadema 
  
DM 
  
Systolic CHF with EF 25% 
  
H/o CAD s/p stent placement 
  
 
--Will add milrinone to timbo and diuretic to see if can help with volume mobilization 
--wean Timbo as able 
--abx per ID Benjamin Bustos MD

## 2018-10-18 NOTE — PROGRESS NOTES
0400- during bath, PT lay flat for 2 minutes, became increasingly SOB, with increased WOB, and sats dipping into low 80's. Immediately sat up, coached through breathing exercises without improvement. Per previous verbal order from Dr. Estefani Santiago, PT placed on Hi-Osman by RT - 45 L and 50%. RT to titrate.

## 2018-10-18 NOTE — CDMP QUERY
Dear Dr. Bhupendra Altamirano, Please clarify if this patient is (was) being treated/managed for:  
 
=> Hypovolemic shock in the setting of hypotension requiring vasopressors and ICU monitoring 
=> Other explanation of clinical findings 
=> Clinically Undetermined (no explanation for clinical findings) The medical record reflects the following clinical findings, treatment, and risk factors. Risk Factors:  adm with CHF, hx CAD Clinical Indicators:  BP 64/50, per PN-ACE on pressors 10/17/2018 for drop in bp Treatment: neosynephrine drip, ICU monitoring Please clarify and document your clinical opinion in the progress notes and discharge summary including the definitive and/or presumptive diagnosis, (suspected or probable), related to the above clinical findings. Please include clinical findings supporting your diagnosis. Thank You 
Sixto Wood,MSN,BSN,RN,[facility] 
[phone]

## 2018-10-18 NOTE — PROGRESS NOTES
NUTRITION COMPLETE ASSESSMENT 
 
RECOMMENDATIONS:  
1. For dry mouth and to encourage compliance with fluid restriction: - Give mouth swabs to keep mucosa moist 
 - consider trial of artificial saliva if needed 
 - allow hard candies between meals 2. Downgrade diet to mechanical soft if trouble chewing soft solids 3. Add daily MVI 4. Adjustment to fluid restriction per MD 
  
Interventions/Plan:  
Food/Nutrient Delivery:  (snacks) Nutrition Education:(Fluid restriction and mgmt) Assessment:  
Reason for Assessment: [x] Provider Consult Diet: Cardiac, Consistent carb Supplements: none Nutritionally Significant Medications: [x] Reviewed & Includes: albumin, allopurinol, pulmicort, wellbutrin, colace, iron, lasix, regular insulin QID, protonix, zosyn, vanco; milrinone drip, mica Meal Intake:  
Patient Vitals for the past 100 hrs: 
 % Diet Eaten 10/17/18 1800 100 % 10/17/18 1200 100 % 10/17/18 0900 100 % 10/16/18 1051 30 % Pre-Hospitalization: 
Usual Appetite: Good Diet at Home: regular Vitamins/Supplements: No 
 
Current Hospitalization:  
Fluid Restriction: 1800 ml Appetite: Good PO Ability: Independent Average po intake:% Average supplements intake:    
 Subjective: 
\"its not enough fluid. And the food is not enough. \" 
 
Objective: 
Pt admitted for generalized edema. PMHx: CAD, HTN, DM, CHF, COPD. CHF exacerbation with cellutlitis noted. Chronic BLE swelling with wounds. Albumin and diuretics given. ID following for abx. Now with pressors and on hi flow O2. Fluid overload with 4+ BLLE and anasarca POA. Hyponatremia likely d/t fluid status per MD note. Lasix rx with fluid restriction. Wt Readings from Last 10 Encounters:  
10/18/18 120.9 kg (266 lb 8.6 oz) 08/23/18 108.8 kg (239 lb 13.8 oz) 07/12/18 116.2 kg (256 lb 3.2 oz) 06/06/18 117.7 kg (259 lb 7.7 oz) 05/07/18 118.6 kg (261 lb 7.5 oz) 03/02/18 120.3 kg (265 lb 3.2 oz) 06/05/17 138.8 kg (306 lb) 05/02/17 137.4 kg (302 lb 14.6 oz) 01/03/17 121.6 kg (268 lb)  
08/27/15 125.3 kg (276 lb 3.8 oz) Pt visited today. Discussed reason for fluid restrictions and ways to manage dry mouth while still staying within goals. Appetite good. Recommend giving mouth swabs to keep mucosa moist - consider trial of artificial saliva if needed. Hx of DM but A1c 6% 2 months ago. BG well controlled. Current diet overly restrictive for estimated needs. Will adjust diet with double protein foods and HS snack. Will rescreen per protocol with diet reinforcement PRN. Estimated Nutrition Needs:  
Kcals/day: 1094 Kcals/day(2235-2440kcal) Protein: 128 g(1.1g/kg) Fluid: 1800 ml(per MD FR) Based On: Brenton St Jeor(x 1.1-1.2) Weight Used: LWA(273.4GX) Pt expected to meet estimated nutrient needs:  []   Yes     []  No [] Unable to predict at this time Nutrition Diagnosis:  
1. Excessive fluid intake related to fluid overload as evidenced by pt reports of high fluid intake; 1800ml fluid restriction Goals:   
 Adherence to fluid restriction Monitoring & Evaluation: - Total energy intake, Oral fluids amount - Weight/weight change Previous Nutrition Goals Met:   N/A Previous Recommendations:    N/A Education & Discharge Needs: 
 [] None Identified 
 [x] Identified and addressed  
 [] Participated in care plan, discharge planning, and/or interdisciplinary rounds Cultural, Druze and ethnic food preferences identified: None Skin Integrity: [x]Intact  []Other Edema: []None [x]Other: 1+ genital, 1-2+ BLUE, 3-4+ BLLE Last BM: 10/18 Food Allergies: [x]None []Other Diet Restrictions: Cultural/Latter day Preference(s): None Anthropometrics:   
Weight Loss Metrics 10/18/2018 8/23/2018 7/12/2018 6/6/2018 5/7/2018 3/2/2018 6/5/2017 Today's Wt 266 lb 8.6 oz 239 lb 13.8 oz 256 lb 3.2 oz 259 lb 7.7 oz 261 lb 7.5 oz 265 lb 3.2 oz 306 lb  
 BMI 34.22 kg/m2 30.8 kg/m2 32.89 kg/m2 33.32 kg/m2 32.68 kg/m2 34.05 kg/m2 39.29 kg/m2 Weight Source: Bed Height: 6' 2\" (188 cm), Body mass index is 34.22 kg/m². IBW : 86.2 kg (190 lb), % IBW (Calculated): 140.28 % 
 ,   
 
Labs:   
Lab Results Component Value Date/Time Sodium 135 (L) 10/18/2018 04:01 AM  
 Potassium 3.7 10/18/2018 04:01 AM  
 Chloride 98 10/18/2018 04:01 AM  
 CO2 26 10/18/2018 04:01 AM  
 Glucose 116 (H) 10/18/2018 04:01 AM  
 BUN 18 10/18/2018 04:01 AM  
 Creatinine 1.18 10/18/2018 04:01 AM  
 Calcium 8.0 (L) 10/18/2018 04:01 AM  
 Magnesium 1.7 10/18/2018 04:01 AM  
 Phosphorus 3.0 10/18/2018 04:01 AM  
 Albumin 2.1 (L) 10/18/2018 04:01 AM  
 
Lab Results Component Value Date/Time Hemoglobin A1c 6.0 08/14/2018 03:39 AM  
 
Angelika Gilmore RD Pager #[phone]

## 2018-10-18 NOTE — PROGRESS NOTES
Occupational Therapy Note: 
 
Chart reviewed. Attempted to see pt for therapy session however pt with wound care. Will continue to follow and attempt again tomorrow. Thank you.  
 
Mariama Villanueva OTR/SKY

## 2018-10-18 NOTE — PROGRESS NOTES
Pt placed on HFNC due to episode of desaturation, pt tolerating well with no acute respiratory distress noted at this time. SpO2 94%. Will continue to monitor

## 2018-10-18 NOTE — PROGRESS NOTES
Care manager spoke with Mercedes Deluna (SW with [facility] to give her clinical update. Brenden Smith RN,CRM

## 2018-10-19 NOTE — PROGRESS NOTES
Hospitalist Progress Note Hattie Jang MD 
Answering service: [phone] OR [phone] from in house phone Date of Service:  10/19/2018 NAME:  Vernard Hodgkins :  1960 MRN:  [id number] Admission Summary:  
Admitted with massive edema and sob Interval history / Subjective:  
   
10/16/2018 : For albumin for fluid shifts as alessandro as anasarca and cont iv lasix. , relief of his acute and severe vol overload process will be slow,  Cellulitic changes of legs noted adri left > rigth, would seeing, abx continue; will need collins for  Tracking I and O.    
 
10/18/2018 : 
Events overnight noted now on pressors; feels some better, adri LE wrapped. Seen by wound, ID and others;   Remains in ICU for now on pressors, ,may need dobutamin ; cont for now albumin/diruetic,  
 
10/19/2018 : 
Less complaints No n/v  
Legs beyound dressing better Electrolytes/elements replaced. Assessment & Plan:  
 
Volume overload,  For diuretic, albuin as alessandro ; now on milrinone Anasarca, for iv albumin as alessandro; adding milrinone CM with EF Of  25-30 range, for diuretic use and bb as can, and cont on current ACE on pressors 10/19/2018 for drop in bp,   See above Chronic syst heart failure with acute exacerbation for diruetic and I and o followng, NYHA 4. SYST heart failue; as above. Possible Cellulitis adri lower ext for iv abx. ; see ID note Chronic pain on opioids from home. DM for SSI Code status: full DVT prophylaxis: Eliquis ( NOAC) Care Plan discussed with: Patient/Family and Nurse Disposition: Home w/Family and TBD Hospital Problems  Date Reviewed: 2018 Codes Class Noted POA Generalized edema ICD-10-CM: R60.1 ICD-9-CM: 782.3  10/15/2018 Unknown Review of Systems:  
 
Overall improved, see above. Less sob, no cp cont w edema, but overall improved. Vital Signs: Last 24hrs VS reviewed since prior progress note. Most recent are: 
Visit Vitals /64 Pulse (!) 114 Temp 98.5 °F (36.9 °C) Resp (!) 46 Ht 6' 2\" (1.88 m) Wt 120.9 kg (266 lb 8 oz) SpO2 94% BMI 34.22 kg/m² Intake/Output Summary (Last 24 hours) at 10/19/2018 1053 Last data filed at 10/19/2018 0930 Gross per 24 hour Intake 2416.91 ml Output 1725 ml Net 691.91 ml Physical Examination:  
 
 
     
Constitutional:  +acute distress, cooperative icu on pressors ENT:  Oral mucous moist, oropharynx benign. Neck supple, Resp:  CTA bilaterally. No wheezing/rhonchi/rales. No accessory muscle use CV:  Regular rhythm, normal rate, no murmurs, gallops, rubs GI:  +distended, non tender. normoactive bowel sounds, Musculoskeletal:  anasarca like edema to mid abdomen; , warm, 1+ pulses throughout Neurologic:  Moves all extremities. AAOx3, CN II-XII reviewed Psych:  axnious, else appropriated, not agitated. Data Review:  
 Review and/or order of clinical lab test 
 
 
Labs:  
 
Recent Labs 10/19/18 
0520 10/18/18 
0401 WBC 8.3 9.2 HGB 7.8* 9.0*  
HCT 26.4* 31.2*  
 420* Recent Labs 10/19/18 
8309 10/18/18 
0401 10/17/18 
1067  135* 135* K 3.0* 3.7 4.2  98 97 CO2 29 26 28 BUN 11 18 19 CREA 0.83 1.18 1.09  
* 116* 95  
CA 7.5* 8.0* 8.4* MG 1.8 1.7  --   
PHOS 2.5* 3.0  --   
 
Recent Labs 10/19/18 
0520 10/18/18 
0401 SGOT 27 25 ALT 11* 14  
 147* TBILI 1.0 1.2* TP 6.4 7.2 ALB 2.0* 2.1*  
GLOB 4.4* 5.1* No results for input(s): INR, PTP, APTT in the last 72 hours. No lab exists for component: INREXT, INREXT No results for input(s): FE, TIBC, PSAT, FERR in the last 72 hours. Lab Results Component Value Date/Time Folate 7.9 02/27/2018 03:52 AM  
 Folate 7.5 02/27/2018 03:52 AM  
  
No results for input(s): PH, PCO2, PO2 in the last 72 hours. No results for input(s): CPK, CKNDX, TROIQ in the last 72 hours. No lab exists for component: CPKMB Lab Results Component Value Date/Time Cholesterol, total 109 08/14/2018 03:39 AM  
 HDL Cholesterol 24 08/14/2018 03:39 AM  
 LDL,Direct 193 (H) 05/21/2014 09:59 AM  
 LDL, calculated 72.2 08/14/2018 03:39 AM  
 Triglyceride 64 08/14/2018 03:39 AM  
 CHOL/HDL Ratio 4.5 08/14/2018 03:39 AM  
 
Lab Results Component Value Date/Time Glucose (POC) 140 (H) 10/19/2018 06:42 AM  
 Glucose (POC) 120 (H) 10/18/2018 10:00 PM  
 Glucose (POC) 104 (H) 10/18/2018 04:26 PM  
 Glucose (POC) 256 (H) 10/18/2018 12:05 PM  
 Glucose (POC) 113 (H) 10/18/2018 06:38 AM  
 
Lab Results Component Value Date/Time Color DARK YELLOW 07/09/2018 10:39 PM  
 Appearance CLEAR 07/09/2018 10:39 PM  
 Specific gravity 1.017 07/09/2018 10:39 PM  
 pH (UA) 8.0 07/09/2018 10:39 PM  
 Protein TRACE (A) 07/09/2018 10:39 PM  
 Glucose NEGATIVE  07/09/2018 10:39 PM  
 Ketone NEGATIVE  07/09/2018 10:39 PM  
 Bilirubin NEGATIVE  07/09/2018 10:39 PM  
 Urobilinogen 1.0 07/09/2018 10:39 PM  
 Nitrites NEGATIVE  07/09/2018 10:39 PM  
 Leukocyte Esterase NEGATIVE  07/09/2018 10:39 PM  
 Epithelial cells FEW 07/09/2018 10:39 PM  
 Bacteria NEGATIVE  07/09/2018 10:39 PM  
 WBC 0-4 07/09/2018 10:39 PM  
 RBC 0-5 07/09/2018 10:39 PM  
 
 
 
Medications Reviewed:  
 
Current Facility-Administered Medications Medication Dose Route Frequency  phosphorus (K PHOS NEUTRAL) 250 mg tablet 1 Tab  1 Tab Oral BID  Vancomycin trough 10/19 @ 0930   Other ONCE  
 milrinone (PRIMACOR) 20 MG/100 ML D5W infusion  0.375 mcg/kg/min IntraVENous CONTINUOUS  
 PHENYLephrine (PF)(DAYA-SYNEPHRINE) 30 mg in 0.9% sodium chloride 250 mL infusion   mcg/min IntraVENous TITRATE  vancomycin (VANCOCIN) 1750 mg in  ml infusion  1,750 mg IntraVENous Q16H  
 furosemide (LASIX) injection 40 mg  40 mg IntraVENous Q12H  sodium chloride (NS) flush 5-10 mL  5-10 mL IntraVENous Q8H  
 sodium chloride (NS) flush 5-10 mL  5-10 mL IntraVENous PRN  
 albuterol (PROVENTIL VENTOLIN) nebulizer solution 2.5 mg  2.5 mg Nebulization Q4H PRN  
 allopurinol (ZYLOPRIM) tablet 100 mg  100 mg Oral DAILY  amiodarone (CORDARONE) tablet 200 mg  200 mg Oral BIDPC  apixaban (ELIQUIS) tablet 5 mg  5 mg Oral BID  aspirin delayed-release tablet 81 mg  81 mg Oral DAILY  atorvastatin (LIPITOR) tablet 80 mg  80 mg Oral DAILY  bu[facility]ion [facility]) tablet 150 mg  150 mg Oral BID  docusate sodium (COLACE) capsule 100 mg  100 mg Oral BID  ferrous sulfate tablet 325 mg  325 mg Oral DAILY WITH BREAKFAST  morphine CR (MS CONTIN) tablet 60 mg  60 mg Oral TID  nortriptyline (PAMELOR) capsule 50 mg  50 mg Oral QHS  rOPINIRole (REQUIP) tablet 8 mg  8 mg Oral QHS  
 HYDROmorphone (DILAUDID) tablet 4 mg  4 mg Oral Q12H  
 HYDROmorphone (DILAUDID) injection 1 mg  1 mg IntraVENous Q6H PRN  piperacillin-tazobactam (ZOSYN) 3.375 g in 0.9% sodium chloride (MBP/ADV) 100 mL  3.375 g IntraVENous Q8H  
 insulin regular (NOVOLIN R, HUMULIN R) injection   SubCUTAneous AC&HS  
 glucose chewable tablet 16 g  4 Tab Oral PRN  
 dextrose (D50W) injection syrg 12.5-25 g  12.5-25 g IntraVENous PRN  
 glucagon (GLUCAGEN) injection 1 mg  1 mg IntraMUSCular PRN  
 zolpidem (AMBIEN) tablet 10 mg  10 mg Oral QHS  Vancomycin- pharmacy to dose   Other PRN  
 arformoterol (BROVANA) neb solution 15 mcg  15 mcg Nebulization BID RT And  
 budesonide (PULMICORT) 500 mcg/2 ml nebulizer suspension  500 mcg Nebulization BID RT  
 pantoprazole (PROTONIX) tablet 40 mg  40 mg Oral ACB  
 
______________________________________________________________________ EXPECTED LENGTH OF STAY: 3d 7h 
ACTUAL LENGTH OF STAY:          4 Pasquale López MD

## 2018-10-19 NOTE — PROGRESS NOTES
0730 received verbal bedside report from [facility] using SBAR-alarms and drips checked 
-800 updated DR French on labs -pt status with bp 
0900 received electrolyte replacements that were ordered 1100 mica off -  
1100 Interdisciplinary team rounds were held 10/19/2018 with the following team members:Care Management, Nursing, Nutrition, Pharmacy, Physician and Respiratory Therapy and the patient. Plan of care discussed. See clinical pathway and/or care plan for interventions and desired outcomes. 1300 I spoke with DR Georgiana Heard about pt's drowsiness and his pain meds- a palliative consult was placed to help us with pain control with less drowsiness 1500 pt still eating his lunch 
1700 pt sleeping 
1900 shift summary: has been off the mica drip since 1100 am remains on the primacor-pt still drowsy- voiding but at times incontinent- appetite good Report given to the on coming rn using sbar

## 2018-10-19 NOTE — PROGRESS NOTES
Pharmacist Note - Vancomycin Dosing Therapy day #5/7 Indication: Empiric for erythema of B/L LE - vascular stasis +/- cellulitis Current regimen: 1750 mg IV Q 16 hr 
 
A Trough Level resulted at 17.8 mcg/mL which was obtained ~17 hrs post-dose. The extrapolated \"true\" trough is approximately 18.7 mcg/mL based on the patient's known kinetics. Goal trough: 10 - 15 mcg/mL Plan: Change to 1500 mg IV Q 18 hr . Pharmacy will continue to monitor this patient daily for changes in clinical status and renal function.

## 2018-10-19 NOTE — PROGRESS NOTES
HI-VERONICA settings had to be increased during the night due to saturation remaining 88 or less. When laying back to roll for bathtime, pt rapidly becomes panicky, SOB, and since bath remains intermittently tachypneic into 30's. 0715-Team 44 paged to report abnormal labs. 56- Dr. Angie Krause paged back, stated he will address all labs.

## 2018-10-19 NOTE — PROGRESS NOTES
ID Progress Note 10/19/2018 Subjective: He is in the ICU for more aggressive fluid management--tenuous respiratory status Objective: Antibiotics: 1. Vancomycin 2. Zosyn Vitals:  
Visit Vitals BP (!) 113/95 Pulse (!) 119 Temp 98.8 °F (37.1 °C) Resp 30 Ht 6' 2\" (1.88 m) Wt 120.9 kg (266 lb 8 oz) SpO2 (!) 87% BMI 34.22 kg/m² Tmax:  Temp (24hrs), Av.8 °F (37.1 °C), Min:98.5 °F (36.9 °C), Max:99.5 °F (37.5 °C) Exam:  Lungs:  clear to auscultation bilaterally Heart:  regular rate and rhythm Abdomen:  soft, non-tender. Bowel sounds normal. No masses,  no organomegaly Decreased edema of the legs, somewhat Labs:     
Recent Labs 10/19/18 
2790 10/18/18 
0401 10/17/18 
7005 WBC 8.3 9.2  --   
HGB 7.8* 9.0*  --   
 420*  --   
BUN 11  19 CREA 0.83 1.18 1.09  
SGOT 27 25  --   
 147*  --   
TBILI 1.0 1.2*  --   
 
 
Cultures:  
 
Lab Results Component Value Date/Time Specimen Description: ESOPHAGEAL BRUSHING 2010 09:25 AM  
 
Lab Results Component Value Date/Time Culture result: NO GROWTH 4 DAYS 10/15/2018 02:34 PM  
 Culture result: NO GROWTH 5 DAYS 2018 08:19 PM  
 Culture result: NO GROWTH 5 DAYS 2018 09:11 PM  
 
 
Radiology:  
 
Line/Insert Date:        
 
Assessment: 1. Anasarca 2. Stasis dermatitis 3. CHF 4. Debility 5. Worsening cardiac status Objective: 1. Continue IV therapy for the short term 2. Follow up cultures Judy Morgan MD

## 2018-10-19 NOTE — PROGRESS NOTES
PCCM 
 
Remains in ICU on low dose timbo On High flow NC O2 at 45 L/min Milrinone gtt added Remains in a positive fluid balance despite milrinone diuretics and albumin CXR with increased pulm edema Cardiology to see Patient Vitals for the past 4 hrs: 
 BP Pulse Resp SpO2  
10/19/18 0845 101/65 (!) 111    
10/19/18 0720    93 % 10/19/18 0700 91/62 (!) 111 (!) 37 93 % 10/19/18 0600 95/61 (!) 111 19 96 % 10/19/18 0536    98 % Temp (24hrs), Av °F (37.2 °C), Min:98.5 °F (36.9 °C), Max:99.5 °F (37.5 °C) Intake/Output Summary (Last 24 hours) at 10/19/2018 0901 Last data filed at 10/19/2018 0700 Gross per 24 hour Intake 2310.2 ml Output 2075 ml Net 235.2 ml No resp distress On high flow O2 Anicteric 
MMM 
+JVD Diminished bs at bases Irreg Soft protuberant Warm and dry with chronic stasis changes Diffuse edema CXR - increased edema Lab: 
Recent Labs 10/19/18 
0677 10/18/18 
0401 10/17/18 
3316 WBC 8.3 9.2  --   
HGB 7.8* 9.0*  --   
 420*  --   
 135* 135* K 3.0* 3.7 4.2  98 97 CO2 29 26 28 BUN 11 18 19 CREA 0.83 1.18 1.09  
* 116* 95  
CA 7.5* 8.0* 8.4* MG 1.8 1.7  --   
PHOS 2.5* 3.0  --   
TBILI 1.0 1.2*  --   
SGOT 27 25  -- Impression Acute on chronic hypoxemic resp failure - on home O2. Has some pulmonary edema on CXR - receiving albumin, lasix and timbo for marginal bp and trying to diurese. Milrinone gtt added - remains on low dose Timbo 
  
Hypotension as above  
  
Cellulitis 
  
Lymphadema 
  
DM 
  
Systolic CHF with EF 68-41% 
  
H/o CAD s/p stent placement 
  
 
--continue milrinone, lasix bid, wean timbo as able 
--cardiology to see (Dr Elyn Lefort) --abx per ID 
--electrolyte replacement 
--? Palliative care bernadette Boles MD

## 2018-10-20 NOTE — ROUTINE PROCESS
0730: Bedside, Verbal and Written shift change report given to Kailyn Lipscomb RN (oncoming nurse) by Zeus Moreira (offgoing nurse). Report included the following information SBAR, Kardex, ED Summary, OR Summary, Procedure Summary, Intake/Output, MAR, Recent Results, Med Rec Status, Cardiac Rhythm A-Fib and Alarm Parameters . 1930: Bedside, Verbal and Written shift change report given to Sonja KENNEDY RN (oncoming nurse) by Kailyn Lipscomb RN (offgoing nurse). Report included the following information SBAR, Kardex, ED Summary, Procedure Summary, Intake/Output, MAR, Recent Results, Med Rec Status and Cardiac Rhythm A- Fib.

## 2018-10-20 NOTE — ROUTINE PROCESS
Bedside and Verbal shift change report given to [address] [place] [id number] (oncoming nurse) by Rosa Currie RN (offgoing nurse). Report included the following information SBAR, Kardex, Intake/Output, MAR, Recent Results, Cardiac Rhythm Afib, Alarm Parameters  and Quality Measures. 2130: Pt aspirated while taking medications and made NPO. 
 
2300: Pt laid flat for bed pad change and sats dropped to the 80's. Took pt 15 minutes to recover. 0030: Pt became very agitated and confused and tried to get out of bed, stating he had a bad dream. O2 saturations dropped into the 80's when he took off his hi-flow and took 15 min to recover. 0220: Pt took off hi-flow again and sats dropped to 78%. Hi-flow replaced and pt encouraged to breathe through nose. Pt continues to refuse bi-pap despite education. Took pt 15 min to recover sats to 88%. 0340: Sats remained at 88%, pt agreed to bi-pap. Bi-pap applied. 0730: Pt has been tolerating bi-pap very well with sats at 100%. Bedside and Verbal shift change report given to RN (oncoming nurse) by Talia Cobos RN (offgoing nurse). Report included the following information SBAR, Kardex, Intake/Output, MAR, Recent Results, Cardiac Rhythm afib, Alarm Parameters  and Quality Measures.

## 2018-10-20 NOTE — PROGRESS NOTES
ID Progress Note 
10/20/2018 Subjective: He is in the ICU for more aggressive fluid management--tenuous respiratory status Objective: Antibiotics: 1. Vancomycin 2. Zosyn Vitals:  
Visit Vitals /82 Pulse (!) 116 Temp 98.6 °F (37 °C) Resp 30 Ht 6' 2\" (1.88 m) Wt 120.9 kg (266 lb 8 oz) SpO2 94% BMI 34.22 kg/m² Tmax:  Temp (24hrs), Av.3 °F (37.4 °C), Min:98.6 °F (37 °C), Max:100 °F (37.8 °C) Exam:  Lungs:  clear to auscultation bilaterally Heart:  regular rate and rhythm Abdomen:  soft, non-tender. Bowel sounds normal. No masses,  no organomegaly Decreased edema of the legs, somewhat Labs:     
Recent Labs 10/20/18 
0423 10/19/18 
0520 10/18/18 
0401 WBC 8.9 8.3 9.2 HGB 7.9* 7.8* 9.0*  
 371 420* BUN 11 11 18 CREA 0.81 0.83 1.18  
SGOT 23 27 25  116 147* TBILI 1.4* 1.0 1.2* Cultures:  
 
Lab Results Component Value Date/Time Specimen Description: ESOPHAGEAL BRUSHING 2010 09:25 AM  
 
Lab Results Component Value Date/Time Culture result: NO GROWTH 5 DAYS 10/15/2018 02:34 PM  
 Culture result: NO GROWTH 5 DAYS 2018 08:19 PM  
 Culture result: NO GROWTH 5 DAYS 2018 09:11 PM  
 
 
Radiology:  
 
Line/Insert Date:        
 
Assessment: 1. Anasarca 2. Stasis dermatitis 3. CHF 4. Debility 5. Worsening cardiac status Objective: 1. Continue IV therapy for the short term 2. Follow up cultures 3. Seems to be leaning towards hospice/comfort care Elaine Westfall MD

## 2018-10-20 NOTE — PROGRESS NOTES
Hospitalist Progress Note Nancy Ovalles MD 
Answering service: [phone] OR [phone] from in house phone Date of Service:  10/20/2018 NAME:  Joya Kyle :  1960 MRN:  [id number] Admission Summary:  
Admitted with massive edema and sob Interval history / Subjective:  
   
10/16/2018 : For albumin for fluid shifts as alessandro as anasarca and cont iv lasix. , relief of his acute and severe vol overload process will be slow,  Cellulitic changes of legs noted adri left > rigth, would seeing, abx continue; will need collins for  Tracking I and O.    
 
10/18/2018 : 
Events overnight noted now on pressors; feels some better, adri LE wrapped. Seen by wound, ID and others;   Remains in ICU for now on pressors, ,may need dobutamin ; cont for now albumin/diruetic,  
 
10/19/2018 : 
Less complaints No n/v  
Legs beyound dressing better Electrolytes/elements replaced. 10/20/2018 : 
Pt not thriving, on and off high flow/bipap last pm.  pcp notes hospice has been called in past pt now accepts and desires DNR status,  Orders changed and hospice consulted. [place] sheet filled out,  RN witness the questions and pt's answers Assessment & Plan:  
 
Volume overload,  For diuretic, albuin as alessandro ; now on milrinone  
hyoxic resp failure on and off NIVD and high flow 02, pt elects DNR/hospice, Debility, long term; has discussed hospice in past and now again in current setting agrees . Shock, cardiogenic for pressors, ( Not hypovolemic) Anasarca, for iv albumin as alessandro; adding milrinone CM with EF Of  25-30 range NYHA 4 not a bb nor an ace/arb candidate 2n2 to hypotension requiring pressors, for diuretic use and bb as can, and cont on current ACE til pressure drops to low and  on pressors  for drop in bp,   See above Chronic syst heart failure NYHA 4.  with acute exacerbation for diruetic and I and o followng, NYHA 4. SYST heart failue; as above. Possible Cellulitis adri lower ext for iv abx. ; see ID note Chronic pain on opioids from home. DM for SSI Possible copd but not a issue here and not requiring other than general support, not need for steroids for all cardiac assoc decompensations/resp difficulties. Code status: full DVT prophylaxis: Eliquis ( NOAC) Care Plan discussed with: Patient/Family and Nurse Disposition: Home w/Family and TBD Hospital Problems  Date Reviewed: 8/13/2018 Codes Class Noted POA Generalized edema ICD-10-CM: R60.1 ICD-9-CM: 782.3  10/15/2018 Unknown Review of Systems:  
 
Feels worst overall , see above Vital Signs:  
 Last 24hrs VS reviewed since prior progress note. Most recent are: 
Visit Vitals BP 97/55 (BP 1 Location: Right arm, BP Patient Position: At rest) Pulse (!) 112 Temp 98.6 °F (37 °C) Resp 16 Ht 6' 2\" (1.88 m) Wt 120.9 kg (266 lb 8 oz) SpO2 100% BMI 34.22 kg/m² Intake/Output Summary (Last 24 hours) at 10/20/2018 1000 Last data filed at 10/20/2018 0700 Gross per 24 hour Intake 1586.15 ml Output 1575 ml Net 11.15 ml Physical Examination:  
 
 
     
Constitutional:  +acute distress, cooperative icu on pressors and bipap ENT:  Oral mucous moist, oropharynx benign. Neck supple, Resp:  CTA bilaterally. Some mild  wheezing/rhonchi/rales. No accessory muscle use CV:  Regular rhythm, normal rate, no murmurs, gallops, rubs GI:  +distended, non tender. normoactive bowel sounds, Musculoskeletal:  anasarca like edema to mid abdomen; , warm, 1+ pulses throughout Neurologic:  Moves all extremities. AAOx3, CN II-XII reviewed Psych:  axnious, else appropriated, not agitated. Data Review:  
 Review and/or order of clinical lab test 
 
 
Labs:  
 
Recent Labs 10/20/18 
0423 10/19/18 
6807 WBC 8.9 8.3 HGB 7.9* 7.8* HCT 27.1* 26.4*  
 371 Recent Labs 10/20/18 
0423 10/19/18 
0520 10/18/18 
0401  138 135*  
K 2.9* 3.0* 3.7  104 98 CO2 29 29 26 BUN 11 11 18 CREA 0.81 0.83 1.18  
* 107* 116* CA 8.0* 7.5* 8.0*  
MG 1.7 1.8 1.7 PHOS  --  2.5* 3.0 Recent Labs 10/20/18 
0423 10/19/18 
0520 10/18/18 
0401 SGOT 23 27 25 ALT 10* 11* 14  
 116 147* TBILI 1.4* 1.0 1.2* TP 6.6 6.4 7.2 ALB 1.9* 2.0* 2.1*  
GLOB 4.7* 4.4* 5.1* No results for input(s): INR, PTP, APTT in the last 72 hours. No lab exists for component: INREXT, INREXT No results for input(s): FE, TIBC, PSAT, FERR in the last 72 hours. Lab Results Component Value Date/Time Folate 7.9 02/27/2018 03:52 AM  
 Folate 7.5 02/27/2018 03:52 AM  
  
No results for input(s): PH, PCO2, PO2 in the last 72 hours. No results for input(s): CPK, CKNDX, TROIQ in the last 72 hours. No lab exists for component: CPKMB Lab Results Component Value Date/Time Cholesterol, total 109 08/14/2018 03:39 AM  
 HDL Cholesterol 24 08/14/2018 03:39 AM  
 LDL,Direct 193 (H) 05/21/2014 09:59 AM  
 LDL, calculated 72.2 08/14/2018 03:39 AM  
 Triglyceride 64 08/14/2018 03:39 AM  
 CHOL/HDL Ratio 4.5 08/14/2018 03:39 AM  
 
Lab Results Component Value Date/Time Glucose (POC) 104 (H) 10/20/2018 07:14 AM  
 Glucose (POC) 112 (H) 10/19/2018 09:29 PM  
 Glucose (POC) 99 10/19/2018 04:50 PM  
 Glucose (POC) 81 10/19/2018 11:59 AM  
 Glucose (POC) 140 (H) 10/19/2018 06:42 AM  
 
Lab Results Component Value Date/Time  Color DARK YELLOW 07/09/2018 10:39 PM  
 Appearance CLEAR 07/09/2018 10:39 PM  
 Specific gravity 1.017 07/09/2018 10:39 PM  
 pH (UA) 8.0 07/09/2018 10:39 PM  
 Protein TRACE (A) 07/09/2018 10:39 PM  
 Glucose NEGATIVE  07/09/2018 10:39 PM  
 Ketone NEGATIVE  07/09/2018 10:39 PM  
 Bilirubin NEGATIVE  07/09/2018 10:39 PM  
 Urobilinogen 1.0 07/09/2018 10:39 PM  
 Nitrites NEGATIVE  07/09/2018 10:39 PM  
 Leukocyte Esterase NEGATIVE  07/09/2018 10:39 PM  
 Epithelial cells FEW 07/09/2018 10:39 PM  
 Bacteria NEGATIVE  07/09/2018 10:39 PM  
 WBC 0-4 07/09/2018 10:39 PM  
 RBC 0-5 07/09/2018 10:39 PM  
 
 
 
Medications Reviewed:  
 
Current Facility-Administered Medications Medication Dose Route Frequency  phosphorus (K PHOS NEUTRAL) 250 mg tablet 1 Tab  1 Tab Oral BID  vancomycin (VANCOCIN) 1500 mg in  ml infusion  1,500 mg IntraVENous Q18H  
 milrinone (PRIMACOR) 20 MG/100 ML D5W infusion  0.375 mcg/kg/min IntraVENous CONTINUOUS  
 PHENYLephrine (PF)(DAYA-SYNEPHRINE) 30 mg in 0.9% sodium chloride 250 mL infusion   mcg/min IntraVENous TITRATE  furosemide (LASIX) injection 40 mg  40 mg IntraVENous Q12H  
 sodium chloride (NS) flush 5-10 mL  5-10 mL IntraVENous Q8H  
 sodium chloride (NS) flush 5-10 mL  5-10 mL IntraVENous PRN  
 albuterol (PROVENTIL VENTOLIN) nebulizer solution 2.5 mg  2.5 mg Nebulization Q4H PRN  
 allopurinol (ZYLOPRIM) tablet 100 mg  100 mg Oral DAILY  amiodarone (CORDARONE) tablet 200 mg  200 mg Oral BIDPC  apixaban (ELIQUIS) tablet 5 mg  5 mg Oral BID  aspirin delayed-release tablet 81 mg  81 mg Oral DAILY  atorvastatin (LIPITOR) tablet 80 mg  80 mg Oral DAILY  buPROPion SR [facility] SR) tablet 150 mg  150 mg Oral BID  docusate sodium (COLACE) capsule 100 mg  100 mg Oral BID  ferrous sulfate tablet 325 mg  325 mg Oral DAILY WITH BREAKFAST  morphine CR (MS CONTIN) tablet 60 mg  60 mg Oral TID  nortriptyline (PAMELOR) capsule 50 mg  50 mg Oral QHS  rOPINIRole (REQUIP) tablet 8 mg  8 mg Oral QHS  
 HYDROmorphone (DILAUDID) tablet 4 mg  4 mg Oral Q12H  
 HYDROmorphone (DILAUDID) injection 1 mg  1 mg IntraVENous Q6H PRN  piperacillin-tazobactam (ZOSYN) 3.375 g in 0.9% sodium chloride (MBP/ADV) 100 mL  3.375 g IntraVENous Q8H  
 insulin regular (NOVOLIN R, HUMULIN R) injection   SubCUTAneous AC&HS  
  glucose chewable tablet 16 g  4 Tab Oral PRN  
 dextrose (D50W) injection syrg 12.5-25 g  12.5-25 g IntraVENous PRN  
 glucagon (GLUCAGEN) injection 1 mg  1 mg IntraMUSCular PRN  
 zolpidem (AMBIEN) tablet 10 mg  10 mg Oral QHS  Vancomycin- pharmacy to dose   Other PRN  
 arformoterol (BROVANA) neb solution 15 mcg  15 mcg Nebulization BID RT And  
 budesonide (PULMICORT) 500 mcg/2 ml nebulizer suspension  500 mcg Nebulization BID RT  
 pantoprazole (PROTONIX) tablet 40 mg  40 mg Oral ACB  
 
______________________________________________________________________ EXPECTED LENGTH OF STAY: 4d 2h 
ACTUAL LENGTH OF STAY:          5 Adrian Quintana MD

## 2018-10-20 NOTE — PROGRESS NOTES
Problem: Falls - Risk of 
Goal: *Absence of Falls Document Benedicto Li Fall Risk and appropriate interventions in the flowsheet. Outcome: Progressing Towards Goal 
Fall Risk Interventions: 
Mobility Interventions: Communicate number of staff needed for ambulation/transfer, Patient to call before getting OOB, Strengthening exercises (ROM-active/passive) Medication Interventions: Assess postural VS orthostatic hypotension, Evaluate medications/consider consulting pharmacy, Patient to call before getting OOB, Teach patient to arise slowly Elimination Interventions: Call light in reach, Patient to call for help with toileting needs, Toilet paper/wipes in reach, Toileting schedule/hourly rounds, Urinal in reach Problem: Pressure Injury - Risk of 
Goal: *Prevention of pressure injury Document Charlie Scale and appropriate interventions in the flowsheet. Outcome: Progressing Towards Goal 
Pressure Injury Interventions: 
Sensory Interventions: Assess changes in LOC, Avoid rigorous massage over bony prominences, Check visual cues for pain, Pressure redistribution bed/mattress (bed type), Turn and reposition approx. every two hours (pillows and wedges if needed) Moisture Interventions: Absorbent underpads, Check for incontinence Q2 hours and as needed, Limit adult briefs, Offer toileting Q_hr Activity Interventions: Pressure redistribution bed/mattress(bed type) Mobility Interventions: Pressure redistribution bed/mattress (bed type), Turn and reposition approx. every two hours(pillow and wedges) Nutrition Interventions: Document food/fluid/supplement intake Friction and Shear Interventions: Apply protective barrier, creams and emollients, Lift team/patient mobility team, Transferring/repositioning devices

## 2018-10-20 NOTE — PROGRESS NOTES
11:02 AM 
CM received a consult for Hospice. Patient is followed by [facility] in the community. CM spoke with patient at bedside. Patient is agreeable to move forward with Hospice at this time. Patient requested for CM to notify his emergency contact, Renetta Ottovj [phone]. CM attempted to contact Ms. Magaly Marquis. No one present at the time of the call. CM left a message requesting a return call. CM spoke with Alton Hu RN Acute Case Manger from [facility], phone [phone] or  [phone] [facility] location). CM informed that agency contracts with [facility] for services.  to contact [facility] directly to start process. CM submitted a referral to [facility] via [employer]. CM also contacted agency directly at [phone]. CM awaiting return call from on-call liaison. CM also awaiting receipt of referral and updates. CM will continue to follow and assist with disposition needs as they arise. 12:32 PM 
CM received a call from [facility] Liaison. Agency is able to accept and accommodate patient for services. Family meeting is scheduled with patient for 1:00 pm tomorrow 10/21/18. LAUREN Almonte/LATRICIA

## 2018-10-20 NOTE — PROGRESS NOTES
[facility] 
10/20 Seen and examined earlier today. Uncomfortable but denied pain. Noted plans for hospice with DNR 
 
10/19 Remains in ICU on low dose timbo On High flow NC O2 at 45 L/min Milrinone gtt added Remains in a positive fluid balance despite milrinone diuretics and albumin CXR with increased pulm edema Cardiology to see Patient Vitals for the past 4 hrs: 
 BP Temp Pulse Resp SpO2  
10/20/18 1330 101/71  (!) 118 23 91 % 10/20/18 1300 109/59  (!) 118 (!) 40 92 % 10/20/18 1230 110/64  (!) 108 24 93 % 10/20/18 1200 114/74 98.9 °F (37.2 °C) (!) 109 (!) 32 93 % 10/20/18 1130 121/82  (!) 116 30 94 % 10/20/18 1100   (!) 115 26 93 % 10/20/18 1030 115/76  (!) 120 20 (!) 87 % 10/20/18 1013     90 % 10/20/18 1000 106/71  (!) 105 20 96 % Temp (24hrs), Av.2 °F (37.3 °C), Min:98.6 °F (37 °C), Max:100 °F (37.8 °C) Intake/Output Summary (Last 24 hours) at 10/20/2018 1341 Last data filed at 10/20/2018 1000 Gross per 24 hour Intake 1392 ml Output 2300 ml Net -908 ml No resp distress On high flow O2/bipap Anicteric 
MMM 
+JVD Diminished bs at bases Irreg Soft protuberant Warm and dry with chronic stasis changes Diffuse edema CXR - increased edema Lab: 
Recent Labs 10/20/18 
0423 10/19/18 
0520 10/18/18 
0401 WBC 8.9 8.3 9.2 HGB 7.9* 7.8* 9.0*  
 371 420*  138 135*  
K 2.9* 3.0* 3.7  104 98 CO2 29 29 26 BUN 11 11 18 CREA 0.81 0.83 1.18  
* 107* 116* CA 8.0* 7.5* 8.0*  
MG 1.7 1.8 1.7 PHOS  --  2.5* 3.0 TBILI 1.4* 1.0 1.2* SGOT 23 27 25 Impression Acute on chronic hypoxemic resp failure - on home O2. Has some pulmonary edema on CXR - receiving albumin, lasix and timbo for marginal bp and trying to diurese.   Milrinone gtt added - remains on low dose Timbo 
  
Hypotension as above  
  
Cellulitis 
  
Lymphadema 
  
DM 
  
Systolic CHF with EF 22-41% 
  
H/o CAD s/p stent placement 
  
 
 --continue milrinone, lasix bid, wean mica as able 
--abx per ID 
--electrolyte replacement 
--DNR and hospice consulted --D/W WALTER Mendes MD

## 2018-10-21 NOTE — PROGRESS NOTES
Hospitalist Progress Note Margot Cline MD 
Answering service: [phone] OR [phone] from in house phone Date of Service:  10/21/2018 NAME:  Cristóbal Shipley :  1960 MRN:  [id number] Admission Summary:  
Admitted with massive edema and sob Interval history / Subjective:  
   
10/16/2018 : For albumin for fluid shifts as alessandro as anasarca and cont iv lasix. , relief of his acute and severe vol overload process will be slow,  Cellulitic changes of legs noted adri left > rigth, would seeing, abx continue; will need collins for  Tracking I and O.    
 
10/18/2018 : 
Events overnight noted now on pressors; feels some better, adri LE wrapped. Seen by wound, ID and others;   Remains in ICU for now on pressors, ,may need dobutamin ; cont for now albumin/diruetic,  
 
10/19/2018 : 
Less complaints No n/v  
Legs beyound dressing better Electrolytes/elements replaced. 10/20/2018 : 
Pt not thriving, on and off high flow/bipap last pm.  pcp notes hospice has been called in past pt now accepts and desires DNR status,  Orders changed and hospice consulted. [place] sheet filled out,  RN witness the questions and pt's answers 10/21/2018 : 
Comfort measures as we await for hospice to take over care Pt too unstable for even pt intake this am.  
Comfort measures advanced. Hopefully will have a hospice intake today. Pt may or may not beable to participate given his decline overnight. Assessment & Plan:  
 
Volume overload,  For diuretic, albuin as alessandro ; now on milrinone  
hyoxic resp failure on and off NIVD and high flow 02, pt elects DNR/hospice, Debility, long term; has discussed hospice in past and now again in current setting agrees . DNR/Hospice bound ; pt not not able to take po meds,  Comfort measures only going forward. Shock, cardiogenic for pressors, ( Not hypovolemic) Anasarca, for iv albumin as alessandro; adding milrinone CM with EF Of  25-30 range, for diuretic use and bb as can, and cont on current ACE on pressors 10/21/2018 for drop in bp,   See above Chronic syst heart failure with acute exacerbation for diruetic and I and o followng, NYHA 4. SYST heart failue; as above. Possible Cellulitis adri lower ext for iv abx. ; see ID note Chronic pain on opioids from home. DM for SSI Code status: full DVT prophylaxis: Eliquis ( NOAC) Care Plan discussed with: Patient/Family and Nurse Disposition: Home w/Family and TBD Hospital Problems  Date Reviewed: 8/13/2018 Codes Class Noted POA Generalized edema ICD-10-CM: R60.1 ICD-9-CM: 782.3  10/15/2018 Unknown Review of Systems:  
 
Not able to participate in conversation Vital Signs:  
 Last 24hrs VS reviewed since prior progress note. Most recent are: 
Visit Vitals /70 Pulse (!) 116 Temp 99.7 °F (37.6 °C) Resp 20 Ht 6' 2\" (1.88 m) Wt 114 kg (251 lb 6.4 oz) SpO2 99% BMI 32.28 kg/m² Intake/Output Summary (Last 24 hours) at 10/21/2018 1015 Last data filed at 10/20/2018 1800 Gross per 24 hour Intake 648.8 ml Output 1350 ml Net -701.2 ml Physical Examination:  
 
 
     
Constitutional:  +acute distress, on  bipap ENT:  Oral mucous moist, oropharynx benign. Neck supple, Resp:  coarse bilaterally. Some mild  wheezing/rhonchi/rales. No accessory muscle use CV:  Regular rhythm, normal rate, no murmurs, gallops, rubs GI:  +distended, non tender. normoactive bowel sounds, Musculoskeletal:  anasarca like edema to mid abdomen; , warm, 1+ pulses throughout Neurologic:  somnolent Psych:  axnious, else appropriated, not agitated. Data Review:  
 Review and/or order of clinical lab test 
 
 
Labs:  
 
Recent Labs 10/21/18 
0445 10/20/18 
0423 WBC 14.0* 8.9 HGB 9.9* 7.9*  
HCT 33.9* 27.1*  
* 357 Recent Labs 10/21/18 
1517 10/20/18 
0423 10/19/18 
3980  141 138  
K 3.5 2.9* 3.0*  
 103 104 CO2 26 29 29 BUN 10 11 11 CREA 0.82 0.81 0.83 * 121* 107* CA 8.2* 8.0* 7.5* MG 1.7 1.7 1.8 PHOS 3.1  --  2.5* Recent Labs 10/21/18 
1436 10/20/18 
0423 10/19/18 
7117 SGOT 50* 23 27 ALT 14 10* 11* * 112 116 TBILI 2.1* 1.4* 1.0 TP 8.0 6.6 6.4 ALB 2.1* 1.9* 2.0*  
GLOB 5.9* 4.7* 4.4* No results for input(s): INR, PTP, APTT in the last 72 hours. No lab exists for component: INREXT, INREXT No results for input(s): FE, TIBC, PSAT, FERR in the last 72 hours. Lab Results Component Value Date/Time Folate 7.9 02/27/2018 03:52 AM  
 Folate 7.5 02/27/2018 03:52 AM  
  
No results for input(s): PH, PCO2, PO2 in the last 72 hours. No results for input(s): CPK, CKNDX, TROIQ in the last 72 hours. No lab exists for component: CPKMB Lab Results Component Value Date/Time Cholesterol, total 109 08/14/2018 03:39 AM  
 HDL Cholesterol 24 08/14/2018 03:39 AM  
 LDL,Direct 193 (H) 05/21/2014 09:59 AM  
 LDL, calculated 72.2 08/14/2018 03:39 AM  
 Triglyceride 64 08/14/2018 03:39 AM  
 CHOL/HDL Ratio 4.5 08/14/2018 03:39 AM  
 
Lab Results Component Value Date/Time Glucose (POC) 122 (H) 10/21/2018 06:29 AM  
 Glucose (POC) 168 (H) 10/20/2018 09:44 PM  
 Glucose (POC) 104 (H) 10/20/2018 04:32 PM  
 Glucose (POC) 102 (H) 10/20/2018 11:34 AM  
 Glucose (POC) 104 (H) 10/20/2018 07:14 AM  
 
Lab Results Component Value Date/Time  Color DARK YELLOW 07/09/2018 10:39 PM  
 Appearance CLEAR 07/09/2018 10:39 PM  
 Specific gravity 1.017 07/09/2018 10:39 PM  
 pH (UA) 8.0 07/09/2018 10:39 PM  
 Protein TRACE (A) 07/09/2018 10:39 PM  
 Glucose NEGATIVE  07/09/2018 10:39 PM  
 Ketone NEGATIVE  07/09/2018 10:39 PM  
 Bilirubin NEGATIVE  07/09/2018 10:39 PM  
 Urobilinogen 1.0 07/09/2018 10:39 PM  
 Nitrites NEGATIVE  07/09/2018 10:39 PM  
 Leukocyte Esterase NEGATIVE  07/09/2018 10:39 PM  
 Epithelial cells FEW 07/09/2018 10:39 PM  
 Bacteria NEGATIVE  07/09/2018 10:39 PM  
 WBC 0-4 07/09/2018 10:39 PM  
 RBC 0-5 07/09/2018 10:39 PM  
 
 
 
Medications Reviewed:  
 
Current Facility-Administered Medications Medication Dose Route Frequency  LORazepam (ATIVAN) injection 1 mg  1 mg IntraVENous Q4H PRN  
 sodium chloride (NS) flush 5-10 mL  5-10 mL IntraVENous PRN  
 albuterol (PROVENTIL VENTOLIN) nebulizer solution 2.5 mg  2.5 mg Nebulization Q4H PRN  
 HYDROmorphone (DILAUDID) injection 1 mg  1 mg IntraVENous Q6H PRN  
 glucose chewable tablet 16 g  4 Tab Oral PRN  
 dextrose (D50W) injection syrg 12.5-25 g  12.5-25 g IntraVENous PRN  
 glucagon (GLUCAGEN) injection 1 mg  1 mg IntraMUSCular PRN  
 arformoterol (BROVANA) neb solution 15 mcg  15 mcg Nebulization BID RT And  
 budesonide (PULMICORT) 500 mcg/2 ml nebulizer suspension  500 mcg Nebulization BID RT  
 
______________________________________________________________________ EXPECTED LENGTH OF STAY: 4d 2h 
ACTUAL LENGTH OF STAY:          6 Griselda Fray, MD

## 2018-10-21 NOTE — PROGRESS NOTES
1930: Bedside and Verbal shift change report given to Sonja Walker  (oncoming nurse) by Leia Monet  (offgoing nurse). Report included the following information SBAR, Kardex, ED Summary, Procedure Summary, Intake/Output, MAR, Accordion, Recent Results, Med Rec Status, Cardiac Rhythm Sin Tach, Alarm Parameters , Pre Procedure Checklist, Procedure Verification and Quality Measures Pt restless and anxious through night. On HiFlow at beginning of shift and slowly O2 sats dropped to a consistent 83-84%. Would fight against using the BiPAP, would remove it himself and desat into the 50s-60s. Dr. Marco A Beltran with Pulm called about pts worsening wet lungs and low potassium of 2.9.  1mg of bumex, 4 runs of 10meq of K in 50ml, and 1mg ativan q4h for anxiety and Bipap compliance. Pt O2 sats improved to the high 90's while resting and tolerating bipap. Gregorio Villalobos 0740: Bedside and Verbal shift change report given to Leia Monet  (oncoming nurse) by Jen Leonard RN  (offgoing nurse). Report included the following information SBAR, Kardex, ED Summary, Procedure Summary, Intake/Output, MAR, Accordion, Recent Results, Med Rec Status, Cardiac Rhythm Afib/Sinus Tach, Alarm Parameters  and Quality Measures.

## 2018-10-21 NOTE — PROGRESS NOTES
Brief: Pt  persisted with desires of comfort measures only and hospice  and end of life care;  maintained on bipap til family could arrive, then device  removed, comfort care and end of life setting is the current plan of care. Case discussed with care team and the  consulted hospice ( who felt pt could not make it home for home end of life care)  and pt. As of this note pt seems to be on a downhill slow spiral and briefly likely will maintain some life (plateau) for an undetermined period of time; family advised.   
 
Faustino Vargas MD 10/21/2018 3:53 PM

## 2018-10-21 NOTE — PROGRESS NOTES
responded to staff request. Pt was not communicating,  and hospice representative were at bedside.  prayed for pt as he has request earlier.  follow up as needed. Spiritual Care Partner Volunteer visited patient in   on Documented by: Chaplain Osorio MDiv, MACE 
[phone] PRAY [phone])

## 2018-10-21 NOTE — PROGRESS NOTES
responded to request. Pt's wife and grandson were at bedside. Pt was more alert and  had prayer with pt. Pt was tearful and family present was emotional. Please contact [phone] [facility] for further support.  follow up as needed. Jhon Rodriguez, MACE 
 [phone]

## 2018-10-21 NOTE — PROGRESS NOTES
[facility] 
10/21 Seen and examined. Lethargic on bipap. Increased WOB. Awaiting hospice consult. Transitioned to full comfort measures 10/20 Seen and examined earlier today. Uncomfortable but denied pain. Noted plans for hospice with DNR 
 
10/19 Remains in ICU on low dose timbo On High flow NC O2 at 45 L/min Milrinone gtt added Remains in a positive fluid balance despite milrinone diuretics and albumin CXR with increased pulm edema Cardiology to see Patient Vitals for the past 4 hrs: 
 BP Pulse Resp SpO2  
10/21/18 1000 99/60 (!) 117 23 99 % 10/21/18 0900 103/65 (!) 117 24 98 % Temp (24hrs), Av.7 °F (37.6 °C), Min:98.6 °F (37 °C), Max:100.4 °F (38 °C) Intake/Output Summary (Last 24 hours) at 10/21/2018 1212 Last data filed at 10/20/2018 1800 Gross per 24 hour Intake 321.6 ml Output 900 ml Net -578.4 ml No resp distress On high flow O2/bipap Anicteric 
MMM 
+JVD Diminished bs at bases Irreg Soft protuberant Warm and dry with chronic stasis changes Diffuse edema CXR - increased edema Lab: 
Recent Labs 10/21/18 
8685 10/20/18 
0423 10/19/18 
4202 WBC 14.0* 8.9 8.3 HGB 9.9* 7.9* 7.8* * 357 371  141 138  
K 3.5 2.9* 3.0*  
 103 104 CO2 26 29 29 BUN 10 11 11 CREA 0.82 0.81 0.83 * 121* 107* CA 8.2* 8.0* 7.5* MG 1.7 1.7 1.8 PHOS 3.1  --  2.5* TBILI 2.1* 1.4* 1.0  
SGOT 50* 23 27 Impression Acute on chronic hypoxemic resp failure - on home O2. Has some pulmonary edema on CXR - receiving albumin, lasix and timbo for marginal bp and trying to diurese. Milrinone gtt added - remains on low dose Timbo 
  
Hypotension as above  
  
Cellulitis 
  
Lymphadema 
  
DM 
  
Systolic CHF with EF 56-46% 
  
H/o CAD s/p stent placement 
  
 
- Comfort measures 
--DNR and hospice consulted - We are available to help as needed --D/W RN Phi Shah MD

## 2018-10-22 NOTE — PROGRESS NOTES
1930: Bedside and Verbal shift change report given to [employer]  (oncoming nurse) by Alina Wright  (offgoing nurse). Report included the following information SBAR, Kardex, ED Summary, Procedure Summary, Intake/Output, MAR, Accordion, Recent Results, Alarm Parameters , Pre Procedure Checklist, Procedure Verification and Quality Measures. : Observed HR of 30 on Monitor and assessed pts cardic status with offgoing RN, Vy Martino. Friend of pt, Jorge Dale, present at bedside. : I checked pt for for radial, carotid, and femoral pulse and one could not be palpates. I auscultated heart sound and no heart beat present. [place] WATSON contacted Dr. Bhupendra Altamirano of pts death. : Family and pastoral care notified of death. : Self NP present to pronounce pt. 
 
2015: I contacted [address] [facility] [address]. Pt is a candidate for tissue donation. Life net to contact POA. : Common Law Wife, grandson and family friend, Jorge Dale, present at bedside. Pastoral Care called again and family denied their services. Family to discern  home location tomorrow morning. 5:  Pt off floor to [facility] with nursing supervisors.

## 2018-10-22 NOTE — DISCHARGE SUMMARY
Discharge Summary PATIENT ID: Maria Alejandra Villalta MRN: [id number] YOB: 1960 DATE OF ADMISSION: 10/15/2018  2:14 PM   
DATE OF DISCHARGE: 10/22/2018 PRIMARY CARE PROVIDER: Salma Archer MD  
 
ATTENDING PHYSICIAN: Rg Starks MD  
DISCHARGING PROVIDER: Rg Starks MD   
To contact this individual call [phone] and ask the  to page. If unavailable ask to be transferred the Adult Hospitalist Department. CONSULTATIONS: IP CONSULT TO INFECTIOUS DISEASES PROCEDURES/SURGERIES: * No surgery found * [address] COURSE:  
 
Pt elected to be DNR and comfort care and hospice on 10/21,   Acted on this on 10/22 to allow family to visit,   Hospice saw but felt pt too ill to even get home for home hospice option. As pt went comfort care the pt slowly deteriorated , and  10/21/2018 at  Per covering care team:  
 
Called to examine patient who has  at [facility] No response to verbal and tactile stimuli. No respiratory effort. Absent heart sounds and pulses. Pupils fixed and dilated. Patient pronounced dead at 2015 hours. Suresh Womack while here:  
 
 
Admission Summary:  
Admitted with massive edema and sob  
  
Interval history / Subjective:  
   
10/16/2018 : For albumin for fluid shifts as alessandro as anasarca and cont iv lasix. , relief of his acute and severe vol overload process will be slow,  Cellulitic changes of legs noted adri left > rigth, would seeing, abx continue; will need collins for  Tracking I and O.    
  
10/18/2018 : 
Events overnight noted now on pressors; feels some better, adri LE wrapped. Seen by wound, ID and others;   Remains in ICU for now on pressors, ,may need dobutamin ; cont for now albumin/diruetic,  
  
10/19/2018 : 
Less complaints No n/v  
Legs beyound dressing better Electrolytes/elements replaced.  
  
10/20/2018 : 
Pt not thriving, on and off high flow/bipap last pm.  pcp notes hospice has been called in past pt now accepts and desires DNR status,  Orders changed and hospice consulted. [place] sheet filled out,  RN witness the questions and pt's answers   
  
Assessment & Plan:  
  
Volume overload,  For diuretic, albuin as alessandro ; now on milrinone  
hyoxic resp failure on and off NIVD and high flow 02, pt elects DNR/hospice, Debility, long term; has discussed hospice in past and now again in current setting agrees . Shock, cardiogenic for pressors, ( Not hypovolemic) Anasarca, for iv albumin as alessandro; adding milrinone CM with EF Of  25-30 range NYHA 4 not a bb nor an ace/arb candidate 2n2 to hypotension requiring pressors, for diuretic use and bb as can, and cont on current ACE til pressure drops to low and  on pressors  for drop in bp,   See above Chronic syst heart failure NYHA 4.  with acute exacerbation for diruetic and I and o followng, NYHA 4. SYST heart failue; as above. Possible Cellulitis adri lower ext for iv abx. ; see ID note Chronic pain on opioids from home. DM for SSI Possible copd but not a issue here and not requiring other than general support, not need for steroids for all cardiac assoc decompensations/resp difficulties.  
  
Code status:DNR  
DVT prophylaxis: Eliquis ( NOAC)  
  
Care Plan discussed with: Patient/Family and Nurse Disposition: hospice   
  
 
 
 
 
 
 
DISCHARGE DIAGNOSES / PLAN:   
 
1.  as above PENDING TEST RESULTS:  
At the time of discharge the following test results are still pending: na 
 DISPOSITION: PHYSICAL EXAMINATION AT DISCHARGE: 
 Refer to Progress Note During life: + distress; increased wob, massive edema  10/21 bedside exam  
 
 
CHRONIC MEDICAL DIAGNOSES: 
Problem List as of 10/22/2018 Date Reviewed: 2018 Codes Class Noted - Resolved Generalized edema ICD-10-CM: R60.1 ICD-9-CM: 782.3  10/15/2018 - Present NSTEMI (non-ST elevated myocardial infarction) ([facility] [address] ICD-10-CM: I21.4 ICD-9-CM: 410.70  8/13/2018 - Present Fluid overload ICD-10-CM: E87.70 ICD-9-CM: 276.69  5/6/2018 - Present SOB (shortness of breath) ICD-10-CM: R06.02 
ICD-9-CM: 786.05  5/1/2018 - Present Systolic CHF, acute ([facility] ICD-10-CM: I50.21 ICD-9-CM: 428.21, 428.0  2/26/2018 - Present Acute systolic CHF (congestive heart failure) (HCC) ICD-10-CM: I50.21 ICD-9-CM: 428.21, 428.0  2/26/2018 - Present COPD with exacerbation ([place] [address].) ICD-10-CM: J44.1 ICD-9-CM: 491.21  5/24/2014 - Present Morbid obesity ([facility] ICD-10-CM: E66.01 
ICD-9-CM: 278.01  1/8/2014 - Present Hematemesis ICD-10-CM: K92.0 ICD-9-CM: 578.0  1/7/2014 - Present Rectal bleeding ICD-10-CM: K62.5 ICD-9-CM: 569.3  1/7/2014 - Present Obstructive sleep apnea (adult) (pediatric) ICD-10-CM: A98.14 
ICD-9-CM: 327.23  1/7/2014 - Present Chronic airway obstruction, not elsewhere classified ICD-10-CM: J44.9 ICD-9-CM: 916  1/7/2014 - Present Essential hypertension, benign ICD-10-CM: I10 
ICD-9-CM: 401.1  1/7/2014 - Present Type II or unspecified type diabetes mellitus without mention of complication, not stated as uncontrolled ICD-10-CM: E11.9 ICD-9-CM: 250.00  1/7/2014 - Present Coronary artery disease ICD-10-CM: I25.10 ICD-9-CM: 414.00  1/7/2014 - Present Chest pain ICD-10-CM: R07.9 ICD-9-CM: 786.50  1/5/2014 - Present Coronary atherosclerosis of native coronary artery ICD-10-CM: I25.10 ICD-9-CM: 414.01  4/12/2011 - Present Other primary cardiomyopathies ICD-10-CM: I42.8 ICD-9-CM: 425.4  4/12/2011 - Present Chronic systolic heart failure (HCC) ICD-10-CM: I50.22 ICD-9-CM: 428.22  4/12/2011 - Present RESOLVED: Leukocytosis ICD-10-CM: H29.576 ICD-9-CM: 288.60  7/10/2018 - 7/12/2018 RESOLVED: Pneumonia ICD-10-CM: J18.9 ICD-9-CM: 171  7/10/2018 - 7/12/2018 RESOLVED: Tachycardia ICD-10-CM: R00.0 ICD-9-CM: 785.0  7/10/2018 - 7/12/2018 RESOLVED: Hypotension ICD-10-CM: I95.9 ICD-9-CM: 458.9  7/10/2018 - 7/12/2018 RESOLVED: Fever ICD-10-CM: R50.9 ICD-9-CM: 780.60  7/10/2018 - 7/12/2018 RESOLVED: SIRS (systemic inflammatory response syndrome) (HCC) ICD-10-CM: R65.10 ICD-9-CM: 995.90  7/10/2018 - 7/12/2018 Greater than 30  minutes were spent with the patient on counseling and coordination of care Signed:  
Hattie Jang MD 
10/22/2018 10:19 AM

## 2018-10-22 NOTE — ROUTINE PROCESS
0730: Bedside, Verbal and Written shift change report given to Mino Wagner RN (oncoming nurse) by Jodie Shaikh RN (offgoing nurse). Report included the following information SBAR, Kardex, OR Summary, Procedure Summary, Intake/Output, MAR, Recent Results, Med Rec Status, Cardiac Rhythm Sinus Tach with PVCs hx of A-Fib and Alarm Parameters . 1930: Bedside, Verbal and Written shift change report given to Sonja KENNEDY RN (oncoming nurse) by Melissa Cantrell RN (offgoing nurse). Report included the following information SBAR, Kardex, OR Summary, Procedure Summary, Intake/Output, MAR and Recent Results and Comfort Care.

## 2018-10-22 NOTE — PROGRESS NOTES
responded to pt death. No family present at this time. Silent prayer.  follow up as needed. Spiritual Care Partner Volunteer visited patient in   on Documented by: Chaplain Osorio MDiv, MACE 
[phone] PRAY ([phone])

## 2018-10-22 NOTE — PROGRESS NOTES
Called to examine patient who has  at [facility] No response to verbal and tactile stimuli. No respiratory effort. Absent heart sounds and pulses. Pupils fixed and dilated. Patient pronounced dead at 2015 hours.

## 2021-12-22 NOTE — NURSE NAVIGATOR
Heart Failure education booklet given to Mr. Villalta       Educated using teach back method. Discussed diagnosis definition and assessed patient understanding. Reviewed importance of daily weight monitoring and low sodium diet (less than 1500 mg. daily). He weighs but not daily. Reinforced daily weights and what weight gain to report to MD.  Encouraged activity and rest periods within symptom limitations and as ordered by physician. Reviewed lasix,  purpose of medication, potential side effects. Patient denies concerns with obtaining medications. Discussed importance of reporting signs and symptoms of exacerbation  (moving into the next zone) and when to report them to the doctor to prevent re-hospitalization. Mr. Wolfgang Ny was encouraged to keep all appointments with doctor. advised post discharge followup appt will be made at discharge. Patient will also have home health services with [address] [place] [address]    Smoking history assessed. Pt is a current day smoker. Questions answered to patient satisfaction. No

## 2022-03-16 NOTE — PROGRESS NOTES
Date of previous inpatient admission/ ED visit? 5/1/18-5/7/18 Shortness of Breath and 8/79/94-0/11/59 Acute Systolic CHF  What brought the patient back to ED? Patient comes in from home by EMS with complaints of left sided chest pain. Patient also complaining of bilateral lower extremity edema. Did patient decline recommended services during last admission/ ED visit (if yes, what)? NO  Has patient seen a provider since their last inpatient admission/ED visit (if yes, when)? YES, Patient is followed in the community by [facility]. Scheduled appointment with PCP 5/10/18. Patient reports he last saw PCP last week. CM Interventions:  From previous inpatient admission/ED visit: Patient discharged home with family assistance. Follow-up Appointments were made. From current inpatient admission/ED visit:  Anticipated plan is for patient to discharge home with family assistance. There are no current CM consults or needs. Disposition needs TBD/subject to change pending recommendations. CM will continue to follow and assist with disposition needs as they arise. Mode of transport at time of discharge to be provided by patient's family.     Sukhwinder Feliciano MSW/CRM 86.1

## 2022-05-18 NOTE — PROGRESS NOTES
Hospitalist Progress Note Isaias Choudhary MD 
Answering service: [phone] OR [phone] from in house phone Date of Service:  2018 NAME:  Zain Leyva :  1960 MRN:  [id number] Admission Summary: A 57-year-old white male with past medical history of coronary artery disease, chronic systolic CHF, COPD, type 2 diabetes mellitus, hypertension, hyperlipidemia, myocardial infarction, restless leg syndrome, leg edema, gait abnormality presented to the emergency department via EMS from home with chief complaint of leg pain, swelling, and redness. Patient notes pain is severe, diffuse, located in bilateral legs, radiating from bilateral feet to bilateral knees without specific alleviating factors, exacerbated with any movement, touch. The patient is a limited historian. Majority of history was obtained from review of ED and electronic medical records. The patient has chronic leg redness of lower extremities and symptoms notably worsened Interval history / Subjective:  
  
 
Assessment & Plan: 1. Sepsis with fever, tachycardia, leukocytosis, hypotension from cellulitis and PNA 
- Cultures NGTD    
- on levaquin and zosyn covering for PNA and cellulitis - ID consult 2. Hypotension possibly related to Sepsis resolving with IVF  
- somewhat stable d/c BP meds for now IVF 3. Chronic hypoxic respiratory failure due COPD stable  Continue with his O2 nasal cannula and titrate oxygen accordingly. Continue on pulse oximetry. Cont nebs PRN 4. Type 2 diabetes mellitus. Place on Humalog insulin correction coverage, schedule Accu-Cheks, and check hemoglobin A1c level. 5.  Bilateral leg pain. Provide pain management, supportive cares, 
- pt takes ms contin and short acting pain meds will resume half the dose watch for resp depression , d/w pt DVT scan negative 6.   Chronic systolic congestive heart failure,stable NYHA 2-3 .  
- on BB  AND ACE  Monitor closely. 7.  Generalized weakness/debility. Pt.ot when able Code status:Full DVT prophylaxis: Lovenox Care Plan discussed with: Patient/Family and Nurse Disposition: TBD Hospital Problems  Date Reviewed: 7/10/2018 Codes Class Noted POA Leukocytosis ICD-10-CM: X67.564 ICD-9-CM: 288.60  7/10/2018 Unknown * (Principal)Pneumonia ICD-10-CM: J18.9 ICD-9-CM: 327  7/10/2018 Unknown Tachycardia ICD-10-CM: R00.0 ICD-9-CM: 785.0  7/10/2018 Unknown Hypotension ICD-10-CM: I95.9 ICD-9-CM: 458.9  7/10/2018 Unknown Fever ICD-10-CM: R50.9 ICD-9-CM: 780.60  7/10/2018 Unknown SIRS (systemic inflammatory response syndrome) (HCC) ICD-10-CM: R65.10 ICD-9-CM: 995.90  7/10/2018 Unknown Review of Systems: A comprehensive review of systems was negative. Vital Signs:  
 Last 24hrs VS reviewed since prior progress note. Most recent are: 
Visit Vitals  /73 (BP 1 Location: Right arm, BP Patient Position: At rest;Supine)  Pulse 90  Temp 97.6 °F (36.4 °C)  Resp 16  
 Ht 6' 2\" (1.88 m)  Wt 112.9 kg (249 lb)  SpO2 96%  BMI 31.97 kg/m2 Intake/Output Summary (Last 24 hours) at 07/11/18 5802 Last data filed at 07/10/18 2105 Gross per 24 hour Intake                0 ml Output              200 ml Net             -200 ml Physical Examination:  
 
 
     
Constitutional:  No acute distress, on NC   
ENT:  Oral mucous moist, Resp:  CTA bilaterally. CV:  Regular rhythm, normal rate GI:  Soft, +distended, non tender. bs+ Musculoskeletal:  red. Cellulitis 3 + oedema chronic skin changes Neurologic:  Moves all extremities. AAOx3, CN II-XII reviewed Psych:  Good insight, Not anxious nor agitated. Data Review:  
 I personally reviewed  Image and labs Labs:  
 
Recent Labs  
   07/10/18 
 0417  07/09/18 
 2111 WBC  16.8*  17.8* HGB 10.0*  10.8* HCT  32.8*  34.1*  
PLT  328  359 Recent Labs  
   07/10/18 
 0417  07/09/18 2111 NA  136  136  
K  4.1  4.0  
CL  105  101 CO2  24  24 BUN  8  7 CREA  0.87  1.04  
GLU  75  88 CA  7.9*  8.7 Recent Labs  
   07/09/18 2111 SGOT  11* ALT  12  
AP  151* TBILI  0.9 TP  8.0 ALB  2.7*  
GLOB  5.3* No results for input(s): INR, PTP, APTT in the last 72 hours. No lab exists for component: INREXT No results for input(s): FE, TIBC, PSAT, FERR in the last 72 hours. Lab Results Component Value Date/Time Folate 7.9 02/27/2018 03:52 AM  
 Folate 7.5 02/27/2018 03:52 AM  
  
No results for input(s): PH, PCO2, PO2 in the last 72 hours. Recent Labs  
   07/10/18 
 0157  07/09/18 2111 TROIQ  0.05*  <0.05 Lab Results Component Value Date/Time Cholesterol, total 104 07/10/2018 04:17 AM  
 HDL Cholesterol 34 07/10/2018 04:17 AM  
 LDL,Direct 193 (H) 05/21/2014 09:59 AM  
 LDL, calculated 56.8 07/10/2018 04:17 AM  
 Triglyceride 66 07/10/2018 04:17 AM  
 CHOL/HDL Ratio 3.1 07/10/2018 04:17 AM  
 
Lab Results Component Value Date/Time Glucose (POC) 114 (H) 07/11/2018 06:47 AM  
 Glucose (POC) 133 (H) 07/10/2018 09:09 PM  
 Glucose (POC) 96 07/10/2018 04:16 PM  
 Glucose (POC) 91 07/10/2018 01:41 PM  
 Glucose (POC) 71 07/10/2018 01:15 PM  
 
Lab Results Component Value Date/Time  Color DARK YELLOW 07/09/2018 10:39 PM  
 Appearance CLEAR 07/09/2018 10:39 PM  
 Specific gravity 1.017 07/09/2018 10:39 PM  
 pH (UA) 8.0 07/09/2018 10:39 PM  
 Protein TRACE (A) 07/09/2018 10:39 PM  
 Glucose NEGATIVE  07/09/2018 10:39 PM  
 Ketone NEGATIVE  07/09/2018 10:39 PM  
 Bilirubin NEGATIVE  07/09/2018 10:39 PM  
 Urobilinogen 1.0 07/09/2018 10:39 PM  
 Nitrites NEGATIVE  07/09/2018 10:39 PM  
 Leukocyte Esterase NEGATIVE  07/09/2018 10:39 PM  
 Epithelial cells FEW 07/09/2018 10:39 PM  
 Bacteria NEGATIVE  07/09/2018 10:39 PM  
 WBC 0-4 07/09/2018 10:39 PM  
 RBC 0-5 07/09/2018 10:39 PM  
 
 
 
Medications Reviewed:  
 
Current Facility-Administered Medications Medication Dose Route Frequency  sodium chloride (NS) flush 5-10 mL  5-10 mL IntraVENous Q8H  
 sodium chloride (NS) flush 5-10 mL  5-10 mL IntraVENous PRN  
 levoFLOXacin (LEVAQUIN) 750 mg in D5W IVPB  750 mg IntraVENous Q24H  
 nicotine (NICODERM CQ) 21 mg/24 hr patch 1 Patch  1 Patch TransDERmal DAILY  vancomycin (VANCOCIN) 1250 mg in  ml infusion  1,250 mg IntraVENous Q8H  Vancomycin - Pharmacy to Dose   Other Rx Dosing/Monitoring  glucose chewable tablet 16 g  4 Tab Oral PRN  
 dextrose (D50W) injection syrg 12.5-25 g  12.5-25 g IntraVENous PRN  
 glucagon (GLUCAGEN) injection 1 mg  1 mg IntraMUSCular PRN  
 insulin lispro (HUMALOG) injection   SubCUTAneous AC&HS  acetaminophen (TYLENOL) tablet 650 mg  650 mg Oral Q6H PRN  
 0.9% sodium chloride infusion  100 mL/hr IntraVENous CONTINUOUS  
 rOPINIRole (REQUIP) tablet 8 mg  8 mg Oral QHS  acetaminophen (TYLENOL) suppository 650 mg  650 mg Rectal Q6H PRN  pantoprazole (PROTONIX) tablet 40 mg  40 mg Oral ACB  morphine CR (MS CONTIN) tablet 30 mg  30 mg Oral TID  allopurinol (ZYLOPRIM) tablet 100 mg  100 mg Oral DAILY  buPROPion SR [facility] SR) tablet 150 mg  150 mg Oral BID  
 HYDROmorphone (DILAUDID) tablet 4 mg  4 mg Oral BID  metoprolol succinate (TOPROL-XL) XL tablet 25 mg  25 mg Oral DAILY  citalopram (CELEXA) tablet 40 mg  40 mg Oral DAILY  nortriptyline (PAMELOR) capsule 50 mg  50 mg Oral QHS  clopidogrel (PLAVIX) tablet 75 mg  75 mg Oral DAILY  
 
______________________________________________________________________ EXPECTED LENGTH OF STAY: 3d 9h 
ACTUAL LENGTH OF STAY:          1 Dada Cowan MD  
 W Plasty Text: The lesion was extirpated to the level of the fat with a #15 scalpel blade.  Given the location of the defect, shape of the defect and the proximity to free margins a W-plasty was deemed most appropriate for repair.  Using a sterile surgical marker, the appropriate transposition arms of the W-plasty were drawn incorporating the defect and placing the expected incisions within the relaxed skin tension lines where possible.    The area thus outlined was incised deep to adipose tissue with a #15 scalpel blade.  The skin margins were undermined to an appropriate distance in all directions utilizing iris scissors.  The opposing transposition arms were then transposed into place in opposite direction and anchored with interrupted buried subcutaneous sutures.

## 2023-01-10 NOTE — ROUTINE PROCESS
POSTOPERATIVE INSTRUCTIONS    COMFORT:   · You can soak in a tub of warm water for relief.    · You may take Ibuprofen (Motrin, Advil) up to 600 mg 3 times daily if needed for additional pain control.  · You may take Tylenol 1000 mg every 6 hours as needed.  Do not take more than 4 g of Tylenol (acetominophen) in a day.    ACTIVITY:  · You may shower at any time.   · The incision is open. Keep it covered with a gauze. It will continue to drain.  · You may return to normal activities/light exercise when comfortable.   · You may walk, climb stairs or drive when able.     WHAT TO EXPECT:  · A small amount of bloody drainage on your dressings is normal.  · You may have some bruising or discoloration at your incision sites.     NOTIFY YOUR DOCTOR IF YOU HAVE THESE SYMPTOMS:  · Severe pain not relieved by your pain medication.  · Fever over 101°.  · Incision sites:  · Redness or warmth.  · Cloudy or foul smelling drainage.  · Bleeding or continuous oozing that saturates the dressing even after holding 10 minutes of pressure to the incision.    APPOINTMENT:  DR. NINO ANSARI  If you do not already have a follow-up appointment scheduled, please call to schedule an appointment 1 week after surgery at one of the following locations:     · [facility], [address]:  [phone]   0 [facility]  · [facility]:  [phone]    [id number] [facility]    If you have any problems or questions concerning your surgical procedure, please do not hesitate to call [phone].      If you have any problems or questions concerning your surgical procedure, please do not hesitate to call [phone].         TRANSFER - OUT REPORT:    Verbal report given to Denise Hughes(name) on Rishabh Villalta  being transferred to 454(unit) for routine progression of care       Report consisted of patients Situation, Background, Assessment and   Recommendations(SBAR). Information from the following report(s) SBAR, ED Summary and MAR was reviewed with the receiving nurse. Lines:   Peripheral IV 05/01/17 Left Antecubital (Active)   Site Assessment Clean, dry, & intact 5/1/2017  7:03 AM   Phlebitis Assessment 0 5/1/2017  7:03 AM   Infiltration Assessment 0 5/1/2017  7:03 AM   Dressing Status Clean, dry, & intact 5/1/2017  7:03 AM   Dressing Type Tape;Transparent 5/1/2017  7:03 AM   Hub Color/Line Status Pink;Capped;Flushed;Patent 5/1/2017  7:03 AM        Opportunity for questions and clarification was provided.       Patient transported with:   Monitor  O2 @ 3 liters  Registered Nurse pop/soda

## 2023-08-22 NOTE — PROGRESS NOTES
TRANSFER - OUT REPORT:    Verbal report given to Tabitha WATSON on Ashlyn Sensing Nine being transferred to Room 506 for routine progression of care       Report consisted of patients Situation, Background, Assessment and   Recommendations(SBAR). Information from the following report(s) SBAR, Procedure Summary, MAR and Recent Results was reviewed with the receiving nurse. Opportunity for questions and clarification was provided. Spinal

## 2024-07-31 NOTE — PROGRESS NOTES
0800: Held PO meds d/t pt fail STAND. Pt not alert. 0930: Dr. Emil Rao notified of pt's inability to take PO meds. 1000: Dr. Emil Rao orders Comfort Measures only for pt. 
 
1015: Dr Emil Rao confirms continuation of Respiratory treatments. 1230: Valle placed. 1300: Hospice arrives. Pastoral Care notified. 1600: Dr Emil Rao verbal order to give pt 1mg Lorazepam at [phone] and 1600.  
 
1935: No pulse is detected per Tal Segura RN, hospitalist paged. Dr. Emil Rao notified. 1949: Pastoral Care paged. Diabetes/Warfarin/Coumadin